# Patient Record
Sex: FEMALE | Race: ASIAN | NOT HISPANIC OR LATINO | Employment: OTHER | URBAN - METROPOLITAN AREA
[De-identification: names, ages, dates, MRNs, and addresses within clinical notes are randomized per-mention and may not be internally consistent; named-entity substitution may affect disease eponyms.]

---

## 2017-01-03 ENCOUNTER — RADIANT APPOINTMENT (OUTPATIENT)
Dept: MAMMOGRAPHY | Facility: CLINIC | Age: 77
End: 2017-01-03

## 2017-01-03 DIAGNOSIS — Z12.31 VISIT FOR SCREENING MAMMOGRAM: ICD-10-CM

## 2017-03-13 ENCOUNTER — HOSPITAL ENCOUNTER (OUTPATIENT)
Facility: CLINIC | Age: 77
Setting detail: OBSERVATION
Discharge: HOME | End: 2017-03-14
Attending: INTERNAL MEDICINE | Admitting: PEDIATRICS
Payer: COMMERCIAL

## 2017-03-13 DIAGNOSIS — K64.8 INTERNAL HEMORRHOIDS: Primary | ICD-10-CM

## 2017-03-13 DIAGNOSIS — K92.2 GASTROINTESTINAL HEMORRHAGE, UNSPECIFIED GASTROINTESTINAL HEMORRHAGE TYPE: ICD-10-CM

## 2017-03-13 DIAGNOSIS — M06.9 RHEUMATOID ARTHRITIS, INVOLVING UNSPECIFIED SITE, UNSPECIFIED RHEUMATOID FACTOR PRESENCE: ICD-10-CM

## 2017-03-13 LAB
ABO + RH BLD: NORMAL
ABO + RH BLD: NORMAL
ALBUMIN SERPL-MCNC: 3.6 G/DL (ref 3.4–5)
ALBUMIN UR-MCNC: 10 MG/DL
ALP SERPL-CCNC: 77 U/L (ref 40–150)
ALT SERPL W P-5'-P-CCNC: 12 U/L (ref 0–50)
ANION GAP SERPL CALCULATED.3IONS-SCNC: 9 MMOL/L (ref 3–14)
APPEARANCE UR: CLEAR
AST SERPL W P-5'-P-CCNC: 10 U/L (ref 0–45)
BASOPHILS # BLD AUTO: 0 10E9/L (ref 0–0.2)
BASOPHILS NFR BLD AUTO: 0.3 %
BILIRUB SERPL-MCNC: 0.5 MG/DL (ref 0.2–1.3)
BILIRUB UR QL STRIP: NEGATIVE
BLD GP AB SCN SERPL QL: NORMAL
BLOOD BANK CMNT PATIENT-IMP: NORMAL
BUN SERPL-MCNC: 12 MG/DL (ref 7–30)
CALCIUM SERPL-MCNC: 9 MG/DL (ref 8.5–10.1)
CHLORIDE SERPL-SCNC: 107 MMOL/L (ref 94–109)
CO2 SERPL-SCNC: 28 MMOL/L (ref 20–32)
COLOR UR AUTO: YELLOW
CREAT SERPL-MCNC: 0.78 MG/DL (ref 0.52–1.04)
CRP SERPL-MCNC: 10 MG/L (ref 0–8)
DIFFERENTIAL METHOD BLD: ABNORMAL
EOSINOPHIL # BLD AUTO: 0.1 10E9/L (ref 0–0.7)
EOSINOPHIL NFR BLD AUTO: 1 %
ERYTHROCYTE [DISTWIDTH] IN BLOOD BY AUTOMATED COUNT: 14.8 % (ref 10–15)
ERYTHROCYTE [SEDIMENTATION RATE] IN BLOOD BY WESTERGREN METHOD: 63 MM/H (ref 0–30)
GFR SERPL CREATININE-BSD FRML MDRD: 73 ML/MIN/1.7M2
GLUCOSE SERPL-MCNC: 98 MG/DL (ref 70–99)
GLUCOSE UR STRIP-MCNC: NEGATIVE MG/DL
HCT VFR BLD AUTO: 30 % (ref 35–47)
HGB BLD-MCNC: 8.9 G/DL (ref 11.7–15.7)
HGB BLD-MCNC: 9.8 G/DL (ref 11.7–15.7)
HGB UR QL STRIP: ABNORMAL
IMM GRANULOCYTES # BLD: 0 10E9/L (ref 0–0.4)
IMM GRANULOCYTES NFR BLD: 0.1 %
INR PPP: 1.11 (ref 0.86–1.14)
KETONES UR STRIP-MCNC: 10 MG/DL
LACTATE BLD-SCNC: 1.2 MMOL/L (ref 0.7–2.1)
LEUKOCYTE ESTERASE UR QL STRIP: NEGATIVE
LYMPHOCYTES # BLD AUTO: 1.3 10E9/L (ref 0.8–5.3)
LYMPHOCYTES NFR BLD AUTO: 16.2 %
MCH RBC QN AUTO: 32.7 PG (ref 26.5–33)
MCHC RBC AUTO-ENTMCNC: 32.7 G/DL (ref 31.5–36.5)
MCV RBC AUTO: 100 FL (ref 78–100)
MONOCYTES # BLD AUTO: 0.4 10E9/L (ref 0–1.3)
MONOCYTES NFR BLD AUTO: 4.9 %
MUCOUS THREADS #/AREA URNS LPF: PRESENT /LPF
NEUTROPHILS # BLD AUTO: 6.1 10E9/L (ref 1.6–8.3)
NEUTROPHILS NFR BLD AUTO: 77.5 %
NITRATE UR QL: NEGATIVE
NRBC # BLD AUTO: 0.1 10*3/UL
NRBC BLD AUTO-RTO: 1 /100
PH UR STRIP: 6 PH (ref 5–7)
PLATELET # BLD AUTO: 223 10E9/L (ref 150–450)
POTASSIUM SERPL-SCNC: 3.7 MMOL/L (ref 3.4–5.3)
PROT SERPL-MCNC: 7.3 G/DL (ref 6.8–8.8)
RBC # BLD AUTO: 3 10E12/L (ref 3.8–5.2)
RBC #/AREA URNS AUTO: 5 /HPF (ref 0–2)
SODIUM SERPL-SCNC: 144 MMOL/L (ref 133–144)
SP GR UR STRIP: 1.02 (ref 1–1.03)
SPECIMEN EXP DATE BLD: NORMAL
SQUAMOUS #/AREA URNS AUTO: <1 /HPF (ref 0–1)
URN SPEC COLLECT METH UR: ABNORMAL
UROBILINOGEN UR STRIP-MCNC: NORMAL MG/DL (ref 0–2)
WBC # BLD AUTO: 7.8 10E9/L (ref 4–11)
WBC #/AREA URNS AUTO: <1 /HPF (ref 0–2)

## 2017-03-13 PROCEDURE — 85018 HEMOGLOBIN: CPT | Performed by: STUDENT IN AN ORGANIZED HEALTH CARE EDUCATION/TRAINING PROGRAM

## 2017-03-13 PROCEDURE — 99220 ZZC INITIAL OBSERVATION CARE,LEVL III: CPT | Mod: AI | Performed by: PEDIATRICS

## 2017-03-13 PROCEDURE — 36415 COLL VENOUS BLD VENIPUNCTURE: CPT | Performed by: INTERNAL MEDICINE

## 2017-03-13 PROCEDURE — G0378 HOSPITAL OBSERVATION PER HR: HCPCS

## 2017-03-13 PROCEDURE — 96360 HYDRATION IV INFUSION INIT: CPT | Performed by: INTERNAL MEDICINE

## 2017-03-13 PROCEDURE — 80053 COMPREHEN METABOLIC PANEL: CPT | Performed by: INTERNAL MEDICINE

## 2017-03-13 PROCEDURE — 86140 C-REACTIVE PROTEIN: CPT | Performed by: INTERNAL MEDICINE

## 2017-03-13 PROCEDURE — 36415 COLL VENOUS BLD VENIPUNCTURE: CPT | Performed by: STUDENT IN AN ORGANIZED HEALTH CARE EDUCATION/TRAINING PROGRAM

## 2017-03-13 PROCEDURE — 25000128 H RX IP 250 OP 636: Performed by: INTERNAL MEDICINE

## 2017-03-13 PROCEDURE — 85652 RBC SED RATE AUTOMATED: CPT | Performed by: INTERNAL MEDICINE

## 2017-03-13 PROCEDURE — 99285 EMERGENCY DEPT VISIT HI MDM: CPT | Mod: Z6 | Performed by: INTERNAL MEDICINE

## 2017-03-13 PROCEDURE — 81001 URINALYSIS AUTO W/SCOPE: CPT | Performed by: INTERNAL MEDICINE

## 2017-03-13 PROCEDURE — 86850 RBC ANTIBODY SCREEN: CPT | Performed by: INTERNAL MEDICINE

## 2017-03-13 PROCEDURE — 25000132 ZZH RX MED GY IP 250 OP 250 PS 637: Performed by: STUDENT IN AN ORGANIZED HEALTH CARE EDUCATION/TRAINING PROGRAM

## 2017-03-13 PROCEDURE — 85610 PROTHROMBIN TIME: CPT | Performed by: INTERNAL MEDICINE

## 2017-03-13 PROCEDURE — 99285 EMERGENCY DEPT VISIT HI MDM: CPT | Mod: 25 | Performed by: INTERNAL MEDICINE

## 2017-03-13 PROCEDURE — 83605 ASSAY OF LACTIC ACID: CPT | Performed by: INTERNAL MEDICINE

## 2017-03-13 PROCEDURE — 86900 BLOOD TYPING SEROLOGIC ABO: CPT | Performed by: INTERNAL MEDICINE

## 2017-03-13 PROCEDURE — 85025 COMPLETE CBC W/AUTO DIFF WBC: CPT | Performed by: INTERNAL MEDICINE

## 2017-03-13 PROCEDURE — 86901 BLOOD TYPING SEROLOGIC RH(D): CPT | Performed by: INTERNAL MEDICINE

## 2017-03-13 RX ORDER — NALOXONE HYDROCHLORIDE 0.4 MG/ML
.1-.4 INJECTION, SOLUTION INTRAMUSCULAR; INTRAVENOUS; SUBCUTANEOUS
Status: DISCONTINUED | OUTPATIENT
Start: 2017-03-13 | End: 2017-03-14 | Stop reason: HOSPADM

## 2017-03-13 RX ORDER — PANTOPRAZOLE SODIUM 40 MG/1
40 TABLET, DELAYED RELEASE ORAL
Status: DISCONTINUED | OUTPATIENT
Start: 2017-03-13 | End: 2017-03-14 | Stop reason: HOSPADM

## 2017-03-13 RX ORDER — FOLIC ACID 1 MG/1
1 TABLET ORAL DAILY
Status: DISCONTINUED | OUTPATIENT
Start: 2017-03-13 | End: 2017-03-13

## 2017-03-13 RX ORDER — MEDROXYPROGESTERONE ACETATE 10 MG
10 TABLET ORAL DAILY
Status: DISCONTINUED | OUTPATIENT
Start: 2017-03-13 | End: 2017-03-14 | Stop reason: HOSPADM

## 2017-03-13 RX ORDER — TRETINOIN 1 MG/G
CREAM TOPICAL AT BEDTIME
Status: DISCONTINUED | OUTPATIENT
Start: 2017-03-13 | End: 2017-03-13

## 2017-03-13 RX ORDER — ACETAMINOPHEN 325 MG/1
650 TABLET ORAL EVERY 4 HOURS PRN
Status: DISCONTINUED | OUTPATIENT
Start: 2017-03-13 | End: 2017-03-14 | Stop reason: HOSPADM

## 2017-03-13 RX ORDER — ONDANSETRON 2 MG/ML
4 INJECTION INTRAMUSCULAR; INTRAVENOUS EVERY 6 HOURS PRN
Status: DISCONTINUED | OUTPATIENT
Start: 2017-03-13 | End: 2017-03-14 | Stop reason: HOSPADM

## 2017-03-13 RX ORDER — ONDANSETRON 4 MG/1
4 TABLET, ORALLY DISINTEGRATING ORAL EVERY 6 HOURS PRN
Status: DISCONTINUED | OUTPATIENT
Start: 2017-03-13 | End: 2017-03-14 | Stop reason: HOSPADM

## 2017-03-13 RX ADMIN — FOLIC ACID 1 MG: 1 TABLET ORAL at 18:19

## 2017-03-13 RX ADMIN — SODIUM CHLORIDE 1000 ML: 9 INJECTION, SOLUTION INTRAVENOUS at 10:51

## 2017-03-13 RX ADMIN — PANTOPRAZOLE SODIUM 40 MG: 40 TABLET, DELAYED RELEASE ORAL at 15:59

## 2017-03-13 RX ADMIN — MEDROXYPROGESTERONE ACETATE 10 MG: 10 TABLET ORAL at 18:19

## 2017-03-13 RX ADMIN — POLYETHYLENE GLYCOL 3350, SODIUM SULFATE ANHYDROUS, SODIUM BICARBONATE, SODIUM CHLORIDE, POTASSIUM CHLORIDE 4000 ML: 236; 22.74; 6.74; 5.86; 2.97 POWDER, FOR SOLUTION ORAL at 22:14

## 2017-03-13 ASSESSMENT — ENCOUNTER SYMPTOMS
ABDOMINAL PAIN: 0
SHORTNESS OF BREATH: 0
FEVER: 0
BLOOD IN STOOL: 1

## 2017-03-13 NOTE — H&P
INTERNAL MEDICINE HISTORY & PHYSICAL   Patrick Olson ([id number]) admitted on 3/13/2017  Primary care provider: Essence Tamayo         Chief Complaint:     Rectal bleeding         History of Present Illness     Patrick Olson is a 76 year old female with history of  Osteoarthritis, osteoporosis, rheumatoid arthritis on methotrexate, and history of diverticular bleed in 2010 who presents to the ED today for rectal bleeding of >1 week. She first noticed bright red bleeding on Monday of last week (3/6/17). She saw her primary care physician on 3/9/17 who started her on antibiotics (augmentin) for possible diverticulitis/diverticular bleed. It was recommended by her PCP that she present to the ED for active bleeding. She presented to the ED today for dark red/brown stool. She experienced some abdominal pain at the start of the bleeding, but none recently, just discomfort. She has been limiting her diet to soft foods and small portions, but has not noticed an improvement. She does have a history of OA and RA, is on methotrexate weekly, also reports taking celecoxib. She is a former smoker, does not drink alcohol, no history of liver disease. She reportedly has a history of a diverticular bleed in 2010 for which she required a transfusion.     In the ED her rectal exam was normal, no evidence of internal or external hemorrhoids, no fissures. However her stool was strongly Guaiac positive. Labs showed a hemoglobin of 9.8, most recent hemoglobin in Care Everywhere is 9.6 from 2011. Lactate was normal. CRP and ESR were mildly elevated. No leukocytosis, BMP normal.          Past Medical History     Past Medical History   Diagnosis Date     Osteoarthritis      Osteoporosis      Rheumatoid arthritis (H)      Sensorineural hearing loss             Past Surgical History     Past Surgical History   Procedure Laterality Date     Orthopedic surgery              Medications     No current facility-administered medications on  file prior to encounter.   Current Outpatient Prescriptions on File Prior to Encounter:  [DISCONTINUED] Celecoxib (CELEBREX PO) Take 200 mg by mouth 2 times daily   tretinoin (RETIN-A) 0.1 % cream Apply topically At Bedtime   Calcium Carbonate-Vitamin D (CALCIUM 600 + D OR) Take 1,200 mg by mouth   Multiple Vitamins-Minerals (MULTIVITAMIN OR)    folic acid (FOLVITE) 1 MG tablet Take 1 mg by mouth daily   Estradiol (ESTROGEL TD) Place 0.5 mg onto the skin   medroxyPROGESTERone (PROVERA) 10 MG tablet Take 10 mg by mouth daily   methotrexate 2.5 MG tablet Take by mouth once a week   DIPHENHYDRAMINE HCL    EPINEPHrine (EPIPEN) 0.3 MG/0.3ML injection Inject 0.3 mg into the muscle once as needed            Allergies     Allergies   Allergen Reactions     Bee Venom Anaphylaxis     Shrimp Anaphylaxis     Evoxac [Cevimeline] Unknown     Prevnar [Pneumococcal 13-Linda Conj Vacc] Unknown            Social History   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     Tobacco: Former smoker  EtOH: None  Other drugs: None    Social History     Social History     Marital status:      Spouse name: N/A     Number of children: N/A     Years of education: N/A     Occupational History     Not on file.     Social History Main Topics     Smoking status: Former Smoker     Smokeless tobacco: Not on file     Alcohol use No     Drug use: No     Sexual activity: Not on file     Other Topics Concern     Not on file     Social History Narrative            Family History   History reviewed. No pertinent family history.         Review of Systems   Complete ROS negative except as noted in HPI         Vitals and Exam     Physical exam:  /64 (BP Location: Left arm)  Pulse 78  Temp 98  F (36.7  C) (Oral)  Resp 16  SpO2 99%  Breastfeeding? No  Wt Readings from Last 2 Encounters:   12/12/16 52.3 kg (115 lb 6.4 oz)   07/25/16 54.2 kg (119 lb 6.4 oz)     No intake or output data in the 24 hours ending 03/13/17 1365    Physical Exam:   General:  female supine in bed, NAD.   HEENT: Sclerae non-icteric. MMM. PERRL, EOMI.  Cardiac: Normal rate, regular rhythm. No m/r/g. Normal S1, S2.  Pulm: Normal respiratory effort. CTAB, no wheezes, crackles, or rhonchi.   Abd: Soft, non distended, non tender abdomen. No hepatosplenomegaly.  Skin: No jaundice, no rash.  Extremities: No LE edema. No cyanosis. No clubbing.  Joints: No inflammation noted on joints.  Neuro: A&Ox3, no focal deficits. Normal speech.   Psych: Euthymic mood, full affect         Labs   CBC  Recent Labs  Lab 03/13/17  1042   WBC 7.8   RBC 3.00*   HGB 9.8*   HCT 30.0*      MCH 32.7   MCHC 32.7   RDW 14.8          BMP  Recent Labs  Lab 03/13/17  1042      POTASSIUM 3.7   CHLORIDE 107   CO2 28   ANIONGAP 9   GLC 98   BUN 12   CR 0.78   GFRESTIMATED 73   GFRESTBLACK 88   EJ 9.0        INR  Recent Labs  Lab 03/13/17  1042   INR 1.11       Liver panel  Recent Labs  Lab 03/13/17  1042   PROTTOTAL 7.3   ALBUMIN 3.6   BILITOTAL 0.5   ALKPHOS 77   AST 10   ALT 12       Urinalysis  Recent Labs   Lab Test  03/13/17   1124   COLOR  Yellow   APPEARANCE  Clear   URINEGLC  Negative   URINEBILI  Negative   URINEKETONE  10*   SG  1.016   UBLD  Trace*   URINEPH  6.0   PROTEIN  10*   NITRITE  Negative   LEUKEST  Negative   RBCU  5*   WBCU  <1       Cultures  None    Imaging/procedure results:  None    ASSESSMENT & PLAN :    # Acute GI Bleed  History of GI bleed requiring transfusion in 2010. Endoscopy at that time showed no evidence of bleed, diverticulosis and internal hemorrhoids. Current GI bleed could be due to diverticular bleed or hemorrhoids.   - GI consult, appreciate recs  - PO pantoprazole 40mg BID  - clear liquid diet, no reds/purples  - GoLytely prep tonight, colonoscopy tomorrow  - NPO at 07:00  - q6h hgb  - d/c augmentin, no signs of infection    #  Rheumatoid arthritis   Stable, no acute issues. Patient takes methotrexate weekly on Tuesdays.   - methotrexate after colonoscopy  tomorrow    ===Chronic conditions===  # Osteoarthritis: no acute issues, tylenol for pain    FEN: Clear liquid diet, no reds/purples; NPO @ 07:00 tomorrow  Prophylaxis:  DVT: mechanical, no chemical due to risk of bleed   GI: pantoprazole as above  Disposition: admit to observation, to home pending evaluation by GI  Code Status: FULL CODE     Annabella Haq MD  Internal Medicine PGY1  [id number]    Patient was seen and discussed with attending physician Dr. Ruby who agrees with above assessment and plan.    Attestation:  Date of Service (when I saw the patient): 3/13/17    This patient has been seen and evaluated by me, Donald Ruby.  Discussed with the house staff team or resident(s) and agree with the findings and plan in this note.     I have reviewed today's Medications, Vital Signs and Labs.    Here with recurrence of GI bleeding.  GI to perform coloscopy in AM.

## 2017-03-13 NOTE — ED NOTES
Pt arrived in the ED with reports of rectal bleeding x8 days, now is developing abdominal discomfort; states she was seen by her MD for the symptoms.  Pt did not take any of her medications or eat/drink today thinking that she would have a colonoscopy.  Pt is awake, alert, and oriented x4; pt is ambulatory with a steady gait.

## 2017-03-13 NOTE — CONSULTS
GASTROENTEROLOGY CONSULTATION      Date of Admission:  3/13/2017          Chief Complaint:   We were asked by Annabella Haq MD to evaluate this patient with BRBPR.         ASSESSMENT AND RECOMMENDATIONS:   Assessment:  Patrick Olson is a 76 year old female with a history of osteoarthritis, rheumatoid arthritis on methotrexate/celecoxib, osteoporosis, internal hemorrhoids, diverticulosis and possible diverticular bleed in 2010 as her colonoscopy did not indicate evidence of bleed endoscopically. Hgb has been chronically low (11.1 - 9.8).      Patient's BRBPR is likely from her known internal hemorrhoids, but it could also be s/t diverticula bleed or angiodysplasia. Plan is to monitor patient overnight while prepping for colonoscopy tomorrow.      Recommendations  -- Monitor hgb, and transfuse if less than 7  -- Ranitidine 150mg twice daily   -- Clear liquids (no red or purple), NPO at 7:00am  -- 4 liters of Golytely, and if not clear at 10pm, please give 2 more liters of bowel prep  -- Please stop Augmentin as there is no evidence of infection     Gastroenterology follow up recommendations: To be determined       Patient care plan discussed with Dr. Downey, GI staff physician. Thank you for involving us in this patient's care. Please do not hesitate to contact the GI service with any questions or concerns.     Reza Rivera CNP  Department of Gastroenterology   -------------------------------------------------------------------------------------------------------------------   History is obtained from the patient and the medical record.          History of Present Illness:   Patrick Olson is a 76 year old female with a history of osteoarthritis, rheumatoid arthritis on methotrexate/celecoxib, osteoporosis, internal hemorrhoids, diverticulosis and possible diverticular bleed in 2010 as there was no evidence of bleed seen endoscopically. Hgb has been chronically low (11.1 - 9.8).      Patient had BRBPR  with all bowel movements since 3/4/17, and the blood amount has been increasing. She went to her PCP on 3/6/17, and patient reports receiving Augmentin for diverticulitis. In the past couple of weeks her stool has been narrow and finger size circumferentially. She also noticed pain that travels from stomach through the rectum after eating. Therefore, she has been eating small meals and less frequently. Patient is former smoker of x1 year (age [age]).           Past Medical History:   Reviewed and edited as appropriate  Past Medical History   Diagnosis Date     Osteoarthritis      Osteoporosis      Rheumatoid arthritis (H)      Sensorineural hearing loss             Past Surgical History:   Reviewed and edited as appropriate   Past Surgical History   Procedure Laterality Date     Orthopedic surgery              Previous Endoscopy:     Results for orders placed or performed in visit on 08/19/10   COLONOSCOPY   Result Value Ref Range    COLONOSCOPY       [facility], [place]  Endoscopy Department-[facility]  _______________________________________________________________________________  Patient Name: Patrick Olson             Gender: F                                   Procedure Date: 8/19/2010 08:40:00 AM MRN: [id number]                             YOB: 1940             Age: 69                                     Admit Type: Inpatient                 Account #: [id number]                       Note Status: Finalized                Attending MD: Tori Rock MD         Pause For The Cause: Pause for the ca   _______________________________________________________________________________     Procedure:           Colonoscopy  Indications:         Pt with RA, diverticulosis who has had significant                        diverticualr bleed in the recent past, now presents with                        minimal hematochezia with no hgb drop.  Providers:           Tori  Elvia Rock MD, Bam Salazar MD, Vandana Monroy RN  Referring MD:        Essence Tamayo MD  Medicines:           Fentanyl  mcgs, Versed IV 2.5 mgs  Complications:       No immediate complications  _______________________________________________________________________________  Procedure:           - Prior to the procedure, a History and Physical was                        performed, and patient medication allergies were                        reviewed. The patient is competent. The risks and                        benefits of the procedure and the sedation options and                        risks were discussed with the patient. All questions                        were answered and informed consent was obtained. Patient                        identification and proposed procedure were verified by                        the physician in the pre-procedure area in the procedure                        room. Mental Status Examination: normal. Airway                        Examination: normal oropharyngeal airway and neck                        mobility. Respiratory Examination: clear to                        auscultation. CV Examination: normal. Prophylactic                        Antibiotics: The patient does not require prophylactic                        antibiotics. Prior Anticoagulants: The patient has taken                        no previous anticoagulant or antiplatelet agents. ASA                        Grade Assessment: II - A patient with mild systemic                        disease. After reviewing the risks and benefits, the                        patient was deemed in satisfactory condition to undergo                        the procedure. The anesthesia plan was to use moderate                        sedation / analgesia (conscious sedation). Immediately                        prior to administration of medications, the patient was                        re-assessed  for adequacy to receive sedatives. The heart                        rate, respiratory rate, oxygen saturations, blood                        pressure, adequacy of pulmonary ventilation, and                        response to care were monitored throughout the                        procedure. The physical status of the patient was                        re-assessed after the procedure.                       After obtaining informed consent, the colonoscope was                        passed under direct vision. Throughout the procedure,                        the patient's blood pressure, pulse, and oxygen                        saturations were monitored continuously. The Colonoscope                        was introduced through the anus and advanced to the                        ileum. The colonoscopy was performed without difficulty.                        The patient tolerated the procedure well. The quality of                        the prep was adequate.                                                                                   Findings:       Multiple small-mouthed diverticula were found in the entire colon. There        is no endoscopic evidence of: bleeding in the entire colon. The exam was        otherwise normal throughout the examined colon.The terminal ileum        appeared normal. There is no endoscopic evidence of: bleeding in the        distal ileum and in the ileocecal valve. Internal, non-bleeding,        medium-sized hemorrhoids were found during retroflexion.                                                                                   Impression:          - Diverticulosis colon.                       - The terminal ileum is normal.                       - No blood or source of bleeding found.                       - Internal, non bleeding, medium-sized hemorrhoids were                        found, this could have been the source of her                         hematochezia.  Recommendation:      - [facility] for [facility].                       - GI consult team will follow.                                                                                     electronically signed by Tori Rock  ______________________  Tori Rock MD  Signed Date: 8/19/2010 11:00:35 AM  Number of Addenda: 0  I was physically present for the entire viewing portion of the exam.      __________________________  Signature of teaching physician    B4c/D4c  Note initiated on 8/19/2010 08:40:37 AM    _____________________  Bam Salazar MD            Social History:   Reviewed and edited as appropriate  Social History     Social History     Marital status:      Spouse name: N/A     Number of children: N/A     Years of education: N/A     Occupational History     Not on file.     Social History Main Topics     Smoking status: Former Smoker     Smokeless tobacco: Not on file     Alcohol use No     Drug use: No     Sexual activity: Not on file     Other Topics Concern     Not on file     Social History Narrative            Family History:   Reviewed and edited as appropriate  No family history on file.        Allergies:   Reviewed and edited as appropriate     Allergies   Allergen Reactions     Bee Venom Anaphylaxis     Shrimp Anaphylaxis     Evoxac [Cevimeline] Unknown     Prevnar [Pneumococcal 13-Linda Conj Vacc] Unknown            Medications:     Prescriptions Prior to Admission   Medication Sig Dispense Refill Last Dose     tretinoin (RETIN-A) 0.1 % cream Apply topically At Bedtime   Taking     Calcium Carbonate-Vitamin D (CALCIUM 600 + D OR) Take 1,200 mg by mouth   Taking     Multiple Vitamins-Minerals (MULTIVITAMIN OR)    Taking     folic acid (FOLVITE) 1 MG tablet Take 1 mg by mouth daily   Taking     Estradiol (ESTROGEL TD) Place 0.5 mg onto the skin   Taking     medroxyPROGESTERone (PROVERA) 10 MG tablet Take 10 mg by mouth daily   Taking      methotrexate 2.5 MG tablet Take by mouth once a week   Taking     DIPHENHYDRAMINE HCL    Taking     EPINEPHrine (EPIPEN) 0.3 MG/0.3ML injection Inject 0.3 mg into the muscle once as needed   Taking             Review of Systems:   A complete review of systems was performed and is negative except as noted in the HPI           Physical Exam:   /64 (BP Location: Left arm)  Pulse 78  Temp 98  F (36.7  C) (Oral)  Resp 16  SpO2 99%  Breastfeeding? No  Wt:   Wt Readings from Last 2 Encounters:   12/12/16 52.3 kg (115 lb 6.4 oz)   07/25/16 54.2 kg (119 lb 6.4 oz)      Constitutional: cooperative, pleasant, not dyspneic/diaphoretic, no acute distress  Eyes: Sclera anicteric/injected  Ears/nose/mouth/throat: Normal oropharynx without ulcers or exudate, mucus membranes moist, hearing intact  Neck: supple, thyroid normal size  CV: RRR. No edema in LE.  Respiratory: Unlabored breathing. CTA bilaterally.   Lymph: No axillary, submandibular, supraclavicular or inguinal lymphadenopathy  Abd: Nondistended, +bs, no hepatosplenomegaly, no peritoneal signs. LLQ tenderness.  Rectal exam: Dark brown stool with the rectal exam  Skin: warm, perfused, no jaundice  Neuro: AAO x 3, No asterixis  Psych: Normal affect  MSK: Normal gait         Data:   Labs and imaging below were independently reviewed and interpreted    BMP  Recent Labs  Lab 03/13/17  1042      POTASSIUM 3.7   CHLORIDE 107   EJ 9.0   CO2 28   BUN 12   CR 0.78   GLC 98     CBC  Recent Labs  Lab 03/13/17  1042   WBC 7.8   RBC 3.00*   HGB 9.8*   HCT 30.0*      MCH 32.7   MCHC 32.7   RDW 14.8        INR  Recent Labs  Lab 03/13/17  1042   INR 1.11     LFTs  Recent Labs  Lab 03/13/17  1042   ALKPHOS 77   AST 10   ALT 12   BILITOTAL 0.5   PROTTOTAL 7.3   ALBUMIN 3.6      PANCNo lab results found in last 7 days.

## 2017-03-13 NOTE — PLAN OF CARE
Problem: Discharge Planning  Goal: Discharge Planning (Adult, OB, Behavioral, Peds)  Observation goals PRIOR TO DISCHARGE     Comments: -diagnostic tests and consults completed and resulted : No not yet  -vital signs normal or at patient baseline: Yes  Nurse to notify provider when observation goals have been met and patient is ready for discharge.

## 2017-03-13 NOTE — IP AVS SNAPSHOT
[address] Observation [address] [address]    Phone:  [phone]    Fax:  [phone]                                       After Visit Summary   3/13/2017    Patrick Olson    MRN: [id number]           After Visit Summary Signature Page     I have received my discharge instructions, and my questions have been answered. I have discussed any challenges I see with this plan with the nurse or doctor.    ..........................................................................................................................................  Patient/Patient Representative Signature      ..........................................................................................................................................  Patient Representative Print Name and Relationship to Patient    ..................................................               ................................................  Date                                            Time    ..........................................................................................................................................  Reviewed by Signature/Title    ...................................................              ..............................................  Date                                                            Time

## 2017-03-13 NOTE — IP AVS SNAPSHOT
MRN:[id number]                      After Visit Summary   3/13/2017    Patrick Olson    MRN: [id number]           Thank you!     Thank you for choosing [facility] for your care. Our goal is always to provide you with excellent care. Hearing back from our patients is one way we can continue to improve our services. Please take a few minutes to complete the written survey that you may receive in the mail after you visit with us. Thank you!        Patient Information     Date Of Birth          1940        About your hospital stay     You were admitted on:  March 13, 2017 You last received care in the:  [facility] Observation [facility]    You were discharged on:  March 14, 2017        Reason for your hospital stay       You were admitted for a GI bleed. You were evaluated with a colonoscopy. No sign of bleeding was seen.                  Who to Call     For medical emergencies, please call 911.  For non-urgent questions about your medical care, please call your primary care provider or clinic, [phone]  For questions related to your surgery, please call your surgery clinic        Attending Provider     Provider Specialty    Erasmo Ruffin MD Internal Medicine    Donald Ruby MD Pediatrics       Primary Care Provider Office Phone # Fax #    Essence Tamayo [phone]       [address] [address]         When to contact your care team       Contact your primary care provider or present to the emergency department if you have temperature >101 degrees, if you have large volume bleeding, or if you feel you may pass out.                  After Care Instructions     Activity       Your activity upon discharge: activity as tolerated            Diet       Follow this diet upon discharge: regular diet, fiber            Discharge Instructions       Please follow-up with your primary care provider with 2-3 days to discuss results of  "biopsy and culture, and to recheck hemoglobin. Stop taking Augmentin as there was no sign of infection on colonoscopy.                  Pending Results     Date and Time Order Name Status Description    3/14/2017 1101 Surgical pathology exam In process     3/14/2017 1100 Clostridium difficile toxin B PCR In process             Statement of Approval     Ordered          17 1503  I have reviewed and agree with all the recommendations and orders detailed in this document.  EFFECTIVE NOW     Approved and electronically signed by:  Annabella Haq MD             Admission Information     Date & Time Provider Department Dept. Phone    3/13/2017 Donald Ruby MD Unit 6D Observation [facility] [place] [phone]      Your Vitals Were     Blood Pressure Pulse Temperature Respirations Pulse Oximetry       117/58 78 98.7  F (37.1  C) (Oral) 16 100%       MyChart Information     [employer] lets you send messages to your doctor, view your test results, renew your prescriptions, schedule appointments and more. To sign up, go to [email or web address] . Click on \"Log in\" on the left side of the screen, which will take you to the Welcome page. Then click on \"Sign up Now\" on the right side of the page.     You will be asked to enter the access code listed below, as well as some personal information. Please follow the directions to create your username and password.     Your access code is: [email or web address]  Expires: 2017  3:07 PM     Your access code will  in 90 days. If you need help or a new code, please call your [place] clinic or [phone].        Care EveryWhere ID     This is your Care EveryWhere ID. This could be used by other organizations to access your [facility] medical records  [id number]           Review of your medicines      START taking        Dose / Directions    hydrocortisone 25 MG Suppository   Commonly known as:  ANUSOL-HC   Used for:  Internal hemorrhoids        Dose:  25 mg   Place 1 " suppository (25 mg) rectally 2 times daily as needed for hemorrhoids   Quantity:  28 suppository   Refills:  0         CONTINUE these medicines which may have CHANGED, or have new prescriptions. If we are uncertain of the size of tablets/capsules you have at home, strength may be listed as something that might have changed.        Dose / Directions    ranitidine 150 MG tablet   Commonly known as:  ZANTAC   This may have changed:    - medication strength  - when to take this   Used for:  Gastrointestinal hemorrhage, unspecified gastrointestinal hemorrhage type        Dose:  150 mg   Take 1 tablet (150 mg) by mouth 2 times daily   Refills:  0         CONTINUE these medicines which have NOT CHANGED        Dose / Directions    bromfenac 0.09 % ophthalmic solution   Commonly known as:  XIBROM        Dose:  1 drop   Place 1 drop Into the left eye 2 times daily   Refills:  0       calcium carb 1250 mg (500 mg [place])/vitamin D 200 units 500-200 MG-UNIT per tablet   Commonly known as:  OSCAL with D        Dose:  2 tablet   Take 2 tablets by mouth daily   Refills:  0       CELEBREX PO        Dose:  200 mg   Take 200 mg by mouth 2 times daily   Refills:  0       EPIPEN 0.3 MG/0.3ML injection   Generic drug:  EPINEPHrine        Dose:  0.3 mg   Inject 0.3 mg into the muscle once as needed   Refills:  0       ESTRADIOL PO        Dose:  0.5 mg   Take 0.5 mg by mouth daily   Refills:  0       folic acid 1 MG tablet   Commonly known as:  FOLVITE        Dose:  3 mg   Take 3 mg by mouth daily   Refills:  0       medroxyPROGESTERone 10 MG tablet   Commonly known as:  PROVERA        Dose:  10 mg   Take 10 mg by mouth daily One tablet (10 mg) daily for Days 1-12 of each month.   Refills:  0       methotrexate 2.5 MG tablet CHEMO        Dose:  15 mg   Take 15 mg by mouth once a week Weekly on Tuesdays   Refills:  0       OCUVITE PRESERVISION PO        Dose:  1 tablet   Take 1 tablet by mouth daily   Refills:  0       PILOCARPINE HCL PO         Dose:  5 mg   Take 5 mg by mouth 3 times daily   Refills:  0       tretinoin 0.1 % cream   Commonly known as:  RETIN-A        Apply topically At Bedtime   Refills:  0            Where to get your medicines      These medications were sent to [id number] - [address] AT [address] & [address] [address]     Phone:  [phone]     hydrocortisone 25 MG Suppository         Some of these will need a paper prescription and others can be bought over the counter. Ask your nurse if you have questions.     You don't need a prescription for these medications     ranitidine 150 MG tablet                Protect others around you: Learn how to safely use, store and throw away your medicines at [email or web address].             Medication List: This is a list of all your medications and when to take them. Check marks below indicate your daily home schedule. Keep this list as a reference.      Medications           Morning Afternoon Evening Bedtime As Needed    bromfenac 0.09 % ophthalmic solution   Commonly known as:  XIBROM   Place 1 drop Into the left eye 2 times daily                                calcium carb 1250 mg (500 mg [place])/vitamin D 200 units 500-200 MG-UNIT per tablet   Commonly known as:  OSCAL with D   Take 2 tablets by mouth daily                                CELEBREX PO   Take 200 mg by mouth 2 times daily                                EPIPEN 0.3 MG/0.3ML injection   Inject 0.3 mg into the muscle once as needed   Generic drug:  EPINEPHrine                                ESTRADIOL PO   Take 0.5 mg by mouth daily                                folic acid 1 MG tablet   Commonly known as:  FOLVITE   Take 3 mg by mouth daily   Last time this was given:  1 mg on 3/13/2017  6:19 PM                                hydrocortisone 25 MG Suppository   Commonly known as:  ANUSOL-HC   Place 1 suppository (25 mg) rectally 2 times daily as  needed for hemorrhoids                                medroxyPROGESTERone 10 MG tablet   Commonly known as:  PROVERA   Take 10 mg by mouth daily One tablet (10 mg) daily for Days 1-12 of each month.   Last time this was given:  10 mg on 3/13/2017  6:19 PM                                methotrexate 2.5 MG tablet CHEMO   Take 15 mg by mouth once a week Weekly on Tuesdays                                OCUVITE PRESERVISION PO   Take 1 tablet by mouth daily                                PILOCARPINE HCL PO   Take 5 mg by mouth 3 times daily                                ranitidine 150 MG tablet   Commonly known as:  ZANTAC   Take 1 tablet (150 mg) by mouth 2 times daily                                tretinoin 0.1 % cream   Commonly known as:  RETIN-A   Apply topically At Bedtime

## 2017-03-13 NOTE — ED PROVIDER NOTES
"  History     Chief Complaint   Patient presents with     Rectal Bleeding     HPI  Patrick Olson is a 76 year old female who presents to the ED today with concern for rectal bleeding. Patient reports onset of bright red blood in stool on Monday (~7 days ago). She was evaluated for this on Thursday and started on a course of antibiotics. She notes her doctor was concerned she maybe having a diverticular bleed as she has had one in the past. Patient was recommended by her doctor to come into the ED if she was \"actively bleeding\". Patient states she was sure what this meant however she continued to have blood in herstool and noted today the blood was more of a dark brown color. She endorses some abdominal discomfort last week and now reports gurgling from paraumbilical region to supra pubic region. She states she has tried a soft food diet and eating only small portions at a time with no symptomatic improvement.          I have reviewed the Medications, Allergies, Past Medical and Surgical History, and Social History in the Epic system.        PAST MEDICAL HISTORY:   Past Medical History   Diagnosis Date     Osteoarthritis      Osteoporosis      Rheumatoid arthritis (H)      Sensorineural hearing loss        PAST SURGICAL HISTORY:   Past Surgical History   Procedure Laterality Date     Orthopedic surgery         FAMILY HISTORY: No family history on file.    SOCIAL HISTORY:   Social History   Substance Use Topics     Smoking status: Former Smoker     Smokeless tobacco: Not on file     Alcohol use No     No current facility-administered medications for this encounter.      Current Outpatient Prescriptions   Medication     [DISCONTINUED] Celecoxib (CELEBREX PO)     tretinoin (RETIN-A) 0.1 % cream     Calcium Carbonate-Vitamin D (CALCIUM 600 + D OR)     Multiple Vitamins-Minerals (MULTIVITAMIN OR)     folic acid (FOLVITE) 1 MG tablet     Estradiol (ESTROGEL TD)     medroxyPROGESTERone (PROVERA) 10 MG tablet     " methotrexate 2.5 MG tablet     DIPHENHYDRAMINE HCL     EPINEPHrine (EPIPEN) 0.3 MG/0.3ML injection        Allergies   Allergen Reactions     Bee Venom Anaphylaxis     Shrimp Anaphylaxis     Evoxac [Cevimeline] Unknown     Prevnar [Pneumococcal 13-Linda Conj Vacc] Unknown       Review of Systems   Constitutional: Negative for fever.   Respiratory: Negative for shortness of breath.    Cardiovascular: Negative for chest pain.   Gastrointestinal: Positive for blood in stool. Negative for abdominal pain.   All other systems reviewed and are negative.      Physical Exam      Physical Exam   Constitutional: She is oriented to person, place, and time. No distress.   HENT:   Head: Atraumatic.   Mouth/Throat: Oropharynx is clear and moist. No oropharyngeal exudate.   Eyes: Pupils are equal, round, and reactive to light. No scleral icterus.   Neck: Neck supple. No JVD present.   Cardiovascular: Normal rate, normal heart sounds and intact distal pulses.  Exam reveals no gallop and no friction rub.    No murmur heard.  Pulmonary/Chest: Effort normal and breath sounds normal. No respiratory distress. She has no wheezes. She has no rales. She exhibits no tenderness.   Abdominal: Soft. Bowel sounds are normal. She exhibits no distension and no mass. There is no tenderness. There is no rebound and no guarding.   Genitourinary: Rectal exam shows guaiac positive stool (yellow mucous strongly positive). Rectal exam shows no external hemorrhoid, no internal hemorrhoid, no fissure, no mass, no tenderness and anal tone normal.   Musculoskeletal: She exhibits no edema or tenderness.   Neurological: She is alert and oriented to person, place, and time. No cranial nerve deficit. Coordination normal.   Skin: Skin is warm. No rash noted. She is not diaphoretic.       ED Course     ED Course     Procedures       10:30 AM  The patient was seen and examined by Dr. Ruffin in Room 10.     Results for orders placed or performed during the hospital  encounter of 03/13/17 (from the past 24 hour(s))   CBC with platelets differential     Status: Abnormal    Collection Time: 03/13/17 10:42 AM   Result Value Ref Range    WBC 7.8 4.0 - 11.0 10e9/L    RBC Count 3.00 (L) 3.8 - 5.2 10e12/L    Hemoglobin 9.8 (L) 11.7 - 15.7 g/dL    Hematocrit 30.0 (L) 35.0 - 47.0 %     78 - 100 fl    MCH 32.7 26.5 - 33.0 pg    MCHC 32.7 31.5 - 36.5 g/dL    RDW 14.8 10.0 - 15.0 %    Platelet Count 223 150 - 450 10e9/L    Diff Method Automated Method     % Neutrophils 77.5 %    % Lymphocytes 16.2 %    % Monocytes 4.9 %    % Eosinophils 1.0 %    % Basophils 0.3 %    % Immature Granulocytes 0.1 %    Nucleated RBCs 1 (H) 0 /100    Absolute Neutrophil 6.1 1.6 - 8.3 10e9/L    Absolute Lymphocytes 1.3 0.8 - 5.3 10e9/L    Absolute Monocytes 0.4 0.0 - 1.3 10e9/L    Absolute Eosinophils 0.1 0.0 - 0.7 10e9/L    Absolute Basophils 0.0 0.0 - 0.2 10e9/L    Abs Immature Granulocytes 0.0 0 - 0.4 10e9/L    Absolute Nucleated RBC 0.1    ABO/Rh type and screen     Status: None    Collection Time: 03/13/17 10:42 AM   Result Value Ref Range    ABO O     RH(D)  Pos     Antibody Screen Neg     Test Valid Only At       [facility]    Specimen Expires 03/16/2017    INR     Status: None    Collection Time: 03/13/17 10:42 AM   Result Value Ref Range    INR 1.11 0.86 - 1.14   Comprehensive metabolic panel     Status: None    Collection Time: 03/13/17 10:42 AM   Result Value Ref Range    Sodium 144 133 - 144 mmol/L    Potassium 3.7 3.4 - 5.3 mmol/L    Chloride 107 94 - 109 mmol/L    Carbon Dioxide 28 20 - 32 mmol/L    Anion Gap 9 3 - 14 mmol/L    Glucose 98 70 - 99 mg/dL    Urea Nitrogen 12 7 - 30 mg/dL    Creatinine 0.78 0.52 - 1.04 mg/dL    GFR Estimate 73 >60 mL/min/1.7m2    GFR Estimate If Black 88 >60 mL/min/1.7m2    Calcium 9.0 8.5 - 10.1 mg/dL    Bilirubin Total 0.5 0.2 - 1.3 mg/dL    Albumin 3.6 3.4 - 5.0 g/dL    Protein Total 7.3 6.8 - 8.8 g/dL    Alkaline  Phosphatase 77 40 - 150 U/L    ALT 12 0 - 50 U/L    AST 10 0 - 45 U/L   Lactic acid whole blood     Status: None    Collection Time: 03/13/17 10:42 AM   Result Value Ref Range    Lactic Acid 1.2 0.7 - 2.1 mmol/L   CRP inflammation     Status: Abnormal    Collection Time: 03/13/17 10:42 AM   Result Value Ref Range    CRP Inflammation 10.0 (H) 0.0 - 8.0 mg/L   Erythrocyte sedimentation rate auto     Status: Abnormal    Collection Time: 03/13/17 10:42 AM   Result Value Ref Range    Sed Rate 63 (H) 0 - 30 mm/h   UA reflex to Microscopic and Culture     Status: Abnormal    Collection Time: 03/13/17 11:24 AM   Result Value Ref Range    Color Urine Yellow     Appearance Urine Clear     Glucose Urine Negative NEG mg/dL    Bilirubin Urine Negative NEG    Ketones Urine 10 (A) NEG mg/dL    Specific Gravity Urine 1.016 1.003 - 1.035    Blood Urine Trace (A) NEG    pH Urine 6.0 5.0 - 7.0 pH    Protein Albumin Urine 10 (A) NEG mg/dL    Urobilinogen mg/dL Normal 0.0 - 2.0 mg/dL    Nitrite Urine Negative NEG    Leukocyte Esterase Urine Negative NEG    Source Midstream Urine     RBC Urine 5 (H) 0 - 2 /HPF    WBC Urine <1 0 - 2 /HPF    Squamous Epithelial /HPF Urine <1 0 - 1 /HPF    Mucous Urine Present (A) NEG /LPF             Labs Ordered and Resulted from Time of ED Arrival Up to the Time of Departure from the ED   CBC WITH PLATELETS DIFFERENTIAL - Abnormal; Notable for the following:        Result Value    RBC Count 3.00 (*)     Hemoglobin 9.8 (*)     Hematocrit 30.0 (*)     Nucleated RBCs 1 (*)     All other components within normal limits   UA MACROSCOPIC WITH REFLEX TO MICRO AND CULTURE - Abnormal; Notable for the following:     Ketones Urine 10 (*)     Blood Urine Trace (*)     Protein Albumin Urine 10 (*)     RBC Urine 5 (*)     Mucous Urine Present (*)     All other components within normal limits   CRP INFLAMMATION - Abnormal; Notable for the following:     CRP Inflammation 10.0 (*)     All other components within normal  limits   ERYTHROCYTE SEDIMENTATION RATE AUTO - Abnormal; Notable for the following:     Sed Rate 63 (*)     All other components within normal limits   INR   COMPREHENSIVE METABOLIC PANEL   LACTIC ACID WHOLE BLOOD   CRP INFLAMMATION   ERYTHROCYTE SEDIMENTATION RATE AUTO   CARDIAC CONTINUOUS MONITORING   ORTHOSTATIC BLOOD PRESSURE AND PULSE   ABO/RH TYPE AND SCREEN       Assessments & Plan (with Medical Decision Making)  GI bleed in the pt with RA on mtx but no NSAIDs, but h/o similar event 2000 that required PRBC transfusion for Hgb 7, colonoscope at that time showed just internal hemorroids, Hgb down to 9.8 from 10.3 baseline appears to be 10-11's, still strongly pos hemoccult, D/W GI-admit and colonoscope in am, not recommending CTA at this time, D/W Medicine-admit.       I have reviewed the nursing notes.    I have reviewed the findings, diagnosis, plan and need for follow up with the patient.    New Prescriptions    No medications on file       Final diagnoses:   Gastrointestinal hemorrhage, unspecified gastrointestinal hemorrhage type     I,Todd Wilcox , sandra serving as a trained medical scribe to document services personally performed by Erasmo Ruffin MD, based on the provider's statements to me.   I, Erasmo Ruffin MD, was physically present and have reviewed and verified the accuracy of this note documented by Todd Wilcox.    3/13/2017   [facility], [place], EMERGENCY DEPARTMENT     Erasmo Ruffin MD  03/13/17 [id number]

## 2017-03-14 VITALS
HEART RATE: 78 BPM | TEMPERATURE: 98.7 F | DIASTOLIC BLOOD PRESSURE: 58 MMHG | OXYGEN SATURATION: 100 % | SYSTOLIC BLOOD PRESSURE: 117 MMHG | RESPIRATION RATE: 16 BRPM

## 2017-03-14 LAB
C DIFF TOX B STL QL: NORMAL
COLONOSCOPY: NORMAL
COPATH REPORT: NORMAL
HGB BLD-MCNC: 10 G/DL (ref 11.7–15.7)
HGB BLD-MCNC: 8.9 G/DL (ref 11.7–15.7)
HGB BLD-MCNC: 9.4 G/DL (ref 11.7–15.7)
SPECIMEN SOURCE: NORMAL

## 2017-03-14 PROCEDURE — 45380 COLONOSCOPY AND BIOPSY: CPT | Performed by: INTERNAL MEDICINE

## 2017-03-14 PROCEDURE — 85018 HEMOGLOBIN: CPT | Performed by: STUDENT IN AN ORGANIZED HEALTH CARE EDUCATION/TRAINING PROGRAM

## 2017-03-14 PROCEDURE — 25000125 ZZHC RX 250: Performed by: INTERNAL MEDICINE

## 2017-03-14 PROCEDURE — 25000128 H RX IP 250 OP 636: Performed by: INTERNAL MEDICINE

## 2017-03-14 PROCEDURE — 36415 COLL VENOUS BLD VENIPUNCTURE: CPT | Performed by: STUDENT IN AN ORGANIZED HEALTH CARE EDUCATION/TRAINING PROGRAM

## 2017-03-14 PROCEDURE — 99153 MOD SED SAME PHYS/QHP EA: CPT | Performed by: INTERNAL MEDICINE

## 2017-03-14 PROCEDURE — 88305 TISSUE EXAM BY PATHOLOGIST: CPT | Performed by: INTERNAL MEDICINE

## 2017-03-14 PROCEDURE — G0378 HOSPITAL OBSERVATION PER HR: HCPCS

## 2017-03-14 PROCEDURE — G0500 MOD SEDAT ENDO SERVICE >5YRS: HCPCS | Performed by: INTERNAL MEDICINE

## 2017-03-14 PROCEDURE — 99217 ZZC OBSERVATION CARE DISCHARGE: CPT | Mod: GC | Performed by: INTERNAL MEDICINE

## 2017-03-14 PROCEDURE — 87493 C DIFF AMPLIFIED PROBE: CPT | Performed by: PEDIATRICS

## 2017-03-14 RX ORDER — HYDROCORTISONE ACETATE 25 MG/1
25 SUPPOSITORY RECTAL 2 TIMES DAILY PRN
Qty: 28 SUPPOSITORY | Refills: 0 | Status: SHIPPED
Start: 2017-03-14 | End: 2019-02-26

## 2017-03-14 RX ORDER — FENTANYL CITRATE 50 UG/ML
INJECTION, SOLUTION INTRAMUSCULAR; INTRAVENOUS PRN
Status: DISCONTINUED | OUTPATIENT
Start: 2017-03-14 | End: 2017-03-14 | Stop reason: HOSPADM

## 2017-03-14 NOTE — DISCHARGE SUMMARY
Medicine Discharge Summary  Patrick Olson MRN: [id number]  1940  Home clinic: [facility]  Primary care provider: Essence Tamayo  ___________________________________          Date of Admission:  3/13/2017  Date of Discharge:  3/14/2017   Admitting Physician:  Donald Ruby MD  Discharge Physician:  Ghazala Adams MD  Discharging Service:  Internal Medicine, [place] [address]     Primary Provider: Essence Tamayo         Reason for Admission:   Patrick Olson is a 77 yo female with a history of osteoporosis, osteoarthritis, rheumatoid arthritis on methotrexate, and a history of diverticular bleed in 2010 who presented with rectal bleeding of 1 week duration and was admitted to observation for bowel clean out and colonoscopy.          Discharge Diagnosis:   # Bright Red Blood Per Rectum  # Rheumatoid Arthritis  # Osteoarthritis         Procedures & Significant Findings:   3/14/17 Colonoscopy Per Report:       The perianal and digital rectal examinations were normal.        The terminal ileum appeared normal.        Scattered mild inflammation characterized by erosions and erythema was        found in the cecum. Biopsies were taken with a cold forceps for        histology. Aspiration of stool was taken to test for clostridium        difficile toxin B        Many small and medium sized diverticula were found in the entire colon.        Non-bleeding internal hemorrhoids were found during retroflexion and        were medium-sized.          Consultations:   Gastroenterology         Hospital Course by Problem:    # Bright Red Blood Per Rectum - Patrick presented with bright red blood per rectum concerning for a GI bleed with a slight drop from her baseline hemoglobin.  She was hemodynamically stable and evaluated by gastroenterology in the emergency department who recommended admission to observation with bowel clean out and colonoscopy.  This was completed on  3/14/2017 while monitored hemoglobin levels were stable.  Procedure revealed no active bleeding source but did note some internal hemorrhoids and non-bleeding diverticuli as well as some mild inflammation of the cecum.  The cecal inflammation may be secondary to bowel prep but biopsies were taken and a stool aspirate was sent for C. Diff testing in the setting of recent antibiotics (found to be negative).  Following the procedure, Patrick was stable and able to tolerate a diet without further observed BRBPR.  She was discharged to home with follow up with her PCP within 2-3 days to review pathology results and hemoglobin recheck.  Antibiotics were discontinued this admission.    # Rheumatoid Arthritis - No acute issues this admission.  Patrick may take her home methotrexate tomorrow (typically takes on Tuesdays but skipped due to procedure) and follow up as noted below.    # Osteoarthritis - No acute issues, continue acetaminophen as needed for pain.    Physical Exam on day of Discharge:  Blood pressure 117/58, pulse 78, temperature 98.7  F (37.1  C), temperature source Oral, resp. rate 16, SpO2 100 %, not currently breastfeeding.  General: well appearing woman, NAD,  at bedside  HEENT: NC/AT  Neck: supple  Respiratory: CTAB  Heart/CV: RRR, no m/r/g  Abdomen/GI: normal bowel sounds, soft, nondistended, nontender  Extremities/MSK: no edema  Skin: no rashes/lesions  Neuro: AOx3, nonfocal  Psych: euthymic    Lines/Tubes:  none         Pending Results:   Colon biopsies - To be followed up at PCP appointment.         Discharge Medications:     Discharge Medication List as of 3/14/2017  3:08 PM      START taking these medications    Details   hydrocortisone (ANUSOL-HC) 25 MG Suppository Place 1 suppository (25 mg) rectally 2 times daily as needed for hemorrhoids, Disp-28 suppository, R-0, Fax         CONTINUE these medications which have CHANGED    Details   ranitidine (ZANTAC) 150 MG tablet Take 1 tablet (150 mg) by  mouth 2 times daily, No Print Out         CONTINUE these medications which have NOT CHANGED    Details   calcium carb 1250 mg, 500 mg Crow,/vitamin D 200 units (OSCAL WITH D) 500-200 MG-UNIT per tablet Take 2 tablets by mouth daily, Historical      ESTRADIOL PO Take 0.5 mg by mouth daily, Historical      PILOCARPINE HCL PO Take 5 mg by mouth 3 times daily, Historical      Multiple Vitamins-Minerals (OCUVITE PRESERVISION PO) Take 1 tablet by mouth daily, Historical      bromfenac (XIBROM) 0.09 % ophthalmic solution Place 1 drop Into the left eye 2 times daily, Historical      Celecoxib (CELEBREX PO) Take 200 mg by mouth 2 times daily, Historical      tretinoin (RETIN-A) 0.1 % cream Apply topically At BedtimeHistorical      folic acid (FOLVITE) 1 MG tablet Take 3 mg by mouth daily , Historical      medroxyPROGESTERone (PROVERA) 10 MG tablet Take 10 mg by mouth daily One tablet (10 mg) daily for Days 1-12 of each month., Historical      methotrexate 2.5 MG tablet Take 15 mg by mouth once a week Weekly on Tuesdays, Historical      EPINEPHrine (EPIPEN) 0.3 MG/0.3ML injection Inject 0.3 mg into the muscle once as needed, Historical                  Discharge Instructions and Follow-Up:     Discharge Procedure Orders  Reason for your hospital stay   Order Comments: You were admitted for a GI bleed. You were evaluated with a colonoscopy. No sign of bleeding was seen.     Activity   Order Comments: Your activity upon discharge: activity as tolerated   Order Specific Question Answer Comments   Is discharge order? Yes      When to contact your care team   Order Comments: Contact your primary care provider or present to the emergency department if you have temperature >101 degrees, if you have large volume bleeding, or if you feel you may pass out.     Discharge Instructions   Order Comments: Please follow-up with your primary care provider with 2-3 days to discuss results of biopsy and culture, and to recheck hemoglobin. Stop  taking Augmentin as there was no sign of infection on colonoscopy.     Diet   Order Comments: Follow this diet upon discharge: regular diet, fiber   Order Specific Question Answer Comments   Is discharge order? Yes                Discharge Disposition:   To home         Condition on Discharge:   Discharge condition: Stable   Code status on discharge: Full Code        Date of service: 3/14/2017    The patient was discussed with Dr. Adams.    Annabella Haq MD  Internal Medicine PGY-1  P: [id number]

## 2017-03-14 NOTE — PLAN OF CARE
Problem: Discharge Planning  Goal: Discharge Planning (Adult, OB, Behavioral, Peds)  -diagnostic tests and consults completed and resulted: No, colonoscopy in AM, labs to be drawn  -vital signs normal or at patient baseline: No, Hgb 8.9

## 2017-03-14 NOTE — PHARMACY-ADMISSION MEDICATION HISTORY
"Admission medication history interview status for the 3/13/2017 admission is complete. See Epic admission navigator for allergy information, pharmacy, prior to admission medications and immunization status.     Medication history interview sources:  Patient, [facility] pill bottles [phone] (did not call)    Changes made to PTA medication list (reason)  Added:   -celecoxib  -pilocarpine  -ranitidine  -Preservision ocular vitamin  -bromfenac  Deleted:   -diphenhydramine  Changed:   -calcium-vitamin D: 1 tablet daily to 2 tablets daily  -topical estrogens to estradiol 0.5 mg daily  -medroxyprogesterone: added \"take 10 mg daily on days 1-12 of each month\"  -methotrexate: updated dose from 2.5 mg to 15 mg  -folic acid: updated dose from 1 mg daily to 3 mg daily    Additional medication history information (including reliability of information, actions taken by pharmacist):  -Pt was somewhat reliable historian, referred to some bottles for medication details and her SO assisted with information as well.  -Pt received an influenza vaccination in November 2016, this was confirmed via [facility] and documented in [facility].      Prior to Admission medications    Medication Sig Last Dose Taking? Auth Provider   calcium carb 1250 mg, 500 mg Platinum,/vitamin D 200 units (OSCAL WITH D) 500-200 MG-UNIT per tablet Take 2 tablets by mouth daily Past Week at Unknown time Yes Unknown, Entered By History   ESTRADIOL PO Take 0.5 mg by mouth daily Past Week at Unknown time Yes Unknown, Entered By History   PILOCARPINE HCL PO Take 5 mg by mouth 3 times daily Past Week at Unknown time Yes Unknown, Entered By History   RANITIDINE HCL PO Take 150 mg by mouth At Bedtime Past Week at Unknown time Yes Unknown, Entered By History   Multiple Vitamins-Minerals (OCUVITE PRESERVISION PO) Take 1 tablet by mouth daily Past Week at Unknown time Yes Unknown, Entered By History   bromfenac (XIBROM) 0.09 % ophthalmic solution Place 1 drop Into the left eye 2 times " daily Past Week at Unknown time Yes Unknown, Entered By History   Celecoxib (CELEBREX PO) Take 200 mg by mouth 2 times daily Past Week at Unknown time Yes Unknown, Entered By History   tretinoin (RETIN-A) 0.1 % cream Apply topically At Bedtime Past Month at Unknown time Yes Reported, Patient   folic acid (FOLVITE) 1 MG tablet Take 3 mg by mouth daily  Past Week at Unknown time Yes Reported, Patient   medroxyPROGESTERone (PROVERA) 10 MG tablet Take 10 mg by mouth daily One tablet (10 mg) daily for Days 1-12 of each month. Past Week at Unknown time Yes Reported, Patient   EPINEPHrine (EPIPEN) 0.3 MG/0.3ML injection Inject 0.3 mg into the muscle once as needed  Yes Reported, Patient   methotrexate 2.5 MG tablet Take 15 mg by mouth once a week Weekly on Tuesdays 3/7/2017  Reported, Patient         Medication history completed by: Florentin Brooks, PharmD

## 2017-03-14 NOTE — PROVIDER NOTIFICATION
Golytely  Prep initiated. 1 soft BM, Pt reported BM is brown, no blood per per.Scheduled Colonoscopy tomorrow.  Blood pressure 129/58, pulse 78, temperature 97.6  F (36.4  C), temperature source Oral, resp. rate 16, SpO2 99 %, not currently breastfeeding.  Will continue to monitor

## 2017-03-14 NOTE — PROGRESS NOTES
D/c to home at 1515. Walked to front door with  and all belongings. PIV removed. Reviewed d/c instructions with pt. Verbalized understanding of d/c instructions and signed papers.

## 2017-03-14 NOTE — PLAN OF CARE
Problem: Discharge Planning  Goal: Discharge Planning (Adult, OB, Behavioral, Peds)  Outpatient/Observation goals to be met before discharge home:     -diagnostic tests and consults completed and resulted: No, colonoscopy in AM, labs to be drawn  -vital signs normal or at patient baseline: No, Hgb 8.9

## 2017-03-14 NOTE — PROGRESS NOTES
Notified MD that patient finished bowel prep at 0200 and stool is clear (no signs of blood) with small particles (look like skin lining) but no stool present. Will continue to monitor

## 2017-03-14 NOTE — PROVIDER NOTIFICATION
Text paged  Provider about a  call from Infectious Disease  Re:Abdominal tissue specimen # 3 Few gram neg Rods, few pmn and Abdominal wall fluid specimen #2 Rare gm neg cris. few pmn.

## 2017-03-14 NOTE — PLAN OF CARE
Problem: Discharge Planning  Goal: Discharge Planning (Adult, OB, Behavioral, Peds)  Outpatient/Observation goals to be met before discharge home:     -diagnostic tests and consults completed and resulted: No, colonoscopy in AM, labs to be drawn  -vital signs normal or at patient baseline: No, Hgb 10

## 2017-03-14 NOTE — PLAN OF CARE
Problem: Discharge Planning  Goal: Discharge Planning (Adult, OB, Behavioral, Peds)  -diagnostic tests and consults completed and resulted: Yes.  -vital signs normal or at patient baseline: yes, Stable 8.9

## 2017-03-14 NOTE — OR NURSING
Colonoscopy done w/ fentanyl and versed for comfort, tolerated well, on RA with VSS, biopsies sent to path

## 2017-03-14 NOTE — PROGRESS NOTES
GASTROENTEROLOGY PROGRESS NOTE    ASSESSMENT:  Patrick Olson is a 76 year old female with a history of osteoarthritis, rheumatoid arthritis on methotrexate/celecoxib, osteoporosis, internal hemorrhoids, diverticulosis and possible diverticular bleed in 2010 as her colonoscopy did not indicate evidence of bleed endoscopically. Hgb has been chronically low ranging from 11.1 - 8.9.     Patient had colonoscopy to evaluate the etiology of the BRBPR, and there was no active bleed seen.  It is possible that the bleed is secondary to the internal hemorrhoids versus diverticulosis seen in the entire colon. However, there was mild random inflammation in the cecum that is possibly related to the bowel prep, biopsies taken. Stool has been aspirated for C. Diff as well because patient was recently on Augmentin.      RECOMMENDATIONS:  -- Anusol suppositories as needed  -- Daily Miralax and fiber to prevent constipation   -- Diet as tolerated   -- Follow up in 2-3 days with PCP to re-check hgb  -- Ranitidine 150mg twice daily   -- Review C. Diff and pathology result (if C. Diff positive, please treat w/oral vancomycin 125mg QID x14 days)  -- GI team is signing off patient to primary team     Patient care plan discussed with Dr. Downey, GI staff physician. Thank you for involving us in this patient's care. Please do not hesitate to contact the GI service with any questions or concerns.     Reza Rivera  Gastroenterology Nurse Practitioner  _______________________________________________________________  S: Patient's abdominal pain is better this morning.    O:  Blood pressure 96/64, pulse 78, temperature 98.3  F (36.8  C), temperature source Oral, resp. rate 14, SpO2 100 %, not currently breastfeeding.    Constitutional: cooperative, pleasant, not dyspneic/diaphoretic, no acute distress  Eyes: Sclera anicteric/injected  Ears/nose/mouth/throat: Normal oropharynx without ulcers or exudate, mucus membranes moist,  hearing intact  Neck: supple, thyroid normal size  CV: RRR. No edema in LE.  Respiratory: Unlabored breathing. CTA bilaterally.   Lymph: No axillary, submandibular, supraclavicular or inguinal lymphadenopathy  Abd: Nondistended, +bs, no hepatosplenomegaly, no peritoneal signs. LLQ tenderness.  Rectal exam: Dark brown stool with the rectal exam  Skin: warm, perfused, no jaundice  Neuro: AAO x 3, No asterixis  Psych: Normal affect  MSK: Normal gait    LABS:  BMP  Recent Labs  Lab 03/13/17  1042      POTASSIUM 3.7   CHLORIDE 107   EJ 9.0   CO2 28   BUN 12   CR 0.78   GLC 98     CBC  Recent Labs  Lab 03/14/17  0557  03/13/17  1042   WBC  --   --  7.8   RBC  --   --  3.00*   HGB 8.9*  < > 9.8*   HCT  --   --  30.0*   MCV  --   --  100   MCH  --   --  32.7   MCHC  --   --  32.7   RDW  --   --  14.8   PLT  --   --  223   < > = values in this interval not displayed.  INR  Recent Labs  Lab 03/13/17  1042   INR 1.11     LFTs  Recent Labs  Lab 03/13/17  1042   ALKPHOS 77   AST 10   ALT 12   BILITOTAL 0.5   PROTTOTAL 7.3   ALBUMIN 3.6      PANCNo lab results found in last 7 days.    HPI:  Patrick Olson is a 76 year old female with a history of osteoarthritis, rheumatoid arthritis on methotrexate/celecoxib, osteoporosis, internal hemorrhoids, diverticulosis and possible diverticular bleed in 2010 as there was no evidence of bleed seen endoscopically. Hgb has been chronically low (11.1 - 9.8).      Patient had BRBPR with all bowel movements since 3/4/17, and the blood amount has been increasing. She went to her PCP on 3/6/17, and patient reports receiving Augmentin for diverticulitis. In the past couple of weeks her stool has been narrow and finger size circumferentially. She also noticed pain that travels from stomach through the rectum after eating. Therefore, she has been eating small meals and less frequently. Patient is former smoker of x1 year (age [age]).    ROS: A comprehensive Review of Systems was asked and  answered in the negative unless specifically commented upon in the HPI    PMHx:  Past Medical History   Diagnosis Date     Osteoarthritis      Osteoporosis      Rheumatoid arthritis (H)      Sensorineural hearing loss        PSurgHx:   Past Surgical History   Procedure Laterality Date     Orthopedic surgery         MEDS:    No current facility-administered medications on file prior to encounter.   Current Outpatient Prescriptions on File Prior to Encounter:  [DISCONTINUED] Celecoxib (CELEBREX PO) Take 200 mg by mouth 2 times daily   tretinoin (RETIN-A) 0.1 % cream Apply topically At Bedtime   Calcium Carbonate-Vitamin D (CALCIUM 600 + D OR) Take 1,200 mg by mouth   Multiple Vitamins-Minerals (MULTIVITAMIN OR)    folic acid (FOLVITE) 1 MG tablet Take 1 mg by mouth daily   Estradiol (ESTROGEL TD) Place 0.5 mg onto the skin   medroxyPROGESTERone (PROVERA) 10 MG tablet Take 10 mg by mouth daily   methotrexate 2.5 MG tablet Take by mouth once a week   DIPHENHYDRAMINE HCL    EPINEPHrine (EPIPEN) 0.3 MG/0.3ML injection Inject 0.3 mg into the muscle once as needed       ALLERGIES:    Allergies   Allergen Reactions     Bee Venom Anaphylaxis     Shrimp Anaphylaxis     Evoxac [Cevimeline] Unknown     Prevnar [Pneumococcal 13-Linda Conj Vacc] Unknown       FHx:  History reviewed. No pertinent family history.    SOCIAL Hx:  Social History     Social History     Marital status:      Spouse name: N/A     Number of children: N/A     Years of education: N/A     Occupational History     Not on file.     Social History Main Topics     Smoking status: Former Smoker     Smokeless tobacco: Not on file     Alcohol use No     Drug use: No     Sexual activity: Not on file     Other Topics Concern     Not on file     Social History Narrative

## 2017-03-20 ENCOUNTER — MEDICAL CORRESPONDENCE (OUTPATIENT)
Dept: HEALTH INFORMATION MANAGEMENT | Facility: CLINIC | Age: 77
End: 2017-03-20

## 2017-03-20 ENCOUNTER — TRANSFERRED RECORDS (OUTPATIENT)
Dept: HEALTH INFORMATION MANAGEMENT | Facility: CLINIC | Age: 77
End: 2017-03-20

## 2017-03-30 ENCOUNTER — PRE VISIT (OUTPATIENT)
Dept: GASTROENTEROLOGY | Facility: CLINIC | Age: 77
End: 2017-03-30

## 2017-03-30 NOTE — TELEPHONE ENCOUNTER
1.  Date/reason for appt: 4/11/17 -- colitis  2.  Referring provider: Essence Tamayo  3.  Call to patient (Yes / No - short description): no, referred  4.  Previous care at / records requested from: [facility] -- referral and records in [facility]  5.  Other: [facility] ED 3/13/17 -- records in [facility].  Colonoscopy 3/13/17 in [facility].

## 2017-04-06 ENCOUNTER — TRANSFERRED RECORDS (OUTPATIENT)
Dept: HEALTH INFORMATION MANAGEMENT | Facility: CLINIC | Age: 77
End: 2017-04-06
Payer: COMMERCIAL

## 2017-04-11 ENCOUNTER — TRANSFERRED RECORDS (OUTPATIENT)
Dept: HEALTH INFORMATION MANAGEMENT | Facility: CLINIC | Age: 77
End: 2017-04-11
Payer: COMMERCIAL

## 2017-04-19 ENCOUNTER — TRANSFERRED RECORDS (OUTPATIENT)
Dept: HEALTH INFORMATION MANAGEMENT | Facility: CLINIC | Age: 77
End: 2017-04-19
Payer: COMMERCIAL

## 2017-05-08 ENCOUNTER — TRANSFERRED RECORDS (OUTPATIENT)
Dept: HEALTH INFORMATION MANAGEMENT | Facility: CLINIC | Age: 77
End: 2017-05-08
Payer: COMMERCIAL

## 2017-06-22 ENCOUNTER — TRANSFERRED RECORDS (OUTPATIENT)
Dept: HEALTH INFORMATION MANAGEMENT | Facility: CLINIC | Age: 77
End: 2017-06-22
Payer: COMMERCIAL

## 2017-07-07 ENCOUNTER — TELEPHONE (OUTPATIENT)
Dept: AUDIOLOGY | Facility: CLINIC | Age: 77
End: 2017-07-07

## 2017-07-07 NOTE — TELEPHONE ENCOUNTER
Patrick Olson was see at the [facility] on 7/7/17 for hearing aid services.  She reported diminished hearing from her hearing aids.  Examination found the devices to be very clean and working properly.  Domes and  filters were replaced as a precaution.  Patrick is scheduled to see her audiologist in ~ 2 weeks to discuss her diminished hearing.  Otherwise, she will return to the clinic as needed.

## 2017-07-24 ENCOUNTER — OFFICE VISIT (OUTPATIENT)
Dept: AUDIOLOGY | Facility: CLINIC | Age: 77
End: 2017-07-24

## 2017-07-24 DIAGNOSIS — H90.3 SENSORY HEARING LOSS, BILATERAL: Primary | ICD-10-CM

## 2017-07-24 NOTE — PROGRESS NOTES
AUDIOLOGY REPORT    BACKGROUND INFORMATION: Patrick Olson was seen in Audiology at the [facility] on 7/24/2017 for follow-up.  The patient has been seen previously in this clinic for hearing assessment 7/25/2016 and results revealed borderline normal sloping to severe sensorineural hearing loss bilaterally for which the patient was fit with binaural hearing amplification 10/19/2016.  T    TEST RESULTS AND PROCEDURES: The patient reports that she is concerned for the retension of the right hearing device and so a retension piece was added. Both hearing aids were deep-cleaned and assessed using electroacoustic analysis and found to be functioning well. The high-frequency gain was increased 5 dB for both devices due to the patient's report of a decrease in clarity of speech. The patient as counseled that if these adjustments do not help she should obtain an order and be scheduled for a hearing evaluation and hearing aid check in the near future.    SUMMARY AND RECOMMENDATIONS: A hearing aid check was performed today.  Adjustments were made as noted above and the patient will return as needed or at least every 4-6 months for cleaning and assessment of hearing aid.  Please call this clinic with questions regarding today s visit.    Jemal Mari.  Licensed Audiologist  [place] #[id number]

## 2017-07-24 NOTE — MR AVS SNAPSHOT
After Visit Summary   2017    Patrick Olson    MRN: [id number]           Patient Information     Date Of Birth          1940        Visit Information        Provider Department      2017 11:00 AM Yasmin Gallagher, Allen SCHUSTER [facility] Audiology        Today's Diagnoses     Sensory hearing loss, bilateral    -  1       Follow-ups after your visit        Who to contact     Please call your clinic at [phone] to:    Ask questions about your health    Make or cancel appointments    Discuss your medicines    Learn about your test results    Speak to your doctor   If you have compliments or concerns about an experience at your clinic, or if you wish to file a complaint, please contact [facility] Patient Relations at [phone] or email us at [email or web address]         Additional Information About Your Visit        MyChart Information     [employer] is an electronic gateway that provides easy, online access to your medical records. With [employer], you can request a clinic appointment, read your test results, renew a prescription or communicate with your care team.     To sign up for Clonet visit the website at [email or web address]   You will be asked to enter the access code listed below, as well as some personal information. Please follow the directions to create your username and password.     Your access code is: [email or web address]  Expires: 10/8/2017  6:31 AM     Your access code will  in 90 days. If you need help or a new code, please contact your [facility] or call [phone] for assistance.        Care EveryWhere ID     This is your Care EveryWhere ID. This could be used by other organizations to access your [place] medical records  [id number]         Blood Pressure from Last 3 Encounters:   17 117/58    Weight from Last 3 Encounters:   16 52.3 kg (115 lb 6.4 oz)   16 54.2 kg (119 lb 6.4 oz)    11/26/12 56.4 kg (124 lb 6.4 oz)              We Performed the Following     No Charge, Hearing Aid Clinic Visit        Primary Care Provider Office Phone # Fax #    Essence Tamayo [phone]       [address] [address]        Equal Access to Services     [facility] : Hadii aad ku hadasho Soomaali, waaxda luqadaha, qaybta kaalmada adeegyada, waxay idiin hayaan ademicheal kharaservando laeliasn . So [facility] [phone].    ATENCIÓN: Si habla español, tiene a farris disposición servicios gratuitos de asistencia lingüística. Wong al [phone].    We comply with applicable federal civil rights laws and [place] laws. We do not discriminate on the basis of race, color, national origin, age, disability sex, sexual orientation or gender identity.            Thank you!     Thank you for choosing [facility] AUDIOLOGY  for your care. Our goal is always to provide you with excellent care. Hearing back from our patients is one way we can continue to improve our services. Please take a few minutes to complete the written survey that you may receive in the mail after your visit with us. Thank you!             Your Updated Medication List - Protect others around you: Learn how to safely use, store and throw away your medicines at www.disposemymeds.org.          This list is accurate as of: 7/24/17 12:29 PM.  Always use your most recent med list.                   Brand Name Dispense Instructions for use Diagnosis    bromfenac 0.09 % ophthalmic solution    XIBROM     Place 1 drop Into the left eye 2 times daily        calcium carb 1250 mg (500 mg [place])/vitamin D 200 units 500-200 MG-UNIT per tablet    OSCAL with D     Take 2 tablets by mouth daily        CELEBREX PO      Take 200 mg by mouth 2 times daily        EPIPEN 0.3 MG/0.3ML injection   Generic drug:  EPINEPHrine      Inject 0.3 mg into the muscle once as needed        ESTRADIOL PO      Take 0.5 mg by mouth daily         folic acid 1 MG tablet    FOLVITE     Take 3 mg by mouth daily        hydrocortisone 25 MG Suppository    ANUSOL-HC    28 suppository    Place 1 suppository (25 mg) rectally 2 times daily as needed for hemorrhoids    Internal hemorrhoids       medroxyPROGESTERone 10 MG tablet    PROVERA     Take 10 mg by mouth daily One tablet (10 mg) daily for Days 1-12 of each month.        methotrexate 2.5 MG tablet CHEMO      Take 15 mg by mouth once a week Weekly on Tuesdays        OCUVITE PRESERVISION PO      Take 1 tablet by mouth daily        PILOCARPINE HCL PO      Take 5 mg by mouth 3 times daily        ranitidine 150 MG tablet    ZANTAC     Take 1 tablet (150 mg) by mouth 2 times daily    Gastrointestinal hemorrhage, unspecified gastrointestinal hemorrhage type       tretinoin 0.1 % cream    RETIN-A     Apply topically At Bedtime

## 2018-05-15 ENCOUNTER — TRANSFERRED RECORDS (OUTPATIENT)
Dept: HEALTH INFORMATION MANAGEMENT | Facility: CLINIC | Age: 78
End: 2018-05-15

## 2018-05-15 ENCOUNTER — OFFICE VISIT (OUTPATIENT)
Dept: ORTHOPEDICS | Facility: CLINIC | Age: 78
End: 2018-05-15
Payer: COMMERCIAL

## 2018-05-15 ENCOUNTER — MEDICAL CORRESPONDENCE (OUTPATIENT)
Dept: HEALTH INFORMATION MANAGEMENT | Facility: CLINIC | Age: 78
End: 2018-05-15

## 2018-05-15 VITALS
BODY MASS INDEX: 19.46 KG/M2 | HEIGHT: 64 IN | SYSTOLIC BLOOD PRESSURE: 121 MMHG | HEART RATE: 89 BPM | WEIGHT: 114 LBS | DIASTOLIC BLOOD PRESSURE: 79 MMHG

## 2018-05-15 DIAGNOSIS — M53.3 CHRONIC LEFT SI JOINT PAIN: Primary | ICD-10-CM

## 2018-05-15 DIAGNOSIS — G89.29 CHRONIC LEFT SI JOINT PAIN: Primary | ICD-10-CM

## 2018-05-15 DIAGNOSIS — M67.952 TENDINOPATHY OF LEFT GLUTEUS MEDIUS: ICD-10-CM

## 2018-05-15 NOTE — PROGRESS NOTES
SPORTS & ORTHOPEDIC WALK-IN VISIT 5/15/2018    Primary Care Physician: Dr. Tamayo  Chronic LBP. Due to increasing yard work and such, has gotten worse. Also having some hip pain. TTP b/l troch. L worse than Right.  Seen at [facility]. XR done. Referred to walk in for assessment of SI jt injections/troch bursitis.   PT in past for LBP. Helped a little.  Reason for visit:     What part of your body is injured / painful?  bilateral hip    What caused the injury /pain? No inciting event     How long ago did your injury occur or pain begin? several weeks ago    What are your most bothersome symptoms? Pain    How would you characterize your symptom?  aching and sharp    What makes your symptoms better? Rest    What makes your symptoms worse? Standing and Sitting    Have you been previously seen for this problem? Yes, [facility]    Medical History:    Any recent changes to your medical history? No    Any new medication prescribed since last visit? No    Have you had surgery on this body part before? Yes 2011 L WINTER at [facility]    Social History:    Occupation: Retired    Handedness: Right    Exercise: Yoga     Review of Systems:    Do you have fever, chills, weight loss? No    Do you have any vision problems? No    Do you have any chest pain or edema? No    Do you have any shortness of breath or wheezing?  No    Do you have stomach problems? No    Do you have any numbness or focal weakness? No    Do you have diabetes? No    Do you have problems with bleeding or clotting? No    Do you have an rashes or other skin lesions? No

## 2018-05-15 NOTE — LETTER
5/15/2018     RE: Patrick Olson  [address] [facility] [address]     Dear Colleague,    Thank you for referring your patient, Patrick Olson, to the [facility] SPORTS AND ORTHOPAEDIC WALK IN CLINIC at [facility]. Please see a copy of my visit note below.          SPORTS & ORTHOPEDIC WALK-IN VISIT 5/15/2018    Primary Care Physician: Dr. Tamayo  Chronic LBP. Due to increasing yard work and such, has gotten worse. Also having some hip pain. TTP b/l troch. L worse than Right.  Seen at [facility]. XR done. Referred to walk in for assessment of SI jt injections/troch bursitis.   PT in past for LBP. Helped a little.  Reason for visit:     What part of your body is injured / painful?  bilateral hip    What caused the injury /pain? No inciting event     How long ago did your injury occur or pain begin? several weeks ago    What are your most bothersome symptoms? Pain    How would you characterize your symptom?  aching and sharp    What makes your symptoms better? Rest    What makes your symptoms worse? Standing and Sitting    Have you been previously seen for this problem? Yes, [facility]    Medical History:    Any recent changes to your medical history? No    Any new medication prescribed since last visit? No    Have you had surgery on this body part before? Yes 2011 L WINTER at [facility]    Social History:    Occupation: Retired    Handedness: Right    Exercise: Yoga     Review of Systems:    Do you have fever, chills, weight loss? No    Do you have any vision problems? No    Do you have any chest pain or edema? No    Do you have any shortness of breath or wheezing?  No    Do you have stomach problems? No    Do you have any numbness or focal weakness? No    Do you have diabetes? No    Do you have problems with bleeding or clotting? No    Do you have an rashes or other skin lesions? No           CHIEF COMPLAINT:  Consult (Back pain)       HISTORY OF PRESENT ILLNESS  Ms. Olson is a  jovanna 77 year old year old female who presents to clinic today with low back and lateral left hip pain.  Patrick explains that she has had long-standing low back pain.  She notes that it is typically worse with overactivity.  It was tolerable over the winter and early spring season however she began gardening and noticed increasing pain over the last several weeks.  She also noticed additional pain in her left lateral hip.  This is worse with walking standing and sitting to some degree.  There is improved with rest.  Pain is described as aching and sharp in the lateral aspect of her left hip and aching in her low back.  She is starting to experience right lateral hip pain as well which is similar in quality, intensity.  She was initially seen and evaluated by [facility] yesterday who referred her on to us for orthopedic evaluation.  Patient notes that she will be traveling next week and she would like to appreciate some relief from this pain.    Treatments to date: No specific treatment to date.    Additional history: Left total hip arthroplasty [facility] 2011.  She has not had any issues with her left hip since this time.    MEDICAL HISTORY  Patient Active Problem List   Diagnosis     SNHL (sensorineural hearing loss)     GI bleed       Current Outpatient Prescriptions   Medication Sig Dispense Refill     bromfenac (XIBROM) 0.09 % ophthalmic solution Place 1 drop Into the left eye 2 times daily       calcium carb 1250 mg, 500 mg [place]/vitamin D 200 units (OSCAL WITH D) 500-200 MG-UNIT per tablet Take 2 tablets by mouth daily       Celecoxib (CELEBREX PO) Take 200 mg by mouth 2 times daily       EPINEPHrine (EPIPEN) 0.3 MG/0.3ML injection Inject 0.3 mg into the muscle once as needed       ESTRADIOL PO Take 0.5 mg by mouth daily       folic acid (FOLVITE) 1 MG tablet Take 3 mg by mouth daily        hydrocortisone (ANUSOL-HC) 25 MG Suppository Place 1 suppository (25 mg) rectally 2 times daily  "as needed for hemorrhoids 28 suppository 0     medroxyPROGESTERone (PROVERA) 10 MG tablet Take 10 mg by mouth daily One tablet (10 mg) daily for Days 1-12 of each month.       methotrexate 2.5 MG tablet Take 15 mg by mouth once a week Weekly on Tuesdays       Multiple Vitamins-Minerals (OCUVITE PRESERVISION PO) Take 1 tablet by mouth daily       PILOCARPINE HCL PO Take 5 mg by mouth 3 times daily       ranitidine (ZANTAC) 150 MG tablet Take 1 tablet (150 mg) by mouth 2 times daily       tretinoin (RETIN-A) 0.1 % cream Apply topically At Bedtime         Allergies   Allergen Reactions     Bee Venom Anaphylaxis     Shrimp Anaphylaxis     Evoxac [Cevimeline] Unknown     Prevnar [Pneumococcal 13-Linda Conj Vacc] Unknown       No family history on file.    Additional medical/Social/Surgical histories reviewed in [facility] and updated as appropriate.     REVIEW OF SYSTEMS (5/17/2018)  CONSTITUTIONAL: Denies fever and weight loss  EYES: Denies acute vision changes  ENT: Denies hearing changes or difficulty swallowing  CARDIAC: Denies chest pain or edema  RESPIRATORY: Denies dyspnea, cough or wheeze  GASTROINTESTINAL: Denies abdominal pain, nausea, vomiting  MUSCULOSKELETAL: See HPI  SKIN: Denies any recent rash or lesion  NEUROLOGICAL: Denies numbness or focal weakness  PSYCHIATRIC: No history of psychiatric symptoms or problems  ENDOCRINE: Diagnosis of diabetes:No  HEMATOLOGY: Denies episodes of easy bleeding      PHYSICAL EXAM  /79  Pulse 89  Ht 1.626 m (5' 4\")  Wt 51.7 kg (114 lb)  BMI 19.57 kg/m2    General  - normal appearance, in no obvious distress  CV  - normal femoral pulse  Pulm  - normal respiratory pattern, non-labored  Musculoskeletal - Left hip  - stance: normal gait without limp, no obvious leg length discrepancy, normal heel and toe walk, single leg squat displays knee valgus, contralateral hip drop, internal rotation of the hip   - inspection: no swelling or effusion,  normal bone and joint alignment, no " obvious deformity  - palpation: tender over the greater trochanter and gluteus medius musculature.  - ROM: pain with active abduction, normal and painless flexion, extension, IR, ER, adduction  - strength: 5/5 in all planes  - special tests:  (-) GELA  (-) FADIR  no pain with axial femoral load  Musculoskeletal - lumbar spine  - stance: Normal gait and stance.  - inspection: normal bone and joint alignment, no obvious scoliosis  - palpation: Tenderness palpation at left sacroiliac joint, exquisitely.  - ROM: Overall preserved range of motion in flexion extension side bending rotation.  - strength: lower extremities 5/5 in all planes  - special tests:  (-) straight leg raise bilaterally  (-) slump test  Neuro  - patellar and Achilles DTRs 2+ bilaterally, no sensory or motor deficit, grossly normal coordination, normal muscle tone  Skin  - no ecchymosis, erythema, warmth, or induration, no obvious rash  Psych  - interactive, appropriate, normal mood and affect    IMAGING : XR Pelvis at [facility] - Mild bilateral SI degeneration, lumbar DDD.      ASSESSMENT & PLAN  Ms. Olson is a 77 year old year old female who presents to clinic today with low back pain and left-sided lateral hip pain which is progressed over the last 3 weeks.  We discussed treatment options at length and patient would like to proceed with more immediate relief, possibly diagnostic and therapeutic SI joint injection.  Additionally she will be enrolled in physical therapy for strengthening of core, lumbar and hip abductors.    Diagnosis:   Left sacroiliac joint pain  Left gluteus tendinopathy    -X-ray guided sacroiliac joint injection  -Home exercise program provided for hip abductor strengthening  -Ibuprofen as needed  -Heat to low back and sacrum  -Follow up 4 weeks    It was a pleasure seeing Patrick today.    Mukesh Lantigua DO, CAM  Primary Care Sports Medicine

## 2018-05-15 NOTE — MR AVS SNAPSHOT
After Visit Summary   5/15/2018    Patrick Olson    MRN: [id number]           Patient Information     Date Of Birth          1940        Visit Information        Provider Department      5/15/2018 1:20 PM Mukesh Lantigua DO [facility] Sports and Orthopaedic Walk In Clinic        Today's Diagnoses     Chronic left SI joint pain    -  1       Follow-ups after your visit        Additional Services     PHYSICAL THERAPY REFERRAL (Internal)       Physical Therapy Referral                  Future tests that were ordered for you today     Open Future Orders        Priority Expected Expires Ordered    XR Sacroiliac Therapeutic Injection Left Routine 5/15/2018 5/15/2019 5/15/2018            Who to contact     Please call your clinic at [phone] to:    Ask questions about your health    Make or cancel appointments    Discuss your medicines    Learn about your test results    Speak to your doctor            Additional Information About Your Visit        MyChart Information     [employer] is an electronic gateway that provides easy, online access to your medical records. With [employer], you can request a clinic appointment, read your test results, renew a prescription or communicate with your care team.     To sign up for coRankt visit the website at [email or web address]   You will be asked to enter the access code listed below, as well as some personal information. Please follow the directions to create your username and password.     Your access code is: [email or web address]  Expires: 2018  2:11 PM     Your access code will  in 90 days. If you need help or a new code, please contact your [facility] or call [phone] for assistance.        Care EveryWhere ID     This is your Care EveryWhere ID. This could be used by other organizations to access your [place] medical records  [id number]        Your Vitals Were     Pulse                   89            Blood Pressure  from Last 3 Encounters:   05/15/18 121/79   03/14/17 117/58    Weight from Last 3 Encounters:   12/12/16 52.3 kg (115 lb 6.4 oz)   07/25/16 54.2 kg (119 lb 6.4 oz)   11/26/12 56.4 kg (124 lb 6.4 oz)              We Performed the Following     PHYSICAL THERAPY REFERRAL (Internal)        Primary Care Provider Office Phone # Fax #    Essence Tamayo [phone]       [address] [address]        Equal Access to Services     [facility]: Hadii luisa arredondo hadjeevan Soflavia, waaxmaddie green, qanilay fordalmamaddie lerma, eloy boston . So [facility] [phone].    ATENCIÓN: Si habla español, tiene a farris disposición servicios gratuitos de asistencia lingüística. [facility] [phone].    We comply with applicable federal civil rights laws and [place] laws. We do not discriminate on the basis of race, color, national origin, age, disability, sex, sexual orientation, or gender identity.            Thank you!     Thank you for choosing [facility] SPORTS AND ORTHOPAEDIC WALK IN CLINIC  for your care. Our goal is always to provide you with excellent care. Hearing back from our patients is one way we can continue to improve our services. Please take a few minutes to complete the written survey that you may receive in the mail after your visit with us. Thank you!             Your Updated Medication List - Protect others around you: Learn how to safely use, store and throw away your medicines at www.disposemymeds.org.          This list is accurate as of 5/15/18  2:11 PM.  Always use your most recent med list.                   Brand Name Dispense Instructions for use Diagnosis    bromfenac 0.09 % ophthalmic solution    XIBROM     Place 1 drop Into the left eye 2 times daily        Calcium carb-Vitamin D 500 mg [place]-200 units 500-200 MG-UNIT per tablet    OSCAL with D;Oyster Shell Calcium     Take 2 tablets by mouth daily        CELEBREX PO       Take 200 mg by mouth 2 times daily        EPIPEN 0.3 MG/0.3ML injection   Generic drug:  EPINEPHrine      Inject 0.3 mg into the muscle once as needed        ESTRADIOL PO      Take 0.5 mg by mouth daily        folic acid 1 MG tablet    FOLVITE     Take 3 mg by mouth daily        hydrocortisone 25 MG Suppository    ANUSOL-HC    28 suppository    Place 1 suppository (25 mg) rectally 2 times daily as needed for hemorrhoids    Internal hemorrhoids       medroxyPROGESTERone 10 MG tablet    PROVERA     Take 10 mg by mouth daily One tablet (10 mg) daily for Days 1-12 of each month.        methotrexate 2.5 MG tablet CHEMO      Take 15 mg by mouth once a week Weekly on Tuesdays        OCUVITE PRESERVISION PO      Take 1 tablet by mouth daily        PILOCARPINE HCL PO      Take 5 mg by mouth 3 times daily        ranitidine 150 MG tablet    ZANTAC     Take 1 tablet (150 mg) by mouth 2 times daily    Gastrointestinal hemorrhage, unspecified gastrointestinal hemorrhage type       tretinoin 0.1 % cream    RETIN-A     Apply topically At Bedtime

## 2018-05-17 ENCOUNTER — HOSPITAL ENCOUNTER (OUTPATIENT)
Dept: CT IMAGING | Facility: CLINIC | Age: 78
Discharge: HOME | End: 2018-05-17
Attending: FAMILY MEDICINE | Admitting: FAMILY MEDICINE
Payer: COMMERCIAL

## 2018-05-17 DIAGNOSIS — G89.29 CHRONIC LEFT SI JOINT PAIN: ICD-10-CM

## 2018-05-17 DIAGNOSIS — M53.3 CHRONIC LEFT SI JOINT PAIN: ICD-10-CM

## 2018-05-17 PROCEDURE — 25000125 ZZHC RX 250: Performed by: FAMILY MEDICINE

## 2018-05-17 PROCEDURE — 27096 INJECT SACROILIAC JOINT: CPT

## 2018-05-17 PROCEDURE — 25000128 H RX IP 250 OP 636: Performed by: FAMILY MEDICINE

## 2018-05-17 RX ORDER — TRIAMCINOLONE ACETONIDE 40 MG/ML
40 INJECTION, SUSPENSION INTRA-ARTICULAR; INTRAMUSCULAR ONCE
Status: COMPLETED | OUTPATIENT
Start: 2018-05-17 | End: 2018-05-17

## 2018-05-17 RX ORDER — BUPIVACAINE HYDROCHLORIDE 2.5 MG/ML
10 INJECTION, SOLUTION EPIDURAL; INFILTRATION; INTRACAUDAL ONCE
Status: COMPLETED | OUTPATIENT
Start: 2018-05-17 | End: 2018-05-17

## 2018-05-17 RX ORDER — LIDOCAINE HYDROCHLORIDE 10 MG/ML
5 INJECTION, SOLUTION EPIDURAL; INFILTRATION; INTRACAUDAL; PERINEURAL ONCE
Status: COMPLETED | OUTPATIENT
Start: 2018-05-17 | End: 2018-05-17

## 2018-05-17 RX ADMIN — BUPIVACAINE HYDROCHLORIDE 25 MG: 2.5 INJECTION, SOLUTION EPIDURAL; INFILTRATION; INTRACAUDAL at 12:26

## 2018-05-17 RX ADMIN — TRIAMCINOLONE ACETONIDE 40 MG: 40 INJECTION, SUSPENSION INTRA-ARTICULAR; INTRAMUSCULAR at 12:26

## 2018-05-17 RX ADMIN — LIDOCAINE HYDROCHLORIDE 5 ML: 10 INJECTION, SOLUTION EPIDURAL; INFILTRATION; INTRACAUDAL; PERINEURAL at 12:25

## 2018-05-17 NOTE — PROGRESS NOTES
CHIEF COMPLAINT:  Consult (Back pain)       HISTORY OF PRESENT ILLNESS  Ms. Olson is a pleasant 77 year old year old female who presents to clinic today with low back and lateral left hip pain.  Patrick explains that she has had long-standing low back pain.  She notes that it is typically worse with overactivity.  It was tolerable over the winter and early spring season however she began gardening and noticed increasing pain over the last several weeks.  She also noticed additional pain in her left lateral hip.  This is worse with walking standing and sitting to some degree.  There is improved with rest.  Pain is described as aching and sharp in the lateral aspect of her left hip and aching in her low back.  She is starting to experience right lateral hip pain as well which is similar in quality, intensity.  She was initially seen and evaluated by [facility] yesterday who referred her on to us for orthopedic evaluation.  Patient notes that she will be traveling next week and she would like to appreciate some relief from this pain.    Treatments to date: No specific treatment to date.    Additional history: Left total hip arthroplasty [facility] 2011.  She has not had any issues with her left hip since this time.    MEDICAL HISTORY  Patient Active Problem List   Diagnosis     SNHL (sensorineural hearing loss)     GI bleed       Current Outpatient Prescriptions   Medication Sig Dispense Refill     bromfenac (XIBROM) 0.09 % ophthalmic solution Place 1 drop Into the left eye 2 times daily       calcium carb 1250 mg, 500 mg [place],/vitamin D 200 units (OSCAL WITH D) 500-200 MG-UNIT per tablet Take 2 tablets by mouth daily       Celecoxib (CELEBREX PO) Take 200 mg by mouth 2 times daily       EPINEPHrine (EPIPEN) 0.3 MG/0.3ML injection Inject 0.3 mg into the muscle once as needed       ESTRADIOL PO Take 0.5 mg by mouth daily       folic acid (FOLVITE) 1 MG tablet Take 3 mg by mouth daily         "hydrocortisone (ANUSOL-HC) 25 MG Suppository Place 1 suppository (25 mg) rectally 2 times daily as needed for hemorrhoids 28 suppository 0     medroxyPROGESTERone (PROVERA) 10 MG tablet Take 10 mg by mouth daily One tablet (10 mg) daily for Days 1-12 of each month.       methotrexate 2.5 MG tablet Take 15 mg by mouth once a week Weekly on Tuesdays       Multiple Vitamins-Minerals (OCUVITE PRESERVISION PO) Take 1 tablet by mouth daily       PILOCARPINE HCL PO Take 5 mg by mouth 3 times daily       ranitidine (ZANTAC) 150 MG tablet Take 1 tablet (150 mg) by mouth 2 times daily       tretinoin (RETIN-A) 0.1 % cream Apply topically At Bedtime         Allergies   Allergen Reactions     Bee Venom Anaphylaxis     Shrimp Anaphylaxis     Evoxac [Cevimeline] Unknown     Prevnar [Pneumococcal 13-Linda Conj Vacc] Unknown       No family history on file.    Additional medical/Social/Surgical histories reviewed in [facility] and updated as appropriate.     REVIEW OF SYSTEMS (5/17/2018)  CONSTITUTIONAL: Denies fever and weight loss  EYES: Denies acute vision changes  ENT: Denies hearing changes or difficulty swallowing  CARDIAC: Denies chest pain or edema  RESPIRATORY: Denies dyspnea, cough or wheeze  GASTROINTESTINAL: Denies abdominal pain, nausea, vomiting  MUSCULOSKELETAL: See HPI  SKIN: Denies any recent rash or lesion  NEUROLOGICAL: Denies numbness or focal weakness  PSYCHIATRIC: No history of psychiatric symptoms or problems  ENDOCRINE: Diagnosis of diabetes:No  HEMATOLOGY: Denies episodes of easy bleeding      PHYSICAL EXAM  /79  Pulse 89  Ht 1.626 m (5' 4\")  Wt 51.7 kg (114 lb)  BMI 19.57 kg/m2    General  - normal appearance, in no obvious distress  CV  - normal femoral pulse  Pulm  - normal respiratory pattern, non-labored  Musculoskeletal - Left hip  - stance: normal gait without limp, no obvious leg length discrepancy, normal heel and toe walk, single leg squat displays knee valgus, contralateral hip drop, internal " rotation of the hip   - inspection: no swelling or effusion,  normal bone and joint alignment, no obvious deformity  - palpation: tender over the greater trochanter and gluteus medius musculature.  - ROM: pain with active abduction, normal and painless flexion, extension, IR, ER, adduction  - strength: 5/5 in all planes  - special tests:  (-) GELA  (-) FADIR  no pain with axial femoral load  Musculoskeletal - lumbar spine  - stance: Normal gait and stance.  - inspection: normal bone and joint alignment, no obvious scoliosis  - palpation: Tenderness palpation at left sacroiliac joint, exquisitely.  - ROM: Overall preserved range of motion in flexion extension side bending rotation.  - strength: lower extremities 5/5 in all planes  - special tests:  (-) straight leg raise bilaterally  (-) slump test  Neuro  - patellar and Achilles DTRs 2+ bilaterally, no sensory or motor deficit, grossly normal coordination, normal muscle tone  Skin  - no ecchymosis, erythema, warmth, or induration, no obvious rash  Psych  - interactive, appropriate, normal mood and affect    IMAGING : XR Pelvis at [facility] - Mild bilateral SI degeneration, lumbar DDD.      ASSESSMENT & PLAN  Ms. Olson is a 77 year old year old female who presents to clinic today with low back pain and left-sided lateral hip pain which is progressed over the last 3 weeks.  We discussed treatment options at length and patient would like to proceed with more immediate relief, possibly diagnostic and therapeutic SI joint injection.  Additionally she will be enrolled in physical therapy for strengthening of core, lumbar and hip abductors.    Diagnosis:   Left sacroiliac joint pain  Left gluteus tendinopathy    -X-ray guided sacroiliac joint injection  -Home exercise program provided for hip abductor strengthening  -Ibuprofen as needed  -Heat to low back and sacrum  -Follow up 4 weeks    It was a pleasure seeing Patrick today.    Mukesh Lantiuga, , CAM  Primary Care  Sports Medicine

## 2018-06-04 ENCOUNTER — THERAPY VISIT (OUTPATIENT)
Dept: PHYSICAL THERAPY | Facility: CLINIC | Age: 78
End: 2018-06-04
Attending: FAMILY MEDICINE
Payer: COMMERCIAL

## 2018-06-04 DIAGNOSIS — G89.29 CHRONIC LEFT-SIDED LOW BACK PAIN WITH LEFT-SIDED SCIATICA: Primary | ICD-10-CM

## 2018-06-04 DIAGNOSIS — M54.42 CHRONIC LEFT-SIDED LOW BACK PAIN WITH LEFT-SIDED SCIATICA: Primary | ICD-10-CM

## 2018-06-04 PROCEDURE — 97112 NEUROMUSCULAR REEDUCATION: CPT | Mod: GP | Performed by: PHYSICAL THERAPIST

## 2018-06-04 PROCEDURE — G8979 MOBILITY GOAL STATUS: HCPCS | Mod: GP | Performed by: PHYSICAL THERAPIST

## 2018-06-04 PROCEDURE — 97110 THERAPEUTIC EXERCISES: CPT | Mod: GP | Performed by: PHYSICAL THERAPIST

## 2018-06-04 PROCEDURE — G8978 MOBILITY CURRENT STATUS: HCPCS | Mod: GP | Performed by: PHYSICAL THERAPIST

## 2018-06-04 PROCEDURE — 97161 PT EVAL LOW COMPLEX 20 MIN: CPT | Mod: GP | Performed by: PHYSICAL THERAPIST

## 2018-06-04 NOTE — MR AVS SNAPSHOT
After Visit Summary   6/4/2018    Patrick Olson    MRN: [id number]           Patient Information     Date Of Birth          1940        Visit Information        Provider Department      6/4/2018 12:20 PM Blank Pedroza PT [facility]        Today's Diagnoses     Chronic left-sided low back pain with left-sided sciatica    -  1       Follow-ups after your visit        Your next 10 appointments already scheduled     Jun 11, 2018 10:20 AM CDT   [facility] ([facility])    [address] [place] [address] [address]  [place] [address]   [phone]            Jun 22, 2018 10:20 AM CDT   [facility] ([facility])    [address] [place] [address] [address]  [place] [address]   [phone]              Who to contact     If you have questions or need follow up information about today's clinic visit or your schedule please contact [facility] directly at [phone].  Normal or non-critical lab and imaging results will be communicated to you by MyChart, letter or phone within 4 business days after the clinic has received the results. If you do not hear from us within 7 days, please contact the clinic through [employer] or phone. If you have a critical or abnormal lab result, we will notify you by phone as soon as possible.  Submit refill requests through [employer] or call your pharmacy and they will forward the refill request to us. Please allow 3 business days for your refill to be completed.          Additional Information About Your Visit        [employer] Information     [employer] lets you send messages to your doctor, view your test results, renew your prescriptions, schedule appointments and  "more. To sign up, go to [email or web address] . Click on \"Log in\" on the left side of the screen, which will take you to the Welcome page. Then click on \"Sign up Now\" on the right side of the page.     You will be asked to enter the access code listed below, as well as some personal information. Please follow the directions to create your username and password.     Your access code is: [email or web address]  Expires: 2018  2:11 PM     Your access code will  in 90 days. If you need help or a new code, please call your [place] clinic or [phone].        Care EveryWhere ID     This is your Care EveryWhere ID. This could be used by other organizations to access your [place] medical records  [id number]         Blood Pressure from Last 3 Encounters:   05/15/18 121/79   17 117/58    Weight from Last 3 Encounters:   05/15/18 51.7 kg (114 lb)   16 52.3 kg (115 lb 6.4 oz)   16 54.2 kg (119 lb 6.4 oz)              We Performed the Following     SARABJIT Inital Eval Report     Neuromuscular Re-Education     PT Eval, Low Complexity (67094)     Therapeutic Exercises        Primary Care Provider Office Phone # Fax #    Essence Tamayo [phone]       [address] [address]        Equal Access to Services     WADE COVINGTON : Hadii aad ku hadasho Sochrisali, waaxda luqadaha, qaybta kaalmada eloy lerma. So [facility] [phone].    ATENCIÓN: Si isaella sylvia, tiene a farris disposición servicios gratuitos de asistencia lingüística. Wong al [phone].    We comply with applicable federal civil rights laws and [place] laws. We do not discriminate on the basis of race, color, national origin, age, disability, sex, sexual orientation, or gender identity.            Thank you!     Thank you for choosing [facility]ER[facility]  for your care. Our goal is always to " provide you with excellent care. Hearing back from our patients is one way we can continue to improve our services. Please take a few minutes to complete the written survey that you may receive in the mail after your visit with us. Thank you!             Your Updated Medication List - Protect others around you: Learn how to safely use, store and throw away your medicines at www.disposemymeds.org.          This list is accurate as of 6/4/18  3:26 PM.  Always use your most recent med list.                   Brand Name Dispense Instructions for use Diagnosis    bromfenac 0.09 % ophthalmic solution    XIBROM     Place 1 drop Into the left eye 2 times daily        Calcium carb-Vitamin D 500 mg [place]-200 units 500-200 MG-UNIT per tablet    OSCAL with D;Oyster Shell Calcium     Take 2 tablets by mouth daily        CELEBREX PO      Take 200 mg by mouth 2 times daily        EPIPEN 0.3 MG/0.3ML injection   Generic drug:  EPINEPHrine      Inject 0.3 mg into the muscle once as needed        ESTRADIOL PO      Take 0.5 mg by mouth daily        folic acid 1 MG tablet    FOLVITE     Take 3 mg by mouth daily        hydrocortisone 25 MG Suppository    ANUSOL-HC    28 suppository    Place 1 suppository (25 mg) rectally 2 times daily as needed for hemorrhoids    Internal hemorrhoids       medroxyPROGESTERone 10 MG tablet    PROVERA     Take 10 mg by mouth daily One tablet (10 mg) daily for Days 1-12 of each month.        methotrexate 2.5 MG tablet CHEMO      Take 15 mg by mouth once a week Weekly on Tuesdays        OCUVITE PRESERVISION PO      Take 1 tablet by mouth daily        PILOCARPINE HCL PO      Take 5 mg by mouth 3 times daily        ranitidine 150 MG tablet    ZANTAC     Take 1 tablet (150 mg) by mouth 2 times daily    Gastrointestinal hemorrhage, unspecified gastrointestinal hemorrhage type       tretinoin 0.1 % cream    RETIN-A     Apply topically At Bedtime

## 2018-06-04 NOTE — LETTER
[facility] PHYSICAL THERAPY  [address]  [place] [address]  [phone]    2018    Re: Patrick Olson   :   1940  MRN:  [id number]   REFERRING PHYSICIAN:   Mukesh Lantigua    [facility] PH[facility]    Date of Initial Evaluation:  2018  Visits:  Rxs Used: 1  Reason for Referral:  Chronic left-sided low back pain with left-sided sciatica    EVALUATION SUMMARY    Subjective:  Patient is a 77 year old female presenting with rehab back hpi. The history is provided by the patient. No  was used.   Patrick Olson is a 77 year old female with a lumbar and sacroiliac condition.  Condition occurred with:  Degenerative joint disease and other reason.  Condition occurred: other.  This is a chronic condition  I have RA and my upper body is manageable.  I have had chronic back pain, Left WINTER .  My left hip and low back pain has been off and on.  Most of the pain is on the outside of the left low back and left SI joint.  When I bend it is on both sides. The pain has been increasing and had SI injection 2018.   I go to senior exercise classes.  I do stretching in the morning.  .    Patient reports pain:  Lower lumbar spine, lumbar spine left and lumbar spine right.  Radiates to:  Gluteals left, thigh left, lower leg left and foot left.  Pain is described as aching (dull) and is constant and reported as 1/10 and 5/10 (range).  Associated symptoms:  Tingling (muscle spasms in both calves, leg going a sleep occasionally.  ). Pain is worse in the P.M..  Symptoms are exacerbated by bending (standing 1 hour, bending to do garden) and relieved by heat and NSAID's.  Since onset symptoms are gradually worsening (since the injection).  Special testing: x-ray,    Previous treatment includes physical therapy (hip replacement).      Pertinent medical history includes:  Implanted device,  rheumatoid arthritis and osteoarthritis.  Medical allergies: yes (shrimp. bee sting).  Other surgeries include:  Orthopedic surgery (left WINTER).  Current medications:  Anti-inflammatory, pain medication and sleep medication.  Current occupation is , retired.    Employment tasks: sitting.  Barriers: house, yard work.    Red flags:  None as reported by the patient.                 Objective:  Standing Alignment:    Lumbar:  Lordosis decr  Pelvic:  Normal  Ankle/Foot:  Calcaneal valgus L  Gait:    Deviations:  Hip:  Decr dynamic control L and Trendelenberg L  Flexibility/Screens:   Re: Patrick Olson   :   1940    Positive screens:  Hip; Lumbar and SI Joint  Lower Extremity:  Decreased left lower extremity flexibility:Hip Flexors; Quadriceps; Hamstrings and Gastroc  Decreased right lower extremity flexibility:  Hip Flexors; Quadriceps; Hamstrings and Gastroc       Lumbar/SI Evaluation  ROM:    AROM Lumbar:   Flexion:        Ankles , 5 reps increase in motion, 10 reps increase in soreness  Ext:                    Moderate movement and feels better   Side Bend:        Left:     Right:   Rotation:           Left:     Right:   Side Glide:        Left:     Right:      Lumbar Myotomes:    T12-L3 (Hip Flex):  Left: 5    Right: 5  L2-4 (Quads):  Left:  5    Right:  5  L4 (Ankle DF):  Left:  5    Right:  5  L5 (Great Toe Ext): Left: 5-    Right: 5-   S1 (Toe Raise):  Left: 5    Right: 5  Neural Tension/Mobility:    Left side:SLR (tightness) or Slump (tightness)  negative.   Lumbar Palpation:    Tenderness present at Left:    Quadratus Lumborum; Erector Spinae; Piriformis; Greater Trochanter; Hip Flexors and Vertebral  Lumbar Provocation:    Left positive with:  Mobility (low back pain) and PROM hip  Spinal Segmental Conclusions:   Level: Hypo noted at L4, L5 and S1  SI joint/Sacrum:      Provocation:  Si compression/distraction negative  Left negative at:    Thigh thrust and Sacral thrust    Hip Evaluation  Hip  PROM:    Flexion: Left: 95  Right:  Extension: Left: almost neutral    Right: unable to get to neutral   Abduction: Left: slightly limited   Right:  Internal Rotation: Left: WFL    Right: WFL  External Rotation: Left: 30 with tightness    Right: 45    Hip Strength:    Flexion:   Left: 5/5   Pain:  Right: 5/5   Pain:                Abduction:  Left: 4/5    +   Pain:Right: 5-/5    Pain:  Internal Rotation:  Left: 4+/5    Pain:Right: 5/5   Pain:  External Rotation:  Left: 5-/5   Pain:  Right: 5/5   Pain:  Knee Flexion:  Left: 5/5   Pain:  Knee Extension:  Left: 5/5   Pain:Right: 5/5    Pain:    Assessment/Plan:    Patient is a 77 year old female with lumbar and left side hip complaints.    Re: Patrick Olson   :   1940    Patient has the following significant findings with corresponding treatment plan.                Diagnosis 1:  Left SI joint, possible left Low back and left hip tenderness  Pain -  manual therapy, self management, education, directional preference exercise and home program  Decreased ROM/flexibility - manual therapy, therapeutic exercise, therapeutic activity and home program  Decreased joint mobility - manual therapy, therapeutic exercise, therapeutic activity and home program  Decreased strength - therapeutic exercise, therapeutic activities and home program  Impaired balance - neuro re-education, therapeutic activities and home program  Impaired muscle performance - neuro re-education and home program  Decreased function - therapeutic activities and home program  Evaluation ongoing.    Therapy Evaluation Codes:   1) History comprised of:   Personal factors that impact the plan of care:      Past/current experiences and Time since onset of symptoms.    Comorbidity factors that impact the plan of care are:      Implanted device, Osteoarthritis and Rheumatoid arthritis.     Medications impacting care: Anti-inflammatory, Pain and Sleep.  2) Examination of Body Systems comprised of:   Body  structures and functions that impact the plan of care:      Hip, Lumbar spine and Sacral illiac joint.   Activity limitations that impact the plan of care are:      Bathing, Bending, Cooking, Driving, Lifting, Sitting, Squatting/kneeling, Stairs, Standing, Walking and Sleeping.  3) Clinical presentation characteristics are:   Stable/Uncomplicated.  4) Decision-Making    Low complexity using standardized patient assessment instrument and/or measureable assessment of functional outcome.  Cumulative Therapy Evaluation is: Low complexity.    Previous and current functional limitations:  (See Goal Flow Sheet for this information)    Short term and Long term goals: (See Goal Flow Sheet for this information)     Communication ability:  Patient appears to be able to clearly communicate and understand verbal and written communication and follow directions correctly.  Treatment Explanation - The following has been discussed with the patient:   RX ordered/plan of care  Possible risks and side effects  This patient would benefit from PT intervention to resume normal activities.   Rehab potential is good.    Frequency:  1 X week, once daily  Duration:  for 8 weeks  Discharge Plan:  Achieve all LTG.  Independent in home treatment program.      Re: Patrick Olson   :   1940    Thank you for your referral.    INQUIRIES  Therapist: Blank Pedroza, PT  [facility]  [address] [address]  Phone: [phone]  Fax: [phone]

## 2018-06-04 NOTE — PROGRESS NOTES
[facility] Initial Evaluation  Subjective:  Patient is a 77 year old female presenting with rehab back hpi. The history is provided by the patient. No  was used.   Patrick Olson is a 77 year old female with a lumbar and sacroiliac condition.  Condition occurred with:  Degenerative joint disease and other reason.  Condition occurred: other.  This is a chronic condition  I have RA and my upper body is manageable.  I have had chronic back pain, Left WINTER 2011.  My left hip and low back pain has been off and on.  Most of the pain is on the outside of the left low back and left SI joint.  When I bend it is on both sides. The pain has been increasing and had SI injection 5/17/2018.   I go to senior exercise classes.  I do stretching in the morning.  .    Patient reports pain:  Lower lumbar spine, lumbar spine left and lumbar spine right.  Radiates to:  Gluteals left, thigh left, lower leg left and foot left.  Pain is described as aching (dull) and is constant and reported as 1/10 and 5/10 (range).  Associated symptoms:  Tingling (muscle spasms in both calves, leg going a sleep occasionally.  ). Pain is worse in the P.M..  Symptoms are exacerbated by bending (standing 1 hour, bending to do garden) and relieved by heat and NSAID's.  Since onset symptoms are gradually worsening (since the injection).  Special testing: x-ray,    Previous treatment includes physical therapy (hip replacement).      Pertinent medical history includes:  Implanted device, rheumatoid arthritis and osteoarthritis.  Medical allergies: yes (shrimp. bee sting).  Other surgeries include:  Orthopedic surgery (left WINTER).  Current medications:  Anti-inflammatory, pain medication and sleep medication.  Current occupation is , retired.    Employment tasks: sitting.    Barriers: house, yard work.      Red flags:  None as reported by the patient.                        Objective:  Standing Alignment:         Lumbar:  Lordosis decr  Pelvic:  Normal      Ankle/Foot:  Calcaneal valgus L    Gait:      Deviations:  Hip:  Decr dynamic control L and Trendelenberg L    Flexibility/Screens:   Positive screens:  Hip; Lumbar and SI Joint    Lower Extremity:  Decreased left lower extremity flexibility:Hip Flexors; Quadriceps; Hamstrings and Gastroc    Decreased right lower extremity flexibility:  Hip Flexors; Quadriceps; Hamstrings and Gastroc               Lumbar/SI Evaluation  ROM:    AROM Lumbar:   Flexion:        Ankles , 5 reps increase in motion, 10 reps increase in soreness  Ext:                    Moderate movement and feels better   Side Bend:        Left:     Right:   Rotation:           Left:     Right:   Side Glide:        Left:     Right:           Lumbar Myotomes:    T12-L3 (Hip Flex):  Left: 5    Right: 5  L2-4 (Quads):  Left:  5    Right:  5  L4 (Ankle DF):  Left:  5    Right:  5  L5 (Great Toe Ext): Left: 5-    Right: 5-   S1 (Toe Raise):  Left: 5    Right: 5        Neural Tension/Mobility:      Left side:SLR (tightness) or Slump (tightness)  negative.     Lumbar Palpation:    Tenderness present at Left:    Quadratus Lumborum; Erector Spinae; Piriformis; Greater Trochanter; Hip Flexors and Vertebral      Lumbar Provocation:    Left positive with:  Mobility (low back pain) and PROM hip    Spinal Segmental Conclusions:     Level: Hypo noted at L4, L5 and S1      SI joint/Sacrum:      Provocation:  Si compression/distraction negative    Left negative at:    Thigh thrust and Sacral thrust                                        Hip Evaluation  Hip PROM:    Flexion: Left: 95  Right:  Extension: Left: almost neutral    Right: unable to get to neutral   Abduction: Left: slightly limited   Right:    Internal Rotation: Left: WFL    Right: WFL  External Rotation: Left: 30 with tightness    Right: 45              Hip Strength:    Flexion:   Left: 5/5   Pain:  Right: 5/5   Pain:                      Abduction:  Left: 4/5     +   Pain:Right: 5-/5    Pain:    Internal Rotation:  Left: 4+/5    Pain:Right: 5/5   Pain:  External Rotation:  Left: 5-/5   Pain:  Right: 5/5   Pain:  Knee Flexion:  Left: 5/5   Pain:  Knee Extension:  Left: 5/5   Pain:Right: 5/5    Pain:                       General     ROS    Assessment/Plan:    Patient is a 77 year old female with lumbar and left side hip complaints.    Patient has the following significant findings with corresponding treatment plan.                Diagnosis 1:  Left SI joint, possible left Low back and left hip tenderness  Pain -  manual therapy, self management, education, directional preference exercise and home program  Decreased ROM/flexibility - manual therapy, therapeutic exercise, therapeutic activity and home program  Decreased joint mobility - manual therapy, therapeutic exercise, therapeutic activity and home program  Decreased strength - therapeutic exercise, therapeutic activities and home program  Impaired balance - neuro re-education, therapeutic activities and home program  Impaired muscle performance - neuro re-education and home program  Decreased function - therapeutic activities and home program  Evaluation ongoing.    Therapy Evaluation Codes:   1) History comprised of:   Personal factors that impact the plan of care:      Past/current experiences and Time since onset of symptoms.    Comorbidity factors that impact the plan of care are:      Implanted device, Osteoarthritis and Rheumatoid arthritis.     Medications impacting care: Anti-inflammatory, Pain and Sleep.  2) Examination of Body Systems comprised of:   Body structures and functions that impact the plan of care:      Hip, Lumbar spine and Sacral illiac joint.   Activity limitations that impact the plan of care are:      Bathing, Bending, Cooking, Driving, Lifting, Sitting, Squatting/kneeling, Stairs, Standing, Walking and Sleeping.  3) Clinical presentation characteristics  are:   Stable/Uncomplicated.  4) Decision-Making    Low complexity using standardized patient assessment instrument and/or measureable assessment of functional outcome.  Cumulative Therapy Evaluation is: Low complexity.    Previous and current functional limitations:  (See Goal Flow Sheet for this information)    Short term and Long term goals: (See Goal Flow Sheet for this information)     Communication ability:  Patient appears to be able to clearly communicate and understand verbal and written communication and follow directions correctly.  Treatment Explanation - The following has been discussed with the patient:   RX ordered/plan of care  Possible risks and side effects  This patient would benefit from PT intervention to resume normal activities.   Rehab potential is good.    Frequency:  1 X week, once daily  Duration:  for 8 weeks  Discharge Plan:  Achieve all LTG.  Independent in home treatment program.    Please refer to the daily flowsheet for treatment today, total treatment time and time spent performing 1:1 timed codes.

## 2018-06-11 ENCOUNTER — THERAPY VISIT (OUTPATIENT)
Dept: PHYSICAL THERAPY | Facility: CLINIC | Age: 78
End: 2018-06-11
Payer: COMMERCIAL

## 2018-06-11 DIAGNOSIS — M54.42 CHRONIC LEFT-SIDED LOW BACK PAIN WITH LEFT-SIDED SCIATICA: ICD-10-CM

## 2018-06-11 DIAGNOSIS — G89.29 CHRONIC LEFT-SIDED LOW BACK PAIN WITH LEFT-SIDED SCIATICA: ICD-10-CM

## 2018-06-11 PROCEDURE — 97110 THERAPEUTIC EXERCISES: CPT | Mod: GP | Performed by: PHYSICAL THERAPY ASSISTANT

## 2018-06-22 ENCOUNTER — THERAPY VISIT (OUTPATIENT)
Dept: PHYSICAL THERAPY | Facility: CLINIC | Age: 78
End: 2018-06-22
Payer: COMMERCIAL

## 2018-06-22 DIAGNOSIS — M54.42 CHRONIC LEFT-SIDED LOW BACK PAIN WITH LEFT-SIDED SCIATICA: ICD-10-CM

## 2018-06-22 DIAGNOSIS — G89.29 CHRONIC LEFT-SIDED LOW BACK PAIN WITH LEFT-SIDED SCIATICA: ICD-10-CM

## 2018-06-22 PROCEDURE — 97110 THERAPEUTIC EXERCISES: CPT | Mod: GP | Performed by: PHYSICAL THERAPY ASSISTANT

## 2018-07-06 ENCOUNTER — THERAPY VISIT (OUTPATIENT)
Dept: PHYSICAL THERAPY | Facility: CLINIC | Age: 78
End: 2018-07-06
Payer: COMMERCIAL

## 2018-07-06 DIAGNOSIS — G89.29 CHRONIC LEFT-SIDED LOW BACK PAIN WITH LEFT-SIDED SCIATICA: ICD-10-CM

## 2018-07-06 DIAGNOSIS — M54.42 CHRONIC LEFT-SIDED LOW BACK PAIN WITH LEFT-SIDED SCIATICA: ICD-10-CM

## 2018-07-06 PROCEDURE — 97140 MANUAL THERAPY 1/> REGIONS: CPT | Mod: GP | Performed by: PHYSICAL THERAPIST

## 2018-07-06 PROCEDURE — 97110 THERAPEUTIC EXERCISES: CPT | Mod: GP | Performed by: PHYSICAL THERAPIST

## 2018-07-10 ENCOUNTER — OFFICE VISIT (OUTPATIENT)
Dept: ORTHOPEDICS | Facility: CLINIC | Age: 78
End: 2018-07-10
Payer: COMMERCIAL

## 2018-07-10 VITALS
HEIGHT: 64 IN | SYSTOLIC BLOOD PRESSURE: 111 MMHG | BODY MASS INDEX: 19.46 KG/M2 | WEIGHT: 114 LBS | DIASTOLIC BLOOD PRESSURE: 69 MMHG

## 2018-07-10 DIAGNOSIS — M54.16 LUMBAR BACK PAIN WITH RADICULOPATHY AFFECTING LEFT LOWER EXTREMITY: Primary | ICD-10-CM

## 2018-07-10 DIAGNOSIS — M53.3 SACROILIAC JOINT PAIN: ICD-10-CM

## 2018-07-10 DIAGNOSIS — M67.952 TENDINOPATHY OF LEFT GLUTEUS MEDIUS: ICD-10-CM

## 2018-07-10 RX ORDER — METHYLPREDNISOLONE 4 MG
TABLET, DOSE PACK ORAL
Qty: 21 TABLET | Refills: 0 | Status: SHIPPED | OUTPATIENT
Start: 2018-07-10 | End: 2018-08-08

## 2018-07-10 NOTE — LETTER
7/10/2018       RE: Patrick Olson  [address] [facility] [address]     Dear Colleague,    Thank you for referring your patient, Patrick Olson, to the [facility] SPORTS AND ORTHOPAEDIC WALK IN CLINIC at [facility]. Please see a copy of my visit note below.          SPORTS & ORTHOPEDIC WALK-IN FOLLOW-UP VISIT 7/10/2018    Interval History:     Follow up reason: Low back pain     Date of injury: Chronic     Date last seen: 5/15/18    Following Therapeutic Plan: Yes     Pain: Worsening    Function: Worsening    Interval History:  She was getting better after injection on . She went to [place] soon after this and was doing okay, but had some pain with touring the city. She then went to [facility] in mid  and walked a lot more than usual. She then started to have increased low back pain/SI pain. She came back and tried to rest. Then she started PT (3 sessions). She feels she has gotten worse since then. She has low back pain and left hip pain. She feels okay while sitting, but has pain once she stands. The pain shoots down her left leg into her big toe. She would like to know why she is getting worse and some pain relief.     Medical History:    Any recent changes to your medical history? No    Any new medication prescribed since last visit? No    Review of Systems:    Do you have fever, chills, weight loss? No    Do you have any vision problems? No    Do you have any chest pain or edema? No    Do you have any shortness of breath or wheezing?  No    Do you have stomach problems? No    Do you have any numbness or focal weakness? Yes - down left leg    Do you have diabetes? No    Do you have problems with bleeding or clotting? No    Do you have an rashes or other skin lesions? No             [facility]  Orthopedics  Mukesh Lantigua,   07/10/2018     Name: Patrick Olson  MRN: [id number]  Age: 77 year old  : 1940  Referring provider: Referred Self     ESTABLISHED  "PATIENT FOLLOW-UP: Low back and hip pain        HISTORY OF PRESENT ILLNESS  Ms. Olson is a pleasant 77 year old year old female status-post left hip arthroplasty in 2011 with a history of rheumatoid arthritis who presents to clinic today for follow-up of chronic low back pain. I last evaluated the patient on 5/15/18 at which time she voiced having a tolerable winter and early spring, however, when she began gardening activities she had increasing pain in the low back and left lateral hip. At the time, it was also exacerbated with walking, standing, and sitting and improved with rest. Pain was described as aching and sharp in the hip and aching in her lower back. I performed a radiologist guided left SI joint injection on 5/17. Other plans were for formal physical therapy, home exercise, heat to low back and scrum, and Ibuprofen as needed.     Today, she reports that she received moderate relief from SI injection.  When she went to [place] in late May, and did a lot of walking that exacerbated her back pain however was very functional. She got home and spent three weeks a rest, however, the mild pain remained relatively unchanged. She then went to [place] in the middle of June, with walking about 5-6 miles a day. She endorses that by the 6th day of this, \"my body was telling me to stop\". She got home and rested for a few days and her pain began to feel better. This is when she decided to begin formal physical therapy; however, after three session, beginning about 10 days ago, she developed left hip pain with radiation of pain down to her knee and beginning again at the toes and worsening low back pain. She feels that standing, sitting, and walking still exacerbate her pain, and is present at rest. She is taking Tylenol, which does provide some relief. She is concerned of right hip problems now due to over compensating on he right leg.     Her worst pain at this time is radiating down leg. No weakness.      REVIEW OF " "SYSTEMS (7/10/2018)  CONSTITUTIONAL: Denies fever and weight loss  GASTROINTESTINAL: Denies abdominal pain, nausea, vomiting  MUSCULOSKELETAL: See HPI  SKIN: Denies any recent rash or lesion  NEUROLOGICAL: Denies numbness or focal weakness     PHYSICAL EXAM  /69  Ht 1.626 m (5' 4\")  Wt 51.7 kg (114 lb)  BMI 19.57 kg/m2    General  - normal appearance, in no obvious distress  CV  - normal peripheral perfusion  Pulm  - normal respiratory pattern, non-labored  Musculoskeletal - lumbar spine  - stance: normal gait without limp, no obvious leg length discrepancy, normal heel and toe walk  - inspection: normal bone and joint alignment, no obvious scoliosis  - palpation: Tenderness to the origin of the rectus femoris anteriorly. Pain in bilateral SI joints. Tenderness overlying greater trochanters.  - ROM: resisted hip flexion exacerbates pain, lumbar spine with normal extension, sidebending, rotation. Pain with lumbar flexion.  - strength: lower extremities 5/5 in all planes  - special tests:  (-) straight leg raise    (-) slump test    Neuro  - patellar and Achilles DTRs 2+ bilaterally, No lower extremity sensory deficit throughout L5 distribution, grossly normal coordination, normal muscle tone  Skin  - no ecchymosis, erythema, warmth, or induration, no obvious rash  Psych  - interactive, appropriate, normal mood and affect    IMAGING : XR Pelvis at [facility] - Mild bilateral SI degeneration, significant lumbar DDD.      ASSESSMENT & PLAN  Ms. Olson is a 77 year old year old female who presents to clinic today with low back and left hip pain. We discussed the need for further evaluation through MRI imaging of the lumbar spine as this is her greatest symptom today. No improvement to date with land based PT    Diagnosis: Lumbar back pain with radiculopathy affecting left lower extremity, sacroiliac joint pain. Gluteal tendinopathy of left hip.     - MRI of the lumbar spine   - Medrol dose pack   - Continue " taking Tylenol as needed for pain    - Follow up after imaging is obtained; consider aquatherapy,     It was a pleasure seeing Patrick.    I, Mukesh Lantigua DO, have reviewed the above note and agree with the scribe's notation as written.    I, Freddy Dill, am serving as a scribe to document services personally performed by Mukesh Lantigua DO, based on my observations and the provider's statements to me.    Portions of this medical record were completed by a scribe. UPON MY REVIEW AND AUTHENTICATION BY ELECTRONIC SIGNATURE, this confirms (a) I performed the applicable clinical services, and (b) the record is accurate.       Again, thank you for allowing me to participate in the care of your patient.      Sincerely,    Mukesh Lantigua DO

## 2018-07-10 NOTE — PROGRESS NOTES
"[facility]  Orthopedics  Mukesh Lantigua, DO  07/10/2018     Name: Patrick Olson  MRN: [id number]  Age: 77 year old  : 1940  Referring provider: Referred Self     ESTABLISHED PATIENT FOLLOW-UP: Low back and hip pain        HISTORY OF PRESENT ILLNESS  Ms. Olson is a pleasant 77 year old year old female status-post left hip arthroplasty in  with a history of rheumatoid arthritis who presents to clinic today for follow-up of chronic low back pain. I last evaluated the patient on 5/15/18 at which time she voiced having a tolerable winter and early spring, however, when she began gardening activities she had increasing pain in the low back and left lateral hip. At the time, it was also exacerbated with walking, standing, and sitting and improved with rest. Pain was described as aching and sharp in the hip and aching in her lower back. I performed a radiologist guided left SI joint injection on . Other plans were for formal physical therapy, home exercise, heat to low back and scrum, and Ibuprofen as needed.     Today, she reports that she received moderate relief from SI injection.  When she went to [place] in late May, and did a lot of walking that exacerbated her back pain however was very functional. She got home and spent three weeks a rest, however, the mild pain remained relatively unchanged. She then went to [place] in the middle of , with walking about 5-6 miles a day. She endorses that by the 6th day of this, \"my body was telling me to stop\". She got home and rested for a few days and her pain began to feel better. This is when she decided to begin formal physical therapy; however, after three session, beginning about 10 days ago, she developed left hip pain with radiation of pain down to her knee and beginning again at the toes and worsening low back pain. She feels that standing, sitting, and walking still exacerbate her pain, and is present at rest. She is taking Tylenol, which does provide " "some relief. She is concerned of right hip problems now due to over compensating on he right leg.     Her worst pain at this time is radiating down leg. No weakness.      REVIEW OF SYSTEMS (7/10/2018)  CONSTITUTIONAL: Denies fever and weight loss  GASTROINTESTINAL: Denies abdominal pain, nausea, vomiting  MUSCULOSKELETAL: See HPI  SKIN: Denies any recent rash or lesion  NEUROLOGICAL: Denies numbness or focal weakness     PHYSICAL EXAM  /69  Ht 1.626 m (5' 4\")  Wt 51.7 kg (114 lb)  BMI 19.57 kg/m2    General  - normal appearance, in no obvious distress  CV  - normal peripheral perfusion  Pulm  - normal respiratory pattern, non-labored  Musculoskeletal - lumbar spine  - stance: normal gait without limp, no obvious leg length discrepancy, normal heel and toe walk  - inspection: normal bone and joint alignment, no obvious scoliosis  - palpation: Tenderness to the origin of the rectus femoris anteriorly. Pain in bilateral SI joints. Tenderness overlying greater trochanters.  - ROM: resisted hip flexion exacerbates pain, lumbar spine with normal extension, sidebending, rotation. Pain with lumbar flexion.  - strength: lower extremities 5/5 in all planes  - special tests:  (-) straight leg raise    (-) slump test    Neuro  - patellar and Achilles DTRs 2+ bilaterally, No lower extremity sensory deficit throughout L5 distribution, grossly normal coordination, normal muscle tone  Skin  - no ecchymosis, erythema, warmth, or induration, no obvious rash  Psych  - interactive, appropriate, normal mood and affect    IMAGING : XR Pelvis at [facility] - Mild bilateral SI degeneration, significant lumbar DDD.      ASSESSMENT & PLAN  Ms. Olson is a 77 year old year old female who presents to clinic today with low back and left hip pain. We discussed the need for further evaluation through MRI imaging of the lumbar spine as this is her greatest symptom today. No improvement to date with land based PT    Diagnosis: Lumbar " back pain with radiculopathy affecting left lower extremity, sacroiliac joint pain. Gluteal tendinopathy of left hip.     - MRI of the lumbar spine   - Medrol dose pack   - Continue taking Tylenol as needed for pain    - Follow up after imaging is obtained; consider aquatherapy,     It was a pleasure seeing Patrick.    I, Mukesh Lantigua DO, have reviewed the above note and agree with the scribe's notation as written.    I, Freddy Dill, am serving as a scribe to document services personally performed by Mukesh Lantigua DO, based on my observations and the provider's statements to me.    Portions of this medical record were completed by a scribe. UPON MY REVIEW AND AUTHENTICATION BY ELECTRONIC SIGNATURE, this confirms (a) I performed the applicable clinical services, and (b) the record is accurate.     Mukesh Lantigua DO, [facility]  Primary Care Sports Medicine

## 2018-07-10 NOTE — PROGRESS NOTES
SPORTS & ORTHOPEDIC WALK-IN FOLLOW-UP VISIT 7/10/2018    Interval History:     Follow up reason: Low back pain     Date of injury: Chronic     Date last seen: 5/15/18    Following Therapeutic Plan: Yes     Pain: Worsening    Function: Worsening    Interval History:  She was getting better after injection on 5/17. She went to [place] soon after this and was doing okay, but had some pain with touring the city. She then went to [facility] in mid June and walked a lot more than usual. She then started to have increased low back pain/SI pain. She came back and tried to rest. Then she started PT (3 sessions). She feels she has gotten worse since then. She has low back pain and left hip pain. She feels okay while sitting, but has pain once she stands. The pain shoots down her left leg into her big toe. She would like to know why she is getting worse and some pain relief.     Medical History:    Any recent changes to your medical history? No    Any new medication prescribed since last visit? No    Review of Systems:    Do you have fever, chills, weight loss? No    Do you have any vision problems? No    Do you have any chest pain or edema? No    Do you have any shortness of breath or wheezing?  No    Do you have stomach problems? No    Do you have any numbness or focal weakness? Yes - down left leg    Do you have diabetes? No    Do you have problems with bleeding or clotting? No    Do you have an rashes or other skin lesions? No

## 2018-07-10 NOTE — PROGRESS NOTES
"ESTABLISHED PATIENT FOLLOW-UP:  Pain of the Lower Back and New Patient       HISTORY OF PRESENT ILLNESS  Ms. Olson is a pleasant 77 year old year old female who presents to clinic today for follow-up of chronic low back pain. I last evaluated the patient on 5/15/18 at which time she voiced having a tolerable winter and early spring, however, when she began gardening activities she began to have increasing pain in her back and left lateral hip. It was worse with walking, standing, and sitting and improved with rest. Pain was described as aching and sharp in the hip and aching in her lower back. I performed a radiologist guided left SI joint injection on 5/17. This provided her         Left SI injection on 5/17/18. Hx of left total hip arthroplasty 2011.  bilateral low back pain left hip pain   Seen on ***    Not completely gone after the injection. Exacerbated with walking during her trip. 3 week period of rest and things were unchanged. Then went to [place] in mid June. There she walked 5-6 miles a day and on her 6th day her pain was worse.   Went home rested for few days. Began to feel better and decided to begin the formal physical therapy. She participated in three sessions.   10 days ago left hip down her lateral leg down to her big toes. Tingling in toes.   Laying flat slight exaerbationg  Standing   Compensation on right leg and is beginning to hurt tamy     Qian for week [place] for week.     Nadia today, helped her pain     Stops at the top of her knee and begin at her toes.       Rheumatoid arthritris     REVIEW OF SYSTEMS (7/10/2018)  CONSTITUTIONAL: Denies fever and weight loss  GASTROINTESTINAL: Denies abdominal pain, nausea, vomiting***  MUSCULOSKELETAL: See HPI  SKIN: Denies any recent rash or lesion  NEUROLOGICAL: Denies numbness or focal weakness     PHYSICAL EXAM  /69  Ht 1.626 m (5' 4\")  Wt 51.7 kg (114 lb)  BMI 19.57 kg/m2    ***  General  - normal appearance, in no obvious " distress  CV  - normal peripheral perfusion  Pulm  - normal respiratory pattern, non-labored  Musculoskeletal - lumbar spine  - stance: slow to rise and sit  - inspection: normal bone and joint alignment, no obvious scoliosis  - palpation: bilateral lumbar paravertebral spasm  - ROM: pain with bilateral rotation, flexion past 30 deg, extension  - strength: lower extremities 5/5 in all planes  - special tests:  (-) straight leg raise bilaterally  (-) slump test  Neuro  - patellar and Achilles DTRs 2+ bilaterally, no sensory or motor deficit, grossly normal coordination, normal muscle tone  Skin  - no ecchymosis, erythema, warmth, or induration, no obvious rash  Psych  - interactive, appropriate, normal mood and affect         General  - normal appearance, in no obvious distress  CV  - normal peripheral perfusion  Pulm  - normal respiratory pattern, non-labored  Musculoskeletal - lumbar spine  - stance: normal gait without limp, no obvious leg length discrepancy, normal heel and toe walk  - inspection: normal bone and joint alignment, no obvious scoliosis  - palpation: no paravertebral or bony tenderness  - ROM: flexion exacerbates pain, normal extension, sidebending, rotation  - strength: lower extremities 5/5 in all planes  - special tests:  (-) straight leg raise    (-) slump test    Neuro  - patellar and Achilles DTRs 2+ bilaterally, lower extremity sensory deficit throughout L5 distribution ***, grossly normal coordination, normal muscle tone  Skin  - no ecchymosis, erythema, warmth, or induration, no obvious rash  Psych  - interactive, appropriate, normal mood and affect      Bilateral SI pain  l greater troch  Anterior at rec fem    pain with resisted hip flexion   tendereness to origin of rectus femoris   Pain in the bilateral SI region.       IMAGING : {ds laterality:163205}. Final results and radiologist's interpretation, available in the [facility] health record. Images were reviewed with the patient/family  members in the office today. My personal interpretation of the performed imaging is ***    Films in may   modaate jackn in spine      ASSESSMENT & PLAN  Ms. Olson is a 77 year old year old female who presents to clinic today with Pain of the Lower Back and New Patient        - MRI of the lumbar spine ***  - Medrol dose darryl   - Continue taking Tylenol as needed for pain   - ***   -Follow up ***    It was a pleasure seeing Imani Lantigua DO, CAM  Primary Care Sports Medicine

## 2018-07-12 ENCOUNTER — RADIANT APPOINTMENT (OUTPATIENT)
Dept: MRI IMAGING | Facility: CLINIC | Age: 78
End: 2018-07-12
Attending: FAMILY MEDICINE
Payer: COMMERCIAL

## 2018-07-12 DIAGNOSIS — M54.16 LUMBAR BACK PAIN WITH RADICULOPATHY AFFECTING LEFT LOWER EXTREMITY: ICD-10-CM

## 2018-07-12 DIAGNOSIS — M53.3 SACROILIAC JOINT PAIN: ICD-10-CM

## 2018-07-13 ENCOUNTER — OFFICE VISIT (OUTPATIENT)
Dept: ORTHOPEDICS | Facility: CLINIC | Age: 78
End: 2018-07-13
Payer: COMMERCIAL

## 2018-07-13 VITALS — HEART RATE: 87 BPM | HEIGHT: 64 IN | BODY MASS INDEX: 19.46 KG/M2 | WEIGHT: 114 LBS

## 2018-07-13 DIAGNOSIS — M51.16 LUMBAR DISC HERNIATION WITH RADICULOPATHY: Primary | ICD-10-CM

## 2018-07-13 NOTE — LETTER
"7/13/2018       RE: Patrick Olson  [address] [facility] [address]     Dear Colleague,    Thank you for referring your patient, Patrick Olson, to the [facility] at [facility]. Please see a copy of my visit note below.          SPORTS & ORTHOPEDIC WALK-IN FOLLOW-UP VISIT 7/13/2018    Interval History:     Follow up reason: LBP L spine MRI results    Date of injury: Chronic    Date last seen: 7/10/18    Following Therapeutic Plan: Yes     Pain: About the same as 7/10/18    Function: Unchanged    Interval History: The Medrol dose pack seems to help somewhat.    Medical History:    Any recent changes to your medical history? No    Any new medication prescribed since last visit? No    Review of Systems:    Do you have fever, chills, weight loss? No    Do you have any vision problems? No    Do you have any chest pain or edema? No    Do you have any shortness of breath or wheezing?  No    Do you have stomach problems? No    Do you have any numbness or focal weakness? Yes, down L leg    Do you have diabetes? No    Do you have problems with bleeding or clotting? No    Do you have an rashes or other skin lesions? No             ESTABLISHED PATIENT FOLLOW-UP:  Pain of the Lower Back       HISTORY OF PRESENT ILLNESS  Ms. Olson is a pleasant 77 year old year old female who presents to clinic today for follow-up of low back pain.  Patrick is here today to review her MRI results.  Since her last visit, 1 week ago, she continues to have pain from left lateral hip across her anterior thigh and knee into the medial aspect of her calf.  Pain was improved since \"little bit\" taking Medrol Dosepak.  No weakness.    Date of injury: About 1 month  Date last seen: 7/10/18  Following Therapeutic Plan: Yes  Pain: Slightly improved  Function: Unchanged  Interval History: As above    Additional medical/Social/Surgical histories reviewed in EPIC and updated as " "appropriate.    REVIEW OF SYSTEMS (7/14/2018)  CONSTITUTIONAL: Denies fever and weight loss  GASTROINTESTINAL: Denies abdominal pain, nausea, vomiting  MUSCULOSKELETAL: See HPI  SKIN: Denies any recent rash or lesion  NEUROLOGICAL: Denies numbness or focal weakness     PHYSICAL EXAM  Pulse 87  Ht 1.626 m (5' 4\")  Wt 51.7 kg (114 lb)  BMI 19.57 kg/m2    General Appearance: Well appearing, alert, in no acute distress, well-hydrated, and well nourished  Skin: No rashes, lesions, or ecchymosis present  Cardiovascular: pedal pulses and radial pulses normal, no signs of upper or lower extremity edema  Respiratory: no respiratory distress, no audible wheezing, no labored breathing, symmetric thoracic excursion  Psychiatric: mood and affect are appropriate, patient is oriented to time, place and person  Extremities: no peripheral edema noted in the upper or lower extremities    IMAGING : MRI lumbar spine  Impression:      Multilevel lumbar spondylosis. The most significant findings are at  L3-L4 where there is a left subarticular disc protrusion impinging on  the left L4 nerve root. Right moderate foraminal stenosis at L4-L5 due  to right foraminal disc protrusion and facet arthropathy.     I have personally reviewed the examination and initial interpretation  and I agree with the findings.     ALIS MONTE MD    ASSESSMENT & PLAN  Ms. Olson is a 77 year old year old female who presents to clinic today for follow-up of lumbar MRI and discussion of low back pain with radiculopathy.  MRI revealing L3-L4 disc herniation with impingement of left L4 nerve root.  This is consistent with her clinical examination and symptomatology.  This point we discussed consideration for epidural steroid injection.  Patient agrees and order was placed.  We also discussed continuing physical therapy and considering addition of traction at the lumbar spine.  Patient will return in 2 weeks after her epidural injection for reevaluation.    It " was a pleasure seeing Patrick.    Mukesh Lantigua DO, CAQSM  Primary Care Sports Medicine

## 2018-07-13 NOTE — MR AVS SNAPSHOT
"              After Visit Summary   7/13/2018    Patrick Olson    MRN: [id number]           Patient Information     Date Of Birth          1940        Visit Information        Provider Department      7/13/2018 6:00 PM Mukesh Lantigua [facility]        Today's Diagnoses     Lumbar disc herniation with radiculopathy    -  1       Follow-ups after your visit        Additional Services     PHYSICAL THERAPY REFERRAL (Internal)       Physical Therapy Referral - Lumbar traction with Peter at [facility]                  Future tests that were ordered for you today     Open Future Orders        Priority Expected Expires Ordered    X-ray Lumbar epidural injection Routine 7/13/2018 7/13/2019 7/13/2018            Who to contact     Please call your clinic at [phone] to:    Ask questions about your health    Make or cancel appointments    Discuss your medicines    Learn about your test results    Speak to your doctor            Additional Information About Your Visit        Care EveryWhere ID     This is your Care EveryWhere ID. This could be used by other organizations to access your [facility] medical records  [id number]        Your Vitals Were     Pulse Height BMI (Body Mass Index)             87 1.626 m (5' 4\") 19.57 kg/m2          Blood Pressure from Last 3 Encounters:   07/10/18 111/69   05/15/18 121/79   03/14/17 117/58    Weight from Last 3 Encounters:   07/13/18 51.7 kg (114 lb)   07/10/18 51.7 kg (114 lb)   05/15/18 51.7 kg (114 lb)              We Performed the Following     PHYSICAL THERAPY REFERRAL (Internal)        Primary Care Provider Office Phone # Fax #    Essence Tamayo [phone]       [address] [address]        Equal Access to Services     WADE COVINGTON AH: Sarah Garibay, loerna green, eloy powers. So wasahara " [phone].    ATENCIÓN: Si fabiola ward, tiene a farris disposición servicios gratuitos de asistencia lingüística. Wong kidd [phone].    We comply with applicable federal civil rights laws and [place] laws. We do not discriminate on the basis of race, color, national origin, age, disability, sex, sexual orientation, or gender identity.            Thank you!     Thank you for choosing [facility]  for your care. Our goal is always to provide you with excellent care. Hearing back from our patients is one way we can continue to improve our services. Please take a few minutes to complete the written survey that you may receive in the mail after your visit with us. Thank you!             Your Updated Medication List - Protect others around you: Learn how to safely use, store and throw away your medicines at www.disposemymeds.org.          This list is accurate as of 7/13/18  6:55 PM.  Always use your most recent med list.                   Brand Name Dispense Instructions for use Diagnosis    bromfenac 0.09 % ophthalmic solution    XIBROM     Place 1 drop Into the left eye 2 times daily        Calcium carb-Vitamin D 500 mg [place]-200 units 500-200 MG-UNIT per tablet    OSCAL with D;Oyster Shell Calcium     Take 2 tablets by mouth daily        CELEBREX PO      Take 200 mg by mouth 2 times daily        EPIPEN 0.3 MG/0.3ML injection   Generic drug:  EPINEPHrine      Inject 0.3 mg into the muscle once as needed        ESTRADIOL PO      Take 0.5 mg by mouth daily        folic acid 1 MG tablet    FOLVITE     Take 3 mg by mouth daily        hydrocortisone 25 MG Suppository    ANUSOL-HC    28 suppository    Place 1 suppository (25 mg) rectally 2 times daily as needed for hemorrhoids    Internal hemorrhoids       medroxyPROGESTERone 10 MG tablet    PROVERA     Take 10 mg by mouth daily One tablet (10 mg) daily for Days 1-12 of each month.        methotrexate 2.5 MG tablet CHEMO      Take 15  mg by mouth once a week Weekly on Tuesdays        methylPREDNISolone 4 MG tablet    MEDROL DOSEPAK    21 tablet    Follow package instructions    Lumbar back pain with radiculopathy affecting left lower extremity, Sacroiliac joint pain       OCUVITE PRESERVISION PO      Take 1 tablet by mouth daily        PILOCARPINE HCL PO      Take 5 mg by mouth 3 times daily        ranitidine 150 MG tablet    ZANTAC     Take 1 tablet (150 mg) by mouth 2 times daily    Gastrointestinal hemorrhage, unspecified gastrointestinal hemorrhage type       tretinoin 0.1 % cream    RETIN-A     Apply topically At Bedtime

## 2018-07-13 NOTE — PROGRESS NOTES
SPORTS & ORTHOPEDIC WALK-IN FOLLOW-UP VISIT 7/13/2018    Interval History:     Follow up reason: LBP L spine MRI results    Date of injury: Chronic    Date last seen: 7/10/18    Following Therapeutic Plan: Yes     Pain: About the same as 7/10/18    Function: Unchanged    Interval History: The Medrol dose pack seems to help somewhat.    Medical History:    Any recent changes to your medical history? No    Any new medication prescribed since last visit? No    Review of Systems:    Do you have fever, chills, weight loss? No    Do you have any vision problems? No    Do you have any chest pain or edema? No    Do you have any shortness of breath or wheezing?  No    Do you have stomach problems? No    Do you have any numbness or focal weakness? Yes, down L leg    Do you have diabetes? No    Do you have problems with bleeding or clotting? No    Do you have an rashes or other skin lesions? No

## 2018-07-14 NOTE — PROGRESS NOTES
"ESTABLISHED PATIENT FOLLOW-UP:  Pain of the Lower Back       HISTORY OF PRESENT ILLNESS  Ms. Olson is a pleasant 77 year old year old female who presents to clinic today for follow-up of low back pain.  Patrick is here today to review her MRI results.  Since her last visit, 1 week ago, she continues to have pain from left lateral hip across her anterior thigh and knee into the medial aspect of her calf.  Pain was improved since \"little bit\" taking Medrol Dosepak.  No weakness.    Date of injury: About 1 month  Date last seen: 7/10/18  Following Therapeutic Plan: Yes  Pain: Slightly improved  Function: Unchanged  Interval History: As above    Additional medical/Social/Surgical histories reviewed in [facility] and updated as appropriate.    REVIEW OF SYSTEMS (7/14/2018)  CONSTITUTIONAL: Denies fever and weight loss  GASTROINTESTINAL: Denies abdominal pain, nausea, vomiting  MUSCULOSKELETAL: See HPI  SKIN: Denies any recent rash or lesion  NEUROLOGICAL: Denies numbness or focal weakness     PHYSICAL EXAM  Pulse 87  Ht 1.626 m (5' 4\")  Wt 51.7 kg (114 lb)  BMI 19.57 kg/m2    General Appearance: Well appearing, alert, in no acute distress, well-hydrated, and well nourished  Skin: No rashes, lesions, or ecchymosis present  Cardiovascular: pedal pulses and radial pulses normal, no signs of upper or lower extremity edema  Respiratory: no respiratory distress, no audible wheezing, no labored breathing, symmetric thoracic excursion  Psychiatric: mood and affect are appropriate, patient is oriented to time, place and person  Extremities: no peripheral edema noted in the upper or lower extremities    IMAGING : MRI lumbar spine  Impression:      Multilevel lumbar spondylosis. The most significant findings are at  L3-L4 where there is a left subarticular disc protrusion impinging on  the left L4 nerve root. Right moderate foraminal stenosis at L4-L5 due  to right foraminal disc protrusion and facet arthropathy.     I have personally " reviewed the examination and initial interpretation  and I agree with the findings.     ALIS MONTE MD    ASSESSMENT & PLAN  Ms. Olson is a 77 year old year old female who presents to clinic today for follow-up of lumbar MRI and discussion of low back pain with radiculopathy.  MRI revealing L3-L4 disc herniation with impingement of left L4 nerve root.  This is consistent with her clinical examination and symptomatology.  This point we discussed consideration for epidural steroid injection.  Patient agrees and order was placed.  We also discussed continuing physical therapy and considering addition of traction at the lumbar spine.  Patient will return in 2 weeks after her epidural injection for reevaluation.    It was a pleasure seeing Patrick.    Mukesh Lantigua DO, [facility]  Primary Care Sports Medicine

## 2018-07-16 ENCOUNTER — TELEPHONE (OUTPATIENT)
Dept: GENERAL RADIOLOGY | Facility: CLINIC | Age: 78
End: 2018-07-16

## 2018-07-20 ENCOUNTER — TELEPHONE (OUTPATIENT)
Dept: AUDIOLOGY | Facility: CLINIC | Age: 78
End: 2018-07-20

## 2018-07-20 ENCOUNTER — RADIANT APPOINTMENT (OUTPATIENT)
Dept: GENERAL RADIOLOGY | Facility: CLINIC | Age: 78
End: 2018-07-20
Attending: FAMILY MEDICINE
Payer: COMMERCIAL

## 2018-07-20 VITALS
OXYGEN SATURATION: 100 % | RESPIRATION RATE: 20 BRPM | HEART RATE: 72 BPM | DIASTOLIC BLOOD PRESSURE: 60 MMHG | SYSTOLIC BLOOD PRESSURE: 145 MMHG

## 2018-07-20 DIAGNOSIS — M51.16 LUMBAR DISC HERNIATION WITH RADICULOPATHY: ICD-10-CM

## 2018-07-20 RX ORDER — IOPAMIDOL 408 MG/ML
20 INJECTION, SOLUTION INTRATHECAL ONCE
Status: COMPLETED | OUTPATIENT
Start: 2018-07-20 | End: 2018-07-20

## 2018-07-20 RX ORDER — BUPIVACAINE HYDROCHLORIDE 5 MG/ML
10 INJECTION, SOLUTION EPIDURAL; INTRACAUDAL ONCE
Status: COMPLETED | OUTPATIENT
Start: 2018-07-20 | End: 2018-07-20

## 2018-07-20 RX ORDER — LIDOCAINE HYDROCHLORIDE 10 MG/ML
30 INJECTION, SOLUTION EPIDURAL; INFILTRATION; INTRACAUDAL; PERINEURAL ONCE
Status: COMPLETED | OUTPATIENT
Start: 2018-07-20 | End: 2018-07-20

## 2018-07-20 RX ORDER — METHYLPREDNISOLONE ACETATE 80 MG/ML
80 INJECTION, SUSPENSION INTRA-ARTICULAR; INTRALESIONAL; INTRAMUSCULAR; SOFT TISSUE ONCE
Status: COMPLETED | OUTPATIENT
Start: 2018-07-20 | End: 2018-07-20

## 2018-07-20 RX ADMIN — BUPIVACAINE HYDROCHLORIDE 4 ML: 5 INJECTION, SOLUTION EPIDURAL; INTRACAUDAL at 14:29

## 2018-07-20 RX ADMIN — LIDOCAINE HYDROCHLORIDE 5 ML: 10 INJECTION, SOLUTION EPIDURAL; INFILTRATION; INTRACAUDAL; PERINEURAL at 14:27

## 2018-07-20 RX ADMIN — METHYLPREDNISOLONE ACETATE 80 MG: 80 INJECTION, SUSPENSION INTRA-ARTICULAR; INTRALESIONAL; INTRAMUSCULAR; SOFT TISSUE at 14:29

## 2018-07-20 RX ADMIN — IOPAMIDOL 2 ML: 408 INJECTION, SOLUTION INTRATHECAL at 14:29

## 2018-07-20 NOTE — MR AVS SNAPSHOT
MRN:[id number]                      After Visit Summary   7/20/2018    Patrick Olson    MRN: [id number]           Visit Information        Provider Department      7/20/2018 2:30 PM  NEURO RAD;  IMAGING NURSE; UC[facility]           Review of your medicines          These changes are accurate as of 7/20/18  2:21 PM.  If you have any questions, ask your nurse or doctor.               CONTINUE these medicines which have NOT CHANGED        Dose / Directions    bromfenac 0.09 % ophthalmic solution   Commonly known as:  XIBROM        Dose:  1 drop   Place 1 drop Into the left eye 2 times daily   Refills:  0       Calcium carb-Vitamin D 500 mg [place]-200 units 500-200 MG-UNIT per tablet   Commonly known as:  OSCAL with D;Oyster Shell Calcium        Dose:  2 tablet   Take 2 tablets by mouth daily   Refills:  0       CELEBREX PO        Dose:  200 mg   Take 200 mg by mouth 2 times daily   Refills:  0       EPIPEN 0.3 MG/0.3ML injection   Generic drug:  EPINEPHrine        Dose:  0.3 mg   Inject 0.3 mg into the muscle once as needed   Refills:  0       ESTRADIOL PO        Dose:  0.5 mg   Take 0.5 mg by mouth daily   Refills:  0       folic acid 1 MG tablet   Commonly known as:  FOLVITE        Dose:  3 mg   Take 3 mg by mouth daily   Refills:  0       hydrocortisone 25 MG Suppository   Commonly known as:  ANUSOL-HC   Used for:  Internal hemorrhoids        Dose:  25 mg   Place 1 suppository (25 mg) rectally 2 times daily as needed for hemorrhoids   Quantity:  28 suppository   Refills:  0       medroxyPROGESTERone 10 MG tablet   Commonly known as:  PROVERA        Dose:  10 mg   Take 10 mg by mouth daily One tablet (10 mg) daily for Days 1-12 of each month.   Refills:  0       methotrexate 2.5 MG tablet CHEMO        Dose:  15 mg   Take 15 mg by mouth once a week Weekly on Tuesdays   Refills:  0       methylPREDNISolone 4 MG tablet   Commonly known as:  MEDROL DOSEPAK   Used for:  Lumbar  back pain with radiculopathy affecting left lower extremity, Sacroiliac joint pain        Follow package instructions   Quantity:  21 tablet   Refills:  0       OCUVITE PRESERVISION PO        Dose:  1 tablet   Take 1 tablet by mouth daily   Refills:  0       PILOCARPINE HCL PO        Dose:  5 mg   Take 5 mg by mouth 3 times daily   Refills:  0       ranitidine 150 MG tablet   Commonly known as:  ZANTAC   Used for:  Gastrointestinal hemorrhage, unspecified gastrointestinal hemorrhage type        Dose:  150 mg   Take 1 tablet (150 mg) by mouth 2 times daily   Refills:  0       tretinoin 0.1 % cream   Commonly known as:  RETIN-A        Apply topically At Bedtime   Refills:  0                Protect others around you: Learn how to safely use, store and throw away your medicines at www.disposemymeds.org.         Follow-ups after your visit        Your next 10 appointments already scheduled     Jul 20, 2018  2:30 PM CDT   XR LUMBAR EPIDURAL INJECTION with MIGUELXR3, MIGUEL IMAGING NURSE, MIGUEL NEURO RAD   [facility] ([facility])    [address] [address] [phone]           For nerve root injection, please send or bring copies of any MRIs or other scans you have had.  Bring a list of your current medicines to your exam. (Include vitamins, minerals and over-the-counter medicines.) Leave your valuables at home.  Plan to have someone drive you home afterward.  Stop taking the following medicines (but talk to your doctor first):   If you take blood thinners, you may need to stop taking them a few days before treatment. Talk to your doctor before stopping these medicines.Stop taking Coumadin (warfarin) 3 days before treatment. Restart the day after treatment.   If you take Plavix, Ticlid, Pletal or Persantine, please ask your doctor if you should stop these medicines. You may need extra tests on the morning of your scan.   If you take Xarelto  (Rivaroxaban), you may need to stop taking it 24 hours before treatment. Talk to your doctor before stopping this medicine. Restart the day after treatment.  You may take your other medicines as normal.  Stop all food and drink (including water) 3 hours before your test or treatment.  Please tell the doctor:   If you are allergic to X-ray dye (contrast fluid).   If you may be pregnant.  Injections take about 30 to 45 minutes. Most people spend up to 2 hours in the clinic or hospital.  Please call the Imaging Department at your exam site with any questions.            Jul 30, 2018 11:00 AM CDT   (Arrive by 10:45 AM)   [facility] Spine with Leon Cavazos, PT   [facility] [place] ([facility])    [address] [place] [address] [phone]               Care Instructions        Further instructions from your care team       Discharge Instructions for Epidural Steroid Injection/Nerve Block    Care of injection site:    If you have new numbness down your leg after the injection, this may last up to 4-6 hours, but should go away. You may need help with walking until your leg feels normal.    Over the next 24 to 48 hours, pain at the injection site may increase before it gets better.    For the next 48 hours, use ice packs for 15 minutes,3-4 times a day for pain relief.    If you have a bandage, you may remove it the next morning     Do not submerge injection site in water for 24 hours, (no baths. pools, hot tubs). Showers are OK.            Activity:    You should relax today and then you may return to your regular activity tomorrow.    You may eat a normal diet.    Medicines:    Restart all your medicines tomorrow at your regular dose, including Coumadin (Warfarin).    If you are restarting Coumadin, talk to your doctor about having your INR checked.    If you received steroids: The steroid should help reduce swelling and pain. This may take from 3-14 days. Pain from  the shot will be mild and go away in 2-3 days.     Common side effects may include:    Insomnia    Heartburn    Flushed face    Water retention    Increased appetite    Increased blood sugar      If you have diabetes, watch your blood sugar closely, If needed, call your doctor to help control your blood sugar.    Some patients will get lasting relief from a single injection. Others may require additional injections to get results.     Call your doctor or go to the Emergency Room if you have new severe pain or fever.     Additional Information About Your Visit        [employer] Information     [employer] gives you secure access to your electronic health record. If you see a primary care provider, you can also send messages to your care team and make appointments. If you have questions, please call your primary care clinic.  If you do not have a primary care provider, please call [phone] and they will assist you.      [employer] is an electronic gateway that provides easy, online access to your medical records. With [employer], you can request a clinic appointment, read your test results, renew a prescription or communicate with your care team.     To access your existing account, please contact your [facility] or call [phone] for assistance.        Care EveryWhere ID     This is your Care EveryWhere ID. This could be used by other organizations to access your [facility] medical records  [id number]        Your Vitals Were     Blood Pressure Pulse Respirations Pulse Oximetry          127/58 72 20 100%         Primary Care Provider Office Phone # Fax #    Essence Tamayo [phone]      Equal Access to Services     [facility] : Hadii luisa arredondo hadasho Sochrisali, waaxda luqadaha, qaybta kaalmada adeegyada, eloy boston . So [facility] [phone].    ATENCIÓN: Si habla español, tiene a farris disposición servicios gratuitos de asistencia lingüística. Llame  al [phone].    We comply with applicable federal civil rights laws and [place] laws. We do not discriminate on the basis of race, color, national origin, age, disability, sex, sexual orientation, or gender identity.            Thank you!     Thank you for choosing [facility] for your care. Our goal is always to provide you with excellent care. Hearing back from our patients is one way we can continue to improve our services. Please take a few minutes to complete the written survey that you may receive in the mail after you visit with us. Thank you!             Medication List: This is a list of all your medications and when to take them. Check marks below indicate your daily home schedule. Keep this list as a reference.          This list is accurate as of 7/20/18  2:21 PM.  Always use your most recent med list.               Medications           Morning Afternoon Evening Bedtime As Needed    bromfenac 0.09 % ophthalmic solution   Commonly known as:  XIBROM   Place 1 drop Into the left eye 2 times daily                                Calcium carb-Vitamin D 500 mg [place]-200 units 500-200 MG-UNIT per tablet   Commonly known as:  OSCAL with D;Oyster Shell Calcium   Take 2 tablets by mouth daily                                CELEBREX PO   Take 200 mg by mouth 2 times daily                                EPIPEN 0.3 MG/0.3ML injection   Inject 0.3 mg into the muscle once as needed   Generic drug:  EPINEPHrine                                ESTRADIOL PO   Take 0.5 mg by mouth daily                                folic acid 1 MG tablet   Commonly known as:  FOLVITE   Take 3 mg by mouth daily                                hydrocortisone 25 MG Suppository   Commonly known as:  ANUSOL-HC   Place 1 suppository (25 mg) rectally 2 times daily as needed for hemorrhoids                                medroxyPROGESTERone 10 MG tablet   Commonly known as:  PROVERA   Take 10 mg by mouth daily One tablet (10 mg) daily for Days  1-12 of each month.                                methotrexate 2.5 MG tablet CHEMO   Take 15 mg by mouth once a week Weekly on Tuesdays                                methylPREDNISolone 4 MG tablet   Commonly known as:  MEDROL DOSEPAK   Follow package instructions                                OCUVITE PRESERVISION PO   Take 1 tablet by mouth daily                                PILOCARPINE HCL PO   Take 5 mg by mouth 3 times daily                                ranitidine 150 MG tablet   Commonly known as:  ZANTAC   Take 1 tablet (150 mg) by mouth 2 times daily                                tretinoin 0.1 % cream   Commonly known as:  RETIN-A   Apply topically At Bedtime

## 2018-07-20 NOTE — PROGRESS NOTES
Pt did well with the procedure.  No complications. VSS. AVS given and pt escorted to the waiting room.    Brooke, Imaging Nurse

## 2018-07-30 ENCOUNTER — THERAPY VISIT (OUTPATIENT)
Dept: PHYSICAL THERAPY | Facility: CLINIC | Age: 78
End: 2018-07-30
Payer: COMMERCIAL

## 2018-07-30 DIAGNOSIS — M54.42 CHRONIC LEFT-SIDED LOW BACK PAIN WITH LEFT-SIDED SCIATICA: ICD-10-CM

## 2018-07-30 DIAGNOSIS — G89.29 CHRONIC LEFT-SIDED LOW BACK PAIN WITH LEFT-SIDED SCIATICA: ICD-10-CM

## 2018-07-30 PROCEDURE — 97530 THERAPEUTIC ACTIVITIES: CPT | Mod: GP | Performed by: PHYSICAL THERAPIST

## 2018-07-30 PROCEDURE — 97012 MECHANICAL TRACTION THERAPY: CPT | Mod: GP | Performed by: PHYSICAL THERAPIST

## 2018-07-30 PROCEDURE — 97110 THERAPEUTIC EXERCISES: CPT | Mod: GP | Performed by: PHYSICAL THERAPIST

## 2018-07-30 NOTE — PROGRESS NOTES
S:  Pt reports back to PT with continued orders for PT.  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  Pt had a hx of left WINTER.  Pt had a trip in may with increased walking, returned with no problems.  June went to [facility] with incr walking and sxs aggravation since then.  Things were going well until last PT appt and then had reaggravation.  Pt reports having a CSI with some success.  Current pain 4/10  O:   TTP:  L3,4,5 and surrounding paraspinals  Good michelle of prone position  Rapid speed with HEP and lacking control  A:  Pt will benefit from continued PT with traction trial to address form/speed and progress POC.  P: follow up in 1 week

## 2018-07-30 NOTE — MR AVS SNAPSHOT
After Visit Summary   7/30/2018    Patrick Olson    MRN: [id number]           Patient Information     Date Of Birth          1940        Visit Information        Provider Department      7/30/2018 11:00 AM Leon Cavazos PT [facility]        Today's Diagnoses     Chronic left-sided low back pain with left-sided sciatica           Follow-ups after your visit        Your next 10 appointments already scheduled     Aug 06, 2018  9:40 AM CDT   SARABJIT Spine with Leon Cavazos PT   [facility] ([facility])    [address] [place] [id number] [phone]            Aug 08, 2018 10:00 AM CDT   Return Walk In Ortho with Mukesh Lantigua DO   [facility] ([facility] [place])    [address] [place] [id number] [phone]            Aug 21, 2018  1:50 PM CDT   SARABJIT Spine with Leon Cavazos PT   [facility] ([facility])    [address] [place] [id number] [phone]            Aug 24, 2018 10:20 AM CDT   SARABJIT Spine with Leon Cavazos PT   [facility] ([facility])    [address] [place] [id number] [phone]              Who to contact     If you have questions or need follow up information about today's clinic visit or your schedule please contact [facility] [place] directly at [phone].  Normal or non-critical lab and imaging results will be communicated to you by MyChart, letter or phone within 4 business days after the clinic has received the results. If you do not hear from us within 7 days, please contact the clinic through MyChart or phone. If you have a critical or abnormal lab result, we will notify you by phone as soon as possible.  Submit refill requests  through [employer] or call your pharmacy and they will forward the refill request to us. Please allow 3 business days for your refill to be completed.          Additional Information About Your Visit        [employer] Information     [employer] gives you secure access to your electronic health record. If you see a primary care provider, you can also send messages to your care team and make appointments. If you have questions, please call your primary care clinic.  If you do not have a primary care provider, please call [phone] and they will assist you.        Care EveryWhere ID     This is your Care EveryWhere ID. This could be used by other organizations to access your [facility] medical records  [id number]         Blood Pressure from Last 3 Encounters:   07/20/18 145/60   07/10/18 111/69   05/15/18 121/79    Weight from Last 3 Encounters:   07/13/18 51.7 kg (114 lb)   07/10/18 51.7 kg (114 lb)   05/15/18 51.7 kg (114 lb)              We Performed the Following     Mechanical Traction Therapy     Therapeutic Activities     Therapeutic Exercises        Primary Care Provider Office Phone # Fax #    Essence Tamayo [phone]       [address] [address]        Equal Access to Services     [facility] : Hadii luisa lechugao Lani, waaxda norma, qaybta kaalmada adetrent, eloy borjas. So [facility] [phone].    ATENCIÓN: Si habla español, tiene a farris disposición servicios gratuitos de asistencia lingüística. Llame al [phone].    We comply with applicable federal civil rights laws and [place] laws. We do not discriminate on the basis of race, color, national origin, age, disability, sex, sexual orientation, or gender identity.            Thank you!     Thank you for choosing [facility] [place]  for your care. Our goal is always to provide you with excellent care. Hearing back from our patients  is one way we can continue to improve our services. Please take a few minutes to complete the written survey that you may receive in the mail after your visit with us. Thank you!             Your Updated Medication List - Protect others around you: Learn how to safely use, store and throw away your medicines at www.disposemymeds.org.          This list is accurate as of 7/30/18 12:52 PM.  Always use your most recent med list.                   Brand Name Dispense Instructions for use Diagnosis    bromfenac 0.09 % ophthalmic solution    XIBROM     Place 1 drop Into the left eye 2 times daily        Calcium carb-Vitamin D 500 mg [place]-200 units 500-200 MG-UNIT per tablet    OSCAL with D;Oyster Shell Calcium     Take 2 tablets by mouth daily        CELEBREX PO      Take 200 mg by mouth 2 times daily        EPIPEN 0.3 MG/0.3ML injection   Generic drug:  EPINEPHrine      Inject 0.3 mg into the muscle once as needed        ESTRADIOL PO      Take 0.5 mg by mouth daily        folic acid 1 MG tablet    FOLVITE     Take 3 mg by mouth daily        hydrocortisone 25 MG Suppository    ANUSOL-HC    28 suppository    Place 1 suppository (25 mg) rectally 2 times daily as needed for hemorrhoids    Internal hemorrhoids       medroxyPROGESTERone 10 MG tablet    PROVERA     Take 10 mg by mouth daily One tablet (10 mg) daily for Days 1-12 of each month.        methotrexate 2.5 MG tablet CHEMO      Take 15 mg by mouth once a week Weekly on Tuesdays        methylPREDNISolone 4 MG tablet    MEDROL DOSEPAK    21 tablet    Follow package instructions    Lumbar back pain with radiculopathy affecting left lower extremity, Sacroiliac joint pain       OCUVITE PRESERVISION PO      Take 1 tablet by mouth daily        PILOCARPINE HCL PO      Take 5 mg by mouth 3 times daily        ranitidine 150 MG tablet    ZANTAC     Take 1 tablet (150 mg) by mouth 2 times daily    Gastrointestinal hemorrhage, unspecified gastrointestinal hemorrhage type        tretinoin 0.1 % cream    RETIN-A     Apply topically At Bedtime

## 2018-08-06 ENCOUNTER — THERAPY VISIT (OUTPATIENT)
Dept: PHYSICAL THERAPY | Facility: CLINIC | Age: 78
End: 2018-08-06
Payer: COMMERCIAL

## 2018-08-06 DIAGNOSIS — M54.42 CHRONIC LEFT-SIDED LOW BACK PAIN WITH LEFT-SIDED SCIATICA: ICD-10-CM

## 2018-08-06 DIAGNOSIS — G89.29 CHRONIC LEFT-SIDED LOW BACK PAIN WITH LEFT-SIDED SCIATICA: ICD-10-CM

## 2018-08-06 PROCEDURE — 97112 NEUROMUSCULAR REEDUCATION: CPT | Mod: GP | Performed by: PHYSICAL THERAPIST

## 2018-08-06 PROCEDURE — 97012 MECHANICAL TRACTION THERAPY: CPT | Mod: GP | Performed by: PHYSICAL THERAPIST

## 2018-08-06 PROCEDURE — 97110 THERAPEUTIC EXERCISES: CPT | Mod: GP | Performed by: PHYSICAL THERAPIST

## 2018-08-06 NOTE — MR AVS SNAPSHOT
After Visit Summary   8/6/2018    Patrick Olson    MRN: [id number]           Patient Information     Date Of Birth          1940        Visit Information        Provider Department      8/6/2018 9:40 AM Leon Cavazos PT [facility] [employer] [facility]        Today's Diagnoses     Chronic left-sided low back pain with left-sided sciatica           Follow-ups after your visit        Your next 10 appointments already scheduled     Aug 08, 2018 10:00 AM CDT   Return Walk In Ortho with Mukesh Lantigua DO   [facility] ([facility])    [address] [place] [address]  4th [facility] [address]   [phone]            Aug 21, 2018  1:50 PM CDT   SARABJIT Spine with Leon Cavazos PT   [facility] [employer] [facility] ([facility])    [address] [place] [address]   [phone]            Aug 24, 2018 10:20 AM CDT   SARABJIT Spine with eLon Cavazos PT   [facility] [employer] [facility] ([facility])    [address] [place] [address]   [phone]            Aug 27, 2018  9:00 AM CDT   SARABJIT Spine with Leon Cavazos PT   [facility] [employer] [facility] ([facility])    [address] [place] [address]   [phone]            Sep 04, 2018 10:40 AM CDT   SARABJIT Spine with Leon Cavazos PT   [facility] [employer] [facility] ([facility])    [address] [place] [address]   [phone]            Sep 10, 2018  9:40 AM CDT   SARABJIT Spine with Leon Cavazos PT   [facility] [employer] [facility] ([facility])    [address] [place] [address]   [phone]            Sep 17, 2018 11:00 AM CDT   SARABJIT Spine with Leon Cavazos PT   [facility] [employer] [facility] ([facility])    [address] [place] [address]  [facility] [address] [phone]            Sep 24, 2018 11:00 AM CDT   SARABJIT Spine with Leon Cavazos PT   [facility] ([facility])    [facility] [address] [phone]              Who to contact     If you have questions or need follow up information about today's clinic visit or your schedule please contact [facility] [place] directly at [phone].  Normal or non-critical lab and imaging results will be communicated to you by [employer], letter or phone within 4 business days after the clinic has received the results. If you do not hear from us within 7 days, please contact the clinic through [employer] or phone. If you have a critical or abnormal lab result, we will notify you by phone as soon as possible.  Submit refill requests through [employer] or call your pharmacy and they will forward the refill request to us. Please allow 3 business days for your refill to be completed.          Additional Information About Your Visit        [employer] Information     [employer] gives you secure access to your electronic health record. If you see a primary care provider, you can also send messages to your care team and make appointments. If you have questions, please call your primary care clinic.  If you do not have a primary care provider, please call [phone] and they will assist you.        Care EveryWhere ID     This is your Care EveryWhere ID. This could be used by other organizations to access your [facility] medical records  [id number]         Blood Pressure from Last 3 Encounters:   07/20/18 145/60   07/10/18 111/69   05/15/18 121/79    Weight from Last 3 Encounters:   07/13/18 51.7 kg (114 lb)   07/10/18 51.7 kg (114 lb)   05/15/18 51.7 kg (114 lb)              We Performed the Following     Mechanical Traction Therapy     Neuromuscular Re-Education     Therapeutic Exercises        Primary Care Provider  Office Phone # Fax #    Essence Tamayo [phone]       [address] [address]        Equal Access to Services     WADE COVINGTON : Hadii aad ku hadkaidennaz Pilarflavia, wamariaelenada luqadaha, qafrandyta kaalmada maria guadalupe, eloy jewelin hayaanoe garciamichael gabriellebarbaraservando borjas. So [facility] [phone].    ATENCIÓN: Si habla español, tiene a farris disposición servicios gratuitos de asistencia lingüística. Mary Anname al [phone].    We comply with applicable federal civil rights laws and [place] laws. We do not discriminate on the basis of race, color, national origin, age, disability, sex, sexual orientation, or gender identity.            Thank you!     Thank you for choosing [facility] [place]  for your care. Our goal is always to provide you with excellent care. Hearing back from our patients is one way we can continue to improve our services. Please take a few minutes to complete the written survey that you may receive in the mail after your visit with us. Thank you!             Your Updated Medication List - Protect others around you: Learn how to safely use, store and throw away your medicines at www.disposemymeds.org.          This list is accurate as of 8/6/18 10:41 AM.  Always use your most recent med list.                   Brand Name Dispense Instructions for use Diagnosis    bromfenac 0.09 % ophthalmic solution    XIBROM     Place 1 drop Into the left eye 2 times daily        Calcium carb-Vitamin D 500 mg [place]-200 units 500-200 MG-UNIT per tablet    OSCAL with D;Oyster Shell Calcium     Take 2 tablets by mouth daily        CELEBREX PO      Take 200 mg by mouth 2 times daily        EPIPEN 0.3 MG/0.3ML injection   Generic drug:  EPINEPHrine      Inject 0.3 mg into the muscle once as needed        ESTRADIOL PO      Take 0.5 mg by mouth daily        folic acid 1 MG tablet    FOLVITE     Take 3 mg by mouth daily        hydrocortisone 25 MG Suppository     ANUSOL-HC    28 suppository    Place 1 suppository (25 mg) rectally 2 times daily as needed for hemorrhoids    Internal hemorrhoids       medroxyPROGESTERone 10 MG tablet    PROVERA     Take 10 mg by mouth daily One tablet (10 mg) daily for Days 1-12 of each month.        methotrexate 2.5 MG tablet CHEMO      Take 15 mg by mouth once a week Weekly on Tuesdays        methylPREDNISolone 4 MG tablet    MEDROL DOSEPAK    21 tablet    Follow package instructions    Lumbar back pain with radiculopathy affecting left lower extremity, Sacroiliac joint pain       OCUVITE PRESERVISION PO      Take 1 tablet by mouth daily        PILOCARPINE HCL PO      Take 5 mg by mouth 3 times daily        ranitidine 150 MG tablet    ZANTAC     Take 1 tablet (150 mg) by mouth 2 times daily    Gastrointestinal hemorrhage, unspecified gastrointestinal hemorrhage type       tretinoin 0.1 % cream    RETIN-A     Apply topically At Bedtime

## 2018-08-06 NOTE — PROGRESS NOTES
S:  Pt reports feeling good after last session but then the next day had an increase in pain this increased again through the remainder of the week.  Current pain is a 5/10 without NSAIDS.  Overall no change in sxs since the onset of PT.  Injection helped for about 3 days.  Walking tolerance is one mile.   O:  Trunk Standing ext: thigh tightness and pain  Standing flex: decr sxs with repeated motions    A:  No current change in PT since onset.  One visit of traction-will trial 4-5 more times to assess response and determine potential home usage.  This will be coupled with strengthening.  P:  Follow up 1-2 times per week.

## 2018-08-08 ENCOUNTER — OFFICE VISIT (OUTPATIENT)
Dept: ORTHOPEDICS | Facility: CLINIC | Age: 78
End: 2018-08-08
Payer: COMMERCIAL

## 2018-08-08 VITALS
BODY MASS INDEX: 19.46 KG/M2 | SYSTOLIC BLOOD PRESSURE: 129 MMHG | HEART RATE: 77 BPM | WEIGHT: 114 LBS | HEIGHT: 64 IN | DIASTOLIC BLOOD PRESSURE: 73 MMHG

## 2018-08-08 DIAGNOSIS — M54.42 CHRONIC LEFT-SIDED LOW BACK PAIN WITH LEFT-SIDED SCIATICA: Primary | ICD-10-CM

## 2018-08-08 DIAGNOSIS — G89.29 CHRONIC LEFT-SIDED LOW BACK PAIN WITH LEFT-SIDED SCIATICA: Primary | ICD-10-CM

## 2018-08-08 RX ORDER — CYCLOSPORINE 0.5 MG/ML
1 EMULSION OPHTHALMIC 2 TIMES DAILY
Status: ON HOLD | COMMUNITY
End: 2021-01-21

## 2018-08-08 RX ORDER — MELOXICAM 15 MG/1
15 TABLET ORAL DAILY
Qty: 30 TABLET | Refills: 0 | Status: SHIPPED | OUTPATIENT
Start: 2018-08-08 | End: 2018-11-06

## 2018-08-08 NOTE — LETTER
8/8/2018       RE: Patrick Olson  [address]  [facility] [address]     Dear Colleague,    Thank you for referring your patient, Patrick Olson, to the [facility] SPORTS AND ORTHOPAEDIC WALK IN CLINIC at [facility]. Please see a copy of my visit note below.          SPORTS & ORTHOPEDIC WALK-IN FOLLOW-UP VISIT 8/8/2018    Interval History:     Follow up reason: LEE ANN F/U    Date of injury: Chronic    Date last seen: 7/13/18    Following Therapeutic Plan: Yes     Pain: Improving somewhat    Function: Improving somewhat    Interval History: Up an down, some days better than others. Injection made somewhat of a difference. Has had two PT sessions since injection.     Medical History:    Any recent changes to your medical history? No    Any new medication prescribed since last visit? No    Review of Systems:    Do you have fever, chills, weight loss? No    Do you have any vision problems? No    Do you have any chest pain or edema? No    Do you have any shortness of breath or wheezing?  No    Do you have stomach problems? No    Do you have any numbness or focal weakness? Yes, down L leg    Do you have diabetes? No    Do you have problems with bleeding or clotting? No    Do you have an rashes or other skin lesions? No             ESTABLISHED PATIENT FOLLOW-UP:  RECHECK and Pain of the Lower Back       HISTORY OF PRESENT ILLNESS  Ms. Olson is a pleasant 77 year old year old female who presents to clinic today for follow-up of low back pain with left-sided radiculopathy, status post lumbar epidural steroid injection.    Date of injury: No injury, acute exacerbation around May 2018  Date last seen: July 13, 2018  Following Therapeutic Plan: Yes  Pain: Pain is improved by approximately 20%  Function: Mildly improved  Interval History: Patient states that she had marked improvement from lumbar epidural steroid injection which only lasted for a few days.  Since this time she has had  "increased pain with radiculopathy into her left lower extremity.  She is able to perform most of her IADLs however she is unable to perform long walks.  Her walking tolerance is up to about 1 mile at this point in time.  She attempted a 2 mile walk but she had an acute flare of her low back pain.  She continues physical therapy and notes that she had had improvement with lumbar traction over the last 2 weeks.  She is optimistic that this may continue to improve her symptoms.     Additional medical/Social/Surgical histories reviewed in [facility] and updated as appropriate.    REVIEW OF SYSTEMS (8/8/2018)  CONSTITUTIONAL: Denies fever and weight loss  GASTROINTESTINAL: Denies abdominal pain, nausea, vomiting  MUSCULOSKELETAL: See HPI  SKIN: Denies any recent rash or lesion  NEUROLOGICAL: Denies numbness or focal weakness     PHYSICAL EXAM  /73  Pulse 77  Ht 1.626 m (5' 4\")  Wt 51.7 kg (114 lb)  BMI 19.57 kg/m2      General  - normal appearance, in no obvious distress  CV  - normal peripheral perfusion  Pulm  - normal respiratory pattern, non-labored  Musculoskeletal - lumbar spine  - stance: normal gait without limp, no obvious leg length discrepancy, normal heel and toe walk  - inspection: normal bone and joint alignment, no obvious scoliosis  - palpation: Significant tenderness palpation of lumbar spine.  - ROM: Full range of motion in flexion-extension side bending and rotation.  No significant increase in pain with flexion.  - strength: lower extremities 5/5 in all planes  - special tests:  (-) straight leg raise    (-) slump test    Neuro  - patellar and Achilles DTRs 2+ bilaterally, No lower extremity sensory deficit throughout L5 distribution, grossly normal coordination, normal muscle tone  Skin  - no ecchymosis, erythema, warmth, or induration, no obvious rash  Psych  - interactive, appropriate, normal mood and affect     ASSESSMENT & PLAN  Ms. Olson is a 77 year old year old female who presents to " clinic today for discussion of chronic low back pain with radiculopathy status post lumbar epidural steroid injection.  Patient does note that she has had about 20% improvement of her symptoms overall and is hopeful that continued traction and physical therapy will bolster her recovery.  We discussed additional medication such as gabapentin versus repeat epidural steroid injection.  Patient would like to see how continue physical therapy will alter her current level of pain and discomfort and declines additional medications.  She would however be interested in trying meloxicam daily for her pain instead of using ibuprofen.  Patient will follow-up in the next 6 weeks or so to discuss progress his physical therapy wraps up.    It was a pleasure seeing Patrick.      Again, thank you for allowing me to participate in the care of your patient.      Sincerely,    Mukesh Lantigua, DO

## 2018-08-08 NOTE — MR AVS SNAPSHOT
After Visit Summary   8/8/2018    Patrick Olson    MRN: [id number]           Patient Information     Date Of Birth          1940        Visit Information        Provider Department      8/8/2018 10:00 AM Mukesh Lantigua DO [facility]opa[facility]        Today's Diagnoses     Chronic left-sided low back pain with left-sided sciatica    -  1       Follow-ups after your visit        Your next 10 appointments already scheduled     Aug 21, 2018  1:50 PM CDT   SARABJIT Spine with Leon Cavazos PT   [facility] ([facility])    [address] [place] [address]   [phone]            Aug 24, 2018 10:20 AM CDT   SARABJIT Spine with Leon Cavazos PT   [facility] ([facility])    [address] [place] [address]   [phone]            Aug 27, 2018  9:00 AM CDT   SARABJIT Spine with Leon Cavazos PT   [facility] ([facility])    [address] [place] [address]   [phone]            Sep 04, 2018 10:40 AM CDT   SARABJIT Spine with Leon Cavazos PT   [facility] ([facility])    [address] [place] [address]   [phone]            Sep 10, 2018  9:40 AM CDT   SARABJIT Spine with Leon Cavazos PT   [facility] ([facility])    [address] [place] [address]   [phone]            Sep 17, 2018 11:00 AM CDT   SARABJIT Spine with Leon Cavazos PT   [facility] ([facility])    [address] [place] [address]   [phone]            Sep 24, 2018 11:00 AM CDT   SARABJIT Spine with Leon Cavazos PT   [facility] ([facility])    [address] [place] [address]  [phone]              Who to contact     Please call your clinic at [phone] to:    Ask questions about your health    Make or cancel appointments    Discuss your medicines    Learn about your test results    Speak to your doctor            Additional Information About Your Visit        [employer] Information     [employer] gives you secure access to your electronic health record. If you see a primary care provider, you can also send messages to your care team and make appointments. If you have questions, please call your primary care clinic.  If you do not have a primary care provider, please call [phone] and they will assist you.      [employer] is an electronic gateway that provides easy, online access to your medical records. With [employer], you can request a clinic appointment, read your test results, renew a prescription or communicate with your care team.     To access your existing account, please contact your [facility] or call [phone] for assistance.        Care EveryWhere ID     This is your Care EveryWhere ID. This could be used by other organizations to access your [facility] medical records  [id number]        Your Vitals Were     Pulse Height BMI (Body Mass Index)             77 1.626 m (5' 4\") 19.57 kg/m2          Blood Pressure from Last 3 Encounters:   08/08/18 129/73   07/20/18 145/60   07/10/18 111/69    Weight from Last 3 Encounters:   08/08/18 51.7 kg (114 lb)   07/13/18 51.7 kg (114 lb)   07/10/18 51.7 kg (114 lb)              Today, you had the following     No orders found for display         Today's Medication Changes          These changes are accurate as of 8/8/18  3:03 PM.  If you have any questions, ask your nurse or doctor.               Start taking these medicines.        Dose/Directions    meloxicam 15 MG tablet   Commonly known as:  MOBIC   Used for:  Chronic left-sided low back pain with left-sided sciatica        Dose:  15 mg "   Take 1 tablet (15 mg) by mouth daily   Quantity:  30 tablet   Refills:  0         Stop taking these medicines if you haven't already. Please contact your care team if you have questions.     bromfenac 0.09 % ophthalmic solution   Commonly known as:  XIBROM                Where to get your medicines      These medications were sent to [facility] [id number] - [address] AT [address] & [address] [address]     Phone:  [phone]     meloxicam 15 MG tablet                Primary Care Provider Office Phone # Fax #    Essence Tamayo [phone]       [address]   [facility] [address]        Equal Access to Services     WADE COVINGTON : Sarah lechugao Soomaali, waaxda luqadaha, qaybta kaalmada adeegyada, eloy borjas. So [facility] [phone].    ATENCIÓN: Si habla español, tiene a farris disposición servicios gratuitos de asistencia lingüística. [facility] [phone].    We comply with applicable federal civil rights laws and [place] laws. We do not discriminate on the basis of race, color, national origin, age, disability, sex, sexual orientation, or gender identity.            Thank you!     Thank you for choosing [facility] SPORTS AND ORTHOPAEDIC WALK IN CLINIC  for your care. Our goal is always to provide you with excellent care. Hearing back from our patients is one way we can continue to improve our services. Please take a few minutes to complete the written survey that you may receive in the mail after your visit with us. Thank you!             Your Updated Medication List - Protect others around you: Learn how to safely use, store and throw away your medicines at www.disposemymeds.org.          This list is accurate as of 8/8/18  3:03 PM.  Always use your most recent med list.                   Brand Name Dispense Instructions for use Diagnosis    Calcium  carb-Vitamin D 500 mg [place]-200 units 500-200 MG-UNIT per tablet    OSCAL with D;Oyster Shell Calcium     Take 2 tablets by mouth daily        CELEBREX PO      Take 200 mg by mouth 2 times daily        cycloSPORINE 0.05 % ophthalmic emulsion    RESTASIS     Place 1 drop into both eyes 2 times daily        EPIPEN 0.3 MG/0.3ML injection   Generic drug:  EPINEPHrine      Inject 0.3 mg into the muscle once as needed        ESTRADIOL PO      Take 0.5 mg by mouth daily        folic acid 1 MG tablet    FOLVITE     Take 3 mg by mouth daily        hydrocortisone 25 MG Suppository    ANUSOL-HC    28 suppository    Place 1 suppository (25 mg) rectally 2 times daily as needed for hemorrhoids    Internal hemorrhoids       medroxyPROGESTERone 10 MG tablet    PROVERA     Take 10 mg by mouth daily One tablet (10 mg) daily for Days 1-12 of each month.        meloxicam 15 MG tablet    MOBIC    30 tablet    Take 1 tablet (15 mg) by mouth daily    Chronic left-sided low back pain with left-sided sciatica       methotrexate 2.5 MG tablet CHEMO      Take 15 mg by mouth once a week Weekly on Tuesdays        OCUVITE PRESERVISION PO      Take 1 tablet by mouth daily        PILOCARPINE HCL PO      Take 5 mg by mouth 3 times daily        ranitidine 150 MG tablet    ZANTAC     Take 1 tablet (150 mg) by mouth 2 times daily    Gastrointestinal hemorrhage, unspecified gastrointestinal hemorrhage type       tretinoin 0.1 % cream    RETIN-A     Apply topically At Bedtime

## 2018-08-08 NOTE — PROGRESS NOTES
"ESTABLISHED PATIENT FOLLOW-UP:  RECHECK and Pain of the Lower Back       HISTORY OF PRESENT ILLNESS  Ms. Olson is a pleasant 77 year old year old female who presents to clinic today for follow-up of low back pain with left-sided radiculopathy, status post lumbar epidural steroid injection.    Date of injury: No injury, acute exacerbation around May 2018  Date last seen: July 13, 2018  Following Therapeutic Plan: Yes  Pain: Pain is improved by approximately 20%  Function: Mildly improved  Interval History: Patient states that she had marked improvement from lumbar epidural steroid injection which only lasted for a few days.  Since this time she has had increased pain with radiculopathy into her left lower extremity.  She is able to perform most of her IADLs however she is unable to perform long walks.  Her walking tolerance is up to about 1 mile at this point in time.  She attempted a 2 mile walk but she had an acute flare of her low back pain.  She continues physical therapy and notes that she had had improvement with lumbar traction over the last 2 weeks.  She is optimistic that this may continue to improve her symptoms.     Additional medical/Social/Surgical histories reviewed in [facility] and updated as appropriate.    REVIEW OF SYSTEMS (8/8/2018)  CONSTITUTIONAL: Denies fever and weight loss  GASTROINTESTINAL: Denies abdominal pain, nausea, vomiting  MUSCULOSKELETAL: See HPI  SKIN: Denies any recent rash or lesion  NEUROLOGICAL: Denies numbness or focal weakness     PHYSICAL EXAM  /73  Pulse 77  Ht 1.626 m (5' 4\")  Wt 51.7 kg (114 lb)  BMI 19.57 kg/m2      General  - normal appearance, in no obvious distress  CV  - normal peripheral perfusion  Pulm  - normal respiratory pattern, non-labored  Musculoskeletal - lumbar spine  - stance: normal gait without limp, no obvious leg length discrepancy, normal heel and toe walk  - inspection: normal bone and joint alignment, no obvious scoliosis  - palpation: " Significant tenderness palpation of lumbar spine.  - ROM: Full range of motion in flexion-extension side bending and rotation.  No significant increase in pain with flexion.  - strength: lower extremities 5/5 in all planes  - special tests:  (-) straight leg raise    (-) slump test    Neuro  - patellar and Achilles DTRs 2+ bilaterally, No lower extremity sensory deficit throughout L5 distribution, grossly normal coordination, normal muscle tone  Skin  - no ecchymosis, erythema, warmth, or induration, no obvious rash  Psych  - interactive, appropriate, normal mood and affect     ASSESSMENT & PLAN  Ms. Olson is a 77 year old year old female who presents to clinic today for discussion of chronic low back pain with radiculopathy status post lumbar epidural steroid injection.  Patient does note that she has had about 20% improvement of her symptoms overall and is hopeful that continued traction and physical therapy will bolster her recovery.  We discussed additional medication such as gabapentin versus repeat epidural steroid injection.  Patient would like to see how continue physical therapy will alter her current level of pain and discomfort and declines additional medications.  She would however be interested in trying meloxicam daily for her pain instead of using ibuprofen.  Patient will follow-up in the next 6 weeks or so to discuss progress his physical therapy wraps up.    It was a pleasure seeing Imani Lantigua DO, CAM  Primary Care Sports Medicine

## 2018-08-08 NOTE — PROGRESS NOTES
SPORTS & ORTHOPEDIC WALK-IN FOLLOW-UP VISIT 8/8/2018    Interval History:     Follow up reason: LEE ANN F/U    Date of injury: Chronic    Date last seen: 7/13/18    Following Therapeutic Plan: Yes     Pain: Improving somewhat    Function: Improving somewhat    Interval History: Up an down, some days better than others. Injection made somewhat of a difference. Has had two PT sessions since injection.     Medical History:    Any recent changes to your medical history? No    Any new medication prescribed since last visit? No    Review of Systems:    Do you have fever, chills, weight loss? No    Do you have any vision problems? No    Do you have any chest pain or edema? No    Do you have any shortness of breath or wheezing?  No    Do you have stomach problems? No    Do you have any numbness or focal weakness? Yes, down L leg    Do you have diabetes? No    Do you have problems with bleeding or clotting? No    Do you have an rashes or other skin lesions? No

## 2018-08-21 ENCOUNTER — THERAPY VISIT (OUTPATIENT)
Dept: PHYSICAL THERAPY | Facility: CLINIC | Age: 78
End: 2018-08-21
Payer: COMMERCIAL

## 2018-08-21 DIAGNOSIS — M51.16 LUMBAR DISC HERNIATION WITH RADICULOPATHY: Primary | ICD-10-CM

## 2018-08-21 DIAGNOSIS — G89.29 CHRONIC LEFT-SIDED LOW BACK PAIN WITH LEFT-SIDED SCIATICA: ICD-10-CM

## 2018-08-21 DIAGNOSIS — M54.42 CHRONIC LEFT-SIDED LOW BACK PAIN WITH LEFT-SIDED SCIATICA: ICD-10-CM

## 2018-08-21 PROCEDURE — 97110 THERAPEUTIC EXERCISES: CPT | Mod: GP | Performed by: PHYSICAL THERAPIST

## 2018-08-21 PROCEDURE — 97112 NEUROMUSCULAR REEDUCATION: CPT | Mod: GP | Performed by: PHYSICAL THERAPIST

## 2018-08-21 PROCEDURE — 97012 MECHANICAL TRACTION THERAPY: CPT | Mod: GP | Performed by: PHYSICAL THERAPIST

## 2018-08-21 NOTE — MR AVS SNAPSHOT
After Visit Summary   8/21/2018    Patrick Olson    MRN: [id number]           Patient Information     Date Of Birth          1940        Visit Information        Provider Department      8/21/2018 1:50 PM Leon Cavazos PT [facility] [place]        Today's Diagnoses     Chronic left-sided low back pain with left-sided sciatica           Follow-ups after your visit        Your next 10 appointments already scheduled     Aug 24, 2018 10:20 AM CDT   SARABJIT Spine with Leon Cavazos PT   [facility] [place] ([facility])    [address] [place] [address]   [phone]            Aug 27, 2018  9:00 AM CDT   SARABJIT Spine with Leon Cavazos PT   [facility] [place] ([facility])    [address] [place] [address]   [phone]            Sep 04, 2018 10:40 AM CDT   SARABJIT Spine with Leon Cavazos PT   [facility] [place] ([facility])    [address] [place] [address]   [phone]            Sep 10, 2018  9:40 AM CDT   SARABJIT Spine with Leon Cavazos PT   [facility] [place] ([facility])    [address] [place] [address]   [phone]            Sep 17, 2018 11:00 AM CDT   SARABJIT Spine with Leon Cavazos PT   [facility] [place] ([facility])    [address] [place] [address]   [phone]            Sep 24, 2018 11:00 AM CDT   SARABJIT Spine with Leon Cavazos PT   [facility] ([facility])    [address] [place] [address]   [phone]              Who to contact     If you have questions or need follow up information about today's clinic visit or your schedule please contact [facility] directly at  [phone].  Normal or non-critical lab and imaging results will be communicated to you by MyChart, letter or phone within 4 business days after the clinic has received the results. If you do not hear from us within 7 days, please contact the clinic through [employer] or phone. If you have a critical or abnormal lab result, we will notify you by phone as soon as possible.  Submit refill requests through [employer] or call your pharmacy and they will forward the refill request to us. Please allow 3 business days for your refill to be completed.          Additional Information About Your Visit        [employer] Information     [employer] gives you secure access to your electronic health record. If you see a primary care provider, you can also send messages to your care team and make appointments. If you have questions, please call your primary care clinic.  If you do not have a primary care provider, please call [phone] and they will assist you.        Care EveryWhere ID     This is your Care EveryWhere ID. This could be used by other organizations to access your [facility] medical records  [id number]         Blood Pressure from Last 3 Encounters:   08/08/18 129/73   07/20/18 145/60   07/10/18 111/69    Weight from Last 3 Encounters:   08/08/18 51.7 kg (114 lb)   07/13/18 51.7 kg (114 lb)   07/10/18 51.7 kg (114 lb)              We Performed the Following     Mechanical Traction Therapy     Neuromuscular Re-Education     Therapeutic Exercises        Primary Care Provider Office Phone # Fax #    Essence Tamayo [phone]       [address] [address]        Equal Access to Services     [facility]: Hadii aad arnulfo hadasho Soflavia, waaxda luqadaha, qaybta kaalmada maria guadalupe, eloy borjas. So [facility] [phone].    ATENCIÓN: Si habla español, tiene a farris disposición servicios gratuitos de asistencia lingüística. Llame al  [phone].    We comply with applicable federal civil rights laws and [place] laws. We do not discriminate on the basis of race, color, national origin, age, disability, sex, sexual orientation, or gender identity.            Thank you!     Thank you for choosing [facility]  for your care. Our goal is always to provide you with excellent care. Hearing back from our patients is one way we can continue to improve our services. Please take a few minutes to complete the written survey that you may receive in the mail after your visit with us. Thank you!             Your Updated Medication List - Protect others around you: Learn how to safely use, store and throw away your medicines at www.disposemymeds.org.          This list is accurate as of 8/21/18  2:38 PM.  Always use your most recent med list.                   Brand Name Dispense Instructions for use Diagnosis    Calcium carb-Vitamin D 500 mg [place]-200 units 500-200 MG-UNIT per tablet    OSCAL with D;Oyster Shell Calcium     Take 2 tablets by mouth daily        CELEBREX PO      Take 200 mg by mouth 2 times daily        cycloSPORINE 0.05 % ophthalmic emulsion    RESTASIS     Place 1 drop into both eyes 2 times daily        EPIPEN 0.3 MG/0.3ML injection   Generic drug:  EPINEPHrine      Inject 0.3 mg into the muscle once as needed        ESTRADIOL PO      Take 0.5 mg by mouth daily        folic acid 1 MG tablet    FOLVITE     Take 3 mg by mouth daily        hydrocortisone 25 MG Suppository    ANUSOL-HC    28 suppository    Place 1 suppository (25 mg) rectally 2 times daily as needed for hemorrhoids    Internal hemorrhoids       medroxyPROGESTERone 10 MG tablet    PROVERA     Take 10 mg by mouth daily One tablet (10 mg) daily for Days 1-12 of each month.        meloxicam 15 MG tablet    MOBIC    30 tablet    Take 1 tablet (15 mg) by mouth daily    Chronic left-sided low back pain with left-sided sciatica       methotrexate 2.5 MG  tablet CHEMO      Take 15 mg by mouth once a week Weekly on Tuesdays        OCUVITE PRESERVISION PO      Take 1 tablet by mouth daily        PILOCARPINE HCL PO      Take 5 mg by mouth 3 times daily        ranitidine 150 MG tablet    ZANTAC     Take 1 tablet (150 mg) by mouth 2 times daily    Gastrointestinal hemorrhage, unspecified gastrointestinal hemorrhage type       tretinoin 0.1 % cream    RETIN-A     Apply topically At Bedtime

## 2018-08-21 NOTE — PROGRESS NOTES
S:  Pt reports overall noticed change in walking tolerance able to walk a few miles.  Standing for prolonged periods aggravate the left leg although able to stand longer 80 minutes-tendency to bias right side.  O: fair control with TA contraction  decr sxs with repeated flex in standing and good motion  Thigh tightness present with trunk extension  A:  Reports small changes in sxs and function that are temporary in nature.  PT would like to continue traction + PT for 3-4 more visits to progress POC and determine potential for home traction usage.  P: follow up 1-2 times per week.

## 2018-08-24 ENCOUNTER — THERAPY VISIT (OUTPATIENT)
Dept: PHYSICAL THERAPY | Facility: CLINIC | Age: 78
End: 2018-08-24
Payer: COMMERCIAL

## 2018-08-24 DIAGNOSIS — G89.29 CHRONIC LEFT-SIDED LOW BACK PAIN WITH LEFT-SIDED SCIATICA: ICD-10-CM

## 2018-08-24 DIAGNOSIS — M54.42 CHRONIC LEFT-SIDED LOW BACK PAIN WITH LEFT-SIDED SCIATICA: ICD-10-CM

## 2018-08-24 PROCEDURE — 97112 NEUROMUSCULAR REEDUCATION: CPT | Mod: GP | Performed by: PHYSICAL THERAPIST

## 2018-08-24 PROCEDURE — 97110 THERAPEUTIC EXERCISES: CPT | Mod: GP | Performed by: PHYSICAL THERAPIST

## 2018-08-27 ENCOUNTER — THERAPY VISIT (OUTPATIENT)
Dept: PHYSICAL THERAPY | Facility: CLINIC | Age: 78
End: 2018-08-27
Payer: COMMERCIAL

## 2018-08-27 DIAGNOSIS — M54.42 CHRONIC LEFT-SIDED LOW BACK PAIN WITH LEFT-SIDED SCIATICA: ICD-10-CM

## 2018-08-27 DIAGNOSIS — G89.29 CHRONIC LEFT-SIDED LOW BACK PAIN WITH LEFT-SIDED SCIATICA: ICD-10-CM

## 2018-08-27 PROCEDURE — 97110 THERAPEUTIC EXERCISES: CPT | Mod: GP | Performed by: PHYSICAL THERAPIST

## 2018-08-27 PROCEDURE — 97012 MECHANICAL TRACTION THERAPY: CPT | Mod: GP | Performed by: PHYSICAL THERAPIST

## 2018-09-04 ENCOUNTER — THERAPY VISIT (OUTPATIENT)
Dept: PHYSICAL THERAPY | Facility: CLINIC | Age: 78
End: 2018-09-04
Payer: COMMERCIAL

## 2018-09-04 DIAGNOSIS — G89.29 CHRONIC LEFT-SIDED LOW BACK PAIN WITH LEFT-SIDED SCIATICA: ICD-10-CM

## 2018-09-04 DIAGNOSIS — M54.42 CHRONIC LEFT-SIDED LOW BACK PAIN WITH LEFT-SIDED SCIATICA: ICD-10-CM

## 2018-09-04 PROCEDURE — 97112 NEUROMUSCULAR REEDUCATION: CPT | Mod: GP | Performed by: PHYSICAL THERAPIST

## 2018-09-04 PROCEDURE — 97110 THERAPEUTIC EXERCISES: CPT | Mod: GP | Performed by: PHYSICAL THERAPIST

## 2018-09-07 DIAGNOSIS — M54.16 LUMBAR BACK PAIN WITH RADICULOPATHY AFFECTING LEFT LOWER EXTREMITY: Primary | ICD-10-CM

## 2018-09-10 ENCOUNTER — THERAPY VISIT (OUTPATIENT)
Dept: PHYSICAL THERAPY | Facility: CLINIC | Age: 78
End: 2018-09-10
Payer: COMMERCIAL

## 2018-09-10 DIAGNOSIS — G89.29 CHRONIC LEFT-SIDED LOW BACK PAIN WITH LEFT-SIDED SCIATICA: ICD-10-CM

## 2018-09-10 DIAGNOSIS — M54.42 CHRONIC LEFT-SIDED LOW BACK PAIN WITH LEFT-SIDED SCIATICA: ICD-10-CM

## 2018-09-10 PROCEDURE — 97110 THERAPEUTIC EXERCISES: CPT | Mod: GP | Performed by: PHYSICAL THERAPIST

## 2018-09-10 PROCEDURE — 97140 MANUAL THERAPY 1/> REGIONS: CPT | Mod: GP | Performed by: PHYSICAL THERAPIST

## 2018-09-18 ENCOUNTER — RADIANT APPOINTMENT (OUTPATIENT)
Dept: MAMMOGRAPHY | Facility: CLINIC | Age: 78
End: 2018-09-18
Attending: INTERNAL MEDICINE
Payer: COMMERCIAL

## 2018-09-18 DIAGNOSIS — Z12.31 VISIT FOR SCREENING MAMMOGRAM: ICD-10-CM

## 2018-09-18 DIAGNOSIS — Z12.39 BREAST CANCER SCREENING, HIGH RISK PATIENT: ICD-10-CM

## 2018-11-06 DIAGNOSIS — M54.42 CHRONIC LEFT-SIDED LOW BACK PAIN WITH LEFT-SIDED SCIATICA: ICD-10-CM

## 2018-11-06 DIAGNOSIS — G89.29 CHRONIC LEFT-SIDED LOW BACK PAIN WITH LEFT-SIDED SCIATICA: ICD-10-CM

## 2018-11-06 RX ORDER — MELOXICAM 15 MG/1
15 TABLET ORAL DAILY
Qty: 30 TABLET | Refills: 0 | Status: SHIPPED | OUTPATIENT
Start: 2018-11-06 | End: 2020-02-07

## 2019-02-01 ENCOUNTER — MEDICAL CORRESPONDENCE (OUTPATIENT)
Dept: HEALTH INFORMATION MANAGEMENT | Facility: CLINIC | Age: 79
End: 2019-02-01

## 2019-02-04 ENCOUNTER — TRANSFERRED RECORDS (OUTPATIENT)
Dept: HEALTH INFORMATION MANAGEMENT | Facility: CLINIC | Age: 79
End: 2019-02-04

## 2019-02-26 ENCOUNTER — OFFICE VISIT (OUTPATIENT)
Dept: ORTHOPEDICS | Facility: CLINIC | Age: 79
End: 2019-02-26
Payer: COMMERCIAL

## 2019-02-26 DIAGNOSIS — M51.369 LUMBAR DEGENERATIVE DISC DISEASE: Primary | ICD-10-CM

## 2019-02-26 NOTE — LETTER
2019       RE: Patrick Olson  [address] [facility] [address]     Dear Colleague,    Thank you for referring your patient, Patrick Olson, to the [facility] SPORTS AND ORTHOPAEDIC WALK IN CLINIC at [facility]. Please see a copy of my visit note below.    [facility]  Orthopedics  Mukesh Lantigua, DO  2019     Name: Patrick Olson  MRN: [id number]  Age: 78 year old  : 1940  Referring provider: Referred Self     Chief Complaint: RECHECK    History of Present Illness:   Patrick Olson is a 78 year old female  who presents today for follow-up regarding lower back pain with left-sided radiculopathy. I last evaluated her on 19, at which time patient was interested in trying meloxicam daily for pain instead of using ibuprofen and wanted to continue physical therapy to alter her current level of pain and discomfort. Today, she notes increasing low back pain in the last month, especially after following painting activities. She notes that she visited Dr. Carney approximately two weeks ago, at which time she was given an increase dosage in Metadoxine. She notes following the home exercise program closely, and was wondering if physical therapy would be helpful. She also reports she will be traveling and doing a lot of walking in March.     Review of Systems:   A 10-point review of systems was obtained and is negative except for as noted in the HPI.     Physical Examination:  not currently breastfeeding.    General  - normal appearance, in no obvious distress  CV  - normal peripheral perfusion  Pulm  - normal respiratory pattern, non-labored  Musculoskeletal - lumbar spine  - stance: normal gait without limp, no obvious leg length discrepancy, normal heel and toe walk  - inspection: normal bone and joint alignment, no obvious scoliosis  - palpation: tenderness at the left SI region as well as left gluteal region. Tenderness at the left PIS region.   -  ROM: normal and painless flexion, extension, left and right sidebending and rotation  - strength: lower extremities 5/5 in all planes   - special tests:  (-) straight leg raise bilaterally  (-) slump test  (-) Sacroiliac compression test   Neuro  - patellar and Achilles DTRs 2+ bilaterally, no sensory or motor deficit, grossly normal coordination, normal muscle tone  Skin  - no ecchymosis, erythema, warmth, or induration, no obvious rash  Psych  - interactive, appropriate, normal mood and affect    Assessment:   78 year old female with a past medical history of sacroiliac joint pain and 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  She has concerns that her pain may progress to severity experienced 6 months ago and would like to initiate therapy earlier to avoid need for injections.    Plan:   - Referral to physical therapy provided; may only need 3 visits or so to brush up   - Continue home exercise program  - Avoid prolonged standing during painting/modify activity for the time being  - She defers epidural steroid injections at this time.   - Follow-up PRN    Mukesh PEREIRA DO, have reviewed the above note and agree with the scribe's notation as written.    Scribe Disclosure:   Waldo PEREIRA, am serving as a scribe to document services personally performed by Mukesh Lantigua DO at this visit, based upon the provider's statements to me. All documentation has been reviewed by the aforementioned provider prior to being entered into the official medical record.      Again, thank you for allowing me to participate in the care of your patient.      Sincerely,    Mukesh Lantigua DO

## 2019-02-26 NOTE — PROGRESS NOTES
[facility]  Orthopedics  Mukesh Lantigua,   2019     Name: Patrick Olson  MRN: [id number]  Age: 78 year old  : 1940  Referring provider: Referred Self     Chief Complaint: RECHECK    History of Present Illness:   Patrick Olson is a 78 year old female  who presents today for follow-up regarding lower back pain with left-sided radiculopathy. I last evaluated her on 19, at which time patient was interested in trying meloxicam daily for pain instead of using ibuprofen and wanted to continue physical therapy to alter her current level of pain and discomfort. Today, she notes increasing low back pain in the last month, especially after following painting activities. She notes that she visited Dr. Carney approximately two weeks ago, at which time she was given an increase dosage in Metadoxine. She notes following the home exercise program closely, and was wondering if physical therapy would be helpful. She also reports she will be traveling and doing a lot of walking in March.     Review of Systems:   A 10-point review of systems was obtained and is negative except for as noted in the HPI.     Physical Examination:  not currently breastfeeding.    General  - normal appearance, in no obvious distress  CV  - normal peripheral perfusion  Pulm  - normal respiratory pattern, non-labored  Musculoskeletal - lumbar spine  - stance: normal gait without limp, no obvious leg length discrepancy, normal heel and toe walk  - inspection: normal bone and joint alignment, no obvious scoliosis  - palpation: tenderness at the left SI region as well as left gluteal region. Tenderness at the left PIS region.   - ROM: normal and painless flexion, extension, left and right sidebending and rotation  - strength: lower extremities 5/5 in all planes   - special tests:  (-) straight leg raise bilaterally  (-) slump test  (-) Sacroiliac compression test   Neuro  - patellar and Achilles DTRs 2+ bilaterally, no sensory or  motor deficit, grossly normal coordination, normal muscle tone  Skin  - no ecchymosis, erythema, warmth, or induration, no obvious rash  Psych  - interactive, appropriate, normal mood and affect    Assessment:   78 year old female with a past medical history of sacroiliac joint pain and 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  She has concerns that her pain may progress to severity experienced 6 months ago and would like to initiate therapy earlier to avoid need for injections.    Plan:   - Referral to physical therapy provided; may only need 3 visits or so to brush up   - Continue home exercise program  - Avoid prolonged standing during painting/modify activity for the time being  - She defers epidural steroid injections at this time.   - Follow-up PRN    Mukesh PEREIRA DO, have reviewed the above note and agree with the scribe's notation as written.    Scribe Disclosure:   Waldo PEREIRA, am serving as a scribe to document services personally performed by Mukesh Lantigua DO at this visit, based upon the provider's statements to me. All documentation has been reviewed by the aforementioned provider prior to being entered into the official medical record.

## 2019-03-01 ENCOUNTER — OFFICE VISIT (OUTPATIENT)
Dept: AUDIOLOGY | Facility: CLINIC | Age: 79
End: 2019-03-01
Payer: COMMERCIAL

## 2019-03-01 DIAGNOSIS — H90.3 SENSORINEURAL HEARING LOSS, BILATERAL: Primary | ICD-10-CM

## 2019-03-01 NOTE — PROGRESS NOTES
AUDIOLOGY REPORT    SUBJECTIVE:  Patrick Olson is a 78 year old female who was seen in Audiology at the [facility] [place] [facility] for audiologic evaluation and hearing aid check, referred by Essence Tamayo.  The patient has been seen previously in this clinic on 7/25/2016 for hearing assessment and results indicated mild sloping to severe sensorineural hearing loss bilaterally with 68% speech understanding for the right ear and 84% for the left ear. The patient was fit with binaural Resound LiNX2-5  in the canal hearing aids 10/19/2016 but reports that she seems to have lost some clarity for speech in her left ear. The patient also reports the presence of significant feedback from the right hearing aid with recent use. The patient also notes issues with hearing in the presence of background noise and also issues with clarity of speech when someone is using a microphone. The patient confirms bilateral episodic tinnitus is still present but she denies any other ear related issues or vertigo.    OBJECTIVE:  Fall Risk Screen:  1. Have you fallen two or more times in the past year? No  2. Have you fallen and had an injury in the past year? No    Otoscopic exam indicates excessive cerumen bilaterally (more significant right ear) that was removed with lighted curette without incident prior to testing.     Pure Tone Thresholds assessed using conventional audiometry with good  reliability from 250-8000 Hz bilaterally using circumaural headphones and reassessed using insert earphones    RIGHT:  Mild sloping to severe sensorineural hearing loss; stable    LEFT:    Mild sloping to severe sensorineural hearing loss; stable w/ exception of 15 dB decrease 4000 Hz  Tympanogram:    RIGHT: normal eardrum mobility    LEFT:   normal eardrum mobility    Reflexes (reported by stimulus ear):  RIGHT: Ipsilateral is present at elevated levels  RIGHT: Contralateral is present at elevated  levels  LEFT:   Ipsilateral is present at normal levels  LEFT:   Contralateral is absent at frequencies tested    Speech Reception Threshold:    RIGHT: 45 dB HL    LEFT:   45 dB HL  Word Recognition Score:     RIGHT: 76% at 85 dB HL using NU-6 recorded word list; slight improvement (68% 2016)    LEFT:   88% at 85 dB HL using NU-6 recorded word list; stable    The patient's hearing aids were deep cleaned and assessed and are functioning according to specifications. The patient reports good volume and sound quality after the cleaning. The noise reduction strategy was changed to monitor/focus ear and made more sensitive, and a manual acoustic program was added for the patient to try when others are speaking through a microphone. The patient confirms that the right hearing aid feedback is gone after ear cleaning and hearing aid cleaning.    ASSESSMENT:  Essentially stable mild to severe sensorineural hearing loss bilaterally (15 dB decrease 4000 Hz left ear only). Hearing aids cleaned and assessed and are functioning well. Adjustments were made to the hearing aid settings as noted above.    PLAN:     It is recommended that the patient return every 4-6 months for cleaning and assessment of the hearing aids and at least bi-annually for monitoring of hearing status, sooner if changes in hearing or ear symptoms are noted.  Please call this clinic with questions regarding these results or recommendations.      Jemal Mari.  Licensed Audiologist  [place] #[id number]

## 2019-03-04 ENCOUNTER — THERAPY VISIT (OUTPATIENT)
Dept: PHYSICAL THERAPY | Facility: CLINIC | Age: 79
End: 2019-03-04
Payer: COMMERCIAL

## 2019-03-04 DIAGNOSIS — M51.369 LUMBAR DEGENERATIVE DISC DISEASE: ICD-10-CM

## 2019-03-04 PROCEDURE — G8981 BODY POS CURRENT STATUS: HCPCS | Mod: GP | Performed by: PHYSICAL THERAPIST

## 2019-03-04 PROCEDURE — 97112 NEUROMUSCULAR REEDUCATION: CPT | Mod: GP | Performed by: PHYSICAL THERAPIST

## 2019-03-04 PROCEDURE — G8982 BODY POS GOAL STATUS: HCPCS | Mod: GP | Performed by: PHYSICAL THERAPIST

## 2019-03-04 PROCEDURE — 97161 PT EVAL LOW COMPLEX 20 MIN: CPT | Mod: GP | Performed by: PHYSICAL THERAPIST

## 2019-03-04 PROCEDURE — 97110 THERAPEUTIC EXERCISES: CPT | Mod: GP | Performed by: PHYSICAL THERAPIST

## 2019-03-04 NOTE — LETTER
[facility]  [address]  [place] [address]  [phone]    2019    Re: Patrick Olson   :   1940  MRN:  [id number]   REFERRING PHYSICIAN:   Mukesh Lantigua    [facility]    Date of Initial Evaluation:  2019  Visits:  Rxs Used: 1  Reason for Referral:  Lumbar degenerative disc disease    EVALUATION SUMMARY    Subjective:  The history is provided by the patient. No  was used.   Patrick Olson is a 78 year old female with a lumbar condition.  Occurance: leaning over.  Condition occurred: at home.  This is a new and recurrent condition  I had back pain last summer.  I had a MRI which showed a disc problem and had a cortisone shot which helped.  With PT I was gradually getting better.  I had pain along the left side of the leg.  This time on or about 2019 the pain has started to increase but has stayed in the low back.  I also had flare up with the arthritis in the upper body.  I had increase in methotrexate.  I am getting a little better with the back.  I still have U/E pain.  I am a  and tried to return and had increase in pain. End of March, I am going on trip..    Patient reports pain:  Lumbar spine right, lumbar spine left and lower lumbar spine.  Radiates to:  No radiation.  Quality: dull pain. and is constant and reported as 2/10 and 4/10.  Associated with: none. Pain is worse in the A.M..  Symptoms are exacerbated by lying down, bending and carrying and relieved by heat (RA medication, getting up and moving).  Since onset symptoms are unchanged.  Special tests:  MRI (last year).  Previous treatment includes physical therapy.  There was moderate improvement following previous treatment.  General health as reported by patient is fair.  Pertinent medical history includes:  Implanted device, osteoarthritis and rheumatoid  arthritis.  Medical allergies: no.  Other surgeries include:  Orthopedic surgery (hip replacement).  Current medications:  Anti-inflammatory and sleep medication.  Current occupation is Retired--, .  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  Primary job tasks include:  Prolonged standing (leaning).  Barriers: house.  Red flags:  None as reported by the patient.                  Objective:  Standing Alignment:    Cervical/thoracic deviations alignment: fair minus sitting posture.  Shoulder/UE:  Elevated scapula R  Lumbar:  Lordosis decr  Pelvic:  Iliac crest high R  Hip:  Greater trochanter high L  Flexibility/Screens:   Re: Patrick Olson   :   1940    Positive screens:  LumbarNegative screens: Hip   Lower Extremity:  Decreased left lower extremity flexibility:Quadriceps and Hamstrings  Decreased right lower extremity flexibility:  Quadriceps and Hamstrings       Lumbar/SI Evaluation  ROM:    AROM Lumbar:   Flexion:        Mid to lower shin increase in pain   Ext:                    Minimal movement   Side Bend:        Left:     Right:   Rotation:           Left:     Right:   Side Glide:        Left:     Right:     Lumbar Myotomes:    T12-L3 (Hip Flex):  Left: 5    Right: 5  L2-4 (Quads):  Left:  5    Right:  5  L4 (Ankle DF):  Left:  5    Right:  5  L5 (Great Toe Ext): Left: 5    Right: 5   S1 (Toe Raise):  Left: 5    Right: 5  Neural Tension/Mobility:    Left side:Slump  negative.   Right side:   Slump  negative.   Lumbar Palpation:    Tenderness present at Left:    Quadratus Lumborum and Vertebral  Tenderness present at Right: Quadratus Lumborum and Vertebral  Functional Tests:  Core strength and proprioception lumbar: balance 10 sec bilateral   Lumbar Provocation:    Left positive with:  Mobility  Right positive with: Mobility    Assessment/Plan:    Patient is a 78 year old female with lumbar complaints.    Patient has the following significant findings with corresponding treatment  plan.                Diagnosis 1:  Low back pain DDD,  Pain -  manual therapy, self management, education, directional preference exercise and home program  Decreased ROM/flexibility - manual therapy, therapeutic exercise, therapeutic activity and home program  Impaired balance - neuro re-education, therapeutic activities and home program  Impaired muscle performance - neuro re-education and home program  Decreased function - therapeutic activities and home program  Impaired posture - neuro re-education, therapeutic activities and home program  Evaluation ongoing.    Therapy Evaluation Codes:   1) History comprised of:   Personal factors that impact the plan of care:      Past/current experiences and Profession.    Comorbidity factors that impact the plan of care are:      Implanted device, Osteoarthritis and Rheumatoid arthritis.     Medications impacting care: Anti-inflammatory and Sleep.  Re: Patrick Olson   :   1940    2) Examination of Body Systems comprised of:   Body structures and functions that impact the plan of care:      Lumbar spine and Pelvis.   Activity limitations that impact the plan of care are:      Bathing, Bending, Driving, Dressing, Lifting, Sitting, Squatting/kneeling, Stairs, Standing, Walking, Sleeping and painting.  3) Clinical presentation characteristics are:   Evolving/Changing.  4) Decision-Making    Low complexity using standardized patient assessment instrument and/or measureable assessment of functional outcome.  Cumulative Therapy Evaluation is: Low complexity.    Previous and current functional limitations:  (See Goal Flow Sheet for this information)    Short term and Long term goals: (See Goal Flow Sheet for this information)     Communication ability:  Patient appears to be able to clearly communicate and understand verbal and written communication and follow directions correctly.  Treatment Explanation - The following has been discussed with the patient:   RX  ordered/plan of care  Possible risks and side effects  This patient would benefit from PT intervention to resume normal activities.   Rehab potential is good.    Frequency:  2 X week, once daily  Duration:  for 1 weeks tapering to 1 X a week over 6 weeks  Discharge Plan:  Achieve all LTG.  Independent in home treatment program.    Thank you for your referral.    INQUIRIES  Therapist: Blank Pedroza, PT  [facility] [place]  [address] [address]  Phone: [phone]  Fax: [phone]

## 2019-03-04 NOTE — PROGRESS NOTES
[facility] Initial Evaluation  Subjective:  The history is provided by the patient. No  was used.   Patrick Olson is a 78 year old female with a lumbar condition.  Occurance: leaning over.  Condition occurred: at home.  This is a new and recurrent condition  I had back pain last summer.  I had a MRI which showed a disc problem and had a cortisone shot which helped.  With PT I was gradually getting better.  I had pain along the left side of the leg.  This time on or about 1/28/2019 the pain has started to increase but has stayed in the low back.  I also had flare up with the arthritis in the upper body.  I had increase in methotrexate.  I am getting a little better with the back.  I still have U/E pain.  I am a  and tried to return and had increase in pain. End of March, I am going on trip..    Patient reports pain:  Lumbar spine right, lumbar spine left and lower lumbar spine.  Radiates to:  No radiation.  Quality: dull pain. and is constant and reported as 2/10 and 4/10.  Associated with: none. Pain is worse in the A.M..  Symptoms are exacerbated by lying down, bending and carrying and relieved by heat (RA medication, getting up and moving).  Since onset symptoms are unchanged.  Special tests:  MRI (last year).  Previous treatment includes physical therapy.  There was moderate improvement following previous treatment.  General health as reported by patient is fair.  Pertinent medical history includes:  Implanted device, osteoarthritis and rheumatoid arthritis.  Medical allergies: no.  Other surgeries include:  Orthopedic surgery (hip replacement).  Current medications:  Anti-inflammatory and sleep medication.  Current occupation is Retired--, .  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  Primary job tasks include:  Prolonged standing (leaning).    Barriers: house.    Red flags:  None as reported by the patient.                         Objective:  Standing Alignment:    Cervical/thoracic deviations alignment: fair minus sitting posture.  Shoulder/UE:  Elevated scapula R  Lumbar:  Lordosis decr  Pelvic:  Iliac crest high R  Hip:  Greater trochanter high L            Flexibility/Screens:   Positive screens:  LumbarNegative screens: Hip     Lower Extremity:  Decreased left lower extremity flexibility:Quadriceps and Hamstrings    Decreased right lower extremity flexibility:  Quadriceps and Hamstrings               Lumbar/SI Evaluation  ROM:    AROM Lumbar:   Flexion:        Mid to lower shin increase in pain   Ext:                    Minimal movement   Side Bend:        Left:     Right:   Rotation:           Left:     Right:   Side Glide:        Left:     Right:           Lumbar Myotomes:    T12-L3 (Hip Flex):  Left: 5    Right: 5  L2-4 (Quads):  Left:  5    Right:  5  L4 (Ankle DF):  Left:  5    Right:  5  L5 (Great Toe Ext): Left: 5    Right: 5   S1 (Toe Raise):  Left: 5    Right: 5        Neural Tension/Mobility:      Left side:Slump  negative.     Right side:   Slump  negative.   Lumbar Palpation:    Tenderness present at Left:    Quadratus Lumborum and Vertebral  Tenderness present at Right: Quadratus Lumborum and Vertebral  Functional Tests:  Core strength and proprioception lumbar: balance 10 sec bilateral         Lumbar Provocation:    Left positive with:  Mobility  Right positive with: Mobility                                                 General     ROS    Assessment/Plan:    Patient is a 78 year old female with lumbar complaints.    Patient has the following significant findings with corresponding treatment plan.                Diagnosis 1:  Low back pain DDD,  Pain -  manual therapy, self management, education, directional preference exercise and home program  Decreased ROM/flexibility - manual therapy, therapeutic exercise, therapeutic activity and home program  Impaired balance - neuro re-education, therapeutic activities and home  program  Impaired muscle performance - neuro re-education and home program  Decreased function - therapeutic activities and home program  Impaired posture - neuro re-education, therapeutic activities and home program  Evaluation ongoing.    Therapy Evaluation Codes:   1) History comprised of:   Personal factors that impact the plan of care:      Past/current experiences and Profession.    Comorbidity factors that impact the plan of care are:      Implanted device, Osteoarthritis and Rheumatoid arthritis.     Medications impacting care: Anti-inflammatory and Sleep.  2) Examination of Body Systems comprised of:   Body structures and functions that impact the plan of care:      Lumbar spine and Pelvis.   Activity limitations that impact the plan of care are:      Bathing, Bending, Driving, Dressing, Lifting, Sitting, Squatting/kneeling, Stairs, Standing, Walking, Sleeping and painting.  3) Clinical presentation characteristics are:   Evolving/Changing.  4) Decision-Making    Low complexity using standardized patient assessment instrument and/or measureable assessment of functional outcome.  Cumulative Therapy Evaluation is: Low complexity.    Previous and current functional limitations:  (See Goal Flow Sheet for this information)    Short term and Long term goals: (See Goal Flow Sheet for this information)     Communication ability:  Patient appears to be able to clearly communicate and understand verbal and written communication and follow directions correctly.  Treatment Explanation - The following has been discussed with the patient:   RX ordered/plan of care  Possible risks and side effects  This patient would benefit from PT intervention to resume normal activities.   Rehab potential is good.    Frequency:  2 X week, once daily  Duration:  for 1 weeks tapering to 1 X a week over 6 weeks  Discharge Plan:  Achieve all LTG.  Independent in home treatment program.    Please refer to the daily flowsheet for treatment  today, total treatment time and time spent performing 1:1 timed codes.

## 2019-03-08 ENCOUNTER — THERAPY VISIT (OUTPATIENT)
Dept: PHYSICAL THERAPY | Facility: CLINIC | Age: 79
End: 2019-03-08
Payer: COMMERCIAL

## 2019-03-08 DIAGNOSIS — M51.369 LUMBAR DEGENERATIVE DISC DISEASE: ICD-10-CM

## 2019-03-08 PROCEDURE — 97112 NEUROMUSCULAR REEDUCATION: CPT | Mod: GP | Performed by: PHYSICAL THERAPIST

## 2019-03-08 PROCEDURE — 97530 THERAPEUTIC ACTIVITIES: CPT | Mod: GP | Performed by: PHYSICAL THERAPIST

## 2019-03-08 PROCEDURE — 97110 THERAPEUTIC EXERCISES: CPT | Mod: GP | Performed by: PHYSICAL THERAPIST

## 2019-03-13 ENCOUNTER — THERAPY VISIT (OUTPATIENT)
Dept: PHYSICAL THERAPY | Facility: CLINIC | Age: 79
End: 2019-03-13
Payer: COMMERCIAL

## 2019-03-13 DIAGNOSIS — M51.369 LUMBAR DEGENERATIVE DISC DISEASE: ICD-10-CM

## 2019-03-13 PROCEDURE — 97112 NEUROMUSCULAR REEDUCATION: CPT | Mod: GP | Performed by: PHYSICAL THERAPIST

## 2019-03-13 PROCEDURE — 97110 THERAPEUTIC EXERCISES: CPT | Mod: GP | Performed by: PHYSICAL THERAPIST

## 2019-03-21 ENCOUNTER — THERAPY VISIT (OUTPATIENT)
Dept: PHYSICAL THERAPY | Facility: CLINIC | Age: 79
End: 2019-03-21
Payer: COMMERCIAL

## 2019-03-21 DIAGNOSIS — M51.369 LUMBAR DEGENERATIVE DISC DISEASE: ICD-10-CM

## 2019-03-21 PROCEDURE — 97112 NEUROMUSCULAR REEDUCATION: CPT | Mod: GP | Performed by: PHYSICAL THERAPIST

## 2019-03-21 PROCEDURE — 97530 THERAPEUTIC ACTIVITIES: CPT | Mod: GP | Performed by: PHYSICAL THERAPIST

## 2019-03-21 PROCEDURE — 97110 THERAPEUTIC EXERCISES: CPT | Mod: GP | Performed by: PHYSICAL THERAPIST

## 2019-04-03 ENCOUNTER — THERAPY VISIT (OUTPATIENT)
Dept: PHYSICAL THERAPY | Facility: CLINIC | Age: 79
End: 2019-04-03
Payer: COMMERCIAL

## 2019-04-03 DIAGNOSIS — M51.369 LUMBAR DEGENERATIVE DISC DISEASE: ICD-10-CM

## 2019-04-03 PROCEDURE — 97110 THERAPEUTIC EXERCISES: CPT | Mod: GP | Performed by: PHYSICAL THERAPIST

## 2019-04-03 PROCEDURE — 97112 NEUROMUSCULAR REEDUCATION: CPT | Mod: GP | Performed by: PHYSICAL THERAPIST

## 2019-05-01 ENCOUNTER — THERAPY VISIT (OUTPATIENT)
Dept: PHYSICAL THERAPY | Facility: CLINIC | Age: 79
End: 2019-05-01
Payer: COMMERCIAL

## 2019-05-01 DIAGNOSIS — M51.369 LUMBAR DEGENERATIVE DISC DISEASE: ICD-10-CM

## 2019-05-01 PROCEDURE — 97110 THERAPEUTIC EXERCISES: CPT | Mod: GP | Performed by: PHYSICAL THERAPY ASSISTANT

## 2019-05-01 NOTE — LETTER
[facility] ATHLETIC [facility] PHYSICAL THERAPY  [address]  [place] [address]  [phone]    May 6, 2019    Re: Patrick Olson   :   1940  MRN:  [id number]   REFERRING PHYSICIAN:   Mukesh Lantigua    [facility]    Date of Initial Evaluation:  2019  Visits:  Rxs Used: 6  Reason for Referral:  Lumbar degenerative disc disease    DISCHARGE REPORT  Progress reporting period is from 3/4/19 to 19.      SUBJECTIVE  Subjective changes noted by patient:    Pt has been seen for 6 PT sessions, pt pleased with current recovery process. Symptoms tend to be worse in AM, decreases with performance of exercise. Symptoms during the day vary with activity level.  Pt consistent with HEP and work out class 2x/week.    Current Pain level: 10    Initial Pain level: 5/10   Changes in function:  Yes (See Goal flowsheet attached for changes in current functional level)     Adverse reaction to treatment or activity: None     OBJECTIVE  Changes noted in objective findings:  Yes, improved ROM and strength.  Lumbar AROM: flex reach to ankles, ext WFL.   R/L hip abd strength 5-/5.   Stork balance test ~20 seconds bilaterally. G  ait normal on level ground, did discuss using SEC for ambulation on uneven surface with vacation.   Oswestry has stayed the same at 20%, Khushi has improved from 4 to 3 (low risk).      ASSESSMENT/PLAN  Updated problem list and treatment plan: Diagnosis 1:  LBP  Pain -  home program  Decreased strength - home program  Decreased proprioception - home program  Decreased function - home program  STG/LTGs have been met:  Yes (See Goal flow sheet completed today.)  Progress toward STG/LTGs have been made:  Yes (See Goal flow sheet completed today.)  Assessment of Progress: The patient's condition is improving.  Self Management Plans:  Patient is independent in a home treatment program.  Patient is  independent in self management of symptoms.  Re: Patrick Olson   :   1940    I have re-evaluated this patient and find that the nature, scope, duration and intensity of the therapy is appropriate for the medical condition of the patient.  Patrick continues to require the following intervention to meet STG and LT's:  PT intervention is no longer required to meet STG/LTG.    Recommendations:  This patient is ready to be discharged from therapy and continue their home treatment program.  The progress note/discharge summary was written in collaboration with and reviewed by the physical therapist.    Thank you for your referral.    INQUIRIES  Therapist: Genie Damon, PT  [facility] [place] [facility]ICAL [facility]AP[facility]  [address] [address]  Phone: [phone]  Fax: [phone]

## 2019-05-02 NOTE — PROGRESS NOTES
DISCHARGE REPORT    Progress reporting period is from 3/4/19 to 5/1/19.      SUBJECTIVE  Subjective changes noted by patient:    Pt has been seen for 6 PT sessions, pt pleased with current recovery process. Symptoms tend to be worse in AM, decreases with performance of exercise. Symptoms during the day vary with activity level.  Pt consistent with HEP and work out class 2x/week.    Current Pain level: 1/10    Initial Pain level: 5/10   Changes in function:  Yes (See Goal flowsheet attached for changes in current functional level)     Adverse reaction to treatment or activity: None     OBJECTIVE  Changes noted in objective findings:  Yes, improved ROM and strength.  Lumbar AROM: flex reach to ankles, ext WFL.   R/L hip abd strength 5-/5.   Stork balance test ~20 seconds bilaterally. G  ait normal on level ground, did discuss using SEC for ambulation on uneven surface with vacation.   Oswestry has stayed the same at 20%, Khushi has improved from 4 to 3 (low risk).

## 2019-05-03 PROBLEM — M51.369 LUMBAR DEGENERATIVE DISC DISEASE: Status: RESOLVED | Noted: 2019-03-04 | Resolved: 2019-05-03

## 2019-05-03 NOTE — PROGRESS NOTES
ASSESSMENT/PLAN  Updated problem list and treatment plan: Diagnosis 1:  LBP  Pain -  home program  Decreased strength - home program  Decreased proprioception - home program  Decreased function - home program  STG/LTGs have been met:  Yes (See Goal flow sheet completed today.)  Progress toward STG/LTGs have been made:  Yes (See Goal flow sheet completed today.)  Assessment of Progress: The patient's condition is improving.  Self Management Plans:  Patient is independent in a home treatment program.  Patient is independent in self management of symptoms.  I have re-evaluated this patient and find that the nature, scope, duration and intensity of the therapy is appropriate for the medical condition of the patient.  Patrick continues to require the following intervention to meet STG and LT's:  PT intervention is no longer required to meet STG/LTG.    Recommendations:  This patient is ready to be discharged from therapy and continue their home treatment program.  The progress note/discharge summary was written in collaboration with and reviewed by the physical therapist.    Please refer to the daily flowsheet for treatment today, total treatment time and time spent performing 1:1 timed codes.

## 2019-09-16 PROBLEM — G89.29 CHRONIC LEFT-SIDED LOW BACK PAIN WITH LEFT-SIDED SCIATICA: Status: RESOLVED | Noted: 2018-06-04 | Resolved: 2019-09-16

## 2019-09-16 PROBLEM — M54.42 CHRONIC LEFT-SIDED LOW BACK PAIN WITH LEFT-SIDED SCIATICA: Status: RESOLVED | Noted: 2018-06-04 | Resolved: 2019-09-16

## 2019-10-03 ENCOUNTER — HEALTH MAINTENANCE LETTER (OUTPATIENT)
Age: 79
End: 2019-10-03

## 2019-10-22 ENCOUNTER — ANCILLARY PROCEDURE (OUTPATIENT)
Dept: MAMMOGRAPHY | Facility: CLINIC | Age: 79
End: 2019-10-22
Attending: INTERNAL MEDICINE
Payer: COMMERCIAL

## 2019-10-22 DIAGNOSIS — Z12.31 VISIT FOR SCREENING MAMMOGRAM: ICD-10-CM

## 2019-12-16 ENCOUNTER — HEALTH MAINTENANCE LETTER (OUTPATIENT)
Age: 79
End: 2019-12-16

## 2020-02-07 ENCOUNTER — OFFICE VISIT (OUTPATIENT)
Dept: ORTHOPEDICS | Facility: CLINIC | Age: 80
End: 2020-02-07

## 2020-02-07 VITALS — HEIGHT: 64 IN | WEIGHT: 105 LBS | BODY MASS INDEX: 17.93 KG/M2

## 2020-02-07 DIAGNOSIS — M51.369 LUMBAR DEGENERATIVE DISC DISEASE: Primary | ICD-10-CM

## 2020-02-07 ASSESSMENT — MIFFLIN-ST. JEOR: SCORE: 946.28

## 2020-02-07 NOTE — LETTER
2/7/2020       RE: Patrick Olson  [address]  [place] [address]     Dear Colleague,    Thank you for referring your patient, Patrick Olson, to the [facility] SPORTS AND ORTHOPAEDIC WALK IN CLINIC at [facility]. Please see a copy of my visit note below.          SPORTS & ORTHOPEDIC WALK-IN FOLLOW-UP VISIT 2/7/2020    Had completed a good amount of PT.   Mentions that she had good relief from her LEE ANN on 7/20/2018.  She has been doing her best to continue her HEP and goes to exercise class 2x a week.  Denies any injury, overuse, or aggravating factors that would have attributed to this slow recurrence of her LBP.  Mentions that she is noticing more pain creep into her thoracic spine with forward flexion.  Pain is generally localized around her low back, but is also noticing Bilateral (L>R) glute pain.    She is beginning to notice more pain with flexion when standing (not as bad sitting because she uses pillow support)  Laying supine is most comfortable.    Planning a trip to [place] in March (wants to be able to complete all the long walks).     Interval History:     Follow up reason: chronic recurrence of LBP    Date of injury: NA    Date last seen: 2/26/19    Following Therapeutic Plan: Yes     Pain: Unchanged    Function: Worsening      Medical History:    Any recent changes to your medical history? No    Any new medication prescribed since last visit? No    Review of Systems:    Do you have fever, chills, weight loss? No    Do you have any vision problems? No    Do you have any chest pain or edema? No    Do you have any shortness of breath or wheezing?  No    Do you have stomach problems? No    Do you have any numbness or focal weakness? No    Do you have diabetes? No    Do you have problems with bleeding or clotting? No    Do you have an rashes or other skin lesions? No             ESTABLISHED PATIENT FOLLOW-UP:  Follow Up of the Lower Back       HISTORY OF  "PRESENT ILLNESS  Mrs. Olson is a pleasant 79 year old year old female who presents to clinic today for follow-up of low back pain. Good relief from her LEE ANN on 7/20/2018. She has been doing her best to continue her HEP and goes to exercise class 2x a week. Slowly increasing low back pain without significant radicular component. L>R gluteal region as well.  Increasing pain with standing, improved with sitting.   Denies any injury, overuse, or aggravating factors that would have attributed to this slow recurrence of her LBP.    Of note she is planning a trip to [place] in March and would like to be very functional by this time. Would be interested in repeat LE EANN.    Additional medical/Social/Surgical histories reviewed in [facility] and updated as appropriate.    REVIEW OF SYSTEMS (2/10/2020)  CONSTITUTIONAL: Denies fever and weight loss  GASTROINTESTINAL: Denies abdominal pain, nausea, vomiting  MUSCULOSKELETAL: See HPI  SKIN: Denies any recent rash or lesion  NEUROLOGICAL: Denies numbness or focal weakness     PHYSICAL EXAM  Ht 1.626 m (5' 4\")   Wt 47.6 kg (105 lb)   BMI 18.02 kg/m           General  - normal appearance, in no obvious distress  CV  - normal peripheral perfusion  Pulm  - normal respiratory pattern, non-labored  Musculoskeletal - lumbar spine  - stance: normal gait without limp, no obvious leg length discrepancy, normal heel and toe walk  - inspection: normal bone and joint alignment, no obvious scoliosis  - palpation: Significant tenderness palpation of lumbar spine.  - ROM: Full range of motion in flexion-extension side bending and rotation.  Pain with flexion and sidebending  - strength: lower extremities 5/5 in all planes  - special tests:  (-) straight leg raise    (-) slump test    Neuro  - patellar and Achilles DTRs 2+ bilaterally, No lower extremity sensory deficit throughout L5 distribution, grossly normal coordination, normal muscle tone  Skin  - no ecchymosis, erythema, warmth, or induration, no " obvious rash  Psych  - interactive, appropriate, normal mood and affect     ASSESSMENT & PLAN  Ms. Olson is a 79 year old year old female with lumbar degenerative disc disease, good relief from previous LEE ANN about 6 months ago.  Will repeat and determine level of improvement.  Pain is more axial than previously, but I do think interlaminar LEE ANN would be appropriate given pathology and gluteal pain.      -Repeat CSI lumbar spine  -Continue HEP  -Heat therapy  -Activity modifications discussed  -Follow up 2-3 weeks after LEE ANN    It was a pleasure seeing Patrick.    Mukesh Lantigua DO, CAQSM  Primary Care Sports Medicine

## 2020-02-07 NOTE — PROGRESS NOTES
SPORTS & ORTHOPEDIC WALK-IN FOLLOW-UP VISIT 2/7/2020    Had completed a good amount of PT.   Mentions that she had good relief from her LEE ANN on 7/20/2018.  She has been doing her best to continue her HEP and goes to exercise class 2x a week.  Denies any injury, overuse, or aggravating factors that would have attributed to this slow recurrence of her LBP.  Mentions that she is noticing more pain creep into her thoracic spine with forward flexion.  Pain is generally localized around her low back, but is also noticing Bilateral (L>R) glute pain.    She is beginning to notice more pain with flexion when standing (not as bad sitting because she uses pillow support)  Laying supine is most comfortable.    Planning a trip to [place] in March (wants to be able to complete all the long walks).     Interval History:     Follow up reason: chronic recurrence of LBP    Date of injury: NA    Date last seen: 2/26/19    Following Therapeutic Plan: Yes     Pain: Unchanged    Function: Worsening      Medical History:    Any recent changes to your medical history? No    Any new medication prescribed since last visit? No    Review of Systems:    Do you have fever, chills, weight loss? No    Do you have any vision problems? No    Do you have any chest pain or edema? No    Do you have any shortness of breath or wheezing?  No    Do you have stomach problems? No    Do you have any numbness or focal weakness? No    Do you have diabetes? No    Do you have problems with bleeding or clotting? No    Do you have an rashes or other skin lesions? No

## 2020-02-10 NOTE — PROGRESS NOTES
"ESTABLISHED PATIENT FOLLOW-UP:  Follow Up of the Lower Back       HISTORY OF PRESENT ILLNESS  Mrs. Olson is a pleasant 79 year old year old female who presents to clinic today for follow-up of low back pain. Good relief from her LEE ANN on 7/20/2018. She has been doing her best to continue her HEP and goes to exercise class 2x a week. Slowly increasing low back pain without significant radicular component. L>R gluteal region as well.  Increasing pain with standing, improved with sitting.   Denies any injury, overuse, or aggravating factors that would have attributed to this slow recurrence of her LBP.    Of note she is planning a trip to [place] in March and would like to be very functional by this time. Would be interested in repeat LEE ANN.    Additional medical/Social/Surgical histories reviewed in [facility] and updated as appropriate.    REVIEW OF SYSTEMS (2/10/2020)  CONSTITUTIONAL: Denies fever and weight loss  GASTROINTESTINAL: Denies abdominal pain, nausea, vomiting  MUSCULOSKELETAL: See HPI  SKIN: Denies any recent rash or lesion  NEUROLOGICAL: Denies numbness or focal weakness     PHYSICAL EXAM  Ht 1.626 m (5' 4\")   Wt 47.6 kg (105 lb)   BMI 18.02 kg/m          General  - normal appearance, in no obvious distress  CV  - normal peripheral perfusion  Pulm  - normal respiratory pattern, non-labored  Musculoskeletal - lumbar spine  - stance: normal gait without limp, no obvious leg length discrepancy, normal heel and toe walk  - inspection: normal bone and joint alignment, no obvious scoliosis  - palpation: Significant tenderness palpation of lumbar spine.  - ROM: Full range of motion in flexion-extension side bending and rotation.  Pain with flexion and sidebending  - strength: lower extremities 5/5 in all planes  - special tests:  (-) straight leg raise    (-) slump test    Neuro  - patellar and Achilles DTRs 2+ bilaterally, No lower extremity sensory deficit throughout L5 distribution, grossly normal coordination, " normal muscle tone  Skin  - no ecchymosis, erythema, warmth, or induration, no obvious rash  Psych  - interactive, appropriate, normal mood and affect     ASSESSMENT & PLAN  Ms. Olson is a 79 year old year old female with lumbar degenerative disc disease, good relief from previous LEE ANN about 6 months ago.  Will repeat and determine level of improvement.  Pain is more axial than previously, but I do think interlaminar LEE ANN would be appropriate given pathology and gluteal pain.      -Repeat CSI lumbar spine  -Continue HEP  -Heat therapy  -Activity modifications discussed  -Follow up 2-3 weeks after LEE ANN    It was a pleasure seeing Patrick.    Mukesh Lantigua DO, CAQSM  Primary Care Sports Medicine

## 2020-02-14 RX ORDER — NICOTINE POLACRILEX 4 MG
15-30 LOZENGE BUCCAL
Status: CANCELLED | OUTPATIENT
Start: 2020-02-14

## 2020-02-14 RX ORDER — DEXTROSE MONOHYDRATE 25 G/50ML
25-50 INJECTION, SOLUTION INTRAVENOUS
Status: CANCELLED | OUTPATIENT
Start: 2020-02-14

## 2020-02-18 ENCOUNTER — HOSPITAL ENCOUNTER (OUTPATIENT)
Facility: CLINIC | Age: 80
Discharge: HOME | End: 2020-02-18
Attending: RADIOLOGY | Admitting: RADIOLOGY
Payer: COMMERCIAL

## 2020-02-18 ENCOUNTER — HOSPITAL ENCOUNTER (OUTPATIENT)
Dept: GENERAL RADIOLOGY | Facility: CLINIC | Age: 80
End: 2020-02-18
Attending: FAMILY MEDICINE | Admitting: RADIOLOGY
Payer: COMMERCIAL

## 2020-02-18 VITALS
TEMPERATURE: 97 F | SYSTOLIC BLOOD PRESSURE: 111 MMHG | OXYGEN SATURATION: 99 % | DIASTOLIC BLOOD PRESSURE: 40 MMHG | HEART RATE: 69 BPM | RESPIRATION RATE: 16 BRPM

## 2020-02-18 DIAGNOSIS — M51.369 LUMBAR DEGENERATIVE DISC DISEASE: ICD-10-CM

## 2020-02-18 PROCEDURE — 40000863 ZZH STATISTIC RADIOLOGY XRAY, US, CT, MAR, NM

## 2020-02-18 PROCEDURE — 25000125 ZZHC RX 250: Performed by: FAMILY MEDICINE

## 2020-02-18 PROCEDURE — 25000128 H RX IP 250 OP 636: Performed by: FAMILY MEDICINE

## 2020-02-18 PROCEDURE — 62323 NJX INTERLAMINAR LMBR/SAC: CPT

## 2020-02-18 PROCEDURE — 25500064 ZZH RX 255 OP 636: Performed by: FAMILY MEDICINE

## 2020-02-18 RX ORDER — DIPHENHYDRAMINE HCL 25 MG
25 TABLET ORAL
Status: ON HOLD | COMMUNITY
End: 2021-01-20

## 2020-02-18 RX ORDER — DEXTROSE MONOHYDRATE 25 G/50ML
25-50 INJECTION, SOLUTION INTRAVENOUS
Status: DISCONTINUED | OUTPATIENT
Start: 2020-02-18 | End: 2020-02-18 | Stop reason: HOSPADM

## 2020-02-18 RX ORDER — IOPAMIDOL 408 MG/ML
10 INJECTION, SOLUTION INTRATHECAL ONCE
Status: COMPLETED | OUTPATIENT
Start: 2020-02-18 | End: 2020-02-18

## 2020-02-18 RX ORDER — ACETAMINOPHEN 325 MG/1
600 TABLET ORAL ONCE
COMMUNITY
End: 2023-03-07 | Stop reason: DRUGHIGH

## 2020-02-18 RX ORDER — NICOTINE POLACRILEX 4 MG
15-30 LOZENGE BUCCAL
Status: DISCONTINUED | OUTPATIENT
Start: 2020-02-18 | End: 2020-02-18 | Stop reason: HOSPADM

## 2020-02-18 RX ORDER — DEXAMETHASONE SODIUM PHOSPHATE 10 MG/ML
20 INJECTION, SOLUTION INTRAMUSCULAR; INTRAVENOUS ONCE
Status: COMPLETED | OUTPATIENT
Start: 2020-02-18 | End: 2020-02-18

## 2020-02-18 RX ADMIN — LIDOCAINE HYDROCHLORIDE 2 ML: 10 INJECTION, SOLUTION EPIDURAL; INFILTRATION; INTRACAUDAL; PERINEURAL at 10:47

## 2020-02-18 RX ADMIN — DEXAMETHASONE SODIUM PHOSPHATE 20 MG: 10 INJECTION, SOLUTION INTRAMUSCULAR; INTRAVENOUS at 10:49

## 2020-02-18 RX ADMIN — LIDOCAINE HYDROCHLORIDE 3 ML: 10 INJECTION, SOLUTION EPIDURAL; INFILTRATION; INTRACAUDAL; PERINEURAL at 10:49

## 2020-02-18 RX ADMIN — IOPAMIDOL 1 ML: 408 INJECTION, SOLUTION INTRATHECAL at 10:49

## 2020-02-18 NOTE — PROGRESS NOTES
Pt returns to [facility] via cart at 1105 from xray.  Denies pain, nausea or vomiting. Pt requesting po food and fluids.

## 2020-02-18 NOTE — PROGRESS NOTES
Pre procedure plan of care reviewed with pt prior to injection.  All question answered and pt appears to accept and understand.

## 2020-02-18 NOTE — PROGRESS NOTES
p up at bedside with stand by assist after injection and tolerated activity well.  Denies pain. Pt able to ambulate safely at time of discharge to home.  All discharge instructions reviewed prior to discharge   to home.

## 2020-02-18 NOTE — PROCEDURES
[facility]    Procedure: L3-4 interlaminar epidural steroid injection  Date/Time: 2/18/2020 11:07 AM  Performed by: Evi Arnett PA-C  Authorized by: Evi Arnett PA-C     UNIVERSAL PROTOCOL   Site Marked: Yes  Prior Images Obtained and Reviewed:  Yes  Required items: Required blood products, implants, devices and special equipment available    Patient identity confirmed:  Verbally with patient  NA - No sedation, light sedation, or local anesthesia  Confirmation Checklist:  Patient's identity using two indicators, relevant allergies, procedure was appropriate and matched the consent or emergent situation and correct equipment/implants were available  Time out: Immediately prior to the procedure a time out was called    Universal Protocol: the Joint Commission Universal Protocol was followed    Preparation: Patient was prepped and draped in usual sterile fashion           ANESTHESIA    Anesthesia: Local infiltration  Local Anesthetic:  Lidocaine 1% without epinephrine      SEDATION    Patient Sedated: No    See dictated procedure note for full details.  PROCEDURE   Patient Tolerance:  Patient tolerated the procedure well with no immediate complications  Describe Procedure: L3-4 interlaminar epidural steroid injection  Length of time physician/provider present for 1:1 monitoring during sedation: 0

## 2020-02-18 NOTE — DISCHARGE INSTRUCTIONS
Steroid Injection Discharge Instructions     After you go home:      You may resume your normal diet.    Care of Puncture Site:      If you have a bandaid on your puncture site, you may remove it the next morning    You may shower tomorrow    No bath tubs, whirlpools or swimming for at least 3 days     Activity:      You may go back to normal activity in 24 hours    You should let pain be your guide as to the extent of your activities    Maintain any activity limitations as ordered by your provider    Do NOT drive a vehicle if you develop numbness in your arm or leg    Medicines:      You may resume all medications    For minor pain, you may take Acetaminophen (Tylenol) or Ibuprofen (Advil)    Pain:       You may experience increased or different pain over the next 24-48 hours    For the next 48 hrs - you may use ice packs for discomfort     Call your primary care doctor if:      You have severe pain that does not improve with pain medication    You have chills or a fever greater than 101 F (38 C)    The site is red, swollen, hot or tender    New problems with your bowel or bladder    Any questions or concerns    Other Instructions:      New numbness down your leg post injection is temporary and may last for up to 6 hours. You may need assistance with activity until your leg has normal sensation.      For Your Information:      A steroid was injected to help decrease swelling and may help to reduce pain. It may take up to 7-10 days to obtain full results.    Some patients will get lasting relief from a single injection. Others may require up to 3 injections to get results. If you have more than one steroid injection, they should be given 2 weeks apart.    Side effects of your steroid injection are mild and will go away in 2-3 days  - Insomnia  - Heartburn  - Flushed face  - Water retention  - Increased appetite  - Increased blood sugar      If you have questions call:        [facility] Radiology Dept @  [phone]

## 2020-02-18 NOTE — PROGRESS NOTES
Care Suites Admission Nursing Note    Patient Information  Name: Patrick Olson  Age: 79 year old  Reason for admission: steroid injection  Care Suites arrival time: 0945    Patient Admission/Assessment   Pre-procedure assessment complete: YES  If abnormal assessment/labs, provider notified: N/A  NPO: YES and NO  Medications held per instructions/orders: N/A  Consent: MD to obtain  Patient oriented to room: YES  Education/questions answered: YES  Plan/other: per procedural plan of care    Discharge Planning  Accompanied by:   Discharge name/phone number: pt will call  Overnight post sedation caregiver: N/A  Discharge location: home    Mary Carmen Turpin RN

## 2020-02-18 NOTE — PROGRESS NOTES
Care Suites Discharge Summary    Discharge Criteria:   Discharge Criteria met per MD orders: Yes.   Vital signs stable.     Pt demonstrates ability to ambulate safely: Yes.  (See discharge questionnaire for additional information)    Discharge instructions & education:   Discharge instructions reviewed with patient . Patient verbalizes  understanding.   Additional patient education provided:  none.     Medications:   Patient will be discharging on new medications- No. Patient verbalizes reason for use, start date, and side effects NA.    Items returned to patient:   Home and hospital acquired medications returned to patient NA   Listed belongings gathered and returned to patient: Yes    Patient discharged to home via wheelchair with spouse driving  .    Mary Carmen Turpin RN

## 2020-09-03 ENCOUNTER — TELEPHONE (OUTPATIENT)
Dept: AUDIOLOGY | Facility: CLINIC | Age: 80
End: 2020-09-03

## 2020-09-03 ENCOUNTER — ANCILLARY PROCEDURE (OUTPATIENT)
Dept: MRI IMAGING | Facility: CLINIC | Age: 80
End: 2020-09-03
Attending: INTERNAL MEDICINE
Payer: COMMERCIAL

## 2020-09-03 DIAGNOSIS — R51.9 ACHING HEADACHE: ICD-10-CM

## 2020-09-03 RX ORDER — GADOBUTROL 604.72 MG/ML
7.5 INJECTION INTRAVENOUS ONCE
Status: COMPLETED | OUTPATIENT
Start: 2020-09-03 | End: 2020-09-03

## 2020-09-03 RX ADMIN — GADOBUTROL 5 ML: 604.72 INJECTION INTRAVENOUS at 12:56

## 2020-09-03 NOTE — TELEPHONE ENCOUNTER
Patrick Olson was seen at the [facility] on 9/3/20 for hearing aid services.  She asked to have her hearing aids cleaned.  Both devices were cleaned and the filters, domes (medium open) and right retention lock were replaced.  The hearing aids were found to be working normally.  Patrick was advised to schedule an appointment with an audiologist as she reports not always hearing well with the hearing aids.

## 2020-10-26 ENCOUNTER — ANCILLARY PROCEDURE (OUTPATIENT)
Dept: MAMMOGRAPHY | Facility: CLINIC | Age: 80
End: 2020-10-26
Attending: INTERNAL MEDICINE
Payer: COMMERCIAL

## 2020-10-26 DIAGNOSIS — Z12.31 VISIT FOR SCREENING MAMMOGRAM: ICD-10-CM

## 2020-10-26 PROCEDURE — 77067 SCR MAMMO BI INCL CAD: CPT | Mod: GC | Performed by: RADIOLOGY

## 2021-01-15 ENCOUNTER — HEALTH MAINTENANCE LETTER (OUTPATIENT)
Age: 81
End: 2021-01-15

## 2021-01-19 ENCOUNTER — HOSPITAL ENCOUNTER (INPATIENT)
Facility: CLINIC | Age: 81
LOS: 1 days | Discharge: ANOTHER HOSPITAL | DRG: 379 | End: 2021-01-20
Attending: EMERGENCY MEDICINE | Admitting: EMERGENCY MEDICINE
Payer: COMMERCIAL

## 2021-01-19 ENCOUNTER — TRANSFERRED RECORDS (OUTPATIENT)
Dept: HEALTH INFORMATION MANAGEMENT | Facility: CLINIC | Age: 81
End: 2021-01-19

## 2021-01-19 ENCOUNTER — APPOINTMENT (OUTPATIENT)
Dept: CT IMAGING | Facility: CLINIC | Age: 81
DRG: 379 | End: 2021-01-19
Attending: EMERGENCY MEDICINE
Payer: COMMERCIAL

## 2021-01-19 DIAGNOSIS — Z11.52 ENCOUNTER FOR SCREENING LABORATORY TESTING FOR SEVERE ACUTE RESPIRATORY SYNDROME CORONAVIRUS 2 (SARS-COV-2): ICD-10-CM

## 2021-01-19 DIAGNOSIS — K92.2 GASTROINTESTINAL HEMORRHAGE, UNSPECIFIED GASTROINTESTINAL HEMORRHAGE TYPE: ICD-10-CM

## 2021-01-19 LAB
ABO + RH BLD: NORMAL
ABO + RH BLD: NORMAL
ANION GAP SERPL CALCULATED.3IONS-SCNC: 3 MMOL/L (ref 3–14)
APTT PPP: 26 SEC (ref 22–37)
BASOPHILS # BLD AUTO: 0 10E9/L (ref 0–0.2)
BASOPHILS NFR BLD AUTO: 0.4 %
BLD GP AB SCN SERPL QL: NORMAL
BLOOD BANK CMNT PATIENT-IMP: NORMAL
BUN SERPL-MCNC: 24 MG/DL (ref 7–30)
CALCIUM SERPL-MCNC: 9.4 MG/DL (ref 8.5–10.1)
CHLORIDE SERPL-SCNC: 108 MMOL/L (ref 94–109)
CO2 SERPL-SCNC: 30 MMOL/L (ref 20–32)
CREAT SERPL-MCNC: 0.94 MG/DL (ref 0.52–1.04)
DIFFERENTIAL METHOD BLD: ABNORMAL
EOSINOPHIL # BLD AUTO: 0.1 10E9/L (ref 0–0.7)
EOSINOPHIL NFR BLD AUTO: 1.1 %
ERYTHROCYTE [DISTWIDTH] IN BLOOD BY AUTOMATED COUNT: 15.8 % (ref 10–15)
GFR SERPL CREATININE-BSD FRML MDRD: 58 ML/MIN/{1.73_M2}
GLUCOSE SERPL-MCNC: 121 MG/DL (ref 70–99)
HCT VFR BLD AUTO: 28.9 % (ref 35–47)
HGB BLD-MCNC: 8.3 G/DL (ref 11.7–15.7)
HGB BLD-MCNC: 9.4 G/DL (ref 11.7–15.7)
IMM GRANULOCYTES # BLD: 0 10E9/L (ref 0–0.4)
IMM GRANULOCYTES NFR BLD: 0.2 %
INR PPP: 0.95 (ref 0.86–1.14)
LYMPHOCYTES # BLD AUTO: 1.1 10E9/L (ref 0.8–5.3)
LYMPHOCYTES NFR BLD AUTO: 25.1 %
MCH RBC QN AUTO: 33 PG (ref 26.5–33)
MCHC RBC AUTO-ENTMCNC: 32.5 G/DL (ref 31.5–36.5)
MCV RBC AUTO: 101 FL (ref 78–100)
MONOCYTES # BLD AUTO: 0.5 10E9/L (ref 0–1.3)
MONOCYTES NFR BLD AUTO: 10.3 %
NEUTROPHILS # BLD AUTO: 2.8 10E9/L (ref 1.6–8.3)
NEUTROPHILS NFR BLD AUTO: 62.9 %
NRBC # BLD AUTO: 0 10*3/UL
NRBC BLD AUTO-RTO: 0 /100
PLATELET # BLD AUTO: 242 10E9/L (ref 150–450)
POTASSIUM SERPL-SCNC: 4 MMOL/L (ref 3.4–5.3)
RBC # BLD AUTO: 2.85 10E12/L (ref 3.8–5.2)
SODIUM SERPL-SCNC: 142 MMOL/L (ref 133–144)
SPECIMEN EXP DATE BLD: NORMAL
WBC # BLD AUTO: 4.5 10E9/L (ref 4–11)

## 2021-01-19 PROCEDURE — 80048 BASIC METABOLIC PNL TOTAL CA: CPT | Performed by: EMERGENCY MEDICINE

## 2021-01-19 PROCEDURE — 120N000001 HC R&B MED SURG/OB

## 2021-01-19 PROCEDURE — 99285 EMERGENCY DEPT VISIT HI MDM: CPT | Performed by: EMERGENCY MEDICINE

## 2021-01-19 PROCEDURE — 86901 BLOOD TYPING SEROLOGIC RH(D): CPT | Performed by: EMERGENCY MEDICINE

## 2021-01-19 PROCEDURE — 85018 HEMOGLOBIN: CPT | Performed by: EMERGENCY MEDICINE

## 2021-01-19 PROCEDURE — 250N000011 HC RX IP 250 OP 636: Performed by: EMERGENCY MEDICINE

## 2021-01-19 PROCEDURE — 74177 CT ABD & PELVIS W/CONTRAST: CPT

## 2021-01-19 PROCEDURE — 85025 COMPLETE CBC W/AUTO DIFF WBC: CPT | Performed by: EMERGENCY MEDICINE

## 2021-01-19 PROCEDURE — 86850 RBC ANTIBODY SCREEN: CPT | Performed by: EMERGENCY MEDICINE

## 2021-01-19 PROCEDURE — 74177 CT ABD & PELVIS W/CONTRAST: CPT | Mod: 26 | Performed by: RADIOLOGY

## 2021-01-19 PROCEDURE — 85730 THROMBOPLASTIN TIME PARTIAL: CPT | Performed by: EMERGENCY MEDICINE

## 2021-01-19 PROCEDURE — 86900 BLOOD TYPING SEROLOGIC ABO: CPT | Performed by: EMERGENCY MEDICINE

## 2021-01-19 PROCEDURE — 85610 PROTHROMBIN TIME: CPT | Performed by: EMERGENCY MEDICINE

## 2021-01-19 PROCEDURE — 99285 EMERGENCY DEPT VISIT HI MDM: CPT | Mod: 25 | Performed by: EMERGENCY MEDICINE

## 2021-01-19 PROCEDURE — C9803 HOPD COVID-19 SPEC COLLECT: HCPCS | Performed by: EMERGENCY MEDICINE

## 2021-01-19 RX ORDER — IOPAMIDOL 755 MG/ML
64 INJECTION, SOLUTION INTRAVASCULAR ONCE
Status: COMPLETED | OUTPATIENT
Start: 2021-01-19 | End: 2021-01-19

## 2021-01-19 RX ADMIN — IOPAMIDOL 64 ML: 755 INJECTION, SOLUTION INTRAVENOUS at 21:02

## 2021-01-19 ASSESSMENT — MIFFLIN-ST. JEOR: SCORE: 936.74

## 2021-01-20 ENCOUNTER — HOSPITAL ENCOUNTER (INPATIENT)
Facility: CLINIC | Age: 81
LOS: 4 days | Discharge: HOME | DRG: 378 | End: 2021-01-25
Attending: INTERNAL MEDICINE | Admitting: INTERNAL MEDICINE
Payer: COMMERCIAL

## 2021-01-20 ENCOUNTER — APPOINTMENT (OUTPATIENT)
Dept: NUCLEAR MEDICINE | Facility: CLINIC | Age: 81
DRG: 378 | End: 2021-01-20
Attending: INTERNAL MEDICINE
Payer: COMMERCIAL

## 2021-01-20 VITALS
RESPIRATION RATE: 16 BRPM | OXYGEN SATURATION: 100 % | WEIGHT: 104 LBS | TEMPERATURE: 98.1 F | SYSTOLIC BLOOD PRESSURE: 126 MMHG | HEIGHT: 64 IN | BODY MASS INDEX: 17.75 KG/M2 | DIASTOLIC BLOOD PRESSURE: 62 MMHG | HEART RATE: 75 BPM

## 2021-01-20 DIAGNOSIS — K57.31 DIVERTICULAR HEMORRHAGE: Primary | ICD-10-CM

## 2021-01-20 LAB
ANION GAP SERPL CALCULATED.3IONS-SCNC: 4 MMOL/L (ref 3–14)
BUN SERPL-MCNC: 17 MG/DL (ref 7–30)
CALCIUM SERPL-MCNC: 8.8 MG/DL (ref 8.5–10.1)
CHLORIDE SERPL-SCNC: 110 MMOL/L (ref 94–109)
CO2 SERPL-SCNC: 28 MMOL/L (ref 20–32)
CREAT SERPL-MCNC: 0.82 MG/DL (ref 0.52–1.04)
ERYTHROCYTE [DISTWIDTH] IN BLOOD BY AUTOMATED COUNT: 15.9 % (ref 10–15)
GFR SERPL CREATININE-BSD FRML MDRD: 69 ML/MIN/{1.73_M2}
GLUCOSE SERPL-MCNC: 95 MG/DL (ref 70–99)
HCT VFR BLD AUTO: 25.2 % (ref 35–47)
HGB BLD-MCNC: 8.3 G/DL (ref 11.7–15.7)
HGB BLD-MCNC: 8.3 G/DL (ref 11.7–15.7)
LABORATORY COMMENT REPORT: NORMAL
MCH RBC QN AUTO: 33.1 PG (ref 26.5–33)
MCHC RBC AUTO-ENTMCNC: 32.9 G/DL (ref 31.5–36.5)
MCV RBC AUTO: 100 FL (ref 78–100)
PLATELET # BLD AUTO: 207 10E9/L (ref 150–450)
POTASSIUM SERPL-SCNC: 4.7 MMOL/L (ref 3.4–5.3)
RBC # BLD AUTO: 2.51 10E12/L (ref 3.8–5.2)
SARS-COV-2 RNA RESP QL NAA+PROBE: NEGATIVE
SODIUM SERPL-SCNC: 142 MMOL/L (ref 133–144)
SPECIMEN SOURCE: NORMAL
WBC # BLD AUTO: 5 10E9/L (ref 4–11)

## 2021-01-20 PROCEDURE — 96360 HYDRATION IV INFUSION INIT: CPT | Mod: 59 | Performed by: EMERGENCY MEDICINE

## 2021-01-20 PROCEDURE — 86901 BLOOD TYPING SEROLOGIC RH(D): CPT | Performed by: INTERNAL MEDICINE

## 2021-01-20 PROCEDURE — U0003 INFECTIOUS AGENT DETECTION BY NUCLEIC ACID (DNA OR RNA); SEVERE ACUTE RESPIRATORY SYNDROME CORONAVIRUS 2 (SARS-COV-2) (CORONAVIRUS DISEASE [COVID-19]), AMPLIFIED PROBE TECHNIQUE, MAKING USE OF HIGH THROUGHPUT TECHNOLOGIES AS DESCRIBED BY CMS-2020-01-R: HCPCS | Performed by: EMERGENCY MEDICINE

## 2021-01-20 PROCEDURE — U0005 INFEC AGEN DETEC AMPLI PROBE: HCPCS | Performed by: EMERGENCY MEDICINE

## 2021-01-20 PROCEDURE — 80048 BASIC METABOLIC PNL TOTAL CA: CPT | Performed by: INTERNAL MEDICINE

## 2021-01-20 PROCEDURE — 258N000003 HC RX IP 258 OP 636: Performed by: EMERGENCY MEDICINE

## 2021-01-20 PROCEDURE — 36415 COLL VENOUS BLD VENIPUNCTURE: CPT | Performed by: PHYSICIAN ASSISTANT

## 2021-01-20 PROCEDURE — 99220 PR INITIAL OBSERVATION CARE,LEVEL III: CPT | Performed by: INTERNAL MEDICINE

## 2021-01-20 PROCEDURE — 78278 ACUTE GI BLOOD LOSS IMAGING: CPT

## 2021-01-20 PROCEDURE — 343N000001 HC RX 343: Performed by: INTERNAL MEDICINE

## 2021-01-20 PROCEDURE — 86923 COMPATIBILITY TEST ELECTRIC: CPT | Performed by: INTERNAL MEDICINE

## 2021-01-20 PROCEDURE — 250N000013 HC RX MED GY IP 250 OP 250 PS 637: Performed by: INTERNAL MEDICINE

## 2021-01-20 PROCEDURE — 258N000003 HC RX IP 258 OP 636: Performed by: PHYSICIAN ASSISTANT

## 2021-01-20 PROCEDURE — 85027 COMPLETE CBC AUTOMATED: CPT | Performed by: INTERNAL MEDICINE

## 2021-01-20 PROCEDURE — G0378 HOSPITAL OBSERVATION PER HR: HCPCS

## 2021-01-20 PROCEDURE — 36415 COLL VENOUS BLD VENIPUNCTURE: CPT | Performed by: INTERNAL MEDICINE

## 2021-01-20 PROCEDURE — A9560 TC99M LABELED RBC: HCPCS | Performed by: INTERNAL MEDICINE

## 2021-01-20 PROCEDURE — 86900 BLOOD TYPING SEROLOGIC ABO: CPT | Performed by: INTERNAL MEDICINE

## 2021-01-20 PROCEDURE — 250N000011 HC RX IP 250 OP 636: Performed by: INTERNAL MEDICINE

## 2021-01-20 PROCEDURE — 86850 RBC ANTIBODY SCREEN: CPT | Performed by: INTERNAL MEDICINE

## 2021-01-20 PROCEDURE — 85018 HEMOGLOBIN: CPT | Performed by: PHYSICIAN ASSISTANT

## 2021-01-20 RX ORDER — LANOLIN ALCOHOL/MO/W.PET/CERES
3 CREAM (GRAM) TOPICAL
Status: DISCONTINUED | OUTPATIENT
Start: 2021-01-20 | End: 2021-01-25 | Stop reason: HOSPADM

## 2021-01-20 RX ORDER — ONDANSETRON 2 MG/ML
4 INJECTION INTRAMUSCULAR; INTRAVENOUS EVERY 6 HOURS PRN
Status: DISCONTINUED | OUTPATIENT
Start: 2021-01-20 | End: 2021-01-25 | Stop reason: HOSPADM

## 2021-01-20 RX ORDER — ONDANSETRON 4 MG/1
4 TABLET, ORALLY DISINTEGRATING ORAL EVERY 6 HOURS PRN
Status: DISCONTINUED | OUTPATIENT
Start: 2021-01-20 | End: 2021-01-25 | Stop reason: HOSPADM

## 2021-01-20 RX ORDER — SODIUM CHLORIDE 9 MG/ML
INJECTION, SOLUTION INTRAVENOUS CONTINUOUS
Status: DISCONTINUED | OUTPATIENT
Start: 2021-01-20 | End: 2021-01-23

## 2021-01-20 RX ORDER — ACETAMINOPHEN 325 MG/1
650 TABLET ORAL EVERY 4 HOURS PRN
Status: DISCONTINUED | OUTPATIENT
Start: 2021-01-20 | End: 2021-01-25 | Stop reason: HOSPADM

## 2021-01-20 RX ORDER — NALOXONE HYDROCHLORIDE 0.4 MG/ML
0.4 INJECTION, SOLUTION INTRAMUSCULAR; INTRAVENOUS; SUBCUTANEOUS
Status: DISCONTINUED | OUTPATIENT
Start: 2021-01-20 | End: 2021-01-25 | Stop reason: HOSPADM

## 2021-01-20 RX ORDER — SODIUM CHLORIDE 9 MG/ML
INJECTION, SOLUTION INTRAVENOUS ONCE
Status: COMPLETED | OUTPATIENT
Start: 2021-01-20 | End: 2021-01-20

## 2021-01-20 RX ORDER — NALOXONE HYDROCHLORIDE 0.4 MG/ML
0.2 INJECTION, SOLUTION INTRAMUSCULAR; INTRAVENOUS; SUBCUTANEOUS
Status: DISCONTINUED | OUTPATIENT
Start: 2021-01-20 | End: 2021-01-25 | Stop reason: HOSPADM

## 2021-01-20 RX ORDER — LIDOCAINE 40 MG/G
CREAM TOPICAL
Status: DISCONTINUED | OUTPATIENT
Start: 2021-01-20 | End: 2021-01-24

## 2021-01-20 RX ORDER — OXYCODONE HYDROCHLORIDE 5 MG/1
5-10 TABLET ORAL
Status: DISCONTINUED | OUTPATIENT
Start: 2021-01-20 | End: 2021-01-25 | Stop reason: HOSPADM

## 2021-01-20 RX ORDER — ACETAMINOPHEN 650 MG/1
650 SUPPOSITORY RECTAL EVERY 4 HOURS PRN
Status: DISCONTINUED | OUTPATIENT
Start: 2021-01-20 | End: 2021-01-25 | Stop reason: HOSPADM

## 2021-01-20 RX ORDER — FOLIC ACID 1 MG/1
1 TABLET ORAL DAILY
Status: DISCONTINUED | OUTPATIENT
Start: 2021-01-20 | End: 2021-01-25 | Stop reason: HOSPADM

## 2021-01-20 RX ADMIN — ONDANSETRON 4 MG: 4 TABLET, ORALLY DISINTEGRATING ORAL at 07:06

## 2021-01-20 RX ADMIN — SODIUM CHLORIDE: 9 INJECTION, SOLUTION INTRAVENOUS at 00:32

## 2021-01-20 RX ADMIN — FOLIC ACID 1 MG: 1 TABLET ORAL at 08:04

## 2021-01-20 RX ADMIN — ACETAMINOPHEN 650 MG: 325 TABLET, FILM COATED ORAL at 19:14

## 2021-01-20 RX ADMIN — ACETAMINOPHEN 650 MG: 325 TABLET, FILM COATED ORAL at 03:14

## 2021-01-20 RX ADMIN — Medication 22.6 MILLICURIE: at 14:04

## 2021-01-20 RX ADMIN — SODIUM CHLORIDE: 9 INJECTION, SOLUTION INTRAVENOUS at 14:52

## 2021-01-20 ASSESSMENT — ENCOUNTER SYMPTOMS
LIGHT-HEADEDNESS: 0
NAUSEA: 0
DIZZINESS: 0
BLOOD IN STOOL: 1
VOMITING: 0

## 2021-01-20 NOTE — CONSULTS
Consult Date:  01/20/2021      GASTROENTEROLOGY CONSULTATION      CHIEF COMPLAINT:  Bright red blood per rectum.      We were asked to see this patient by her admitting hospitalist, Dr. Nj Cordova, for further evaluation of bright red blood per rectum.      HISTORY OF PRESENT ILLNESS:  The patient is a 80-year-old woman who has known diverticular disease and hemorrhoids and has had a diverticular bleed in the past, who was in her usual state of health when she developed the acute onset of bright red blood per rectum.  She had scheduled a video visit with her gastroenterologist later that day, but then she developed more bright red blood per rectum and she presented to the emergency room for further evaluation.  She initially presented to the [facility] where she has been followed, but there were no beds and so she is here at [facility].      She describes the blood as painless, bright red, blood with mixed stool initially and then just bright red blood per rectum.  She describes it as a large volume.  She did not have any dizziness or lightheadedness.      REVIEW OF SYSTEMS:   CONSTITUTIONAL:  No fever or chills.   NEUROLOGIC:  No dizziness or lightheadedness.   CARDIAC:  No chest pain or palpitations.   PULMONARY:  No shortness of breath.   GASTROINTESTINAL:  As above.   GENITOURINARY:  She is urinating normally.      All other systems were negative.      PAST MEDICAL HISTORY:  Significant for osteoarthritis, osteoporosis, and a sensorineural hearing loss for which she wears hearing aids.      Past medical history also includes a history of diverticular bleed.  Colonoscopy in 2017 showed diverticulosis and hemorrhoids.      MEDICATIONS ON ADMISSION:  Included Tylenol, calcium carbonate, Celebrex, Restasis, an EpiPen, folic acid, methotrexate, pilocarpine, and Retin-A cream.      SOCIAL HISTORY:  The patient denies tobacco, denies alcohol.      FAMILY HISTORY:  Sister diagnosed with colon cancer  in her 70s.      PHYSICAL EXAMINATION:   GENERAL:  She is a well-nourished woman, alert and oriented, in no apparent distress.   VITAL SIGNS:  Blood pressure is 108/40, temperature is 97.6, pulse is 75.   HEENT:  Head is atraumatic, normocephalic.  Sclerae nonicteric.   SKIN:  Without evidence of jaundice or rash.     HEART:  S1, S2.  I did not appreciate a significant murmur.   LUNGS:  Clear to auscultation.   ABDOMEN:  Soft and nontender.  No hepatosplenomegaly, no masses.   EXTREMITIES:  Right and left lower extremity without edema.   NEUROLOGIC:  She is alert and oriented.  Grossly, it is nonfocal.   PSYCHIATRIC:  No evidence of pressured speech or flattened affect.      LABORATORY DATA:  Her hemoglobin in 2017 when she had her last bleed was around 9.  Her hemoglobin today is 9.4.  I do not have a previous value.  Since admission, it has fallen to 8.3.  Platelet count is normal and white blood cell count is normal.      ASSESSMENT AND PLAN:  Bright red blood per rectum:  The most likely etiology is diverticular bleed with her last colonoscopy in 2017.  Differential always has to include a small bowel bleed or malignancy or even hemorrhoids, although hemorrhoids rarely bleed to this volume.  Her bleeding was probably exacerbated by her use of Celebrex.  At this time, she is having bright red blood per rectum and we suggest a tagged red blood cell scan as soon as possible for further evaluation.  At least at that time, we would be able to localize the site of bleeding.  If she continues bleeding to a rapid degree, Interventional Radiology would be the next step for both diagnostic and therapeutic benefit.      We will continue to follow this patient with you.      Thank you for asking us to participate in her care.         ASIF OSHEA MD             D: 2021   T: 2021   MT: SAIDA      Name:     MACHELLE PERSAUD   MRN:      [id number]        Account:       [id number]   :      1940            Consult Date:  01/20/2021      Document: [id number]

## 2021-01-20 NOTE — PLAN OF CARE
Patient A&Ox4. VSS ex soft BP, on RA. Denies pain, using heat pack on shoulder. No c/o nausea. 1 medium bloody BM this shift. Hgb recheck this AM: 8.3. Good UOP. Up independently. Tolerates clears diet. PIV infusing NS @75. GI consulted. GIB imaging done today. Discharge pending.

## 2021-01-20 NOTE — H&P
[facility]    History and Physical - Hospitalist Service       Date of Admission:  1/20/2021    Assessment & Plan   Patrick Olson is a 80 year old female admitted on 1/20/2021. She presents as a direct admission from [facility] emergency department secondary to lack of beds with painless bright red blood per rectum, known history of diverticular bleeds requiring blood transfusions in the past.    Acute GI hemorrhage: Most consistent with recurrent diverticular bleed.  -Gastroenterology consulted.  Patient currently follows with gastroenterology at the [facility], though tells me that she has also followed with [place] [facility] in the past.  -Consider outpatient referral to colorectal surgery given now third episode of diverticular bleed  -Patient has been consented for blood products by myself on admission  -Repeat hemoglobin in a.m.; type and screen can be obtained at that time as well as patient is hemodynamically stable and has had no further episodes of hematochezia since 7 PM  -Iron supplementation at discharge pending course of hospitalization (might not be necessary if patient requires significant transfusions).    Rheumatoid arthritis:  -Following reconciliation of prior to admission medications by pharmacy, anticipate resumption of methotrexate, pilocarpine  -Continuing on folic acid 1 mg daily currently       Diet:  Clear liquid diet  DVT Prophylaxis: Ambulate every shift  Parra Catheter: not present  Code Status:    Full code         Disposition Plan   Expected discharge: Tomorrow, recommended to prior living arrangement once Leaving subsides, hemoglobin stable, gastroenterology consultation complete.  Entered: Nj Cordova MD 01/20/2021, 2:26 AM     The patient's care was discussed with the Patient and Transferring ER provider at the [facility].    Nj Cordova MD  [facility]  Contact information  available via [facility] Paging/Directory      ______________________________________________________________________    Chief Complaint   Right red blood per rectum    History is obtained from the patient, chart review, discussion with outside/transferring ER provider, review of outside records including hospitalization for left hip arthroplasty in 2011 at [facility]    History of Present Illness   Patrick Olson is a 80 year old female who presents as a direct admission from [facility] emergency department for painless GI hemorrhage.    Patient is a fairly healthy 80-year-old female with a History of rheumatoid arthritis on methotrexate, folic acid.  She does, however, have a history of significant diverticular bleeds in the past requiring blood transfusions.  Required hospitalization in 2010, 2017 for hematochezia most recently.    typically followed by [facility], though tells me that she has followed with [place] [facility] in the past as well.    For the past day, patient has had some bright red blood in her stool.  She was scheduled for a video visit with her gastroenterologist later today, though amount of blood noted in stool worsened to the point where she felt she should be evaluated in the emergency department, subsequently transferred to [facility] given lack of beds at [facility].    Patient tells me that she had to bloody stools, describing blood mixed with stools 1/19 a.m.  More significant blood in stool at approximately 7 PM which resulted in her presentation to the emergency department.  She describes a large volume of painless bright red blood per rectum consistent with diverticular bleed, which again, patient has had in the past.    In the emergency department, patient's hemoglobin of 9.4 was rechecked after approximately 3 hours, back to 8.3.  Patient with no further episodes of hematochezia.  She tells me that she has gone to the bathroom and  passed flatus without blood.  She has no lightheadedness, no shortness of breath, no palpitations.    On arrival at [facility], patient is hemodynamically stable, asymptomatic.  Again, has not had further episodes of bright red blood per rectum since 7 PM.  Consented for blood products, gastroenterology consulted, admitted observation status as I am hopeful that her bleeding has already subsided.  Patient has known significant diverticulosis and now with her third diverticular bleeding episode.  She has not had discussions in the past with colorectal surgery regarding possible partial colectomy.  Note that on colonoscopy she has diverticulum throughout her colon.  I am not certain that a source of bleeding has ever been identified on prior gastroenterology evaluation.      Review of Systems    The 10 point Review of Systems is negative other than noted in the HPI or here.  No fevers or chills  No palpitations  No lightheadedness    Past Medical History    I have reviewed this patient's medical history and updated it with pertinent information if needed.   Past Medical History:   Diagnosis Date     Osteoarthritis      Osteoporosis      Rheumatoid arthritis (H)      Sensorineural hearing loss        Past Surgical History   I have reviewed this patient's surgical history and updated it with pertinent information if needed.  Past Surgical History:   Procedure Laterality Date     COLONOSCOPY N/A 3/14/2017    Procedure: COMBINED COLONOSCOPY, SINGLE OR MULTIPLE BIOPSY/POLYPECTOMY BY BIOPSY;  Surgeon: Spencer Downey MD;  Location:  GI     ORTHOPEDIC SURGERY     Left total hip arthroplasty in 2011 at [facility]    Social History   I have reviewed this patient's social history and updated it with pertinent information if needed.  Social History     Tobacco Use     Smoking status: Former Smoker     Smokeless tobacco: Never Used   Substance Use Topics     Alcohol use: No     Drug use: No       Family  "History     Sister diagnosed with colon cancer in her 70s; patient reports that this background concern weighs on her when she has GI issues    Prior to Admission Medications   Prior to Admission Medications   Prescriptions Last Dose Informant Patient Reported? Taking?   Celecoxib (CELEBREX PO)   Yes No   Sig: Take 200 mg by mouth 2 times daily   EPINEPHrine (EPIPEN) 0.3 MG/0.3ML injection   Yes No   Sig: Inject 0.3 mg into the muscle once as needed   Multiple Vitamins-Minerals (OCUVITE PRESERVISION PO)   Yes No   Sig: Take 1 tablet by mouth daily   PILOCARPINE HCL PO   Yes No   Sig: Take 5 mg by mouth 3 times daily   acetaminophen (TYLENOL) 500 MG tablet   Yes No   Sig: Take 500 mg by mouth every 6 hours as needed for mild pain   calcium carb 1250 mg, 500 mg [place],/vitamin D 200 units (OSCAL WITH D) 500-200 MG-UNIT per tablet   Yes No   Sig: Take 2 tablets by mouth daily   cycloSPORINE (RESTASIS) 0.05 % ophthalmic emulsion   Yes No   Sig: Place 1 drop into both eyes 2 times daily   diphenhydrAMINE (BENADRYL) 25 MG tablet   Yes No   Sig: Take 25 mg by mouth nightly as needed for itching or allergies   folic acid (FOLVITE) 1 MG tablet   Yes No   Sig: Take 3 mg by mouth daily    methotrexate 2.5 MG tablet   Yes No   Sig: Take 20 mg by mouth every 7 days Weekly on Tuesdays   tretinoin (RETIN-A) 0.1 % cream   Yes No   Sig: Apply topically At Bedtime      Facility-Administered Medications: None     Allergies   Allergies   Allergen Reactions     Bee Venom Anaphylaxis     Shrimp Anaphylaxis     Evoxac [Cevimeline] Unknown     Prevnar Swelling     Other reaction(s): Edema     Prevnar [Pneumococcal 13-Linda Conj Vacc] Unknown       Physical Exam  Vital signs:  Temp: 97.6  F (36.4  C) Temp src: Oral BP: 137/46 Pulse: 83   Resp: 16 SpO2: 96 % O2 Device: None (Room air)     Weight: 46.5 kg (102 lb 8 oz)  Estimated body mass index is 17.59 kg/m  as calculated from the following:    Height as of 1/19/21: 1.626 m (5' 4\").    Weight " as of this encounter: 46.5 kg (102 lb 8 oz).    General Appearance: Thin, though well-appearing 80-year-old  American female resting comfortably in bed.  She appears younger than stated age  Eyes: No scleral icterus or injection  HEENT: Normocephalic, atraumatic.  Respiratory: Breath sounds are clear bilateral to auscultation without wheezes or crackles.  Good effort, good air movement throughout lung fields  Cardiovascular: Regular rate and rhythm.  Heart rate currently in the 80 range.  No appreciable murmur.  GI: Abdomen thin, soft, nontender palpation.  No palpable mass.  Bowel sounds present and normoactive  Lymph/Hematologic: No lower extremity edema  Genitourinary: Not examined  Skin: No rashes, no petechiae, no jaundice  Musculoskeletal: Thin, though no muscular wasting is appreciated.  Neurologic: Alert, conversant, appropriate conversation.  Mental status grossly intact.  Psychiatric: Very pleasant, normal affect    Data   Data reviewed today: I reviewed all medications, new labs and imaging results over the last 24 hours. I personally reviewed no images or EKG's today.    Recent Labs   Lab 01/19/21  2254 01/19/21 2001   WBC  --  4.5   HGB 8.3* 9.4*   MCV  --  101*   PLT  --  242   INR  --  0.95   NA  --  142   POTASSIUM  --  4.0   CHLORIDE  --  108   CO2  --  30   BUN  --  24   CR  --  0.94   ANIONGAP  --  3   EJ  --  9.4   GLC  --  121*

## 2021-01-20 NOTE — ED PROVIDER NOTES
[place] EMERGENCY DEPARTMENT ([facility])  1/19/21  History     Chief Complaint   Patient presents with     Rectal Bleeding     The history is provided by the patient and medical records.     Patrick Olson is a 80 year old female with a past medical history of diverticulosis c/b bleeds, rheumatoid arthritis, osteoporosis, osteoarthritis and s/p colonoscopy (3/14/2017) who presents for evaluation of hematochezia and possible GI bleed. Patient reports she had two episodes of large bright red blood per rectum yesterday. She states the blood was mixed in with loose/soft stool. She states she went to [place] to see her PCP this morning where she had a negative rectal exam, but was noted to have hemorrhoids. Patient states she had a 3rd episode of bleeding this afternoon that was increased in amount and volume of blood so she came into the ED for further evaluation. Patient notes mild GI upset over the past couple days with more gas. She denies nausea or vomiting. Denies lightheadedness, dizziness, or chest pain. Patient denies other symptoms.     Past Medical History  Past Medical History:   Diagnosis Date     Osteoarthritis      Osteoporosis      Rheumatoid arthritis (H)      Sensorineural hearing loss      Past Surgical History:   Procedure Laterality Date     COLONOSCOPY N/A 3/14/2017    Procedure: COMBINED COLONOSCOPY, SINGLE OR MULTIPLE BIOPSY/POLYPECTOMY BY BIOPSY;  Surgeon: Spencer Downey MD;  Location:  GI     ORTHOPEDIC SURGERY            acetaminophen (TYLENOL) 500 MG tablet       calcium carb 1250 mg, 500 mg [place],/vitamin D 200 units (OSCAL WITH D) 500-200 MG-UNIT per tablet       Celecoxib (CELEBREX PO)       cycloSPORINE (RESTASIS) 0.05 % ophthalmic emulsion       diphenhydrAMINE (BENADRYL) 25 MG tablet       EPINEPHrine (EPIPEN) 0.3 MG/0.3ML injection       folic acid (FOLVITE) 1 MG tablet       methotrexate 2.5 MG tablet       Multiple Vitamins-Minerals (OCUVITE PRESERVISION PO)        "PILOCARPINE HCL PO       tretinoin (RETIN-A) 0.1 % cream      Allergies   Allergen Reactions     Bee Venom Anaphylaxis     Shrimp Anaphylaxis     Evoxac [Cevimeline] Unknown     Prevnar Swelling     Other reaction(s): Edema     Prevnar [Pneumococcal 13-Linda Conj Vacc] Unknown     Family History  History reviewed. No pertinent family history.  Social History   Social History     Tobacco Use     Smoking status: Former Smoker     Smokeless tobacco: Never Used   Substance Use Topics     Alcohol use: No     Drug use: No      Past medical history, past surgical history, medications, allergies, family history, and social history were reviewed with the patient. No additional pertinent items.       Review of Systems   Cardiovascular: Negative for chest pain.   Gastrointestinal: Positive for blood in stool (BRBPR). Negative for nausea and vomiting.        Pos for GI \"upset\" and gas   Neurological: Negative for dizziness and light-headedness.   All other systems reviewed and are negative.      Physical Exam   BP: 137/72  Pulse: 93  Temp: 98.1  F (36.7  C)  Resp: 16  Height: 162.6 cm (5' 4\")  Weight: 47.2 kg (104 lb)  SpO2: 100 %  Physical Exam  Vitals signs and nursing note reviewed.   Constitutional:       General: She is not in acute distress.     Appearance: Normal appearance. She is not diaphoretic.   HENT:      Head: Atraumatic.      Mouth/Throat:      Pharynx: No oropharyngeal exudate.   Eyes:      General: No scleral icterus.     Pupils: Pupils are equal, round, and reactive to light.   Cardiovascular:      Rate and Rhythm: Normal rate and regular rhythm.      Heart sounds: Normal heart sounds.   Pulmonary:      Effort: No respiratory distress.      Breath sounds: Normal breath sounds.   Abdominal:      General: Bowel sounds are normal.      Palpations: Abdomen is soft.      Tenderness: There is no abdominal tenderness.   Musculoskeletal:         General: No tenderness.   Skin:     General: Skin is warm.      Findings: " No rash.   Neurological:      General: No focal deficit present.      Mental Status: She is alert and oriented to person, place, and time.   Psychiatric:         Mood and Affect: Mood normal.         Behavior: Behavior normal.           ED Course      Procedures                   Results for orders placed or performed during the hospital encounter of 01/19/21   CT Abdomen Pelvis w Contrast     Status: None    Narrative    EXAM: CT ABDOMEN PELVIS W CONTRAST, 1/19/2021 9:10 PM    TECHNIQUE:  Helical CT images from the lung bases through the  symphysis pubis were obtained after the administration of intravenous  contrast. Coronal and sagittal reformatted images were generated at a  workstation for further assessment. Contrast dose: iopamidol  (ISOVUE-370) solution 64 mL.    HISTORY: Abdominal pain, acute, nonlocalized    COMPARISON: Lumbar spine MRI 7/12/2018.    FINDINGS:    Lung Bases: No acute airspace disease. Aortic atherosclerosis.    Abdomen/Pelvis: Subcentimeter hypoenhancing liver lesions between  segments 5/8 (series 2 image 19), in segment Radha (series 2 image 14),  and in segment 6 (series 2 image 30), too small to characterize. These  are not clearly included or evaluated on prior MRI lumbar spine. A  nodular 1 cm lesion in the left adrenal gland towards the apex is also  noted, similar to prior MRI. Numerous fluid-density renal cysts  without mass, hydronephrosis, or stone. Urinary bladder, uterus, and  ovaries are grossly unremarkable though partially obscured due to  streak artifact. No abnormally dilated or thickened loops of bowel.  The appendix is unremarkable. Extensive colonic diverticulosis  throughout the colon without evidence of acute diverticulitis. No free  air or free fluid in the abdomen/pelvis. Nearly 2 cm hypoattenuating  stone in the gallbladder. Tiny cyst in the pancreatic body.  No  pathologically enlarged lymph nodes.    Bones / Soft Tissues: No suspicious lesions or acute  abnormalities.  Left hip total arthroplasty. Ischial tuberosity enthesophytes.  Degenerative changes of the hip joints.      Impression    IMPRESSION:   1.  Hypoattenuating liver lesions are demonstrated, too small to  characterize. These are indeterminant, possibly representing  hemangiomas although other etiologies including primary and secondary  liver lesions cannot be excluded. No comparison available at time of  dictation on PACS or care everywhere. Could consider follow-up  nonemergent outpatient MRI liver to further evaluate as clinically  warranted.  2.  No acute intra-abdominal or pelvic abnormality identified.  3.  Left adrenal nodule appears similar to prior lumbar spine MRI  suggesting benign etiology.  4.  Extensive colonic diverticulosis with no evidence of acute  diverticulitis.    I have personally reviewed the examination and initial interpretation  and I agree with the findings.    ALISSON TAVAREZ MD   CBC with platelets differential     Status: Abnormal   Result Value Ref Range    WBC 4.5 4.0 - 11.0 10e9/L    RBC Count 2.85 (L) 3.8 - 5.2 10e12/L    Hemoglobin 9.4 (L) 11.7 - 15.7 g/dL    Hematocrit 28.9 (L) 35.0 - 47.0 %     (H) 78 - 100 fl    MCH 33.0 26.5 - 33.0 pg    MCHC 32.5 31.5 - 36.5 g/dL    RDW 15.8 (H) 10.0 - 15.0 %    Platelet Count 242 150 - 450 10e9/L    Diff Method Automated Method     % Neutrophils 62.9 %    % Lymphocytes 25.1 %    % Monocytes 10.3 %    % Eosinophils 1.1 %    % Basophils 0.4 %    % Immature Granulocytes 0.2 %    Nucleated RBCs 0 0 /100    Absolute Neutrophil 2.8 1.6 - 8.3 10e9/L    Absolute Lymphocytes 1.1 0.8 - 5.3 10e9/L    Absolute Monocytes 0.5 0.0 - 1.3 10e9/L    Absolute Eosinophils 0.1 0.0 - 0.7 10e9/L    Absolute Basophils 0.0 0.0 - 0.2 10e9/L    Abs Immature Granulocytes 0.0 0 - 0.4 10e9/L    Absolute Nucleated RBC 0.0    INR     Status: None   Result Value Ref Range    INR 0.95 0.86 - 1.14   Partial thromboplastin time     Status: None   Result  Value Ref Range    PTT 26 22 - 37 sec   Basic metabolic panel     Status: Abnormal   Result Value Ref Range    Sodium 142 133 - 144 mmol/L    Potassium 4.0 3.4 - 5.3 mmol/L    Chloride 108 94 - 109 mmol/L    Carbon Dioxide 30 20 - 32 mmol/L    Anion Gap 3 3 - 14 mmol/L    Glucose 121 (H) 70 - 99 mg/dL    Urea Nitrogen 24 7 - 30 mg/dL    Creatinine 0.94 0.52 - 1.04 mg/dL    GFR Estimate 58 (L) >60 mL/min/[1.73_m2]    GFR Estimate If Black 67 >60 mL/min/[1.73_m2]    Calcium 9.4 8.5 - 10.1 mg/dL   Hemoglobin     Status: Abnormal   Result Value Ref Range    Hemoglobin 8.3 (L) 11.7 - 15.7 g/dL   ABO/Rh type and screen     Status: None   Result Value Ref Range    ABO O     RH(D) Pos     Antibody Screen Neg     Test Valid Only At          [facility]    Specimen Expires 01/22/2021      Medications   sodium chloride 0.9% infusion (has no administration in time range)   sodium chloride (PF) 0.9% PF flush 66 mL (66 mLs Intravenous Given 1/19/21 2102)   iopamidol (ISOVUE-370) solution 64 mL (64 mLs Intravenous Given 1/19/21 2102)        Assessments & Plan (with Medical Decision Making)     80 year old female with a past medical history of diverticulosis c/b bleeds, rheumatoid arthritis, osteoporosis, osteoarthritis and s/p colonoscopy (3/14/2017) who presents for evaluation of hematochezia and possible diverticular GI bleed.  Patient placed on cardiac monitor which revealed normal sinus rhythm and stable vital signs with a blood pressure 137/72 and normal pulse ox at home percent on room air.  IV is established x2, labs are drawn sent reviewed and document in epic and hemoglobin initially 9.4 with otherwise unremarkable CBC and normal electrolytes.  Patient was observed here in the emergency department while undergoing CT of abdomen pelvis which revealed some hypoattenuating liver lesions too small to characterize along with extensive diverticula but without any other acute process.   Patient had a second hemoglobin drawn approximately 3 hours after the first which dropped to 8.3 from 9.4 without any administration of IV fluids to suggest a delusional effect.  Case was discussed with GI who advised admitting the patient.  Case discussed with patient placement here at the [place] however no beds were available, patient is agreeable to transfer to [facility] and this was arranged.    I have reviewed the nursing notes. I have reviewed the findings, diagnosis, plan and need for follow up with the patient.    New Prescriptions    No medications on file       Final diagnoses:   Gastrointestinal hemorrhage, unspecified gastrointestinal hemorrhage type   I, Nava Brito, am serving as a trained medical scribe to document services personally performed by Mary Ellen Black MD, based on the provider's statements to me.     I, Mary Ellen Black MD, was physically present and have reviewed and verified the accuracy of this note documented by Nava Brito.     --  Mary Ellen Black MD  [facility] EMERGENCY DEPARTMENT  1/19/2021     Mary Ellen Black MD  01/21/21 [id number]

## 2021-01-20 NOTE — PROGRESS NOTES
Brief chart update. H&P from this am reviewed as well as GI consult note. Appreciate their assistance.    Repeat Hgb stable this am but had additional episode of BRBPR.    - Planning tagged RBC study per GI  - Continue monitor Hgb q 6  - Gentle IVF at 75 ml     Cheyanne Smith PA-C  Hospitalist Service  [phone]

## 2021-01-20 NOTE — PLAN OF CARE
A/Ox4. VSS on RA. Pain control with tylenol. Oxycodone available, complaints of shoulder pain. Heat applied. Denies abd pain. No bloody stool this shift. Hbg recheck scheduled for the AM. Ind in room. Discharge pending GI consult.

## 2021-01-20 NOTE — PROGRESS NOTES
GI NOTE ( consult dictated )    A/  Bright red blood per rectum- most likey diverticular bleed    P/  Tagged RBC scan if bleeding rapidly can be seen with interventional radiology for diagnostic and therapeutic benefit.    Will continue to follow    Princess Womack MD  [facility] Digestive Health  Office

## 2021-01-20 NOTE — ED TRIAGE NOTES
Pt arrives to triage with complaints of BRBPR. Pt reports she had 2 episodes of bloody stool this morning, went to [facility]. Pt reports rectal exam was negative and pt does not have hemorrhoids. Blood work was checked. Pt sent home. Pt had another episode of bloody stool this evening. Hx diverticulosis. Denies nausea and vomiting. Denies abdominal pain. Pt reports she does not take any blood thinning medications. A&O.

## 2021-01-21 PROBLEM — K92.2 ACUTE LOWER GI BLEEDING: Status: ACTIVE | Noted: 2021-01-21

## 2021-01-21 LAB
BLD PROD TYP BPU: NORMAL
BLD UNIT ID BPU: 0
BLOOD PRODUCT CODE: NORMAL
BPU ID: NORMAL
HGB BLD-MCNC: 6.5 G/DL (ref 11.7–15.7)
HGB BLD-MCNC: 8 G/DL (ref 11.7–15.7)
HGB BLD-MCNC: 8.1 G/DL (ref 11.7–15.7)
TRANSFUSION STATUS PATIENT QL: NORMAL
TRANSFUSION STATUS PATIENT QL: NORMAL

## 2021-01-21 PROCEDURE — 250N000013 HC RX MED GY IP 250 OP 250 PS 637: Performed by: PHYSICIAN ASSISTANT

## 2021-01-21 PROCEDURE — G0378 HOSPITAL OBSERVATION PER HR: HCPCS

## 2021-01-21 PROCEDURE — 120N000001 HC R&B MED SURG/OB

## 2021-01-21 PROCEDURE — 258N000003 HC RX IP 258 OP 636: Performed by: HOSPITALIST

## 2021-01-21 PROCEDURE — 85018 HEMOGLOBIN: CPT | Performed by: HOSPITALIST

## 2021-01-21 PROCEDURE — 36415 COLL VENOUS BLD VENIPUNCTURE: CPT | Performed by: PHYSICIAN ASSISTANT

## 2021-01-21 PROCEDURE — 250N000013 HC RX MED GY IP 250 OP 250 PS 637: Performed by: INTERNAL MEDICINE

## 2021-01-21 PROCEDURE — 36415 COLL VENOUS BLD VENIPUNCTURE: CPT | Performed by: HOSPITALIST

## 2021-01-21 PROCEDURE — 96360 HYDRATION IV INFUSION INIT: CPT

## 2021-01-21 PROCEDURE — 99232 SBSQ HOSP IP/OBS MODERATE 35: CPT | Performed by: HOSPITALIST

## 2021-01-21 PROCEDURE — 36430 TRANSFUSION BLD/BLD COMPNT: CPT

## 2021-01-21 PROCEDURE — P9016 RBC LEUKOCYTES REDUCED: HCPCS | Performed by: INTERNAL MEDICINE

## 2021-01-21 PROCEDURE — 85018 HEMOGLOBIN: CPT | Performed by: PHYSICIAN ASSISTANT

## 2021-01-21 RX ORDER — CELECOXIB 200 MG/1
200 CAPSULE ORAL 2 TIMES DAILY
Status: ON HOLD | COMMUNITY
End: 2021-01-25

## 2021-01-21 RX ORDER — GABAPENTIN 300 MG/1
300 CAPSULE ORAL AT BEDTIME
Status: DISCONTINUED | OUTPATIENT
Start: 2021-01-21 | End: 2021-01-25 | Stop reason: HOSPADM

## 2021-01-21 RX ORDER — GABAPENTIN 300 MG/1
300 CAPSULE ORAL AT BEDTIME
COMMUNITY
End: 2021-12-31

## 2021-01-21 RX ORDER — FOLIC ACID 1 MG/1
1 TABLET ORAL DAILY
Status: DISCONTINUED | OUTPATIENT
Start: 2021-01-21 | End: 2021-01-21

## 2021-01-21 RX ORDER — PILOCARPINE HYDROCHLORIDE 5 MG/1
5 TABLET, FILM COATED ORAL AT BEDTIME
Status: ON HOLD | COMMUNITY
End: 2021-11-17

## 2021-01-21 RX ORDER — PILOCARPINE HYDROCHLORIDE 5 MG/1
5 TABLET, FILM COATED ORAL DAILY PRN
COMMUNITY
End: 2021-10-12

## 2021-01-21 RX ORDER — LIFITEGRAST 50 MG/ML
1 SOLUTION/ DROPS OPHTHALMIC 2 TIMES DAILY
Status: ON HOLD | COMMUNITY
End: 2021-11-17

## 2021-01-21 RX ADMIN — SODIUM CHLORIDE 1000 ML: 9 INJECTION, SOLUTION INTRAVENOUS at 01:38

## 2021-01-21 RX ADMIN — FOLIC ACID 1 MG: 1 TABLET ORAL at 09:49

## 2021-01-21 RX ADMIN — SODIUM CHLORIDE: 9 INJECTION, SOLUTION INTRAVENOUS at 14:55

## 2021-01-21 RX ADMIN — ACETAMINOPHEN 650 MG: 325 TABLET, FILM COATED ORAL at 20:07

## 2021-01-21 RX ADMIN — GABAPENTIN 300 MG: 300 CAPSULE ORAL at 20:07

## 2021-01-21 ASSESSMENT — ACTIVITIES OF DAILY LIVING (ADL)
ADLS_ACUITY_SCORE: 14
ADLS_ACUITY_SCORE: 14

## 2021-01-21 NOTE — PROGRESS NOTES
GASTROENTEROLOGY PROGRESS NOTE    CC: Diverticular bleed    SUBJECTIVE:  Drop in hemoglobin and small amount of leeding overnight    OBJECTIVE:  General Appearance:  Well nourished in NAD  BP (!) 100/39 (BP Location: Left arm)   Pulse 72   Temp 97.1  F (36.2  C) (Oral)   Resp 16   Wt 46.5 kg (102 lb 8 oz)   SpO2 97%   BMI 17.59 kg/m    Temp (24hrs), Av.7  F (36.5  C), Min:96.9  F (36.1  C), Max:98.7  F (37.1  C)    Patient Vitals for the past 72 hrs:   Weight   21 0224 46.5 kg (102 lb 8 oz)       Intake/Output Summary (Last 24 hours) at 2021 0904  Last data filed at 2021 0700  Gross per 24 hour   Intake 1402 ml   Output 2350 ml   Net -948 ml       PHYSICAL EXAM  Abdomen: Abdomen soft, non-tender. BS normal. No masses, organomegaly      Additional Comments:  ROS, FH, SH: See initial GI consult for details.    I have reviewed the patient's new clinical lab results:    Recent Labs   Lab Test 21  0724 21  0150 21  1546 21  0851 21  1042 17  1042   WBC  --   --   --  5.0  --  4.5  --  7.8   HGB 8.0* 6.5* 8.3* 8.3*   < > 9.4*   < > 9.8*   MCV  --   --   --  100  --  101*  --  100   PLT  --   --   --  207  --  242  --  223   INR  --   --   --   --   --  0.95  --  1.11    < > = values in this interval not displayed.     Recent Labs   Lab Test 21  0851 21  1042   POTASSIUM 4.7 4.0 3.7   CHLORIDE 110* 108 107   CO2 28 30 28   BUN 17 24 12   ANIONGAP 4 3 9     Recent Labs   Lab Test 17  1124 17  1042   ALBUMIN  --  3.6   BILITOTAL  --  0.5   ALT  --  12   AST  --  10   PROTEIN 10*  --       2021 Tagged RBC scan                                                                IMPRESSION:  Negative tagged red cell study.    Active Problems:    Diverticular hemorrhage   No acute bleeding noted at this time but drop in hemoglobin   Continue clears   Monitor    Princess Womack MD  [facility] Digestive  Health  Office

## 2021-01-21 NOTE — PLAN OF CARE
Patient A&Ox4. VSS on RA. Hard of hearing, hearing aids in place. R PIV infusing NS @ 75. Up Ind. Denies pain, using heat pack on back for discomfort, PRN Tyl available. No c/o nausea. 2 medium bloody BM this shift. Hgb recheck this afternoon: 8.3. Tolerating clears diet. GIB imaging completed today, see results. GI consulted. Discharge pending.

## 2021-01-21 NOTE — PROGRESS NOTES
I was paged for hypotension. I ordered STAT HGB and STAT NS bolus.    HGB 6.5; one unit packed red cells urgently ordered. Per nursing staff she is not actively bleeding; we will monitor closely overnight.

## 2021-01-21 NOTE — UTILIZATION REVIEW
Admission Status; Secondary Review Determination         Under the authority of the Utilization Management Committee, the utilization review process indicated a secondary review on the above patient.  The review outcome is based on review of the medical records, discussions with staff, and applying clinical experience noted on the date of the review.        (x)      Inpatient Status Appropriate - This patient's medical care is consistent with medical management for inpatient care and reasonable inpatient medical practice.      RATIONALE FOR DETERMINATION   The patient is a 80-year-old female admitted on 1/20/2021.  She was transferred from [facility] emergency department due to lack of beds at the [place].  She does have a history of GI bleed.  She has had bloody stools and her hemoglobin fell to 6.5.  She was given a unit of blood with post hemoglobin of 8.0.  She has received a tagged red blood cell count.  The case was discussed with Cheyanne Smith PA-C.  She has had continued GI bleed today and may be taken down for additional scope study.  She has not considered stable at this time.  She had been on Celebrex which is currently held.  Due to acute and persisting GI bleed with further investigation and treatment required, inpatient status is recommended.  Cheyanne will make this change.      The severity of illness, intensity of service provided, expected LOS and risk for adverse outcome make the care complex, high risk and appropriate for hospital admission.        The information on this document is developed by the utilization review team in order for the business office to ensure compliance.  This only denotes the appropriateness of proper admission status and does not reflect the quality of care rendered.         The definitions of Inpatient Status and Observation Status used in making the determination above are those provided in the CMS Coverage Manual, Chapter 1 and Chapter 6, section 70.4.       Sincerely,     Kian Chavez MD  Physician Advisor  Utilization Review/ Case Management  [facility].

## 2021-01-21 NOTE — PROVIDER NOTIFICATION
MD Notification    Notified Person: MD    Notified Person Name: Dr Garcia    Notification Date/Time: 1/21/21 0100    Notification Interaction: Phone    Purpose of Notification: Hypotensive    Orders Received: Stat Hgb, 1L bolus of NS    Comments: Hgb came back 6.5, 1 U PRBC's ordered

## 2021-01-21 NOTE — PLAN OF CARE
A&Ox4, pleasant. [place], hearing aids. VSS on RA ex hypotensive. MD pageisis, 1L NS bolus given and stat Hgb came back 6.5, 1 U PRBC's given overnight, tolerated well. Denied pain, N/V. Up SBA in room, c/o generalized weakness so pt agreed to call when getting up. No bloody stools overnight but endorsed small amt of blood when wiping after urinating. Tolerating clears. GI consulted. Discharge pending improvement. Slept between cares.

## 2021-01-21 NOTE — PHARMACY-ADMISSION MEDICATION HISTORY
Pharmacy Medication History  Admission medication history interview status for the 1/20/2021  admission is complete. See EPIC admission navigator for prior to admission medications     Location of Interview: Phone  Medication history sources: Patient and outside med dispense report  Medication history source reliability: Good  Adherence assessment: Good    Significant changes made to the medication list:  Added: Gabapentin, Xiidra  Discontinued: Restasis, Retin-A  Adjusted: Folic Acid dose to 1mg, Preservision to BID, Pilocarpine to daily at hs and prn    In the past week, patient estimated taking medication this percent of the time: greater than 90%    Additional medication history information:   None    Medication reconciliation completed by provider prior to medication history? No    Time spent in this activity: 30 mins      Prior to Admission medications    Medication Sig Last Dose Taking? Auth Provider   acetaminophen (TYLENOL) 500 MG tablet Take 500 mg by mouth every 6 hours as needed for mild pain 1/20/2021 at pm Yes Reported, Patient   calcium carb 1250 mg, 500 mg [place],/vitamin D 200 units (OSCAL WITH D) 500-200 MG-UNIT per tablet Take 1 tablet by mouth 2 times daily  1/19/2021 at pm Yes Unknown, Entered By History   celecoxib (CELEBREX) 200 MG capsule Take 200 mg by mouth 2 times daily 1/19/2021 at pm Yes Unknown, Entered By History   EPINEPHrine (EPIPEN) 0.3 MG/0.3ML injection Inject 0.3 mg into the muscle once as needed prn at prn Yes Reported, Patient   folic acid (FOLVITE) 1 MG tablet Take 1 mg by mouth daily  1/20/2021 at pm Yes Reported, Patient   gabapentin (NEURONTIN) 300 MG capsule Take 300 mg by mouth At Bedtime 1/19/2021 at hs Yes Unknown, Entered By History   lifitegrast (XIIDRA) 5 % opthalmic solution Place 1 drop into both eyes 2 times daily 1/21/2021 at am Yes Unknown, Entered By History   methotrexate 2.5 MG tablet Take 20 mg by mouth every 7 days Weekly on Tuesdays 1/19/2021 at am Yes  Reported, Patient   Multiple Vitamins-Minerals (OCUVITE PRESERVISION PO) Take 1 tablet by mouth 2 times daily  1/19/2021 at pm Yes Unknown, Entered By History   pilocarpine (SALAGEN) 5 MG tablet Take 5 mg by mouth At Bedtime 1/19/2021 at hs Yes Unknown, Entered By History   pilocarpine (SALAGEN) 5 MG tablet Take 5 mg by mouth daily as needed prn at prn Yes Unknown, Entered By History

## 2021-01-21 NOTE — PROGRESS NOTES
[facility]    Medicine Progress Note - Hospitalist Service       Date of Admission:  1/20/2021  Assessment & Plan       Patrick Olson is a 80 year old female admitted on 1/20/2021. She presents as a direct admission from [facility] emergency department secondary to lack of beds with painless bright red blood per rectum, known history of diverticular bleeds requiring blood transfusions in the past.    ABL Anemia  Acute Diverticular Bleed: Patient with history of diverticular bleed in 2010 and 2017. Presented to outside ER with blood stools. Hgb 9.4--8.3. Transferred to [facility] due to lack of beds  - Had 1 additional bloody stool 1/20. GI consulted underwent tagged RBC study that was negative  - Hgb fell to 6.5--s/p 1 unit PRBC 8.0  - Discussed with GI, no plans for scope. Repeat Hgb at 12 and if stable advance diet to low fiber  - Hold PTA Celebrex    Addendum: Additional small bloody stool this afternoon. Dicussed with GI and Nuclear medicine. Will send back to down to rescan tagged RBC     Hypotension 2/2 to ABL Anemia: SBPs fell to 80s systolic. Improved after IVF bolus and PRBC  - Monitor  - Trend Hgb  - Continue continuous IVF for now    Rheumatoid arthritis:  - Takes methotrexate on Tue, hold for now  - Hold PTA Celebrex  - Resume PTA Gabapentin        Diet: Clear Liquid Diet    DVT Prophylaxis: Ambulate every shift  Parra Catheter: not present  Code Status: Full Code           Disposition Plan   Expected discharge: Possibly tomorrow if Hgb stable with advancement of diet  Entered: Cheyanne Smith PA-C 01/21/2021, 11:39 AM       The patient's care was discussed with the Bedside Nurse, Care Coordinator/, Patient, Patient's Family and GI Consultant.    Cheyanne Smith PA-C  Hospitalist Service  [facility]  Contact information available via [facility]  Paging/Directory    ______________________________________________________________________    Interval History   Events of last night reviewed.  at bedside. Feeling well. No dizziness with ambulation to bathroom. Hgb stable this am    Discussed with GI and plan to advance diet later today if repeat Hgb stable    Data reviewed today: I reviewed all medications, new labs and imaging results over the last 24 hours. I personally reviewed no images or EKG's today.    Physical Exam   Vital Signs: Temp: 97.1  F (36.2  C) Temp src: Oral BP: (!) 100/39 Pulse: 72   Resp: 16 SpO2: 97 % O2 Device: None (Room air)    Weight: 102 lbs 8 oz  Physical Exam  Vitals signs and nursing note reviewed.   Constitutional:       Appearance: She is not toxic-appearing.      Comments: thin   HENT:      Head: Normocephalic and atraumatic.   Eyes:      General: No scleral icterus.  Cardiovascular:      Rate and Rhythm: Normal rate and regular rhythm.      Heart sounds: No murmur.   Pulmonary:      Effort: Pulmonary effort is normal.   Abdominal:      General: Abdomen is flat. There is no distension.      Palpations: Abdomen is soft.   Musculoskeletal:      Right lower leg: No edema.      Left lower leg: No edema.   Skin:     General: Skin is warm and dry.      Findings: No rash.   Neurological:      General: No focal deficit present.      Mental Status: She is alert and oriented to person, place, and time.   Psychiatric:         Mood and Affect: Mood normal.           Data

## 2021-01-21 NOTE — PLAN OF CARE
AOx4, pleasant, [place] (hearing aids were running out of battery this morning,  to bring new batteries) Patient denies pain, n/v but is experiencing generalized weakness. Soft BP (especially diastolic), but normal VS otherwise. Hgb is slowly rising (8.1 at time of writing this note). Patient is still on clear liquid diet, and waiting for a confirmed additional blood tracking test before moving to a fiber-restricted diet. Has not had a BM today, and no bleeding except on toilet tissue this morning. NS running 125mL/hr, may reduce as fit if patient continues fluids PO. Discharge pending blood tracking test and improvement.

## 2021-01-22 ENCOUNTER — APPOINTMENT (OUTPATIENT)
Dept: INTERVENTIONAL RADIOLOGY/VASCULAR | Facility: CLINIC | Age: 81
DRG: 378 | End: 2021-01-22
Attending: RADIOLOGY
Payer: COMMERCIAL

## 2021-01-22 LAB
HGB BLD-MCNC: 7 G/DL (ref 11.7–15.7)
HGB BLD-MCNC: 7.2 G/DL (ref 11.7–15.7)
HGB BLD-MCNC: 7.3 G/DL (ref 11.7–15.7)

## 2021-01-22 PROCEDURE — B41C1ZZ FLUOROSCOPY OF PELVIC ARTERIES USING LOW OSMOLAR CONTRAST: ICD-10-PCS | Performed by: RADIOLOGY

## 2021-01-22 PROCEDURE — 272N000566 HC SHEATH CR3

## 2021-01-22 PROCEDURE — 250N000009 HC RX 250: Performed by: RADIOLOGY

## 2021-01-22 PROCEDURE — 258N000003 HC RX IP 258 OP 636: Performed by: HOSPITALIST

## 2021-01-22 PROCEDURE — 99207 PR CDG-CHARGE REQUIRED MANUAL ENTRY: CPT | Performed by: PHYSICIAN ASSISTANT

## 2021-01-22 PROCEDURE — C1760 CLOSURE DEV, VASC: HCPCS

## 2021-01-22 PROCEDURE — 75726 ARTERY X-RAYS ABDOMEN: CPT

## 2021-01-22 PROCEDURE — 99232 SBSQ HOSP IP/OBS MODERATE 35: CPT | Performed by: PHYSICIAN ASSISTANT

## 2021-01-22 PROCEDURE — 272N000196 HC ACCESSORY CR5

## 2021-01-22 PROCEDURE — 255N000002 HC RX 255 OP 636: Performed by: RADIOLOGY

## 2021-01-22 PROCEDURE — 250N000011 HC RX IP 250 OP 636: Performed by: RADIOLOGY

## 2021-01-22 PROCEDURE — 36415 COLL VENOUS BLD VENIPUNCTURE: CPT | Performed by: PHYSICIAN ASSISTANT

## 2021-01-22 PROCEDURE — C1769 GUIDE WIRE: HCPCS

## 2021-01-22 PROCEDURE — 120N000001 HC R&B MED SURG/OB

## 2021-01-22 PROCEDURE — 250N000013 HC RX MED GY IP 250 OP 250 PS 637: Performed by: INTERNAL MEDICINE

## 2021-01-22 PROCEDURE — 272N000116 HC CATH CR1

## 2021-01-22 PROCEDURE — 36245 INS CATH ABD/L-EXT ART 1ST: CPT

## 2021-01-22 PROCEDURE — 250N000013 HC RX MED GY IP 250 OP 250 PS 637: Performed by: PHYSICIAN ASSISTANT

## 2021-01-22 PROCEDURE — 85018 HEMOGLOBIN: CPT | Performed by: PHYSICIAN ASSISTANT

## 2021-01-22 PROCEDURE — 99152 MOD SED SAME PHYS/QHP 5/>YRS: CPT

## 2021-01-22 RX ORDER — FLUMAZENIL 0.1 MG/ML
0.2 INJECTION, SOLUTION INTRAVENOUS
Status: DISCONTINUED | OUTPATIENT
Start: 2021-01-22 | End: 2021-01-23

## 2021-01-22 RX ORDER — NALOXONE HYDROCHLORIDE 0.4 MG/ML
0.2 INJECTION, SOLUTION INTRAMUSCULAR; INTRAVENOUS; SUBCUTANEOUS
Status: DISCONTINUED | OUTPATIENT
Start: 2021-01-22 | End: 2021-01-22

## 2021-01-22 RX ORDER — NALOXONE HYDROCHLORIDE 0.4 MG/ML
0.4 INJECTION, SOLUTION INTRAMUSCULAR; INTRAVENOUS; SUBCUTANEOUS
Status: DISCONTINUED | OUTPATIENT
Start: 2021-01-22 | End: 2021-01-22

## 2021-01-22 RX ORDER — HEPARIN SODIUM 200 [USP'U]/100ML
2 INJECTION, SOLUTION INTRAVENOUS CONTINUOUS PRN
Status: DISCONTINUED | OUTPATIENT
Start: 2021-01-22 | End: 2021-01-22

## 2021-01-22 RX ORDER — SODIUM CHLORIDE 9 MG/ML
INJECTION, SOLUTION INTRAVENOUS CONTINUOUS
Status: DISCONTINUED | OUTPATIENT
Start: 2021-01-22 | End: 2021-01-24

## 2021-01-22 RX ORDER — IOPAMIDOL 612 MG/ML
100 INJECTION, SOLUTION INTRAVASCULAR ONCE
Status: COMPLETED | OUTPATIENT
Start: 2021-01-22 | End: 2021-01-22

## 2021-01-22 RX ORDER — FENTANYL CITRATE 50 UG/ML
25-50 INJECTION, SOLUTION INTRAMUSCULAR; INTRAVENOUS EVERY 5 MIN PRN
Status: DISCONTINUED | OUTPATIENT
Start: 2021-01-22 | End: 2021-01-22

## 2021-01-22 RX ADMIN — FENTANYL CITRATE 25 MCG: 50 INJECTION, SOLUTION INTRAMUSCULAR; INTRAVENOUS at 11:15

## 2021-01-22 RX ADMIN — MIDAZOLAM HYDROCHLORIDE 0.5 MG: 1 INJECTION, SOLUTION INTRAMUSCULAR; INTRAVENOUS at 11:15

## 2021-01-22 RX ADMIN — GABAPENTIN 300 MG: 300 CAPSULE ORAL at 20:24

## 2021-01-22 RX ADMIN — FOLIC ACID 1 MG: 1 TABLET ORAL at 08:25

## 2021-01-22 RX ADMIN — SODIUM CHLORIDE: 9 INJECTION, SOLUTION INTRAVENOUS at 13:54

## 2021-01-22 RX ADMIN — SODIUM CHLORIDE: 9 INJECTION, SOLUTION INTRAVENOUS at 09:53

## 2021-01-22 RX ADMIN — IOPAMIDOL 90 ML: 612 INJECTION, SOLUTION INTRAVENOUS at 11:36

## 2021-01-22 RX ADMIN — LIDOCAINE HYDROCHLORIDE 5 ML: 10 INJECTION, SOLUTION INFILTRATION; PERINEURAL at 11:13

## 2021-01-22 RX ADMIN — ACETAMINOPHEN 650 MG: 325 TABLET, FILM COATED ORAL at 20:24

## 2021-01-22 RX ADMIN — FENTANYL CITRATE 25 MCG: 50 INJECTION, SOLUTION INTRAMUSCULAR; INTRAVENOUS at 11:03

## 2021-01-22 RX ADMIN — SODIUM CHLORIDE: 9 INJECTION, SOLUTION INTRAVENOUS at 01:43

## 2021-01-22 RX ADMIN — ACETAMINOPHEN 650 MG: 325 TABLET, FILM COATED ORAL at 15:14

## 2021-01-22 RX ADMIN — HEPARIN SODIUM 2 BAG: 200 INJECTION, SOLUTION INTRAVENOUS at 11:17

## 2021-01-22 RX ADMIN — MIDAZOLAM HYDROCHLORIDE 0.5 MG: 1 INJECTION, SOLUTION INTRAMUSCULAR; INTRAVENOUS at 11:03

## 2021-01-22 RX ADMIN — SODIUM CHLORIDE: 9 INJECTION, SOLUTION INTRAVENOUS at 22:01

## 2021-01-22 ASSESSMENT — ACTIVITIES OF DAILY LIVING (ADL)
ADLS_ACUITY_SCORE: 14

## 2021-01-22 NOTE — PLAN OF CARE
A&Ox4. VSS ex soft BP. C/O back pain, PRN tylenol given. BM x2 - bloody, red. IND, denies feeling weak or lightheaded. Clear liquid diet. R PIV infusing NS @ 125 mL/hr. Possible discharge 1/22 pending Hgb.

## 2021-01-22 NOTE — PROGRESS NOTES
GI NOTE    Discussed case with IR and they will attempt angiogram to locate bleed.   Orders written    Princess Womack MD  [facility]  Cell  [phone] 8 AM-5 PM  Office

## 2021-01-22 NOTE — PRE-PROCEDURE
GENERAL PRE-PROCEDURE:   Procedure:  Visceral angiogram with possible embolization of a diverticular bleed with intravenous moderate sedation   Date/Time:  1/22/2021 10:55 AM    Written consent obtained?: Yes    Risks and benefits: Risks, benefits and alternatives were discussed    Consent given by:  Patient  Patient states understanding of procedure being performed: Yes    Patient's understanding of procedure matches consent: Yes    Procedure consent matches procedure scheduled: Yes    Expected level of sedation:  Moderate  Appropriately NPO:  Yes  ASA Class:  Class 2- mild systemic disease, no acute problems, no functional limitations  Mallampati  :  Grade 2- soft palate, base of uvula, tonsillar pillars, and portion of posterior pharyngeal wall visible  Lungs:  Lungs clear with good breath sounds bilaterally  Heart:  Normal heart sounds and rate  History & Physical reviewed:  History and physical reviewed and no updates needed  Statement of review:  I have reviewed the lab findings, diagnostic data, medications, and the plan for sedation

## 2021-01-22 NOTE — PRE-PROCEDURE
GENERAL PRE-PROCEDURE:   Procedure:  Gi bleed angiogram  Date/Time:  1/22/2021 10:49 AM    Verbal consent obtained?: Yes    Risks and benefits: Risks, benefits and alternatives were discussed    DC Plan: Appropriate discharge home plan in place for patients who are going home after procedure   Consent given by:  Patient  Patient states understanding of procedure being performed: Yes    Patient's understanding of procedure matches consent: Yes    Procedure consent matches procedure scheduled: Yes    Appropriately NPO:  Yes  ASA Class:  Class 2- mild systemic disease, no acute problems, no functional limitations  Mallampati  :  Grade 2- soft palate, base of uvula, tonsillar pillars, and portion of posterior pharyngeal wall visible  Lungs:  Lungs clear with good breath sounds bilaterally  Heart:  Normal heart sounds and rate  History & Physical reviewed:  History and physical reviewed and no updates needed  Statement of review:  I have reviewed the lab findings, diagnostic data, medications, and the plan for sedation

## 2021-01-22 NOTE — IR NOTE
Interventional Radiology Intra-procedural Nursing Note     Patient Name:  Patrick Olson    Medical Record Number: [id number]  Today's Date:  January 22, 2021  Procedure: visceral angio  Start Time: 1102  End of procedure time: 1126    Report given to: lara Edward 88 RN  Time pt departs:  1133       Notes:      Patient brought into IR room # 1 at 1050.      Informed consent obtained by Dr. Lesvia MD.         Allergies   Allergen Reactions     Bee Venom Anaphylaxis     Shrimp Anaphylaxis     Evoxac [Cevimeline] Unknown     Prevnar Swelling     Other reaction(s): Edema     Prevnar [Pneumococcal 13-Linda Conj Vacc] Unknown       Labs reviewed:  Results for orders placed or performed during the hospital encounter of 01/20/21 (from the past 24 hour(s))   Hemoglobin   Result Value Ref Range    Hemoglobin 8.1 (L) 11.7 - 15.7 g/dL   Hemoglobin   Result Value Ref Range    Hemoglobin 7.2 (L) 11.7 - 15.7 g/dL     Peripheral pulses checked and documented in Epic Flowsheet.     Patient prepped with 2% Chlorhexidine and draped in supine position.  VSS.  Monitor reads NSR with rate in the 70's.      MD first needle stick time was 1107. A total of 5 ml of 1% lidocaine was used locally at the puncture site.    A 6Fr arterial sheath was placed at 1119.    Debrief was completed by Dr. Lesvia MD.       Patient received a total of 1 mg Versed and 50 mcg fentanyl for sedation during the procedure. The last dose was given at 1115.      The patient tolerated the procedure well.      A 6Fr arterial sheath was removed at 1125 and 6 fr angio seal was placed.  The site is C/D/I at procedure end without hematoma.       Marino Ascencio RN

## 2021-01-22 NOTE — PROGRESS NOTES
[facility]    Medicine Progress Note - Hospitalist Service       Date of Admission:  1/20/2021  Assessment & Plan       Patrick Olson is a 80 year old female admitted on 1/20/2021. She presents as a direct admission from [facility] emergency department secondary to lack of beds with painless bright red blood per rectum, known history of diverticular bleeds requiring blood transfusions in the past.    ABL Anemia  Acute Diverticular Bleed: Patient with history of diverticular bleed in 2010 and 2017. Presented to outside ER with blood stools. Hgb 9.4--8.3. Transferred to [facility] due to lack of beds  - Ongoing bloody stools. [facility] following. Initial tagged RBC scan 1/20 negative, repeat 1/21 with possible activity in hepatic/splenic flexure. Angiogram 1/22 negative  - Hgb fell to 6.5--s/p 1 unit PRBC. Hgb now stable 7-7.5. Conditional orders for 1 unit for Hgb < 7  - Monitor Hgb q 8  - Clears  - [facility] considering flex sig but patient now on bedrest following angiogram until 1530, possibly tomorrow if ongoing bleeding    Hypotension 2/2 to ABL Anemia, resp;kay: SBPs fell to 80s systolic 1/20. Improved after IVF bolus and PRBC  - Monitor  - Trend Hgb  - Continue continuous IVF for now    Rheumatoid arthritis:  - Takes methotrexate on Tue, hold for now  - Hold PTA Celebrex  - Resume PTA Gabapentin        Diet: Clear Liquid Diet    DVT Prophylaxis: Pneumatic Compression Devices  Parra Catheter: not present  Code Status: Full Code           Disposition Plan   Expected discharge: Pending cessation of bleeding, tolerating diet and plan per GI  Entered: Cheyanne Smith PA-C 01/22/2021, 1:25 PM       The patient's care was discussed with the Bedside Nurse, Patient and GI Consultant.    Cheyanne Smith PA-C  Hospitalist Service  [facility]  Contact information available via [facility]  [place]/y    ______________________________________________________________________    Interval History   Had ongoing bloody stools overnight. BP stable. Underwent IR procedure this am and no obvious bleeding found    Data reviewed today: I reviewed all medications, new labs and imaging results over the last 24 hours. I personally reviewed no images or EKG's today.    Physical Exam   Vital Signs: Temp: 97.9  F (36.6  C) Temp src: Oral BP: 117/49 Pulse: 70   Resp: 13 SpO2: 96 % O2 Device: None (Room air) Oxygen Delivery: 2 LPM  Weight: 102 lbs 8 oz  Physical Exam  Vitals signs and nursing note reviewed.   Constitutional:       Appearance: Normal appearance.   HENT:      Head: Normocephalic and atraumatic.   Eyes:      General: No scleral icterus.  Cardiovascular:      Rate and Rhythm: Normal rate and regular rhythm.      Heart sounds: No murmur.   Pulmonary:      Effort: Pulmonary effort is normal.      Breath sounds: No wheezing.   Abdominal:      General: There is no distension.      Tenderness: There is no abdominal tenderness.   Skin:     General: Skin is warm and dry.      Findings: No rash.   Neurological:      General: No focal deficit present.      Mental Status: She is alert. Mental status is at baseline.   Psychiatric:         Mood and Affect: Mood normal.           Data

## 2021-01-22 NOTE — PROGRESS NOTES
[facility] Progress Note     Interval History:  Returned from angiogram. Denies abdominal pain, nausea, or vomiting. Seems to have bloody stools after clear liquid meals. Had clear liquids last evening at 7:30. Just before 8, had a stool with bright red blood, at 8, larger stool with bright red blood mixed with stool. Happened again this morning after clear liquids, 8am had a stool with a small amount of blood, 10 minutes later moderate amount of blood. Angiogram negative.       Physical Exam:    /54   Pulse 68   Temp 97.8  F (36.6  C) (Oral)   Resp 13   Wt 46.5 kg (102 lb 8 oz)   SpO2 96%   BMI 17.59 kg/m    Temp (24hrs), Av.2  F (36.2  C), Min:96.9  F (36.1  C), Max:97.8  F (36.6  C)    Patient Vitals for the past 72 hrs:   Weight   21 0224 46.5 kg (102 lb 8 oz)       Intake/Output Summary (Last 24 hours) at 2021 1151  Last data filed at 2021 0700  Gross per 24 hour   Intake 2725 ml   Output --   Net 2725 ml       Constitutional: No acute distress  Cardiovascular: RRR, normal S1/S2  Respiratory: Effort normal, CTA bilaterally  Abdomen: Soft, nondistended, nontender      Laboratory Data  Recent Labs   Lab Test 21  0727 21  1156 21  0724 21  0851 21  0851 21  1042 17  1042   WBC  --   --   --   --  5.0  --  4.5  --  7.8   HGB 7.2* 8.1* 8.0*   < > 8.3*   < > 9.4*   < > 9.8*   MCV  --   --   --   --  100  --  101*  --  100   PLT  --   --   --   --  207  --  242  --  223   INR  --   --   --   --   --   --  0.95  --  1.11    < > = values in this interval not displayed.     Recent Labs   Lab Test 21  0851 21  1042    142 144   POTASSIUM 4.7 4.0 3.7   CHLORIDE 110* 108 107   CO2 28 30 28   BUN 17 24 12   CR 0.82 0.94 0.78   ANIONGAP 4 3 9   EJ 8.8 9.4 9.0     Recent Labs   Lab Test 17  1124 17  1042   ALBUMIN  --  3.6   BILITOTAL  --  0.5   ALT  --  12   AST  --  10   ALKPHOS  --   77   PROTEIN 10*  --        Imaging  CT 1/19/21  IMPRESSION:   1.  Hypoattenuating liver lesions are demonstrated, too small to  characterize. These are indeterminant, possibly representing  hemangiomas although other etiologies including primary and secondary  liver lesions cannot be excluded. No comparison available at time of  dictation on PACS or care everywhere. Could consider follow-up  nonemergent outpatient MRI liver to further evaluate as clinically  warranted.  2.  No acute intra-abdominal or pelvic abnormality identified.  3.  Left adrenal nodule appears similar to prior lumbar spine MRI  suggesting benign etiology.  4.  Extensive colonic diverticulosis with no evidence of acute  diverticulitis.    Tagged RBC Scan   1/20/21  IMPRESSION:  Negative tagged red cell study.    Tagged RBC Scan   1/21/21  Additional 24-hour imaging is obtained. There is activity which could  be in the hepatic and splenic flexures. It is unclear if this is free  technetium versus blood in the colon.    Endoscopic Workup  Colonoscopy 3/14/17  Findings:        The perianal and digital rectal examinations were normal.        The terminal ileum appeared normal.        Scattered mild inflammation characterized by erosions and erythema was        found in the cecum. Biopsies were taken with a cold forceps for        histology. Aspiration of stool was taken to test for clostridium        difficile toxin B        Many small and medium sized diverticula were found in the entire colon.        Non-bleeding internal hemorrhoids were found during retroflexion and        were medium-sized.                                                                                     Impression:          - Scattered mild inflammation was found in the cecum.                        Biopsied. This may be due to prep. She was recently on                        antibiotics and thus we aspirated stool for C diff                        testing as well, which  could explain the cramping she is                        having. Alternatively she culd be having symptomatic                        diverticular disease and hemorrhoidal bleeding.                        - The examined portion of the ileum was normal.                        - Diverticulosis in the entire examined colon.                        - Non-bleeding internal hemorrhoids.     Assessment & Plan:  80 year old female with a history of diverticulosis throughout the colon, hemorrhoids, and diverticular bleeding who presented with large volume bright red blood per rectum on 1/20. Initial tagged RBC scan 1/20 was negative. Repeat tagged RBC scan 1/21 showed activity in possible hepatic/splenic flexure. She had two bloody stools last evening after clear liquids Hgb 8.1--> 7.2, same thing happened after clears for breakfast. Angiogram today was negative. Will need to be on bedrest for 3 hours after procedure.     Plan  -Clears   -Monitor for signs of bleeding  -Monitor hemoglobin and transfuse to keep >7  -May consider endoscopic workup pending clinical course   -Will discuss with Dr. Vu Ojeda, [facility]  Cell: [phone] until 1PM. Please call Dr. Womack 1-5PM   Office: [phone]

## 2021-01-22 NOTE — PLAN OF CARE
A&O. VSS except soft BPs. No c/o pain. Several loose bloody stools. Pt to IR for Visceral Embolization but no bleed found. Left femoral site CDI with no hematoma noted. CMS intact. IVF infusing at 125cc/hr into PIV. Clears. Hgb 7.2 and 7.3 today, next draw at 8pm. PRBC conditional orders available for <7. Possible GI procedure tomorrow if no improvement.

## 2021-01-23 LAB
ABO + RH BLD: NORMAL
ABO + RH BLD: NORMAL
ANION GAP SERPL CALCULATED.3IONS-SCNC: 3 MMOL/L (ref 3–14)
BLD GP AB SCN SERPL QL: NORMAL
BLD PROD TYP BPU: NORMAL
BLD PROD TYP BPU: NORMAL
BLD UNIT ID BPU: 0
BLOOD BANK CMNT PATIENT-IMP: NORMAL
BLOOD PRODUCT CODE: NORMAL
BPU ID: NORMAL
BUN SERPL-MCNC: 6 MG/DL (ref 7–30)
CALCIUM SERPL-MCNC: 8 MG/DL (ref 8.5–10.1)
CHLORIDE SERPL-SCNC: 114 MMOL/L (ref 94–109)
CO2 SERPL-SCNC: 26 MMOL/L (ref 20–32)
CREAT SERPL-MCNC: 0.75 MG/DL (ref 0.52–1.04)
FLEXIBLE SIGMOIDOSCOPY: NORMAL
GFR SERPL CREATININE-BSD FRML MDRD: 76 ML/MIN/{1.73_M2}
GLUCOSE SERPL-MCNC: 157 MG/DL (ref 70–99)
HGB BLD-MCNC: 7.5 G/DL (ref 11.7–15.7)
HGB BLD-MCNC: 8.4 G/DL (ref 11.7–15.7)
HGB BLD-MCNC: 8.8 G/DL (ref 11.7–15.7)
NUM BPU REQUESTED: 2
POTASSIUM SERPL-SCNC: 3.6 MMOL/L (ref 3.4–5.3)
SODIUM SERPL-SCNC: 143 MMOL/L (ref 133–144)
SPECIMEN EXP DATE BLD: NORMAL
TRANSFUSION STATUS PATIENT QL: NORMAL
TRANSFUSION STATUS PATIENT QL: NORMAL

## 2021-01-23 PROCEDURE — G0500 MOD SEDAT ENDO SERVICE >5YRS: HCPCS | Performed by: INTERNAL MEDICINE

## 2021-01-23 PROCEDURE — 250N000013 HC RX MED GY IP 250 OP 250 PS 637: Performed by: INTERNAL MEDICINE

## 2021-01-23 PROCEDURE — 258N000003 HC RX IP 258 OP 636: Performed by: INTERNAL MEDICINE

## 2021-01-23 PROCEDURE — 0DJD8ZZ INSPECTION OF LOWER INTESTINAL TRACT, VIA NATURAL OR ARTIFICIAL OPENING ENDOSCOPIC: ICD-10-PCS | Performed by: INTERNAL MEDICINE

## 2021-01-23 PROCEDURE — 85018 HEMOGLOBIN: CPT | Performed by: INTERNAL MEDICINE

## 2021-01-23 PROCEDURE — 36415 COLL VENOUS BLD VENIPUNCTURE: CPT | Performed by: INTERNAL MEDICINE

## 2021-01-23 PROCEDURE — 36415 COLL VENOUS BLD VENIPUNCTURE: CPT | Performed by: PHYSICIAN ASSISTANT

## 2021-01-23 PROCEDURE — 120N000001 HC R&B MED SURG/OB

## 2021-01-23 PROCEDURE — 250N000013 HC RX MED GY IP 250 OP 250 PS 637: Performed by: PHYSICIAN ASSISTANT

## 2021-01-23 PROCEDURE — P9016 RBC LEUKOCYTES REDUCED: HCPCS | Performed by: INTERNAL MEDICINE

## 2021-01-23 PROCEDURE — 250N000011 HC RX IP 250 OP 636: Performed by: INTERNAL MEDICINE

## 2021-01-23 PROCEDURE — 45330 DIAGNOSTIC SIGMOIDOSCOPY: CPT | Performed by: INTERNAL MEDICINE

## 2021-01-23 PROCEDURE — 258N000003 HC RX IP 258 OP 636: Performed by: HOSPITALIST

## 2021-01-23 PROCEDURE — 80048 BASIC METABOLIC PNL TOTAL CA: CPT | Performed by: PHYSICIAN ASSISTANT

## 2021-01-23 PROCEDURE — 85018 HEMOGLOBIN: CPT | Performed by: PHYSICIAN ASSISTANT

## 2021-01-23 PROCEDURE — 99232 SBSQ HOSP IP/OBS MODERATE 35: CPT | Performed by: INTERNAL MEDICINE

## 2021-01-23 RX ORDER — NALOXONE HYDROCHLORIDE 0.4 MG/ML
0.2 INJECTION, SOLUTION INTRAMUSCULAR; INTRAVENOUS; SUBCUTANEOUS
Status: CANCELLED | OUTPATIENT
Start: 2021-01-23 | End: 2021-01-24

## 2021-01-23 RX ORDER — FENTANYL CITRATE 50 UG/ML
INJECTION, SOLUTION INTRAMUSCULAR; INTRAVENOUS PRN
Status: DISCONTINUED | OUTPATIENT
Start: 2021-01-23 | End: 2021-01-23 | Stop reason: HOSPADM

## 2021-01-23 RX ORDER — NALOXONE HYDROCHLORIDE 0.4 MG/ML
0.4 INJECTION, SOLUTION INTRAMUSCULAR; INTRAVENOUS; SUBCUTANEOUS
Status: CANCELLED | OUTPATIENT
Start: 2021-01-23 | End: 2021-01-24

## 2021-01-23 RX ORDER — FLUMAZENIL 0.1 MG/ML
0.2 INJECTION, SOLUTION INTRAVENOUS
Status: CANCELLED | OUTPATIENT
Start: 2021-01-23 | End: 2021-01-24

## 2021-01-23 RX ORDER — LIDOCAINE 40 MG/G
CREAM TOPICAL
Status: DISCONTINUED | OUTPATIENT
Start: 2021-01-23 | End: 2021-01-25 | Stop reason: HOSPADM

## 2021-01-23 RX ADMIN — SODIUM CHLORIDE: 9 INJECTION, SOLUTION INTRAVENOUS at 05:31

## 2021-01-23 RX ADMIN — GABAPENTIN 300 MG: 300 CAPSULE ORAL at 20:13

## 2021-01-23 RX ADMIN — SODIUM CHLORIDE: 9 INJECTION, SOLUTION INTRAVENOUS at 09:46

## 2021-01-23 RX ADMIN — FOLIC ACID 1 MG: 1 TABLET ORAL at 08:21

## 2021-01-23 RX ADMIN — SODIUM PHOSPHATE 2 ENEMA: 7; 19 ENEMA RECTAL at 12:27

## 2021-01-23 RX ADMIN — ACETAMINOPHEN 650 MG: 325 TABLET, FILM COATED ORAL at 20:13

## 2021-01-23 RX ADMIN — SODIUM CHLORIDE: 9 INJECTION, SOLUTION INTRAVENOUS at 18:37

## 2021-01-23 ASSESSMENT — ACTIVITIES OF DAILY LIVING (ADL)
ADLS_ACUITY_SCORE: 14

## 2021-01-23 NOTE — PLAN OF CARE
A&O. VSS, BPs soft. Up independently in room. Hgb this morning 8.8. Flex sig this afternoon, no acute bleeds noted. Diet advanced to full liquid, tolerating well. Continue to watch bleeding.

## 2021-01-23 NOTE — PLAN OF CARE
A&Ox4, VSS on RA ex SBP 80s overnight. Transfused 1U PRBC, recheck hgb in AM. C/o of slight soreness at groin sight, given tylenol prn x1. Groin site, WNL, CDI. CMS intact. Ind in room, w/ iv pole. IVF @ 125mL/hr. Tolerating clears. GI following, potential flex sig today.

## 2021-01-23 NOTE — PLAN OF CARE
3125-5290: A&O. VSS. Some back pain and soreness at angiogram site, tylenol given. Femoral site CDI, CMS intact. 2 small episodes of bright red stools with clots. Denies dizziness/lightheadedness. BP stable. Tolerating clears. Repeat hgb @ 8pm. Possible flex sig tomorrow pending hgb, resolution in bleeding/gi recs.

## 2021-01-23 NOTE — PRE-PROCEDURE
Pre-Endoscopy History and Physical     Patrick Olson MRN# [id number]   YOB: 1940 Age: 80 year old     Date of Procedure: 1/20/2021  Primary care provider: Essence Tamayo  Type of Endoscopy: flexible sigmoidoscopy  Reason for Procedure: rectal bleedign  Type of Anesthesia Anticipated: Moderate (conscious) sedation    HPI:    Patrick is a 80 year old female who will be undergoing the above procedure.      A history and physical has been performed. The patient's medications and allergies have been reviewed. The risks and benefits of the procedure and the sedation options and risks were discussed with the patient.  All questions were answered and informed consent was obtained.      Allergies   Allergen Reactions     Bee Venom Anaphylaxis     Shrimp Anaphylaxis     Evoxac [Cevimeline] Unknown     Prevnar Swelling     Other reaction(s): Edema     Prevnar [Pneumococcal 13-Linda Conj Vacc] Unknown        Current Facility-Administered Medications   Medication     acetaminophen (TYLENOL) Suppository 650 mg     acetaminophen (TYLENOL) tablet 650 mg     folic acid (FOLVITE) tablet 1 mg     gabapentin (NEURONTIN) capsule 300 mg     lidocaine (LMX4) cream     lidocaine 1 % 0.1-1 mL     melatonin tablet 3 mg     naloxone (NARCAN) injection 0.2 mg    Or     naloxone (NARCAN) injection 0.4 mg    Or     naloxone (NARCAN) injection 0.2 mg    Or     naloxone (NARCAN) injection 0.4 mg     ondansetron (ZOFRAN-ODT) ODT tab 4 mg    Or     ondansetron (ZOFRAN) injection 4 mg     oxyCODONE (ROXICODONE) tablet 5-10 mg     sodium chloride (PF) 0.9% PF flush 3 mL     sodium chloride (PF) 0.9% PF flush 3 mL     sodium chloride 0.9% infusion       Patient Active Problem List   Diagnosis     SNHL (sensorineural hearing loss)     GI bleed     Gastrointestinal hemorrhage, unspecified gastrointestinal hemorrhage type     Diverticular hemorrhage     Acute lower GI bleeding        Past Medical History:   Diagnosis Date     Osteoarthritis       Osteoporosis      Rheumatoid arthritis (H)      Sensorineural hearing loss         Past Surgical History:   Procedure Laterality Date     COLONOSCOPY N/A 3/14/2017    Procedure: COMBINED COLONOSCOPY, SINGLE OR MULTIPLE BIOPSY/POLYPECTOMY BY BIOPSY;  Surgeon: Spencer Downey MD;  Location: [facility]     IR VISCERAL EMBOLIZATION  1/22/2021     ORTHOPEDIC SURGERY         Social History     Tobacco Use     Smoking status: Former Smoker     Smokeless tobacco: Never Used   Substance Use Topics     Alcohol use: No       No family history on file.         Medications:     Medications Prior to Admission   Medication Sig Dispense Refill Last Dose     acetaminophen (TYLENOL) 500 MG tablet Take 500 mg by mouth every 6 hours as needed for mild pain   1/20/2021 at pm     calcium carb 1250 mg, 500 mg [place],/vitamin D 200 units (OSCAL WITH D) 500-200 MG-UNIT per tablet Take 1 tablet by mouth 2 times daily    1/19/2021 at pm     celecoxib (CELEBREX) 200 MG capsule Take 200 mg by mouth 2 times daily   1/19/2021 at pm     EPINEPHrine (EPIPEN) 0.3 MG/0.3ML injection Inject 0.3 mg into the muscle once as needed   prn at prn     folic acid (FOLVITE) 1 MG tablet Take 1 mg by mouth daily    1/20/2021 at pm     gabapentin (NEURONTIN) 300 MG capsule Take 300 mg by mouth At Bedtime   1/19/2021 at hs     lifitegrast (XIIDRA) 5 % opthalmic solution Place 1 drop into both eyes 2 times daily   1/21/2021 at am     methotrexate 2.5 MG tablet Take 20 mg by mouth every 7 days Weekly on Tuesdays 1/19/2021 at am     Multiple Vitamins-Minerals (OCUVITE PRESERVISION PO) Take 1 tablet by mouth 2 times daily    1/19/2021 at pm     pilocarpine (SALAGEN) 5 MG tablet Take 5 mg by mouth At Bedtime   1/19/2021 at hs     pilocarpine (SALAGEN) 5 MG tablet Take 5 mg by mouth daily as needed   prn at prn       Scheduled Medications:    folic acid  1 mg Oral Daily     gabapentin  300 mg Oral At Bedtime     sodium chloride (PF)  3 mL Intracatheter Q8H  "      PRN:  acetaminophen, acetaminophen, lidocaine 4%, lidocaine (buffered or not buffered), melatonin, naloxone **OR** naloxone **OR** naloxone **OR** naloxone, ondansetron **OR** ondansetron, oxyCODONE, sodium chloride (PF)    PHYSICAL EXAM:   /43 (BP Location: Right arm)   Pulse 72   Temp 98.1  F (36.7  C) (Oral)   Resp 16   Wt 46.5 kg (102 lb 8 oz)   SpO2 97%   BMI 17.59 kg/m   Estimated body mass index is 17.59 kg/m  as calculated from the following:    Height as of 1/19/21: 1.626 m (5' 4\").    Weight as of this encounter: 46.5 kg (102 lb 8 oz).   RESP: lungs clear to auscultation - no rales, rhonchi or wheezes  CV: regular rates and rhythm    IMPRESSION   ASA Class 2 - Mild systemic disease      Signed Electronically by: Jaime Steinberg MD  January 23, 2021    .            "

## 2021-01-23 NOTE — PROGRESS NOTES
Earlier in the evening I was paged for HGB 7.0 and SBP 88 and recommended transfusion one unit packed red cells.

## 2021-01-23 NOTE — PROVIDER NOTIFICATION
MD Notification    Notified Person: MD    Notified Person Name: Radha    Notification Date/Time: 1/23/21, 0028     Notification Interaction: Text page     Purpose of Notification: Soft BP 88/43. Fatigue, but otherwise asymptomatic. Currently receiving IVF 125ml/hr. Last hgb 7.     Orders Received: transfuse 1 unit    Comments:

## 2021-01-24 LAB
HGB BLD-MCNC: 8 G/DL (ref 11.7–15.7)
HGB BLD-MCNC: 8 G/DL (ref 11.7–15.7)
HGB BLD-MCNC: 8.4 G/DL (ref 11.7–15.7)

## 2021-01-24 PROCEDURE — 120N000001 HC R&B MED SURG/OB

## 2021-01-24 PROCEDURE — 250N000013 HC RX MED GY IP 250 OP 250 PS 637: Performed by: INTERNAL MEDICINE

## 2021-01-24 PROCEDURE — 99232 SBSQ HOSP IP/OBS MODERATE 35: CPT | Performed by: INTERNAL MEDICINE

## 2021-01-24 PROCEDURE — 250N000013 HC RX MED GY IP 250 OP 250 PS 637: Performed by: PHYSICIAN ASSISTANT

## 2021-01-24 PROCEDURE — 85018 HEMOGLOBIN: CPT | Performed by: INTERNAL MEDICINE

## 2021-01-24 PROCEDURE — 258N000003 HC RX IP 258 OP 636: Performed by: INTERNAL MEDICINE

## 2021-01-24 PROCEDURE — 36415 COLL VENOUS BLD VENIPUNCTURE: CPT | Performed by: INTERNAL MEDICINE

## 2021-01-24 RX ADMIN — SODIUM CHLORIDE: 9 INJECTION, SOLUTION INTRAVENOUS at 08:13

## 2021-01-24 RX ADMIN — FOLIC ACID 1 MG: 1 TABLET ORAL at 09:09

## 2021-01-24 RX ADMIN — ACETAMINOPHEN 650 MG: 325 TABLET, FILM COATED ORAL at 20:33

## 2021-01-24 RX ADMIN — GABAPENTIN 300 MG: 300 CAPSULE ORAL at 20:33

## 2021-01-24 ASSESSMENT — ACTIVITIES OF DAILY LIVING (ADL)
ADLS_ACUITY_SCORE: 14

## 2021-01-24 NOTE — PROGRESS NOTES
GASTROENTEROLOGY PROGRESS NOTE       SUBJECTIVE:  Feels good.  Non-bloody stool today.     OBJECTIVE:  /58 (BP Location: Left arm)   Pulse 71   Temp 97.8  F (36.6  C) (Oral)   Resp 15   Wt 46.5 kg (102 lb 8 oz)   SpO2 95%   BMI 17.59 kg/m    Temp (24hrs), Av  F (36.7  C), Min:97.8  F (36.6  C), Max:98.2  F (36.8  C)    No data found.    Intake/Output Summary (Last 24 hours) at 2021 1254  Last data filed at 2021 1219  Gross per 24 hour   Intake 720 ml   Output --   Net 720 ml        PHYSICAL EXAM     Cardiovascular: Normal  Respiratory: Normal  Gastrointestinal: Active BS, soft, NT/ND      Recent Labs   Lab Test 21  0555 21  2143 21  1529 21  0851 21  0821  1042 17  1042   WBC  --   --   --   --  5.0  --  4.5  --  7.8   HGB 8.0* 7.5* 8.4*   < > 8.3*   < > 9.4*   < > 9.8*   MCV  --   --   --   --  100  --  101*  --  100   PLT  --   --   --   --  207  --  242  --  223   INR  --   --   --   --   --   --  0.95  --  1.11    < > = values in this interval not displayed.     Recent Labs   Lab Test 21  0902 21  0851 21   POTASSIUM 3.6 4.7 4.0   CHLORIDE 114* 110* 108   CO2 26 28 30   BUN 6* 17 24   ANIONGAP 3 4 3     Recent Labs   Lab Test 17  1124 17  1042   ALBUMIN  --  3.6   BILITOTAL  --  0.5   ALT  --  12   AST  --  10   PROTEIN 10*  --            Active Problems:    Diverticular hemorrhage    Assessment: No evidence of further bleeding.  Most likely a diverticular bleed.    Plan: Full liquid diet.  Home tomorrow.  Recommended she take a daily fiber supplement at discharge.        Jaime Steinberg MD  [facility]  Office:  [phone]

## 2021-01-24 NOTE — PROGRESS NOTES
Medicine Progress Note - Hospitalist Service       Date of Admission:  1/20/2021  Assessment & Plan       Patrick Olson is a 80 year old female admitted on 1/20/2021. She presents as a direct admission from [facility] emergency department secondary to lack of beds with painless bright red blood per rectum, known history of diverticular bleeds requiring blood transfusions in the past.    ABL Anemia  Acute Diverticular Bleed: Patient with history of diverticular bleed in 2010 and 2017. Presented to outside ER with blood stools. Hgb 9.4--8.3. Transferred to [facility] due to lack of beds  - not on ASA or AC; PTA Celebrex stopped.  - Ongoing bloody stools. [facility] following. Initial tagged RBC scan 1/20 negative, repeat 1/21 with possible activity in hepatic/splenic flexure. Angiogram 1/22 negative  - Hgb fell to 6.5--s/p 1 unit PRBC on 01/21  - Hb up to 8 but then trending down- 7-7.5--7.3--7 on 01/22 night; BP was low lat that time also  - transfused another unit PBRBcs on 01/23  - Hb up to 8.8; BP improved  - underwent flex sig on 01/23- Many diverticula were found in the sigmoid colon. Most had clot in them. Nonbloody stool encountered in descending colon. No significant hemorrhoids.  - full liquid diet  - had a nonbloody BM in am, as per report  - serial Hb stable 8.4--7.5--8  - monitor Hbq8h  . Conditional orders for 1 unit for Hgb < 7  - stop iv fluids    Hypotension 2/2 to ABL Anemia, improved  - SBPs fell to 80s systolic 1/20. Improved after IVF bolus and PRBC; BP again low last night; improved after transfusion of 1 unit PRBCs  - Monitor  - Trend Hgb  - stop iv fluids now    Rheumatoid arthritis:  - Takes methotrexate on Tue, hold for now  - Hold PTA Celebrex  - Resume PTA Gabapentin        Diet: Full Liquid Diet    DVT Prophylaxis: Pneumatic Compression Devices  Parra Catheter: not present  Code Status: Full Code           Disposition Plan   Expected discharge: possible  "tomorrow if no recurrent bloody stools    Entered: Rubi Edward MD 01/24/2021, 12:04 PM       The patient's care was discussed with the Bedside Nurse and Patient.    Rubi Edward MD  Hospitalist Service  [facility]  Contact information available via [facility] Paging/Directory    ______________________________________________________________________    Interval History    Had a nonbloody BM in am, as per report; no abd pain; tolerating full liquid diet; no chest pain, no SOB  - asking if she will have a colonoscopy before discharge, \"wants to make sure there is nothing wrong in her colon\"; I told her that likely she will not have colonoscopy since her bleeding stopped and the cause was likely diverticular bleed.     Data reviewed today: I reviewed all medications, new labs and imaging results over the last 24 hours. I personally reviewed no images or EKG's today.    Physical Exam   Vital Signs: Temp: 97.8  F (36.6  C) Temp src: Oral BP: 118/58 Pulse: 71   Resp: 15 SpO2: 95 % O2 Device: None (Room air)    Weight: 102 lbs 8 oz  Physical Exam  Vitals signs and nursing note reviewed.   Constitutional:       Appearance: Normal appearance.   HENT:      Head: Normocephalic and atraumatic.   Eyes:      General: No scleral icterus.  Cardiovascular:      Rate and Rhythm: Normal rate and regular rhythm.      Heart sounds: No murmur.   Pulmonary:      Effort: Pulmonary effort is normal.      Breath sounds: No wheezing.   Abdominal:      General: There is no distension.      Tenderness: There is no abdominal tenderness.   Skin:     General: Skin is warm and dry.      Findings: No rash.   Neurological:      General: No focal deficit present.      Mental Status: She is alert. Mental status is at baseline.   Psychiatric:         Mood and Affect: Mood normal.           Data     "

## 2021-01-24 NOTE — PLAN OF CARE
A&O. VSS. Tylenol for pack pain. No BM's this shift, last hgb 8. R groin site CDI, CMS intact. IVF @ 75, +1 edema to bilateral feet. Tolerating full liquids. Monitoring for bloody stools. Discharge pending stable hgb, resolution in bloody stools/medical clearance.

## 2021-01-24 NOTE — PLAN OF CARE
A&O. VSS. Up independently in room. 1 BM this shift, no bleeding noted. R groin site CDI. Trace edema to BLE, IV SL. Hgb at 0600 8.0, next check at 1400. Plan to discharge tomorrow if no further bleeding noted.

## 2021-01-24 NOTE — PROVIDER NOTIFICATION
MD Notification    Notified Person: MD    Notified Person Name: Dr. Edward    Notification Date/Time: 01/24/21 5:07 PM     Notification Interaction: web based paging    Purpose of Notification: FYI, pt bumped head against shelf when bending down in bathroom. Pt is stable. Did you want any additional testing?    Orders Received: Continue to monitor.    Comments:

## 2021-01-24 NOTE — PLAN OF CARE
A&OX4, VSS on RA. Denies pain, n&V. S/p flex sig today. Tolerating full liquid diet, continues to have watery loose diarrhea, possibly residual from bowel prep but no signs of bleeding yet. Up independently. Right fem site c/d/I. Hgb recheck was 8.4. IVF at 75mL/hr. GI following.Continue to monitor.

## 2021-01-25 VITALS
WEIGHT: 102.5 LBS | HEART RATE: 77 BPM | TEMPERATURE: 97.4 F | OXYGEN SATURATION: 99 % | DIASTOLIC BLOOD PRESSURE: 46 MMHG | BODY MASS INDEX: 17.59 KG/M2 | RESPIRATION RATE: 16 BRPM | SYSTOLIC BLOOD PRESSURE: 131 MMHG

## 2021-01-25 LAB — HGB BLD-MCNC: 8 G/DL (ref 11.7–15.7)

## 2021-01-25 PROCEDURE — 36415 COLL VENOUS BLD VENIPUNCTURE: CPT | Performed by: INTERNAL MEDICINE

## 2021-01-25 PROCEDURE — 250N000013 HC RX MED GY IP 250 OP 250 PS 637: Performed by: INTERNAL MEDICINE

## 2021-01-25 PROCEDURE — 99238 HOSP IP/OBS DSCHRG MGMT 30/<: CPT | Performed by: INTERNAL MEDICINE

## 2021-01-25 PROCEDURE — 85018 HEMOGLOBIN: CPT | Performed by: INTERNAL MEDICINE

## 2021-01-25 RX ORDER — WHEAT DEXTRIN/ASPARTAME 3 G/6 G
1 POWDER IN PACKET (EA) ORAL DAILY
COMMUNITY
Start: 2021-01-25 | End: 2021-10-12

## 2021-01-25 RX ADMIN — FOLIC ACID 1 MG: 1 TABLET ORAL at 08:15

## 2021-01-25 ASSESSMENT — ACTIVITIES OF DAILY LIVING (ADL)
ADLS_ACUITY_SCORE: 14

## 2021-01-25 NOTE — DISCHARGE SUMMARY
[facility]  Hospitalist Discharge Summary      Date of Admission:  1/20/2021  Date of Discharge:  1/25/2021 12:46 PM  Discharging Provider: Rubi Edward MD      Discharge Diagnoses   Acute Diverticular Bleed  Acute blood loss anemia  Rheumatoid arthritis    Follow-ups Needed After Discharge   Follow-up Appointments     Follow-up and recommended labs and tests       Follow up with primary care provider, Essence Tamayo, within 7 days for   hospital follow- up.  The following labs/tests are recommended: Hb.             Unresulted Labs Ordered in the Past 30 Days of this Admission     No orders found from 12/21/2020 to 1/21/2021.      None    Discharge Disposition   Discharged to home  Condition at discharge: Good      Hospital Course   Patrick Olson is a 80 year old female with PMH of diverticular bleeding (requiring blood transfusions in the past) and RA admitted on 1/20/2021 after she presented to [facility] ER with painless bright red blood per rectum; because of lack of beds at [facility]- she was transferred to [facility]; for a detailed HPI- please refer to H&P done by Dr. Nj Cordova on 01/20/2021.     ABL Anemia  Acute Diverticular Bleed: Patient with history of diverticular bleed in 2010 and 2017. Presented to outside ER with blood stools. Baseline Hgb 9.4--8.3. Transferred to [facility] due to lack of beds  - not on ASA or AC; PTA Celebrex stopped.  - GI consulted  - had ongoing bloody stools after d/c; Initial tagged RBC scan 1/20 negative, repeat 1/21 with possible activity in hepatic/splenic flexure. Angiogram 1/22 negative  - Hgb fell to 6.5--s/p 1 unit PRBC on 01/21  - Hb up to 8 but then trending down- 7-7.5--7.3--7 on 01/22 night; BP was low at that time also  - transfused another unit PBRBcs on 01/23  - Hb up to 8.8; BP improved  - underwent flex sig on 01/23- Many diverticula were found in the sigmoid colon. Most had clot in them. Nonbloody stool encountered in descending colon. No  significant hemorrhoids.  - it seems that the bleeding stopped  - diet resumed and advanced; no reported abdominal pain and no recurrent bloody stools  - serial Hb stable 8.4--7.5--8  - recommended to hold Celebrex for now, follow up with PMD in 7-10 days, repeat Hb at that time and if no recurrent bloody stools- consider resuming Celebrex after discussing with PMD and Rheumatology.   - add daily fiber supplement to the diet  - as per GI- If pt has recurrent bleeding she was instructed to return to the ED. Would then obtain immediate CTA to try and catch the source for her bleeding     Hypotension 2/2 to ABL Anemia, resolved with iv fluids and blood tramsfussions     Rheumatoid arthritis:  - PTA Methotrexate held on admission, OK to resume after discharge   - Hold PTA Celebrex for now, risks and benefits of resuming NSAIDs to be discussed with her rheumathologist  - Resume PTA Gabapentin        Consultations This Hospital Stay   GASTROENTEROLOGY IP CONSULT    Code Status   Full Code    Time Spent on this Encounter   I, Rubi Edward MD, personally saw the patient today and spent less than or equal to 30 minutes discharging this patient.       Rubi Edward MD  [place] [id number] [facility]  [address] [place] [address] AVE, [address]  [facility] [address]  Phone: [phone]  ______________________________________________________________________    Physical Exam   Vital Signs: Temp: 97.4  F (36.3  C) Temp src: Oral BP: 131/46 Pulse: 77   Resp: 16 SpO2: 99 % O2 Device: None (Room air)    Weight: 102 lbs 8 oz     Constitutional:       Appearance: Normal appearance.   HENT:      Head: Normocephalic and atraumatic.   Eyes:      General: No scleral icterus.  Cardiovascular:      Rate and Rhythm: Normal rate and regular rhythm.      Heart sounds: No murmur.   Pulmonary:      Effort: Pulmonary effort is normal.      Breath sounds: No wheezing.   Abdominal:      General: There is no distension.      Tenderness:  There is no abdominal tenderness.   Skin:     General: Skin is warm and dry.      Findings: No rash.   Neurological:      General: No focal deficit present.      Mental Status: She is alert. Mental status is at baseline.   Psychiatric:         Mood and Affect: Mood normal.        Primary Care Physician   Essence Tamayo    Discharge Orders      Reason for your hospital stay    You were admitted for evaluation of bloody stools- likely due to a diverticular bleeding that stopped by itself. You were seen by GI and underwent flexible sigmoidoscopy and there was no evidence of active bleeding     Follow-up and recommended labs and tests     Follow up with primary care provider, Essence Tamayo, within 7 days for hospital follow- up.  The following labs/tests are recommended: Hb.     Activity    Your activity upon discharge: activity as tolerated     When to contact your care team    Call your primary doctor if you have any of the following: temperature greater than 100.5 or less than 96, chills, recurrent bloody stools, black stools, nausea, vomiting, abdominal pain, dizziness, loss of consciousness.     Full Code     Diet    Follow this diet upon discharge: High fiber       Significant Results and Procedures   Most Recent 3 CBC's:  Recent Labs   Lab Test 01/25/21  0555 01/24/21  2127 01/24/21  1401 01/20/21  0851 01/20/21  0851 01/19/21 2001 01/19/21 2001 03/13/17  1042 03/13/17  1042   WBC  --   --   --   --  5.0  --  4.5  --  7.8   HGB 8.0* 8.0* 8.4*   < > 8.3*   < > 9.4*   < > 9.8*   MCV  --   --   --   --  100  --  101*  --  100   PLT  --   --   --   --  207  --  242  --  223    < > = values in this interval not displayed.     Most Recent 3 BMP's:  Recent Labs   Lab Test 01/23/21  0902 01/20/21  0851 01/19/21 2001    142 142   POTASSIUM 3.6 4.7 4.0   CHLORIDE 114* 110* 108   CO2 26 28 30   BUN 6* 17 24   CR 0.75 0.82 0.94   ANIONGAP 3 4 3   EJ 8.0* 8.8 9.4   * 95 121*     Most Recent 2  LFT's:  Recent Labs   Lab Test 03/13/17  1042   AST 10   ALT 12   ALKPHOS 77   BILITOTAL 0.5     Most Recent 3 INR's:  Recent Labs   Lab Test 01/19/21 2001 03/13/17  1042   INR 0.95 1.11     Most Recent 3 Creatinines:  Recent Labs   Lab Test 01/23/21  0902 01/20/21  0851 01/19/21 2001   CR 0.75 0.82 0.94     Most Recent 3 Hemoglobins:  Recent Labs   Lab Test 01/25/21  0555 01/24/21 2127 01/24/21  1401   HGB 8.0* 8.0* 8.4*     Most Recent TSH and T4:No lab results found.  Most Recent 6 glucoses:  Recent Labs   Lab Test 01/23/21  0902 01/20/21  0851 01/19/21 2001 03/13/17  1042   * 95 121* 98     Most Recent Anemia Panel:  Recent Labs   Lab Test 01/25/21  0555 01/20/21  0851 01/20/21  0851   WBC  --   --  5.0   HGB 8.0*   < > 8.3*   HCT  --   --  25.2*   MCV  --   --  100   PLT  --   --  207    < > = values in this interval not displayed.   ,   Results for orders placed or performed during the hospital encounter of 01/20/21   NM GI Bleed    Addendum: 1/21/2021    MACHELLE PERSAUD  Accession # [id number]    The original report on this patient was dictated by Dr. Bauer.      Additional 24-hour imaging is obtained. There is activity which could  be in the hepatic and splenic flexures. It is unclear if this is free  technetium versus blood in the colon.    TRE BAUER MD (Date of Addendum: 1/21/2021 2:40 PM)    TRE BAUER MD      Narrative    NUCLEAR MEDICINE GASTROINTESTINAL BLEEDING STUDY 1/20/2021 2:43 PM    HISTORY: Gastrointestinal bleed. Bright red blood per rectum. Possible  diverticular bleed.    COMPARISON: None.    TECHNIQUE: Tc labeled red blood cells were injected intravenously with  subsequent dynamic imaging of the abdomen for evaluation of GI bleed.     DOSE: 22.6mCi Tc99m Ultratag RBC's @ 1335, R arm IV. injected  intravenously.    FINDINGS: There is no activity projecting within the GI tract  suspicious for GI bleed.      Impression    IMPRESSION:  Negative tagged red cell study.    TRE  MD WADE   IR Visceral Embolization    Narrative    INTERVENTIONAL RADIOLOGY VISCERAL EMBOLIZATION  1/22/2021 11:39 AM     HISTORY:  Patient has an acute gastrointestinal bleeding felt to be  from a diverticular source in the distal colon.    COMPARISON: Tagged red blood cell scan dated 1/20/2021 and CT of the  abdomen and pelvis dated 1/19/2021.    DESCRIPTION OF PROCEDURE: After obtaining informed consent, the  patient was placed in a supine position on the fluoroscopy table. The  right groin was prepped and draped in the usual sterile manner. 1%  lidocaine was injected for local anesthesia. Ultrasound was used to  evaluate and document patency of the right common femoral artery.  Incidental note was made of a high right femoral bifurcation. Under  sterile ultrasound guidance, access into the right common femoral  artery was obtained. An image was saved for documentation. A 6 Macedonian  vascular sheath was placed. A SOS catheter was used to select the  inferior mesenteric artery as well as the superior mesenteric artery  for angiography.    FINDINGS: No active extravasation was identified.    The right femoral sheath was removed. The puncture site was closed  with a 6 [place] Angio-Seal.    I determined this patient to be an appropriate candidate for the  planned sedation and procedure and reassessed the patient immediately  prior to sedation and procedure. Moderate intravenous conscious  sedation was supervised by me. The patient was independently monitored  by a registered nurse assigned to the Department of radiology using  automated blood pressure, EKG and pulse oximetry. The patient  tolerated the procedure well. There were no immediate postprocedure  complications. The patient's vital signs were monitored by radiology  nursing staff under my supervision and remained stable throughout the  study. Radiation dose for this scan was reduced using automated  exposure control, adjustment of the mA and/or kV  according to patient  size, or iterative reconstruction technique.    Medications: 1 mg Versed, 50 mcg fentanyl    Sedation time: 16 minutes    Fluoroscopy time: 3.4 minutes    Total fluoroscopy dose: 70.32 mGy Air Kerma    Contrast: 90 cc Isovue    Local anesthetic: 5 cc 1% lidocaine      Impression    IMPRESSION: Visceral angiography performed as above. No active  extravasation from bowel noted.    SIVA STRANGE MD       Discharge Medications   Current Discharge Medication List      START taking these medications    Details   bulk laxative (BENEFIBER DRINK MIX) packet Take 1 packet by mouth daily  Qty:      Associated Diagnoses: Diverticular hemorrhage         CONTINUE these medications which have NOT CHANGED    Details   acetaminophen (TYLENOL) 500 MG tablet Take 500 mg by mouth every 6 hours as needed for mild pain      calcium carb 1250 mg, 500 mg [place],/vitamin D 200 units (OSCAL WITH D) 500-200 MG-UNIT per tablet Take 1 tablet by mouth 2 times daily       EPINEPHrine (EPIPEN) 0.3 MG/0.3ML injection Inject 0.3 mg into the muscle once as needed      folic acid (FOLVITE) 1 MG tablet Take 1 mg by mouth daily       gabapentin (NEURONTIN) 300 MG capsule Take 300 mg by mouth At Bedtime      lifitegrast (XIIDRA) 5 % opthalmic solution Place 1 drop into both eyes 2 times daily      methotrexate 2.5 MG tablet Take 20 mg by mouth every 7 days Weekly on Tuesdays      Multiple Vitamins-Minerals (OCUVITE PRESERVISION PO) Take 1 tablet by mouth 2 times daily       !! pilocarpine (SALAGEN) 5 MG tablet Take 5 mg by mouth At Bedtime      !! pilocarpine (SALAGEN) 5 MG tablet Take 5 mg by mouth daily as needed       !! - Potential duplicate medications found. Please discuss with provider.      STOP taking these medications       celecoxib (CELEBREX) 200 MG capsule Comments:   Reason for Stopping:             Allergies   Allergies   Allergen Reactions     Bee Venom Anaphylaxis     Shrimp Anaphylaxis     Evoxac [Cevimeline]  Unknown     Prevnar Swelling     Other reaction(s): Edema     Prevnar [Pneumococcal 13-Linda Conj Vacc] Unknown

## 2021-01-25 NOTE — PLAN OF CARE
Pt A&O x 4, VSS on RA. Up independently in room. Tolerating full liquid diet well. R PIV SL. Denies pain or N/V. Pt bumped head on evening shift while in BR, small abrasion to right side of forehead, Neuros intact(MD notified by prior shift). R groin dressing CDI. Hgb 8.0 recheck @ 0555. No further blood stools, voiding adequatley. Pt to likely discharge home today.

## 2021-01-25 NOTE — PROGRESS NOTES
GASTROENTEROLOGY PROGRESS NOTE     SUBJECTIVE: Feeling well. Had a normal non bloody bowel movement this morning. Tolerating clears.      OBJECTIVE:   /46 (BP Location: Right arm)   Pulse 77   Temp 97.4  F (36.3  C) (Oral)   Resp 16   Wt 46.5 kg (102 lb 8 oz)   SpO2 99%   BMI 17.59 kg/m     Temp (24hrs), Av.7  F (36.5  C), Min:97.1  F (36.2  C), Max:98.4  F (36.9  C)     No data found.     Intake/Output Summary (Last 24 hours) at 2021 0812  Last data filed at 2021 0747  Gross per 24 hour   Intake 740 ml   Output --   Net 740 ml      PHYSICAL EXAM   Constitutional: Age appropriate, up in room, no acute distress    Additional Comments:   ROS, FH, SH: See initial GI consult for details.     I have reviewed the patient's new clinical lab results:   Recent Labs   Lab Test 21  0555 21  1401 21  0851 21  0851 21  1042 17  1042   WBC  --   --   --   --  5.0  --  4.5  --  7.8   HGB 8.0* 8.0* 8.4*   < > 8.3*   < > 9.4*   < > 9.8*   MCV  --   --   --   --  100  --  101*  --  100   PLT  --   --   --   --  207  --  242  --  223   INR  --   --   --   --   --   --  0.95  --  1.11    < > = values in this interval not displayed.      Recent Labs   Lab Test 21  0902 21  0821    142 142   POTASSIUM 3.6 4.7 4.0   CHLORIDE 114* 110* 108   CO2 26 28 30   BUN 6* 17 24   CR 0.75 0.82 0.94   ANIONGAP 3 4 3   EJ 8.0* 8.8 9.4      Recent Labs   Lab Test 17  1124 17  1042   ALBUMIN  --  3.6   BILITOTAL  --  0.5   ALT  --  12   AST  --  10   ALKPHOS  --  77   PROTEIN 10*  --       3/14/17 Colonoscopy ([facility])                                                                                    Impression:          - Scattered mild inflammation was found in the cecum.                        Biopsied. This may be due to prep. She was recently on                        antibiotics and thus we  aspirated stool for C diff                        testing as well, which could explain the cramping she is                        having. Alternatively she culd be having symptomatic                        diverticular disease and hemorrhoidal bleeding.                        - The examined portion of the ileum was normal.                        - Diverticulosis in the entire examined colon.                        - Non-bleeding internal hemorrhoids.     1/19/21 CT abdomen pelvis  IMPRESSION:   1.  Hypoattenuating liver lesions are demonstrated, too small to  characterize. These are indeterminant, possibly representing  hemangiomas although other etiologies including primary and secondary  liver lesions cannot be excluded. No comparison available at time of  dictation on PACS or care everywhere. Could consider follow-up  nonemergent outpatient MRI liver to further evaluate as clinically  warranted.  2.  No acute intra-abdominal or pelvic abnormality identified.  3.  Left adrenal nodule appears similar to prior lumbar spine MRI  suggesting benign etiology.  4.  Extensive colonic diverticulosis with no evidence of acute  diverticulitis.     1/20/21 Tagged RBC Scan   IMPRESSION:  Negative tagged red cell study.     1/21/21 Tagged RBC Scan   Additional 24-hour imaging is obtained. There is activity which could  be in the hepatic and splenic flexures. It is unclear if this is free  technetium versus blood in the colon.    1/22/21 Visceral Angiogram  IMPRESSION: Visceral angiography performed as above. No active  extravasation from bowel noted.    1/23/21 Flex Sig ([place])   Findings:        Many diverticula were found in the sigmoid colon. Most had clot in them.        Nonbloody stool encountered in descending colon. No significant        hemorrhoids.        Assessment & Plan:  80 year old female with a history of diverticulosis throughout the colon, hemorrhoids, and diverticular bleeding who presented with large volume  bright red blood per rectum on 1/20. Initial tagged RBC scan 1/20 was negative. Repeat tagged RBC scan 1/21 showed activity in possible hepatic/splenic flexure. Visceral angiogram negative. More bloody stools 3 days ago so flex sig done as above but no source of bleeding found. Diverticular bleed suspected, appears to be resolved at this time.      Plan  -Diet as tolerated   -Follow hgb and stool output  -Discharge planning for today  -If pt has recurrent bleeding she was instructed to return to the ED. Would then obtain immediate CTA to try and catch the source for her bleeding  -Will sign off. Discussed with Dr Edward. Please call with any questions.      Lam Christie PA-C  [facility]  Cell:  [phone] Monday through Friday 4258-0749  Office: [phone]

## 2021-01-25 NOTE — PLAN OF CARE
AOx4. VSS on RA. Up independently in room. Full liquid diet, tolerating well. PIV SL. Denies pain and N/V. Pt bumped head while bending over in BR, small scratch to forehead, MD aware. R groin dressing, CDI. No BM this shift. Hgb 8, recheck in AM. Possible discharge home tomorrow pending Hgb stabilization.

## 2021-01-25 NOTE — PROGRESS NOTES
Patient discharged at 12:48 PM to home. IV was discontinued. Pain at time of discharge was 0. Belongings returned to patient.  Discharge instructions and medications reviewed with patient.  Patient verbalized understanding and all questions were answered. Instructions to stop celebrex until seeing her PCP and also to  bulk laxative either powder or pill and take daily.  At time of discharge, patient condition was stable and left the unit escorted by nursing assistant.

## 2021-02-02 ENCOUNTER — IMMUNIZATION (OUTPATIENT)
Dept: NURSING | Facility: CLINIC | Age: 81
End: 2021-02-02
Payer: COMMERCIAL

## 2021-02-02 PROCEDURE — 91300 PR COVID VAC PFIZER DIL RECON 30 MCG/0.3 ML IM: CPT

## 2021-02-02 PROCEDURE — 0001A PR COVID VAC PFIZER DIL RECON 30 MCG/0.3 ML IM: CPT

## 2021-02-23 ENCOUNTER — IMMUNIZATION (OUTPATIENT)
Dept: NURSING | Facility: CLINIC | Age: 81
End: 2021-02-23
Attending: FAMILY MEDICINE
Payer: COMMERCIAL

## 2021-02-23 PROCEDURE — 0002A PR COVID VAC PFIZER DIL RECON 30 MCG/0.3 ML IM: CPT

## 2021-02-23 PROCEDURE — 91300 PR COVID VAC PFIZER DIL RECON 30 MCG/0.3 ML IM: CPT

## 2021-03-31 NOTE — PROGRESS NOTES
March 31, 2021     Kaylee Lucero  [address] [place] [id number]    Dear Kaylee Lucero:    Welcome and thank you for choosing the [facility]!  At [facility] we believe that every patient deserves the best care and we look forward to providing that for you. You have a scheduled appointment with:        MIKAL Toro   DATE: 4/28/2021  Time: 1:00 pm    Please complete the attached new patient medical history forms and bring it with you to your appointment. Arrive 15 minutes early if you need assistance completing the paperwork.    If you arrive late, or do not have your new patient medical history form completed, your appointment may be rescheduled for a later date. If you need to cancel your appointment for any reason, please contact the Pain Line at [phone] at least 24 hours prior to your appointment. If you use transportation services, plan extra time for travel.     In order to provide you with the best possible care, please bring along the following to your appointment:     · The attached completed history forms  · Medical records or test results related to your condition performed outside of an [facility] location. For example: prior surgical reports, treatment records, test results, X-Ray/MRI/CT scan reports and/or images.  · Insurance cards  · Photo ID  · Any applicable co-pay    Our address is:    [facility] [address] [address] [facility][note 3] [address]  Phone: [phone]      If you have any questions, please do not hesitate to contact our Pain Line at            [phone], and we will be happy to assist you.  We look forward to meeting you.    Sincerely,  Madina Gonzalez  Manager of Clinic Operations   [facility]    Medicine Progress Note - Hospitalist Service       Date of Admission:  1/20/2021  Assessment & Plan       Patrick Olson is a 80 year old female admitted on 1/20/2021. She presents as a direct admission from [facility] emergency department secondary to lack of beds with painless bright red blood per rectum, known history of diverticular bleeds requiring blood transfusions in the past.    ABL Anemia  Acute Diverticular Bleed: Patient with history of diverticular bleed in 2010 and 2017. Presented to outside ER with blood stools. Hgb 9.4--8.3. Transferred to [facility] due to lack of beds  - Ongoing bloody stools. [facility] following. Initial tagged RBC scan 1/20 negative, repeat 1/21 with possible activity in hepatic/splenic flexure. Angiogram 1/22 negative  - Hgb fell to 6.5--s/p 1 unit PRBC on 01/21  - Hb up to 8 but then trending down- 7-7.5--7.3--7 last night; BP was low last night  - transfused another unit PBRBcs  - Hb up to 8.8; BP improved  - underwent flex sig today- Many diverticula were found in the sigmoid colon. Most had clot in them. Nonbloody stool encountered in descending colon. No significant   Hemorrhoids.  - full liquid diet  - monitor Hbq8h  . Conditional orders for 1 unit for Hgb < 7    Hypotension 2/2 to ABL Anemia, improved  - SBPs fell to 80s systolic 1/20. Improved after IVF bolus and PRBC; BP again low last night; improved after transfusion of 1 unit PRBCs  - Monitor  - Trend Hgb  - Continue IVF NS at 75cc/h for now, bolus prn    Rheumatoid arthritis:  - Takes methotrexate on Tue, hold for now  - Hold PTA Celebrex  - Resume PTA Gabapentin        Diet: Full Liquid Diet    DVT Prophylaxis: Pneumatic Compression Devices  Parra Catheter: not present  Code Status: Full Code           Disposition Plan   Expected discharge: Pending cessation of bleeding, tolerating diet and plan per GI  Entered: Rubi Edward MD 01/23/2021, 3:12 PM       The patient's  care was discussed with the Bedside Nurse, Patient and GI Consultant.    Rubi Edward MD  Hospitalist Service  [facility]  Contact information available via [facility] Paging/Directory    ______________________________________________________________________    Interval History   Had ongoing bloody stools overnight. SBP in 80's, transfused 1 unit PRBCs, BP improved  - had flex sigmoid today  - denies abd pain, no N/V, no chest pain, no SOB  - a little frustrated because of ongoing lower GI bleeding with no obvious bleeding found    Data reviewed today: I reviewed all medications, new labs and imaging results over the last 24 hours. I personally reviewed the flex sigmoidoscopy report image(s) showing as above.    Physical Exam   Vital Signs: Temp: 98.1  F (36.7  C) Temp src: Oral BP: 114/58 Pulse: 69   Resp: 11 SpO2: 97 % O2 Device: None (Room air)    Weight: 102 lbs 8 oz  Physical Exam  Vitals signs and nursing note reviewed.   Constitutional:       Appearance: Normal appearance.   HENT:      Head: Normocephalic and atraumatic.   Eyes:      General: No scleral icterus.  Cardiovascular:      Rate and Rhythm: Normal rate and regular rhythm.      Heart sounds: No murmur.   Pulmonary:      Effort: Pulmonary effort is normal.      Breath sounds: No wheezing.   Abdominal:      General: There is no distension.      Tenderness: There is no abdominal tenderness.   Skin:     General: Skin is warm and dry.      Findings: No rash.   Neurological:      General: No focal deficit present.      Mental Status: She is alert. Mental status is at baseline.   Psychiatric:         Mood and Affect: Mood normal.           Data

## 2021-04-30 ENCOUNTER — TRANSFERRED RECORDS (OUTPATIENT)
Dept: HEALTH INFORMATION MANAGEMENT | Facility: CLINIC | Age: 81
End: 2021-04-30
Payer: COMMERCIAL

## 2021-05-05 ENCOUNTER — TRANSFERRED RECORDS (OUTPATIENT)
Dept: HEALTH INFORMATION MANAGEMENT | Facility: CLINIC | Age: 81
End: 2021-05-05

## 2021-05-24 ENCOUNTER — TRANSFERRED RECORDS (OUTPATIENT)
Dept: HEALTH INFORMATION MANAGEMENT | Facility: CLINIC | Age: 81
End: 2021-05-24
Payer: COMMERCIAL

## 2021-05-26 ENCOUNTER — TRANSFERRED RECORDS (OUTPATIENT)
Dept: HEALTH INFORMATION MANAGEMENT | Facility: CLINIC | Age: 81
End: 2021-05-26

## 2021-06-21 ENCOUNTER — OFFICE VISIT (OUTPATIENT)
Dept: ORTHOPEDICS | Facility: CLINIC | Age: 81
End: 2021-06-21
Payer: COMMERCIAL

## 2021-06-21 VITALS — WEIGHT: 103 LBS | HEIGHT: 64 IN | BODY MASS INDEX: 17.58 KG/M2

## 2021-06-21 DIAGNOSIS — M51.369 LUMBAR DEGENERATIVE DISC DISEASE: Primary | ICD-10-CM

## 2021-06-21 PROCEDURE — 99203 OFFICE O/P NEW LOW 30 MIN: CPT | Performed by: FAMILY MEDICINE

## 2021-06-21 ASSESSMENT — MIFFLIN-ST. JEOR: SCORE: 932.2

## 2021-06-21 NOTE — PROGRESS NOTES
CHIEF COMPLAINT:  Pain of the Lower Back       HISTORY OF PRESENT ILLNESS  Ms. Olson is a pleasant 80 year old year old female who presents to clinic today with recurring low back pain.      Patrick explains that last LEE ANN 2/18/2020 did not last quite as long as the LEE ANN prior.   Since that injection, it had been helpful, but gradually worsening. Since about early 4/2021, has noticed enough Sx to want to return to treatment.  Sx currently are worse than she has ever been experiencing.  Better in the AM, worse in the afternoon.    Pain is localized on low back, but will also experience B lateral hip pain.  Walks every day, has increased pain after about 1 mile of walking.    Onset: gradual  Location: bilateral low back  Quality:  dull  Duration: 2 months were it has been significantly worse  Severity: 5/10 at worst  Timing:intermittent episodes worse in PM  Modifying factors:  resting and non-use makes it better, movement and use makes it worse  Associated signs & symptoms: pain  Previous similar pain: Yes, similar LBP in the past treated with LEE ANN  Treatments to date:heat, LEE ANN 14 months of relief, tylenol    Additional history: as documented    Review of Systems:    Have you recently had a a fever, chills, weight loss? No    Do you have any vision problems? No    Do you have any chest pain or edema? No    Do you have any shortness of breath or wheezing?  No    Do you have stomach problems? No    Do you have any numbness or focal weakness? No    Do you have diabetes? No    Do you have problems with bleeding or clotting? No    Do you have an rashes or other skin lesions? No    MEDICAL HISTORY  Patient Active Problem List   Diagnosis     SNHL (sensorineural hearing loss)     GI bleed     Gastrointestinal hemorrhage, unspecified gastrointestinal hemorrhage type     Diverticular hemorrhage     Acute lower GI bleeding       Current Outpatient Medications   Medication Sig Dispense Refill     acetaminophen (TYLENOL) 500 MG  tablet Take 500 mg by mouth every 6 hours as needed for mild pain       bulk laxative (BENEFIBER DRINK MIX) packet Take 1 packet by mouth daily       calcium carb 1250 mg, 500 mg [place],/vitamin D 200 units (OSCAL WITH D) 500-200 MG-UNIT per tablet Take 1 tablet by mouth 2 times daily        EPINEPHrine (EPIPEN) 0.3 MG/0.3ML injection Inject 0.3 mg into the muscle once as needed       folic acid (FOLVITE) 1 MG tablet Take 1 mg by mouth daily        gabapentin (NEURONTIN) 300 MG capsule Take 300 mg by mouth At Bedtime       lifitegrast (XIIDRA) 5 % opthalmic solution Place 1 drop into both eyes 2 times daily       methotrexate 2.5 MG tablet Take 20 mg by mouth every 7 days Weekly on Tuesdays       Multiple Vitamins-Minerals (OCUVITE PRESERVISION PO) Take 1 tablet by mouth 2 times daily        pilocarpine (SALAGEN) 5 MG tablet Take 5 mg by mouth At Bedtime       pilocarpine (SALAGEN) 5 MG tablet Take 5 mg by mouth daily as needed         Allergies   Allergen Reactions     Bee Venom Anaphylaxis     Shrimp Anaphylaxis     Evoxac [Cevimeline] Unknown     Prevnar Swelling     Other reaction(s): Edema     Prevnar [Pneumococcal 13-Linda Conj Vacc] Unknown       No family history on file.    Additional medical/Social/Surgical histories reviewed in [facility] and updated as appropriate.       PHYSICAL EXAM  There were no vitals taken for this visit.    General  - normal appearance, in no obvious distress  Musculoskeletal - lumbar spine  - stance: slow to rise and sit  - inspection: normal bone and joint alignment, no obvious scoliosis  - palpation: left low lumbar paravertebral spasm. TTP L4-S1 paraspinals. Tender sciatic notch bilateral buttock.  - ROM: pain with bilateral rotation, flexion past 30 deg, extension  - strength: lower extremities 5/5 in all planes  - special tests:  (-) straight leg raise bilaterally  (-) slump test  Neuro  - patellar and Achilles DTRs 2+ bilaterally, no sensory or motor deficit, grossly normal  coordination, normal muscle tone  Skin  - no ecchymosis, erythema, warmth, or induration, no obvious rash  Psych  - interactive, appropriate, normal mood and affect    IMAGING :   MRI LUMBAR SPINE W/O CONTRAST    Impression:      Multilevel lumbar spondylosis. The most significant findings are at  L3-L4 where there is a left subarticular disc protrusion impinging on  the left L4 nerve root. Right moderate foraminal stenosis at L4-L5 due  to right foraminal disc protrusion and facet arthropathy.     I have personally reviewed the examination and initial interpretation  and I agree with the findings.     ALIS MONTE MD     ASSESSMENT & PLAN  Ms. Olson is a 80 year old year old female with past medical history of lumbar spondylosis presenting with acute on chronic low back pain.    Diagnosis:   1. Chronic lumbago with sciatica  2. Lumbar spondylosis    Patrick and I had a discussion regarding current status of her low back.  Her last MRI was in 2018 and it does seem her pain has been improved previously with epidural injection.  I would like to repeat MRI for consideration of repeat epidural injection.  We also had a discussion about surgical considerations and she would like to hold off at this juncture, especially if not absolutely needed.    I will see her back virtually to discuss.  Continue with over-the-counter analgesics, her home exercise program which she has gained extensive knowledge of with previous PT courses.    It was a pleasure seeing Patrick today.    Mukesh Lantigua DO, [facility]  Primary Care Sports Medicine

## 2021-06-21 NOTE — LETTER
6/21/2021      RE: Patrick Olson  [address]  [facility] [address]       CHIEF COMPLAINT:  Pain of the Lower Back       HISTORY OF PRESENT ILLNESS  Ms. Olson is a pleasant 80 year old year old female who presents to clinic today with recurring low back pain.      Patrick explains that last LEE ANN 2/18/2020 did not last quite as long as the LEE ANN prior.   Since that injection, it had been helpful, but gradually worsening. Since about early 4/2021, has noticed enough Sx to want to return to treatment.  Sx currently are worse than she has ever been experiencing.  Better in the AM, worse in the afternoon.    Pain is localized on low back, but will also experience B lateral hip pain.  Walks every day, has increased pain after about 1 mile of walking.    Onset: gradual  Location: bilateral low back  Quality:  dull  Duration: 2 months were it has been significantly worse  Severity: 5/10 at worst  Timing:intermittent episodes worse in PM  Modifying factors:  resting and non-use makes it better, movement and use makes it worse  Associated signs & symptoms: pain  Previous similar pain: Yes, similar LBP in the past treated with LEE ANN  Treatments to date:heat, LEE ANN 14 months of relief, tylenol    Additional history: as documented    Review of Systems:    Have you recently had a a fever, chills, weight loss? No    Do you have any vision problems? No    Do you have any chest pain or edema? No    Do you have any shortness of breath or wheezing?  No    Do you have stomach problems? No    Do you have any numbness or focal weakness? No    Do you have diabetes? No    Do you have problems with bleeding or clotting? No    Do you have an rashes or other skin lesions? No    MEDICAL HISTORY  Patient Active Problem List   Diagnosis     SNHL (sensorineural hearing loss)     GI bleed     Gastrointestinal hemorrhage, unspecified gastrointestinal hemorrhage type     Diverticular hemorrhage     Acute lower GI bleeding       Current  Outpatient Medications   Medication Sig Dispense Refill     acetaminophen (TYLENOL) 500 MG tablet Take 500 mg by mouth every 6 hours as needed for mild pain       bulk laxative (BENEFIBER DRINK MIX) packet Take 1 packet by mouth daily       calcium carb 1250 mg, 500 mg [place],/vitamin D 200 units (OSCAL WITH D) 500-200 MG-UNIT per tablet Take 1 tablet by mouth 2 times daily        EPINEPHrine (EPIPEN) 0.3 MG/0.3ML injection Inject 0.3 mg into the muscle once as needed       folic acid (FOLVITE) 1 MG tablet Take 1 mg by mouth daily        gabapentin (NEURONTIN) 300 MG capsule Take 300 mg by mouth At Bedtime       lifitegrast (XIIDRA) 5 % opthalmic solution Place 1 drop into both eyes 2 times daily       methotrexate 2.5 MG tablet Take 20 mg by mouth every 7 days Weekly on Tuesdays       Multiple Vitamins-Minerals (OCUVITE PRESERVISION PO) Take 1 tablet by mouth 2 times daily        pilocarpine (SALAGEN) 5 MG tablet Take 5 mg by mouth At Bedtime       pilocarpine (SALAGEN) 5 MG tablet Take 5 mg by mouth daily as needed         Allergies   Allergen Reactions     Bee Venom Anaphylaxis     Shrimp Anaphylaxis     Evoxac [Cevimeline] Unknown     Prevnar Swelling     Other reaction(s): Edema     Prevnar [Pneumococcal 13-Linda Conj Vacc] Unknown       No family history on file.    Additional medical/Social/Surgical histories reviewed in [facility] and updated as appropriate.       PHYSICAL EXAM  There were no vitals taken for this visit.    General  - normal appearance, in no obvious distress  Musculoskeletal - lumbar spine  - stance: slow to rise and sit  - inspection: normal bone and joint alignment, no obvious scoliosis  - palpation: left low lumbar paravertebral spasm. TTP L4-S1 paraspinals. Tender sciatic notch bilateral buttock.  - ROM: pain with bilateral rotation, flexion past 30 deg, extension  - strength: lower extremities 5/5 in all planes  - special tests:  (-) straight leg raise bilaterally  (-) slump test  Neuro  -  patellar and Achilles DTRs 2+ bilaterally, no sensory or motor deficit, grossly normal coordination, normal muscle tone  Skin  - no ecchymosis, erythema, warmth, or induration, no obvious rash  Psych  - interactive, appropriate, normal mood and affect    IMAGING :   MRI LUMBAR SPINE W/O CONTRAST    Impression:      Multilevel lumbar spondylosis. The most significant findings are at  L3-L4 where there is a left subarticular disc protrusion impinging on  the left L4 nerve root. Right moderate foraminal stenosis at L4-L5 due  to right foraminal disc protrusion and facet arthropathy.     I have personally reviewed the examination and initial interpretation  and I agree with the findings.     ALIS MONTE MD     ASSESSMENT & PLAN  Ms. Olson is a 80 year old year old female with past medical history of lumbar spondylosis presenting with acute on chronic low back pain.    Diagnosis:   1. Chronic lumbago with sciatica  2. Lumbar spondylosis    Patrick and I had a discussion regarding current status of her low back.  Her last MRI was in 2018 and it does seem her pain has been improved previously with epidural injection.  I would like to repeat MRI for consideration of repeat epidural injection.  We also had a discussion about surgical considerations and she would like to hold off at this juncture, especially if not absolutely needed.    I will see her back virtually to discuss.  Continue with over-the-counter analgesics, her home exercise program which she has gained extensive knowledge of with previous PT courses.    It was a pleasure seeing Patrick today.    Mukesh Lantigua DO, [facility]  Primary Care Sports Medicine

## 2021-06-23 ENCOUNTER — TRANSFERRED RECORDS (OUTPATIENT)
Dept: HEALTH INFORMATION MANAGEMENT | Facility: CLINIC | Age: 81
End: 2021-06-23
Payer: COMMERCIAL

## 2021-07-01 ENCOUNTER — OFFICE VISIT (OUTPATIENT)
Dept: ORTHOPEDICS | Facility: CLINIC | Age: 81
End: 2021-07-01
Payer: COMMERCIAL

## 2021-07-01 VITALS — BODY MASS INDEX: 17.58 KG/M2 | HEIGHT: 64 IN | WEIGHT: 103 LBS

## 2021-07-01 DIAGNOSIS — M51.369 LUMBAR DEGENERATIVE DISC DISEASE: Primary | ICD-10-CM

## 2021-07-01 PROCEDURE — 99213 OFFICE O/P EST LOW 20 MIN: CPT | Performed by: FAMILY MEDICINE

## 2021-07-01 ASSESSMENT — MIFFLIN-ST. JEOR: SCORE: 932.2

## 2021-07-01 NOTE — LETTER
"  7/1/2021      RE: Patrick Olson  [address] [facility] [address]       ESTABLISHED PATIENT FOLLOW-UP:  Follow Up of the Lower Back       HISTORY OF PRESENT ILLNESS  Ms. Olson is a pleasant 80 year old year old female who presents to clinic today for follow-up of MRI lumbar.     Interested in LEE ANN.    Date of injury: early 4/2021  Date last seen: 6/21/21  Following Therapeutic Plan: yes MRI  Pain: unchanged  Function: unchanged    Additional medical/Social/Surgical histories reviewed in [facility] and updated as appropriate.    REVIEW OF SYSTEMS (7/1/2021)  CONSTITUTIONAL: Denies fever and weight loss  GASTROINTESTINAL: Denies abdominal pain, nausea, vomiting  MUSCULOSKELETAL: See HPI  SKIN: Denies any recent rash or lesion  NEUROLOGICAL: Denies numbness or focal weakness     PHYSICAL EXAM  Ht 1.626 m (5' 4\")   Wt 46.7 kg (103 lb)   BMI 17.68 kg/m      .  General  - normal appearance, in no obvious distress  Musculoskeletal - lumbar spine  - stance: slow to rise and sit  - inspection: normal bone and joint alignment, no obvious scoliosis  - palpation: left low lumbar paravertebral spasm. TTP L4-S1 paraspinals. Tender sciatic notch bilateral buttock.  - ROM: pain with bilateral rotation, flexion past 30 deg, extension  - strength: lower extremities 5/5 in all planes  - special tests:  (-) straight leg raise bilaterally  (-) slump test  Neuro  - patellar and Achilles DTRs 2+ bilaterally, no sensory or motor deficit, grossly normal coordination, normal muscle tone  Skin  - no ecchymosis, erythema, warmth, or induration, no obvious rash  Psych  - interactive, appropriate, normal mood and affect    IMAGING :      ASSESSMENT & PLAN  Ms. Olson is a 80 year old year old female who presents to clinic today with Follow Up of the Lower Back    Most difficult finding which is not seen on previous MRI from 2018 includes a 6 mm left-sided herniated nucleus pulposus at L2-L3 with severe subarticular recess stenosis " and left III nerve root impingement.  I do believe this is the likely cause for her new and more intense back pain.    Treatment options discussed and at this time she would like to proceed with a repeat epidural injection.  At this time, we will redirect not at L3-L4 this time, but more cephalad to L2-L3 pathology.    I would like to see how this improves her symptoms.  She will follow-up with me 2 weeks after injection or as needed based on results.  She will continue her lumbar strengthening exercises which she is been diligent with in the past and has had extensive physical therapy.    It was a pleasure seeing Patrick.    Mukesh Lantigua DO, [facility]  Primary Care Sports Medicine

## 2021-07-01 NOTE — PROGRESS NOTES
"ESTABLISHED PATIENT FOLLOW-UP:  Follow Up of the Lower Back       HISTORY OF PRESENT ILLNESS  Ms. Olson is a pleasant 80 year old year old female who presents to clinic today for follow-up of MRI lumbar.     Interested in LEE ANN.    Date of injury: early 4/2021  Date last seen: 6/21/21  Following Therapeutic Plan: yes MRI  Pain: unchanged  Function: unchanged    Additional medical/Social/Surgical histories reviewed in [facility] and updated as appropriate.    REVIEW OF SYSTEMS (7/1/2021)  CONSTITUTIONAL: Denies fever and weight loss  GASTROINTESTINAL: Denies abdominal pain, nausea, vomiting  MUSCULOSKELETAL: See HPI  SKIN: Denies any recent rash or lesion  NEUROLOGICAL: Denies numbness or focal weakness     PHYSICAL EXAM  Ht 1.626 m (5' 4\")   Wt 46.7 kg (103 lb)   BMI 17.68 kg/m      .  General  - normal appearance, in no obvious distress  Musculoskeletal - lumbar spine  - stance: slow to rise and sit  - inspection: normal bone and joint alignment, no obvious scoliosis  - palpation: left low lumbar paravertebral spasm. TTP L4-S1 paraspinals. Tender sciatic notch bilateral buttock.  - ROM: pain with bilateral rotation, flexion past 30 deg, extension  - strength: lower extremities 5/5 in all planes  - special tests:  (-) straight leg raise bilaterally  (-) slump test  Neuro  - patellar and Achilles DTRs 2+ bilaterally, no sensory or motor deficit, grossly normal coordination, normal muscle tone  Skin  - no ecchymosis, erythema, warmth, or induration, no obvious rash  Psych  - interactive, appropriate, normal mood and affect    IMAGING :      ASSESSMENT & PLAN  Ms. Olson is a 80 year old year old female who presents to clinic today with Follow Up of the Lower Back    Most difficult finding which is not seen on previous MRI from 2018 includes a 6 mm left-sided herniated nucleus pulposus at L2-L3 with severe subarticular recess stenosis and left III nerve root impingement.  I do believe this is the likely cause for her new " and more intense back pain.    Treatment options discussed and at this time she would like to proceed with a repeat epidural injection.  At this time, we will redirect not at L3-L4 this time, but more cephalad to L2-L3 pathology.    I would like to see how this improves her symptoms.  She will follow-up with me 2 weeks after injection or as needed based on results.  She will continue her lumbar strengthening exercises which she is been diligent with in the past and has had extensive physical therapy.    It was a pleasure seeing Patrick.    Mukesh Lantigua DO, [facility]  Primary Care Sports Medicine

## 2021-07-08 ENCOUNTER — TRANSFERRED RECORDS (OUTPATIENT)
Dept: HEALTH INFORMATION MANAGEMENT | Facility: CLINIC | Age: 81
End: 2021-07-08

## 2021-07-20 ENCOUNTER — TRANSFERRED RECORDS (OUTPATIENT)
Dept: HEALTH INFORMATION MANAGEMENT | Facility: CLINIC | Age: 81
End: 2021-07-20

## 2021-07-20 ENCOUNTER — MEDICAL CORRESPONDENCE (OUTPATIENT)
Dept: HEALTH INFORMATION MANAGEMENT | Facility: CLINIC | Age: 81
End: 2021-07-20

## 2021-07-21 ENCOUNTER — TRANSCRIBE ORDERS (OUTPATIENT)
Dept: OTHER | Age: 81
End: 2021-07-21

## 2021-07-21 DIAGNOSIS — N28.9 RENAL INSUFFICIENCY: Primary | ICD-10-CM

## 2021-07-21 DIAGNOSIS — M06.9 RHEUMATOID ARTHRITIS, RHEUMATOID FACTOR STATUS UNKNOWN (H): ICD-10-CM

## 2021-07-21 DIAGNOSIS — D64.9 ANEMIA: Primary | ICD-10-CM

## 2021-07-26 ENCOUNTER — TRANSFERRED RECORDS (OUTPATIENT)
Dept: HEALTH INFORMATION MANAGEMENT | Facility: CLINIC | Age: 81
End: 2021-07-26

## 2021-08-24 ENCOUNTER — MEDICAL CORRESPONDENCE (OUTPATIENT)
Dept: HEALTH INFORMATION MANAGEMENT | Facility: CLINIC | Age: 81
End: 2021-08-24

## 2021-08-24 ENCOUNTER — TRANSFERRED RECORDS (OUTPATIENT)
Dept: HEALTH INFORMATION MANAGEMENT | Facility: CLINIC | Age: 81
End: 2021-08-24

## 2021-08-25 ENCOUNTER — TRANSCRIBE ORDERS (OUTPATIENT)
Dept: OTHER | Age: 81
End: 2021-08-25

## 2021-08-25 DIAGNOSIS — K31.819 GASTRIC AVM: Primary | ICD-10-CM

## 2021-08-26 ENCOUNTER — TRANSCRIBE ORDERS (OUTPATIENT)
Dept: MULTI SPECIALTY CLINIC | Facility: CLINIC | Age: 81
End: 2021-08-26

## 2021-08-26 DIAGNOSIS — M06.9 RHEUMATOID ARTHRITIS INVOLVING MULTIPLE SITES, UNSPECIFIED WHETHER RHEUMATOID FACTOR PRESENT (H): Primary | ICD-10-CM

## 2021-09-05 ENCOUNTER — HEALTH MAINTENANCE LETTER (OUTPATIENT)
Age: 81
End: 2021-09-05

## 2021-09-28 DIAGNOSIS — R79.89 ELEVATED SERUM CREATININE: Primary | ICD-10-CM

## 2021-09-28 DIAGNOSIS — E55.9 VITAMIN D DEFICIENCY, UNSPECIFIED: ICD-10-CM

## 2021-09-28 DIAGNOSIS — K92.2 GASTROINTESTINAL HEMORRHAGE, UNSPECIFIED GASTROINTESTINAL HEMORRHAGE TYPE: ICD-10-CM

## 2021-09-30 NOTE — TELEPHONE ENCOUNTER
RECORDS RECEIVED FROM: Internal   DATE RECEIVED: 10.01.2021   NOTES STATUS DETAILS   OFFICE NOTE from referring provider Received 07.20.2021 Lauryn Tamayo MD, [place]   OFFICE NOTE from other specialist  N/A    *Only VASCULITIS or LUPUS gather office notes for the following N/A    *PULMONARY   N/A    *ENT N/A    *DERMATOLOGY N/A    *RHEUMATOLOGY N/A    DISCHARGE SUMMARY from hospital N/A    DISCHARGE REPORT from the ER N/A    MEDICATION LIST Received 07.21.2021   IMAGING  (NEED IMAGES AND REPORTS)     KIDNEY CT SCAN N/A    KIDNEY ULTRASOUND N/A    MR ABDOMEN N/A    NUCLEAR MEDICINE RENAL N/A    LABS     CBC Received 07.21.2021   CMP Received 07.21.2021   BMP Received 07.21.2021   UA Received 07.21.2021   URINE PROTEIN Received 07.21.2021   RENAL PANEL N/A    BIOPSY     KIDNEY BIOPSY  N/A

## 2021-09-30 NOTE — TELEPHONE ENCOUNTER
NOTES Status Details   OFFICE NOTE from referring provider Received 08.26.2021 Essence Tamayo MD   OFFICE NOTE from other specialist N/A    DISCHARGE SUMMARY from hospital N/A    DISCHARGE REPORT from the ER N/A    MEDICATION LIST Received 08.26.2021   LABS (Any and all labs)      Received    Biopsy/pathology (Anything related to diagnoses I.e. fluid aspirations, lip biopsy, muscle biopsy)      N/A     N/A    Imaging (All imaging related to diagnoses)     Echo N/A    HRCT N/A    CXR N/A    EMG N/A                   Scleroderma/Dermatomyositis diagnoses     Previous Cardiology notes  N/A    Previous Pulmonary notes N/A    Previous Dermatology notes N/A    Previous GI notes N/A    Lupus diagnoses     Previous Nephrology notes N/A    Previous Dermatology notes N/A    Previous Cardiology notes N/A

## 2021-10-01 ENCOUNTER — OFFICE VISIT (OUTPATIENT)
Dept: NEPHROLOGY | Facility: CLINIC | Age: 81
End: 2021-10-01
Attending: INTERNAL MEDICINE
Payer: COMMERCIAL

## 2021-10-01 ENCOUNTER — PRE VISIT (OUTPATIENT)
Dept: NEPHROLOGY | Facility: CLINIC | Age: 81
End: 2021-10-01

## 2021-10-01 ENCOUNTER — LAB (OUTPATIENT)
Dept: LAB | Facility: CLINIC | Age: 81
End: 2021-10-01
Payer: COMMERCIAL

## 2021-10-01 VITALS
WEIGHT: 102.6 LBS | SYSTOLIC BLOOD PRESSURE: 123 MMHG | HEIGHT: 64 IN | BODY MASS INDEX: 17.52 KG/M2 | TEMPERATURE: 97.9 F | DIASTOLIC BLOOD PRESSURE: 67 MMHG | HEART RATE: 79 BPM

## 2021-10-01 DIAGNOSIS — E55.9 VITAMIN D DEFICIENCY, UNSPECIFIED: ICD-10-CM

## 2021-10-01 DIAGNOSIS — R79.89 ELEVATED SERUM CREATININE: ICD-10-CM

## 2021-10-01 DIAGNOSIS — N28.9 RENAL INSUFFICIENCY: ICD-10-CM

## 2021-10-01 DIAGNOSIS — K92.2 GASTROINTESTINAL HEMORRHAGE, UNSPECIFIED GASTROINTESTINAL HEMORRHAGE TYPE: ICD-10-CM

## 2021-10-01 DIAGNOSIS — M06.9 RHEUMATOID ARTHRITIS, RHEUMATOID FACTOR STATUS UNKNOWN (H): ICD-10-CM

## 2021-10-01 LAB
ALBUMIN SERPL-MCNC: 3.6 G/DL (ref 3.4–5)
ALBUMIN UR-MCNC: NEGATIVE MG/DL
ANION GAP SERPL CALCULATED.3IONS-SCNC: 4 MMOL/L (ref 3–14)
APPEARANCE UR: CLEAR
BILIRUB UR QL STRIP: NEGATIVE
BUN SERPL-MCNC: 19 MG/DL (ref 7–30)
CALCIUM SERPL-MCNC: 9.6 MG/DL (ref 8.5–10.1)
CHLORIDE BLD-SCNC: 107 MMOL/L (ref 94–109)
CO2 SERPL-SCNC: 30 MMOL/L (ref 20–32)
COLOR UR AUTO: NORMAL
CREAT SERPL-MCNC: 0.99 MG/DL (ref 0.52–1.04)
CREAT UR-MCNC: 15 MG/DL
DEPRECATED CALCIDIOL+CALCIFEROL SERPL-MC: 76 UG/L (ref 20–75)
ERYTHROCYTE [DISTWIDTH] IN BLOOD BY AUTOMATED COUNT: 14.3 % (ref 10–15)
FERRITIN SERPL-MCNC: 116 NG/ML (ref 8–252)
GFR SERPL CREATININE-BSD FRML MDRD: 55 ML/MIN/1.73M2
GLUCOSE BLD-MCNC: 148 MG/DL (ref 70–99)
GLUCOSE UR STRIP-MCNC: NEGATIVE MG/DL
HCT VFR BLD AUTO: 34.2 % (ref 35–47)
HGB BLD-MCNC: 11.2 G/DL (ref 11.7–15.7)
HGB UR QL STRIP: NEGATIVE
IRON SATN MFR SERPL: 22 % (ref 15–46)
IRON SERPL-MCNC: 60 UG/DL (ref 35–180)
KETONES UR STRIP-MCNC: NEGATIVE MG/DL
LEUKOCYTE ESTERASE UR QL STRIP: NEGATIVE
MCH RBC QN AUTO: 32.9 PG (ref 26.5–33)
MCHC RBC AUTO-ENTMCNC: 32.7 G/DL (ref 31.5–36.5)
MCV RBC AUTO: 101 FL (ref 78–100)
NITRATE UR QL: NEGATIVE
PH UR STRIP: 6 [PH] (ref 5–7)
PHOSPHATE SERPL-MCNC: 3.4 MG/DL (ref 2.5–4.5)
PLATELET # BLD AUTO: 183 10E3/UL (ref 150–450)
POTASSIUM BLD-SCNC: 5.2 MMOL/L (ref 3.4–5.3)
PROT UR-MCNC: <0.05 G/L
PROT/CREAT 24H UR: NORMAL MG/G{CREAT}
PTH-INTACT SERPL-MCNC: 39 PG/ML (ref 18–80)
RBC # BLD AUTO: 3.4 10E6/UL (ref 3.8–5.2)
RBC URINE: 1 /HPF
SODIUM SERPL-SCNC: 141 MMOL/L (ref 133–144)
SP GR UR STRIP: 1 (ref 1–1.03)
TIBC SERPL-MCNC: 268 UG/DL (ref 240–430)
UROBILINOGEN UR STRIP-MCNC: NORMAL MG/DL
WBC # BLD AUTO: 2.9 10E3/UL (ref 4–11)
WBC URINE: <1 /HPF

## 2021-10-01 PROCEDURE — 85027 COMPLETE CBC AUTOMATED: CPT | Performed by: PATHOLOGY

## 2021-10-01 PROCEDURE — 36415 COLL VENOUS BLD VENIPUNCTURE: CPT | Performed by: PATHOLOGY

## 2021-10-01 PROCEDURE — G0463 HOSPITAL OUTPT CLINIC VISIT: HCPCS

## 2021-10-01 PROCEDURE — 82728 ASSAY OF FERRITIN: CPT | Performed by: PATHOLOGY

## 2021-10-01 PROCEDURE — 80069 RENAL FUNCTION PANEL: CPT | Performed by: PATHOLOGY

## 2021-10-01 PROCEDURE — 81001 URINALYSIS AUTO W/SCOPE: CPT | Performed by: PATHOLOGY

## 2021-10-01 PROCEDURE — 99204 OFFICE O/P NEW MOD 45 MIN: CPT | Mod: GC | Performed by: INTERNAL MEDICINE

## 2021-10-01 PROCEDURE — 84156 ASSAY OF PROTEIN URINE: CPT | Performed by: PATHOLOGY

## 2021-10-01 PROCEDURE — 83550 IRON BINDING TEST: CPT | Performed by: PATHOLOGY

## 2021-10-01 PROCEDURE — 83970 ASSAY OF PARATHORMONE: CPT | Mod: 90 | Performed by: PATHOLOGY

## 2021-10-01 PROCEDURE — 82306 VITAMIN D 25 HYDROXY: CPT | Mod: 90 | Performed by: PATHOLOGY

## 2021-10-01 ASSESSMENT — MIFFLIN-ST. JEOR: SCORE: 930.39

## 2021-10-01 ASSESSMENT — PAIN SCALES - GENERAL: PAINLEVEL: MODERATE PAIN (4)

## 2021-10-01 NOTE — PATIENT INSTRUCTIONS
Please stop the iron and vitamin C supplements    Please check your blood pressure in the morning after you wake up and particularly when you feel dizzy to make sure its not low    We will get an ultrasound of your kidneys and see you back in about 3 months    Pleasure talking to you today!

## 2021-10-01 NOTE — NURSING NOTE
"Chief Complaint   Patient presents with     Consult     establish care with renal insufficiency     /67   Pulse 79   Temp 97.9  F (36.6  C) (Oral)   Ht 1.626 m (5' 4\")   Wt 46.5 kg (102 lb 9.6 oz)   BMI 17.61 kg/m    Joie Page CMA on 10/1/2021 at 9:44 AM    "

## 2021-10-01 NOTE — PROGRESS NOTES
October 1, 2021    Assessment/Plan:  CKD3  Baseline ~0.8 until last year, up to 1.18 in early July with unclear cause and back to 0.99 on today's check. UA bland, UPCR negative. Patient did have multiple cysts noted on recent CT but these are likely acquired given her age and normal BP.  CKD likely in the setting of advanced age and possible contribution from chronic  methotrexate use. Cr likely overestimating GFR given weight loss and low BMI. GFR using Cockroft Gault equation is 35 ml/min.  - renal US to complete workup  - consider alternative to methotrexate in discussion with rheumatology  - Avoid nephrotoxins  - Stop vitamin C as this gets metabolized to oxalate and can cause toxicity    Anemia, recurrent GI bleed  Hgb improving, can stop PO iron supplement as iron profile now replete and she has some abdominal discomfort    Orthostasis  Asked patient to check BP in the morning during her symptoms.   - decrease dose of gabapentin as this is renally cleared if hypotension is noted or symptoms continue to persist    Low WBC  Previously normal.  Will be repeated on two weeks at [facility]    Discussed with Dr Chelsy Power MD  Renal Fellow  [phone]    I have seen and examined this patient with the fellow.  This note reflects our joint assessment and plan.   81 yo woman with CKD stage 3b (GFR 35 by CG).  UA is bland.  D dx for CKD includes long term methotrexate use, KUSHAL in association with recent GI bleeds. Possible orthostatic hypotension in morning. She has been on iron supplements and is now iron replete.  We rec  1.  Check morning BPs  2.  OK to stop iron supplements and vitamin C  3.  Consider lower dose of gabapentin given CKD    Carito Valentino MD      I was asked to see this patient by Dr. Tamayo    HPI: Patrick Olson is a 80 year old female who presents for evaluation of Cr elevation. PMH includes RA on methotrexate weekly since 1980s (current dose is 7.5 mg weekly), recurrent GI bleed  with last admission in Jan 2021.    Patient notes slow weight loss over the past year. 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 Colonoscopy showed diverticulosis and non-bleeding external hemorrhoids. Patient continues to have a stomachache particularly after eating and this has affected her intake which is significantly less. Also continues to note fatigue and less exercise tolerance, previously was able to walk about 2 miles but now can only do about 1. No SOB, chest pain, abdominal pain, n/v/d, fevers/chills, dysuria, change in frequency, hematuria. Arthritis usually affects R elbow and hand joints as well as lower back. Denies any significant LUTS, notes nocturia x1.  Feels groggy/dizzy in the morning so gets up slowly.  No LOC.    Cr was noted to be 1.18 in July on most recent check which prompted referral. UA has been bland.  Cr 0.7 in 2017. Up to 1.2 intermittently since Jan 2021     Recently started taking PO iron and vit C supplement on 8/27/21.    - Swelling: none  - Hx of UTIs: none  - Hx of stones: none  - Rashes: none new  - Family hx of kidney disease: none, father and sister have diabetes  - NSAID use: none    Allergies   Allergen Reactions     Bee Venom Anaphylaxis     Shrimp Anaphylaxis     Evoxac [Cevimeline] Unknown     Prevnar Swelling     Other reaction(s): Edema     Prevnar [Pneumococcal 13-Linda Conj Vacc] Unknown     acetaminophen (TYLENOL) 500 MG tablet, Take 500 mg by mouth every 6 hours as needed for mild pain  bulk laxative (BENEFIBER DRINK MIX) packet, Take 1 packet by mouth daily  calcium carb 1250 mg, 500 mg [place],/vitamin D 200 units (OSCAL WITH D) 500-200 MG-UNIT per tablet, Take 1 tablet by mouth 2 times daily   EPINEPHrine (EPIPEN) 0.3 MG/0.3ML injection, Inject 0.3 mg into the muscle once as needed  folic acid (FOLVITE) 1 MG tablet, Take 1 mg by mouth daily   gabapentin (NEURONTIN) 300 MG capsule, Take 300 mg by mouth At  "Bedtime  lifitegrast (XIIDRA) 5 % opthalmic solution, Place 1 drop into both eyes 2 times daily  methotrexate 2.5 MG tablet, Take 20 mg by mouth every 7 days Weekly on Tuesdays  Multiple Vitamins-Minerals (OCUVITE PRESERVISION PO), Take 1 tablet by mouth 2 times daily   pilocarpine (SALAGEN) 5 MG tablet, Take 5 mg by mouth At Bedtime  pilocarpine (SALAGEN) 5 MG tablet, Take 5 mg by mouth daily as needed    No current facility-administered medications on file prior to visit.      Past Medical History:   Diagnosis Date     Osteoarthritis      Osteoporosis      Rheumatoid arthritis (H)      Sensorineural hearing loss        Past Surgical History:   Procedure Laterality Date     COLONOSCOPY N/A 3/14/2017    Procedure: COMBINED COLONOSCOPY, SINGLE OR MULTIPLE BIOPSY/POLYPECTOMY BY BIOPSY;  Surgeon: Spencer Downey MD;  Location:  GI     IR VISCERAL EMBOLIZATION  1/22/2021     ORTHOPEDIC SURGERY       SIGMOIDOSCOPY FLEXIBLE N/A 1/23/2021    Procedure: SIGMOIDOSCOPY, FLEXIBLE;  Surgeon: Jaime Steinberg MD;  Location:  GI       Social History     Tobacco Use     Smoking status: Former Smoker     Smokeless tobacco: Never Used   Substance Use Topics     Alcohol use: No     Drug use: No       No family history on file.    ROS: A 12 point review of systems was negative other than noted above.     Exam:  /67   Pulse 79   Temp 97.9  F (36.6  C) (Oral)   Ht 1.626 m (5' 4\")   Wt 46.5 kg (102 lb 9.6 oz)   BMI 17.61 kg/m      GENERAL APPEARANCE: alert and no distress, very thin  EYES: EOMI, no scleral icterus  HENT: mouth without ulcers or lesions  NECK: supple, no goiter  RESP: lungs clear to auscultation   CV: regular rhythm, normal rate, no rub  Extremities: no edema  GI: soft, non-tender, nondistended  SKIN: no rash on exposed surfaces  NEURO: mentation intact and speech normal  PSYCH: affect normal/bright    Results:  Lab on 10/01/2021   Component Date Value Ref Range Status     WBC Count 10/01/2021 2.9* " 4.0 - 11.0 10e3/uL Final     RBC Count 10/01/2021 3.40* 3.80 - 5.20 10e6/uL Final     Hemoglobin 10/01/2021 11.2* 11.7 - 15.7 g/dL Final     Hematocrit 10/01/2021 34.2* 35.0 - 47.0 % Final     MCV 10/01/2021 101* 78 - 100 fL Final     MCH 10/01/2021 32.9  26.5 - 33.0 pg Final     MCHC 10/01/2021 32.7  31.5 - 36.5 g/dL Final     RDW 10/01/2021 14.3  10.0 - 15.0 % Final     Platelet Count 10/01/2021 183  150 - 450 10e3/uL Final

## 2021-10-04 ENCOUNTER — TRANSFERRED RECORDS (OUTPATIENT)
Dept: HEALTH INFORMATION MANAGEMENT | Facility: CLINIC | Age: 81
End: 2021-10-04
Payer: COMMERCIAL

## 2021-10-08 ENCOUNTER — TRANSFERRED RECORDS (OUTPATIENT)
Dept: HEALTH INFORMATION MANAGEMENT | Facility: CLINIC | Age: 81
End: 2021-10-08
Payer: COMMERCIAL

## 2021-10-11 ENCOUNTER — PATIENT OUTREACH (OUTPATIENT)
Dept: ONCOLOGY | Facility: CLINIC | Age: 81
End: 2021-10-11

## 2021-10-11 NOTE — PROGRESS NOTES
Called to remind pt of new pt consult on 10/12 at 10:30 am with Dr. Sen. Introduced self as nurse care coordinator and asked if pt had any questions. She just had labs by pcp on 10/1 and asked if any more labs would be required tomorrow, informed her none prior to visit but after conversation with Dr. Sen he will decide if more are needed, she verbalized understanding.

## 2021-10-12 ENCOUNTER — ONCOLOGY VISIT (OUTPATIENT)
Dept: ONCOLOGY | Facility: CLINIC | Age: 81
End: 2021-10-12
Attending: INTERNAL MEDICINE
Payer: COMMERCIAL

## 2021-10-12 VITALS
BODY MASS INDEX: 18.25 KG/M2 | HEIGHT: 63 IN | HEART RATE: 62 BPM | DIASTOLIC BLOOD PRESSURE: 74 MMHG | SYSTOLIC BLOOD PRESSURE: 152 MMHG | TEMPERATURE: 97.6 F | OXYGEN SATURATION: 99 % | WEIGHT: 103 LBS

## 2021-10-12 DIAGNOSIS — D64.9 ANEMIA: ICD-10-CM

## 2021-10-12 DIAGNOSIS — K57.31 DIVERTICULOSIS OF LARGE INTESTINE WITH HEMORRHAGE: ICD-10-CM

## 2021-10-12 PROCEDURE — G0463 HOSPITAL OUTPT CLINIC VISIT: HCPCS

## 2021-10-12 PROCEDURE — 99205 OFFICE O/P NEW HI 60 MIN: CPT | Performed by: INTERNAL MEDICINE

## 2021-10-12 RX ORDER — TRETINOIN 1 MG/G
CREAM TOPICAL
Status: ON HOLD | COMMUNITY
Start: 2021-09-07 | End: 2023-02-06

## 2021-10-12 RX ORDER — AMOXICILLIN 400 MG/5ML
POWDER, FOR SUSPENSION ORAL
Status: ON HOLD | COMMUNITY
End: 2021-11-17

## 2021-10-12 RX ORDER — FAMOTIDINE 20 MG/1
TABLET, FILM COATED ORAL
Status: ON HOLD | COMMUNITY
Start: 2021-08-24 | End: 2021-11-17

## 2021-10-12 ASSESSMENT — MIFFLIN-ST. JEOR: SCORE: 919.94

## 2021-10-12 ASSESSMENT — PAIN SCALES - GENERAL: PAINLEVEL: MILD PAIN (3)

## 2021-10-12 NOTE — NURSING NOTE
"Oncology Rooming Note    October 12, 2021 10:30 AM   Patrick Olson is a 80 year old female who presents for:    Chief Complaint   Patient presents with     Oncology Clinic Visit     anemia     Initial Vitals: BP (!) 152/74   Pulse 62   Temp 97.6  F (36.4  C) (Oral)   Ht 1.606 m (5' 3.23\")   Wt 46.7 kg (103 lb)   SpO2 99%   BMI 18.11 kg/m   Estimated body mass index is 18.11 kg/m  as calculated from the following:    Height as of this encounter: 1.606 m (5' 3.23\").    Weight as of this encounter: 46.7 kg (103 lb). Body surface area is 1.44 meters squared.  Mild Pain (3) Comment: Data Unavailable   No LMP recorded. Patient is postmenopausal.  Allergies reviewed: Yes  Medications reviewed: Yes    Medications: Medication refills not needed today.  Pharmacy name entered into [employer]: [id number] - [facility] [address] [place] [address]    Clinical concerns: none       Fay Cm CMA            "

## 2021-10-12 NOTE — LETTER
10/12/2021         RE: Patrick Olson  [address] [id number]        Dear Colleague,    Thank you for referring your patient, Patrick Olson, to the [facility]. Please see a copy of my visit note below.        [facility] Hematology Consultation  [address] [place] [address]  Phone: [phone]    Outpatient Visit Note:    Patient: Patrick Olson   MRN: [id number]   : 1940   SARY: Oct 12, 2021     Reason for Consultation: Patrick Olson is a referred by Essence Tamayo for evaluation and treatment of anemia.    Assessment  Patrick Olson is a 80 year old woman history of rheumatoid arthritis on methotrexate and GI bleeding from diverticulosis with mild leukopenia (no differential available) and multifactorial macrocytic anemia with contributions from chronic inflammation and methotrexate therapy.  At this point I think the best strategy would be to change therapy from methotrexate to another DMARD given long-term risk of MDS.  If not improved off this therapy then would consider bone marrow biopsy, particularly if she is neutropenic.        Most recent labs reveal mild macrocytic anemia (Hgb 11.2, ) and leukopenia (WBC 2.9, no diff avaliable). Most likely driving factor for patients anemia/leukopenia is her methotrexate as indicated by an elevated MCV.  B12 elevated recently per patient records, and she is already on folate supplementation. Iron studies reveal she is iron replete. Was hospitalized earlier this year for diverticular bleeding requiring transfusion x2, but no concern for ongoing bleed. While patient is at increased risk for MDS given methotrexate, low suspicion at this time, given hemoglobin significantly improved since hospitlization for diverticular bleed. No differential or smear on blood work prior to visit, so will order those to evaluate further. Would not recommend bone marrow biopsy at this time. Would  recommend switching from methotrexate to other non-myelosuppressive agent, though, as her methotrexate is clearly contributing to anemia/leukopenia at baseline.     Recommendations  - Follow up as planned with Dr. Clements and discuss non-myelosuppressive agents for rheumatoid arthritis (message sent to make him aware)  - Ordered CBC w/ differential and blood smear (can obtain when gets labs drawn with rheumatology)  - Discontinue iron supplementation as she is now iron replete    Sameer Durán  Medical Student  Hematology Clinic    Physician Attestation   I saw this patient with the student who acted as my scribe for the visit.  I have edited the note to reflect my history, exam and medical decision making.    60 minutes spent on the date of the encounter doing chart review, review of outside records, review of test results, interpretation of tests, patient visit and documentation     Doug Sen MD   of Medicine  [facility] [employer]         HPI:    Patrick Olson is a 79 y/o female w/ PMHx significant for rheumatoid arthritis (on methotrexate), diverticulosis (hx of diverticular bleed), and CKD3, who presents for evaluation of anemia.     Recent hospitalization for diverticular bleed, requiring transfusion x2 (01/20-25). No clear source of bleeding identified on flex sigmoidscopy or tagged RBC study. Bleeding resolved spontaneously. No recurrence of bleeding since. Has undergone colonoscopy since, which was unremarkable except for diverticulosis. 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     No recent hematuria, hemoptysis, new bruises, or petechiae. Was started iron supplement by outside provider given concern she was iron deficient (normal iron studies, but elevated soluble transferrin receptor). Does describe darker stools since starting iron supplement, but no overt blood. Otherwise no  abdominal pain, constipation, or any other new concerns since starting iron.      Of note, the patient has a long term history of rheumatoid arthritis, immunosuppressed on methotrexate. Prior rheumatologist retired, so she is currently working on getting set up with a new rheumatologist (has appointment with Dr. Clements on 10/14/21).  No recent increase in methotrexate, but she did decide to go down on her methotrexate earlier this year as her pain had been well controlled. No significant increase in pain since.     Review of Systems:    General: Mild weight loss over past couple of years. No fever/chills.   HEENT: No vision changes. No nosebleeds.   SKIN: No bruising/petechiae. No new rash.   CARDIOVASCULAR: No chest pain/palpitations.   RESPIRATORY: No SOB/cough. No hemoptysis.   GASTROINTESTINAL: No abdominal pain. No constipation/diarrhea. Dark stool (new since starting iron supplement)  GENITOURINARY: No hematuria.   NEUROLOGICAL: No headache. No light headedness/dizziness. No numbness/tingling.     Past Medical/Surgical History:    Past Medical History:   Diagnosis Date     Osteoarthritis      Osteoporosis      Rheumatoid arthritis (H)      Sensorineural hearing loss      Past Surgical History:   Procedure Laterality Date     COLONOSCOPY N/A 3/14/2017    Procedure: COMBINED COLONOSCOPY, SINGLE OR MULTIPLE BIOPSY/POLYPECTOMY BY BIOPSY;  Surgeon: Spencer Downey MD;  Location:  GI     IR VISCERAL EMBOLIZATION  1/22/2021     ORTHOPEDIC SURGERY       SIGMOIDOSCOPY FLEXIBLE N/A 1/23/2021    Procedure: SIGMOIDOSCOPY, FLEXIBLE;  Surgeon: Jaime Steinberg MD;  Location:  GI     Family History:  - No known family history of bleeding/clotting disorders    Social History:   - Retired (formerly employed as  at [employer])  - Former  (multiple works displayed at [employer] and [employer]); stopped recently due to back pain limiting  "her    Vitals:  BP (!) 152/74   Pulse 62   Temp 97.6  F (36.4  C) (Oral)   Ht 1.606 m (5' 3.23\")   Wt 46.7 kg (103 lb)   SpO2 99%   BMI 18.11 kg/m      Physical Exam:    General: Healthy appearing elderly female. No acute distress.   HENT: EOMI. No icterus. NC/AT.   Cardiovascular: RRR. No audible gallop/murmur/rub. Extremities warm and well perfused. No significant peripheral edema.   Pulmonary: Lungs clear to auscultation. No increased work of breathing, speaking comfortably on room air.  Skin: No visible bruising/petechiae on exposed skin.  Neuro: Alert and orientated. Normal mentation. Normal speech.   Psych: Normal mood and affect.     Laboratory Results Reviewed:       Lab Test 10/01/21  0927   WBC 2.9*   RBC 3.40*   HGB 11.2*   HCT 34.2*   *   MCH 32.9   MCHC 32.7   RDW 14.3        Ferritin (10/01): 116  Iron (10/01): 60  Iron Binding Capacity (10/01): 268               Again, thank you for allowing me to participate in the care of your patient.        Sincerely,        Doug Sen MD    "

## 2021-10-12 NOTE — PROGRESS NOTES
[facility] Hematology Consultation  [address] [place] [address]  Phone: [phone]    Outpatient Visit Note:    Patient: Patrick Olson   MRN: [id number]   : 1940   SARY: Oct 12, 2021     Reason for Consultation: Patrick Olson is a referred by Essence Tamayo for evaluation and treatment of anemia.    Assessment  Patrick Olson is a 80 year old woman history of rheumatoid arthritis on methotrexate and GI bleeding from diverticulosis with mild leukopenia (no differential available) and multifactorial macrocytic anemia with contributions from chronic inflammation and methotrexate therapy.  At this point I think the best strategy would be to change therapy from methotrexate to another DMARD given long-term risk of MDS.  If not improved off this therapy then would consider bone marrow biopsy, particularly if she is neutropenic.        Most recent labs reveal mild macrocytic anemia (Hgb 11.2, ) and leukopenia (WBC 2.9, no diff avaliable). Most likely driving factor for patients anemia/leukopenia is her methotrexate as indicated by an elevated MCV.  B12 elevated recently per patient records, and she is already on folate supplementation. Iron studies reveal she is iron replete. Was hospitalized earlier this year for diverticular bleeding requiring transfusion x2, but no concern for ongoing bleed. While patient is at increased risk for MDS given methotrexate, low suspicion at this time, given hemoglobin significantly improved since hospitlization for diverticular bleed. No differential or smear on blood work prior to visit, so will order those to evaluate further. Would not recommend bone marrow biopsy at this time. Would recommend switching from methotrexate to other non-myelosuppressive agent, though, as her methotrexate is clearly contributing to anemia/leukopenia at baseline.     Recommendations  - Follow up as planned with Dr. Clements and discuss non-myelosuppressive agents  for rheumatoid arthritis (message sent to make him aware)  - Ordered CBC w/ differential and blood smear (can obtain when gets labs drawn with rheumatology)  - Discontinue iron supplementation as she is now iron replete    Sameer Durán  Medical Student  Hematology Clinic    Physician Attestation   I saw this patient with the student who acted as my scribe for the visit.  I have edited the note to reflect my history, exam and medical decision making.    60 minutes spent on the date of the encounter doing chart review, review of outside records, review of test results, interpretation of tests, patient visit and documentation     Doug Sen MD   of Medicine  [employer] [facility] [facility] [employer]         HPI:    Patrick Olson is a 81 y/o female w/ PMHx significant for rheumatoid arthritis (on methotrexate), diverticulosis (hx of diverticular bleed), and CKD3, who presents for evaluation of anemia.     Recent hospitalization for diverticular bleed, requiring transfusion x2 (01/20-25). No clear source of bleeding identified on flex sigmoidscopy or tagged RBC study. Bleeding resolved spontaneously. No recurrence of bleeding since. Has undergone colonoscopy since, which was unremarkable except for diverticulosis. 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     No recent hematuria, hemoptysis, new bruises, or petechiae. Was started iron supplement by outside provider given concern she was iron deficient (normal iron studies, but elevated soluble transferrin receptor). Does describe darker stools since starting iron supplement, but no overt blood. Otherwise no abdominal pain, constipation, or any other new concerns since starting iron.      Of note, the patient has a long term history of rheumatoid arthritis, immunosuppressed on methotrexate. Prior rheumatologist retired, so she is currently working on getting set up with a  "new rheumatologist (has appointment with Dr. Clements on 10/14/21).  No recent increase in methotrexate, but she did decide to go down on her methotrexate earlier this year as her pain had been well controlled. No significant increase in pain since.     Review of Systems:    General: Mild weight loss over past couple of years. No fever/chills.   HEENT: No vision changes. No nosebleeds.   SKIN: No bruising/petechiae. No new rash.   CARDIOVASCULAR: No chest pain/palpitations.   RESPIRATORY: No SOB/cough. No hemoptysis.   GASTROINTESTINAL: No abdominal pain. No constipation/diarrhea. Dark stool (new since starting iron supplement)  GENITOURINARY: No hematuria.   NEUROLOGICAL: No headache. No light headedness/dizziness. No numbness/tingling.     Past Medical/Surgical History:    Past Medical History:   Diagnosis Date     Osteoarthritis      Osteoporosis      Rheumatoid arthritis (H)      Sensorineural hearing loss      Past Surgical History:   Procedure Laterality Date     COLONOSCOPY N/A 3/14/2017    Procedure: COMBINED COLONOSCOPY, SINGLE OR MULTIPLE BIOPSY/POLYPECTOMY BY BIOPSY;  Surgeon: Spencer Downey MD;  Location:  GI     IR VISCERAL EMBOLIZATION  1/22/2021     ORTHOPEDIC SURGERY       SIGMOIDOSCOPY FLEXIBLE N/A 1/23/2021    Procedure: SIGMOIDOSCOPY, FLEXIBLE;  Surgeon: Jaime Steinberg MD;  Location:  GI     Family History:  - No known family history of bleeding/clotting disorders    Social History:   - Retired (formerly employed as  at [employer])  - Former  (multiple works displayed at [employer] and [employer]); stopped recently due to back pain limiting her    Vitals:  BP (!) 152/74   Pulse 62   Temp 97.6  F (36.4  C) (Oral)   Ht 1.606 m (5' 3.23\")   Wt 46.7 kg (103 lb)   SpO2 99%   BMI 18.11 kg/m      Physical Exam:    General: Healthy appearing elderly female. No acute distress.   HENT: EOMI. No icterus. NC/AT. "   Cardiovascular: RRR. No audible gallop/murmur/rub. Extremities warm and well perfused. No significant peripheral edema.   Pulmonary: Lungs clear to auscultation. No increased work of breathing, speaking comfortably on room air.  Skin: No visible bruising/petechiae on exposed skin.  Neuro: Alert and orientated. Normal mentation. Normal speech.   Psych: Normal mood and affect.     Laboratory Results Reviewed:       Lab Test 10/01/21  0927   WBC 2.9*   RBC 3.40*   HGB 11.2*   HCT 34.2*   *   MCH 32.9   MCHC 32.7   RDW 14.3        Ferritin (10/01): 116  Iron (10/01): 60  Iron Binding Capacity (10/01): 268

## 2021-10-14 ENCOUNTER — OFFICE VISIT (OUTPATIENT)
Dept: RHEUMATOLOGY | Facility: CLINIC | Age: 81
End: 2021-10-14
Attending: INTERNAL MEDICINE
Payer: COMMERCIAL

## 2021-10-14 ENCOUNTER — ANCILLARY PROCEDURE (OUTPATIENT)
Dept: ULTRASOUND IMAGING | Facility: CLINIC | Age: 81
End: 2021-10-14
Attending: INTERNAL MEDICINE
Payer: COMMERCIAL

## 2021-10-14 ENCOUNTER — PRE VISIT (OUTPATIENT)
Dept: RHEUMATOLOGY | Facility: CLINIC | Age: 81
End: 2021-10-14

## 2021-10-14 VITALS
WEIGHT: 101.1 LBS | SYSTOLIC BLOOD PRESSURE: 125 MMHG | DIASTOLIC BLOOD PRESSURE: 61 MMHG | BODY MASS INDEX: 17.78 KG/M2 | HEART RATE: 69 BPM | OXYGEN SATURATION: 99 % | TEMPERATURE: 97.5 F

## 2021-10-14 DIAGNOSIS — Z79.899 HIGH RISK MEDICATION USE: ICD-10-CM

## 2021-10-14 DIAGNOSIS — D53.9 MACROCYTIC ANEMIA: Primary | ICD-10-CM

## 2021-10-14 DIAGNOSIS — N28.9 RENAL INSUFFICIENCY: ICD-10-CM

## 2021-10-14 DIAGNOSIS — M06.9 RHEUMATOID ARTHRITIS INVOLVING MULTIPLE SITES, UNSPECIFIED WHETHER RHEUMATOID FACTOR PRESENT (H): ICD-10-CM

## 2021-10-14 PROCEDURE — 99205 OFFICE O/P NEW HI 60 MIN: CPT | Performed by: INTERNAL MEDICINE

## 2021-10-14 PROCEDURE — 76770 US EXAM ABDO BACK WALL COMP: CPT | Mod: GC | Performed by: RADIOLOGY

## 2021-10-14 RX ORDER — HYDROXYCHLOROQUINE SULFATE 200 MG/1
200 TABLET, FILM COATED ORAL DAILY
Qty: 90 TABLET | Refills: 1 | Status: SHIPPED | OUTPATIENT
Start: 2021-10-14 | End: 2022-04-12

## 2021-10-14 ASSESSMENT — PAIN SCALES - GENERAL: PAINLEVEL: MODERATE PAIN (4)

## 2021-10-14 NOTE — PATIENT INSTRUCTIONS
1) stop methotrexate  2) start plaquenil 1 tablet (200mg) daily  3) Plan for your routine yearly eye exam  4) Follow-up with me in 12 weeks with labs prior to your appointment  5) Follow-up with spine doctor to talk about options (repeating injection vs other)    Greg Clements MD  Rheumatology

## 2021-10-14 NOTE — PROGRESS NOTES
Outpatient Rheumatology Consultation    Name: Patrick Olson    MRN [id number]   Today's date: 10/14/2021         Reason for consult: Establish care for rheumatoid arthritis on methotrexate   Requesting physician: Lauryn Tamayo MD             Assessment & Plan:   80-year-old female with a history of seropositive rheumatoid arthritis with secondary Sjogren's syndrome currently on methotrexate 10 mg weekly (previously on 20 mg weekly)/folic acid 2 mg daily and 5 mg Salagen nightly presents to rheumatology to establish care.  Had recent visit with Dr Sen of hematology for evaluation and treatment of macrocytic anemia likely secondary to chronic inflammation and methotrexate therapy.  She also has a mild leukopenia.  Her multifactorial macrocytic anemia is further complicated by recurrent diverticular bleeding which most recently in January 2021 required hospitalization and transfusion.  At this point her seropositive rheumatoid arthritis is under good control and in remission on this low-dose of methotrexate which is on the very low end of therapeutic dose.  Given the likely contribution to her anemia and possibly leukopenia in this context of well-controlled low disease activity we discussed other options for her inflammatory arthritis.  We have arrived on hydroxychloroquine.  Risks and benefits discussed to include retinal toxicity and the need for routine OCT retinal toxicity screening exam.  She already sees an ophthalmologist at least yearly.  She will continue this.  We also discussed the rare but serious cutaneous side effect of the drug.  She knows to stop at first sign of rash and let us know.  Headache/GI/other adverse events discussed.  She is agreeable.     We will plan follow-up in about 3 months with labs prior that appointment.  We will repeat serologies to include rheumatoid factor and CCP along with baseline screening labs CBC, CMP, ESR, CRP.      Plan:  -Stop methotrexate and folic  acid  -Start hydroxychloroquine 200 mg once daily dose adjusted for weight and keeping in mind her GFR 55  -Follow-up in 3 months with labs in the week prior to that appointment  -At that time we will also obtain bilateral hand films and foot films as baseline  -Yearly eye exam with screening OCT    Greg Clements MD  Rheumatology     Some total of 60 minutes on the date of service on chart review, patient encounter, documentation,      Subjective:   Patient with a diagnosis of seropositive rheumatoid arthritis and secondary Sjogren's syndrome on methotrexate 10 mg weekly, folic acid 2 mg daily and Salagen 5 mg each evening presents to rheumatology to discuss other therapies for her inflammatory arthritis in the setting of chronic macrocytic anemia and mild leukopenia.  She reports that overall her joint symptoms of peripheral joints have been well controlled even with her reduction of methotrexate dose from 20 mg weekly earlier this year down to 10 mg weekly due to the above concern.  She has not had worsening of joint pain, stiffness or any return of erythema edema or warmth.  She is able to make full fist upon waking in the morning.  Denies any early morning stiffness lasting more than 3 to 5 minutes.  Denies any fevers chills.  She was also previously taking Celebrex though in the context of her recurrent diverticular bleeds this was discontinued earlier this year as well.  No worsening after this discontinuation either.  Has mild dry eyes and sees an ophthalmologist yearly.  She does have dry mouth and finds that the Salagen has been helpful.  She denies any swelling of her parotid glands or submandibular glands.  She drinks water frequently including overnight when needed.  Denies recurrent/progressive dental disease.  Sees a dentist regularly.  No new rash.  No fevers or chills.  No recurrent infection.  Her weight is now stable though she initially did have some weight loss earlier this year  with her diverticular disease and change in diet.  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  No history of DVT.  No hair loss.  No inflammatory eye disease history.  No oral or nasal ulcers.  No recurrent epistaxes.  No photosensitive rash.  No chest pain or shortness of breath.  No change in strength or sensation in arms or legs.  On complaint at this time is ongoing lumbar spine pain for which she had previously seen orthospine.  Previously had injections which were helpful though the most recent was not.    Past Medical History  Past Medical History:   Diagnosis Date     Diverticulosis of large intestine      Osteoarthritis      Osteoporosis      Rheumatoid arthritis (H)      Sensorineural hearing loss      Past Surgical History  Past Surgical History:   Procedure Laterality Date     COLONOSCOPY N/A 3/14/2017    Procedure: COMBINED COLONOSCOPY, SINGLE OR MULTIPLE BIOPSY/POLYPECTOMY BY BIOPSY;  Surgeon: Spencer Downey MD;  Location:  GI     IR VISCERAL EMBOLIZATION  1/22/2021     ORTHOPEDIC SURGERY       SIGMOIDOSCOPY FLEXIBLE N/A 1/23/2021    Procedure: SIGMOIDOSCOPY, FLEXIBLE;  Surgeon: Jaime Steinberg MD;  Location:  GI     Medications  Current Outpatient Medications   Medication     acetaminophen (TYLENOL) 500 MG tablet     amoxicillin (AMOXIL) 400 MG/5ML suspension     calcium carb 1250 mg, 500 mg [place] [employer] [place],/vitamin D 200 units (OSCAL WITH D) 500-200 MG-UNIT per tablet     EPINEPHrine (EPIPEN) 0.3 MG/0.3ML injection     famotidine (PEPCID) 20 MG tablet     Ferrous Gluconate 239 (27 Fe) MG TABS     gabapentin (NEURONTIN) 300 MG capsule     lifitegrast (XIIDRA) 5 % opthalmic solution     methotrexate 2.5 MG tablet     Multiple Vitamins-Minerals (OCUVITE PRESERVISION PO)     pilocarpine (SALAGEN) 5 MG tablet     tretinoin (RETIN-A) 0.1 % external cream     No current facility-administered medications for this  visit.       Allergies  Allergies   Allergen Reactions     Bee Venom Anaphylaxis     Shrimp Anaphylaxis     Evoxac [Cevimeline] Unknown     Prevnar Swelling     Other reaction(s): Edema     Prevnar [Pneumococcal 13-Linda Conj Vacc] Unknown       Family History: No family hx of autoimmune disease    Social History:        Objective:     Blood pressure 125/61, pulse 69, temperature 97.5  F (36.4  C), temperature source Oral, weight 45.9 kg (101 lb 1.6 oz), SpO2 99 %, not currently breastfeeding.  General sitting up unassisted no acute distress  HEENT sclera clear no facial rash no oral ulcers dry mouth with good dentition no parotid or submandibular swelling no scarring alopecia  Cardiovascular regular rate and rhythm  Pulmonary clear to auscultation bilaterally abdomen thin no masses nontender not distended  MSK full active and passive range of motion without pain of her bilateral shoulders, elbows.  Right wrist with mild inability to fully flex 5 out 10 degrees.  Extension also fully limited by about 5 to 10 degrees.  No tenderness at this site though some synovial hypertrophy/fullness.  No erythema or warmth.  She is able to make a full fist bilaterally.   strength is full.  No synovitis of MCPs, PIPs, DIPs bilaterally.  No tenderness at the sites.  Full active passive range of motion of bilateral hips, knees, ankles.  No synovitis.  MTP squeeze negative.      Labs:  WBC   Date Value Ref Range Status   01/20/2021 5.0 4.0 - 11.0 10e9/L Final     WBC Count   Date Value Ref Range Status   10/01/2021 2.9 (L) 4.0 - 11.0 10e3/uL Final     Hemoglobin   Date Value Ref Range Status   10/01/2021 11.2 (L) 11.7 - 15.7 g/dL Final   01/25/2021 8.0 (L) 11.7 - 15.7 g/dL Final     Platelet Count   Date Value Ref Range Status   10/01/2021 183 150 - 450 10e3/uL Final   01/20/2021 207 150 - 450 10e9/L Final     Creatinine   Date Value Ref Range Status   10/01/2021 0.99 0.52 - 1.04 mg/dL Final   01/23/2021 0.75 0.52 - 1.04 mg/dL  Final     Lab Results   Component Value Date    ALKPHOS 77 03/13/2017     AST   Date Value Ref Range Status   03/13/2017 10 0 - 45 U/L Final     Lab Results   Component Value Date    ALT 12 03/13/2017     Sed Rate   Date Value Ref Range Status   03/13/2017 63 (H) 0 - 30 mm/h Final     CRP Inflammation   Date Value Ref Range Status   03/13/2017 10.0 (H) 0.0 - 8.0 mg/L Final     UA RESULTS:  Recent Labs   Lab Test 10/01/21  0934   COLOR Straw   APPEARANCE Clear   URINEGLC Negative   URINEBILI Negative   URINEKETONE Negative   SG 1.003   UBLD Negative   URINEPH 6.0   PROTEIN Negative   NITRITE Negative   LEUKEST Negative   RBCU 1   WBCU <1      No results found for: ANAIGG, ANAP1, TRUMAN, DNA, ENASMI, RNPIGG, ENASSA, ENASSB, C3COM, C4COM, CKTOTAL, ALDOLASE, RHF, CCPIGG, ANCA, MPOIGG, PR3IGG    Imaging:  No peripheral x-rays to review in our system

## 2021-10-15 NOTE — TELEPHONE ENCOUNTER
REFERRAL INFORMATION:    Referring Provider:  Dr. Essence Tamayo     Referring Clinic:  [facility]     Reason for Visit/Diagnosis: Gastric AVM      FUTURE VISIT INFORMATION:    Appointment Date: 1/12/2022    Appointment Time: 10 AM      NOTES STATUS DETAILS   OFFICE NOTE from Referring Provider Received 1/19/2021 Office visit with Kylie Davies PA-C - fax pg 10, scanned under 8/24/2021 1/29/2021 Office visit with Dr. Tamayo- fax pg 14    7/20/2021 Office visit with Dr. Tamayo, fax pg 18     OFFICE NOTE from Other Specialist Internal  10/12/2021 Office visit with Dr. Doug Sen ([facility])   HOSPITAL DISCHARGE SUMMARY/  ED VISITS Internal  11/16/2021 ([facility])    OPERATIVE REPORT Internal  Sigmoidoscopy: 1/20/2021   MEDICATION LIST Internal         ENDOSCOPY  Received EGD: 5/26/2021 ([facility])    COLONOSCOPY Received/ Internal  5/26/2021 ([facility])   3/13/17, 8/19/10, 8/5/10, 3/13/06     ERCP N/A    EUS N/A    STOOL TESTING Internal 3/14/17   PERTINENT LABS Internal/ Care Everywhere    PATHOLOGY REPORTS (RELATED) N/A    IMAGING (CT, MRI, EGD, MRCP, Small Bowel Follow Through/SBT, MR/CT Enterography) Internal NM GI Bleed: 1/20/2021  CT Abdomen Pelvis: 1/19/2021     1/3/2022 11:41am Fax request sent to [facility] for med recs. -Bhao     1/7/2022 1:58pm Fax request sent to [facility] for med recs. -Bhao     1/10/2022 9:15am Fax request sent to [facility] for med recs. -Bhao

## 2021-11-05 ENCOUNTER — LAB (OUTPATIENT)
Dept: LAB | Facility: CLINIC | Age: 81
End: 2021-11-05
Payer: COMMERCIAL

## 2021-11-05 DIAGNOSIS — M06.9 RHEUMATOID ARTHRITIS INVOLVING MULTIPLE SITES, UNSPECIFIED WHETHER RHEUMATOID FACTOR PRESENT (H): ICD-10-CM

## 2021-11-05 LAB
BASOPHILS # BLD AUTO: 0 10E3/UL (ref 0–0.2)
BASOPHILS NFR BLD AUTO: 1 %
CRP SERPL-MCNC: <2.9 MG/L (ref 0–8)
EOSINOPHIL # BLD AUTO: 0.1 10E3/UL (ref 0–0.7)
EOSINOPHIL NFR BLD AUTO: 2 %
ERYTHROCYTE [DISTWIDTH] IN BLOOD BY AUTOMATED COUNT: 14.5 % (ref 10–15)
ERYTHROCYTE [SEDIMENTATION RATE] IN BLOOD BY WESTERGREN METHOD: 36 MM/HR (ref 0–30)
HCT VFR BLD AUTO: 34.2 % (ref 35–47)
HGB BLD-MCNC: 11 G/DL (ref 11.7–15.7)
LYMPHOCYTES # BLD AUTO: 1.3 10E3/UL (ref 0.8–5.3)
LYMPHOCYTES NFR BLD AUTO: 33 %
MCH RBC QN AUTO: 32.6 PG (ref 26.5–33)
MCHC RBC AUTO-ENTMCNC: 32.2 G/DL (ref 31.5–36.5)
MCV RBC AUTO: 102 FL (ref 78–100)
MONOCYTES # BLD AUTO: 0.4 10E3/UL (ref 0–1.3)
MONOCYTES NFR BLD AUTO: 11 %
NEUTROPHILS # BLD AUTO: 2.2 10E3/UL (ref 1.6–8.3)
NEUTROPHILS NFR BLD AUTO: 54 %
PLATELET # BLD AUTO: 224 10E3/UL (ref 150–450)
RBC # BLD AUTO: 3.37 10E6/UL (ref 3.8–5.2)
WBC # BLD AUTO: 4 10E3/UL (ref 4–11)

## 2021-11-05 PROCEDURE — 86431 RHEUMATOID FACTOR QUANT: CPT

## 2021-11-05 PROCEDURE — 85652 RBC SED RATE AUTOMATED: CPT

## 2021-11-05 PROCEDURE — 36415 COLL VENOUS BLD VENIPUNCTURE: CPT

## 2021-11-05 PROCEDURE — 80053 COMPREHEN METABOLIC PANEL: CPT

## 2021-11-05 PROCEDURE — 86140 C-REACTIVE PROTEIN: CPT

## 2021-11-05 PROCEDURE — 85025 COMPLETE CBC W/AUTO DIFF WBC: CPT

## 2021-11-05 PROCEDURE — 86200 CCP ANTIBODY: CPT

## 2021-11-06 LAB
ALBUMIN SERPL-MCNC: 3.8 G/DL (ref 3.4–5)
ALP SERPL-CCNC: 70 U/L (ref 40–150)
ALT SERPL W P-5'-P-CCNC: 18 U/L (ref 0–50)
ANION GAP SERPL CALCULATED.3IONS-SCNC: 7 MMOL/L (ref 3–14)
AST SERPL W P-5'-P-CCNC: 21 U/L (ref 0–45)
BILIRUB SERPL-MCNC: 0.4 MG/DL (ref 0.2–1.3)
BUN SERPL-MCNC: 22 MG/DL (ref 7–30)
CALCIUM SERPL-MCNC: 9 MG/DL (ref 8.5–10.1)
CHLORIDE BLD-SCNC: 107 MMOL/L (ref 94–109)
CO2 SERPL-SCNC: 25 MMOL/L (ref 20–32)
CREAT SERPL-MCNC: 1.02 MG/DL (ref 0.52–1.04)
GFR SERPL CREATININE-BSD FRML MDRD: 53 ML/MIN/1.73M2
GLUCOSE BLD-MCNC: 91 MG/DL (ref 70–99)
POTASSIUM BLD-SCNC: 4.7 MMOL/L (ref 3.4–5.3)
PROT SERPL-MCNC: 7.5 G/DL (ref 6.8–8.8)
SODIUM SERPL-SCNC: 139 MMOL/L (ref 133–144)

## 2021-11-08 LAB
CCP AB SER IA-ACNC: 2.5 U/ML
RHEUMATOID FACT SER NEPH-ACNC: <7 IU/ML

## 2021-11-15 ENCOUNTER — MEDICAL CORRESPONDENCE (OUTPATIENT)
Dept: HEALTH INFORMATION MANAGEMENT | Facility: CLINIC | Age: 81
End: 2021-11-15
Payer: COMMERCIAL

## 2021-11-15 ENCOUNTER — TRANSFERRED RECORDS (OUTPATIENT)
Dept: HEALTH INFORMATION MANAGEMENT | Facility: CLINIC | Age: 81
End: 2021-11-15
Payer: COMMERCIAL

## 2021-11-16 ENCOUNTER — HOSPITAL ENCOUNTER (INPATIENT)
Facility: CLINIC | Age: 81
LOS: 2 days | Discharge: HOME | DRG: 348 | End: 2021-11-20
Attending: EMERGENCY MEDICINE | Admitting: STUDENT IN AN ORGANIZED HEALTH CARE EDUCATION/TRAINING PROGRAM
Payer: COMMERCIAL

## 2021-11-16 ENCOUNTER — APPOINTMENT (OUTPATIENT)
Dept: CT IMAGING | Facility: CLINIC | Age: 81
DRG: 348 | End: 2021-11-16
Attending: EMERGENCY MEDICINE
Payer: COMMERCIAL

## 2021-11-16 ENCOUNTER — NURSE TRIAGE (OUTPATIENT)
Dept: NURSING | Facility: CLINIC | Age: 81
End: 2021-11-16
Payer: COMMERCIAL

## 2021-11-16 DIAGNOSIS — Z20.822 LAB TEST NEGATIVE FOR COVID-19 VIRUS: ICD-10-CM

## 2021-11-16 DIAGNOSIS — K57.31 DIVERTICULOSIS OF LARGE INTESTINE WITH HEMORRHAGE: Primary | ICD-10-CM

## 2021-11-16 DIAGNOSIS — K92.2 GASTROINTESTINAL HEMORRHAGE, UNSPECIFIED GASTROINTESTINAL HEMORRHAGE TYPE: ICD-10-CM

## 2021-11-16 LAB
ABO/RH(D): NORMAL
ALBUMIN SERPL-MCNC: 3.8 G/DL (ref 3.4–5)
ALBUMIN UR-MCNC: NEGATIVE MG/DL
ALP SERPL-CCNC: 69 U/L (ref 40–150)
ALT SERPL W P-5'-P-CCNC: 18 U/L (ref 0–50)
ANION GAP SERPL CALCULATED.3IONS-SCNC: 7 MMOL/L (ref 3–14)
ANTIBODY SCREEN: NEGATIVE
APPEARANCE UR: CLEAR
APTT PPP: 29 SECONDS (ref 22–38)
AST SERPL W P-5'-P-CCNC: 21 U/L (ref 0–45)
BASOPHILS # BLD AUTO: 0.1 10E3/UL (ref 0–0.2)
BASOPHILS NFR BLD AUTO: 1 %
BILIRUB SERPL-MCNC: 0.4 MG/DL (ref 0.2–1.3)
BILIRUB UR QL STRIP: NEGATIVE
BUN SERPL-MCNC: 13 MG/DL (ref 7–30)
CALCIUM SERPL-MCNC: 9.4 MG/DL (ref 8.5–10.1)
CHLORIDE BLD-SCNC: 106 MMOL/L (ref 94–109)
CO2 SERPL-SCNC: 26 MMOL/L (ref 20–32)
COLOR UR AUTO: ABNORMAL
CREAT SERPL-MCNC: 0.98 MG/DL (ref 0.52–1.04)
EOSINOPHIL # BLD AUTO: 0 10E3/UL (ref 0–0.7)
EOSINOPHIL NFR BLD AUTO: 1 %
ERYTHROCYTE [DISTWIDTH] IN BLOOD BY AUTOMATED COUNT: 14.4 % (ref 10–15)
GFR SERPL CREATININE-BSD FRML MDRD: 55 ML/MIN/1.73M2
GLUCOSE BLD-MCNC: 109 MG/DL (ref 70–99)
GLUCOSE UR STRIP-MCNC: NEGATIVE MG/DL
HCT VFR BLD AUTO: 28.6 % (ref 35–47)
HGB BLD-MCNC: 8.6 G/DL (ref 11.7–15.7)
HGB BLD-MCNC: 9.5 G/DL (ref 11.7–15.7)
HGB UR QL STRIP: NEGATIVE
HOLD SPECIMEN: NORMAL
IMM GRANULOCYTES # BLD: 0 10E3/UL
IMM GRANULOCYTES NFR BLD: 0 %
INR PPP: 1.02 (ref 0.85–1.15)
KETONES UR STRIP-MCNC: ABNORMAL MG/DL
LEUKOCYTE ESTERASE UR QL STRIP: NEGATIVE
LYMPHOCYTES # BLD AUTO: 1.1 10E3/UL (ref 0.8–5.3)
LYMPHOCYTES NFR BLD AUTO: 27 %
MCH RBC QN AUTO: 32.9 PG (ref 26.5–33)
MCHC RBC AUTO-ENTMCNC: 33.2 G/DL (ref 31.5–36.5)
MCV RBC AUTO: 99 FL (ref 78–100)
MONOCYTES # BLD AUTO: 0.4 10E3/UL (ref 0–1.3)
MONOCYTES NFR BLD AUTO: 8 %
NEUTROPHILS # BLD AUTO: 2.6 10E3/UL (ref 1.6–8.3)
NEUTROPHILS NFR BLD AUTO: 63 %
NITRATE UR QL: NEGATIVE
NRBC # BLD AUTO: 0 10E3/UL
NRBC BLD AUTO-RTO: 0 /100
PH UR STRIP: 5.5 [PH] (ref 5–7)
PLATELET # BLD AUTO: 218 10E3/UL (ref 150–450)
POTASSIUM BLD-SCNC: 3.5 MMOL/L (ref 3.4–5.3)
PROT SERPL-MCNC: 7.2 G/DL (ref 6.8–8.8)
RBC # BLD AUTO: 2.89 10E6/UL (ref 3.8–5.2)
RBC URINE: <1 /HPF
SARS-COV-2 RNA RESP QL NAA+PROBE: NEGATIVE
SODIUM SERPL-SCNC: 139 MMOL/L (ref 133–144)
SP GR UR STRIP: 1.03 (ref 1–1.03)
SPECIMEN EXPIRATION DATE: NORMAL
SQUAMOUS EPITHELIAL: <1 /HPF
UROBILINOGEN UR STRIP-MCNC: NORMAL MG/DL
WBC # BLD AUTO: 4.2 10E3/UL (ref 4–11)
WBC URINE: 2 /HPF

## 2021-11-16 PROCEDURE — 85610 PROTHROMBIN TIME: CPT | Performed by: EMERGENCY MEDICINE

## 2021-11-16 PROCEDURE — G0378 HOSPITAL OBSERVATION PER HR: HCPCS

## 2021-11-16 PROCEDURE — 36415 COLL VENOUS BLD VENIPUNCTURE: CPT | Performed by: EMERGENCY MEDICINE

## 2021-11-16 PROCEDURE — 96374 THER/PROPH/DIAG INJ IV PUSH: CPT | Mod: 59 | Performed by: EMERGENCY MEDICINE

## 2021-11-16 PROCEDURE — 250N000013 HC RX MED GY IP 250 OP 250 PS 637: Performed by: PHYSICIAN ASSISTANT

## 2021-11-16 PROCEDURE — 99226 PR SUBSEQUENT OBSERVATION CARE,LEVEL III: CPT | Performed by: STUDENT IN AN ORGANIZED HEALTH CARE EDUCATION/TRAINING PROGRAM

## 2021-11-16 PROCEDURE — 85018 HEMOGLOBIN: CPT | Performed by: EMERGENCY MEDICINE

## 2021-11-16 PROCEDURE — 99207 PR CDG-HISTORY COMPONENT: MEETS DETAILED - DOWN CODED LACK OF ROS: CPT | Performed by: STUDENT IN AN ORGANIZED HEALTH CARE EDUCATION/TRAINING PROGRAM

## 2021-11-16 PROCEDURE — 99285 EMERGENCY DEPT VISIT HI MDM: CPT | Mod: 25 | Performed by: EMERGENCY MEDICINE

## 2021-11-16 PROCEDURE — 80053 COMPREHEN METABOLIC PANEL: CPT | Performed by: EMERGENCY MEDICINE

## 2021-11-16 PROCEDURE — 85025 COMPLETE CBC W/AUTO DIFF WBC: CPT | Performed by: EMERGENCY MEDICINE

## 2021-11-16 PROCEDURE — 85730 THROMBOPLASTIN TIME PARTIAL: CPT | Performed by: EMERGENCY MEDICINE

## 2021-11-16 PROCEDURE — U0003 INFECTIOUS AGENT DETECTION BY NUCLEIC ACID (DNA OR RNA); SEVERE ACUTE RESPIRATORY SYNDROME CORONAVIRUS 2 (SARS-COV-2) (CORONAVIRUS DISEASE [COVID-19]), AMPLIFIED PROBE TECHNIQUE, MAKING USE OF HIGH THROUGHPUT TECHNOLOGIES AS DESCRIBED BY CMS-2020-01-R: HCPCS | Performed by: EMERGENCY MEDICINE

## 2021-11-16 PROCEDURE — 250N000011 HC RX IP 250 OP 636: Performed by: EMERGENCY MEDICINE

## 2021-11-16 PROCEDURE — 81001 URINALYSIS AUTO W/SCOPE: CPT | Performed by: EMERGENCY MEDICINE

## 2021-11-16 PROCEDURE — 99285 EMERGENCY DEPT VISIT HI MDM: CPT | Performed by: EMERGENCY MEDICINE

## 2021-11-16 PROCEDURE — 74174 CTA ABD&PLVS W/CONTRAST: CPT

## 2021-11-16 PROCEDURE — 86900 BLOOD TYPING SEROLOGIC ABO: CPT | Performed by: EMERGENCY MEDICINE

## 2021-11-16 PROCEDURE — 74174 CTA ABD&PLVS W/CONTRAST: CPT | Mod: 26 | Performed by: RADIOLOGY

## 2021-11-16 PROCEDURE — C9113 INJ PANTOPRAZOLE SODIUM, VIA: HCPCS | Performed by: EMERGENCY MEDICINE

## 2021-11-16 PROCEDURE — 99207 PR APP CREDIT; MD BILLING SHARED VISIT: CPT | Performed by: PHYSICIAN ASSISTANT

## 2021-11-16 RX ORDER — ACETAMINOPHEN 500 MG
500 TABLET ORAL EVERY 6 HOURS PRN
Status: DISCONTINUED | OUTPATIENT
Start: 2021-11-16 | End: 2021-11-20 | Stop reason: HOSPADM

## 2021-11-16 RX ORDER — ONDANSETRON 2 MG/ML
4 INJECTION INTRAMUSCULAR; INTRAVENOUS EVERY 6 HOURS PRN
Status: DISCONTINUED | OUTPATIENT
Start: 2021-11-16 | End: 2021-11-20 | Stop reason: HOSPADM

## 2021-11-16 RX ORDER — ONDANSETRON 4 MG/1
4 TABLET, ORALLY DISINTEGRATING ORAL EVERY 6 HOURS PRN
Status: DISCONTINUED | OUTPATIENT
Start: 2021-11-16 | End: 2021-11-20 | Stop reason: HOSPADM

## 2021-11-16 RX ORDER — HYDROXYCHLOROQUINE SULFATE 200 MG/1
200 TABLET, FILM COATED ORAL DAILY
Status: DISCONTINUED | OUTPATIENT
Start: 2021-11-17 | End: 2021-11-20 | Stop reason: HOSPADM

## 2021-11-16 RX ORDER — IOPAMIDOL 755 MG/ML
61 INJECTION, SOLUTION INTRAVASCULAR ONCE
Status: COMPLETED | OUTPATIENT
Start: 2021-11-16 | End: 2021-11-16

## 2021-11-16 RX ORDER — GABAPENTIN 300 MG/1
300 CAPSULE ORAL AT BEDTIME
Status: DISCONTINUED | OUTPATIENT
Start: 2021-11-16 | End: 2021-11-20 | Stop reason: HOSPADM

## 2021-11-16 RX ORDER — PANTOPRAZOLE SODIUM 40 MG/1
40 TABLET, DELAYED RELEASE ORAL DAILY
Qty: 30 TABLET | Refills: 0 | Status: SHIPPED | OUTPATIENT
Start: 2021-11-16 | End: 2021-12-16

## 2021-11-16 RX ORDER — ESTRADIOL 10 UG/1
TABLET VAGINAL
COMMUNITY
Start: 2021-10-19 | End: 2022-04-20

## 2021-11-16 RX ADMIN — IOPAMIDOL 61 ML: 755 INJECTION, SOLUTION INTRAVENOUS at 17:24

## 2021-11-16 RX ADMIN — PANTOPRAZOLE SODIUM 40 MG: 40 INJECTION, POWDER, FOR SOLUTION INTRAVENOUS at 18:09

## 2021-11-16 RX ADMIN — GABAPENTIN 300 MG: 300 CAPSULE ORAL at 22:25

## 2021-11-16 NOTE — ED TRIAGE NOTES
Pt arrives from home. Pt states that she had bright red blood in stool which has progressively darkened beginning on 11/12. Hx of diverticulitis. Pt states that dark blood in stool is new for her. 2-4 loose BM per day.    Natacha Valdez RN on 11/16/2021 at 3:44 PM

## 2021-11-16 NOTE — ED PROVIDER NOTES
History     Chief Complaint   Patient presents with     Rectal Bleeding     HPI  Patrick Olson is a 80 year old female with a past medical history of diverticulitis, osteoarthritis, osteoporosis, rheumatoid arthritis who presents to the emergency department with a chief complaint of GI bleeding.  The patient came in from home.  Patient reports she has had bright red blood in her stool progressively darkening since 11/12.  It has also become tarry in nature.  It started similar to prior episodes of diverticulitis in the past.  However, the dark blood in her stool is new for her, she has not had this before.  The patient reports that she has many known diverticula, but GI was never able to find out exactly where the bleeding was coming from as she has so many.  However, she did have an upper endoscopy fairly recently which showed a vascular malformation, which was cauterized.  She has never had an upper GI bleed to her knowledge, she does not take any PPI.  She is not on any blood thinners currently.  The patient reports she has been on methotrexate for her rheumatoid arthritis for many years, this was recently changed to Plaquenil.  She was also recently started on some vaginal estrogen cream for atrophic vaginitis, but she denies any vaginal bleeding.  No hematuria reported.  No other sources of current bleeding.  No other recent medication changes reported.  She reports she has had associated diarrhea, with 2-4 loose bowel movements per day.  She is afebrile on arrival to the emergency department.  She denies any nausea, vomiting, or current abdominal pain.  Patient denies any fevers or chills.  The patient's last episode of GI bleeding was in January of this year.  The patient has been following a clear liquid diet for the past 2 days in hopes that her symptoms would resolve, however when the nature of the bleeding change, she called the nurse triage line and was advised to come to the emergency department for  further evaluation.    Of note, the patient states that her sister  from colon cancer.    I have reviewed the Medications, Allergies, Past Medical and Surgical History, and Social History in the [facility] system.    Past Medical History:   Diagnosis Date     Diverticulosis of large intestine      Osteoarthritis      Osteoporosis      Rheumatoid arthritis (H)      Sensorineural hearing loss      Past Surgical History:   Procedure Laterality Date     COLONOSCOPY N/A 3/14/2017    Procedure: COMBINED COLONOSCOPY, SINGLE OR MULTIPLE BIOPSY/POLYPECTOMY BY BIOPSY;  Surgeon: Spencer Downey MD;  Location: [facility]     IR VISCERAL EMBOLIZATION  2021     ORTHOPEDIC SURGERY       SIGMOIDOSCOPY FLEXIBLE N/A 2021    Procedure: SIGMOIDOSCOPY, FLEXIBLE;  Surgeon: Jaime Steinberg MD;  Location:  GI     No current facility-administered medications for this encounter.     Current Outpatient Medications   Medication     acetaminophen (TYLENOL) 500 MG tablet     amoxicillin (AMOXIL) 400 MG/5ML suspension     calcium carb 1250 mg, 500 mg [place],/vitamin D 200 units (OSCAL WITH D) 500-200 MG-UNIT per tablet     EPINEPHrine (EPIPEN) 0.3 MG/0.3ML injection     famotidine (PEPCID) 20 MG tablet     Ferrous Gluconate 239 (27 Fe) MG TABS     gabapentin (NEURONTIN) 300 MG capsule     hydroxychloroquine (PLAQUENIL) 200 MG tablet     lifitegrast (XIIDRA) 5 % opthalmic solution     methotrexate 2.5 MG tablet     Multiple Vitamins-Minerals (OCUVITE PRESERVISION PO)     pilocarpine (SALAGEN) 5 MG tablet     tretinoin (RETIN-A) 0.1 % external cream     Allergies   Allergen Reactions     Bee Venom Anaphylaxis     Shrimp Anaphylaxis     Evoxac [Cevimeline] Unknown     Prevnar Swelling     Other reaction(s): Edema     Prevnar [Pneumococcal 13-Linda Conj Vacc] Unknown     Past medical history, past surgical history, medications, and allergies were reviewed with the patient. Additional pertinent items: None    Social History     Socioeconomic  History     Marital status:      Spouse name: Not on file     Number of children: Not on file     Years of education: Not on file     Highest education level: Not on file   Occupational History     Not on file   Tobacco Use     Smoking status: Former Smoker     Smokeless tobacco: Never Used   Substance and Sexual Activity     Alcohol use: No     Drug use: No     Sexual activity: Not on file   Other Topics Concern     Not on file   Social History Narrative    - Retired (formerly employed as  at [employer])    - 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 Additional pertinent items: None    Review of Systems  General: No fevers or chills  Skin: No rash or diaphoresis  Eyes: No eye redness or discharge  Ears/Nose/Throat: No rhinorrhea or nasal congestion  Respiratory: No cough or SOB  Cardiovascular: No chest pain or palpitations  Gastrointestinal: See HPI  Genitourinary: No urinary frequency, hematuria, or dysuria  Musculoskeletal: No arthralgias or myalgias  Neurologic: No numbness or weakness  Psychiatric: No depression or SI  Hematologic/Lymphatic/Immunologic: No leg swelling, no easy bruising/bleeding  Endocrine: No polyuria/polydypsia    A complete review of systems was performed with pertinent positives and negatives noted in the HPI, and all other systems negative.    Physical Exam   BP: 124/76  Pulse: 95  Temp: 98  F (36.7  C)  Resp: 18  SpO2: 99 %      General:  Well nourished, well developed, NAD  HEENT: EOMI, anicteric. NCAT, MMM  Neck: no jugular venous distension, supple, nl ROM  Cardiac: Regular rate and rhythm. No murmurs, rubs, or gallops. Normal S1, S2.  Intact peripheral pulses  Pulm: CTAB, no stridor, wheezes, rales, rhonchi  Abd: Soft, nontender, nondistended.  No masses palpated.    Skin: Warm and dry to the touch.  No rash  Extremities: No LE edema, no cyanosis, w/w/p  Neuro: A&Ox3, no gross focal deficits    ED Course        Procedures                          Labs Ordered and Resulted from Time of ED Arrival to Time of ED Departure   COMPREHENSIVE METABOLIC PANEL - Abnormal       Result Value    Sodium 139      Potassium 3.5      Chloride 106      Carbon Dioxide (CO2) 26      Anion Gap 7      Urea Nitrogen 13      Creatinine 0.98      Calcium 9.4      Glucose 109 (*)     Alkaline Phosphatase 69      AST 21      ALT 18      Protein Total 7.2      Albumin 3.8      Bilirubin Total 0.4      GFR Estimate 55 (*)    CBC WITH PLATELETS AND DIFFERENTIAL - Abnormal    WBC Count 4.2      RBC Count 2.89 (*)     Hemoglobin 9.5 (*)     Hematocrit 28.6 (*)     MCV 99      MCH 32.9      MCHC 33.2      RDW 14.4      Platelet Count 218      % Neutrophils 63      % Lymphocytes 27      % Monocytes 8      % Eosinophils 1      % Basophils 1      % Immature Granulocytes 0      NRBCs per 100 WBC 0      Absolute Neutrophils 2.6      Absolute Lymphocytes 1.1      Absolute Monocytes 0.4      Absolute Eosinophils 0.0      Absolute Basophils 0.1      Absolute Immature Granulocytes 0.0      Absolute NRBCs 0.0     INR   PARTIAL THROMBOPLASTIN TIME   ROUTINE UA WITH MICROSCOPIC REFLEX TO CULTURE   ABO/RH TYPE AND SCREEN            Results for orders placed or performed during the hospital encounter of 11/16/21 (from the past 24 hour(s))   [place] Draw    Narrative    The following orders were created for panel order [place] Draw.  Procedure                               Abnormality          Status                     ---------                               -----------         ------                     Extra Blue Top Tube[[id number]]                              In process                 Extra Red Top Tube[[id number]]                               In process                 Extra Green Top (Lithium...[[id number]]                      In process                 Extra Purple Top Tube[[id number]]                            In process                 Extra Blood Bank Purple ...[[id number]]                      In process                   Please view results for these tests on the individual orders.   CBC with platelets differential    Narrative    The following orders were created for panel order CBC with platelets differential.  Procedure                               Abnormality         Status                     ---------                               -----------         ------                     CBC with platelets and d...[[id number]]  Abnormal            Final result                 Please view results for these tests on the individual orders.   Comprehensive metabolic panel   Result Value Ref Range    Sodium 139 133 - 144 mmol/L    Potassium 3.5 3.4 - 5.3 mmol/L    Chloride 106 94 - 109 mmol/L    Carbon Dioxide (CO2) 26 20 - 32 mmol/L    Anion Gap 7 3 - 14 mmol/L    Urea Nitrogen 13 7 - 30 mg/dL    Creatinine 0.98 0.52 - 1.04 mg/dL    Calcium 9.4 8.5 - 10.1 mg/dL    Glucose 109 (H) 70 - 99 mg/dL    Alkaline Phosphatase 69 40 - 150 U/L    AST 21 0 - 45 U/L    ALT 18 0 - 50 U/L    Protein Total 7.2 6.8 - 8.8 g/dL    Albumin 3.8 3.4 - 5.0 g/dL    Bilirubin Total 0.4 0.2 - 1.3 mg/dL    GFR Estimate 55 (L) >60 mL/min/1.73m2   CBC with platelets and differential   Result Value Ref Range    WBC Count 4.2 4.0 - 11.0 10e3/uL    RBC Count 2.89 (L) 3.80 - 5.20 10e6/uL    Hemoglobin 9.5 (L) 11.7 - 15.7 g/dL    Hematocrit 28.6 (L) 35.0 - 47.0 %    MCV 99 78 - 100 fL    MCH 32.9 26.5 - 33.0 pg    MCHC 33.2 31.5 -  36.5 g/dL    RDW 14.4 10.0 - 15.0 %    Platelet Count 218 150 - 450 10e3/uL    % Neutrophils 63 %    % Lymphocytes 27 %    % Monocytes 8 %    % Eosinophils 1 %    % Basophils 1 %    % Immature Granulocytes 0 %    NRBCs per 100 WBC 0 <1 /100    Absolute Neutrophils 2.6 1.6 - 8.3 10e3/uL    Absolute Lymphocytes 1.1 0.8 - 5.3 10e3/uL    Absolute Monocytes 0.4 0.0 - 1.3 10e3/uL    Absolute Eosinophils 0.0 0.0 - 0.7 10e3/uL    Absolute Basophils 0.1 0.0 - 0.2 10e3/uL    Absolute Immature Granulocytes 0.0 <=0.0 10e3/uL    Absolute NRBCs 0.0 10e3/uL       Labs, vital signs, and imaging studies were reviewed by me.    Medications   pantoprazole (PROTONIX) IV push injection 40 mg (has no administration in time range)       Assessments & Plan (with Medical Decision Making)   Patrick Olson is a 80 year old female who presents with GI bleeding.  Differential diagnosis includes diverticulitis/bleeding diverticulum, colon mass, vascular malformation, peptic ulcer disease, gastritis, colitis.  Labs, urinalysis, CTA of the abdomen and pelvis were ordered to further evaluate the patient.  Patient declines any pain medication and denies any nausea.  Protonix was given in case of upper GI bleed (the concern for which is elevated by the tarry and dark red nature of her stools instead of bright red bleeding that patient has had with diverticular bleeds in the past).    Labs thus far are remarkable for normal creatinine.  Hemoglobin 9.5 (decreased from hemoglobin of 11, drawn 11 days ago).     I have reviewed the nursing notes.    I have reviewed the findings, diagnosis, plan and need for follow up with the patient.    Patient to be signed out to oncoming provider, Dr. Salgado, labs, UA, and CT are pending at this time.  Disposition pending results.    New Prescriptions    PANTOPRAZOLE (PROTONIX) 40 MG EC TABLET    Take 1 tablet (40 mg) by mouth daily for 30 doses       Final diagnoses:   Gastrointestinal hemorrhage, unspecified  gastrointestinal hemorrhage type     Genie Rojas MD  11/16/2021   [facility] EMERGENCY DEPARTMENT     Genie Rojas MD  11/16/21 [id number]

## 2021-11-16 NOTE — DISCHARGE INSTRUCTIONS
"MESSAGE FROM DR FRANCES  You were admitted to the hospital for symptoms of \"GI bleeding\" or intestinal bleeding, we did many tests and found that you had bleeding in your small intestine (the terminal or distal ileum) on your video camera capsule study. We also did a colonoscopy and found a polyp that we removed and sent to the lab for testing. The GI doctors (Dr Arthur) will be following up with you about those results. Thankfully your blood counts (hemoglobin levels) were stable and we believe the internal intestinal bleeding had stopped on its own. We gave you IV iron and started you on an iron supplement which helps your body make more hemoglobin. At this point we believe it is safe for you continue your recovery outside the hospital. Remember to follow up with your primary doctor within the next 7 days as we discussed. If you notice symptoms such as black or red stools, fatigue or passing ou to lightheadedness or severe nauasea, call your doctor or come to the ER right away like we discussed.       FOR ANEMIA  -- take ferrous sulfate and ascorbic acid at the same time every day  -- follow up with your doctor as we discussed, you will need a \"CBC\" to check your blood counts  -- take pantoprazole INSTEAD OF famotidine for reflux, this is a type of acid blocker that is stronger than your famotidine and can be helpful for people with GI bleeding    FOR INTESTINAL POLYP  -- you need a repeat colonscopy in 6 months, someone will call you to arrange this  -- the gastroenterology doctors will be following up with you in clinic about your polyp results       "

## 2021-11-16 NOTE — ED PROVIDER NOTES
"SIGN-OUT:  - Assumed care of this patient from Dr. Rojas  - Pending at shift change: CTA A/P, labs (UA)  - Tentative plan from original EM Physician: F/U pending labs and CT imaging. Clinically reassess. Has had some drop in Hgb and given her age they do not think unreasonable to bring in for Obs/further eval/management, but she is reportedly reliable and if wants to go home they have written up for such (though they would recommend a repeat Hgb if able to convince patient to stay for such, 6 hrs would be about 10pm).     UPDATES / REASSESSMENT:  - Results:   --- CTA A/P (written prelim report): \"1. No GI bleed, retroperitoneal hemorrhage or evidence of active extravasation.   2. Colonic diverticulosis without evidence of diverticulitis.   3. Numerous hypoattenuating hepatic lesions, not significantly changed  from 1/19/2021.   4. Incidental findings including cholelithiasis and right nephrolithiasis.\" (Reviewed all results with the patient.  She was already aware of the incidental findings and will follow up with her usual primary care teams for such.)  --- UA: Acute findings  - Reassessment:   --- No acute issues or interventions necessary while still in the emergency department.    DISCUSSIONS:  - w/ Patient:    --- Reviewed available findings, options at this time.  Given her notable GIB history and ongoing symptoms for the last 4 days, as well as the hemoglobin drop, in discussion of the R/B/A, patient elects to come in for further observation, discussed with GI, etc.   - We will keep n.p.o., already received Protonix in case there is an upper source. No known variceal hx for related meds/management.   -  She expressed understanding and agreement with this plan. All questions answered to the best of our ability at this time.   - Discussed w/ admitting team. No additional requests from their standpoint for while in ED. They will F/U and continue further evaluation/management.     DISPOSITION:  - IMPRESSION: " GIB (BRBPR and darker stools), anemia/acute hgb drop  - DISPOSITION: Admit to IM for further evaluation/management  - OTHER RECOMMENDATIONS: GI consult, NPO, serial Hgbs, GIB precuations           Sarahi Salgado MD  11/18/21 7035

## 2021-11-16 NOTE — TELEPHONE ENCOUNTER
"Pt reports Diverticular bleeding in past and believes it is back. In past it  required blood transfusions and pt concerned it will become that severe again. Pt reports she had this issue in January 2021.    Bleeding began 11/12/21, it started with bright red blood with first two stools, then Saturday, 11/13/21, morning still bleeding but less, and was \"medium color\", Sunday, 11/14/21, smelly gas and \"darker, purple color mixed with blood with BMs. Monday, 11/15/21, stool was \"tarry and shiny\". Pt reports the stool was not easily flushed due to size and the quantity, states she had to flush it several times to get it to go down because it was so \"thick\".   Today, 11/16/21, at 0830, \"maroon, tarry\" stool, smaller volume, but then at 1115am, a bigger volume and stool was looser with maroon blood.    No constipation since bleeding began this time.    No vomiting or dizziness.   No fever.    No swollen or distended abdomen.  Pt denies taking blood thinner medications.    \"Slight\" constant pain in abdomen, mainly in lower mid abdomen. However, she does experience mild discomfort on occasion in the upper abdomen.    Pt put herself on clear liquid diet when bleeding began on 11/12/21. Pt states she feels weak from not eating.    Pt reports she is on Plaquenil since 10/14/21, for Rheumatoid Arthritis and is unsure if that is contributing to bleeding.     Per RN Triage protocol, pt will go to ED now for evaluation. Caller given care advice per RN triage protocol. Caller advised to call back with any questions or concerning or worsening symptoms. Caller verbalizes understanding and agreement of plan.    Jenny Dowell RN   11/16/21 2:01 PM  [facility] Nurse Advisor    Reason for Disposition    Bloody, black, or tarry bowel movements (Exception: chronic-unchanged  black-grey bowel movements and is taking iron pills or Pepto-bismol)    Additional Information    Negative: Passed out (i.e., fainted, collapsed and was not " responding)    Negative: Shock suspected (e.g., cold/pale/clammy skin, too weak to stand, low BP, rapid pulse)    Negative: Vomiting red blood or black (coffee ground) material    Negative: Sounds like a life-threatening emergency to the triager    Negative: Diarrhea is the main symptom    Negative: Rectal symptoms    Negative: SEVERE rectal bleeding (large blood clots; on and off, or constant bleeding)    Negative: SEVERE dizziness (e.g., unable to stand, requires support to walk, feels like passing out now)    Negative: MODERATE rectal bleeding (small blood clots, passing blood without stool, or toilet water turns red) more than once a day    Protocols used: RECTAL BLEEDING-A-[place]    COVID 19 Nurse Triage Plan/Patient Instructions    Please be aware that novel coronavirus (COVID-19) may be circulating in the community. If you develop symptoms such as fever, cough, or SOB or if you have concerns about the presence of another infection including coronavirus (COVID-19), please contact your health care provider or visit [email or web address].     Disposition/Instructions    ED Visit recommended. Follow protocol based instructions.     Bring Your Own Device:  Please also bring your smart device(s) (smart phones, tablets, laptops) and their charging cables for your personal use and to communicate with your care team during your visit.    Thank you for taking steps to prevent the spread of this virus.  o Limit your contact with others.  o Wear a simple mask to cover your cough.  o Wash your hands well and often.    Resources    [facility]: About COVID-19: [email or web address]    CDC: What to Do If You're Sick: www.cdc.gov/coronavirus/2019-ncov/about/steps-when-sick.html    CDC: Ending Home Isolation: www.cdc.gov/coronavirus/2019-ncov/hcp/disposition-in-home-patients.html     CDC: Caring for Someone: www.cdc.gov/coronavirus/2019-ncov/if-you-are-sick/care-for-someone.html     [facility]: Interim Guidance  for Hospital Discharge to Home: www.health.[facility].[place]/diseases/coronavirus/hcp/hospdischarge.pdf    [facility] clinical trials (COVID-19 research studies): [email or web address]     Below are the COVID-19 hotlines at the [place] Department of Health ([facility]). Interpreters are available.   o For health questions: Call 428-282-9524 or 1-504.221.2229 (7 a.m. to 7 p.m.)  o For questions about schools and childcare: Call 175-384-2695 or 1-341.765.2704 (7 a.m. to 7 p.m.)

## 2021-11-17 LAB
ANION GAP SERPL CALCULATED.3IONS-SCNC: 3 MMOL/L (ref 3–14)
BUN SERPL-MCNC: 10 MG/DL (ref 7–30)
CALCIUM SERPL-MCNC: 9 MG/DL (ref 8.5–10.1)
CHLORIDE BLD-SCNC: 110 MMOL/L (ref 94–109)
CO2 SERPL-SCNC: 30 MMOL/L (ref 20–32)
CREAT SERPL-MCNC: 1.04 MG/DL (ref 0.52–1.04)
ERYTHROCYTE [DISTWIDTH] IN BLOOD BY AUTOMATED COUNT: 14.6 % (ref 10–15)
GFR SERPL CREATININE-BSD FRML MDRD: 52 ML/MIN/1.73M2
GLUCOSE BLD-MCNC: 91 MG/DL (ref 70–99)
HCT VFR BLD AUTO: 26.6 % (ref 35–47)
HGB BLD-MCNC: 8.6 G/DL (ref 11.7–15.7)
HGB BLD-MCNC: 8.7 G/DL (ref 11.7–15.7)
HGB BLD-MCNC: 9.3 G/DL (ref 11.7–15.7)
LACTATE SERPL-SCNC: 2 MMOL/L (ref 0.7–2)
MCH RBC QN AUTO: 32.2 PG (ref 26.5–33)
MCHC RBC AUTO-ENTMCNC: 32.3 G/DL (ref 31.5–36.5)
MCV RBC AUTO: 100 FL (ref 78–100)
PLATELET # BLD AUTO: 185 10E3/UL (ref 150–450)
POTASSIUM BLD-SCNC: 4.5 MMOL/L (ref 3.4–5.3)
RBC # BLD AUTO: 2.67 10E6/UL (ref 3.8–5.2)
SODIUM SERPL-SCNC: 143 MMOL/L (ref 133–144)
WBC # BLD AUTO: 3.8 10E3/UL (ref 4–11)

## 2021-11-17 PROCEDURE — 80048 BASIC METABOLIC PNL TOTAL CA: CPT | Performed by: PHYSICIAN ASSISTANT

## 2021-11-17 PROCEDURE — 96376 TX/PRO/DX INJ SAME DRUG ADON: CPT

## 2021-11-17 PROCEDURE — 99207 PR CDG-CODE CATEGORY CHANGED: CPT | Performed by: PEDIATRICS

## 2021-11-17 PROCEDURE — G0378 HOSPITAL OBSERVATION PER HR: HCPCS

## 2021-11-17 PROCEDURE — 85018 HEMOGLOBIN: CPT | Performed by: PEDIATRICS

## 2021-11-17 PROCEDURE — 250N000011 HC RX IP 250 OP 636: Performed by: PEDIATRICS

## 2021-11-17 PROCEDURE — C9113 INJ PANTOPRAZOLE SODIUM, VIA: HCPCS | Performed by: PEDIATRICS

## 2021-11-17 PROCEDURE — 250N000013 HC RX MED GY IP 250 OP 250 PS 637: Performed by: PHYSICIAN ASSISTANT

## 2021-11-17 PROCEDURE — 258N000003 HC RX IP 258 OP 636: Performed by: PEDIATRICS

## 2021-11-17 PROCEDURE — 36415 COLL VENOUS BLD VENIPUNCTURE: CPT | Performed by: PEDIATRICS

## 2021-11-17 PROCEDURE — 83605 ASSAY OF LACTIC ACID: CPT | Performed by: PEDIATRICS

## 2021-11-17 PROCEDURE — 999N000007 HC SITE CHECK

## 2021-11-17 PROCEDURE — 85027 COMPLETE CBC AUTOMATED: CPT | Performed by: PHYSICIAN ASSISTANT

## 2021-11-17 PROCEDURE — 999N000128 HC STATISTIC PERIPHERAL IV START W/O US GUIDANCE

## 2021-11-17 PROCEDURE — 36415 COLL VENOUS BLD VENIPUNCTURE: CPT | Performed by: PHYSICIAN ASSISTANT

## 2021-11-17 PROCEDURE — 99225 PR SUBSEQUENT OBSERVATION CARE,LEVEL II: CPT | Performed by: PEDIATRICS

## 2021-11-17 RX ORDER — SODIUM FERRIC GLUCONATE COMPLEX IN SUCROSE 12.5 MG/ML
125 INJECTION INTRAVENOUS ONCE
Status: DISCONTINUED | OUTPATIENT
Start: 2021-11-17 | End: 2021-11-17

## 2021-11-17 RX ORDER — FERROUS SULFATE 325(65) MG
325 TABLET, DELAYED RELEASE (ENTERIC COATED) ORAL DAILY
Qty: 30 TABLET | Refills: 0 | Status: SHIPPED | OUTPATIENT
Start: 2021-11-17 | End: 2022-03-10

## 2021-11-17 RX ORDER — METHYLPREDNISOLONE SODIUM SUCCINATE 125 MG/2ML
125 INJECTION, POWDER, LYOPHILIZED, FOR SOLUTION INTRAMUSCULAR; INTRAVENOUS
Status: DISCONTINUED | OUTPATIENT
Start: 2021-11-17 | End: 2021-11-20

## 2021-11-17 RX ORDER — DIPHENHYDRAMINE HYDROCHLORIDE 50 MG/ML
50 INJECTION INTRAMUSCULAR; INTRAVENOUS
Status: DISCONTINUED | OUTPATIENT
Start: 2021-11-17 | End: 2021-11-20

## 2021-11-17 RX ORDER — ASCORBIC ACID 500 MG
500 TABLET ORAL DAILY
Qty: 30 TABLET | Refills: 0 | Status: SHIPPED | OUTPATIENT
Start: 2021-11-17 | End: 2022-04-20

## 2021-11-17 RX ADMIN — PANTOPRAZOLE SODIUM 40 MG: 40 INJECTION, POWDER, FOR SOLUTION INTRAVENOUS at 17:09

## 2021-11-17 RX ADMIN — GABAPENTIN 300 MG: 300 CAPSULE ORAL at 21:49

## 2021-11-17 RX ADMIN — SODIUM CHLORIDE 25 MG: 9 INJECTION, SOLUTION INTRAVENOUS at 14:01

## 2021-11-17 RX ADMIN — SODIUM CHLORIDE 125 MG: 9 INJECTION, SOLUTION INTRAVENOUS at 23:58

## 2021-11-17 NOTE — PLAN OF CARE
- Diagnostic tests and consults completed and resulted: No  - Vital signs normal or at patient baseline: Yes  - Tolerating oral intake to maintain hydration: Yes  - Hgb stable without evidence of active bleeding: No, pt has had two bloody stools

## 2021-11-17 NOTE — PROVIDER NOTIFICATION
Team paged at [phone]    SOBS, Rm 1, KRISTI TIRADO: Pt room is located in basement observation unit, rm 1.  REA Carty [id number]

## 2021-11-17 NOTE — PLAN OF CARE
[id number]    Activity: Up ad shayla, independent  Neuros: A&O x4.   Cardiac: WDL.   Respiratory: WDL on RA. Denies SOB. LS CTA  GI/: Voiding spontaneously. +BS, passing flatus. No bloody stool observed this shift.  Diet: Clears  Skin: CDI  Lines: Left PIV SL  Labs: Last Hgb 8.6, 0600 Hgb  Pain/nausea: Denies.  New changes this shift: Pt arrived on unit at approximately 0330

## 2021-11-17 NOTE — PLAN OF CARE
-diagnostic tests and consults completed and resulted: No   -vital signs normal or at patient baseline: Yes  -tolerating oral intake to maintain hydration: Yes  - hgb stable without evidence of active bleeding: Yes

## 2021-11-17 NOTE — PROVIDER NOTIFICATION
Gold 3 text paged:    FYI-Pt had a medium loose bright, red bloody stool. VSS and asymptomatic. Please advise. Thanks.

## 2021-11-17 NOTE — H&P
[facility]    History and Physical - Hospitalist Service, Gold Night       Date of Admission:  11/16/2021    Assessment & Plan      Patrick Olson is a 80 year old female admitted on 11/16/2021. She has a past medical history significant for rheumatoid arthritis, CKD stage 3, GERD, and prior diverticular bleeds who presented to the ED with recurrent hematochezia which has slowly evolved into darker/tarry stools. She is admitted to Internal Medicine for observation.    She had an admission in 01/2021 at [facility] with presumed diverticular bleed. Underwent tagged RBC scan with possible activity in hepatic/splenic flexure. Negative angiogram at that time. Flex sigmoidoscopy at that time with many diverticula in sigmoid colon with clot in them. Recommended to start fiber supplement. Has followed with [facility] as outpatient and had colonoscopy/EGD in 05/2021. Noted to have multiple diverticulosis in ascending/transverse and descending/sigmoid colon as well as small hemorrhoids. 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    Hx of diverticular bleed  Initially with hematochezia on 11/12, which transitioned to maroon/darker stools. BM this AM was brown but remained loose. Hgb 9.5, recent baseline 11. HDS. CTA without active bleed or GI bleed, colonic diverticulosis. Prior diverticular bleeds in 2010, 2017 as well as 01/2021 as above. Reports bleeding has all been small volume. No hx of esophageal varices and given presentation low suspicion this is an UGIB. More likely recurrent diverticular bleed which appears to have stopped at this time. Attempts high fiber diet at baseline.  - Clear liquid diet tonight  - Recheck Hgb in AM  - Consider GI consult if recurrent bleeding, otherwise she can likely follow-up outpatient    Rheumatoid arthritis  Follows with Dr. Clements. Previously on methotrexate. Recently started on hydroxychloroquine due to concerns for renal  function/leukopenia related complications from methotrexate.   - Continue PTA hydroxychloroquine 200 mg daily    CKD3  Follows with Nephrology. CKD likely due to advanced age and possible contribution of chronic methotrexate. Cr stable at 0.98.  - Avoid nephrotoxic agents    Insomnia - Continue PTA gabapentin 300 mg at bedtime     Hepatic lesions  CT abd/pevlis noting numerous hypoattenuating hepatic lesions, not significantly changed from 01/2021. Per patient, her outpatient GI provider ordered MRI and lesions are benign.       Diet: Clear Liquid Diet  DVT Prophylaxis: Ambulate every shift  Parra Catheter: Not present  Central Lines: None  Code Status: No CPR- Do NOT Intubate    Clinically Significant Risk Factors Present on Admission                   Disposition Plan   Expected discharge: 1-2 days recommended to prior living arrangement once hemoglobin stable and no further rectal bleeding.     The patient's care was discussed with the Attending Physician, Dr. Lieberman and Patient.    EVERARDO Matos [facility]  Securely message with the Vocera Web Console (learn more here)  Text page via [employer] Paging/Directory    Please see sign in/sign out for up to date coverage information    ______________________________________________________________________    Chief Complaint   Blood in stool    History is obtained from the patient    History of Present Illness   Patrick Olson is a 80 year old female who presented to the ED with concerns for bright red blood in her stool.     She initially noted bright red blood in her stool on 11/12. Was not large volume but given her history was alarmed. Started following a clear liquid diet at that time. It eventually turned darker on Sunday and by Monday noted it was more maroon in nature. It became stickier with this change in color. Also reports it was tarry/shiny looking. Today it has returned to normal brown color but was still  loose. Previously was having 2-4 loose bowel movements, today has had 2. The volume of blood in her stools was less than her prior episodes of diverticular bleeding. Denies any associated abdominal pain. Has complex history of prior diverticular bleeds and follows with [facility] as an outpatient.     She feels slightly weak from following only a clear liquid diet the past few days. She otherwise follows a higher fiber diet - though unsure of exact quantity. Eats oatmeal daily for breakfast, typically has lettuce/salad as well as vegetable such as broccoli. Attempted to take a fiber supplement but noted that it gave her upset stomach.    Denies any nausea or vomiting.     Review of Systems    The 10 point Review of Systems is negative other than noted in the HPI or here.     Past Medical History    I have reviewed this patient's medical history and updated it with pertinent information if needed.   Past Medical History:   Diagnosis Date     Diverticulosis of large intestine      Osteoarthritis      Osteoporosis      Rheumatoid arthritis (H)      Sensorineural hearing loss        Past Surgical History   I have reviewed this patient's surgical history and updated it with pertinent information if needed.  Past Surgical History:   Procedure Laterality Date     COLONOSCOPY N/A 3/14/2017    Procedure: COMBINED COLONOSCOPY, SINGLE OR MULTIPLE BIOPSY/POLYPECTOMY BY BIOPSY;  Surgeon: Spencer Downey MD;  Location:  GI     IR VISCERAL EMBOLIZATION  1/22/2021     ORTHOPEDIC SURGERY       SIGMOIDOSCOPY FLEXIBLE N/A 1/23/2021    Procedure: SIGMOIDOSCOPY, FLEXIBLE;  Surgeon: Jaime Steinberg MD;  Location:  GI       Social History   I have reviewed this patient's social history and updated it with pertinent information if needed.  Social History     Tobacco Use     Smoking status: Former Smoker     Smokeless tobacco: Never Used   Substance Use Topics     Alcohol use: No     Drug use: No       Family History   She does  report family history of sister with colon cancer.    Prior to Admission Medications   Prior to Admission Medications   Prescriptions Last Dose Informant Patient Reported? Taking?   EPINEPHrine (EPIPEN) 0.3 MG/0.3ML injection  at prn Self Yes Yes   Sig: Inject 0.3 mg into the muscle once as needed    Ferrous Gluconate 239 (27 Fe) MG TABS  Self Yes No   Patient not taking: Reported on 10/14/2021   Multiple Vitamins-Minerals (OCUVITE PRESERVISION PO) 11/16/2021 at am Self Yes Yes   Sig: Take 1 tablet by mouth 2 times daily    YUVAFEM 10 MCG TABS vaginal tablet 11/15/2021 at Unknown time Self Yes Yes   acetaminophen (TYLENOL) 500 MG tablet Past Week at Unknown time Self Yes Yes   Sig: Take 500 mg by mouth every 6 hours as needed for mild pain   amoxicillin (AMOXIL) 400 MG/5ML suspension Not Taking at Unknown time Self Yes No   Patient not taking: Reported on 11/16/2021   calcium carb 1250 mg, 500 mg [place],/vitamin D 200 units (OSCAL WITH D) 500-200 MG-UNIT per tablet 11/15/2021 at pm Self Yes Yes   Sig: Take 1 tablet by mouth 2 times daily    famotidine (PEPCID) 20 MG tablet Past Month at Unknown time Self Yes Yes   Sig: TAKE 1 TABLET BY MOUTH TWICE A DAY   gabapentin (NEURONTIN) 300 MG capsule 11/15/2021 at pm Self Yes Yes   Sig: Take 300 mg by mouth At Bedtime   hydroxychloroquine (PLAQUENIL) 200 MG tablet Past Week at 11- Self No Yes   Sig: Take 1 tablet (200 mg) by mouth daily Annual Plaquenil toxicity eye screening required.   lifitegrast (XIIDRA) 5 % opthalmic solution Not Taking at Unknown time Self Yes No   Sig: Place 1 drop into both eyes 2 times daily   Patient not taking: Reported on 11/16/2021   pilocarpine (SALAGEN) 5 MG tablet Not Taking at Unknown time Self Yes No   Sig: Take 5 mg by mouth At Bedtime   Patient not taking: Reported on 11/16/2021   tretinoin (RETIN-A) 0.1 % external cream More than a month at Unknown time Self Yes Yes   Sig: APPLY TO FACE AT BEDTIME *NOT COVERED, NO PA AVAILABLE PER  MD*      Facility-Administered Medications: None     Allergies   Allergies   Allergen Reactions     Bee Venom Anaphylaxis     Shrimp Anaphylaxis     Evoxac [Cevimeline] Unknown     Prevnar Swelling     Other reaction(s): Edema     Prevnar [Pneumococcal 13-Linda Conj Vacc] Unknown       Physical Exam   Vital Signs: Temp: 98  F (36.7  C) Temp src: Oral BP: 115/74 Pulse: 82   Resp: 16 SpO2: 98 % O2 Device: None (Room air)    Weight: 100 lbs 0 oz    General Appearance: Awake. Alert and oriented x 4. No acute distress. Sitting up in stretcher, pleasant.  Eyes: Normal lids. Anicteric sclera. PERRL. EOMs intact. No nystagmus.   HEENT: Normocephalic, atraumatic. Nares patent. Oropharynx clear. Mucous membranes moist.  Respiratory: Normal work of breathing on room air. Lungs CTAB. No wheezes, rhonchi or rales.  Cardiovascular: RRR. S1, S2. No murmurs, rubs or gallops. Pedal pulses 2+ No lower extremity edema.  GI: Abdomen non-distended. Normoactive bowel sounds. Soft, non-tender. No rebounding or guarding.  Lymph/Hematologic: No abnormal or excessive bruising.  Skin: Warm, dry. No rashes on exposed skin.  Musculoskeletal: Moves all extremities equally.  Neurologic: No focal deficits. CN II-XII grossly intact.     Data   Data reviewed today: I reviewed all medications, new labs and imaging results over the last 24 hours. I personally reviewed no images or EKG's today.    Recent Labs   Lab 11/16/21  1615 11/16/21  1603   WBC  --  4.2   HGB 8.6* 9.5*   MCV  --  99   PLT  --  218   INR  --  1.02   NA  --  139   POTASSIUM  --  3.5   CHLORIDE  --  106   CO2  --  26   BUN  --  13   CR  --  0.98   ANIONGAP  --  7   EJ  --  9.4   GLC  --  109*   ALBUMIN  --  3.8   PROTTOTAL  --  7.2   BILITOTAL  --  0.4   ALKPHOS  --  69   ALT  --  18   AST  --  21     Recent Results (from the past 24 hour(s))   CTA Abdomen Pelvis with Contrast    Narrative    EXAMINATION: CTA ABDOMEN PELVIS WITH CONTRAST, 11/16/2021 5:48 PM    TECHNIQUE:  Helical CT  images from the lung bases through the  symphysis pubis were obtained with IV contrast. Contrast dose:  iopamidol (ISOVUE-370) solution 61 mL    COMPARISON: CT 1/19/2021    HISTORY: GI bleed    FINDINGS:    ABDOMEN/PELVIS:  LIVER: There are numerous subcentimeter hepatic hypodense lesions, not  significantly changed from 1/19/2021. A few of these lesions,  including that in hepatic segment 6 demonstrates discontinuous,  peripheral enhancement suggesting hemangioma.  GALLBLADDER: No gallbladder wall thickening. The gallbladder is  dilated containing peripherally calcified, hypoattenuating gallstones.  No intra or extrahepatic biliary ductal dilation.  PANCREAS: 4 mm cystic lesion in the pancreatic body (series 13 image  73), not significantly changed. No pancreatic ductal dilation or  suspicious pancreatic mass  SPLEEN: Within normal limits.  ADRENAL GLANDS: Unchanged thickening of the left adrenal gland. No  right adrenal nodule.  URINARY TRACT: Symmetric cortical enhancement bilaterally. Bilateral  hypoattenuating, exophytic renal cysts. Nonobstructing right renal  calculi. Cortical thinning in the posterior right kidney, suggesting  prior insult or ischemia. No hydronephrosis. The urinary bladder is  well distended without focal abnormality.  REPRODUCTIVE ORGANS: Fibroid uterus, partly obscured by streak  artifact from left total hip arthroplasty.  BOWEL: Small large bowel are normal in caliber. There is  hyperattenuating colonic contents, most pronounced in the cecum and  ascending colon. No increased density on postcontrast images. No  evidence of intraluminal hemorrhage. Diffuse sigmoid diverticulosis  without CT evidence of diverticulitis. Right lower quadrant appendix  is within normal limits.  PERITONEUM/FLUID: No free fluid or air in the abdomen or pelvis.    VESSELS: Scattered atherosclerotic calcifications of the abdominal  aorta. The celiac, superior mesenteric artery and inferior mesenteric  artery are  patent and normal in caliber. Patent origins of bilateral  single renal arteries. The iliac vessels and common femoral vessels  are patent. The portal vein, superior mesenteric vein and splenic vein  are patent.    LYMPH NODES: No lymphadenopathy in the abdomen or pelvis.    BONES/SOFT TISSUES: Straightening of the lumbar spine. Multilevel  degenerative changes with opposing endplate sclerosis. Changes of left  total hip arthroplasty. No suspicious or acute osseous lesions.    LUNG BASES: Clear. Heart is not enlarged. No pleural or pericardial  effusion.      Impression    IMPRESSION:   1. No GI bleed, retroperitoneal hemorrhage or evidence of active  extravasation.  2. Colonic diverticulosis without evidence of diverticulitis.  3. Numerous hypoattenuating hepatic lesions, not significantly changed  from 1/19/2021. Based on prior imaging, many of these appear to  represent hemangiomas.    I have personally reviewed the examination and initial interpretation  and I agree with the findings.    MITA DAMICO MD         SYSTEM ID:  [id number]

## 2021-11-17 NOTE — PROGRESS NOTES
[facility]    Medicine Progress Note - Hospitalist Service, Gold 3       Date of Admission:  11/16/2021    Assessment & Plan           80 year old female admitted on 11/16/2021. She has a past medical history significant for rheumatoid arthritis, CKD stage 3, GERD, and prior diverticular bleeds who presented to the ED with recurrent hematochezia which has slowly evolved into darker/tarry stools     #Diverticulosis  #Bright red blood per rectum   She had an admission in 01/2021 at [facility] with presumed diverticular bleed. Underwent tagged RBC scan with possible activity in hepatic/splenic flexure. Negative angiogram at that time. Flex sigmoidoscopy at that time with many diverticula in sigmoid colon with clot in them. Recommended to start fiber supplement. Has followed with [facility] as outpatient and had colonoscopy/EGD in 05/2021. Noted to have multiple diverticulosis in ascending/transverse and descending/sigmoid colon as well as small hemorrhoids. 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   :: Patient has a history of diverticular bleed requiring transfusion; thus far hemoglobins have been stable but has had persistent red and maroon stools  :: Trend hemoglobins every 8 hours, transfuse to keep hemoglobin greater than 7  :: Does not appear to be high transient upper GI bleed, history and symptoms appear consistent with lower GI bleeding, diverticulosis; May need transfusion, continued red stools by tomorrow we will discuss endoscopy with GI.  :: Clear liquid diet tonight  :: Consent obtained and placed on chart, type and screen obtained.     Rheumatoid arthritis  Follows with Dr. Clements. Previously on methotrexate. Recently started on hydroxychloroquine due to concerns for renal function/leukopenia related complications from methotrexate.   - Continue PTA hydroxychloroquine 200 mg daily     CKD3  Follows with Nephrology. CKD likely due to advanced age and  possible contribution of chronic methotrexate. Cr stable at 0.98.  - Avoid nephrotoxic agents     Insomnia - Continue PTA gabapentin 300 mg at bedtime      Hepatic lesions  CT abd/pevlis noting numerous hypoattenuating hepatic lesions, not significantly changed from 01/2021. Per patient, her outpatient GI provider ordered MRI and lesions are benign.          Diet: Diet  Clear Liquid Diet    DVT Prophylaxis: NOT INDICATED  Parra Catheter: Not present  Central Lines: None  Code Status: No CPR- Do NOT Intubate      Disposition Plan   Expected discharge: 11/17/2021   recommended to prior living arrangement once hemoglobin stable.     The patient's care was discussed with the Bedside Nurse and Patient.    Raul Herrera MD  Hospitalist Service, [address]  Securely message with the Vocera Web Console (learn more here)  Text page via [employer] Paging/Directory    Please see sign in/sign out for up to date coverage information    Clinically Significant Risk Factors Present on Admission                   ______________________________________________________________________    Interval History      History obtained from EMR review and per patient who is an excellent historian.    Overnight no acute events  Patient had stable hemoglobins overnight and planned for discharge after IV iron infusion today.  During rounds this morning she had no symptoms no syncope or presyncope, lightheadedness, fatigue, dizziness, nausea vomiting, diarrhea, melena, hematochezia, she had 1 normal bowel movements standing.      Received page later the afternoon that patient had red and brown stool with apparently bright red blood, this is before IV iron infusion, just prior to discharge after infusion completed patient had additional larger bowel movement with larger volume of bright red blood, she remains without nausea vomiting, lightheadedness or dizziness, palpitations, tachycardia.            Data reviewed today: I reviewed all medications, new labs and imaging results over the last 24 hours. I personally reviewed no images or EKG's today.    Physical Exam   Vital Signs: Temp: 98.2  F (36.8  C) Temp src: Oral BP: 96/55 Pulse: 71   Resp: 16 SpO2: 99 % O2 Device: None (Room air)    Weight: 100 lbs 0 oz  EXAM  General: well appearing woman lying up in bed, NAD  Head: NC, AT  Eye: symm gaze, anicteric sclerae  ENT: patent nares wo drainage/epistaxis, MMM  Pulm: CTAB, comfrtoable WOB on RA  CV: normal rate, regular rhythm  GI: soft, NTND, thin  Neuro: awake, alert, hearing speech and phonation, intact grossly       Data   Recent Labs   Lab 11/17/21  0638 11/16/21  1615 11/16/21  1603   WBC 3.8*  --  4.2   HGB 8.6* 8.6* 9.5*     --  99     --  218   INR  --   --  1.02     --  139   POTASSIUM 4.5  --  3.5   CHLORIDE 110*  --  106   CO2 30  --  26   BUN 10  --  13   CR 1.04  --  0.98   ANIONGAP 3  --  7   EJ 9.0  --  9.4   GLC 91  --  109*   ALBUMIN  --   --  3.8   PROTTOTAL  --   --  7.2   BILITOTAL  --   --  0.4   ALKPHOS  --   --  69   ALT  --   --  18   AST  --   --  21     Recent Results (from the past 24 hour(s))   CTA Abdomen Pelvis with Contrast    Narrative    EXAMINATION: CTA ABDOMEN PELVIS WITH CONTRAST, 11/16/2021 5:48 PM    TECHNIQUE:  Helical CT images from the lung bases through the  symphysis pubis were obtained with IV contrast. Contrast dose:  iopamidol (ISOVUE-370) solution 61 mL    COMPARISON: CT 1/19/2021    HISTORY: GI bleed    FINDINGS:    ABDOMEN/PELVIS:  LIVER: There are numerous subcentimeter hepatic hypodense lesions, not  significantly changed from 1/19/2021. A few of these lesions,  including that in hepatic segment 6 demonstrates discontinuous,  peripheral enhancement suggesting hemangioma.  GALLBLADDER: No gallbladder wall thickening. The gallbladder is  dilated containing peripherally calcified, hypoattenuating gallstones.  No intra or extrahepatic  biliary ductal dilation.  PANCREAS: 4 mm cystic lesion in the pancreatic body (series 13 image  73), not significantly changed. No pancreatic ductal dilation or  suspicious pancreatic mass  SPLEEN: Within normal limits.  ADRENAL GLANDS: Unchanged thickening of the left adrenal gland. No  right adrenal nodule.  URINARY TRACT: Symmetric cortical enhancement bilaterally. Bilateral  hypoattenuating, exophytic renal cysts. Nonobstructing right renal  calculi. Cortical thinning in the posterior right kidney, suggesting  prior insult or ischemia. No hydronephrosis. The urinary bladder is  well distended without focal abnormality.  REPRODUCTIVE ORGANS: Fibroid uterus, partly obscured by streak  artifact from left total hip arthroplasty.  BOWEL: Small large bowel are normal in caliber. There is  hyperattenuating colonic contents, most pronounced in the cecum and  ascending colon. No increased density on postcontrast images. No  evidence of intraluminal hemorrhage. Diffuse sigmoid diverticulosis  without CT evidence of diverticulitis. Right lower quadrant appendix  is within normal limits.  PERITONEUM/FLUID: No free fluid or air in the abdomen or pelvis.    VESSELS: Scattered atherosclerotic calcifications of the abdominal  aorta. The celiac, superior mesenteric artery and inferior mesenteric  artery are patent and normal in caliber. Patent origins of bilateral  single renal arteries. The iliac vessels and common femoral vessels  are patent. The portal vein, superior mesenteric vein and splenic vein  are patent.    LYMPH NODES: No lymphadenopathy in the abdomen or pelvis.    BONES/SOFT TISSUES: Straightening of the lumbar spine. Multilevel  degenerative changes with opposing endplate sclerosis. Changes of left  total hip arthroplasty. No suspicious or acute osseous lesions.    LUNG BASES: Clear. Heart is not enlarged. No pleural or pericardial  effusion.      Impression    IMPRESSION:   1. No GI bleed, retroperitoneal  hemorrhage or evidence of active  extravasation.  2. Colonic diverticulosis without evidence of diverticulitis.  3. Numerous hypoattenuating hepatic lesions, not significantly changed  from 1/19/2021. Based on prior imaging, many of these appear to  represent hemangiomas.    I have personally reviewed the examination and initial interpretation  and I agree with the findings.    MITA DAMICO MD         SYSTEM ID:  [id number]     Medications       ferric gluconate  125 mg Intravenous Q48H     gabapentin  300 mg Oral At Bedtime     hydroxychloroquine  200 mg Oral Daily     pantoprazole (PROTONIX) IV  40 mg Intravenous Q24H

## 2021-11-17 NOTE — PROVIDER NOTIFICATION
Gold 3 text paged Re: Patient had another bloody stool and is concerned that her symptoms are not well controlled to discharge. She wants to be admitted tonight and to see GI tomorrow. Patient would like to discuss her concerns with Provider.

## 2021-11-17 NOTE — PROGRESS NOTES
-diagnostic tests and consults completed and resulted - In process, Hgb to be drawn at 0600   -vital signs normal or at patient baseline - Met  -tolerating oral intake to maintain hydration - Met  - hgb stable without evidence of active bleeding - In process

## 2021-11-18 PROBLEM — Z20.822 LAB TEST NEGATIVE FOR COVID-19 VIRUS: Status: ACTIVE | Noted: 2021-11-18

## 2021-11-18 LAB
C DIFF TOX B STL QL: NEGATIVE
HGB BLD-MCNC: 8.1 G/DL (ref 11.7–15.7)
HGB BLD-MCNC: 8.2 G/DL (ref 11.7–15.7)
HGB BLD-MCNC: 8.7 G/DL (ref 11.7–15.7)
HOLD SPECIMEN: NORMAL

## 2021-11-18 PROCEDURE — G0378 HOSPITAL OBSERVATION PER HR: HCPCS

## 2021-11-18 PROCEDURE — 99222 1ST HOSP IP/OBS MODERATE 55: CPT | Performed by: NURSE PRACTITIONER

## 2021-11-18 PROCEDURE — 87493 C DIFF AMPLIFIED PROBE: CPT | Performed by: NURSE PRACTITIONER

## 2021-11-18 PROCEDURE — 91110 GI TRC IMG INTRAL ESOPH-ILE: CPT | Performed by: INTERNAL MEDICINE

## 2021-11-18 PROCEDURE — 250N000013 HC RX MED GY IP 250 OP 250 PS 637: Performed by: PHYSICIAN ASSISTANT

## 2021-11-18 PROCEDURE — 120N000002 HC R&B MED SURG/OB UMMC

## 2021-11-18 PROCEDURE — 250N000011 HC RX IP 250 OP 636: Performed by: PEDIATRICS

## 2021-11-18 PROCEDURE — 258N000003 HC RX IP 258 OP 636: Performed by: PEDIATRICS

## 2021-11-18 PROCEDURE — 99225 PR SUBSEQUENT OBSERVATION CARE,LEVEL II: CPT | Performed by: PEDIATRICS

## 2021-11-18 PROCEDURE — 250N000013 HC RX MED GY IP 250 OP 250 PS 637: Performed by: NURSE PRACTITIONER

## 2021-11-18 PROCEDURE — 99207 PR CDG-CODE CATEGORY CHANGED: CPT | Performed by: PEDIATRICS

## 2021-11-18 PROCEDURE — 96375 TX/PRO/DX INJ NEW DRUG ADDON: CPT

## 2021-11-18 PROCEDURE — C9113 INJ PANTOPRAZOLE SODIUM, VIA: HCPCS | Performed by: PEDIATRICS

## 2021-11-18 PROCEDURE — 36415 COLL VENOUS BLD VENIPUNCTURE: CPT | Performed by: PEDIATRICS

## 2021-11-18 PROCEDURE — 85018 HEMOGLOBIN: CPT | Performed by: PEDIATRICS

## 2021-11-18 RX ADMIN — POLYETHYLENE GLYCOL 3350, SODIUM SULFATE ANHYDROUS, SODIUM BICARBONATE, SODIUM CHLORIDE, POTASSIUM CHLORIDE 2000 ML: 236; 22.74; 6.74; 5.86; 2.97 POWDER, FOR SOLUTION ORAL at 13:31

## 2021-11-18 RX ADMIN — HYDROXYCHLOROQUINE SULFATE 200 MG: 200 TABLET, FILM COATED ORAL at 09:01

## 2021-11-18 RX ADMIN — GABAPENTIN 300 MG: 300 CAPSULE ORAL at 22:22

## 2021-11-18 RX ADMIN — SODIUM CHLORIDE 125 MG: 9 INJECTION, SOLUTION INTRAVENOUS at 10:42

## 2021-11-18 RX ADMIN — PANTOPRAZOLE SODIUM 40 MG: 40 INJECTION, POWDER, FOR SOLUTION INTRAVENOUS at 18:26

## 2021-11-18 ASSESSMENT — ACTIVITIES OF DAILY LIVING (ADL)
ADLS_ACUITY_SCORE: 4
TOILETING_ISSUES: NO
WEAR_GLASSES_OR_BLIND: YES
VISION_MANAGEMENT: GLASSES
WALKING_OR_CLIMBING_STAIRS_DIFFICULTY: NO
ADLS_ACUITY_SCORE: 8
ADLS_ACUITY_SCORE: 8
ADLS_ACUITY_SCORE: 4
DRESSING/BATHING_DIFFICULTY: NO
ADLS_ACUITY_SCORE: 8
DOING_ERRANDS_INDEPENDENTLY_DIFFICULTY: NO
ADLS_ACUITY_SCORE: 8
DIFFICULTY_EATING/SWALLOWING: NO
CONCENTRATING,_REMEMBERING_OR_MAKING_DECISIONS_DIFFICULTY: NO
ADLS_ACUITY_SCORE: 8
DIFFICULTY_COMMUNICATING: NO
FALL_HISTORY_WITHIN_LAST_SIX_MONTHS: NO
ADLS_ACUITY_SCORE: 4

## 2021-11-18 NOTE — PLAN OF CARE
Observation Goals:     -diagnostic tests and consults completed and resulted not met; trending hemoglobin; possible Endoscopy  -vital signs normal or at patient baseline met except hypotensive this morning. Dr. Esteves informed, no new orders   -tolerating oral intake to maintain hydration not met; on clear liquid diet   - hgb stable without evidence of active bleeding met; no bloody stool this shift.    Alert and oriented x 4. Denies pain. Slept overnight. Will recheck hemoglobin this am. Will continue to monitor and follow plan of care

## 2021-11-18 NOTE — PLAN OF CARE
Time 3310-9918    Reason for admission: Gastrointestinal hemorrhage, unspecified gastrointestinal hemorrhage type [K92.2]   Vitals: /50 (BP Location: Left arm)   Pulse 102   Temp 97.1  F (36.2  C) (Oral)   Resp 16   Wt 45.4 kg (100 lb)   SpO2 98%   BMI 17.59 kg/m     Activity: Independent. Pt ambulates in room.   Pain: denies  Neuro: A&Ox4   Cardiac: WDL   Respiratory: WDL   GI/: Pt being monitored for bloody stool. No stool reported this shift.   Diet: Orders Placed This Encounter      Diet      Clear Liquid Diet     Lines: R and L PIV saline locked   Labs/imaging: Pt triggered sepsis, Lactic acid level 2.0. Provider notified. Pt being monitored for Hgb levels. Latest level at 2149 9.3.     Plan: Pt on observation being monitored for Hgb changes and bloody stool. GI following.     Continue to monitor and follow POC

## 2021-11-18 NOTE — UTILIZATION REVIEW
Admission Status; Secondary Review Determination    Under the authority of the Utilization Management Committee, the utilization review process indicated a secondary review on the above patient. The review outcome is based on review of the medical records, discussions with staff, and applying clinical experience noted on the date of the review.    (x) Inpatient Status Appropriate - This patient's medical care is consistent with medical management for inpatient care and reasonable inpatient medical practice.    RATIONALE FOR DETERMINATION: 80-year-old female with history of rheumatoid arthritis, marked diverticulosis with history of diverticular bleeds now presents to the hospital with recurrent hematochezia along with some darker/tarry stools.  Patient's baseline hemoglobin recently 11.2, down to 9.5 on presentation, 8.6 the following morning and on hospital day 3 patient has had recurrent episodes of hematochezia with hemoglobin down to 8.2.  This has been associated with relative hypotension early morning on hospital day 3.  Due to patient's active recurrent bleeding with slow drop in hemoglobin and concerning blood pressures, patient is requiring greater than 2 nights in the hospital for ongoing evaluation and monitoring of lower GI bleed appropriate for inpatient care.    At the time of admission with the information available to the attending physician more than 2 nights Hospital complex care was anticipated, based on patient risk of adverse outcome if treated as outpatient and complex care required. Inpatient admission is appropriate based on the Medicare guidelines.    This document was produced using voice recognition software    The information on this document is developed by the utilization review team in order for the business office to ensure compliance. This only denotes the appropriateness of proper admission status and does not reflect the quality of care rendered.    The definitions of Inpatient  Status and Observation Status used in making the determination above are those provided in the CMS Coverage Manual, Chapter 1 and Chapter 6, section 70.4.    Sincerely,    Richard Prasad MD  Utilization Review  Physician Advisor  [facility].

## 2021-11-18 NOTE — PROGRESS NOTES
Observation Goals:     -diagnostic tests and consults completed and resulted not met; trending hemoglobin; possible Endoscopy  -vital signs normal or at patient baseline partially met; except hypotensive   -tolerating oral intake to maintain hydration not met; on clear liquid diet   - hgb stable without evidence of active bleeding not met; bloody stool this AM (MD notified), stomachache

## 2021-11-18 NOTE — PROGRESS NOTES
[facility]    Medicine Progress Note - Hospitalist Service, Gold 3       Date of Admission:  11/16/2021    Assessment & Plan           80 year old female admitted on 11/16/2021. She has a past medical history significant for rheumatoid arthritis, CKD stage 3, GERD, and prior diverticular bleeds who presented to the ED with recurrent hematochezia which has slowly evolved into darker/tarry stools     #Diverticulosis  #Bright red blood per rectum   She had an admission in 01/2021 at [facility] with presumed diverticular bleed. Underwent tagged RBC scan with possible activity in hepatic/splenic flexure. Negative angiogram at that time. Flex sigmoidoscopy at that time with many diverticula in sigmoid colon with clot in them. Recommended to start fiber supplement. Has followed with [facility] as outpatient and had colonoscopy/EGD in 05/2021. Noted to have multiple diverticulosis in ascending/transverse and descending/sigmoid colon as well as small hemorrhoids. 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   :: Patient has a history of diverticular bleed requiring transfusion; thus far hemoglobins have been stable but has had persistent red and maroon stools  :: Consult GI, appr recs and asst, see note  :: Trend hemoglobins every 12 hours, transfuse to keep hemoglobin greater than 7  :: Does not appear to be high transient upper GI bleed, history and symptoms appear consistent with lower GI bleeding, diverticulosis; May need transfusion, continued red stools by tomorrow we will discuss endoscopy with GI.  :: Clear liquid diet tonight  :: Consent obtained and placed on chart, type and screen obtained.     Rheumatoid arthritis  Follows with Dr. Clements. Previously on methotrexate. Recently started on hydroxychloroquine due to concerns for renal function/leukopenia related complications from methotrexate.   - Continue PTA hydroxychloroquine 200 mg daily     CKD3  Follows with  Nephrology. CKD likely due to advanced age and possible contribution of chronic methotrexate. Cr stable at 0.98.  - Avoid nephrotoxic agents     Insomnia - Continue PTA gabapentin 300 mg at bedtime      Hepatic lesions  CT abd/pevlis noting numerous hypoattenuating hepatic lesions, not significantly changed from 01/2021. Per patient, her outpatient GI provider ordered MRI and lesions are benign.          Diet: Diet  NPO per Anesthesia Guidelines for Procedure/Surgery Except for: Meds, Other; Specify: except prep    DVT Prophylaxis: NOT INDICATED  Parra Catheter: Not present  Central Lines: None  Code Status: No CPR- Do NOT Intubate      Disposition Plan   Expected discharge: 11/21/2021   recommended to prior living arrangement once hemoglobin stable.     The patient's care was discussed with the Bedside Nurse and Patient.    Raul Herrera MD  Hospitalist Service, [address]  Securely message with the [employer]oc[employer] Web Console (learn more here)  Text page via [employer] Paging/Directory    Please see sign in/sign out for up to date coverage information    Clinically Significant Risk Factors Present on Admission                   ______________________________________________________________________    Interval History      History obtained from EMR review and per patient who is an excellent historian.    Overnight no acute events  Patient had stable hemoglobins overnight  but contd BRBPR  midl abd pain no NV no fcs no cp no sob   PO fine UOP fine                Data reviewed today: I reviewed all medications, new labs and imaging results over the last 24 hours. I personally reviewed no images or EKG's today.    Physical Exam   Vital Signs: Temp: 98.6  F (37  C) Temp src: Oral BP: 120/49 Pulse: 77   Resp: 16 SpO2: 97 % O2 Device: None (Room air)    Weight: 100 lbs 0 oz  EXAM  General: well appearing woman sitting up in bed, NAD  Head: NC, AT  Eye: symm gaze, anicteric  sclerae  ENT: patent nares wo drainage/epistaxis, MMM  Pulm: no stridor, comfrtoable WOB on RA  GI: soft, NTND, thin  Neuro: awake, alert, hearing speech and phonation, intact grossly       Data   Recent Labs   Lab 11/18/21  1212 11/18/21  0611 11/17/21  2149 11/17/21  1717 11/17/21  0638 11/16/21  1615 11/16/21  1603   WBC  --   --   --   --  3.8*  --  4.2   HGB 8.7* 8.2* 9.3*   < > 8.6*   < > 9.5*   MCV  --   --   --   --  100  --  99   PLT  --   --   --   --  185  --  218   INR  --   --   --   --   --   --  1.02   NA  --   --   --   --  143  --  139   POTASSIUM  --   --   --   --  4.5  --  3.5   CHLORIDE  --   --   --   --  110*  --  106   CO2  --   --   --   --  30  --  26   BUN  --   --   --   --  10  --  13   CR  --   --   --   --  1.04  --  0.98   ANIONGAP  --   --   --   --  3  --  7   EJ  --   --   --   --  9.0  --  9.4   GLC  --   --   --   --  91  --  109*   ALBUMIN  --   --   --   --   --   --  3.8   PROTTOTAL  --   --   --   --   --   --  7.2   BILITOTAL  --   --   --   --   --   --  0.4   ALKPHOS  --   --   --   --   --   --  69   ALT  --   --   --   --   --   --  18   AST  --   --   --   --   --   --  21    < > = values in this interval not displayed.     No results found for this or any previous visit (from the past 24 hour(s)).  Medications       gabapentin  300 mg Oral At Bedtime     hydroxychloroquine  200 mg Oral Daily     pantoprazole (PROTONIX) IV  40 mg Intravenous Q24H     polyethylene glycol  2,000 mL Oral Once

## 2021-11-18 NOTE — PLAN OF CARE
S: Patient has entered enteric precautions due to bloody stool this morning.     B: Patient is 80 year old female, admitted on 11/16/2021 for gastrointestinal hemorrhage, unspecified. Previously had diverticular bleeds from 2010, 2017, and 01/2021, hepatic lesions from 01/2021, insomnia, rheumatoid arthritis, GERD, and CKD stage 3.     A: alert and oriented x4, VSS, Voiding adequately, 1 BM this morning described as bloody. Patient had a rectal exam by physician and entered enteric precautions. Patient tolerate rectal exam well with no concerns. Patient is up independently. Denies nausea and vomiting. Currently rates pain 3/10 on lower abdomen. Patient received Hydroxychloroquine 200 mg to help with arthritis and Ferric Gluconate 125 mg for iron-deficiency anemia. No PRN administered. Patient refused shower care but agreed for oral care.     R: Patient will be moving to a isolated room due to entering enteric precautions. Patient will have a endoscopy at 1600. Patient anticipated to discharge 3 days to home with home care.

## 2021-11-18 NOTE — PLAN OF CARE
Admission/Transfer from: Observation unit  2 RN skin assessment completed. Yes  Significant findings include:WDL  WOC Nurse Consult Ordered? No  Was NST/NA able to join during assessment? No  Bed algorithm can be found in PCS flowsheets and forms binder, was this used for this assessment? No  Was a pulsate mattress ordered? No

## 2021-11-18 NOTE — PLAN OF CARE
Observation Goals:    -diagnostic tests and consults completed and resulted not met; trending hemoglobin; possible Endoscopy  -vital signs normal or at patient baseline met  -tolerating oral intake to maintain hydration not met; on clear liquid diet   - hgb stable without evidence of active bleeding met    Alert and oriented x 4. Up independently in the bathroom. Denies pain . Iron IV given, no reaction reported. Vital signs stable. Will continue to monitor and follow plan of care

## 2021-11-18 NOTE — PLAN OF CARE
Time: 8339-0263    Reason for admission: bloody stools  Vitals: /42 (BP Location: Right arm)   Pulse 78   Temp 98.4  F (36.9  C) (Oral)   Resp 16   Wt 45.4 kg (100 lb)   SpO2 96%   BMI 17.59 kg/m      Activity: independent  Pain: stomachache  Neuro: WDL  Cardiac: WDL  Respiratory: WDL  GI/: bloody stools  Diet: clear liquids - NPO after brenna  Lines: L PIV, R PIV  Skin/Wounds: skin is CDI  Labs/Imaging: scope this afternoon, stool sample collected    New changes this shift: given 2000 ml brenna in [facility], stool sample collected - very bloody stool, team aware    Plan:  Continue to monitor and follow POC, assess stool for blood, assess BP

## 2021-11-19 ENCOUNTER — ANESTHESIA EVENT (OUTPATIENT)
Dept: SURGERY | Facility: CLINIC | Age: 81
DRG: 348 | End: 2021-11-19
Payer: COMMERCIAL

## 2021-11-19 LAB
HGB BLD-MCNC: 7.6 G/DL (ref 11.7–15.7)
HGB BLD-MCNC: 8.8 G/DL (ref 11.7–15.7)

## 2021-11-19 PROCEDURE — 36415 COLL VENOUS BLD VENIPUNCTURE: CPT | Performed by: PEDIATRICS

## 2021-11-19 PROCEDURE — 99225 PR SUBSEQUENT OBSERVATION CARE,LEVEL II: CPT | Performed by: PEDIATRICS

## 2021-11-19 PROCEDURE — 85018 HEMOGLOBIN: CPT | Performed by: PEDIATRICS

## 2021-11-19 PROCEDURE — 250N000013 HC RX MED GY IP 250 OP 250 PS 637: Performed by: NURSE PRACTITIONER

## 2021-11-19 PROCEDURE — 99207 PR CDG-CODE CATEGORY CHANGED: CPT | Performed by: PEDIATRICS

## 2021-11-19 PROCEDURE — 250N000013 HC RX MED GY IP 250 OP 250 PS 637: Performed by: PHYSICIAN ASSISTANT

## 2021-11-19 PROCEDURE — C9113 INJ PANTOPRAZOLE SODIUM, VIA: HCPCS | Performed by: PEDIATRICS

## 2021-11-19 PROCEDURE — 120N000002 HC R&B MED SURG/OB UMMC

## 2021-11-19 PROCEDURE — 250N000011 HC RX IP 250 OP 636: Performed by: PEDIATRICS

## 2021-11-19 RX ADMIN — ACETAMINOPHEN 500 MG: 500 TABLET, FILM COATED ORAL at 12:21

## 2021-11-19 RX ADMIN — POLYETHYLENE GLYCOL 3350, SODIUM SULFATE ANHYDROUS, SODIUM BICARBONATE, SODIUM CHLORIDE, POTASSIUM CHLORIDE 4000 ML: 236; 22.74; 6.74; 5.86; 2.97 POWDER, FOR SOLUTION ORAL at 18:37

## 2021-11-19 RX ADMIN — PANTOPRAZOLE SODIUM 40 MG: 40 INJECTION, POWDER, FOR SOLUTION INTRAVENOUS at 18:03

## 2021-11-19 RX ADMIN — GABAPENTIN 300 MG: 300 CAPSULE ORAL at 21:48

## 2021-11-19 ASSESSMENT — ACTIVITIES OF DAILY LIVING (ADL)
ADLS_ACUITY_SCORE: 4

## 2021-11-19 NOTE — PROGRESS NOTES
"Brief Medicine Note:    Cross cover notified that patient refusing evening Golytely. RN attempted to educate patient on plan but patient requested to speak with provider. Writer talked with patient on the phone - educated her on reasoning for repeat golytely prep being that if VCE study shows need for procedure, that she will need to have completed bowel prep in order for this to occur. If unable to complete procedure tomorrow (if warranted), will result in delays in her evaluation and potential inability to capture what is actively causing her to bleed.    Despite her understanding of the above, and my multiple attempts at reiterating this to her, she stated she would not do the second Golytely and that she could \"just get an enema\" if needed tomorrow. I reassured patient that she is absolutely able to refuse the prep but ensured she understood this may delay her care. She was in understanding.    Dalia Simmons PA-C  Internal Medicine [facility] Hospitalist  [facility]  Pager: [phone]    "

## 2021-11-19 NOTE — PROGRESS NOTES
BRIEF GI NOTE:    VCE uploaded today.     Prelim read by this writer with capsule entering duodenum around 2 hours.   Active bleeding seen at 6 hours 9 minutes, in ileum. Unclear how close to IC valve as 20+ minutes of mucosa obscured by blood.   Unable to determine bleeding source. Noted known hx of AVM.     Pt hgb continues to drift down (8.7 yesterday --> 7.6 today). VS stable.  Nursing still noting some blood in stool    Case reviewed with Dr. Arthur of advanced endoscopy.   Will plan for retrograde balloon enteroscopy tomorrow.   Colon prep this evening (4L Golytely). If not clear at 4 a.m. will need additional 2 L.  OK for clears with prep (no red or purple food dye). NPO at 5 a.m.  Care planned discussed with Dr. Herrera of the primary service. Greatly appreciate his cares.

## 2021-11-19 NOTE — PROGRESS NOTES
[facility]    Medicine Progress Note - Hospitalist Service, Gold 3       Date of Admission:  11/16/2021    Assessment & Plan             80 year old female admitted on 11/16/2021. She has a past medical history significant for rheumatoid arthritis, CKD stage 3, GERD, and prior diverticular bleeds who presented to the ED with recurrent hematochezia which has slowly evolved into darker/tarry stools     #Diverticulosis  #Bright red blood per rectum   She had an admission in 01/2021 at [facility] with presumed diverticular bleed. Underwent tagged RBC scan with possible activity in hepatic/splenic flexure. Negative angiogram at that time. Flex sigmoidoscopy at that time with many diverticula in sigmoid colon with clot in them. Recommended to start fiber supplement. Has followed with [facility] as outpatient and had colonoscopy/EGD in 05/2021. Noted to have multiple diverticulosis in ascending/transverse and descending/sigmoid colon as well as small hemorrhoids. 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   :: Patient has a history of diverticular bleed requiring transfusion; thus far hemoglobins have been stable but has had persistent red and maroon stools  :: Consulted GI, appr recs and asst, see note; awaiting VCE results; possible colonoscopy depending on GI expert recs  :: Trend hemoglobins every 12 hours, transfuse to keep hemoglobin greater than 7  :: Consent obtained and placed on chart, type and screen obtained.     Rheumatoid arthritis  Follows with Dr. Clements. Previously on methotrexate. Recently started on hydroxychloroquine due to concerns for renal function/leukopenia related complications from methotrexate.   - Continue PTA hydroxychloroquine 200 mg daily     CKD3  Follows with Nephrology. CKD likely due to advanced age and possible contribution of chronic methotrexate. Cr stable at 0.98.  - Avoid nephrotoxic agents     Insomnia - Continue PTA gabapentin 300  mg at bedtime      Hepatic lesions  CT abd/pevlis noting numerous hypoattenuating hepatic lesions, not significantly changed from 01/2021. Per patient, her outpatient GI provider ordered MRI and lesions are benign.          Diet: Diet  NPO per Anesthesia Guidelines for Procedure/Surgery Except for: Meds, Other; Specify: except prep    DVT Prophylaxis: NOT INDICATED  Parra Catheter: Not present  Central Lines: None  Code Status: No CPR- Do NOT Intubate  <<<d/w pt that code reversal while in OR will be in place, she agrees and will be FULL CODE while in OR, once out of surgery she will be placed back to DNR DNI status    Disposition Plan   Expected discharge: 11/21/2021   recommended to prior living arrangement once hemoglobin stable.     The patient's care was discussed with the Bedside Nurse and Patient.    Raul Herrera MD  Hospitalist Service, [address]  Securely message with the Vocera Web Console (learn more here)  Text page via [employer] Paging/Directory    Please see sign in/sign out for up to date coverage information    Clinically Significant Risk Factors Present on Admission                ______________________________________________________________________    Interval History       ON JESUS   patient declined further prep, see EMR notes  Denies nausea vomiting abdominal pain, no fevers chills sweats, no bright red blood per rectum, had small bowel movement with what appears to be small firm stool and blood, pellet-like    Urine output normal, has been n.p.o. in anticipation of procedure possibly   Awaiting VCE results            Data reviewed today: I reviewed all medications, new labs and imaging results over the last 24 hours. I personally reviewed no images or EKG's today.    Physical Exam   Vital Signs: Temp: 98.2  F (36.8  C) Temp src: Oral BP: 113/52 Pulse: 100   Resp: 16 SpO2: 100 % O2 Device: None (Room air)    Weight: 100 lbs 0  oz  EXAM  General: well appearing woman sitting up in bed, NAD  Head: NC, AT  Eye: symm gaze, anicteric sclerae  ENT: patent nares wo drainage/epistaxis, MMM  Pulm: no stridor, comfrtoable WOB on RA  GI: soft, NTND, thin  Neuro: awake, alert, hearing speech and phonation, intact grossly       Data   Recent Labs   Lab 11/19/21  0630 11/18/21  1807 11/18/21  1212 11/17/21  1717 11/17/21  0638 11/16/21  1615 11/16/21  1603   WBC  --   --   --   --  3.8*  --  4.2   HGB 7.6* 8.1* 8.7*   < > 8.6*   < > 9.5*   MCV  --   --   --   --  100  --  99   PLT  --   --   --   --  185  --  218   INR  --   --   --   --   --   --  1.02   NA  --   --   --   --  143  --  139   POTASSIUM  --   --   --   --  4.5  --  3.5   CHLORIDE  --   --   --   --  110*  --  106   CO2  --   --   --   --  30  --  26   BUN  --   --   --   --  10  --  13   CR  --   --   --   --  1.04  --  0.98   ANIONGAP  --   --   --   --  3  --  7   EJ  --   --   --   --  9.0  --  9.4   GLC  --   --   --   --  91  --  109*   ALBUMIN  --   --   --   --   --   --  3.8   PROTTOTAL  --   --   --   --   --   --  7.2   BILITOTAL  --   --   --   --   --   --  0.4   ALKPHOS  --   --   --   --   --   --  69   ALT  --   --   --   --   --   --  18   AST  --   --   --   --   --   --  21    < > = values in this interval not displayed.     No results found for this or any previous visit (from the past 24 hour(s)).  Medications       gabapentin  300 mg Oral At Bedtime     hydroxychloroquine  200 mg Oral Daily     pantoprazole (PROTONIX) IV  40 mg Intravenous Q24H     polyethylene glycol  2,000 mL Oral Once

## 2021-11-19 NOTE — PLAN OF CARE
Pt is independent. No CPR. DNI. Gold 3. Alert and oriented x4. Concerned about what to expect, needs to be kept informed about Dr's plan for pt. Pt able to make her needs known.  NPO for possible colonoscopy. Able to take meds PO. Refused hydroxychoroquine in AM.  Reported HA around noon, gave tylenol. Pt reported HA has reduced, but not completely gone - pt believes it's due to lack of sleep.  Lung sounds clear in all fields.  Pt has had low BP in the past couple days. BP was 88/45 around 10am. Rechecked minutes later and BP increased to 96/42.  Voids without difficulty. Small bloody, watery stool with a couple of tiny, hard round stool/regan occurred once during shift. Capsule has not yet been excreted.  HGB was 7.6 at 6:30am.  PIV left arm is saline locked. PIV right arm is saline locked.    Waiting on GI to get back about results from capsule. Possible colonoscopy depending on result of capsule study. Keep NPO and continue monitoring HGB.

## 2021-11-19 NOTE — PLAN OF CARE
Alert and oriented x 4. Up independently in the room. Denies pain. C diff test Negative. Ongoing video capsule. Maintained on NPO. Possible colonoscopy , pt refused golytely prep. Soft BPs . No bloody stool this shift. Will recheck hgb this am.  Will continue to monitor and follow plan of care

## 2021-11-19 NOTE — PLAN OF CARE
Time 2497-4228    Reason for admission: Gastrointestinal hemorrhage, unspecified gastrointestinal hemorrhage type [K92.2]  Lab test negative for COVID-19 virus [Z20.822]   Vitals: /44 (BP Location: Right arm)   Pulse 96   Temp 98.8  F (37.1  C) (Oral)   Resp 16   Wt 45.4 kg (100 lb)   SpO2 100%   BMI 17.59 kg/m     Activity: Independent. Pt ambulates in room   Pain: denies  Neuro: A&Ox4, pt calls appropriately  Cardiac: WDL    Respiratory: WDL   GI/: Pt being monitored for bloody stool. Pt took golytely prior to video capsule swallow study - reported blood in stool.   Diet: Orders Placed This Encounter      Diet      NPO per Anesthesia Guidelines for Procedure/Surgery Except for: Meds, Other; Specify: except prep     Lines: R and L PIV saline locked. Requested R PIV consult d/t bleeding with NS flush.   Skin/Wounds: WDL - no issues at this time    Labs/imaging: Pending cdiff lab results. Video capsule swallow study completed at 0400 on 11/19. Last Hgb result 8.1 - provider following and aware of downward trend.        New changes this shift: Pt refused 1700 dose of Golytely for GI consult prep - team aware. Pt moved to inpatient status.     Plan: GI following, pt to be observed for Hgb changes and continued bloody stool.     Continue to monitor and follow POC

## 2021-11-20 ENCOUNTER — ANESTHESIA (OUTPATIENT)
Dept: SURGERY | Facility: CLINIC | Age: 81
DRG: 348 | End: 2021-11-20
Payer: COMMERCIAL

## 2021-11-20 VITALS
HEART RATE: 89 BPM | DIASTOLIC BLOOD PRESSURE: 44 MMHG | RESPIRATION RATE: 16 BRPM | OXYGEN SATURATION: 100 % | SYSTOLIC BLOOD PRESSURE: 98 MMHG | TEMPERATURE: 97.6 F | WEIGHT: 100 LBS | BODY MASS INDEX: 17.59 KG/M2

## 2021-11-20 LAB
ABO/RH(D): NORMAL
ANTIBODY SCREEN: NEGATIVE
COLONOSCOPY: NORMAL
HGB BLD-MCNC: 7.7 G/DL (ref 11.7–15.7)
SPECIMEN EXPIRATION DATE: NORMAL

## 2021-11-20 PROCEDURE — 272N000002 HC OR SUPPLY OTHER OPNP: Performed by: INTERNAL MEDICINE

## 2021-11-20 PROCEDURE — 88305 TISSUE EXAM BY PATHOLOGIST: CPT | Mod: 26 | Performed by: PATHOLOGY

## 2021-11-20 PROCEDURE — 99217 PR OBSERVATION CARE DISCHARGE: CPT | Performed by: PEDIATRICS

## 2021-11-20 PROCEDURE — 99207 PR CDG-CODE CATEGORY CHANGED: CPT | Performed by: PEDIATRICS

## 2021-11-20 PROCEDURE — 370N000017 HC ANESTHESIA TECHNICAL FEE, PER MIN: Performed by: INTERNAL MEDICINE

## 2021-11-20 PROCEDURE — 250N000011 HC RX IP 250 OP 636: Performed by: STUDENT IN AN ORGANIZED HEALTH CARE EDUCATION/TRAINING PROGRAM

## 2021-11-20 PROCEDURE — 36415 COLL VENOUS BLD VENIPUNCTURE: CPT | Performed by: PEDIATRICS

## 2021-11-20 PROCEDURE — 0W3P8ZZ CONTROL BLEEDING IN GASTROINTESTINAL TRACT, VIA NATURAL OR ARTIFICIAL OPENING ENDOSCOPIC: ICD-10-PCS | Performed by: INTERNAL MEDICINE

## 2021-11-20 PROCEDURE — 272N000001 HC OR GENERAL SUPPLY STERILE: Performed by: INTERNAL MEDICINE

## 2021-11-20 PROCEDURE — 0DBC4ZZ EXCISION OF ILEOCECAL VALVE, PERCUTANEOUS ENDOSCOPIC APPROACH: ICD-10-PCS | Performed by: INTERNAL MEDICINE

## 2021-11-20 PROCEDURE — 360N000083 HC SURGERY LEVEL 3 W/ FLUORO, PER MIN: Performed by: INTERNAL MEDICINE

## 2021-11-20 PROCEDURE — 250N000009 HC RX 250: Performed by: STUDENT IN AN ORGANIZED HEALTH CARE EDUCATION/TRAINING PROGRAM

## 2021-11-20 PROCEDURE — 710N000010 HC RECOVERY PHASE 1, LEVEL 2, PER MIN: Performed by: INTERNAL MEDICINE

## 2021-11-20 PROCEDURE — 258N000003 HC RX IP 258 OP 636: Performed by: STUDENT IN AN ORGANIZED HEALTH CARE EDUCATION/TRAINING PROGRAM

## 2021-11-20 PROCEDURE — 86900 BLOOD TYPING SEROLOGIC ABO: CPT | Performed by: ANESTHESIOLOGY

## 2021-11-20 PROCEDURE — 999N000141 HC STATISTIC PRE-PROCEDURE NURSING ASSESSMENT: Performed by: INTERNAL MEDICINE

## 2021-11-20 PROCEDURE — 88305 TISSUE EXAM BY PATHOLOGIST: CPT | Mod: TC | Performed by: INTERNAL MEDICINE

## 2021-11-20 PROCEDURE — 85018 HEMOGLOBIN: CPT | Performed by: PEDIATRICS

## 2021-11-20 PROCEDURE — 36415 COLL VENOUS BLD VENIPUNCTURE: CPT | Performed by: ANESTHESIOLOGY

## 2021-11-20 PROCEDURE — 250N000009 HC RX 250: Performed by: INTERNAL MEDICINE

## 2021-11-20 RX ORDER — OXYCODONE HYDROCHLORIDE 5 MG/1
5 TABLET ORAL EVERY 4 HOURS PRN
Status: DISCONTINUED | OUTPATIENT
Start: 2021-11-20 | End: 2021-11-20 | Stop reason: HOSPADM

## 2021-11-20 RX ORDER — ONDANSETRON 4 MG/1
4 TABLET, ORALLY DISINTEGRATING ORAL EVERY 6 HOURS PRN
Status: DISCONTINUED | OUTPATIENT
Start: 2021-11-20 | End: 2021-11-20

## 2021-11-20 RX ORDER — SODIUM CHLORIDE, SODIUM LACTATE, POTASSIUM CHLORIDE, CALCIUM CHLORIDE 600; 310; 30; 20 MG/100ML; MG/100ML; MG/100ML; MG/100ML
INJECTION, SOLUTION INTRAVENOUS CONTINUOUS
Status: DISCONTINUED | OUTPATIENT
Start: 2021-11-20 | End: 2021-11-20 | Stop reason: HOSPADM

## 2021-11-20 RX ORDER — ONDANSETRON 2 MG/ML
INJECTION INTRAMUSCULAR; INTRAVENOUS PRN
Status: DISCONTINUED | OUTPATIENT
Start: 2021-11-20 | End: 2021-11-20

## 2021-11-20 RX ORDER — NALOXONE HYDROCHLORIDE 0.4 MG/ML
0.4 INJECTION, SOLUTION INTRAMUSCULAR; INTRAVENOUS; SUBCUTANEOUS
Status: DISCONTINUED | OUTPATIENT
Start: 2021-11-20 | End: 2021-11-20 | Stop reason: HOSPADM

## 2021-11-20 RX ORDER — LIDOCAINE 40 MG/G
CREAM TOPICAL
Status: DISCONTINUED | OUTPATIENT
Start: 2021-11-20 | End: 2021-11-20 | Stop reason: HOSPADM

## 2021-11-20 RX ORDER — MEPERIDINE HYDROCHLORIDE 25 MG/ML
12.5 INJECTION INTRAMUSCULAR; INTRAVENOUS; SUBCUTANEOUS
Status: DISCONTINUED | OUTPATIENT
Start: 2021-11-20 | End: 2021-11-20 | Stop reason: HOSPADM

## 2021-11-20 RX ORDER — ONDANSETRON 4 MG/1
4 TABLET, ORALLY DISINTEGRATING ORAL EVERY 30 MIN PRN
Status: DISCONTINUED | OUTPATIENT
Start: 2021-11-20 | End: 2021-11-20 | Stop reason: HOSPADM

## 2021-11-20 RX ORDER — ALBUTEROL SULFATE 0.83 MG/ML
2.5 SOLUTION RESPIRATORY (INHALATION) EVERY 4 HOURS PRN
Status: DISCONTINUED | OUTPATIENT
Start: 2021-11-20 | End: 2021-11-20 | Stop reason: HOSPADM

## 2021-11-20 RX ORDER — ONDANSETRON 2 MG/ML
4 INJECTION INTRAMUSCULAR; INTRAVENOUS EVERY 6 HOURS PRN
Status: DISCONTINUED | OUTPATIENT
Start: 2021-11-20 | End: 2021-11-20

## 2021-11-20 RX ORDER — NALOXONE HYDROCHLORIDE 0.4 MG/ML
0.2 INJECTION, SOLUTION INTRAMUSCULAR; INTRAVENOUS; SUBCUTANEOUS
Status: DISCONTINUED | OUTPATIENT
Start: 2021-11-20 | End: 2021-11-20 | Stop reason: HOSPADM

## 2021-11-20 RX ORDER — LIDOCAINE HYDROCHLORIDE 20 MG/ML
INJECTION, SOLUTION INFILTRATION; PERINEURAL PRN
Status: DISCONTINUED | OUTPATIENT
Start: 2021-11-20 | End: 2021-11-20

## 2021-11-20 RX ORDER — FLUMAZENIL 0.1 MG/ML
0.2 INJECTION, SOLUTION INTRAVENOUS
Status: DISCONTINUED | OUTPATIENT
Start: 2021-11-20 | End: 2021-11-20 | Stop reason: HOSPADM

## 2021-11-20 RX ORDER — HYDROMORPHONE HCL IN WATER/PF 6 MG/30 ML
0.2 PATIENT CONTROLLED ANALGESIA SYRINGE INTRAVENOUS EVERY 5 MIN PRN
Status: DISCONTINUED | OUTPATIENT
Start: 2021-11-20 | End: 2021-11-20 | Stop reason: HOSPADM

## 2021-11-20 RX ORDER — SODIUM CHLORIDE, SODIUM LACTATE, POTASSIUM CHLORIDE, CALCIUM CHLORIDE 600; 310; 30; 20 MG/100ML; MG/100ML; MG/100ML; MG/100ML
INJECTION, SOLUTION INTRAVENOUS CONTINUOUS PRN
Status: DISCONTINUED | OUTPATIENT
Start: 2021-11-20 | End: 2021-11-20

## 2021-11-20 RX ORDER — ONDANSETRON 2 MG/ML
4 INJECTION INTRAMUSCULAR; INTRAVENOUS EVERY 30 MIN PRN
Status: DISCONTINUED | OUTPATIENT
Start: 2021-11-20 | End: 2021-11-20 | Stop reason: HOSPADM

## 2021-11-20 RX ORDER — PROPOFOL 10 MG/ML
INJECTION, EMULSION INTRAVENOUS CONTINUOUS PRN
Status: DISCONTINUED | OUTPATIENT
Start: 2021-11-20 | End: 2021-11-20

## 2021-11-20 RX ORDER — FENTANYL CITRATE 50 UG/ML
INJECTION, SOLUTION INTRAMUSCULAR; INTRAVENOUS PRN
Status: DISCONTINUED | OUTPATIENT
Start: 2021-11-20 | End: 2021-11-20

## 2021-11-20 RX ORDER — FENTANYL CITRATE 50 UG/ML
25 INJECTION, SOLUTION INTRAMUSCULAR; INTRAVENOUS
Status: DISCONTINUED | OUTPATIENT
Start: 2021-11-20 | End: 2021-11-20 | Stop reason: HOSPADM

## 2021-11-20 RX ORDER — FENTANYL CITRATE 50 UG/ML
25 INJECTION, SOLUTION INTRAMUSCULAR; INTRAVENOUS EVERY 5 MIN PRN
Status: DISCONTINUED | OUTPATIENT
Start: 2021-11-20 | End: 2021-11-20 | Stop reason: HOSPADM

## 2021-11-20 RX ADMIN — PHENYLEPHRINE HYDROCHLORIDE 100 MCG: 10 INJECTION INTRAVENOUS at 11:06

## 2021-11-20 RX ADMIN — ONDANSETRON 4 MG: 2 INJECTION INTRAMUSCULAR; INTRAVENOUS at 11:28

## 2021-11-20 RX ADMIN — PHENYLEPHRINE HYDROCHLORIDE 100 MCG: 10 INJECTION INTRAVENOUS at 10:43

## 2021-11-20 RX ADMIN — PROPOFOL 150 MCG/KG/MIN: 10 INJECTION, EMULSION INTRAVENOUS at 10:03

## 2021-11-20 RX ADMIN — PHENYLEPHRINE HYDROCHLORIDE 100 MCG: 10 INJECTION INTRAVENOUS at 10:09

## 2021-11-20 RX ADMIN — LIDOCAINE HYDROCHLORIDE 40 MG: 20 INJECTION, SOLUTION INFILTRATION; PERINEURAL at 10:03

## 2021-11-20 RX ADMIN — PHENYLEPHRINE HYDROCHLORIDE 100 MCG: 10 INJECTION INTRAVENOUS at 10:53

## 2021-11-20 RX ADMIN — SODIUM CHLORIDE, POTASSIUM CHLORIDE, SODIUM LACTATE AND CALCIUM CHLORIDE: 600; 310; 30; 20 INJECTION, SOLUTION INTRAVENOUS at 09:56

## 2021-11-20 RX ADMIN — FENTANYL CITRATE 25 MCG: 50 INJECTION, SOLUTION INTRAMUSCULAR; INTRAVENOUS at 10:14

## 2021-11-20 ASSESSMENT — ACTIVITIES OF DAILY LIVING (ADL)
ADLS_ACUITY_SCORE: 4

## 2021-11-20 NOTE — ANESTHESIA PREPROCEDURE EVALUATION
Anesthesia Pre-Procedure Evaluation    Patient: Patrick Olson   MRN: [id number] : 1940        Preoperative Diagnosis: GI bleed [K92.2]    Procedure : Procedure(s):  BALLOON ENTEROSCOPY          Past Medical History:   Diagnosis Date     Diverticulosis of large intestine      Osteoarthritis      Osteoporosis      Rheumatoid arthritis (H)      Sensorineural hearing loss       Past Surgical History:   Procedure Laterality Date     CAPSULE/PILL CAM ENDOSCOPY N/A 2021    Procedure: IMAGING PROCEDURE, GI TRACT, INTRALUMINAL, VIA CAPSULE;  Surgeon: Deric Rodriguez MD;  Location:  GI     COLONOSCOPY N/A 3/14/2017    Procedure: COMBINED COLONOSCOPY, SINGLE OR MULTIPLE BIOPSY/POLYPECTOMY BY BIOPSY;  Surgeon: Spencer Downey MD;  Location:  GI     IR VISCERAL EMBOLIZATION  2021     ORTHOPEDIC SURGERY       SIGMOIDOSCOPY FLEXIBLE N/A 2021    Procedure: SIGMOIDOSCOPY, FLEXIBLE;  Surgeon: Jaime Steinberg MD;  Location:  GI      Allergies   Allergen Reactions     Bee Venom Anaphylaxis     Shrimp Anaphylaxis     Evoxac [Cevimeline] Unknown     Prevnar Swelling     Other reaction(s): Edema     Prevnar [Pneumococcal 13-Linda Conj Vacc] Unknown      Social History     Tobacco Use     Smoking status: Former Smoker     Smokeless tobacco: Never Used   Substance Use Topics     Alcohol use: No      Wt Readings from Last 1 Encounters:   21 45.4 kg (100 lb)        Anesthesia Evaluation   Pt has had prior anesthetic. Type: General and MAC.    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), type: CRI, Pt does not require dialysis,     Endo:  - neg endo ROS     Psychiatric/Substance Use: Comment: Insomnia on PTA gabapentin      Infectious Disease:  - neg infectious disease ROS     Malignancy:  - neg malignancy ROS     Other:            Physical Exam    Airway        Mallampati: II   TM distance: > 3 FB   Neck ROM: full   Mouth opening: > 3 cm    Respiratory Devices and Support         Dental  no notable dental history         Cardiovascular          Rhythm and rate: regular and normal (-) no murmur    Pulmonary           breath sounds clear to auscultation           OUTSIDE LABS:  CBC:   Lab Results   Component Value Date    WBC 3.8 (L) 11/17/2021    WBC 4.2 11/16/2021    HGB 7.6 (L) 11/19/2021    HGB 8.1 (L) 11/18/2021    HCT 26.6 (L) 11/17/2021    HCT 28.6 (L) 11/16/2021     11/17/2021     11/16/2021     BMP:   Lab Results   Component Value Date     11/17/2021     11/16/2021    POTASSIUM 4.5 11/17/2021    POTASSIUM 3.5 11/16/2021    CHLORIDE 110 (H) 11/17/2021    CHLORIDE 106 11/16/2021    CO2 30 11/17/2021    CO2 26 11/16/2021    BUN 10 11/17/2021    BUN 13 11/16/2021    CR 1.04 11/17/2021    CR 0.98 11/16/2021    GLC 91 11/17/2021     (H) 11/16/2021     COAGS:   Lab Results   Component Value Date    PTT 29 11/16/2021    INR 1.02 11/16/2021     POC: No results found for: BGM, HCG, HCGS  HEPATIC:   Lab Results   Component Value Date    ALBUMIN 3.8 11/16/2021    PROTTOTAL 7.2 11/16/2021    ALT 18 11/16/2021    AST 21 11/16/2021    ALKPHOS 69 11/16/2021    BILITOTAL 0.4 11/16/2021     OTHER:   Lab Results   Component Value Date    LACT 2.0 11/17/2021    EJ 9.0 11/17/2021    PHOS 3.4 10/01/2021    LIPASE 165 08/06/2010    AMYLASE 102 08/06/2010    CRP <2.9 11/05/2021    SED 36 (H) 11/05/2021       Anesthesia Plan    ASA Status:  3   NPO Status:  NPO Appropriate    Anesthesia Type:  MAC.     - Reason for MAC: straight local not clinically adequate   Induction: Intravenous.           Consents    Anesthesia Plan(s) and associated risks, benefits, and realistic alternatives discussed. Questions answered and patient/representative(s) expressed understanding.    - Discussed:     - Discussed with:  Patient      - Extended Intubation/Ventilatory Support Discussed: No.      - Patient is DNR/DNI Status: Yes             Suspend during perioperative period? No.    Use of blood products discussed: No .     Postoperative Care    Pain management: IV analgesics.   PONV prophylaxis: Ondansetron (or other 5HT-3)     Comments:    Other Comments: Patient seen and examined.  Risks, benefits and alternatives to MAC with possibility of GETA discussed.  Patient made aware of the possibility of periprocedural awareness given MAC.  Questions answered and patient wishes to proceed            Rickie Sanchez MD

## 2021-11-20 NOTE — PROGRESS NOTES
6419-4296: Afebrile. VSS on RA ex intermittent soft BPs. Lowest BP 88/45 this morning but re-checks have been stable the remainder of the day. A/Ox4. Up independently. Admitted with bloody stools. Pt completed capsule study overnight which did show signs of bleeding. Plan for enteroscopy tomorrow. Voiding adequately. Pt had 1 small BM today with small amount of bright red blood. PRN tylenol given x1 for headache with relief. Calls appropriately and makes needs known.    Pt will need to drink GoLytely prep tonight 4L until stools are clear. Please give additional 2L GoLytely if stools aren't clear by 0400. Pt is on a clear liquid diet tonight (no red dye.) Must be NPO at 0500 tomorrow.     Fadia Rutherford RN

## 2021-11-20 NOTE — OP NOTE
COLONOSCOPY 11/20/2021 10:01 AM [address] [place] [address] [phone]     Endoscopy Department   _______________________________________________________________________________   Patient Name: Patrick Olson             Procedure Date: 11/20/2021 10:01 AM   MRN: [id number]                       Account Number: [id number]   YOB: 1940             Admit Type: Inpatient   Age: 80                                Gender: Female   Note Status: Finalized                Attending MD: Kirby Arthur MD   Pause for the Cause: time out performed Total Sedation Time:   _______________________________________________________________________________       Procedure:             Colonoscopy   Indications:           Hematochezia   Providers:             Kirby Arthur MD   Referring MD:             Requesting Provider:   Altagracia Martinez MD, Raul Herrera   Medicines:             Monitored Anesthesia Care   Complications:         No immediate complications. Estimated blood loss:                          Minimal.   _______________________________________________________________________________   Procedure:             Pre-Anesthesia Assessment:                          - Prior to the procedure, a History and Physical was                          performed, and patient medications and allergies were                          reviewed. The patient is competent. The risks and                          benefits of the procedure and the sedation options and                          risks were discussed with the patient. All questions                          were answered and informed consent was obtained.                          Patient identification and proposed procedure were                          verified by the physician, the nurse, the                          anesthesiologist and the anesthetist in the procedure                           room. Mental Status Examination: alert and oriented.                          Airway Examination: normal oropharyngeal airway and                          neck mobility. Respiratory Examination: clear to                          auscultation. CV Examination: normal. Prophylactic                          Antibiotics: The patient does not require prophylactic                          antibiotics. Prior Anticoagulants: The patient has                          taken no anticoagulant or antiplatelet agents. ASA                          Grade Assessment: III - A patient with severe systemic                          disease. After reviewing the risks and benefits, the                          patient was deemed in satisfactory condition to                          undergo the procedure. The anesthesia plan was to use                          monitored anesthesia care (MAC). Immediately prior to                          administration of medications, the patient was                          re-assessed for adequacy to receive sedatives. The                          heart rate, respiratory rate, oxygen saturations,                          blood pressure, adequacy of pulmonary ventilation, and                          response to care were monitored throughout the                          procedure. The physical status of the patient was                          re-assessed after the procedure.                          After obtaining informed consent, the colonoscope was                          passed under direct vision. Throughout the procedure,                          the patient's blood pressure, pulse, and oxygen                          saturations were monitored continuously. The was                          introduced through the anus and advanced to 20 cm into                          the ileum. The colonoscopy was performed without                          difficulty. The patient tolerated the procedure well.                           The quality of the bowel preparation was evaluated                          using the BBPS ([place] Bowel Preparation Scale) with                          scores of: Right Colon = 2 (minor amount of residual                          staining, small fragments of stool and/or opaque                          liquid, but mucosa seen well), Transverse Colon = 2                          (minor amount of residual staining, small fragments of                          stool and/or opaque liquid, but mucosa seen well) and                          Left Colon = 3 (entire mucosa seen well with no                          residual staining, small fragments of stool or opaque                          liquid). The total BBPS score equals 7. The quality of                          the bowel preparation was good.                                                                                     Findings:        The perianal and digital rectal examinations were normal. Pertinent        negatives include no palpable rectal lesions.        The distal ileum and ileum, 40 cm from the ileocecal valve appeared        normal. Maximal insertion depth tattooed with an injection of 5 mL of        Spot (carbon black). Estimated blood loss was minimal.        A 30 mm polyp was found in the ileocecal valve. The polyp was Qian        classification IIa (superficial, elevated) and non-granular lateral        spreading, NICE type II. Preparations were made for mucosal resection.        Orise gel was injected to raise the lesion. Piecemeal mucosal resection        using a snare was performed. Resection and retrieval were complete. To        prevent bleeding after mucosal resection, seven hemostatic clips were        successfully placed (MR conditional). There was no bleeding at the end        of the procedure.        Pill cam was visible in the ascending colon and retrieved and removed        with a Amaro net.        Multiple  small and large-mouthed diverticula were found in the entire        colon.        The retroflexed view of the distal rectum and anal verge was normal and        showed no anal or rectal abnormalities.                                                                                    Impression:            - No signs of active bleedig or stigmata of old                          bleeding seen.                          - The examined portion of the ileum was normal.                          Advanced at least ~40 cm. Maximal insertion depth                          tattooed to aid with future localization if recurrent                          bleeding although may not be post-polypectomy in                          nature if occurs in the next 1-2 weeks.                          - One 30 mm polyp at the ileocecal valve (non-granular                          LST, NICE type II), removed with mucosal resection.                          Resected and retrieved. Snare tip soft coagulation of margins. Clips (MR conditional) were placed. Possible source of hematochezia that was seen                          on capsule study.                          - Pill cam in the ascending colon. Removed.                          - Diverticulosis in the entire examined colon.                          - The distal rectum and anal verge are normal on                          retroflexion view.                          - MODIFIER 22 given complexity of procedure.   Recommendation:        - [facility] for [facility] care.                          - Hold anticoagulation for next 5 days                          - Await pathology results.                          - Repeat colonoscopy in 6 months to follow up                          piecemeal resection with Dr. Arthur                          - [facility] team to continue following                                                                                       Kirby Arthur,  MD

## 2021-11-20 NOTE — ANESTHESIA CARE TRANSFER NOTE
Patient: Patrick Olson    Procedure: Procedure(s):  ENTEROSCOPY, colonoscopy with endoscopic mucosal resection and tattoo placement       Diagnosis: GI bleed [K92.2]  Diagnosis Additional Information: No value filed.    Anesthesia Type:   MAC     Note:    Oropharynx: spontaneously breathing and oropharynx clear of all foreign objects  Level of Consciousness: drowsy  Oxygen Supplementation: face mask  Level of Supplemental Oxygen (L/min / FiO2): 6  Independent Airway: airway patency satisfactory and stable  Dentition: dentition unchanged  Vital Signs Stable: post-procedure vital signs reviewed and stable  Report to RN Given: handoff report given  Patient transferred to: PACU    Handoff Report: Identifed the Patient, Identified the Reponsible Provider, Reviewed the pertinent medical history, Discussed the surgical course, Reviewed Intra-OP anesthesia mangement and issues during anesthesia, Set expectations for post-procedure period and Allowed opportunity for questions and acknowledgement of understanding      Vitals:  Vitals Value Taken Time   BP 92/70 11/20/21 1130   Temp 36.5  C (97.7  F) 11/20/21 1134   Pulse 64 11/20/21 1130   Resp     SpO2 100 % 11/20/21 1134   Vitals shown include unvalidated device data.    Electronically Signed By: Leslie Goldberg, MD  November 20, 2021  11:35 AM

## 2021-11-20 NOTE — ANESTHESIA POSTPROCEDURE EVALUATION
Patient: Patrick Olson    Procedure: Procedure(s):  ENTEROSCOPY, colonoscopy with endoscopic mucosal resection and tattoo placement       Diagnosis:GI bleed [K92.2]  Diagnosis Additional Information: No value filed.    Anesthesia Type:  MAC    Note:  Disposition: Inpatient   Postop Pain Control: Uneventful            Sign Out: Well controlled pain   PONV: No   Neuro/Psych: Uneventful            Sign Out: Acceptable/Baseline neuro status   Airway/Respiratory: Uneventful            Sign Out: Acceptable/Baseline resp. status   CV/Hemodynamics: Uneventful            Sign Out: Acceptable CV status; No obvious hypovolemia; No obvious fluid overload   Other NRE: NONE   DID A NON-ROUTINE EVENT OCCUR? No           Last vitals:  Vitals Value Taken Time   /49 11/20/21 1140   Temp 36.4  C (97.52  F) 11/20/21 1149   Pulse 77 11/20/21 1140   Resp 18 11/20/21 1140   SpO2 100 % 11/20/21 1149   Vitals shown include unvalidated device data.    Electronically Signed By: Donald Jackson MD  November 20, 2021  11:50 AM

## 2021-11-20 NOTE — PLAN OF CARE
[id number]    Vital signs:  Temp: 97.6  F (36.4  C) Temp src: Oral BP: 116/44 Pulse: 79   Resp: 16 SpO2: 100 % O2 Device: None (Room air)         Pt is AOx4 and up independently. Colonoscopy was completed on this shift. Pt on clear liquid diet with RN to advance as tolerated. Able to take meds PO. Refused hydroxychloroquine this AM. Hgb was 7.7 at 0640 this AM. Ordered to be drawn again. Lung sounds clear and equal bilaterally on RA. Voids without difficulty. L PIV SL. R PIV SL. Waiting on Hgb result before possible discharge.

## 2021-11-20 NOTE — PLAN OF CARE
Time 8760-5636     Reason for admission:   Gastrointestinal hemorrhage, unspecified gastrointestinal hemorrhage type [K92.2]  Lab test negative for COVID-19 virus [Z20.822]      Vitals: /40 (BP Location: Left arm, Patient Position: Fowlers)   Pulse 79   Temp 98.6  F (37  C) (Oral)   Resp 18   Wt 45.4 kg (100 lb)   SpO2 99%   BMI 17.59 kg/m      Activity: Independent   Pain: Denies  Neuro: A/Ox4  Cardiac: WNL  Respiratory: WNL  GI/: Voids spontaneously, small amount of blood with stools per pt. Amount of blood has been decreasing per pt.   Diet: NPO, no red or purple dyes  Lines: PIV SL  Wounds: no new issues      This shift: Go-Lytely bowel prep competed.      Plan: Colonoscopy/enteroscopy 11/20      Continue to monitor and follow POC

## 2021-11-21 NOTE — DISCHARGE SUMMARY
Pt discharging home with her . Went over discharge paper work and follow-ups together with patient. PIVs removed. Help patient pack up belongings. Adequate for discharge.    Fadia Rutherford RN

## 2021-11-22 NOTE — DISCHARGE SUMMARY
[facility]  Hospitalist Discharge Summary      Date of Admission:  11/16/2021  Date of Discharge:  11/20/2021  4:54 PM  Discharging Provider: Raul Herrera MD  Discharge Team: Hospitalist Service, Gold 3    Discharge Diagnoses     #Acute blood loss anemia secondary to small intestinal tract GI bleed  #Intestinal Polyp s/p biopsy  #hx Diverticulosis  #Rheumatoid arthritis  #CKD3  #Insomnia     Follow-ups Needed After Discharge   Follow-up Appointments     Adult [facility] Follow-up and recommended labs and tests      Follow up with primary care provider, Essence Tamayo, within 7 days to   evaluate medication change and for hospital follow- up.  The following   labs/tests are recommended: CBC.      Appointments on [place] and/or [facility] (with [facility] or [facility]   provider or service). Call [phone] if you haven't heard regarding   these appointments within 7 days of discharge.              #Acute blood loss anemia secondary to small intestinal tract GI bleed  -- take PO iron and vitamin C  -- PCP to consider recheck iron and CBC in the next 3- 6 months  -- monitor clinically for repeat bleeding    #Hepatic lesions  -- CT abd/pevlis noting numerous hypoattenuating hepatic lesions, not significantly changed from 01/2021. Per patient, her outpatient GI provider ordered MRI and lesions are benign. Monitor as outpatient            Unresulted Labs Ordered in the Past 30 Days of this Admission     No orders found from 10/17/2021 to 11/17/2021.      These results will be followed up by PCP  Polyp lab will be followed up by GI team    Discharge Disposition   Discharged to home  Condition at discharge: Stable      Hospital Course       80 year old female who presented to the ED with concerns for bright red blood in her stool.      She initially noted bright red blood in her stool on 11/12. Was not large volume but given her history was alarmed. Started following a clear  liquid diet at that time. It eventually turned darker on Sunday and by Monday noted it was more maroon in nature. It became stickier with this change in color. Also reports it was tarry/shiny looking. Today it has returned to normal brown color but was still loose. Previously was having 2-4 loose bowel movements, today has had 2. The volume of blood in her stools was less than her prior episodes of diverticular bleeding. Denies any associated abdominal pain. Has complex history of prior diverticular bleeds and follows with [facility] as an outpatient.      She feels slightly weak from following only a clear liquid diet the past few days. She otherwise follows a higher fiber diet - though unsure of exact quantity. Eats oatmeal daily for breakfast, typically has lettuce/salad as well as vegetable such as broccoli. Attempted to take a fiber supplement but noted that it gave her upset stomach.       Patient was noted to hospital service for observation of bright red blood per rectum, hemoglobins were trended and remained greater than 7 throughout her hospital stay.  IV iron was given x2 doses.  Due to continued bright red blood per rectum patient ultimately received video capsule endoscopy which showed active bleeding, and subsequently was was taken to colonoscopy by gastroenterology team, see procedure note ultimately found to have polyp which was removed and is pending in the lab, otherwise during colonoscopy no active areas of bleeding were identified, no other clips/ cautery measures were taken.          Consultations This Hospital Stay   VASCULAR ACCESS CARE ADULT IP CONSULT  GI LUMINAL ADULT IP CONSULT  VASCULAR ACCESS CARE ADULT IP CONSULT    Code Status   Prior    Time Spent on this Encounter   I, Raul Herrera MD, personally saw the patient today and spent greater than 30 minutes discharging this patient.       Raul Herrera MD  [facility] [address] [address]  Phone:  [phone]  ______________________________________________________________________    Physical Exam   Vital Signs:                    BP 98/44 (BP Location: Left arm)   Pulse 89   Temp 97.6  F (36.4  C) (Oral)   Resp 16   Wt 45.4 kg (100 lb)   SpO2 100%   BMI 17.59 kg/m      Weight: 100 lbs 0 oz  EXAM  General: Well-appearing elderly woman, sitting up in bed, watching TV  Head: NC, AT  Eye: symm gaze, anicteric sclerae  ENT: patent nares wo drainage/epistaxis  Pulm: CTAB, comfortable WOB on room air  CV: normal rate, regular rhythm  GI: soft, NTND, thin, mild left upper quadrant left lower quadrant tenderness, LUQ pain with mild referred tenderness of left lower quadrant  Neuro: awake, alert, hearing speech and phonation, intact grossly     SUBJECTIVE:    ON JESUS, had colonoscopy today see Dr. Arthur note below, polyp removed and sent to lab, no AVMs were seen, no actively bleeding diverticula are seen, source of GI bleed uncertain but appears to have stopped    Postop feeling better, having mild cramping left upper quadrant and left lower quadrant.  Taking p.o. pain no nausea no vomiting, passing gas and had small clear bowel movement postop  Ambulating on her own, mentation is normal, breathing well on room air     bedside, updated patient has been in results of endoscopy, Gave education, anticipatory guidance re: GIB and post op c-scope cares and return precautions (including, but not limited to) nausea, vomiting, BRBPR, dizziness fatigue syncope, palpitations.  Patient demonstrated understanding, asking many appropriate questions which were answered to satisfaction             Primary Care Physician   Essence Tamayo    Discharge Orders      CBC with platelets     Reason for your hospital stay    Intestinal bleeding     Activity    Your activity upon discharge: activity as tolerated     Adult [facility] Follow-up and recommended labs and tests    Follow up with primary care provider, Essence  Belkis, within 7 days to evaluate medication change and for hospital follow- up.  The following labs/tests are recommended: CBC.      Appointments on [place] and/or [facility] (with [facility] or [facility] provider or service). Call [phone] if you haven't heard regarding these appointments within 7 days of discharge.     Diet    Follow this diet upon discharge: Orders Placed This Encounter      Regular Diet Adult       Significant Results and Procedures   Most Recent 3 CBC's:Recent Labs   Lab Test 11/20/21  0640 11/19/21 2002 11/19/21  0630 11/17/21  1717 11/17/21  0638 11/16/21  1615 11/16/21  1603 11/05/21  1356   WBC  --   --   --   --  3.8*  --  4.2 4.0   HGB 7.7* 8.8* 7.6*   < > 8.6*   < > 9.5* 11.0*   MCV  --   --   --   --  100  --  99 102*   PLT  --   --   --   --  185  --  218 224    < > = values in this interval not displayed.     Most Recent 3 BMP's:Recent Labs   Lab Test 11/17/21  0638 11/16/21  1603 11/05/21  1356    139 139   POTASSIUM 4.5 3.5 4.7   CHLORIDE 110* 106 107   CO2 30 26 25   BUN 10 13 22   CR 1.04 0.98 1.02   ANIONGAP 3 7 7   EJ 9.0 9.4 9.0   GLC 91 109* 91     Most Recent 2 LFT's:Recent Labs   Lab Test 11/16/21  1603 11/05/21  1356   AST 21 21   ALT 18 18   ALKPHOS 69 70   BILITOTAL 0.4 0.4     Most Recent 3 INR's:Recent Labs   Lab Test 11/16/21  1603 01/19/21 2001 03/13/17  1042   INR 1.02 0.95 1.11     Most Recent INR's and Anticoagulation Dosing History:  Anticoagulation Dose History     Recent Dosing and Labs Latest Ref Rng & Units 8/6/2010 8/18/2010 8/19/2010 3/13/2017 1/19/2021 11/16/2021    INR 0.85 - 1.15 1.02 0.93 0.94 1.11 0.95 1.02        Most Recent 3 Creatinines:Recent Labs   Lab Test 11/17/21  0638 11/16/21  1603 11/05/21  1356   CR 1.04 0.98 1.02     Most Recent 3 Hemoglobins:Recent Labs   Lab Test 11/20/21  0640 11/19/21  2002 11/19/21  0630   HGB 7.7* 8.8* 7.6*     Most Recent 3 Troponin's:No lab results found.  Most Recent 3 BNP's:No lab results found.  Most  Recent D-dimer:No lab results found.  Most Recent Cholesterol Panel:No lab results found.  Most Recent 6 Bacteria Isolates From Any Culture (See EPIC Reports for Culture Details):No lab results found.  Most Recent TSH and T4:No lab results found.  Most Recent Hemoglobin A1c:No lab results found.  Most Recent 6 glucoses:Recent Labs   Lab Test 11/17/21  0638 11/16/21  1603 11/05/21  1356 10/01/21  0927 01/23/21  0902 01/20/21  0851   GLC 91 109* 91 148* 157* 95     Most Recent Urinalysis:Recent Labs   Lab Test 11/16/21  1802   COLOR Straw   APPEARANCE Clear   URINEGLC Negative   URINEBILI Negative   URINEKETONE Trace*   SG 1.027   UBLD Negative   URINEPH 5.5   PROTEIN Negative   NITRITE Negative   LEUKEST Negative   RBCU <1   WBCU 2     Most Recent ABG:No lab results found.  Most Recent ESR & CRP:Recent Labs   Lab Test 11/05/21  1356   SED 36*   CRP <2.9     Most Recent Anemia Panel:Recent Labs   Lab Test 11/20/21  0640 11/17/21  1717 11/17/21  0638 11/05/21  1356 10/01/21  0927   WBC  --   --  3.8*   < > 2.9*   HGB 7.7*   < > 8.6*   < > 11.2*   HCT  --   --  26.6*   < > 34.2*   MCV  --   --  100   < > 101*   PLT  --   --  185   < > 183   IRON  --   --   --   --  60   IRONSAT  --   --   --   --  22   FEB  --   --   --   --  268   ANMOL  --   --   --   --  116    < > = values in this interval not displayed.     Most Recent CPK:No lab results found.,   Results for orders placed or performed during the hospital encounter of 11/16/21   CTA Abdomen Pelvis with Contrast    Narrative    EXAMINATION: CTA ABDOMEN PELVIS WITH CONTRAST, 11/16/2021 5:48 PM    TECHNIQUE:  Helical CT images from the lung bases through the  symphysis pubis were obtained with IV contrast. Contrast dose:  iopamidol (ISOVUE-370) solution 61 mL    COMPARISON: CT 1/19/2021    HISTORY: GI bleed    FINDINGS:    ABDOMEN/PELVIS:  LIVER: There are numerous subcentimeter hepatic hypodense lesions, not  significantly changed from 1/19/2021. A few of these  lesions,  including that in hepatic segment 6 demonstrates discontinuous,  peripheral enhancement suggesting hemangioma.  GALLBLADDER: No gallbladder wall thickening. The gallbladder is  dilated containing peripherally calcified, hypoattenuating gallstones.  No intra or extrahepatic biliary ductal dilation.  PANCREAS: 4 mm cystic lesion in the pancreatic body (series 13 image  73), not significantly changed. No pancreatic ductal dilation or  suspicious pancreatic mass  SPLEEN: Within normal limits.  ADRENAL GLANDS: Unchanged thickening of the left adrenal gland. No  right adrenal nodule.  URINARY TRACT: Symmetric cortical enhancement bilaterally. Bilateral  hypoattenuating, exophytic renal cysts. Nonobstructing right renal  calculi. Cortical thinning in the posterior right kidney, suggesting  prior insult or ischemia. No hydronephrosis. The urinary bladder is  well distended without focal abnormality.  REPRODUCTIVE ORGANS: Fibroid uterus, partly obscured by streak  artifact from left total hip arthroplasty.  BOWEL: Small large bowel are normal in caliber. There is  hyperattenuating colonic contents, most pronounced in the cecum and  ascending colon. No increased density on postcontrast images. No  evidence of intraluminal hemorrhage. Diffuse sigmoid diverticulosis  without CT evidence of diverticulitis. Right lower quadrant appendix  is within normal limits.  PERITONEUM/FLUID: No free fluid or air in the abdomen or pelvis.    VESSELS: Scattered atherosclerotic calcifications of the abdominal  aorta. The celiac, superior mesenteric artery and inferior mesenteric  artery are patent and normal in caliber. Patent origins of bilateral  single renal arteries. The iliac vessels and common femoral vessels  are patent. The portal vein, superior mesenteric vein and splenic vein  are patent.    LYMPH NODES: No lymphadenopathy in the abdomen or pelvis.    BONES/SOFT TISSUES: Straightening of the lumbar spine.  Multilevel  degenerative changes with opposing endplate sclerosis. Changes of left  total hip arthroplasty. No suspicious or acute osseous lesions.    LUNG BASES: Clear. Heart is not enlarged. No pleural or pericardial  effusion.      Impression    IMPRESSION:   1. No GI bleed, retroperitoneal hemorrhage or evidence of active  extravasation.  2. Colonic diverticulosis without evidence of diverticulitis.  3. Numerous hypoattenuating hepatic lesions, not significantly changed  from 1/19/2021. Based on prior imaging, many of these appear to  represent hemangiomas.    I have personally reviewed the examination and initial interpretation  and I agree with the findings.    MITA DAMICO MD         SYSTEM ID:  [id number]       Discharge Medications   Discharge Medication List as of 11/20/2021  4:30 PM      START taking these medications    Details   ferrous sulfate (FE TABS) 325 (65 Fe) MG EC tablet Take 1 tablet (325 mg) by mouth daily, Disp-30 tablet, R-0, E-PrescribeDispense on request      pantoprazole (PROTONIX) 40 MG EC tablet Take 1 tablet (40 mg) by mouth daily for 30 doses, Disp-30 tablet, R-0, Local Print      vitamin C (ASCORBIC ACID) 500 MG tablet Take 1 tablet (500 mg) by mouth daily, Disp-30 tablet, R-0, E-Prescribe         CONTINUE these medications which have NOT CHANGED    Details   acetaminophen (TYLENOL) 500 MG tablet Take 500 mg by mouth every 6 hours as needed for mild pain, Historical      calcium carb 1250 mg, 500 mg [place],/vitamin D 200 units (OSCAL WITH D) 500-200 MG-UNIT per tablet Take 1 tablet by mouth 2 times daily , Historical      gabapentin (NEURONTIN) 300 MG capsule Take 300 mg by mouth At Bedtime, Historical      hydroxychloroquine (PLAQUENIL) 200 MG tablet Take 1 tablet (200 mg) by mouth daily Annual Plaquenil toxicity eye screening required., Disp-90 tablet, R-1, E-Prescribe      Multiple Vitamins-Minerals (OCUVITE PRESERVISION PO) Take 1 tablet by mouth 2 times daily , Historical       tretinoin (RETIN-A) 0.1 % external cream APPLY TO FACE AT BEDTIME *NOT COVERED, NO PA AVAILABLE PER MD*Historical      YUVAFEM 10 MCG TABS vaginal tablet KINZA, Historical         STOP taking these medications       amoxicillin (AMOXIL) 400 MG/5ML suspension Comments:   Reason for Stopping:         EPINEPHrine (EPIPEN) 0.3 MG/0.3ML injection Comments:   Reason for Stopping:         famotidine (PEPCID) 20 MG tablet Comments:   Reason for Stopping:         Ferrous Gluconate 239 (27 Fe) MG TABS Comments:   Reason for Stopping:         lifitegrast (XIIDRA) 5 % opthalmic solution Comments:   Reason for Stopping:         pilocarpine (SALAGEN) 5 MG tablet Comments:   Reason for Stopping:             Allergies   Allergies   Allergen Reactions     Bee Venom Anaphylaxis     Shrimp Anaphylaxis     Evoxac [Cevimeline] Unknown     Prevnar Swelling     Other reaction(s): Edema     Prevnar [Pneumococcal 13-Linda Conj Vacc] Unknown

## 2021-11-24 LAB
PATH REPORT.COMMENTS IMP SPEC: NORMAL
PATH REPORT.FINAL DX SPEC: NORMAL
PATH REPORT.GROSS SPEC: NORMAL
PATH REPORT.MICROSCOPIC SPEC OTHER STN: NORMAL
PATH REPORT.RELEVANT HX SPEC: NORMAL
PHOTO IMAGE: NORMAL

## 2021-12-06 ENCOUNTER — TRANSFERRED RECORDS (OUTPATIENT)
Dept: HEALTH INFORMATION MANAGEMENT | Facility: CLINIC | Age: 81
End: 2021-12-06
Payer: COMMERCIAL

## 2021-12-23 ENCOUNTER — ANCILLARY PROCEDURE (OUTPATIENT)
Dept: BONE DENSITY | Facility: CLINIC | Age: 81
End: 2021-12-23
Attending: OBSTETRICS & GYNECOLOGY
Payer: COMMERCIAL

## 2021-12-23 DIAGNOSIS — Z13.820 ENCOUNTER FOR OSTEOPOROSIS SCREENING IN ASYMPTOMATIC POSTMENOPAUSAL PATIENT: ICD-10-CM

## 2021-12-23 DIAGNOSIS — Z78.0 ENCOUNTER FOR OSTEOPOROSIS SCREENING IN ASYMPTOMATIC POSTMENOPAUSAL PATIENT: ICD-10-CM

## 2021-12-23 PROCEDURE — 77080 DXA BONE DENSITY AXIAL: CPT | Performed by: INTERNAL MEDICINE

## 2021-12-24 LAB — VIDEO CAPSULE ENDOSCOPY: NORMAL

## 2021-12-31 ENCOUNTER — OFFICE VISIT (OUTPATIENT)
Dept: ORTHOPEDICS | Facility: CLINIC | Age: 81
End: 2021-12-31
Payer: COMMERCIAL

## 2021-12-31 DIAGNOSIS — M25.421 PAIN AND SWELLING OF RIGHT ELBOW: ICD-10-CM

## 2021-12-31 DIAGNOSIS — M54.16 LUMBAR RADICULOPATHY, ACUTE: Primary | ICD-10-CM

## 2021-12-31 DIAGNOSIS — M06.9 RHEUMATOID ARTHRITIS, INVOLVING UNSPECIFIED SITE, UNSPECIFIED WHETHER RHEUMATOID FACTOR PRESENT (H): ICD-10-CM

## 2021-12-31 DIAGNOSIS — M25.521 PAIN AND SWELLING OF RIGHT ELBOW: ICD-10-CM

## 2021-12-31 DIAGNOSIS — M51.369 LUMBAR DEGENERATIVE DISC DISEASE: ICD-10-CM

## 2021-12-31 PROCEDURE — 99214 OFFICE O/P EST MOD 30 MIN: CPT | Performed by: FAMILY MEDICINE

## 2021-12-31 RX ORDER — GABAPENTIN 300 MG/1
300 CAPSULE ORAL 3 TIMES DAILY
COMMUNITY
Start: 2021-12-31 | End: 2021-12-31

## 2021-12-31 RX ORDER — PREDNISONE 20 MG/1
40 TABLET ORAL DAILY
Qty: 10 TABLET | Refills: 0 | Status: SHIPPED | OUTPATIENT
Start: 2021-12-31 | End: 2022-03-10

## 2021-12-31 RX ORDER — GABAPENTIN 300 MG/1
300 CAPSULE ORAL 3 TIMES DAILY
Qty: 90 CAPSULE | Refills: 1 | Status: SHIPPED | OUTPATIENT
Start: 2021-12-31 | End: 2024-09-12

## 2021-12-31 NOTE — LETTER
12/31/2021      RE: Patrick Olson  [address]  [facility] [id number]       ESTABLISHED PATIENT FOLLOW-UP:  Follow Up of the Lower Back       HISTORY OF PRESENT ILLNESS  Ms. Olson is a pleasant 81 year old year old female who presents to clinic today for follow-up of low back pain.    Date of injury: 4/2021  Date last seen: 7/1/21  Following Therapeutic Plan: Yes  Pain: Worse  Function: Worse   Interval History: when having 1st injection completed in July she did not get much relief and now pain is worse. Looking for next steps.  Pain is not radiating down legs.     Right elbow pain as well radiating into forearm. Also has finger pains . She notes this increased in November. Relates this to stopping her methotrexate and starting plaquenil.  Sees rheumatology mid February.     Additional medical/Social/Surgical histories reviewed in [facility] and updated as appropriate.    REVIEW OF SYSTEMS (12/31/2021)  CONSTITUTIONAL: Denies fever and weight loss  GASTROINTESTINAL: Denies abdominal pain, nausea, vomiting  MUSCULOSKELETAL: See HPI  SKIN: Denies any recent rash or lesion  NEUROLOGICAL: Denies numbness or focal weakness     PHYSICAL EXAM  There were no vitals taken for this visit.    General  - normal appearance, in no obvious distress  Musculoskeletal - left UE  - inspection: normal joint alignment, no obvious deformity, no joint effusion or swelling. No swelling or erythema of elbow or hand.  - palpation: tender at the origin of the common extensor tendon into extensor musculature.  - ROM:  160 deg flexion   0 deg extension   90 deg pronation   90 deg supination  - strength: 5/5 wrist extension with elbow flexed, 5/5 flexion, 5/5  strength  - special tests:  (-) varus  (-) valgus  (-) Tinel's  Musculoskeletal - lumbar spine  - inspection: normal bone and joint alignment, no obvious scoliosis  - palpation: Significant tenderness palpation of lumbar paraspinal musculature.  - ROM: Full range of motion in  flexion-extension side bending and rotation.  Pain with flexion and sidebending  - strength: lower extremities 5/5 in all planes  - special tests:  (-) straight leg raise    (-) slump test    Neuro  - patellar and Achilles DTRs 2+ bilaterally, No lower extremity sensory deficit throughout L5 distribution, grossly normal coordination, normal muscle tone  Skin  - no ecchymosis, erythema, warmth, or induration, no obvious rash  Psych  - interactive, appropriate, normal mood and affect    IMAGING :       ASSESSMENT & PLAN  Ms. Olson is a 81 year old year old female who presents to clinic today with Follow Up of the Lower Back    Diagnosis:   Lumbar degenerative disc disease  Lumbar radiculitis affecting bilateral lower extremities  Polyarthralgias  Rheumatoid arthritis    Current exacerbation of known lumbar disc herniation.  This time we discussed consideration for repeat epidural steroid injection.  After review of previous injections, she had the most significant relief from L3-L4 injection rather than cephalad.  We will repeat at this time to see what kind of relief she is able to achieve.  In addition of this steroid burst will be provided for more immediate relief.  She is currently taking gabapentin at night only and I would like her to increase it at this juncture in time up to 300 mg 3 times daily.    I will see her back 2 weeks after epidural steroid injection.  We can review efficacy.  Discussed gabapentin and whether regimen should be modified.  Continue with her home exercise program.    Provided tennis elbow rehab program to determine whether this is helpful.  Continue this discussion of polyarthralgias with rheumatology. Determine whether medication change from methotrexate could be contributory. Monitor other joint pains.    It was a pleasure seeing Patrick.    Mukesh Lantigua, , [facility]  Primary Care Sports Medicine

## 2021-12-31 NOTE — PATIENT INSTRUCTIONS
"Tennis Elbow Instructions    LATERAL EPICONDYLITIS (TENNIS ELBOW):    Pain on the outside of elbow worsened with any gripping activity even as little as lifting a coffee cup; weakness of ; aching pain    Usually there is not any numbness or tingling involved (please let us know if this is happening as it may be something else)    TREATMENT:    Avoid aggravating activities until pain free at rest and with exercises. Then return slowly with pain being your guide. IF IT HURTS, DO NOT DO IT!    Wrist brace allows the muscles to relax in neutral position. The muscles that extend and rotate your wrist are attached at the lateral elbow. It is these tendons that are painful so the wrist brace protects them (wear brace as much as possible, including sleep until pain free then may wean out. First stop during daytime but continue for sleep and activities you can do with brace on)    Brace options: \"chopat\" elbow strap relieves pressure on the tendon attachment but does squeeze muscle so may initially hurt. Often helpful once resuming activities and not using wrist brace.    Ice 10-15 minutes after activity. (or ice cup massage 5-7 min- fill pilar cup and freze. peel away paper to use)    Cross-friction massage (rub painful area)    Strengthening: Perform exercises as instructed either in handout or by occupational therapy.     These should be very easy with little to no weight.    Goal: 2 sets of 20 for each strength exercise, 1-2 times per day, 5 days a week    Hold stretches for 30 secs and repeat 2-3 times per day      Jeramie Twist: You may purchase a flex  bar by [employer] and perform exercises called \"Jeramie Twist\". Search online for Jeramie twist tennis elbow exercises and you will find videos on how to perform. The bar may be purchased online as well at sites such as amazon.      You may do the stretching exercises right away. You may do the strengthening exercises when stretching is nearly painless.    STRETCHING " EXERCISES    Wrist active range of motion, flexion and extension: Bend the wrist of your injured arm forward and back as far as you can. Do 2 sets of 15.    Wrist stretch: Press the back of the hand on your injured side with your other hand to help bend your wrist. Hold for 15 to 30 seconds. Next, stretch the hand back by pressing the fingers in a backward direction. Hold for 15 to 30 seconds. Keep the arm on your injured side straight during this exercise. Do 3 sets.    Forearm pronation and supination: Bend the elbow of your injured arm 90 degrees, keeping your elbow at your side. Turn your palm up and hold for 5 seconds. Then slowly turn your palm down and hold for 5 seconds. Make sure you keep your elbow at your side and bent 90 degrees while you do the exercise. Do 2 sets of 15.  Active elbow flexion and extension: Gently bring the palm of the hand on your injured side up toward your shoulder, bending your elbow as much as you can. Then straighten your elbow as far as you can. Repeat 15 times. Do 2 sets of 15.  STRENGTHENING EXERCISES    Eccentric wrist flexion: Hold a can or hammer handle in the hand of your injured side with your palm up. Use the hand on the side that is not injured to bend your wrist up. Then let go of your wrist and use just your injured side to lower the weight slowly back to the starting position. Do 3 sets of 15. Gradually increase the weight you are holding.    Eccentric wrist extension: Hold a soup can or hammer handle in the hand of your injured side with your palm facing down. Use the hand on the side that is not injured to bend your wrist up. Then let go of your wrist and use just your injured side to lower the weight slowly back to the starting position. Do 3 sets of 15. Gradually increase the weight you are holding.    Wrist radial deviation strengthening: Put your wrist in the sideways position with your thumb up. Hold a can of soup or a hammer handle and gently bend your wrist  up, with the thumb reaching toward the ceiling. Slowly lower to the starting position. Do not move your forearm throughout this exercise. Do 2 sets of 15.    Forearm pronation and supination strengthening: Hold a soup can or hammer handle in your hand and bend your elbow 90 degrees. Slowly turn your hand so your palm is up and then down. Do 2 sets of 15.  Wrist extension with broom handle: Stand up and hold a broom handle in both hands. With your arms at shoulder level, elbows straight and palms down, roll the broom handle backward in your hand. Do 2 sets of 15.

## 2022-01-03 ENCOUNTER — LAB (OUTPATIENT)
Dept: LAB | Facility: CLINIC | Age: 82
End: 2022-01-03
Payer: COMMERCIAL

## 2022-01-03 DIAGNOSIS — M06.9 RHEUMATOID ARTHRITIS INVOLVING MULTIPLE SITES, UNSPECIFIED WHETHER RHEUMATOID FACTOR PRESENT (H): ICD-10-CM

## 2022-01-03 LAB
ALBUMIN SERPL-MCNC: 3.9 G/DL (ref 3.4–5)
ALP SERPL-CCNC: 75 U/L (ref 40–150)
ALT SERPL W P-5'-P-CCNC: 24 U/L (ref 0–50)
ANION GAP SERPL CALCULATED.3IONS-SCNC: 4 MMOL/L (ref 3–14)
AST SERPL W P-5'-P-CCNC: 24 U/L (ref 0–45)
BASOPHILS # BLD AUTO: 0 10E3/UL (ref 0–0.2)
BASOPHILS NFR BLD AUTO: 0 %
BILIRUB SERPL-MCNC: 0.4 MG/DL (ref 0.2–1.3)
BUN SERPL-MCNC: 18 MG/DL (ref 7–30)
CALCIUM SERPL-MCNC: 9.6 MG/DL (ref 8.5–10.1)
CHLORIDE BLD-SCNC: 109 MMOL/L (ref 94–109)
CO2 SERPL-SCNC: 29 MMOL/L (ref 20–32)
CREAT SERPL-MCNC: 0.99 MG/DL (ref 0.52–1.04)
CRP SERPL-MCNC: <2.9 MG/L (ref 0–8)
EOSINOPHIL # BLD AUTO: 0.1 10E3/UL (ref 0–0.7)
EOSINOPHIL NFR BLD AUTO: 4 %
ERYTHROCYTE [DISTWIDTH] IN BLOOD BY AUTOMATED COUNT: 13.7 % (ref 10–15)
ERYTHROCYTE [SEDIMENTATION RATE] IN BLOOD BY WESTERGREN METHOD: 39 MM/HR (ref 0–30)
GFR SERPL CREATININE-BSD FRML MDRD: 58 ML/MIN/1.73M2
GLUCOSE BLD-MCNC: 104 MG/DL (ref 70–99)
HCT VFR BLD AUTO: 32.5 % (ref 35–47)
HGB BLD-MCNC: 10.5 G/DL (ref 11.7–15.7)
LYMPHOCYTES # BLD AUTO: 1.3 10E3/UL (ref 0.8–5.3)
LYMPHOCYTES NFR BLD AUTO: 32 %
MCH RBC QN AUTO: 32.1 PG (ref 26.5–33)
MCHC RBC AUTO-ENTMCNC: 32.3 G/DL (ref 31.5–36.5)
MCV RBC AUTO: 99 FL (ref 78–100)
MONOCYTES # BLD AUTO: 0.4 10E3/UL (ref 0–1.3)
MONOCYTES NFR BLD AUTO: 10 %
NEUTROPHILS # BLD AUTO: 2.2 10E3/UL (ref 1.6–8.3)
NEUTROPHILS NFR BLD AUTO: 55 %
PLATELET # BLD AUTO: 226 10E3/UL (ref 150–450)
POTASSIUM BLD-SCNC: 4.6 MMOL/L (ref 3.4–5.3)
PROT SERPL-MCNC: 7.5 G/DL (ref 6.8–8.8)
RBC # BLD AUTO: 3.27 10E6/UL (ref 3.8–5.2)
RHEUMATOID FACT SER NEPH-ACNC: 8 IU/ML
SODIUM SERPL-SCNC: 142 MMOL/L (ref 133–144)
WBC # BLD AUTO: 3.9 10E3/UL (ref 4–11)

## 2022-01-03 PROCEDURE — 85025 COMPLETE CBC W/AUTO DIFF WBC: CPT

## 2022-01-03 PROCEDURE — 85652 RBC SED RATE AUTOMATED: CPT

## 2022-01-03 PROCEDURE — 86200 CCP ANTIBODY: CPT

## 2022-01-03 PROCEDURE — 36415 COLL VENOUS BLD VENIPUNCTURE: CPT

## 2022-01-03 PROCEDURE — 80053 COMPREHEN METABOLIC PANEL: CPT

## 2022-01-03 PROCEDURE — 86140 C-REACTIVE PROTEIN: CPT

## 2022-01-03 PROCEDURE — 86431 RHEUMATOID FACTOR QUANT: CPT

## 2022-01-05 LAB — CCP AB SER IA-ACNC: 3.6 U/ML

## 2022-01-12 ENCOUNTER — OFFICE VISIT (OUTPATIENT)
Dept: GASTROENTEROLOGY | Facility: CLINIC | Age: 82
End: 2022-01-12
Attending: INTERNAL MEDICINE
Payer: COMMERCIAL

## 2022-01-12 ENCOUNTER — PRE VISIT (OUTPATIENT)
Dept: GASTROENTEROLOGY | Facility: CLINIC | Age: 82
End: 2022-01-12

## 2022-01-12 VITALS
OXYGEN SATURATION: 100 % | HEART RATE: 72 BPM | DIASTOLIC BLOOD PRESSURE: 60 MMHG | SYSTOLIC BLOOD PRESSURE: 123 MMHG | BODY MASS INDEX: 17.84 KG/M2 | WEIGHT: 100.7 LBS | HEIGHT: 63 IN

## 2022-01-12 DIAGNOSIS — R10.13 DYSPEPSIA: ICD-10-CM

## 2022-01-12 DIAGNOSIS — K59.03 DRUG-INDUCED CONSTIPATION: ICD-10-CM

## 2022-01-12 DIAGNOSIS — K63.5 CECAL POLYP: ICD-10-CM

## 2022-01-12 DIAGNOSIS — K31.819 GASTRIC AVM: Primary | ICD-10-CM

## 2022-01-12 DIAGNOSIS — K64.9 HEMORRHOIDS, UNSPECIFIED HEMORRHOID TYPE: ICD-10-CM

## 2022-01-12 DIAGNOSIS — K57.31 DIVERTICULAR HEMORRHAGE: ICD-10-CM

## 2022-01-12 PROCEDURE — 99205 OFFICE O/P NEW HI 60 MIN: CPT | Performed by: INTERNAL MEDICINE

## 2022-01-12 ASSESSMENT — MIFFLIN-ST. JEOR: SCORE: 904.55

## 2022-01-12 NOTE — PROGRESS NOTES
GI CLINIC VISIT    CC/REFERRING MD:  Essence Tamayo  REASON FOR CONSULTATION:   Ms. Olson is a 81 year old female who I was asked to see in consultation at the request of Dr. Essence Tamayo for gastric AVM.     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 Recent polypectomy at  valve should have managed her most recent bleeding episode. Certainly diverticular bleeding and AVM bleeding could recur and we discussed when to present to the ED for further evaluation.      Given finding of prior AVM, there will likely be additional AVMs. This should be addressed with periodic hgb checks and iron replacement as necessary (oral or infusions). Ablative therapies f (EGD, colonoscopy, and deep SB enteroscopy with use of APC) would not be appropriate unless there is active overt (visible) bleeding.  Disease-modifying therapies (SQ octreotide) would be considered in those patients refractory to the above measures, generally with repeated hospitalizations (note incredible cost for octreotide).      Would also address hemorrhoidal bleeding with aggressive constipation management (see below). Could consider colorectal surgery clinic referral if concern for recurrent hemorrhoidal bleeding which could be addressed with possible banding.      Noted improved dyspepsia with use of famotidine and small dietary changes. We discussed this medication; it is safe for longterm use (benefit outweighs risk).     - continue iron replacement as you are to support anemia in the setting of AVMs, ok to use periodically directed by your primary care team based on hgb levels (pending clinical picture this may not need to be an every medication, but one use  periodically)  - continue famotidine, seems to be helping your earlier stomach symptoms, this medication is safe to take longterm  - continue Miralax - ok to adjust dose from 1/2 capful - 2 capfuls daily, goal is at least three satisfactory BMs a week and no more than three a day with minimizing of constipation symptoms in-between  - ok to get up and walk around after a BM and to return in 10-15 minutes later if getting another signal to move your bowels (you don't need to sit and push on the toilet in the interim)  - will check with Dr. Arthur about follow-up colonoscopy, likely 6-12 months as he reportedly discussed with you  - please report to an emergency department if you have having visible GI bleeding again (vomiting blood, blood in the stool)      RTC PRN      Thank you for this consultation. It was a pleasure to participate in the care of this patient; please contact us with any further questions.  A total of 45 face-to-face minutes was spent with this patient, >50% of which was counseling regarding the above delineated issues. An additional 30 minutes was spent on the date of the encounter doing chart review, documentation, care coordination, and further activities as noted above.    Altagracia Martinez MD   of Medicine  Division of Gastroenterology, Hepatology and Nutrition  [facility]    ---------------------------------------------------------------------------------------------------  HPI:     Ms. Olson is a 81 year old female with RA (on methotrexate until Oct 2021, but switched to plaquenil at that point), spine pain planned for injection, OA, osteoporosis, and prior GI bleeding (AVM, possible diverticular, polyp-related --> see below) who was originally referred to GI clinic due to her history of gastric AVM as well as early satiety with dyspepsia. After her referral she was admitted to our institution for GI bleeding. She has previously followed with [facility] ([facility]) for her GI care (since 2017 per her report) and has undergone endoscopic exams at [facility], and [facility] since 2010.       Dating back several years per her recollection (2010 based on available records) she began to have recurrent overt GI bleeding with anemia. The bleeding was described as bright red. She underwent inpatient colonoscopic exams with no active bleeding found. She's been seen to have both diverticulosis and hemorrhoids on these exams. At these times, diverticular bleeding had been felt to be a contributor given her clinical presentation, though no active bleeding lesion was ever identified.      Her most recent EGD and colonoscopy were 5/26/21 at [facility] for anemia. EGD revealed a non-bleeding gastric AVM which was treated with APC. Colonoscopy demonstrated diverticulosis and small hemorrhoids.     Ms. Olson was referred to  given this AVM and anemia, but prior to the appointment was admitted to [facility] with hematochezia (per patient this was \"a bit darker\" than her prior bleeding episodes). A CTA did not demonstrate any active extravasation. Thus a capsule was done with bleeding seen in the distal ileum to cecum on prelim capsule read. Colonoscopy with ileal intubation  (20 cm beyond the IC valve) was done thereafter with no active bleeding found, but a very large 30 mm polyp was found on the IC valve and removed via EMR. This was felt to be a likely source of bleeding.No other findings were seen in the area of bleeding noted on capsule endoscopy.      In regards to her anemia, Ms. Olson has been evaluated by both her PCP and hematology. Per records it seems she was felt to have a component of anemia of chronic disease, though in the setting of recent GI bleeding she was given IV iron infusions which she has since completed. She is now on daily po iron supplementation with vitamin C. Hgb is up to 10.5 (7.7 on hospital " "discharge).     Ms. Olson was also referred for postprandial stomach pain she began experiencing earlier this year. For it she was started on famotidine 20 mg BID. She also increased her water and fiber intake and cut out gluten. She is not sure if gluten avoidance made a difference and has been re-introducing back into her diet for some time.  She tracked her symptoms and felt that her stomach also hurt more when it was empty. She is working on more frequent snacking throughout the day. Over time, with all these interventions she feels the abdominal discomfort is gone and no longer an issue.      Her GI history is notable for \"fatty liver\" reportedly identified on imaging done through [facility]. She states a liver MRI was done thereafter to follow-up which was reportedly normal.     Ms. Olson also has a history of constipation which was much worse with higher doses of oral iron. She is on Miralax 1 capful daily. With this moves her bowels 1-3 times a day. She does feel that at night she has a lot of flatulence. She denies any straining with defecation, denies noting any tissue prolapse or rectal symptoms.    ROS:    + Dry mouth, most notable at night, has water at bedside which helps  + OP dysphagia - feels solids take a long time to pass through her throat, but no issues in her esophagus - denies sense of food sticking or food impactions --> ? Worse with dry mouth      PROBLEM LIST  Patient Active Problem List    Diagnosis Date Noted     Lab test negative for COVID-19 virus 11/18/2021     Priority: Medium     Diverticulosis of large intestine      Priority: Medium     Acute lower GI bleeding 01/21/2021     Priority: Medium     Diverticular hemorrhage 01/20/2021     Priority: Medium     Gastrointestinal hemorrhage, unspecified gastrointestinal hemorrhage type 01/19/2021     Priority: Medium     GI bleed 03/13/2017     Priority: Medium     SNHL (sensorineural hearing loss) 11/29/2012     Priority: " Medium       PERTINENT PAST MEDICAL HISTORY:  Past Medical History:   Diagnosis Date     Diverticulosis of large intestine      Osteoarthritis      Osteoporosis      Rheumatoid arthritis (H)      Sensorineural hearing loss        PREVIOUS SURGERIES:  Past Surgical History:   Procedure Laterality Date     CAPSULE/PILL CAM ENDOSCOPY N/A 11/18/2021    Procedure: IMAGING PROCEDURE, GI TRACT, INTRALUMINAL, VIA CAPSULE;  Surgeon: Deric Rodriguez MD;  Location: [facility]     COLONOSCOPY N/A 3/14/2017    Procedure: COMBINED COLONOSCOPY, SINGLE OR MULTIPLE BIOPSY/POLYPECTOMY BY BIOPSY;  Surgeon: Spencer Downey MD;  Location: [facility]     ENTEROSCOPY SMALL BOWEL N/A 11/20/2021    Procedure: ENTEROSCOPY, colonoscopy with endoscopic mucosal resection and tattoo placement;  Surgeon: Kirby Arthur MD;  Location: [facility] OR     IR VISCERAL EMBOLIZATION  1/22/2021     ORTHOPEDIC SURGERY       SIGMOIDOSCOPY FLEXIBLE N/A 1/23/2021    Procedure: SIGMOIDOSCOPY, FLEXIBLE;  Surgeon: Jaime Steinberg MD;  Location:  GI       ALLERGIES:   Allergies   Allergen Reactions     Bee Venom Anaphylaxis     Shrimp Anaphylaxis     Evoxac [Cevimeline] Unknown     Prevnar Swelling     Other reaction(s): Edema     Prevnar [Pneumococcal 13-Linda Conj Vacc] Unknown       PERTINENT MEDICATIONS:  Current Outpatient Medications   Medication     acetaminophen (TYLENOL) 500 MG tablet     calcium carb 1250 mg, 500 mg [place],/vitamin D 200 units (OSCAL WITH D) 500-200 MG-UNIT per tablet     ferrous sulfate (FE TABS) 325 (65 Fe) MG EC tablet     gabapentin (NEURONTIN) 300 MG capsule     hydroxychloroquine (PLAQUENIL) 200 MG tablet     Multiple Vitamins-Minerals (OCUVITE PRESERVISION PO)     predniSONE (DELTASONE) 20 MG tablet     tretinoin (RETIN-A) 0.1 % external cream     vitamin C (ASCORBIC ACID) 500 MG tablet     YUVAFEM 10 MCG TABS vaginal tablet     No current facility-administered medications for this visit.       SOCIAL HISTORY:  Social History      Socioeconomic History     Marital status:      Spouse name: Not on file     Number of children: Not on file     Years of education: Not on file     Highest education level: Not on file   Occupational History     Not on file   Tobacco Use     Smoking status: Former Smoker     Smokeless tobacco: Never Used   Substance and Sexual Activity     Alcohol use: No     Drug use: No     Sexual activity: Not on file   Other Topics Concern     Not on file   Social History Narrative    - Retired (formerly employed as  at [employer])    - 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    Past/family/social history reviewed and no changes    PHYSICAL EXAMINATION:  Vitals There were no vitals taken for this visit.   Wt   Wt Readings from Last 2 Encounters:   11/16/21 45.4 kg (100 lb)   10/14/21 45.9 kg (101 lb 1.6 oz)      Gen: Pt sitting up in NAD, interactive and cooperative on exam  Eyes: sclerae anicteric, no injection  Cardiac: RRR, nl S1, S2, no peripheral edema  Resp: Clear on anterior exam  GI: Normoactive BS, abd soft,, nontender  Skin: Warm, dry, no jaundice, nails appear healthy  Neuro: alert, oriented, answers questions appropriately      PERTINENT STUDIES:    Lab on 01/03/2022   Component Date Value Ref Range Status     Rheumatoid Factor 01/03/2022 8  <12 IU/mL Final     Sodium 01/03/2022 142  133 - 144 mmol/L Final     Potassium 01/03/2022 4.6  3.4 - 5.3 mmol/L Final      Chloride 01/03/2022 109  94 - 109 mmol/L Final     Carbon Dioxide (CO2) 01/03/2022 29  20 - 32 mmol/L Final     Anion Gap 01/03/2022 4  3 - 14 mmol/L Final     Urea Nitrogen 01/03/2022 18  7 - 30 mg/dL Final     Creatinine 01/03/2022 0.99  0.52 - 1.04 mg/dL Final     Calcium 01/03/2022 9.6  8.5 - 10.1 mg/dL Final     Glucose 01/03/2022 104* 70 - 99 mg/dL Final     Alkaline Phosphatase 01/03/2022 75  40 - 150 U/L Final     AST 01/03/2022 24  0 - 45 U/L Final     ALT 01/03/2022 24  0 - 50 U/L Final     Protein Total 01/03/2022 7.5  6.8 - 8.8 g/dL Final     Albumin 01/03/2022 3.9  3.4 - 5.0 g/dL Final     Bilirubin Total 01/03/2022 0.4  0.2 - 1.3 mg/dL Final     GFR Estimate 01/03/2022 58* >60 mL/min/1.73m2 Final     CRP Inflammation 01/03/2022 <2.9  0.0 - 8.0 mg/L Final     Cyclic Citrullinated Peptide Antib* 01/03/2022 3.6  <7.0 U/mL Final     Erythrocyte Sedimentation Rate 01/03/2022 39* 0 - 30 mm/hr Final     WBC Count 01/03/2022 3.9* 4.0 - 11.0 10e3/uL Final     RBC Count 01/03/2022 3.27* 3.80 - 5.20 10e6/uL Final     Hemoglobin 01/03/2022 10.5* 11.7 - 15.7 g/dL Final     Hematocrit 01/03/2022 32.5* 35.0 - 47.0 % Final     MCV 01/03/2022 99  78 - 100 fL Final     MCH 01/03/2022 32.1  26.5 - 33.0 pg Final     MCHC 01/03/2022 32.3  31.5 - 36.5 g/dL Final     RDW 01/03/2022 13.7  10.0 - 15.0 % Final     Platelet Count 01/03/2022 226  150 - 450 10e3/uL Final     % Neutrophils 01/03/2022 55  % Final     % Lymphocytes 01/03/2022 32  % Final     % Monocytes 01/03/2022 10  % Final     % Eosinophils 01/03/2022 4  % Final     % Basophils 01/03/2022 0  % Final     Absolute Neutrophils 01/03/2022 2.2  1.6 - 8.3 10e3/uL Final     Absolute Lymphocytes 01/03/2022 1.3  0.8 - 5.3 10e3/uL Final     Absolute Monocytes 01/03/2022 0.4  0.0 - 1.3 10e3/uL Final     Absolute Eosinophils 01/03/2022 0.1  0.0 - 0.7 10e3/uL Final     Absolute Basophils 01/03/2022 0.0  0.0 - 0.2 10e3/uL Final

## 2022-01-12 NOTE — PATIENT INSTRUCTIONS
- continue iron replacement as you are to support anemia in the setting of AVMs, ok to go off and restart as directed by your primary care team  - continue famotidine, seems to be helping your earlier stomach symptoms, this medication is safe to take longterm  - continue Miralax - ok to adjust dose from 1/2 capful - 2 capfuls daily, goal is at least three satisfactory BMs a week and no more than three a day with minimizing of constipation symptoms in-between  - ok to get up and walk around after a BM and to return in 10-15 minutes later if getting another signal to move your bowels (you don't need to sit and push on the toilet in the interim)  - will check with Dr. Arthur about follow-up colonoscopy, likely 6-12 months as he reportedly discussed with you  - please report to an emergency department if you have having visible GI bleeding again (vomiting blood, blood in the stool)  - follow-up as needed       If you have any questions, please don't hesitate to contact me through our GI RN Clinic Coordinator, Consuelo García, at [phone].

## 2022-01-12 NOTE — NURSING NOTE
"Chief Complaint   Patient presents with     New Patient       Vitals:    01/12/22 1000   BP: 123/60   Pulse: 72   SpO2: 100%   Weight: 45.7 kg (100 lb 11.2 oz)   Height: 1.606 m (5' 3.23\")       Body mass index is 17.71 kg/m .    Maura Wiggins CMA    "

## 2022-01-14 ENCOUNTER — MEDICAL CORRESPONDENCE (OUTPATIENT)
Dept: HEALTH INFORMATION MANAGEMENT | Facility: CLINIC | Age: 82
End: 2022-01-14
Payer: COMMERCIAL

## 2022-01-18 ENCOUNTER — TRANSFERRED RECORDS (OUTPATIENT)
Dept: HEALTH INFORMATION MANAGEMENT | Facility: CLINIC | Age: 82
End: 2022-01-18
Payer: COMMERCIAL

## 2022-02-07 ENCOUNTER — ANCILLARY PROCEDURE (OUTPATIENT)
Dept: GENERAL RADIOLOGY | Facility: CLINIC | Age: 82
End: 2022-02-07
Attending: FAMILY MEDICINE
Payer: COMMERCIAL

## 2022-02-07 VITALS
RESPIRATION RATE: 17 BRPM | HEART RATE: 69 BPM | OXYGEN SATURATION: 100 % | DIASTOLIC BLOOD PRESSURE: 73 MMHG | SYSTOLIC BLOOD PRESSURE: 127 MMHG | TEMPERATURE: 97.6 F

## 2022-02-07 DIAGNOSIS — M54.16 LUMBAR RADICULOPATHY, ACUTE: ICD-10-CM

## 2022-02-07 PROCEDURE — 62323 NJX INTERLAMINAR LMBR/SAC: CPT | Performed by: RADIOLOGY

## 2022-02-07 RX ORDER — LIDOCAINE HYDROCHLORIDE 10 MG/ML
30 INJECTION, SOLUTION EPIDURAL; INFILTRATION; INTRACAUDAL; PERINEURAL ONCE
Status: COMPLETED | OUTPATIENT
Start: 2022-02-07 | End: 2022-02-07

## 2022-02-07 RX ORDER — BUPIVACAINE HYDROCHLORIDE 5 MG/ML
10 INJECTION, SOLUTION EPIDURAL; INTRACAUDAL ONCE
Status: COMPLETED | OUTPATIENT
Start: 2022-02-07 | End: 2022-02-07

## 2022-02-07 RX ORDER — IOPAMIDOL 408 MG/ML
20 INJECTION, SOLUTION INTRATHECAL ONCE
Status: COMPLETED | OUTPATIENT
Start: 2022-02-07 | End: 2022-02-07

## 2022-02-07 RX ORDER — METHYLPREDNISOLONE ACETATE 80 MG/ML
80 INJECTION, SUSPENSION INTRA-ARTICULAR; INTRALESIONAL; INTRAMUSCULAR; SOFT TISSUE ONCE
Status: COMPLETED | OUTPATIENT
Start: 2022-02-07 | End: 2022-02-07

## 2022-02-07 RX ADMIN — BUPIVACAINE HYDROCHLORIDE 10 MG: 5 INJECTION, SOLUTION EPIDURAL; INTRACAUDAL at 11:18

## 2022-02-07 RX ADMIN — METHYLPREDNISOLONE ACETATE 80 MG: 80 INJECTION, SUSPENSION INTRA-ARTICULAR; INTRALESIONAL; INTRAMUSCULAR; SOFT TISSUE at 11:18

## 2022-02-07 RX ADMIN — LIDOCAINE HYDROCHLORIDE 5 ML: 10 INJECTION, SOLUTION EPIDURAL; INFILTRATION; INTRACAUDAL; PERINEURAL at 11:18

## 2022-02-07 RX ADMIN — IOPAMIDOL 2 ML: 408 INJECTION, SOLUTION INTRATHECAL at 11:18

## 2022-02-07 NOTE — DISCHARGE INSTRUCTIONS
Discharge Instructions for Epidural Steroid Injection/Nerve Block  Care of injection site:    If you have new numbness down your leg after the injection, this may last up to 4-6 hours, but should go away. You may need help with walking until your leg feels normal.    Over the next 24 to 48 hours, pain at the injection site may increase before it gets better.    For the next 48 hours, use ice packs for 15 minutes, 3-4 times a day for pain relief.    If you have a bandage, you may remove it the next morning     Do not submerge injection site in water for 24 hours, (no baths. pools, hot tubs). Showers are OK.            Activity:    You should relax today and then you may return to your regular activity tomorrow.    You may eat a normal diet.    Medicines:    Restart all your blood thinner medicines tomorrow at your regular dose, including Coumadin (Warfarin).    If you are restarting Coumadin, talk to your doctor about having your INR checked.    If you received steroids: The steroid should help reduce swelling and pain. This may take from 3-14 days. Pain from the shot will be mild and go away in 2-3 days.     Common side effects may include:    Insomnia    Heartburn    Flushed face    Water retention    Increased appetite    Increased blood sugar    If you have diabetes, watch your blood sugar closely. If needed, call your doctor to help control your blood sugar.    Call your doctor or go to the Emergency Room if you have new severe pain or fever.

## 2022-02-10 ENCOUNTER — OFFICE VISIT (OUTPATIENT)
Dept: RHEUMATOLOGY | Facility: CLINIC | Age: 82
End: 2022-02-10
Attending: INTERNAL MEDICINE
Payer: COMMERCIAL

## 2022-02-10 VITALS
SYSTOLIC BLOOD PRESSURE: 108 MMHG | WEIGHT: 101.7 LBS | BODY MASS INDEX: 17.88 KG/M2 | OXYGEN SATURATION: 100 % | HEART RATE: 81 BPM | TEMPERATURE: 98 F | DIASTOLIC BLOOD PRESSURE: 71 MMHG

## 2022-02-10 DIAGNOSIS — M51.369 DDD (DEGENERATIVE DISC DISEASE), LUMBAR: Primary | ICD-10-CM

## 2022-02-10 DIAGNOSIS — M06.9 RHEUMATOID ARTHRITIS INVOLVING MULTIPLE SITES, UNSPECIFIED WHETHER RHEUMATOID FACTOR PRESENT (H): ICD-10-CM

## 2022-02-10 PROCEDURE — 99215 OFFICE O/P EST HI 40 MIN: CPT | Performed by: INTERNAL MEDICINE

## 2022-02-10 PROCEDURE — G0463 HOSPITAL OUTPT CLINIC VISIT: HCPCS

## 2022-02-10 RX ORDER — LIFITEGRAST 50 MG/ML
1 SOLUTION/ DROPS OPHTHALMIC 2 TIMES DAILY
COMMUNITY

## 2022-02-10 ASSESSMENT — PAIN SCALES - GENERAL: PAINLEVEL: MODERATE PAIN (5)

## 2022-02-10 NOTE — NURSING NOTE
Chief Complaint   Patient presents with     RECHECK     f/u     /71   Pulse 81   Temp 98  F (36.7  C) (Oral)   Wt 46.1 kg (101 lb 11.2 oz)   SpO2 100%   BMI 17.88 kg/m    Azeem Simmons MA on 2/10/2022 at 3:38 PM     88

## 2022-02-10 NOTE — LETTER
2/10/2022       RE: Patrick Olson  [address]  [facility] [address]     Dear Colleague,    Thank you for referring your patient, Patrick Olson, to the [facility] [place] [facility]. Please see a copy of my visit note below.      Outpatient Rheumatology follow-up    Name: Patrick Olson    MRN [id number]   Today's date: 02/10/2022         Reason for follow-up: rheumatoid arthritis on methotrexate   Requesting physician: Lauryn Tamayo MD             Assessment & Plan:   81-year-old female with a history of seropositive rheumatoid arthritis with secondary Sjogren's syndrome on methotrexate 10 mg weekly (previously on 20 mg weekly)/folic acid 2 mg daily and 5 mg Salagen nightly presented to rheumatology to establish care in Oct 2021.  At that time she had recently seen Dr Sen of hematology for evaluation and treatment of macrocytic anemia likely secondary to chronic inflammation and methotrexate therapy.  She also has a mild leukopenia.  Her multifactorial macrocytic anemia was further complicated by recurrent diverticular bleeding  most recently in January 2021 required hospitalization and transfusion.  Her seropositive rheumatoid arthritis was under good control and in remission on low-dose of methotrexate which was on the low end of therapeutic dose range.  Given the likely contribution to her anemia and possibly leukopenia in the context of well-controlled low disease activity we discussed other options for her inflammatory arthritis and have transitioned her from methotrexate to hydroxychloroquine.  Risks and benefits again discussed today to include retinal toxicity and the need for routine OCT retinal toxicity screening exam.  She already sees an ophthalmologist at least yearly.  She will continue this.  We also discussed the rare but serious cutaneous side effect of the drug.  She knows to stop at first sign of  rash and let us know.  Headache/GI/other adverse events discussed.  She is agreeable to continue as no side effects have developed and her inflammatory arthritis has remained in remission on HCQ monotherapy.    Seropositive rheumatoid arthritis with secondary sjogrens syndrome: disease is currently in remission without evidence of synovitis on exam, stable ESR at 39 (was 36 3 months ago) and negative CRP <2.9. She has had improvement in her leukopenia and anemia with the switch as well. WBC was 3.9 and HGB was 10.5 on 1/3/22. No change to current therapy with HCQ.     Degenerative disc disease- patient followed by sports medicine. Requests referral to PT. She has had ongoing non-inflammatory pain. Some radicular symptoms which improved with gabapentin. I have outlined a potential increase in her gabapentin dose in her AVS which I offered to increase today, though she wishes to discuss with prescribing provider which is reasonable. Will start tylenol prn.  -Referral to PT placed  -Gabapentin increase proposal outlined in AVS to consider    Left>right greater trochanteric bursitis: Given symptoms only last for 1-3 days occurring only once monthly, patient wishes to hold on dedicated PT or steroid injection to this site. She knows to let me know if she changes her mind prior to her next appointment and can place referral to injection clinic with sports medicine for evaluation for injection to greater trochanter.    High risk medication use: Hydroxychloroquine  No evidence of toxicity by history today.  Most recent labs without evidence of toxicity from January 2022.  The last ophthalmology note I have is from 3/19/2020 though she reports having gone since that time.  We will need to obtain these records.  Continue with yearly screening eye exams.  She understands this.    Plan:  - Continue hydroxychloroquine 200 mg once daily dose adjusted for weight and keeping in mind her GFR 55  -Follow-up in 3 months- no labs  required prior to that appointment  -At that time we will also obtain bilateral hand films and foot films as baseline  -Yearly eye exam with screening OCT needs to be updated  --PT referral placed  --Continue 5mg salagen at bedtime    Greg Clements MD  Rheumatology     I spent a total of 40 minutes on the date of service on chart review, patient in person encounter, documentation, .      Subjective:   Interval history 2/10/22  Did not notice any return or worsening of inflammatory arthritis with transition from methotrexate to Plaquenil.  Reports less than 5 minutes of early morning stiffness.  No red hot swollen joints.  Rates her pain at less than a 2 out of 10.  No rash, headache, change in vision, GI upset.  No interval infection.  Continues to have ongoing left greater than right intermittent hip pain which is worse when lying on that side.  She continues to be active with frequent walking almost daily weather permitting.  Her hip pain is there a few days per month.  Resolves without intervention.  Had follow-up with GI on 01/12/2022.  They plan for repeat colonoscopy in 6 to 12 months.  She was put on a course of prednisone after a visit with sports medicine for her established lumbar disc disease and likely exacerbation.  She had a positive response of this for her axial pain.  She did not note much difference in regards to her peripheral arthritis symptoms as they had been inactive prior to starting this prednisone course.  Review of systems otherwise negative.    History from initial consultation in 10/2021  Patient with a diagnosis of seropositive rheumatoid arthritis and secondary Sjogren's syndrome on methotrexate 10 mg weekly, folic acid 2 mg daily and Salagen 5 mg each evening presents to rheumatology to discuss other therapies for her inflammatory arthritis in the setting of chronic macrocytic anemia and mild leukopenia.  She reports that overall her joint symptoms of peripheral joints  have been well controlled even with her reduction of methotrexate dose from 20 mg weekly earlier this year down to 10 mg weekly due to the above concern.  She has not had worsening of joint pain, stiffness or any return of erythema edema or warmth.  She is able to make full fist upon waking in the morning.  Denies any early morning stiffness lasting more than 3 to 5 minutes.  Denies any fevers chills.  She was also previously taking Celebrex though in the context of her recurrent diverticular bleeds this was discontinued earlier this year as well.  No worsening after this discontinuation either.  Has mild dry eyes and sees an ophthalmologist yearly.  She does have dry mouth and finds that the Salagen has been helpful.  She denies any swelling of her parotid glands or submandibular glands.  She drinks water frequently including overnight when needed.  Denies recurrent/progressive dental disease.  Sees a dentist regularly.  No new rash.  No fevers or chills.  No recurrent infection.  Her weight is now stable though she initially did have some weight loss earlier this year with her diverticular disease and change in diet.  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  No history of DVT.  No hair loss.  No inflammatory eye disease history.  No oral or nasal ulcers.  No recurrent epistaxes.  No photosensitive rash.  No chest pain or shortness of breath.  No change in strength or sensation in arms or legs.  On complaint at this time is ongoing lumbar spine pain for which she had previously seen orthospine.  Previously had injections which were helpful though the most recent was not.    Past Medical History  Past Medical History:   Diagnosis Date     Diverticulosis of large intestine      Osteoarthritis      Osteoporosis      Rheumatoid arthritis (H)      Sensorineural hearing loss      Past Surgical History  Past Surgical History:    Procedure Laterality Date     CAPSULE/PILL CAM ENDOSCOPY N/A 11/18/2021    Procedure: IMAGING PROCEDURE, GI TRACT, INTRALUMINAL, VIA CAPSULE;  Surgeon: Deric Rodriguez MD;  Location: [facility]     COLONOSCOPY N/A 3/14/2017    Procedure: COMBINED COLONOSCOPY, SINGLE OR MULTIPLE BIOPSY/POLYPECTOMY BY BIOPSY;  Surgeon: Spencer Downey MD;  Location: [facility] GI     ENTEROSCOPY SMALL BOWEL N/A 11/20/2021    Procedure: ENTEROSCOPY, colonoscopy with endoscopic mucosal resection and tattoo placement;  Surgeon: Kirby Arthur MD;  Location: [facility] OR     IR VISCERAL EMBOLIZATION  1/22/2021     ORTHOPEDIC SURGERY       SIGMOIDOSCOPY FLEXIBLE N/A 1/23/2021    Procedure: SIGMOIDOSCOPY, FLEXIBLE;  Surgeon: Jaime Steinberg MD;  Location:  GI     Medications  Current Outpatient Medications   Medication     acetaminophen (TYLENOL) 500 MG tablet     calcium carb 1250 mg, 500 mg [place],/vitamin D 200 units (OSCAL WITH D) 500-200 MG-UNIT per tablet     ferrous sulfate (FE TABS) 325 (65 Fe) MG EC tablet     gabapentin (NEURONTIN) 300 MG capsule     Multiple Vitamins-Minerals (OCUVITE PRESERVISION PO)     predniSONE (DELTASONE) 20 MG tablet     tretinoin (RETIN-A) 0.1 % external cream     vitamin C (ASCORBIC ACID) 500 MG tablet     YUVAFEM 10 MCG TABS vaginal tablet     No current facility-administered medications for this visit.       Allergies  Allergies   Allergen Reactions     Bee Venom Anaphylaxis     Shrimp Anaphylaxis     Evoxac [Cevimeline] Unknown     Prevnar Swelling     Other reaction(s): Edema     Prevnar [Pneumococcal 13-Linda Conj Vacc] Unknown       Family History: No family hx of autoimmune disease    Social History: No alcohol, drug use, smoking history.       Objective:   /71   Pulse 81   Temp 98  F (36.7  C) (Oral)   Wt 46.1 kg (101 lb 11.2 oz)   SpO2 100%   BMI 17.88 kg/m    General sitting up unassisted no acute distress  HEENT sclera clear no facial rash no oral ulcers dry mouth with good  dentition no parotid or submandibular swelling no scarring alopecia  Cardiovascular regular rate and rhythm, mild baseline hypotension  Pulmonary clear to auscultation bilaterally   abdomen soft non tender no masses  MSK full active and passive range of motion without pain of her bilateral shoulders, elbows.  Right wrist with mild inability to fully flex by about 5 degrees.  Extension also limited by about 5 to 10 degrees.  No tenderness at this site though some synovial hypertrophy/fullness.  No erythema or warmth.  She is able to make a full fist bilaterally.   strength is full.  No synovitis of MCPs, PIPs, DIPs bilaterally.  No tenderness at the sites.  Full active passive range of motion of bilateral hips, knees, ankles.  No synovitis.  MTP squeeze negative.  Has tenderness to palpation over the lumbar spine, chronic known DDD/ site of disc herniation    Labs:  WBC   Date Value Ref Range Status   01/20/2021 5.0 4.0 - 11.0 10e9/L Final     WBC Count   Date Value Ref Range Status   01/03/2022 3.9 (L) 4.0 - 11.0 10e3/uL Final     Hemoglobin   Date Value Ref Range Status   01/03/2022 10.5 (L) 11.7 - 15.7 g/dL Final   01/25/2021 8.0 (L) 11.7 - 15.7 g/dL Final     Platelet Count   Date Value Ref Range Status   01/03/2022 226 150 - 450 10e3/uL Final   01/20/2021 207 150 - 450 10e9/L Final     Creatinine   Date Value Ref Range Status   01/03/2022 0.99 0.52 - 1.04 mg/dL Final   01/23/2021 0.75 0.52 - 1.04 mg/dL Final     Lab Results   Component Value Date    ALKPHOS 75 01/03/2022    ALKPHOS 77 03/13/2017     AST   Date Value Ref Range Status   01/03/2022 24 0 - 45 U/L Final   03/13/2017 10 0 - 45 U/L Final     Lab Results   Component Value Date    ALT 24 01/03/2022    ALT 12 03/13/2017     Sed Rate   Date Value Ref Range Status   03/13/2017 63 (H) 0 - 30 mm/h Final     Erythrocyte Sedimentation Rate   Date Value Ref Range Status   01/03/2022 39 (H) 0 - 30 mm/hr Final     CRP Inflammation   Date Value Ref Range Status    01/03/2022 <2.9 0.0 - 8.0 mg/L Final   03/13/2017 10.0 (H) 0.0 - 8.0 mg/L Final     UA RESULTS:  Recent Labs   Lab Test 11/16/21  1802   COLOR Straw   APPEARANCE Clear   URINEGLC Negative   URINEBILI Negative   URINEKETONE Trace*   SG 1.027   UBLD Negative   URINEPH 5.5   PROTEIN Negative   NITRITE Negative   LEUKEST Negative   RBCU <1   WBCU 2      Rheumatoid Factor   Date Value Ref Range Status   01/03/2022 8 <12 IU/mL Final     Cyclic Citrullinated Peptide Antibody IgG   Date Value Ref Range Status   01/03/2022 3.6 <7.0 U/mL Final     Comment:     Negative       Imaging:   No peripheral x-rays to review in our system

## 2022-02-10 NOTE — PROGRESS NOTES
Outpatient Rheumatology follow-up    Name: Patrick Olson    MRN [id number]   Today's date: 02/10/2022         Reason for follow-up: rheumatoid arthritis on methotrexate   Requesting physician: Lauryn Tamayo MD             Assessment & Plan:   81-year-old female with a history of seropositive rheumatoid arthritis with secondary Sjogren's syndrome on methotrexate 10 mg weekly (previously on 20 mg weekly)/folic acid 2 mg daily and 5 mg Salagen nightly presented to rheumatology to establish care in Oct 2021.  At that time she had recently seen Dr Sen of hematology for evaluation and treatment of macrocytic anemia likely secondary to chronic inflammation and methotrexate therapy.  She also has a mild leukopenia.  Her multifactorial macrocytic anemia was further complicated by recurrent diverticular bleeding  most recently in January 2021 required hospitalization and transfusion.  Her seropositive rheumatoid arthritis was under good control and in remission on low-dose of methotrexate which was on the low end of therapeutic dose range.  Given the likely contribution to her anemia and possibly leukopenia in the context of well-controlled low disease activity we discussed other options for her inflammatory arthritis and have transitioned her from methotrexate to hydroxychloroquine.  Risks and benefits again discussed today to include retinal toxicity and the need for routine OCT retinal toxicity screening exam.  She already sees an ophthalmologist at least yearly.  She will continue this.  We also discussed the rare but serious cutaneous side effect of the drug.  She knows to stop at first sign of rash and let us know.  Headache/GI/other adverse events discussed.  She is agreeable to continue as no side effects have developed and her inflammatory arthritis has remained in remission on HCQ monotherapy.    Seropositive rheumatoid arthritis with secondary sjogrens syndrome: disease is currently in remission without  evidence of synovitis on exam, stable ESR at 39 (was 36 3 months ago) and negative CRP <2.9. She has had improvement in her leukopenia and anemia with the switch as well. WBC was 3.9 and HGB was 10.5 on 1/3/22. No change to current therapy with HCQ.     Degenerative disc disease- patient followed by sports medicine. Requests referral to PT. She has had ongoing non-inflammatory pain. Some radicular symptoms which improved with gabapentin. I have outlined a potential increase in her gabapentin dose in her AVS which I offered to increase today, though she wishes to discuss with prescribing provider which is reasonable. Will start tylenol prn.  -Referral to PT placed  -Gabapentin increase proposal outlined in AVS to consider    Left>right greater trochanteric bursitis: Given symptoms only last for 1-3 days occurring only once monthly, patient wishes to hold on dedicated PT or steroid injection to this site. She knows to let me know if she changes her mind prior to her next appointment and can place referral to injection clinic with sports medicine for evaluation for injection to greater trochanter.    High risk medication use: Hydroxychloroquine  No evidence of toxicity by history today.  Most recent labs without evidence of toxicity from January 2022.  The last ophthalmology note I have is from 3/19/2020 though she reports having gone since that time.  We will need to obtain these records.  Continue with yearly screening eye exams.  She understands this.    Plan:  - Continue hydroxychloroquine 200 mg once daily dose adjusted for weight and keeping in mind her GFR 55  -Follow-up in 3 months- no labs required prior to that appointment  -At that time we will also obtain bilateral hand films and foot films as baseline  -Yearly eye exam with screening OCT needs to be updated  --PT referral placed  --Continue 5mg salagen at bedtime    Greg Clements MD  Rheumatology     I spent a total of 40 minutes on the date of service  on chart review, patient in person encounter, documentation, .      Subjective:   Interval history 2/10/22  Did not notice any return or worsening of inflammatory arthritis with transition from methotrexate to Plaquenil.  Reports less than 5 minutes of early morning stiffness.  No red hot swollen joints.  Rates her pain at less than a 2 out of 10.  No rash, headache, change in vision, GI upset.  No interval infection.  Continues to have ongoing left greater than right intermittent hip pain which is worse when lying on that side.  She continues to be active with frequent walking almost daily weather permitting.  Her hip pain is there a few days per month.  Resolves without intervention.  Had follow-up with GI on 01/12/2022.  They plan for repeat colonoscopy in 6 to 12 months.  She was put on a course of prednisone after a visit with sports medicine for her established lumbar disc disease and likely exacerbation.  She had a positive response of this for her axial pain.  She did not note much difference in regards to her peripheral arthritis symptoms as they had been inactive prior to starting this prednisone course.  Review of systems otherwise negative.    History from initial consultation in 10/2021  Patient with a diagnosis of seropositive rheumatoid arthritis and secondary Sjogren's syndrome on methotrexate 10 mg weekly, folic acid 2 mg daily and Salagen 5 mg each evening presents to rheumatology to discuss other therapies for her inflammatory arthritis in the setting of chronic macrocytic anemia and mild leukopenia.  She reports that overall her joint symptoms of peripheral joints have been well controlled even with her reduction of methotrexate dose from 20 mg weekly earlier this year down to 10 mg weekly due to the above concern.  She has not had worsening of joint pain, stiffness or any return of erythema edema or warmth.  She is able to make full fist upon waking in the morning.  Denies any early  morning stiffness lasting more than 3 to 5 minutes.  Denies any fevers chills.  She was also previously taking Celebrex though in the context of her recurrent diverticular bleeds this was discontinued earlier this year as well.  No worsening after this discontinuation either.  Has mild dry eyes and sees an ophthalmologist yearly.  She does have dry mouth and finds that the Salagen has been helpful.  She denies any swelling of her parotid glands or submandibular glands.  She drinks water frequently including overnight when needed.  Denies recurrent/progressive dental disease.  Sees a dentist regularly.  No new rash.  No fevers or chills.  No recurrent infection.  Her weight is now stable though she initially did have some weight loss earlier this year with her diverticular disease and change in diet.  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  No history of DVT.  No hair loss.  No inflammatory eye disease history.  No oral or nasal ulcers.  No recurrent epistaxes.  No photosensitive rash.  No chest pain or shortness of breath.  No change in strength or sensation in arms or legs.  On complaint at this time is ongoing lumbar spine pain for which she had previously seen orthospine.  Previously had injections which were helpful though the most recent was not.    Past Medical History  Past Medical History:   Diagnosis Date     Diverticulosis of large intestine      Osteoarthritis      Osteoporosis      Rheumatoid arthritis (H)      Sensorineural hearing loss      Past Surgical History  Past Surgical History:   Procedure Laterality Date     CAPSULE/PILL CAM ENDOSCOPY N/A 11/18/2021    Procedure: IMAGING PROCEDURE, GI TRACT, INTRALUMINAL, VIA CAPSULE;  Surgeon: Deric Rodriguez MD;  Location:  GI     COLONOSCOPY N/A 3/14/2017    Procedure: COMBINED COLONOSCOPY, SINGLE OR MULTIPLE BIOPSY/POLYPECTOMY BY BIOPSY;  Surgeon:  Spencer Downey MD;  Location: [facility] GI     ENTEROSCOPY SMALL BOWEL N/A 11/20/2021    Procedure: ENTEROSCOPY, colonoscopy with endoscopic mucosal resection and tattoo placement;  Surgeon: Kirby Arthur MD;  Location: [facility] OR     IR VISCERAL EMBOLIZATION  1/22/2021     ORTHOPEDIC SURGERY       SIGMOIDOSCOPY FLEXIBLE N/A 1/23/2021    Procedure: SIGMOIDOSCOPY, FLEXIBLE;  Surgeon: Jaime Steinberg MD;  Location:  GI     Medications  Current Outpatient Medications   Medication     acetaminophen (TYLENOL) 500 MG tablet     calcium carb 1250 mg, 500 mg [place],/vitamin D 200 units (OSCAL WITH D) 500-200 MG-UNIT per tablet     ferrous sulfate (FE TABS) 325 (65 Fe) MG EC tablet     gabapentin (NEURONTIN) 300 MG capsule     Multiple Vitamins-Minerals (OCUVITE PRESERVISION PO)     predniSONE (DELTASONE) 20 MG tablet     tretinoin (RETIN-A) 0.1 % external cream     vitamin C (ASCORBIC ACID) 500 MG tablet     YUVAFEM 10 MCG TABS vaginal tablet     No current facility-administered medications for this visit.       Allergies  Allergies   Allergen Reactions     Bee Venom Anaphylaxis     Shrimp Anaphylaxis     Evoxac [Cevimeline] Unknown     Prevnar Swelling     Other reaction(s): Edema     Prevnar [Pneumococcal 13-Linda Conj Vacc] Unknown       Family History: No family hx of autoimmune disease    Social History: No alcohol, drug use, smoking history.       Objective:   /71   Pulse 81   Temp 98  F (36.7  C) (Oral)   Wt 46.1 kg (101 lb 11.2 oz)   SpO2 100%   BMI 17.88 kg/m    General sitting up unassisted no acute distress  HEENT sclera clear no facial rash no oral ulcers dry mouth with good dentition no parotid or submandibular swelling no scarring alopecia  Cardiovascular regular rate and rhythm, mild baseline hypotension  Pulmonary clear to auscultation bilaterally   abdomen soft non tender no masses  MSK full active and passive range of motion without pain of her bilateral shoulders, elbows.  Right wrist with mild  inability to fully flex by about 5 degrees.  Extension also limited by about 5 to 10 degrees.  No tenderness at this site though some synovial hypertrophy/fullness.  No erythema or warmth.  She is able to make a full fist bilaterally.   strength is full.  No synovitis of MCPs, PIPs, DIPs bilaterally.  No tenderness at the sites.  Full active passive range of motion of bilateral hips, knees, ankles.  No synovitis.  MTP squeeze negative.  Has tenderness to palpation over the lumbar spine, chronic known DDD/ site of disc herniation    Labs:  WBC   Date Value Ref Range Status   01/20/2021 5.0 4.0 - 11.0 10e9/L Final     WBC Count   Date Value Ref Range Status   01/03/2022 3.9 (L) 4.0 - 11.0 10e3/uL Final     Hemoglobin   Date Value Ref Range Status   01/03/2022 10.5 (L) 11.7 - 15.7 g/dL Final   01/25/2021 8.0 (L) 11.7 - 15.7 g/dL Final     Platelet Count   Date Value Ref Range Status   01/03/2022 226 150 - 450 10e3/uL Final   01/20/2021 207 150 - 450 10e9/L Final     Creatinine   Date Value Ref Range Status   01/03/2022 0.99 0.52 - 1.04 mg/dL Final   01/23/2021 0.75 0.52 - 1.04 mg/dL Final     Lab Results   Component Value Date    ALKPHOS 75 01/03/2022    ALKPHOS 77 03/13/2017     AST   Date Value Ref Range Status   01/03/2022 24 0 - 45 U/L Final   03/13/2017 10 0 - 45 U/L Final     Lab Results   Component Value Date    ALT 24 01/03/2022    ALT 12 03/13/2017     Sed Rate   Date Value Ref Range Status   03/13/2017 63 (H) 0 - 30 mm/h Final     Erythrocyte Sedimentation Rate   Date Value Ref Range Status   01/03/2022 39 (H) 0 - 30 mm/hr Final     CRP Inflammation   Date Value Ref Range Status   01/03/2022 <2.9 0.0 - 8.0 mg/L Final   03/13/2017 10.0 (H) 0.0 - 8.0 mg/L Final     UA RESULTS:  Recent Labs   Lab Test 11/16/21  1802   COLOR Straw   APPEARANCE Clear   URINEGLC Negative   URINEBILI Negative   URINEKETONE Trace*   SG 1.027   UBLD Negative   URINEPH 5.5   PROTEIN Negative   NITRITE Negative   LEUKEST Negative    RBCU <1   WBCU 2      Rheumatoid Factor   Date Value Ref Range Status   01/03/2022 8 <12 IU/mL Final     Cyclic Citrullinated Peptide Antibody IgG   Date Value Ref Range Status   01/03/2022 3.6 <7.0 U/mL Final     Comment:     Negative       Imaging:   No peripheral x-rays to review in our system

## 2022-02-14 NOTE — PROGRESS NOTES
ESTABLISHED PATIENT FOLLOW-UP:  Follow Up of the Lower Back     HISTORY OF PRESENT ILLNESS  Ms. Olson is a pleasant 81 year old year old female who presents to clinic today for follow-up of low back pain    Date of injury: 4/2021  Date last seen: 12/31/21  Following Therapeutic Plan: lumbar epidural injection  Pain: mild  Function: improved   Interval History: Patrick notes pain has improved moderately after epidural steroid injection on 2/7/2022.  Relief did seem to take over 1 week.    She also notes change in her regimen of gabapentin.  She is using 300 mg every morning, 60 mg afternoon and 600 mg nightly.  She finds this regimen to be most advantageous.  She also started using Tylenol arthritis strength rather than standard Tylenol preparation.  She notes pain in her legs has minimized.  She continues to experience pain of her left gluteal region.  Lumbar pain at times as well.  She has been increasing her workouts at the gym and is now walking about 1/2 mile.  She was given PT use prescription and she has a plan to follow-up in [place] for this.    She saw her rheumatologist who is continuing with similar regimen of hydroxychloroquine monotherapy.  Seropositive rheumatoid arthritis with secondary Sjogren's currently in remission.  Prescribed physical therapy.    Additional medical/Social/Surgical histories reviewed in [facility] and updated as appropriate.    REVIEW OF SYSTEMS (2/14/2022)  CONSTITUTIONAL: Denies fever and weight loss  GASTROINTESTINAL: Denies abdominal pain, nausea, vomiting  MUSCULOSKELETAL: See HPI  SKIN: Denies any recent rash or lesion  NEUROLOGICAL: Denies numbness or focal weakness     PHYSICAL EXAM  /68   Wt 45.8 kg (101 lb)   BMI 17.76 kg/m      General  - normal appearance, in no obvious distress  Musculoskeletal - lumbar spine  - inspection: normal bone and joint alignment, no obvious scoliosis  - palpation: Only mild tenderness palpation of lumbar paraspinal musculature.   Tenderness at the left gluteal region.  Nontender at the trochanteric bursal regions.  - ROM: Full range of motion in flexion-extension side bending and rotation.  Pain with flexion and sidebending  - strength: lower extremities 5/5 in all planes  - special tests:  (-) straight leg raise    (-) slump test    Neuro  - patellar and Achilles DTRs 2+ bilaterally, No lower extremity sensory deficit throughout L5 distribution, grossly normal coordination, normal muscle tone  Skin  - no ecchymosis, erythema, warmth, or induration, no obvious rash  Psych  - interactive, appropriate, normal mood and affect     ASSESSMENT & PLAN  Ms. Olson is a 81 year old year old female with possible history of seropositive rheumatoid arthritis with secondary Sjogren's syndrome in remission on hydroxychloroquine, lumbar stenosis with neurogenic claudication presenting status post epidural steroid injection on 2/7/2022.    Diagnosis  Lumbar stenosis with neurogenic claudication    -Continue gabapentin regimen discussed, do not it in the morning, 600 in the afternoon and nightly  -Physical therapy referral provided by her rheumatologist which I think is a very good idea.  I think she is suffering from some pain related deconditioning and would benefit from lateral pool-based therapy.  She does have land-based therapy scheduled and we may discuss option of pool therapy given its low impact nature on the joints as well as overall gentle resistance to all musculature.  -May apply Voltaren gel as needed up to 4 times daily  -Consider epidural steroid injection prior to her trip to [place] as this is a life event that she wishes to accomplish  -Follow-up in 3 months to discuss progress    Seropositive rheumatoid arthritis secondary Sjogren's syndrome  -Following with   -Continue hydroxychloroquine  -Stable blood counts and inflammatory markers on 1/3/2022    It was a pleasure seeing Imani Lantigua, , [facility]  Primary Care Sports  Medicine

## 2022-02-20 ENCOUNTER — HEALTH MAINTENANCE LETTER (OUTPATIENT)
Age: 82
End: 2022-02-20

## 2022-02-21 NOTE — TELEPHONE ENCOUNTER
RECORDS RECEIVED FROM: internal    DATE RECEIVED: 4.20.22    NOTES (FOR ALL VISITS) STATUS DETAILS   OFFICE NOTES from referring provider internal  Elvia Colin MD   OFFICE NOTES from other specialist Received Claudio- 1/18/22 , 12.6.21   ED NOTES     OPERATIVE REPORT  (thyroid, pituitary, adrenal, parathyroid)     MEDICATION LIST internal     IMAGING      DEXASCAN internal  12.23.21   MRI (BRAIN) internal  9.3.20   XR (Chest)     CT (HEAD/NECK/CHEST/ABDOMEN) internal  11.16.21, 1.19.21   NUCLEAR      ULTRASOUND (HEAD/NECK)     LABS     DIABETES: HBGA1C, CREATININE, FASTING LIPIDS, MICROALBUMIN URINE, POTASSIUM, TSH, T4    THYROID: TSH, T4, CBC, THYRODLONULIN, TOTAL T3, FREE T4, CALCITONIN, CEA internal

## 2022-02-23 ENCOUNTER — OFFICE VISIT (OUTPATIENT)
Dept: ORTHOPEDICS | Facility: CLINIC | Age: 82
End: 2022-02-23
Payer: COMMERCIAL

## 2022-02-23 VITALS — WEIGHT: 101 LBS | DIASTOLIC BLOOD PRESSURE: 68 MMHG | SYSTOLIC BLOOD PRESSURE: 110 MMHG | BODY MASS INDEX: 17.76 KG/M2

## 2022-02-23 DIAGNOSIS — M51.369 LUMBAR DEGENERATIVE DISC DISEASE: Primary | ICD-10-CM

## 2022-02-23 PROCEDURE — 99213 OFFICE O/P EST LOW 20 MIN: CPT | Performed by: FAMILY MEDICINE

## 2022-02-23 NOTE — LETTER
2/23/2022         RE: Patrick Olson  [address]  [facility] [address]        Dear Colleague,    Thank you for referring your patient, Patrick Olson, to the [facility] [place]. Please see a copy of my visit note below.    ESTABLISHED PATIENT FOLLOW-UP:  Follow Up of the Lower Back     HISTORY OF PRESENT ILLNESS  Ms. Olson is a pleasant 81 year old year old female who presents to clinic today for follow-up of low back pain    Date of injury: 4/2021  Date last seen: 12/31/21  Following Therapeutic Plan: lumbar epidural injection  Pain: mild  Function: improved   Interval History: Patrick notes pain has improved moderately after epidural steroid injection on 2/7/2022.  Relief did seem to take over 1 week.    She also notes change in her regimen of gabapentin.  She is using 300 mg every morning, 60 mg afternoon and 600 mg nightly.  She finds this regimen to be most advantageous.  She also started using Tylenol arthritis strength rather than standard Tylenol preparation.  She notes pain in her legs has minimized.  She continues to experience pain of her left gluteal region.  Lumbar pain at times as well.  She has been increasing her workouts at the gym and is now walking about 1/2 mile.  She was given PT use prescription and she has a plan to follow-up in [place] for this.    She saw her rheumatologist who is continuing with similar regimen of hydroxychloroquine monotherapy.  Seropositive rheumatoid arthritis with secondary Sjogren's currently in remission.  Prescribed physical therapy.    Additional medical/Social/Surgical histories reviewed in [facility] and updated as appropriate.    REVIEW OF SYSTEMS (2/14/2022)  CONSTITUTIONAL: Denies fever and weight loss  GASTROINTESTINAL: Denies abdominal pain, nausea, vomiting  MUSCULOSKELETAL: See HPI  SKIN: Denies any recent rash or lesion  NEUROLOGICAL: Denies numbness or focal weakness     PHYSICAL EXAM  /68   Wt 45.8 kg (101  lb)   BMI 17.76 kg/m      General  - normal appearance, in no obvious distress  Musculoskeletal - lumbar spine  - inspection: normal bone and joint alignment, no obvious scoliosis  - palpation: Only mild tenderness palpation of lumbar paraspinal musculature.  Tenderness at the left gluteal region.  Nontender at the trochanteric bursal regions.  - ROM: Full range of motion in flexion-extension side bending and rotation.  Pain with flexion and sidebending  - strength: lower extremities 5/5 in all planes  - special tests:  (-) straight leg raise    (-) slump test    Neuro  - patellar and Achilles DTRs 2+ bilaterally, No lower extremity sensory deficit throughout L5 distribution, grossly normal coordination, normal muscle tone  Skin  - no ecchymosis, erythema, warmth, or induration, no obvious rash  Psych  - interactive, appropriate, normal mood and affect     ASSESSMENT & PLAN  Ms. Olson is a 81 year old year old female with possible history of seropositive rheumatoid arthritis with secondary Sjogren's syndrome in remission on hydroxychloroquine, lumbar stenosis with neurogenic claudication presenting status post epidural steroid injection on 2/7/2022.    Diagnosis  Lumbar stenosis with neurogenic claudication    -Continue gabapentin regimen discussed, do not it in the morning, 600 in the afternoon and nightly  -Physical therapy referral provided by her rheumatologist which I think is a very good idea.  I think she is suffering from some pain related deconditioning and would benefit from lateral pool-based therapy.  She does have land-based therapy scheduled and we may discuss option of pool therapy given its low impact nature on the joints as well as overall gentle resistance to all musculature.  -May apply Voltaren gel as needed up to 4 times daily  -Consider epidural steroid injection prior to her trip to [place] as this is a life event that she wishes to accomplish  -Follow-up in 3 months to discuss  progress    Seropositive rheumatoid arthritis secondary Sjogren's syndrome  -Following with   -Continue hydroxychloroquine  -Stable blood counts and inflammatory markers on 1/3/2022    It was a pleasure seeing Imani Lantigua DO, [facility]  Primary Care Sports Medicine            Again, thank you for allowing me to participate in the care of your patient.        Sincerely,        Mukesh Lantigua DO

## 2022-02-28 ENCOUNTER — THERAPY VISIT (OUTPATIENT)
Dept: PHYSICAL THERAPY | Facility: CLINIC | Age: 82
End: 2022-02-28
Payer: COMMERCIAL

## 2022-02-28 DIAGNOSIS — G89.29 CHRONIC BILATERAL LOW BACK PAIN WITHOUT SCIATICA: ICD-10-CM

## 2022-02-28 DIAGNOSIS — M54.50 CHRONIC BILATERAL LOW BACK PAIN WITHOUT SCIATICA: ICD-10-CM

## 2022-02-28 DIAGNOSIS — M51.369 DDD (DEGENERATIVE DISC DISEASE), LUMBAR: ICD-10-CM

## 2022-02-28 PROCEDURE — 97161 PT EVAL LOW COMPLEX 20 MIN: CPT | Mod: GP | Performed by: PHYSICAL THERAPIST

## 2022-02-28 PROCEDURE — 97112 NEUROMUSCULAR REEDUCATION: CPT | Mod: GP | Performed by: PHYSICAL THERAPIST

## 2022-02-28 PROCEDURE — 97110 THERAPEUTIC EXERCISES: CPT | Mod: GP | Performed by: PHYSICAL THERAPIST

## 2022-02-28 NOTE — PROGRESS NOTES
[facility]    OUTPATIENT Physical Therapy ORTHOPEDIC EVALUATION  PLAN OF TREATMENT FOR OUTPATIENT REHABILITATION  (COMPLETE FOR INITIAL CLAIMS ONLY)  Patient's Last Name, First Name, M.I.  YOB: 1940  Patrick Olson    Provider s Name:  [facility]   Medical Record No.  [id number]   Start of Care Date:  02/28/22   Onset Date:   02/10/22 (MD)   Type:     _X__PT   ___OT Medical Diagnosis:    Encounter Diagnoses   Name Primary?     DDD (degenerative disc disease), lumbar      Chronic bilateral low back pain without sciatica         Treatment Diagnosis:  Low back pain- DDD        Goals:     02/28/22 0500   Body Part   Goals listed below are for Low back   Goal #1   Goal #1 standing   Current Functional Level Hours patient can stand   Performance level 1-2 hrs pain level 5-6/10   STG Target Performance Hours patient will be able to stand   Performance level 1-2 hrs pain level 2/10   Rationale for housekeeping tasks such as vacuuming, bed making, mowing, gardening;for meal preparation   Due date 03/28/22   LTG Target Performance Hours patient will be able to stand   Performance Level 2 hrs pain level 0-1/10   Rationale for housekeeping tasks such as vacuuming, bed making, mowing;for meal preparation;for safe community ambulation   Due date 04/25/22       Therapy Frequency:  1 x week for 4 weeks and 2 x month for 1 month  Predicted Duration of Therapy Intervention:  8 weeks    Kirsten Cash, PT                 I CERTIFY THE NEED FOR THESE SERVICES FURNISHED UNDER        THIS PLAN OF TREATMENT AND WHILE UNDER MY CARE     (Physician co-signature of this document indicates review and certification of the therapy plan).                       Certification Date From:  02/28/22   Certification Date To:  04/28/22    Referring Provider:  Greg Rivera  Assessment        See Epic Evaluation SOC Date: 02/28/22

## 2022-02-28 NOTE — PROGRESS NOTES
Physical Therapy Initial Evaluation  Subjective:    Therapist Generated HPI Evaluation  Problem details: MD order 2/10/22    Patrick has been having LBP for the past 5 years but getting worse last 2 years. She has disc bulge compressing her nerve. She received cortisone injection several times. She underwent PT in the past which has helped. Some days are better than others.  She does exercises everyday. Currently having more pain, shooting to both thighs, She got injection L3-4 level recently which has helped a little. She was sent to PT for further management.  .         Type of problem:  Lumbar.    This is a chronic condition.  Condition occurred with:  Degenerative joint disease.    Patient reports pain:  Lumbar spine left, lumbar spine right, lower lumbar spine, upper lumbar spine and mid lumbar spine.  Pain is described as aching and shooting and is intermittent.  Pain radiates to:  Gluteals left, gluteals right, thigh right and thigh left. Pain is worse during the day.  Since onset symptoms are gradually improving.  Associated symptoms:  Loss of motion/stiffness and loss of strength. Symptoms are exacerbated by standing, sitting, bending and walking (sit/ stand more than 2 hrs, walk 1 mile can increase the pain)  Relieved by: gabapentine, tylenol.  Special tests included:  X-ray.  Previous treatment includes physical therapy and other.   Barriers include:  None as reported by patient.    Patient Health History  Patrick Olson being seen for low back pain.     Date of Onset: 5 yrs back.   Problem occurred: worse last 2 yrs   Pain is reported as 4/10 on pain scale.  General health as reported by patient is fair.  Pertinent medical history includes: rheumatoid arthritis and osteoarthritis.   Red flags:  Pain at rest/night.   Other medical allergies details: shrimp, bee sting.   Surgeries include:  Other. Other surgery history details: hip replaced- left.    Current medications:  Anti-inflammatory and pain medication.     Current occupation is retired.   Primary job tasks include:  Repetitive tasks.                                    Objective:        Flexibility/Screens:       Lower Extremity:  Decreased left lower extremity flexibility:Hip ER's; Piriformis; Hip Flexors and Hamstrings    Decreased right lower extremity flexibility:  Hip ER's; Piriformis; Hip Flexors and Hamstrings               Lumbar/SI Evaluation  ROM:    AROM Lumbar:   Flexion:          Till midlower leg  Ext:                    WFL   Side Bend:        Left:  WFL - stretch pain right side    Right:  WFL - stretch pain leftside  Rotation:           Left:     Right:   Side Glide:        Left:     Right:         Strength: weak core stabilization demonstrated, Abd MMT 2/5  Lumbar Myotomes:  normal            Lumbar DTR's:  normal      Cord Signs:  normal    Lumbar Dermtomes:  normal                Neural Tension/Mobility:  Lumbar:  Normal        Lumbar Palpation:    Tenderness present at Left:    Erector Spinae  Tenderness present at Right: Erector Spinae    Lumbar Provocation:  normal                                                       [place] Lumbar Evaluation    Posture:  Sitting: fair  Standing: fair  Lordosis: WNL  Lateral Shift: no  Correction of Posture: better      Test Movements:  FIS: During: no effect  After: no effect    Repeat FIS: During: increases  After: no worse    EIS: During: no effect  After: no effect    Repeat EIS: During: abolishes  After: better              Conclusion: posture and derangement  Principle of Treatment:  Posture Correction: sitting with back support advsied    Extension: encouraged - no pain                                           ROS    Assessment/Plan:    Patient is a 81 year old female with lumbar complaints.    Patient has the following significant findings with corresponding treatment plan.                Diagnosis 1:  Low back pain- DDD  Pain -  hot/cold therapy, US, electric stimulation, manual therapy, STS, self  management, education and home program  Decreased ROM/flexibility - manual therapy, therapeutic exercise, therapeutic activity and home program  Decreased strength - therapeutic exercise, therapeutic activities and home program  Decreased function - therapeutic activities and home program    Therapy Evaluation Codes:   1) History comprised of:   Personal factors that impact the plan of care:      Time since onset of symptoms.    Comorbidity factors that impact the plan of care are:      check HPI.     Medications impacting care: check HPI.  2) Examination of Body Systems comprised of:   Body structures and functions that impact the plan of care:      Lumbar spine.   Activity limitations that impact the plan of care are:      Bending, Sitting, Standing and Walking.  3) Clinical presentation characteristics are:   Stable/Uncomplicated.  4) Decision-Making    Low complexity using standardized patient assessment instrument and/or measureable assessment of functional outcome.  Cumulative Therapy Evaluation is: Low complexity.    Previous and current functional limitations:  (See Goal Flow Sheet for this information)    Short term and Long term goals: (See Goal Flow Sheet for this information)     Communication ability:  Patient appears to be able to clearly communicate and understand verbal and written communication and follow directions correctly.  Treatment Explanation - The following has been discussed with the patient:   RX ordered/plan of care  Anticipated outcomes  Possible risks and side effects  This patient would benefit from PT intervention to resume normal activities.   Rehab potential is good.    Frequency:  1 X week, once daily  Duration:  for 6 weeks  Discharge Plan:  Achieve all LTG.  Independent in home treatment program.  Reach maximal therapeutic benefit.    Please refer to the daily flowsheet for treatment today, total treatment time and time spent performing 1:1 timed codes.

## 2022-03-07 ENCOUNTER — THERAPY VISIT (OUTPATIENT)
Dept: PHYSICAL THERAPY | Facility: CLINIC | Age: 82
End: 2022-03-07
Payer: COMMERCIAL

## 2022-03-07 DIAGNOSIS — M54.50 CHRONIC BILATERAL LOW BACK PAIN WITHOUT SCIATICA: ICD-10-CM

## 2022-03-07 DIAGNOSIS — G89.29 CHRONIC BILATERAL LOW BACK PAIN WITHOUT SCIATICA: ICD-10-CM

## 2022-03-07 PROCEDURE — 97110 THERAPEUTIC EXERCISES: CPT | Mod: GP | Performed by: PHYSICAL THERAPIST

## 2022-03-07 PROCEDURE — 97112 NEUROMUSCULAR REEDUCATION: CPT | Mod: GP | Performed by: PHYSICAL THERAPIST

## 2022-03-09 DIAGNOSIS — E55.9 VITAMIN D DEFICIENCY, UNSPECIFIED: ICD-10-CM

## 2022-03-09 DIAGNOSIS — R79.89 ELEVATED SERUM CREATININE: Primary | ICD-10-CM

## 2022-03-09 NOTE — PROGRESS NOTES
Nephrology Progress Note  03/09/2022         Assessment/Plan:  CKD3  Baseline ~0.8 until last year, up to 1.18 in early July with unclear cause and back to 0.99, back up to 1.14  on today's check. She has not had much to drink today. UA bland, UPCR negative. Patient did have multiple cysts noted on recent CT but these are likely acquired given her age and normal BP.  CKD likely in the setting of advanced age and possible contribution from chronic  methotrexate use. Cr likely overestimating GFR given weight loss and low BMI. GFR using Cockroft Gault equation is 35 ml/min.  - renal US bilateral benign appearing renal cysts, otherwise unremarkable  - she stopped methotrexate in October   - Avoid nephrotoxins  - Stop vitamin C as this gets metabolized to oxalate and can cause toxicity - she continued this per her PCP    BP/Volume  History of Orthostasis  Asked patient to check BP in the morning during her symptoms.   - she stopped gabapentin in the mornings and her dizziness has improved. She is still taking two tablets in the afternoon and at bedtime.   - /64 in clinic  - does not take any antihypertensives or diuretics  - has no swelling in LE, no shortness of breath    Electrolytes:   - potassium 5.2, sodium 142, bicarb 34  - follow low potassium diet    Anemia, recurrent GI bleed  Hgb 11.2,  Iron sat 28%  - switched to 140 mg of po iron, 325 mg caused GI upset    Disposition: follow up in six months with labs to see Dr. Power.       History of Present Illness:   Patrick Olson is a 81 year old female who presents for evaluation of Cr elevation. PMH includes RA on methotrexate weekly since 1980s (current dose is 7.5 mg weekly), recurrent GI bleed with last admission in Jan 2021.     Patient notes slow weight loss over the past year. 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 Colonoscopy showed diverticulosis and non-bleeding external hemorrhoids. Patient  continues to have a stomachache particularly after eating and this has affected her intake which is significantly less. Also continues to note fatigue and less exercise tolerance, previously was able to walk about 2 miles but now can only do about 1. No SOB, chest pain, abdominal pain, n/v/d, fevers/chills, dysuria, change in frequency, hematuria. Arthritis usually affects R elbow and hand joints as well as lower back. Denies any significant LUTS, notes nocturia x1.  Groggy and dizziness improved with cutting the morning dose of gabapentin.      Cr was noted to be 1.18 in July which prompted referral. UA has been bland.  Cr 0.7 in 2017. Up to 1.2 intermittently since Jan 2021      Recently started taking PO iron and vit C supplement on 8/27/21.    - Swelling: none  - Hx of UTIs: none  - Hx of stones: none  - Rashes: none new  - Family hx of kidney disease: none, father and sister have diabetes  - NSAID use: none      Review of system: negative except noted in HPI.           Labs:   All labs reviewed by me  Electrolytes/Renal - Recent Labs   Lab Test 01/03/22  0955 11/17/21  0638 11/16/21  1603 11/05/21  1356 10/01/21  0927    143 139   < > 141   POTASSIUM 4.6 4.5 3.5   < > 5.2   CHLORIDE 109 110* 106   < > 107   CO2 29 30 26   < > 30   BUN 18 10 13   < > 19   CR 0.99 1.04 0.98   < > 0.99   * 91 109*   < > 148*   EJ 9.6 9.0 9.4   < > 9.6   PHOS  --   --   --   --  3.4    < > = values in this interval not displayed.       CBC -   Recent Labs   Lab Test 01/03/22  0955 11/20/21  0640 11/19/21 2002 11/17/21  1717 11/17/21  0638 11/16/21  1615 11/16/21  1603   WBC 3.9*  --   --   --  3.8*  --  4.2   HGB 10.5* 7.7* 8.8*   < > 8.6*   < > 9.5*     --   --   --  185  --  218    < > = values in this interval not displayed.       LFTs -   Recent Labs   Lab Test 01/03/22  0955 11/16/21  1603 11/05/21  1356   ALKPHOS 75 69 70   BILITOTAL 0.4 0.4 0.4   ALT 24 18 18   AST 24 21 21   PROTTOTAL 7.5 7.2 7.5    ALBUMIN 3.9 3.8 3.8       Iron Panel -   Recent Labs   Lab Test 10/01/21  0927   IRON 60   IRONSAT 22   ANMOL 116         Imaging:  Reviewed      BILL LÓPEZC     Visit length 15 minutes. An additional 20 minutes was spent on date of service in chart review, documentation, and other activities as noted.

## 2022-03-10 ENCOUNTER — LAB (OUTPATIENT)
Dept: LAB | Facility: CLINIC | Age: 82
End: 2022-03-10
Payer: COMMERCIAL

## 2022-03-10 ENCOUNTER — OFFICE VISIT (OUTPATIENT)
Dept: NEPHROLOGY | Facility: CLINIC | Age: 82
End: 2022-03-10
Attending: OBSTETRICS & GYNECOLOGY
Payer: COMMERCIAL

## 2022-03-10 VITALS
OXYGEN SATURATION: 97 % | BODY MASS INDEX: 17.9 KG/M2 | DIASTOLIC BLOOD PRESSURE: 64 MMHG | SYSTOLIC BLOOD PRESSURE: 115 MMHG | WEIGHT: 101.8 LBS | HEART RATE: 79 BPM

## 2022-03-10 DIAGNOSIS — R79.89 ELEVATED SERUM CREATININE: ICD-10-CM

## 2022-03-10 DIAGNOSIS — M06.9 RHEUMATOID ARTHRITIS, RHEUMATOID FACTOR STATUS UNKNOWN (H): ICD-10-CM

## 2022-03-10 DIAGNOSIS — N18.9 ANEMIA IN CHRONIC KIDNEY DISEASE, UNSPECIFIED CKD STAGE: ICD-10-CM

## 2022-03-10 DIAGNOSIS — N18.4 CKD (CHRONIC KIDNEY DISEASE) STAGE 4, GFR 15-29 ML/MIN (H): Primary | ICD-10-CM

## 2022-03-10 DIAGNOSIS — D63.1 ANEMIA IN CHRONIC KIDNEY DISEASE, UNSPECIFIED CKD STAGE: ICD-10-CM

## 2022-03-10 DIAGNOSIS — E55.9 VITAMIN D DEFICIENCY, UNSPECIFIED: ICD-10-CM

## 2022-03-10 LAB
ALBUMIN SERPL-MCNC: 3.9 G/DL (ref 3.4–5)
ALBUMIN UR-MCNC: NEGATIVE MG/DL
ANION GAP SERPL CALCULATED.3IONS-SCNC: 4 MMOL/L (ref 3–14)
APPEARANCE UR: CLEAR
BILIRUB UR QL STRIP: NEGATIVE
BUN SERPL-MCNC: 24 MG/DL (ref 7–30)
CALCIUM SERPL-MCNC: 9.9 MG/DL (ref 8.5–10.1)
CHLORIDE BLD-SCNC: 104 MMOL/L (ref 94–109)
CO2 SERPL-SCNC: 34 MMOL/L (ref 20–32)
COLOR UR AUTO: YELLOW
CREAT SERPL-MCNC: 1.14 MG/DL (ref 0.52–1.04)
CREAT UR-MCNC: 44 MG/DL
DEPRECATED CALCIDIOL+CALCIFEROL SERPL-MC: 75 UG/L (ref 20–75)
ERYTHROCYTE [DISTWIDTH] IN BLOOD BY AUTOMATED COUNT: 15.1 % (ref 10–15)
FERRITIN SERPL-MCNC: 92 NG/ML (ref 8–252)
GFR SERPL CREATININE-BSD FRML MDRD: 49 ML/MIN/1.73M2
GLUCOSE BLD-MCNC: 113 MG/DL (ref 70–99)
GLUCOSE UR STRIP-MCNC: NEGATIVE MG/DL
HCT VFR BLD AUTO: 34.8 % (ref 35–47)
HGB BLD-MCNC: 11.2 G/DL (ref 11.7–15.7)
HGB UR QL STRIP: NEGATIVE
IRON SATN MFR SERPL: 28 % (ref 15–46)
IRON SERPL-MCNC: 83 UG/DL (ref 35–180)
KETONES UR STRIP-MCNC: NEGATIVE MG/DL
LEUKOCYTE ESTERASE UR QL STRIP: NEGATIVE
MCH RBC QN AUTO: 30.1 PG (ref 26.5–33)
MCHC RBC AUTO-ENTMCNC: 32.2 G/DL (ref 31.5–36.5)
MCV RBC AUTO: 94 FL (ref 78–100)
MUCOUS THREADS #/AREA URNS LPF: PRESENT /LPF
NITRATE UR QL: NEGATIVE
PH UR STRIP: 7 [PH] (ref 5–7)
PHOSPHATE SERPL-MCNC: 4.1 MG/DL (ref 2.5–4.5)
PLATELET # BLD AUTO: 220 10E3/UL (ref 150–450)
POTASSIUM BLD-SCNC: 5.2 MMOL/L (ref 3.4–5.3)
PROT UR-MCNC: 0.12 G/L
PROT/CREAT 24H UR: 0.27 G/G CR (ref 0–0.2)
PTH-INTACT SERPL-MCNC: 46 PG/ML (ref 15–65)
RBC # BLD AUTO: 3.72 10E6/UL (ref 3.8–5.2)
RBC URINE: 2 /HPF
SODIUM SERPL-SCNC: 142 MMOL/L (ref 133–144)
SP GR UR STRIP: 1.01 (ref 1–1.03)
TIBC SERPL-MCNC: 296 UG/DL (ref 240–430)
UROBILINOGEN UR STRIP-MCNC: NORMAL MG/DL
WBC # BLD AUTO: 4.1 10E3/UL (ref 4–11)
WBC URINE: <1 /HPF

## 2022-03-10 PROCEDURE — 82306 VITAMIN D 25 HYDROXY: CPT | Mod: 90 | Performed by: PATHOLOGY

## 2022-03-10 PROCEDURE — 84156 ASSAY OF PROTEIN URINE: CPT | Performed by: PATHOLOGY

## 2022-03-10 PROCEDURE — 81001 URINALYSIS AUTO W/SCOPE: CPT | Performed by: PATHOLOGY

## 2022-03-10 PROCEDURE — 83550 IRON BINDING TEST: CPT | Performed by: PATHOLOGY

## 2022-03-10 PROCEDURE — 85027 COMPLETE CBC AUTOMATED: CPT | Performed by: PATHOLOGY

## 2022-03-10 PROCEDURE — 83970 ASSAY OF PARATHORMONE: CPT | Performed by: PATHOLOGY

## 2022-03-10 PROCEDURE — 99000 SPECIMEN HANDLING OFFICE-LAB: CPT | Performed by: PATHOLOGY

## 2022-03-10 PROCEDURE — 82728 ASSAY OF FERRITIN: CPT | Performed by: PATHOLOGY

## 2022-03-10 PROCEDURE — 36415 COLL VENOUS BLD VENIPUNCTURE: CPT | Performed by: PATHOLOGY

## 2022-03-10 PROCEDURE — 99214 OFFICE O/P EST MOD 30 MIN: CPT | Performed by: PHYSICIAN ASSISTANT

## 2022-03-10 PROCEDURE — 80069 RENAL FUNCTION PANEL: CPT | Performed by: PATHOLOGY

## 2022-03-10 NOTE — LETTER
3/10/2022     RE: Patrick Olson  [address]  [facility] [address]     Dear Colleague,    Thank you for referring your patient, Patrick Olson, to the [facility] [place] [facility]. Please see a copy of my visit note below.    Nephrology Progress Note  03/09/2022         Assessment/Plan:  CKD3  Baseline ~0.8 until last year, up to 1.18 in early July with unclear cause and back to 0.99, back up to 1.14  on today's check. She has not had much to drink today. UA bland, UPCR negative. Patient did have multiple cysts noted on recent CT but these are likely acquired given her age and normal BP.  CKD likely in the setting of advanced age and possible contribution from chronic  methotrexate use. Cr likely overestimating GFR given weight loss and low BMI. GFR using Cockroft Gault equation is 35 ml/min.  - renal US bilateral benign appearing renal cysts, otherwise unremarkable  - she stopped methotrexate in October   - Avoid nephrotoxins  - Stop vitamin C as this gets metabolized to oxalate and can cause toxicity - she continued this per her PCP    BP/Volume  History of Orthostasis  Asked patient to check BP in the morning during her symptoms.   - she stopped gabapentin in the mornings and her dizziness has improved. She is still taking two tablets in the afternoon and at bedtime.   - /64 in clinic  - does not take any antihypertensives or diuretics  - has no swelling in LE, no shortness of breath    Electrolytes:   - potassium 5.2, sodium 142, bicarb 34  - follow low potassium diet    Anemia, recurrent GI bleed  Hgb 11.2,  Iron sat 28%  - switched to 140 mg of po iron, 325 mg caused GI upset    Disposition: follow up in six months with labs to see Dr. Power.       History of Present Illness:   Patrick Olson is a 81 year old female who presents for evaluation of Cr elevation. PMH includes RA on methotrexate weekly since 1980s  (current dose is 7.5 mg weekly), recurrent GI bleed with last admission in Jan 2021.     Patient notes slow weight loss over the past year. 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 Colonoscopy showed diverticulosis and non-bleeding external hemorrhoids. Patient continues to have a stomachache particularly after eating and this has affected her intake which is significantly less. Also continues to note fatigue and less exercise tolerance, previously was able to walk about 2 miles but now can only do about 1. No SOB, chest pain, abdominal pain, n/v/d, fevers/chills, dysuria, change in frequency, hematuria. Arthritis usually affects R elbow and hand joints as well as lower back. Denies any significant LUTS, notes nocturia x1.  Groggy and dizziness improved with cutting the morning dose of gabapentin.      Cr was noted to be 1.18 in July which prompted referral. UA has been bland.  Cr 0.7 in 2017. Up to 1.2 intermittently since Jan 2021      Recently started taking PO iron and vit C supplement on 8/27/21.    - Swelling: none  - Hx of UTIs: none  - Hx of stones: none  - Rashes: none new  - Family hx of kidney disease: none, father and sister have diabetes  - NSAID use: none      Review of system: negative except noted in HPI.           Labs:   All labs reviewed by me  Electrolytes/Renal - Recent Labs   Lab Test 01/03/22  0955 11/17/21  0638 11/16/21  1603 11/05/21  1356 10/01/21  0927    143 139   < > 141   POTASSIUM 4.6 4.5 3.5   < > 5.2   CHLORIDE 109 110* 106   < > 107   CO2 29 30 26   < > 30   BUN 18 10 13   < > 19   CR 0.99 1.04 0.98   < > 0.99   * 91 109*   < > 148*   EJ 9.6 9.0 9.4   < > 9.6   PHOS  --   --   --   --  3.4    < > = values in this interval not displayed.       CBC -   Recent Labs   Lab Test 01/03/22  0955 11/20/21  0640 11/19/21 2002 11/17/21  1717 11/17/21  0638 11/16/21  1615 11/16/21  1603   WBC 3.9*  --   --   --  3.8*  --   4.2   HGB 10.5* 7.7* 8.8*   < > 8.6*   < > 9.5*     --   --   --  185  --  218    < > = values in this interval not displayed.       LFTs -   Recent Labs   Lab Test 01/03/22  0955 11/16/21  1603 11/05/21  1356   ALKPHOS 75 69 70   BILITOTAL 0.4 0.4 0.4   ALT 24 18 18   AST 24 21 21   PROTTOTAL 7.5 7.2 7.5   ALBUMIN 3.9 3.8 3.8       Iron Panel -   Recent Labs   Lab Test 10/01/21  0927   IRON 60   IRONSAT 22   ANMOL 116         Imaging:  Reviewed    PATO MIX PA-C     Visit length 15 minutes. An additional 20 minutes was spent on date of service in chart review, documentation, and other activities as noted.

## 2022-03-11 ENCOUNTER — TELEPHONE (OUTPATIENT)
Dept: NEPHROLOGY | Facility: CLINIC | Age: 82
End: 2022-03-11
Payer: COMMERCIAL

## 2022-03-11 DIAGNOSIS — N18.4 CKD (CHRONIC KIDNEY DISEASE) STAGE 4, GFR 15-29 ML/MIN (H): Primary | ICD-10-CM

## 2022-03-11 NOTE — TELEPHONE ENCOUNTER
Genie Raymond PA-C   3/11/2022 11:19 AM CST Back to Top        Please let pt know her vit D is high end of normal. She should stop any vitamin D supplements. Can recheck it in 3-6 months.      Thanks,    Genie     Spoke to Patrick about the Vitamin D. She does take a calcium supplement that contains vitamin D. Writer asked her to find a new calcium supplement without vitamin D if possible. She also talked about scheduling her appointment in September. Writer will send message to .

## 2022-03-14 ENCOUNTER — THERAPY VISIT (OUTPATIENT)
Dept: PHYSICAL THERAPY | Facility: CLINIC | Age: 82
End: 2022-03-14
Payer: COMMERCIAL

## 2022-03-14 DIAGNOSIS — G89.29 CHRONIC BILATERAL LOW BACK PAIN WITHOUT SCIATICA: ICD-10-CM

## 2022-03-14 DIAGNOSIS — M54.50 CHRONIC BILATERAL LOW BACK PAIN WITHOUT SCIATICA: ICD-10-CM

## 2022-03-14 PROCEDURE — 97140 MANUAL THERAPY 1/> REGIONS: CPT | Mod: GP | Performed by: PHYSICAL THERAPIST

## 2022-03-14 PROCEDURE — 97112 NEUROMUSCULAR REEDUCATION: CPT | Mod: GP | Performed by: PHYSICAL THERAPIST

## 2022-03-14 PROCEDURE — 97110 THERAPEUTIC EXERCISES: CPT | Mod: GP | Performed by: PHYSICAL THERAPIST

## 2022-03-21 ENCOUNTER — THERAPY VISIT (OUTPATIENT)
Dept: PHYSICAL THERAPY | Facility: CLINIC | Age: 82
End: 2022-03-21
Payer: COMMERCIAL

## 2022-03-21 DIAGNOSIS — G89.29 CHRONIC BILATERAL LOW BACK PAIN WITHOUT SCIATICA: ICD-10-CM

## 2022-03-21 DIAGNOSIS — M54.50 CHRONIC BILATERAL LOW BACK PAIN WITHOUT SCIATICA: ICD-10-CM

## 2022-03-21 PROCEDURE — 97110 THERAPEUTIC EXERCISES: CPT | Mod: GP | Performed by: PHYSICAL THERAPIST

## 2022-03-21 PROCEDURE — 97112 NEUROMUSCULAR REEDUCATION: CPT | Mod: GP | Performed by: PHYSICAL THERAPIST

## 2022-03-21 PROCEDURE — 97140 MANUAL THERAPY 1/> REGIONS: CPT | Mod: GP | Performed by: PHYSICAL THERAPIST

## 2022-04-04 ENCOUNTER — THERAPY VISIT (OUTPATIENT)
Dept: PHYSICAL THERAPY | Facility: CLINIC | Age: 82
End: 2022-04-04
Payer: COMMERCIAL

## 2022-04-04 DIAGNOSIS — G89.29 CHRONIC BILATERAL LOW BACK PAIN WITHOUT SCIATICA: ICD-10-CM

## 2022-04-04 DIAGNOSIS — M54.50 CHRONIC BILATERAL LOW BACK PAIN WITHOUT SCIATICA: ICD-10-CM

## 2022-04-04 PROCEDURE — 97110 THERAPEUTIC EXERCISES: CPT | Mod: GP | Performed by: PHYSICAL THERAPIST

## 2022-04-04 PROCEDURE — 97140 MANUAL THERAPY 1/> REGIONS: CPT | Mod: GP | Performed by: PHYSICAL THERAPIST

## 2022-04-07 ENCOUNTER — TRANSFERRED RECORDS (OUTPATIENT)
Dept: HEALTH INFORMATION MANAGEMENT | Facility: CLINIC | Age: 82
End: 2022-04-07
Payer: COMMERCIAL

## 2022-04-07 ENCOUNTER — MEDICAL CORRESPONDENCE (OUTPATIENT)
Dept: HEALTH INFORMATION MANAGEMENT | Facility: CLINIC | Age: 82
End: 2022-04-07
Payer: COMMERCIAL

## 2022-04-11 ENCOUNTER — TRANSCRIBE ORDERS (OUTPATIENT)
Dept: OTHER | Age: 82
End: 2022-04-11
Payer: COMMERCIAL

## 2022-04-11 ENCOUNTER — TELEPHONE (OUTPATIENT)
Dept: RHEUMATOLOGY | Facility: CLINIC | Age: 82
End: 2022-04-11
Payer: COMMERCIAL

## 2022-04-11 DIAGNOSIS — H90.6 MIXED CONDUCTIVE AND SENSORINEURAL HEARING LOSS OF BOTH EARS: Primary | ICD-10-CM

## 2022-04-11 NOTE — TELEPHONE ENCOUNTER
Pt has been itching and swelling in her scalp.  She has been trying different shampoos and oil on her skin.  She noticed 2 days ago that she has red patches around her hair line.  She is also noticing that she has some spots on her face that are the same.  She is concerned after looking things up, that she may have psoriasis that could have been triggered by her HCQ use, or that she may have an allergice reaction to a medication, she also has recently started Arthritis tylenol.    She is very concerned as this rash has been very itchy for several weeks, but now the rash is coming out.  She will send photos to be looked at. She would like to be seen by Dermatology. Have scheduled her for Friday, video.    Viki Yousif RN  Rheumatology Clinic

## 2022-04-11 NOTE — TELEPHONE ENCOUNTER
[facility] Call Center    Phone Message    May a detailed message be left on voicemail: no     Reason for Call: Symptoms or Concerns     If patient has red-flag symptoms, warm transfer to triage line    Current symptom or concern: rash/itching on scalp and face    Symptoms have been present for:  3-4 week(s)    Has patient previously been seen for this? No    By : Dr. Uriostegui        Are there any new or worsening symptoms? Yes: Patient said it started on her scalp and has been moving to her face. Wondering if it could be from her RA medication.      Action Taken: Message routed to:  [facility] ([facility] Rheum    Travel Screening: Not Applicable

## 2022-04-12 DIAGNOSIS — M06.9 RHEUMATOID ARTHRITIS INVOLVING MULTIPLE SITES, UNSPECIFIED WHETHER RHEUMATOID FACTOR PRESENT (H): ICD-10-CM

## 2022-04-12 RX ORDER — HYDROXYCHLOROQUINE SULFATE 200 MG/1
200 TABLET, FILM COATED ORAL DAILY
Qty: 90 TABLET | Refills: 0 | Status: SHIPPED | OUTPATIENT
Start: 2022-04-12 | End: 2022-05-19

## 2022-04-12 NOTE — TELEPHONE ENCOUNTER
Plaquenil      Last Written Prescription Date:  10/14/21  Last Fill Quantity: 90,   # refills: 1  Last Office Visit: 2/10/22  Future Office visit: 5/19/22  Last Eye Exam:3/19/20 care everywhere  Nothing  More recent in media nor EMR    Routing refill request to provider for review/approval because:  No record of recent eye exam in EPIC - Letter sent today.  Medication not on active med list

## 2022-04-12 NOTE — LETTER
Patrick Olson  [facility] [facility] [address]    Dear Patrick Olson,     Regular eye exams are required while taking hydroxychloroquine (Plaquenil). Eye exams should be completed by an eye specialist who is experienced in monitoring for hydroxychloroquine toxicity (a rare effect of the drug that can damage your eyes and vision).  These may be yearly or as determined by your eye specialist.     Although vision problems and loss of sight while taking hydroxychloroquine are very rare, notify your doctor immediately if you notice changes in your vision. The goal of screening is to detect toxicity before your vision is significantly or noticeably impacted. Failing to get proper screening exams puts you at risk of vision changes which may or may not be reversible.    Per the American Academy of Ophthalmology recommendations (2016), screening tests performed may include a 10-2 visual field test, spectral-domain optical coherence tomography (SD OCT), or other screening tests as determined by the eye specialist, including a multifocal electroretinogram (mfERG) or fundus auto-fluorescence (FAF).    We received a refill request from your pharmacy. A copy of your current eye exam was not found in your medical record. Your hydroxychloroquine prescription has been refilled with a limited supply pending confirmation you have had an eye exam to test for hydroxychloroquine toxicity.      We encourage you to bring this letter to your eye exam to discuss the exams that will be performed during your visit. Please request your eye clinic fax or mail a copy of your eye exam report to our clinic indicating that testing was completed for hydroxychloroquine toxicity screening. The exam notes must specifically comment on the potential for hydroxychloroquine toxicity and outline recommended follow-up.    If you have questions about hydroxychloroquine or the information in this letter, please call the clinic at [phone] or  talk to your provider at your next office visit.                        2    Eye Specialist Letter  Dear Eye Specialist,  To ensure safe use of Plaquenil (hydroxychloroquine), we are requesting your assistance in providing the following information. Please return this form to our clinic via mail or fax, or incorporate this information into your visit summary. Your note must indicate whether or not there is evidence of toxicity from Plaquenil use. For questions regarding this form, please call [phone].  Patient Name:   :             Date of Exam:    The following exams were completed during this visit in accordance with the American Academy of Ophthalmology recommendations (2016):  ? 10-2 automated visual field  ? Spectral-domain optical coherence tomography (SD OCT)  ? Multifocal electroretinogram (mfERG)  ? Fundus autofluorescence (FAF)  ? Other (please specify)  Please select from the following:  ? The findings from the above exams are not suggestive of toxicity from Plaquenil (hydroxychloroquine).  ? The findings from the above exams may suggest toxicity from Plaquenil (hydroxychloroquine).  Directly contact the clinic and fax this form.  Please provide additional guidance on whether or not the medication may be continued from your perspective:  Date of next recommended Plaquenil (hydroxychloroquine) screening eye exam:   ? 5 years  ? 1 year  ? 6 months  Other (please specify):   Eye Specialist Name (print):                                                                Date:  Eye Specialist Signature:

## 2022-04-13 ENCOUNTER — THERAPY VISIT (OUTPATIENT)
Dept: PHYSICAL THERAPY | Facility: CLINIC | Age: 82
End: 2022-04-13
Payer: COMMERCIAL

## 2022-04-13 DIAGNOSIS — G89.29 CHRONIC BILATERAL LOW BACK PAIN WITHOUT SCIATICA: Primary | ICD-10-CM

## 2022-04-13 DIAGNOSIS — M54.50 CHRONIC BILATERAL LOW BACK PAIN WITHOUT SCIATICA: Primary | ICD-10-CM

## 2022-04-13 PROCEDURE — 97140 MANUAL THERAPY 1/> REGIONS: CPT | Mod: GP | Performed by: PHYSICAL THERAPIST

## 2022-04-13 PROCEDURE — 97112 NEUROMUSCULAR REEDUCATION: CPT | Mod: GP | Performed by: PHYSICAL THERAPIST

## 2022-04-13 PROCEDURE — 97110 THERAPEUTIC EXERCISES: CPT | Mod: GP | Performed by: PHYSICAL THERAPIST

## 2022-04-14 PROBLEM — M54.50 BILATERAL LOW BACK PAIN WITHOUT SCIATICA: Status: RESOLVED | Noted: 2022-02-28 | Resolved: 2022-04-14

## 2022-04-14 NOTE — PROGRESS NOTES
Subjective:  HPI  Physical Exam  Oswestry Score: 4.44 %                 Objective:  System    Physical Exam    General     ROS    Assessment/Plan:    DISCHARGE REPORT    Progress reporting period is from 2/28/22 to 4/13/22.       SUBJECTIVE  Subjective changes noted by patient:  Subjective: Patrikc mentions she is doing well, her pain is better adn she has started some group classes and wants to discharge with HEP.    Current pain level is  Current Pain level: 1/10.     Previous pain level was    .   Changes in function:  Yes (See Goal flowsheet attached for changes in current functional level)  Adverse reaction to treatment or activity: None    OBJECTIVE  Changes noted in objective findings:  Yes,   Objective: L/S AROM is WFL, Improved TA activation and core stabilization demonstrated.     ASSESSMENT/PLAN  Updated problem list and treatment plan: Diagnosis 1:  LBP    STG/LTGs have been met or progress has been made towards goals:  Yes (See Goal flow sheet completed today.)  Assessment of Progress: The patient's condition is improving.  The patient's condition has potential to improve.  The patient has met all of their long term goals.  Self Management Plans:  Patient is independent in a home treatment program.  Patient is independent in self management of symptoms.  I have re-evaluated this patient and find that the nature, scope, duration and intensity of the therapy is appropriate for the medical condition of the patient.  Patrick continues to require the following intervention to meet STG and LTG's:  PT intervention is no longer required to meet STG/LTG.    Recommendations:  This patient is ready to be discharged from therapy and continue their home treatment program.    Please refer to the daily flowsheet for treatment today, total treatment time and time spent performing 1:1 timed codes.

## 2022-04-15 ENCOUNTER — VIRTUAL VISIT (OUTPATIENT)
Dept: DERMATOLOGY | Facility: CLINIC | Age: 82
End: 2022-04-15
Payer: COMMERCIAL

## 2022-04-15 ENCOUNTER — PRE VISIT (OUTPATIENT)
Dept: DERMATOLOGY | Facility: CLINIC | Age: 82
End: 2022-04-15

## 2022-04-15 ENCOUNTER — TELEPHONE (OUTPATIENT)
Dept: DERMATOLOGY | Facility: CLINIC | Age: 82
End: 2022-04-15

## 2022-04-15 DIAGNOSIS — L21.9 DERMATITIS, SEBORRHEIC: Primary | ICD-10-CM

## 2022-04-15 PROCEDURE — 99203 OFFICE O/P NEW LOW 30 MIN: CPT | Mod: 95 | Performed by: DERMATOLOGY

## 2022-04-15 RX ORDER — KETOCONAZOLE 20 MG/ML
SHAMPOO TOPICAL WEEKLY
Qty: 120 ML | Refills: 11 | Status: SHIPPED | OUTPATIENT
Start: 2022-04-15 | End: 2023-03-07

## 2022-04-15 RX ORDER — FLUOCINOLONE ACETONIDE 0.11 MG/ML
OIL TOPICAL DAILY
Qty: 118.28 ML | Refills: 3 | Status: SHIPPED | OUTPATIENT
Start: 2022-04-15 | End: 2023-03-07

## 2022-04-15 RX ORDER — FAMOTIDINE 20 MG/1
20 TABLET, FILM COATED ORAL 2 TIMES DAILY PRN
Status: ON HOLD | COMMUNITY
Start: 2022-04-01 | End: 2024-07-21

## 2022-04-15 NOTE — PROGRESS NOTES
[facility] Dermatology Note  Encounter Date: Apr 15, 2022  Store-and-Forward and Video (invitation sent by [email or web address]). Location of teledermatologist: [facility] [place]. Date of images: 04/15/22. Start time: 10:38am. End time: 10:49 am.    Dermatology Problem List:  1. Pink, scaly patches on frontal scalp/forehead: DDX: Seborheic dermatitis vs. Retinoid dermatitis vs. Irritant contact dermatitis  - ketoconazole shampoo 2-3x per week, fluocinolone oil  - avoid retinoid application to affected area  2. Rheumatoid arthritis and Sjogrens    ____________________________________________    Assessment & Plan:     1. Pink, scaly patches on frontal scalp/forehead: Based on image send via [email or web address], it is difficult to fully evaluate. DDX: Seborheic dermatitis vs. Retinoid dermatitis vs. Irritant contact dermatitis  - Wash scalp with ketoconazole shampoo 2-3x per week  - Apply fluocinolone oil to scalp daily  - Avoid retinoid application to affected area  - RTC in 4-6 weeks for in person evaluation    Procedures Performed:    None    Follow-up: 4-6 week(s) in-person, or earlier for new or changing lesions    Staff and Resident:     Tess Gan MD  PGY-4 Dermatology Resident     Staff Physician Comments:   I evaluated any available patient photographs with the resident and I edited the assessment and plan as documented in the note. I was present on the line and participated in the entire video visit.    Mukesh Stevenson MD   of Dermatology  Department of Dermatology  [facility]    ____________________________________________    CC: Derm Problem (Itchiness on head for about 3-4 weeks, rash appeared last Friday on forehead near hairline)    HPI:  Ms. Patrick Olson is a(n) 81 year old female who presents today as a new patient for evaluation of a pruritic rash on her scalp. She states that the rash has been present for  approximately 2-3 weeks. She is currently treating her scalp with Neutrogena shampoo every day or every other day and coconut oil. She does apply a retinoid to her face and near the affected area. She is concerned that the rash is due to her plaquenil that she is on for her rheumatoid arthritis.  She has been on plaquenil since October 2021.  She denies any previous history of a similar appearing rash. She denies any difficulty standing from a seated position, muscle weakness, etc.      Patient is otherwise feeling well, without additional skin concerns.    Labs Reviewed:  N/A    Physical Exam:  Vitals: There were no vitals taken for this visit.  SKIN: Teledermatology photos were reviewed; image quality and interpretability: acceptable. Image date: 4/11/22.  - On the frontal scalp hairline, there is an ill-defined pink to dark red patch with minimal scale   - No other lesions of concern on areas examined.     Medications:  Current Outpatient Medications   Medication     acetaminophen (TYLENOL) 500 MG tablet     calcium carb 1250 mg, 500 mg [place],/vitamin D 200 units (OSCAL WITH D) 500-200 MG-UNIT per tablet     famotidine (PEPCID) 20 MG tablet     ferrous sulfate 140 (45 Fe) MG TBCR CR tablet     gabapentin (NEURONTIN) 300 MG capsule     hydroxychloroquine (PLAQUENIL) 200 MG tablet     lifitegrast (XIIDRA) 5 % opthalmic solution     Multiple Vitamins-Minerals (OCUVITE PRESERVISION PO)     tretinoin (RETIN-A) 0.1 % external cream     vitamin C (ASCORBIC ACID) 500 MG tablet     YUVAFEM 10 MCG TABS vaginal tablet     No current facility-administered medications for this visit.      Past Medical/Surgical History:   Patient Active Problem List   Diagnosis     SNHL (sensorineural hearing loss)     GI bleed     Gastrointestinal hemorrhage, unspecified gastrointestinal hemorrhage type     Diverticular hemorrhage     Acute lower GI bleeding     Diverticulosis of large intestine     Lab test negative for COVID-19 virus      Past Medical History:   Diagnosis Date     Diverticulosis of large intestine      Osteoarthritis      Osteoporosis      Rheumatoid arthritis (H)      Sensorineural hearing loss        CC No referring provider defined for this encounter. on close of this encounter.

## 2022-04-15 NOTE — TELEPHONE ENCOUNTER
FUTURE VISIT INFORMATION      FUTURE VISIT INFORMATION:    Date: 4.15.22    Time: 10:00    Location: Video  REFERRAL INFORMATION:    Referring provider:  Tyree    Referring providers clinic:  Rheumatology    Reason for visit/diagnosis  rash per yung    RECORDS REQUESTED FROM:       Clinic name Comments Records Status Photos Status   Rheum 4.11.22  [facility]

## 2022-04-15 NOTE — TELEPHONE ENCOUNTER
[facility] Call Center    Phone Message    May a detailed message be left on voicemail: yes     Reason for Call: Appointment Intake    Referring Provider Name: Dr. Mukesh Stevenson  Diagnosis and/or Symptoms: Per Pt Patrick Dr Stevenson wants to see her in person for a follow-up in a month, but next available appt is 7/28. Please call [phone]    Action Taken: Message routed to:  [facility] Derm    Travel Screening: Not Applicable

## 2022-04-15 NOTE — NURSING NOTE
Chief Complaint   Patient presents with     Derm Problem     Itchiness on head for about 3-4 weeks, rash appeared last Friday on forehead near hairline     Teledermatology Nurse Call Patients:     Are you in the [facility] at the time of the encounter? yes    Today's visit will be billed to you and your insurance.    A teledermatology visit is not as thorough as an in-person visit and the quality of the photograph sent may not be of the same quality as that taken by the dermatology clinic.    Medications and allergies reviewed with patient.    ARACELI Barboza

## 2022-04-15 NOTE — LETTER
4/15/2022       RE: Patrick Olson  [address]  [place] [id number]     Dear Colleague,    Thank you for referring your patient, Patrick Olson, to the [facility] [place] at [facility]. Please see a copy of my visit note below.    [facility] Dermatology Note  Encounter Date: Apr 15, 2022  Store-and-Forward and Video (invitation sent by [employer]). Location of teledermatologist: [facility] [place]. Date of images: 04/15/22. Start time: 10:38am. End time: 10:49 am.    Dermatology Problem List:  1. Pink, scaly patches on frontal scalp/forehead: DDX: Seborheic dermatitis vs. Retinoid dermatitis vs. Irritant contact dermatitis  - ketoconazole shampoo 2-3x per week, fluocinolone oil  - avoid retinoid application to affected area  2. Rheumatoid arthritis and Sjogrens    ____________________________________________    Assessment & Plan:     1. Pink, scaly patches on frontal scalp/forehead: Based on image send via [employer], it is difficult to fully evaluate. DDX: Seborheic dermatitis vs. Retinoid dermatitis vs. Irritant contact dermatitis  - Wash scalp with ketoconazole shampoo 2-3x per week  - Apply fluocinolone oil to scalp daily  - Avoid retinoid application to affected area  - RTC in 4-6 weeks for in person evaluation    Procedures Performed:    None    Follow-up: 4-6 week(s) in-person, or earlier for new or changing lesions    Staff and Resident:     Tess Gan MD  PGY-4 Dermatology Resident     Staff Physician Comments:   I evaluated any available patient photographs with the resident and I edited the assessment and plan as documented in the note. I was present on the line and participated in the entire video visit.    Mukesh Stevenson MD   of Dermatology  Department of Dermatology  [facility]  Medicine    ____________________________________________    CC: Derm Problem (Itchiness on head for about 3-4 weeks, rash appeared last Friday on forehead near hairline)    HPI:  Ms. Patrick Olson is a(n) 81 year old female who presents today as a new patient for evaluation of a pruritic rash on her scalp. She states that the rash has been present for approximately 2-3 weeks. She is currently treating her scalp with Neutrogena shampoo every day or every other day and coconut oil. She does apply a retinoid to her face and near the affected area. She is concerned that the rash is due to her plaquenil that she is on for her rheumatoid arthritis.  She has been on plaquenil since October 2021.  She denies any previous history of a similar appearing rash. She denies any difficulty standing from a seated position, muscle weakness, etc.      Patient is otherwise feeling well, without additional skin concerns.    Labs Reviewed:  N/A    Physical Exam:  Vitals: There were no vitals taken for this visit.  SKIN: Teledermatology photos were reviewed; image quality and interpretability: acceptable. Image date: 4/11/22.  - On the frontal scalp hairline, there is an ill-defined pink to dark red patch with minimal scale   - No other lesions of concern on areas examined.     Medications:  Current Outpatient Medications   Medication     acetaminophen (TYLENOL) 500 MG tablet     calcium carb 1250 mg, 500 mg [place],/vitamin D 200 units (OSCAL WITH D) 500-200 MG-UNIT per tablet     famotidine (PEPCID) 20 MG tablet     ferrous sulfate 140 (45 Fe) MG TBCR CR tablet     gabapentin (NEURONTIN) 300 MG capsule     hydroxychloroquine (PLAQUENIL) 200 MG tablet     lifitegrast (XIIDRA) 5 % opthalmic solution     Multiple Vitamins-Minerals (OCUVITE PRESERVISION PO)     tretinoin (RETIN-A) 0.1 % external cream     vitamin C (ASCORBIC ACID) 500 MG tablet     YUVAFEM 10 MCG TABS vaginal tablet     No current facility-administered medications for this  visit.      Past Medical/Surgical History:   Patient Active Problem List   Diagnosis     SNHL (sensorineural hearing loss)     GI bleed     Gastrointestinal hemorrhage, unspecified gastrointestinal hemorrhage type     Diverticular hemorrhage     Acute lower GI bleeding     Diverticulosis of large intestine     Lab test negative for COVID-19 virus     Past Medical History:   Diagnosis Date     Diverticulosis of large intestine      Osteoarthritis      Osteoporosis      Rheumatoid arthritis (H)      Sensorineural hearing loss        CC No referring provider defined for this encounter. on close of this encounter.

## 2022-04-19 NOTE — PROGRESS NOTES
[facility]    Consulting provider: Elvia Colin MD  [address] [address]    Reason for consultation: Low bone density    HPI:   Patrick Olson is a 81 year old woman with comorbidity of rheumatoid arthritis seropositive, st post injections for spinal stenosis, seeing RA for seropositive  Sjogren syndrome.    Most recent DXA scan 12/23/21 T-score -1.7 right total hip decreased by -6.5% compared to DEXA from 2014  Prior fractures no  Height loss no  Prior osteoporosis medications no   Estrogen treatment estrogen treatment 40s-60    Major osteoporotic fracture 9% hip fracture 2.1%      Falls in the last year no, in the past    Dietary Calcium ok  Dietary Protein intake yes  Calcium supplements with breakfast and dinner  Vitamin D supplements has been high  Alcohol consumption no  Smoking no  Caffein consumption 1-2 cups daily    Exercise walking sveraltimes a week    FH of osteoporosis or fractures no    High risk medications year ago not for long for RA< recent 5 days    Gastric or bariatric surgery no  Stroke or MI in last 12 monthsno  Kidney stones no    Dental status regular three times a year    Current Problem List:   Patient Active Problem List   Diagnosis     SNHL (sensorineural hearing loss)     GI bleed     Gastrointestinal hemorrhage, unspecified gastrointestinal hemorrhage type     Diverticular hemorrhage     Acute lower GI bleeding     Diverticulosis of large intestine     Lab test negative for COVID-19 virus         Assessment:  Low bone density  Elderly woman this low bone density risk risk factor of postmenopausal state, age low body weight and rheumatoid arthritis.  Bone density is in the osteopenic range.  Her FRAX score visit is considering her RA is not elevated.  Medical treatment not indicated.    Supplements vitamin D levels have been high normal, reviewed to hold daily multivitamin  Continue calcium with meals twice daily  Continue  multivitamins for eyes  Continue walking and core strengthening exercises in the senior center    Repeat bone density in 12/20/2023 for 2-year follow-up, this could be done with primary care     Follow-up with me as needed    Kandi Medina MD  Endocrinology and Diabetes  Telephone contact:  [facility] [phone]  [facility] [place] [phone]        Past Medical and Past Surgical History:  Past Medical History:   Diagnosis Date     Diverticulosis of large intestine      Osteoarthritis      Osteoporosis      Rheumatoid arthritis (H)      Sensorineural hearing loss        Past Surgical History:   Procedure Laterality Date     CAPSULE/PILL CAM ENDOSCOPY N/A 11/18/2021    Procedure: IMAGING PROCEDURE, GI TRACT, INTRALUMINAL, VIA CAPSULE;  Surgeon: Deric Rodriguez MD;  Location: [facility]     COLONOSCOPY N/A 3/14/2017    Procedure: COMBINED COLONOSCOPY, SINGLE OR MULTIPLE BIOPSY/POLYPECTOMY BY BIOPSY;  Surgeon: Spencer Downey MD;  Location: [facility]     ENTEROSCOPY SMALL BOWEL N/A 11/20/2021    Procedure: ENTEROSCOPY, colonoscopy with endoscopic mucosal resection and tattoo placement;  Surgeon: Kirby Arthur MD;  Location: [facility] OR     IR VISCERAL EMBOLIZATION  1/22/2021     ORTHOPEDIC SURGERY       SIGMOIDOSCOPY FLEXIBLE N/A 1/23/2021    Procedure: SIGMOIDOSCOPY, FLEXIBLE;  Surgeon: Jaime Steinberg MD;  Location:  GI       Medications:   Current Outpatient Medications   Medication Sig Dispense Refill     acetaminophen (TYLENOL) 500 MG tablet Take 500 mg by mouth every 6 hours as needed for mild pain       calcium carb 1250 mg, 500 mg [place],/vitamin D 200 units (OSCAL WITH D) 500-200 MG-UNIT per tablet Take 1 tablet by mouth 2 times daily        famotidine (PEPCID) 20 MG tablet TAKE 1 TABLET BY MOUTH TWICE A DAY       ferrous sulfate 140 (45 Fe) MG TBCR CR tablet Take 140 mg by mouth daily       Fluocinolone Acetonide Scalp 0.01 % OIL oil Apply  topically daily 118.28 mL 3     gabapentin (NEURONTIN) 300 MG capsule Take 1 capsule (300 mg) by mouth 3 times daily Take 300mg in morning, 300mg in afternoon and 300mg 90 capsule 1     hydroxychloroquine (PLAQUENIL) 200 MG tablet Take 1 tablet (200 mg) by mouth daily Annual Plaquenil toxicity eye screening required. 90 tablet 0     ketoconazole (NIZORAL) 2 % external shampoo Apply topically once a week 120 mL 11     lifitegrast (XIIDRA) 5 % opthalmic solution 1 drop 2 times daily       Multiple Vitamins-Minerals (OCUVITE PRESERVISION PO) Take 1 tablet by mouth 2 times daily        tretinoin (RETIN-A) 0.1 % external cream APPLY TO FACE AT BEDTIME *NOT COVERED, NO PA AVAILABLE PER MD*       vitamin C (ASCORBIC ACID) 500 MG tablet Take 1 tablet (500 mg) by mouth daily 30 tablet 0     YUVAFEM 10 MCG TABS vaginal tablet          Allergies:   Allergies   Allergen Reactions     Bee Venom Anaphylaxis     Shrimp Anaphylaxis     Evoxac [Cevimeline] Unknown     Prevnar Swelling     Other reaction(s): Edema     Prevnar [Pneumococcal 13-Linda Conj Vacc] Unknown       Social History     Tobacco Use     Smoking status: Former Smoker     Smokeless tobacco: Never Used   Substance Use Topics     Alcohol use: No       Physical Examination:  There were no vitals taken for this visit.    not currently breastfeeding.  There is no height or weight on file to calculate BMI.    Wt Readings from Last 4 Encounters:  03/10/22 : 46.2 kg (101 lb 12.8 oz)  02/23/22 : 45.8 kg (101 lb)  02/10/22 : 46.1 kg (101 lb 11.2 oz)  01/12/22 : 45.7 kg (100 lb 11.2 oz)      General: Well appearing woman in no distress,   Eyes: EOMI. Sclerae and conjunctivae are clear.   HENT: No thyromegaly or mass.    Lymphatic: No cervical or supraclavicular lymphadenopathy.  Cardiovascular: RRR, with normal S1+S2 and no murmurs.   Extremities: No peripheral edema.   Neurologic: No tremor with hands outstretched. 2+ patellar reflexes.  Muskuloskeletal: rib-pelvis  distance 2 finger breath    Labs and Studies:   Lab Results   Component Value Date    EJ 9.9 03/10/2022    EJ 9.6 01/03/2022    EJ 9.0 11/17/2021    EJ 9.4 11/16/2021    ALBUMIN 3.9 03/10/2022    ALT 24 01/03/2022    PHOS 4.1 03/10/2022    PTHI 46 03/10/2022    PTHI 39 10/01/2021    AST 24 01/03/2022    BILITOTAL 0.4 01/03/2022    CR 1.14 (H) 03/10/2022    CR 0.99 01/03/2022    CR 1.04 11/17/2021     03/10/2022    ALKPHOS 75 01/03/2022    HGB 11.2 (L) 03/10/2022       DX Hip/Pelvis/Spine    [address] [address]  Phone: [phone]   Fax: [phone]      Patient name:   Patrick Olson  Patient demographics:  81 year old  Female  History:  Evaluation of Bone Density  Current treatments:  Calcium, Immunosuppressants  Scan:    [employer]      Conclusions:  The most negative and valid T-score of -1.7 at the level of the right total hip corresponds with low bone density according to WHO criteria for postmenopausal females and men age 50 and over. The risk of osteoporotic fracture increases approximately 2-fold for each 1.0 SD decrease in T-score.    Low bone density is not the only risk factor for fracture; consider factors such as patient's age, fall risk, injury risk, previous osteoporotic fracture, family history of osteoporosis, etc.  People with an elevated risk of fracture should be regularly evaluated for low bone mineral density.  For patients eligible for Medicare, routine testing is allowed once every 2 years. Testing frequency can be increased for patients on corticosteroids. Clinical correlation is recommended.    Comparison Exams:  Taking into account the precision errors (ref #3 below) for this center:  These calculated changes in BMD to the previous exam are significantly decreased at the level of the right total hip.  These calculated changes in BMD to the baseline exam are significantly  increased at the level of the lumbar spine, and decreased at the level of the right total hip.    Percent changes not mentioned or within remaining regions are either insignificant or invalid.    Please note that the differential diagnosis of BMD increase in the spine includes improvement due to pharmacotherapy vs inter-current progression of spine degeneration or fracture.    Please refer to BMD values in PACS.    Bone densitometry cannot rule out secondary causes of bone loss. Therefore, further metabolic testing to look for secondary causes of low BMD should be performed if indicated. Clinical correlation is recommended    Clinical correlation is strongly recommended.      Feel free to contact DXA services if you have any questions or comments.  Thank you for the opportunity to be of service to you and your patient.      Principal result :  Silvina Lazar MD, MD, [facility]  Division of Endocrinology and Diabetes  Department of Medicine    Technical comments:  Satisfactory.  Abnormal vertebrae may be excluded from analysis if there is more than a 1.0 T-score difference between the vertebrae in question and adjacent vertebrae. Note the wide range in BMD values and T-scores within the lumbar spine. In the circumstances, the lumbar spine is represented by L1-L2    References:  Ref. 1. WHO categories:  T-score > -1.0  = normal             .  T-score -1.0 to -2.5 = low bone density  T-score < -2.5 = osteoporosis        .    Ref. 2. 2015 ISCD official position statements:  www.iscd.org.    Ref. 3.  (LSC = least significant change)  AP spine =  0.032 g/cm2  (6.26.2007)  Left hip = 0.029 g/cm2  (6.15.2007)  Right hip = 0.018 g/cm2  (6.15.2007)  Left mid radius = 0.043 g/cm2  (6.26.2007)  Lateral spine region = pending  Total body = pending  According to the ISCD position statements, total hip rather than femoral neck regions are to be compared because larger areas give better precision.    Ref. 4.  By definition,  osteoporosis may be diagnosed in the presence or with the history of a low trauma or fragility fracture.  Fragility and low trauma fracture is defined as a fracture resulting from the force of a fall from a standing height or less or a bone that breaks under conditions that would not cause a normal bone to break.    Ref. 5. NOF Physician's Guideline Website address:  www.nof.org.        Results for orders placed during the hospital encounter of 11/16/21    CTA Abdomen Pelvis with Contrast    Narrative  EXAMINATION: CTA ABDOMEN PELVIS WITH CONTRAST, 11/16/2021 5:48 PM    TECHNIQUE:  Helical CT images from the lung bases through the  symphysis pubis were obtained with IV contrast. Contrast dose:  iopamidol (ISOVUE-370) solution 61 mL    COMPARISON: CT 1/19/2021    HISTORY: GI bleed    FINDINGS:    ABDOMEN/PELVIS:  LIVER: There are numerous subcentimeter hepatic hypodense lesions, not  significantly changed from 1/19/2021. A few of these lesions,  including that in hepatic segment 6 demonstrates discontinuous,  peripheral enhancement suggesting hemangioma.  GALLBLADDER: No gallbladder wall thickening. The gallbladder is  dilated containing peripherally calcified, hypoattenuating gallstones.  No intra or extrahepatic biliary ductal dilation.  PANCREAS: 4 mm cystic lesion in the pancreatic body (series 13 image  73), not significantly changed. No pancreatic ductal dilation or  suspicious pancreatic mass  SPLEEN: Within normal limits.  ADRENAL GLANDS: Unchanged thickening of the left adrenal gland. No  right adrenal nodule.  URINARY TRACT: Symmetric cortical enhancement bilaterally. Bilateral  hypoattenuating, exophytic renal cysts. Nonobstructing right renal  calculi. Cortical thinning in the posterior right kidney, suggesting  prior insult or ischemia. No hydronephrosis. The urinary bladder is  well distended without focal abnormality.  REPRODUCTIVE ORGANS: Fibroid uterus, partly obscured by streak  artifact from left  total hip arthroplasty.  BOWEL: Small large bowel are normal in caliber. There is  hyperattenuating colonic contents, most pronounced in the cecum and  ascending colon. No increased density on postcontrast images. No  evidence of intraluminal hemorrhage. Diffuse sigmoid diverticulosis  without CT evidence of diverticulitis. Right lower quadrant appendix  is within normal limits.  PERITONEUM/FLUID: No free fluid or air in the abdomen or pelvis.    VESSELS: Scattered atherosclerotic calcifications of the abdominal  aorta. The celiac, superior mesenteric artery and inferior mesenteric  artery are patent and normal in caliber. Patent origins of bilateral  single renal arteries. The iliac vessels and common femoral vessels  are patent. The portal vein, superior mesenteric vein and splenic vein  are patent.    LYMPH NODES: No lymphadenopathy in the abdomen or pelvis.    BONES/SOFT TISSUES: Straightening of the lumbar spine. Multilevel  degenerative changes with opposing endplate sclerosis. Changes of left  total hip arthroplasty. No suspicious or acute osseous lesions.    LUNG BASES: Clear. Heart is not enlarged. No pleural or pericardial  effusion.    Impression  IMPRESSION:  1. No GI bleed, retroperitoneal hemorrhage or evidence of active  extravasation.  2. Colonic diverticulosis without evidence of diverticulitis.  3. Numerous hypoattenuating hepatic lesions, not significantly changed  from 1/19/2021. Based on prior imaging, many of these appear to  represent hemangiomas.    I have personally reviewed the examination and initial interpretation  and I agree with the findings.    MITA DAMICO MD      SYSTEM ID:  [id number]                  Answers for HPI/ROS submitted by the patient on 4/20/2022  General Symptoms: No  Skin Symptoms: Yes  HENT Symptoms: Yes  EYE SYMPTOMS: Yes  HEART SYMPTOMS: Yes  LUNG SYMPTOMS: No  INTESTINAL SYMPTOMS: No  URINARY SYMPTOMS: Yes  GYNECOLOGIC SYMPTOMS: No  BREAST SYMPTOMS: No  SKELETAL  SYMPTOMS: No  BLOOD SYMPTOMS: No  NERVOUS SYSTEM SYMPTOMS: No  MENTAL HEALTH SYMPTOMS: Yes  Ear pain: No  Ear discharge: No  Hearing loss: Yes  Tinnitus: No  Nosebleeds: No  Congestion: No  Sinus pain: No  Trouble swallowing: Yes   Voice hoarseness: No  Mouth sores: No  Sore throat: No  Tooth pain: No  Gum tenderness: No  Bleeding gums: No  Change in taste: No  Change in sense of smell: No  Dry mouth: Yes  Hearing aid used: Yes  Neck lump: No  Changes in hair: No  Changes in moles/birth marks: No  Itching: Yes  Rashes: Yes  Changes in nails: No  Acne: No  Hair in places you don't want it: No  Change in facial hair: No  Warts: No  Non-healing sores: No  Scarring: No  Flaking of skin: Yes  Color changes of hands/feet in cold : No  Sun sensitivity: Yes  Skin thickening: No  Eye pain: No  Vision loss: No  Dry eyes: Yes  Watery eyes: No  Eye bulging: No  Double vision: No  Flashing of lights: No  Spots: No  Floaters: No  Redness: No  Crossed eyes: No  Tunnel Vision: No  Yellowing of eyes: No  Eye irritation: No  Chest pain or pressure: No  Fast or irregular heartbeat: No  Pain in legs with walking: No  Trouble breathing while lying down: No  Fingers or toes appear blue: No  High blood pressure: No  Fainting: No  Murmurs: No  Pacemaker: No  Varicose veins: Yes  Edema or swelling: No  Wake up at night with shortness of breath: No  Light-headedness: No  Exercise intolerance: No  Trouble holding urine or incontinence: No  Pain or burning: No  Trouble starting or stopping: No  Increased frequency of urination: No  Blood in urine: No  Decreased frequency of urination: No  Frequent nighttime urination: Yes  Flank pain: No  Difficulty emptying bladder: No  Nervous or Anxious: Yes  Depression: No  Trouble sleeping: Yes  Trouble thinking or concentrating: No  Mood changes: No  Panic attacks: No

## 2022-04-20 ENCOUNTER — PRE VISIT (OUTPATIENT)
Dept: ENDOCRINOLOGY | Facility: CLINIC | Age: 82
End: 2022-04-20

## 2022-04-20 ENCOUNTER — OFFICE VISIT (OUTPATIENT)
Dept: ENDOCRINOLOGY | Facility: CLINIC | Age: 82
End: 2022-04-20
Payer: COMMERCIAL

## 2022-04-20 ENCOUNTER — TELEPHONE (OUTPATIENT)
Dept: RHEUMATOLOGY | Facility: CLINIC | Age: 82
End: 2022-04-20

## 2022-04-20 VITALS
SYSTOLIC BLOOD PRESSURE: 123 MMHG | HEART RATE: 85 BPM | BODY MASS INDEX: 17.92 KG/M2 | DIASTOLIC BLOOD PRESSURE: 71 MMHG | WEIGHT: 101.9 LBS

## 2022-04-20 DIAGNOSIS — M85.9 LOW BONE DENSITY: Primary | ICD-10-CM

## 2022-04-20 PROCEDURE — 99204 OFFICE O/P NEW MOD 45 MIN: CPT | Performed by: INTERNAL MEDICINE

## 2022-04-20 ASSESSMENT — ENCOUNTER SYMPTOMS
DEPRESSION: 0
EYE IRRITATION: 0
TASTE DISTURBANCE: 0
SYNCOPE: 0
SINUS PAIN: 0
SINUS CONGESTION: 0
ORTHOPNEA: 0
SMELL DISTURBANCE: 0
EYE REDNESS: 0
DECREASED CONCENTRATION: 0
DYSURIA: 0
PALPITATIONS: 0
EYE WATERING: 0
LIGHT-HEADEDNESS: 0
HOARSE VOICE: 0
TROUBLE SWALLOWING: 1
HYPERTENSION: 0
DIFFICULTY URINATING: 0
EYE PAIN: 0
FLANK PAIN: 0
NAIL CHANGES: 0
SLEEP DISTURBANCES DUE TO BREATHING: 0
SORE THROAT: 0
POOR WOUND HEALING: 0
INSOMNIA: 1
NECK MASS: 0
NERVOUS/ANXIOUS: 1
SKIN CHANGES: 0
EXERCISE INTOLERANCE: 0
PANIC: 0
DOUBLE VISION: 0
HEMATURIA: 0
LEG PAIN: 0

## 2022-04-20 ASSESSMENT — PAIN SCALES - GENERAL: PAINLEVEL: NO PAIN (0)

## 2022-04-20 NOTE — LETTER
4/20/2022       RE: Patrick Olson  [address]  [facility] [address]     Dear Colleague,    Thank you for referring your patient, Patrick Olson, to the [facility] END[facility]OLOG[facility] [place] [facility]. Please see a copy of my visit note below.      Patrick Olson is a 81 year old female who is being evaluated via a billable video visit.        Endocrinology and Diabetes Clinic    Consulting provider: Elvia Colin MD  [address] [address]    Reason for consultation: Low bone density    HPI:   Patrick Olson is a 81 year old woman with comorbidity of rheumatoid arthritis seropositive, st post injections for spinal stenosis, seeing RA for seropositive  Sjogren syndrome.    Most recent DXA scan 12/23/21 T-score -1.7 right total hip decreased by -6.5% compared to DEXA from 2014  Prior fractures no  Height loss no  Prior osteoporosis medications no   Estrogen treatment estrogen treatment 40s-60    Major osteoporotic fracture 9% hip fracture 2.1%      Falls in the last year no, in the past    Dietary Calcium ok  Dietary Protein intake yes  Calcium supplements with breakfast and dinner  Vitamin D supplements has been high  Alcohol consumption no  Smoking no  Caffein consumption 1-2 cups daily    Exercise walking sveraltimes a week    FH of osteoporosis or fractures no    High risk medications year ago not for long for RA< recent 5 days    Gastric or bariatric surgery no  Stroke or MI in last 12 monthsno  Kidney stones no    Dental status regular three times a year    Current Problem List:   Patient Active Problem List   Diagnosis     SNHL (sensorineural hearing loss)     GI bleed     Gastrointestinal hemorrhage, unspecified gastrointestinal hemorrhage type     Diverticular hemorrhage     Acute lower GI bleeding     Diverticulosis of large intestine     Lab test negative for COVID-19 virus         Assessment:  Low bone  density  Elderly woman this low bone density risk risk factor of postmenopausal state, age low body weight and rheumatoid arthritis.  Bone density is in the osteopenic range.  Her FRAX score visit is considering her RA is not elevated.  Medical treatment not indicated.    Supplements vitamin D levels have been high normal, reviewed to hold daily multivitamin  Continue calcium with meals twice daily  Continue multivitamins for eyes  Continue walking and core strengthening exercises in the senior center    Repeat bone density in 12/20/2023 for 2-year follow-up, this could be done with primary care     Follow-up with me as needed    Kandi Medina MD  Endocrinology and Diabetes  Telephone contact:  [facility] [place] [phone]  [facility] [phone]        Past Medical and Past Surgical History:  Past Medical History:   Diagnosis Date     Diverticulosis of large intestine      Osteoarthritis      Osteoporosis      Rheumatoid arthritis (H)      Sensorineural hearing loss        Past Surgical History:   Procedure Laterality Date     CAPSULE/PILL CAM ENDOSCOPY N/A 11/18/2021    Procedure: IMAGING PROCEDURE, GI TRACT, INTRALUMINAL, VIA CAPSULE;  Surgeon: Deric Rodriguez MD;  Location:  GI     COLONOSCOPY N/A 3/14/2017    Procedure: COMBINED COLONOSCOPY, SINGLE OR MULTIPLE BIOPSY/POLYPECTOMY BY BIOPSY;  Surgeon: Spencer Downey MD;  Location:  GI     ENTEROSCOPY SMALL BOWEL N/A 11/20/2021    Procedure: ENTEROSCOPY, colonoscopy with endoscopic mucosal resection and tattoo placement;  Surgeon: Kirby Arthur MD;  Location: [facility] OR     IR VISCERAL EMBOLIZATION  1/22/2021     ORTHOPEDIC SURGERY       SIGMOIDOSCOPY FLEXIBLE N/A 1/23/2021    Procedure: SIGMOIDOSCOPY, FLEXIBLE;  Surgeon: Jaime Steinberg MD;  Location:  GI       Medications:   Current Outpatient Medications   Medication Sig Dispense Refill     acetaminophen (TYLENOL) 500 MG tablet Take  500 mg by mouth every 6 hours as needed for mild pain       calcium carb 1250 mg, 500 mg [place],/vitamin D 200 units (OSCAL WITH D) 500-200 MG-UNIT per tablet Take 1 tablet by mouth 2 times daily        famotidine (PEPCID) 20 MG tablet TAKE 1 TABLET BY MOUTH TWICE A DAY       ferrous sulfate 140 (45 Fe) MG TBCR CR tablet Take 140 mg by mouth daily       Fluocinolone Acetonide Scalp 0.01 % OIL oil Apply topically daily 118.28 mL 3     gabapentin (NEURONTIN) 300 MG capsule Take 1 capsule (300 mg) by mouth 3 times daily Take 300mg in morning, 300mg in afternoon and 300mg 90 capsule 1     hydroxychloroquine (PLAQUENIL) 200 MG tablet Take 1 tablet (200 mg) by mouth daily Annual Plaquenil toxicity eye screening required. 90 tablet 0     ketoconazole (NIZORAL) 2 % external shampoo Apply topically once a week 120 mL 11     lifitegrast (XIIDRA) 5 % opthalmic solution 1 drop 2 times daily       Multiple Vitamins-Minerals (OCUVITE PRESERVISION PO) Take 1 tablet by mouth 2 times daily        tretinoin (RETIN-A) 0.1 % external cream APPLY TO FACE AT BEDTIME *NOT COVERED, NO PA AVAILABLE PER MD*       vitamin C (ASCORBIC ACID) 500 MG tablet Take 1 tablet (500 mg) by mouth daily 30 tablet 0     YUVAFEM 10 MCG TABS vaginal tablet          Allergies:   Allergies   Allergen Reactions     Bee Venom Anaphylaxis     Shrimp Anaphylaxis     Evoxac [Cevimeline] Unknown     Prevnar Swelling     Other reaction(s): Edema     Prevnar [Pneumococcal 13-Linda Conj Vacc] Unknown       Social History     Tobacco Use     Smoking status: Former Smoker     Smokeless tobacco: Never Used   Substance Use Topics     Alcohol use: No       Physical Examination:  There were no vitals taken for this visit.    not currently breastfeeding.  There is no height or weight on file to calculate BMI.    Wt Readings from Last 4 Encounters:  03/10/22 : 46.2 kg (101 lb 12.8 oz)  02/23/22 : 45.8 kg (101 lb)  02/10/22 : 46.1 kg (101 lb 11.2 oz)  01/12/22 : 45.7 kg (100 lb  11.2 oz)      General: Well appearing woman in no distress,   Eyes: EOMI. Sclerae and conjunctivae are clear.   HENT: No thyromegaly or mass.    Lymphatic: No cervical or supraclavicular lymphadenopathy.  Cardiovascular: RRR, with normal S1+S2 and no murmurs.   Extremities: No peripheral edema.   Neurologic: No tremor with hands outstretched. 2+ patellar reflexes.  Muskuloskeletal: rib-pelvis distance 2 finger breath    Labs and Studies:   Lab Results   Component Value Date    EJ 9.9 03/10/2022    EJ 9.6 01/03/2022    EJ 9.0 11/17/2021    EJ 9.4 11/16/2021    ALBUMIN 3.9 03/10/2022    ALT 24 01/03/2022    PHOS 4.1 03/10/2022    PTHI 46 03/10/2022    PTHI 39 10/01/2021    AST 24 01/03/2022    BILITOTAL 0.4 01/03/2022    CR 1.14 (H) 03/10/2022    CR 0.99 01/03/2022    CR 1.04 11/17/2021     03/10/2022    ALKPHOS 75 01/03/2022    HGB 11.2 (L) 03/10/2022       DX Hip/Pelvis/Spine    [address] [address]  Phone: [phone]   Fax: [phone]      Patient name:   Patrick Olson  Patient demographics:  81 year old  Female  History:  Evaluation of Bone Density  Current treatments:  Calcium, Immunosuppressants  Scan:    [employer]      Conclusions:  The most negative and valid T-score of -1.7 at the level of the right total hip corresponds with low bone density according to WHO criteria for postmenopausal females and men age 50 and over. The risk of osteoporotic fracture increases approximately 2-fold for each 1.0 SD decrease in T-score.    Low bone density is not the only risk factor for fracture; consider factors such as patient's age, fall risk, injury risk, previous osteoporotic fracture, family history of osteoporosis, etc.  People with an elevated risk of fracture should be regularly evaluated for low bone mineral density.  For patients eligible for Medicare, routine testing is allowed once every 2  years. Testing frequency can be increased for patients on corticosteroids. Clinical correlation is recommended.    Comparison Exams:  Taking into account the precision errors (ref #3 below) for this center:  These calculated changes in BMD to the previous exam are significantly decreased at the level of the right total hip.  These calculated changes in BMD to the baseline exam are significantly increased at the level of the lumbar spine, and decreased at the level of the right total hip.    Percent changes not mentioned or within remaining regions are either insignificant or invalid.    Please note that the differential diagnosis of BMD increase in the spine includes improvement due to pharmacotherapy vs inter-current progression of spine degeneration or fracture.    Please refer to BMD values in PACS.    Bone densitometry cannot rule out secondary causes of bone loss. Therefore, further metabolic testing to look for secondary causes of low BMD should be performed if indicated. Clinical correlation is recommended    Clinical correlation is strongly recommended.      Feel free to contact DXA services if you have any questions or comments.  Thank you for the opportunity to be of service to you and your patient.      Principal result :  Silvina Lazar MD, MD, [employer]  Division of Endocrinology and Diabetes  Department of Medicine    Technical comments:  Satisfactory.  Abnormal vertebrae may be excluded from analysis if there is more than a 1.0 T-score difference between the vertebrae in question and adjacent vertebrae. Note the wide range in BMD values and T-scores within the lumbar spine. In the circumstances, the lumbar spine is represented by L1-L2    References:  Ref. 1. WHO categories:  T-score > -1.0  = normal             .  T-score -1.0 to -2.5 = low bone density  T-score < -2.5 = osteoporosis        .    Ref. 2. 2015 ISCD official position statements:  www.iscd.org.    Ref. 3.  (LSC = least significant  change)  AP spine =  0.032 g/cm2  (6.26.2007)  Left hip = 0.029 g/cm2  (6.15.2007)  Right hip = 0.018 g/cm2  (6.15.2007)  Left mid radius = 0.043 g/cm2  (6.26.2007)  Lateral spine region = pending  Total body = pending  According to the ISCD position statements, total hip rather than femoral neck regions are to be compared because larger areas give better precision.    Ref. 4.  By definition, osteoporosis may be diagnosed in the presence or with the history of a low trauma or fragility fracture.  Fragility and low trauma fracture is defined as a fracture resulting from the force of a fall from a standing height or less or a bone that breaks under conditions that would not cause a normal bone to break.    Ref. 5. NOF Physician's Guideline Website address:  www.nof.org.        Results for orders placed during the hospital encounter of 11/16/21    CTA Abdomen Pelvis with Contrast    Narrative  EXAMINATION: CTA ABDOMEN PELVIS WITH CONTRAST, 11/16/2021 5:48 PM    TECHNIQUE:  Helical CT images from the lung bases through the  symphysis pubis were obtained with IV contrast. Contrast dose:  iopamidol (ISOVUE-370) solution 61 mL    COMPARISON: CT 1/19/2021    HISTORY: GI bleed    FINDINGS:    ABDOMEN/PELVIS:  LIVER: There are numerous subcentimeter hepatic hypodense lesions, not  significantly changed from 1/19/2021. A few of these lesions,  including that in hepatic segment 6 demonstrates discontinuous,  peripheral enhancement suggesting hemangioma.  GALLBLADDER: No gallbladder wall thickening. The gallbladder is  dilated containing peripherally calcified, hypoattenuating gallstones.  No intra or extrahepatic biliary ductal dilation.  PANCREAS: 4 mm cystic lesion in the pancreatic body (series 13 image  73), not significantly changed. No pancreatic ductal dilation or  suspicious pancreatic mass  SPLEEN: Within normal limits.  ADRENAL GLANDS: Unchanged thickening of the left adrenal gland. No  right adrenal nodule.  URINARY  TRACT: Symmetric cortical enhancement bilaterally. Bilateral  hypoattenuating, exophytic renal cysts. Nonobstructing right renal  calculi. Cortical thinning in the posterior right kidney, suggesting  prior insult or ischemia. No hydronephrosis. The urinary bladder is  well distended without focal abnormality.  REPRODUCTIVE ORGANS: Fibroid uterus, partly obscured by streak  artifact from left total hip arthroplasty.  BOWEL: Small large bowel are normal in caliber. There is  hyperattenuating colonic contents, most pronounced in the cecum and  ascending colon. No increased density on postcontrast images. No  evidence of intraluminal hemorrhage. Diffuse sigmoid diverticulosis  without CT evidence of diverticulitis. Right lower quadrant appendix  is within normal limits.  PERITONEUM/FLUID: No free fluid or air in the abdomen or pelvis.    VESSELS: Scattered atherosclerotic calcifications of the abdominal  aorta. The celiac, superior mesenteric artery and inferior mesenteric  artery are patent and normal in caliber. Patent origins of bilateral  single renal arteries. The iliac vessels and common femoral vessels  are patent. The portal vein, superior mesenteric vein and splenic vein  are patent.    LYMPH NODES: No lymphadenopathy in the abdomen or pelvis.    BONES/SOFT TISSUES: Straightening of the lumbar spine. Multilevel  degenerative changes with opposing endplate sclerosis. Changes of left  total hip arthroplasty. No suspicious or acute osseous lesions.    LUNG BASES: Clear. Heart is not enlarged. No pleural or pericardial  effusion.    Impression  IMPRESSION:  1. No GI bleed, retroperitoneal hemorrhage or evidence of active  extravasation.  2. Colonic diverticulosis without evidence of diverticulitis.  3. Numerous hypoattenuating hepatic lesions, not significantly changed  from 1/19/2021. Based on prior imaging, many of these appear to  represent hemangiomas.    I have personally reviewed the examination and initial  interpretation  and I agree with the findings.    MITA DAMICO MD      SYSTEM ID:  [id number]                  Answers for HPI/ROS submitted by the patient on 4/20/2022  General Symptoms: No  Skin Symptoms: Yes  HENT Symptoms: Yes  EYE SYMPTOMS: Yes  HEART SYMPTOMS: Yes  LUNG SYMPTOMS: No  INTESTINAL SYMPTOMS: No  URINARY SYMPTOMS: Yes  GYNECOLOGIC SYMPTOMS: No  BREAST SYMPTOMS: No  SKELETAL SYMPTOMS: No  BLOOD SYMPTOMS: No  NERVOUS SYSTEM SYMPTOMS: No  MENTAL HEALTH SYMPTOMS: Yes  Ear pain: No  Ear discharge: No  Hearing loss: Yes  Tinnitus: No  Nosebleeds: No  Congestion: No  Sinus pain: No  Trouble swallowing: Yes   Voice hoarseness: No  Mouth sores: No  Sore throat: No  Tooth pain: No  Gum tenderness: No  Bleeding gums: No  Change in taste: No  Change in sense of smell: No  Dry mouth: Yes  Hearing aid used: Yes  Neck lump: No  Changes in hair: No  Changes in moles/birth marks: No  Itching: Yes  Rashes: Yes  Changes in nails: No  Acne: No  Hair in places you don't want it: No  Change in facial hair: No  Warts: No  Non-healing sores: No  Scarring: No  Flaking of skin: Yes  Color changes of hands/feet in cold : No  Sun sensitivity: Yes  Skin thickening: No  Eye pain: No  Vision loss: No  Dry eyes: Yes  Watery eyes: No  Eye bulging: No  Double vision: No  Flashing of lights: No  Spots: No  Floaters: No  Redness: No  Crossed eyes: No  Tunnel Vision: No  Yellowing of eyes: No  Eye irritation: No  Chest pain or pressure: No  Fast or irregular heartbeat: No  Pain in legs with walking: No  Trouble breathing while lying down: No  Fingers or toes appear blue: No  High blood pressure: No  Fainting: No  Murmurs: No  Pacemaker: No  Varicose veins: Yes  Edema or swelling: No  Wake up at night with shortness of breath: No  Light-headedness: No  Exercise intolerance: No  Trouble holding urine or incontinence: No  Pain or burning: No  Trouble starting or stopping: No  Increased frequency of urination: No  Blood in urine:  No  Decreased frequency of urination: No  Frequent nighttime urination: Yes  Flank pain: No  Difficulty emptying bladder: No  Nervous or Anxious: Yes  Depression: No  Trouble sleeping: Yes  Trouble thinking or concentrating: No  Mood changes: No  Panic attacks: No

## 2022-04-20 NOTE — TELEPHONE ENCOUNTER
[facility] Call Center    Phone Message    May a detailed message be left on voicemail: yes     Reason for Call: Symptoms or Concerns     If patient has red-flag symptoms, warm transfer to triage line    Current symptom or concern: Scalp itching and patches on the the face; Pt states she is not sure if this is due to the Hydroxychloroquine medication she is on?    Pt states she stopped taking this medication 4/13/22.     Pt saw the dermatologist and said it was okay to restart but Pt is wondering if there might be an alternative?    Symptoms have been present for:  3-4 week(s) for scalp itching    Has patient previously been seen for this? Yes    By : Tyree      Are there any new or worsening symptoms? Yes: worsening for patches are spreading to the face, started on 4/11    Pt would like a call back ASAP; Pt states she sent a [employer] message with pictures on Monday 4/18/22. Pt states she will try to resend a new message and photos.      Action Taken: Message routed to:  [facility] Adult Rheumatology Triage

## 2022-04-20 NOTE — NURSING NOTE
"Chief Complaint   Patient presents with     New Patient     Osteopenia     Vital signs:      BP: 123/71 Pulse: 85             Weight: 46.2 kg (101 lb 14.4 oz)  Estimated body mass index is 17.92 kg/m  as calculated from the following:    Height as of 1/12/22: 1.606 m (5' 3.23\").    Weight as of this encounter: 46.2 kg (101 lb 14.4 oz).          "

## 2022-04-20 NOTE — LETTER
4/20/2022      RE: Patrick Olson  [id number]  [facility] [address]         Patrick Olson is a 81 year old female who is being evaluated via a billable video visit.        [facility]in[facility]    Consulting provider: Elvia Colin MD  [address] [address]    Reason for consultation: Low bone density    HPI:   Patrick Olson is a 81 year old woman with comorbidity of rheumatoid arthritis seropositive, st post injections for spinal stenosis, seeing RA for seropositive  Sjogren syndrome.    Most recent DXA scan 12/23/21 T-score -1.7 right total hip decreased by -6.5% compared to DEXA from 2014  Prior fractures no  Height loss no  Prior osteoporosis medications no   Estrogen treatment estrogen treatment 40s-60    Major osteoporotic fracture 9% hip fracture 2.1%      Falls in the last year no, in the past    Dietary Calcium ok  Dietary Protein intake yes  Calcium supplements with breakfast and dinner  Vitamin D supplements has been high  Alcohol consumption no  Smoking no  Caffein consumption 1-2 cups daily    Exercise walking sveraltimes a week    FH of osteoporosis or fractures no    High risk medications year ago not for long for RA< recent 5 days    Gastric or bariatric surgery no  Stroke or MI in last 12 monthsno  Kidney stones no    Dental status regular three times a year    Current Problem List:   Patient Active Problem List   Diagnosis     SNHL (sensorineural hearing loss)     GI bleed     Gastrointestinal hemorrhage, unspecified gastrointestinal hemorrhage type     Diverticular hemorrhage     Acute lower GI bleeding     Diverticulosis of large intestine     Lab test negative for COVID-19 virus         Assessment:  Low bone density  Elderly woman this low bone density risk risk factor of postmenopausal state, age low body weight and rheumatoid arthritis.  Bone density is in the osteopenic range.  Her FRAX score visit is considering her RA is not elevated.   Medical treatment not indicated.    Supplements vitamin D levels have been high normal, reviewed to hold daily multivitamin  Continue calcium with meals twice daily  Continue multivitamins for eyes  Continue walking and core strengthening exercises in the senior center    Repeat bone density in 12/20/2023 for 2-year follow-up, this could be done with primary care     Follow-up with me as needed    Kandi Medina MD  Endocrinology and Diabetes  Telephone contact:  [facility] [place] [phone]  [facility] [phone]        Past Medical and Past Surgical History:  Past Medical History:   Diagnosis Date     Diverticulosis of large intestine      Osteoarthritis      Osteoporosis      Rheumatoid arthritis (H)      Sensorineural hearing loss        Past Surgical History:   Procedure Laterality Date     CAPSULE/PILL CAM ENDOSCOPY N/A 11/18/2021    Procedure: IMAGING PROCEDURE, GI TRACT, INTRALUMINAL, VIA CAPSULE;  Surgeon: Deric Rodriguez MD;  [facility]     COLONOSCOPY N/A 3/14/2017    Procedure: COMBINED COLONOSCOPY, SINGLE OR MULTIPLE BIOPSY/POLYPECTOMY BY BIOPSY;  Surgeon: Spencer Downey MD;  Location:  GI     ENTEROSCOPY SMALL BOWEL N/A 11/20/2021    Procedure: ENTEROSCOPY, colonoscopy with endoscopic mucosal resection and tattoo placement;  Surgeon: Kirby Arthur MD;  [facility]     IR VISCERAL EMBOLIZATION  1/22/2021     ORTHOPEDIC SURGERY       SIGMOIDOSCOPY FLEXIBLE N/A 1/23/2021    Procedure: SIGMOIDOSCOPY, FLEXIBLE;  Surgeon: Jaime Steinberg MD;  Location:  GI       Medications:   Current Outpatient Medications   Medication Sig Dispense Refill     acetaminophen (TYLENOL) 500 MG tablet Take 500 mg by mouth every 6 hours as needed for mild pain       calcium carb 1250 mg, 500 mg [place],/vitamin D 200 units (OSCAL WITH D) 500-200 MG-UNIT per tablet Take 1 tablet by mouth 2 times daily        famotidine (PEPCID) 20 MG tablet  TAKE 1 TABLET BY MOUTH TWICE A DAY       ferrous sulfate 140 (45 Fe) MG TBCR CR tablet Take 140 mg by mouth daily       Fluocinolone Acetonide Scalp 0.01 % OIL oil Apply topically daily 118.28 mL 3     gabapentin (NEURONTIN) 300 MG capsule Take 1 capsule (300 mg) by mouth 3 times daily Take 300mg in morning, 300mg in afternoon and 300mg 90 capsule 1     hydroxychloroquine (PLAQUENIL) 200 MG tablet Take 1 tablet (200 mg) by mouth daily Annual Plaquenil toxicity eye screening required. 90 tablet 0     ketoconazole (NIZORAL) 2 % external shampoo Apply topically once a week 120 mL 11     lifitegrast (XIIDRA) 5 % opthalmic solution 1 drop 2 times daily       Multiple Vitamins-Minerals (OCUVITE PRESERVISION PO) Take 1 tablet by mouth 2 times daily        tretinoin (RETIN-A) 0.1 % external cream APPLY TO FACE AT BEDTIME *NOT COVERED, NO PA AVAILABLE PER MD*       vitamin C (ASCORBIC ACID) 500 MG tablet Take 1 tablet (500 mg) by mouth daily 30 tablet 0     YUVAFEM 10 MCG TABS vaginal tablet          Allergies:   Allergies   Allergen Reactions     Bee Venom Anaphylaxis     Shrimp Anaphylaxis     Evoxac [Cevimeline] Unknown     Prevnar Swelling     Other reaction(s): Edema     Prevnar [Pneumococcal 13-Linda Conj Vacc] Unknown       Social History     Tobacco Use     Smoking status: Former Smoker     Smokeless tobacco: Never Used   Substance Use Topics     Alcohol use: No       Physical Examination:  There were no vitals taken for this visit.    not currently breastfeeding.  There is no height or weight on file to calculate BMI.    Wt Readings from Last 4 Encounters:  03/10/22 : 46.2 kg (101 lb 12.8 oz)  02/23/22 : 45.8 kg (101 lb)  02/10/22 : 46.1 kg (101 lb 11.2 oz)  01/12/22 : 45.7 kg (100 lb 11.2 oz)      General: Well appearing woman in no distress,   Eyes: EOMI. Sclerae and conjunctivae are clear.   HENT: No thyromegaly or mass.    Lymphatic: No cervical or supraclavicular lymphadenopathy.  Cardiovascular: RRR, with  normal S1+S2 and no murmurs.   Extremities: No peripheral edema.   Neurologic: No tremor with hands outstretched. 2+ patellar reflexes.  Muskuloskeletal: rib-pelvis distance 2 finger breath    Labs and Studies:   Lab Results   Component Value Date    EJ 9.9 03/10/2022    EJ 9.6 01/03/2022    EJ 9.0 11/17/2021    EJ 9.4 11/16/2021    ALBUMIN 3.9 03/10/2022    ALT 24 01/03/2022    PHOS 4.1 03/10/2022    PTHI 46 03/10/2022    PTHI 39 10/01/2021    AST 24 01/03/2022    BILITOTAL 0.4 01/03/2022    CR 1.14 (H) 03/10/2022    CR 0.99 01/03/2022    CR 1.04 11/17/2021     03/10/2022    ALKPHOS 75 01/03/2022    HGB 11.2 (L) 03/10/2022       DX Hip/Pelvis/Spine    [address] [address]  Phone: [phone]   Fax: [phone]      Patient name:   Patrick Olson  Patient demographics:  81 year old  Female  History:  Evaluation of Bone Density  Current treatments:  Calcium, Immunosuppressants  Scan:    12Bisigy      Conclusions:  The most negative and valid T-score of -1.7 at the level of the right total hip corresponds with low bone density according to WHO criteria for postmenopausal females and men age 50 and over. The risk of osteoporotic fracture increases approximately 2-fold for each 1.0 SD decrease in T-score.    Low bone density is not the only risk factor for fracture; consider factors such as patient's age, fall risk, injury risk, previous osteoporotic fracture, family history of osteoporosis, etc.  People with an elevated risk of fracture should be regularly evaluated for low bone mineral density.  For patients eligible for Medicare, routine testing is allowed once every 2 years. Testing frequency can be increased for patients on corticosteroids. Clinical correlation is recommended.    Comparison Exams:  Taking into account the precision errors (ref #3 below) for this center:  These calculated changes in  BMD to the previous exam are significantly decreased at the level of the right total hip.  These calculated changes in BMD to the baseline exam are significantly increased at the level of the lumbar spine, and decreased at the level of the right total hip.    Percent changes not mentioned or within remaining regions are either insignificant or invalid.    Please note that the differential diagnosis of BMD increase in the spine includes improvement due to pharmacotherapy vs inter-current progression of spine degeneration or fracture.    Please refer to BMD values in PACS.    Bone densitometry cannot rule out secondary causes of bone loss. Therefore, further metabolic testing to look for secondary causes of low BMD should be performed if indicated. Clinical correlation is recommended    Clinical correlation is strongly recommended.      Feel free to contact DXA services if you have any questions or comments.  Thank you for the opportunity to be of service to you and your patient.      Principal result :  Silvina Lazar MD, MD, [facility]  Division of Endocrinology and Diabetes  Department of Medicine    Technical comments:  Satisfactory.  Abnormal vertebrae may be excluded from analysis if there is more than a 1.0 T-score difference between the vertebrae in question and adjacent vertebrae. Note the wide range in BMD values and T-scores within the lumbar spine. In the circumstances, the lumbar spine is represented by L1-L2    References:  Ref. 1. WHO categories:  T-score > -1.0  = normal             .  T-score -1.0 to -2.5 = low bone density  T-score < -2.5 = osteoporosis        .    Ref. 2. 2015 ISCD official position statements:  www.iscd.org.    Ref. 3.  (LSC = least significant change)  AP spine =  0.032 g/cm2  (6.26.2007)  Left hip = 0.029 g/cm2  (6.15.2007)  Right hip = 0.018 g/cm2  (6.15.2007)  Left mid radius = 0.043 g/cm2  (6.26.2007)  Lateral spine region = pending  Total body = pending  According to the  ISCD position statements, total hip rather than femoral neck regions are to be compared because larger areas give better precision.    Ref. 4.  By definition, osteoporosis may be diagnosed in the presence or with the history of a low trauma or fragility fracture.  Fragility and low trauma fracture is defined as a fracture resulting from the force of a fall from a standing height or less or a bone that breaks under conditions that would not cause a normal bone to break.    Ref. 5. NOF Physician's Guideline Website address:  www.nof.org.        Results for orders placed during the hospital encounter of 11/16/21    CTA Abdomen Pelvis with Contrast    Narrative  EXAMINATION: CTA ABDOMEN PELVIS WITH CONTRAST, 11/16/2021 5:48 PM    TECHNIQUE:  Helical CT images from the lung bases through the  symphysis pubis were obtained with IV contrast. Contrast dose:  iopamidol (ISOVUE-370) solution 61 mL    COMPARISON: CT 1/19/2021    HISTORY: GI bleed    FINDINGS:    ABDOMEN/PELVIS:  LIVER: There are numerous subcentimeter hepatic hypodense lesions, not  significantly changed from 1/19/2021. A few of these lesions,  including that in hepatic segment 6 demonstrates discontinuous,  peripheral enhancement suggesting hemangioma.  GALLBLADDER: No gallbladder wall thickening. The gallbladder is  dilated containing peripherally calcified, hypoattenuating gallstones.  No intra or extrahepatic biliary ductal dilation.  PANCREAS: 4 mm cystic lesion in the pancreatic body (series 13 image  73), not significantly changed. No pancreatic ductal dilation or  suspicious pancreatic mass  SPLEEN: Within normal limits.  ADRENAL GLANDS: Unchanged thickening of the left adrenal gland. No  right adrenal nodule.  URINARY TRACT: Symmetric cortical enhancement bilaterally. Bilateral  hypoattenuating, exophytic renal cysts. Nonobstructing right renal  calculi. Cortical thinning in the posterior right kidney, suggesting  prior insult or ischemia. No  hydronephrosis. The urinary bladder is  well distended without focal abnormality.  REPRODUCTIVE ORGANS: Fibroid uterus, partly obscured by streak  artifact from left total hip arthroplasty.  BOWEL: Small large bowel are normal in caliber. There is  hyperattenuating colonic contents, most pronounced in the cecum and  ascending colon. No increased density on postcontrast images. No  evidence of intraluminal hemorrhage. Diffuse sigmoid diverticulosis  without CT evidence of diverticulitis. Right lower quadrant appendix  is within normal limits.  PERITONEUM/FLUID: No free fluid or air in the abdomen or pelvis.    VESSELS: Scattered atherosclerotic calcifications of the abdominal  aorta. The celiac, superior mesenteric artery and inferior mesenteric  artery are patent and normal in caliber. Patent origins of bilateral  single renal arteries. The iliac vessels and common femoral vessels  are patent. The portal vein, superior mesenteric vein and splenic vein  are patent.    LYMPH NODES: No lymphadenopathy in the abdomen or pelvis.    BONES/SOFT TISSUES: Straightening of the lumbar spine. Multilevel  degenerative changes with opposing endplate sclerosis. Changes of left  total hip arthroplasty. No suspicious or acute osseous lesions.    LUNG BASES: Clear. Heart is not enlarged. No pleural or pericardial  effusion.    Impression  IMPRESSION:  1. No GI bleed, retroperitoneal hemorrhage or evidence of active  extravasation.  2. Colonic diverticulosis without evidence of diverticulitis.  3. Numerous hypoattenuating hepatic lesions, not significantly changed  from 1/19/2021. Based on prior imaging, many of these appear to  represent hemangiomas.    I have personally reviewed the examination and initial interpretation  and I agree with the findings.    MITA DAMICO MD      SYSTEM ID:  [id number]                  Answers for HPI/ROS submitted by the patient on 4/20/2022  General Symptoms: No  Skin Symptoms: Yes  HENT Symptoms:  Yes  EYE SYMPTOMS: Yes  HEART SYMPTOMS: Yes  LUNG SYMPTOMS: No  INTESTINAL SYMPTOMS: No  URINARY SYMPTOMS: Yes  GYNECOLOGIC SYMPTOMS: No  BREAST SYMPTOMS: No  SKELETAL SYMPTOMS: No  BLOOD SYMPTOMS: No  NERVOUS SYSTEM SYMPTOMS: No  MENTAL HEALTH SYMPTOMS: Yes  Ear pain: No  Ear discharge: No  Hearing loss: Yes  Tinnitus: No  Nosebleeds: No  Congestion: No  Sinus pain: No  Trouble swallowing: Yes   Voice hoarseness: No  Mouth sores: No  Sore throat: No  Tooth pain: No  Gum tenderness: No  Bleeding gums: No  Change in taste: No  Change in sense of smell: No  Dry mouth: Yes  Hearing aid used: Yes  Neck lump: No  Changes in hair: No  Changes in moles/birth marks: No  Itching: Yes  Rashes: Yes  Changes in nails: No  Acne: No  Hair in places you don't want it: No  Change in facial hair: No  Warts: No  Non-healing sores: No  Scarring: No  Flaking of skin: Yes  Color changes of hands/feet in cold : No  Sun sensitivity: Yes  Skin thickening: No  Eye pain: No  Vision loss: No  Dry eyes: Yes  Watery eyes: No  Eye bulging: No  Double vision: No  Flashing of lights: No  Spots: No  Floaters: No  Redness: No  Crossed eyes: No  Tunnel Vision: No  Yellowing of eyes: No  Eye irritation: No  Chest pain or pressure: No  Fast or irregular heartbeat: No  Pain in legs with walking: No  Trouble breathing while lying down: No  Fingers or toes appear blue: No  High blood pressure: No  Fainting: No  Murmurs: No  Pacemaker: No  Varicose veins: Yes  Edema or swelling: No  Wake up at night with shortness of breath: No  Light-headedness: No  Exercise intolerance: No  Trouble holding urine or incontinence: No  Pain or burning: No  Trouble starting or stopping: No  Increased frequency of urination: No  Blood in urine: No  Decreased frequency of urination: No  Frequent nighttime urination: Yes  Flank pain: No  Difficulty emptying bladder: No  Nervous or Anxious: Yes  Depression: No  Trouble sleeping: Yes  Trouble thinking or concentrating: No  Mood  changes: No  Panic attacks: No

## 2022-04-20 NOTE — PATIENT INSTRUCTIONS
Continue take Calcium supplement twice daily    Repeat bone density 12/23    Keep walking, do core strength exercises

## 2022-04-21 ENCOUNTER — TELEPHONE (OUTPATIENT)
Dept: RHEUMATOLOGY | Facility: CLINIC | Age: 82
End: 2022-04-21

## 2022-04-21 DIAGNOSIS — Z79.899 HIGH RISK MEDICATION USE: ICD-10-CM

## 2022-04-21 DIAGNOSIS — M06.9 RHEUMATOID ARTHRITIS INVOLVING MULTIPLE SITES, UNSPECIFIED WHETHER RHEUMATOID FACTOR PRESENT (H): Primary | ICD-10-CM

## 2022-04-21 NOTE — TELEPHONE ENCOUNTER
Pt returned call, she is fine with starting Methotrexate at 3 tablets, and she has some left over from her last fill of the prescription.     She is very concerned because the dermatitis continued to spread down her forehead.  She was able to get an in person appt with Dr. Stevenson on 5/5.    Viki Yousif RN  Rheumatology Clinic

## 2022-04-21 NOTE — TELEPHONE ENCOUNTER
Patient is calling because she needs an rx for folic acid sent to the pharmacy. [facility]/pharmacy #[id number] - [place] [id number] [place] [address] (Ph: [phone])

## 2022-04-21 NOTE — TELEPHONE ENCOUNTER
Discussed with Dr. Clements, he is fine if pt does not want to restart HCQ.  He is recommending that pt restart Methotrexate at 7.5 mg per week.  He is also recommending that she restart folic acid as well.  He states it will be safe as her Anemia was from GI Bleed not Methotrexate and the Methotrexate did not cause the GI Bleed.  He is also recommending that she have monitoring labs every 4 weeks.    Attempted to call pt, left message.    My chart message also sent to pt.    Viki Yousif RN  Rheumatology Clinic

## 2022-04-22 ENCOUNTER — TRANSFERRED RECORDS (OUTPATIENT)
Dept: HEALTH INFORMATION MANAGEMENT | Facility: CLINIC | Age: 82
End: 2022-04-22
Payer: COMMERCIAL

## 2022-04-22 RX ORDER — FOLIC ACID 1 MG/1
1 TABLET ORAL DAILY
Qty: 90 TABLET | Refills: 3 | Status: SHIPPED | OUTPATIENT
Start: 2022-04-22 | End: 2023-06-12

## 2022-04-28 ENCOUNTER — TELEPHONE (OUTPATIENT)
Dept: RHEUMATOLOGY | Facility: CLINIC | Age: 82
End: 2022-04-28
Payer: COMMERCIAL

## 2022-04-28 VITALS — BODY MASS INDEX: 17.91 KG/M2 | WEIGHT: 101.85 LBS

## 2022-04-28 NOTE — TELEPHONE ENCOUNTER
Plaquenil Eye Exam    Date of last eye exam specifically commenting on HCQ toxicity: 4/22/2022  Clinic: [facility] Phone Number: [phone]  Ophthalmologist: Ayla Ruby  Toxicity: NO  Records scanned to chart: Yes      Carley Rai CMA   4/28/2022 10:40 AM

## 2022-05-05 ENCOUNTER — OFFICE VISIT (OUTPATIENT)
Dept: DERMATOLOGY | Facility: CLINIC | Age: 82
End: 2022-05-05
Payer: COMMERCIAL

## 2022-05-05 ENCOUNTER — LAB (OUTPATIENT)
Dept: LAB | Facility: CLINIC | Age: 82
End: 2022-05-05
Payer: COMMERCIAL

## 2022-05-05 DIAGNOSIS — D84.9 IMMUNOSUPPRESSION (H): ICD-10-CM

## 2022-05-05 DIAGNOSIS — M06.9 RHEUMATOID ARTHRITIS INVOLVING MULTIPLE SITES, UNSPECIFIED WHETHER RHEUMATOID FACTOR PRESENT (H): ICD-10-CM

## 2022-05-05 DIAGNOSIS — L30.9 DERMATITIS: Primary | ICD-10-CM

## 2022-05-05 LAB
BASOPHILS # BLD AUTO: 0 10E3/UL (ref 0–0.2)
BASOPHILS NFR BLD AUTO: 1 %
CRP SERPL-MCNC: <2.9 MG/L (ref 0–8)
EOSINOPHIL # BLD AUTO: 0.1 10E3/UL (ref 0–0.7)
EOSINOPHIL NFR BLD AUTO: 3 %
ERYTHROCYTE [DISTWIDTH] IN BLOOD BY AUTOMATED COUNT: 15.7 % (ref 10–15)
ERYTHROCYTE [SEDIMENTATION RATE] IN BLOOD BY WESTERGREN METHOD: 38 MM/HR (ref 0–30)
HCT VFR BLD AUTO: 33 % (ref 35–47)
HGB BLD-MCNC: 10.6 G/DL (ref 11.7–15.7)
LYMPHOCYTES # BLD AUTO: 1 10E3/UL (ref 0.8–5.3)
LYMPHOCYTES NFR BLD AUTO: 28 %
MCH RBC QN AUTO: 31.6 PG (ref 26.5–33)
MCHC RBC AUTO-ENTMCNC: 32.1 G/DL (ref 31.5–36.5)
MCV RBC AUTO: 99 FL (ref 78–100)
MONOCYTES # BLD AUTO: 0.3 10E3/UL (ref 0–1.3)
MONOCYTES NFR BLD AUTO: 9 %
NEUTROPHILS # BLD AUTO: 2.1 10E3/UL (ref 1.6–8.3)
NEUTROPHILS NFR BLD AUTO: 60 %
PLATELET # BLD AUTO: 230 10E3/UL (ref 150–450)
RBC # BLD AUTO: 3.35 10E6/UL (ref 3.8–5.2)
WBC # BLD AUTO: 3.6 10E3/UL (ref 4–11)

## 2022-05-05 PROCEDURE — 85652 RBC SED RATE AUTOMATED: CPT

## 2022-05-05 PROCEDURE — 86140 C-REACTIVE PROTEIN: CPT

## 2022-05-05 PROCEDURE — 99213 OFFICE O/P EST LOW 20 MIN: CPT | Performed by: DERMATOLOGY

## 2022-05-05 PROCEDURE — 36415 COLL VENOUS BLD VENIPUNCTURE: CPT

## 2022-05-05 PROCEDURE — 85025 COMPLETE CBC W/AUTO DIFF WBC: CPT

## 2022-05-05 PROCEDURE — 80053 COMPREHEN METABOLIC PANEL: CPT

## 2022-05-05 RX ORDER — HYDROCORTISONE 25 MG/G
OINTMENT TOPICAL 2 TIMES DAILY
Qty: 30 G | Refills: 3 | Status: SHIPPED | OUTPATIENT
Start: 2022-05-05 | End: 2023-02-07

## 2022-05-05 ASSESSMENT — PAIN SCALES - GENERAL: PAINLEVEL: NO PAIN (0)

## 2022-05-05 NOTE — PROGRESS NOTES
[facility] Dermatology Note    Encounter Date: May 5, 2022    Dermatology Problem List:  1. Eczematous dermatitis: DDx irritant contact dermatitis  - ketoconazole shampoo 2-3x per week, fluocinolone oil  - avoid retinoid application to affected area  2. Rheumatoid arthritis and Sjogren's: methotrexate 7.5 mg weekly  - prior: hydroxychloroquine 200 mg daily       ______________________________________    Impression/Plan:  1. Eczematous dermatitis: on cheeks, hands, torso, neck. Query irritant dermatitis as trigger, vs intrisic eczema. Will start hydrocortisone 2.5% ointment BID to affected areas. Forehead now clear but can use previously prescribed topicals PRN.  - hydrocortisone 2.5% ointment BID      Follow-up in 2-3 months.       Staff Involved:  Staff Only    Mukesh Stevenson MD   of Dermatology  Department of Dermatology  [facility]      CC:   Chief Complaint   Patient presents with     Derm Problem     Rash on face       History of Present Illness:  Ms. Patrick Olson is a 81 year old female who presents as a return patient for rash    Rash on hairline - now improved  - initially appeared on April 8  - hasn't used fluocinolone oil yet - used dandruff shampoo and coconut oil   - calmed down ~4/13; one place with flakes  - itching persisted a bit after the visible rash disappeared  - applied OTC hydrocortisone 1%  - started fluocinolone oil about 3 days ago   - don't sleep well at night at baseline   - applied in late afternoon and left on for about 5 hours    New rash on cheeks, dorsal hands, upper chest, back of neck    Didn't want to restart hydroxychloroquine  - starting methotrexate 10 mg weekly per rheumatology with folate supplementation  - had been on 20 mg weekly in the past    Labs:  N/A    Past Medical History:   Past Medical History:   Diagnosis Date     Diverticulosis of large intestine      Osteoarthritis      Osteoporosis       Rheumatoid arthritis (H)      Sensorineural hearing loss      Past Surgical History:   Procedure Laterality Date     CAPSULE/PILL CAM ENDOSCOPY N/A 11/18/2021    Procedure: IMAGING PROCEDURE, GI TRACT, INTRALUMINAL, VIA CAPSULE;  Surgeon: Deric Rodriguez MD;  [facility]     COLONOSCOPY N/A 3/14/2017    Procedure: COMBINED COLONOSCOPY, SINGLE OR MULTIPLE BIOPSY/POLYPECTOMY BY BIOPSY;  Surgeon: Spencer Downey MD;  [facility]     ENTEROSCOPY SMALL BOWEL N/A 11/20/2021    Procedure: ENTEROSCOPY, colonoscopy with endoscopic mucosal resection and tattoo placement;  Surgeon: Kirby Arthur MD;  [facility]     IR VISCERAL EMBOLIZATION  1/22/2021     ORTHOPEDIC SURGERY       SIGMOIDOSCOPY FLEXIBLE N/A 1/23/2021    Procedure: SIGMOIDOSCOPY, FLEXIBLE;  Surgeon: Jaime Steinberg MD;  Location:  GI       Social History:   reports that she has quit smoking. She has never used smokeless tobacco. She reports that she does not drink alcohol and does not use drugs.    Family History:  No family history on file.    Medications:  Current Outpatient Medications   Medication Sig Dispense Refill     acetaminophen (TYLENOL) 500 MG tablet Take 500 mg by mouth every 6 hours as needed for mild pain       calcium carb 1250 mg, 500 mg [place],/vitamin D 200 units (OSCAL WITH D) 500-200 MG-UNIT per tablet Take 1 tablet by mouth 2 times daily        famotidine (PEPCID) 20 MG tablet TAKE 1 TABLET BY MOUTH TWICE A DAY       ferrous sulfate 140 (45 Fe) MG TBCR CR tablet Take 140 mg by mouth daily       Fluocinolone Acetonide Scalp 0.01 % OIL oil Apply topically daily 118.28 mL 3     folic acid (FOLVITE) 1 MG tablet Take 1 tablet (1 mg) by mouth daily 90 tablet 3     gabapentin (NEURONTIN) 300 MG capsule Take 1 capsule (300 mg) by mouth 3 times daily Take 300mg in morning, 300mg in afternoon and 300mg 90 capsule 1     ketoconazole (NIZORAL) 2 % external shampoo Apply topically once a week 120 mL 11     lifitegrast  (XIIDRA) 5 % opthalmic solution 1 drop 2 times daily       methotrexate 2.5 MG tablet Take 2.5 mg by mouth every 7 days       Multiple Vitamins-Minerals (OCUVITE PRESERVISION PO) Take 1 tablet by mouth 2 times daily        tretinoin (RETIN-A) 0.1 % external cream APPLY TO FACE AT BEDTIME *NOT COVERED, NO PA AVAILABLE PER MD*       hydroxychloroquine (PLAQUENIL) 200 MG tablet Take 1 tablet (200 mg) by mouth daily Annual Plaquenil toxicity eye screening required. (Patient not taking: Reported on 5/5/2022) 90 tablet 0     Allergies   Allergen Reactions     Bee Venom Anaphylaxis     Shrimp Anaphylaxis     Evoxac [Cevimeline] Unknown     Prevnar Swelling     Other reaction(s): Edema     Prevnar [Pneumococcal 13-Linda Conj Vacc] Unknown       Physical exam:  Vitals: There were no vitals taken for this visit.  GEN: This is a well developed, well-nourished female in no acute distress, in a pleasant mood.    SKIN: Anthony phototype III  - Focused examination of the face, scalp, neck, chest, hands including nails/digits was performed.  - erythematous patches on the lower cheeks, clavicles, posterior neck, dorsal hands with scant scaling  - No other lesions of concern on areas examined.

## 2022-05-05 NOTE — LETTER
5/5/2022       RE: Patrick Olson  [address]  [facility] [address]     Dear Colleague,    Thank you for referring your patient, Patrick Olson, to the [facility] [place] [facility]. Please see a copy of my visit note below.    [facility] Dermatology Note    Encounter Date: May 5, 2022    Dermatology Problem List:  1. Eczematous dermatitis: DDx irritant contact dermatitis  - ketoconazole shampoo 2-3x per week, fluocinolone oil  - avoid retinoid application to affected area  2. Rheumatoid arthritis and Sjogren's: methotrexate 7.5 mg weekly  - prior: hydroxychloroquine 200 mg daily       ______________________________________    Impression/Plan:  1. Eczematous dermatitis: on cheeks, hands, torso, neck. Query irritant dermatitis as trigger, vs intrisic eczema. Will start hydrocortisone 2.5% ointment BID to affected areas. Forehead now clear but can use previously prescribed topicals PRN.  - hydrocortisone 2.5% ointment BID      Follow-up in 2-3 months.       Staff Involved:  Staff Only    Mukesh Stevenson MD   of Dermatology  Department of Dermatology  [facility]      CC:   Chief Complaint   Patient presents with     Derm Problem     Rash on face       History of Present Illness:  Ms. Patrick Olson is a 81 year old female who presents as a return patient for rash    Rash on hairline - now improved  - initially appeared on April 8  - hasn't used fluocinolone oil yet - used dandruff shampoo and coconut oil   - calmed down ~4/13; one place with flakes  - itching persisted a bit after the visible rash disappeared  - applied OTC hydrocortisone 1%  - started fluocinolone oil about 3 days ago   - don't sleep well at night at baseline   - applied in late afternoon and left on for about 5 hours    New rash on cheeks, dorsal hands, upper chest, back of neck    Didn't  want to restart hydroxychloroquine  - starting methotrexate 10 mg weekly per rheumatology with folate supplementation  - had been on 20 mg weekly in the past    Labs:  N/A    Past Medical History:   Past Medical History:   Diagnosis Date     Diverticulosis of large intestine      Osteoarthritis      Osteoporosis      Rheumatoid arthritis (H)      Sensorineural hearing loss      Past Surgical History:   Procedure Laterality Date     CAPSULE/PILL CAM ENDOSCOPY N/A 11/18/2021    Procedure: IMAGING PROCEDURE, GI TRACT, INTRALUMINAL, VIA CAPSULE;  Surgeon: Deric Rodriguez MD;  Location: [facility] GI     COLONOSCOPY N/A 3/14/2017    Procedure: COMBINED COLONOSCOPY, SINGLE OR MULTIPLE BIOPSY/POLYPECTOMY BY BIOPSY;  Surgeon: Spencer Downey MD;  Location: [facility] GI     ENTEROSCOPY SMALL BOWEL N/A 11/20/2021    Procedure: ENTEROSCOPY, colonoscopy with endoscopic mucosal resection and tattoo placement;  Surgeon: Kirby Arthur MD;  Location: [facility] OR     IR VISCERAL EMBOLIZATION  1/22/2021     ORTHOPEDIC SURGERY       SIGMOIDOSCOPY FLEXIBLE N/A 1/23/2021    Procedure: SIGMOIDOSCOPY, FLEXIBLE;  Surgeon: Jaime Steinberg MD;  Location:  GI       Social History:   reports that she has quit smoking. She has never used smokeless tobacco. She reports that she does not drink alcohol and does not use drugs.    Family History:  No family history on file.    Medications:  Current Outpatient Medications   Medication Sig Dispense Refill     acetaminophen (TYLENOL) 500 MG tablet Take 500 mg by mouth every 6 hours as needed for mild pain       calcium carb 1250 mg, 500 mg [place],/vitamin D 200 units (OSCAL WITH D) 500-200 MG-UNIT per tablet Take 1 tablet by mouth 2 times daily        famotidine (PEPCID) 20 MG tablet TAKE 1 TABLET BY MOUTH TWICE A DAY       ferrous sulfate 140 (45 Fe) MG TBCR CR tablet Take 140 mg by mouth daily       Fluocinolone Acetonide Scalp 0.01 % OIL oil Apply topically daily 118.28 mL 3     folic acid  (FOLVITE) 1 MG tablet Take 1 tablet (1 mg) by mouth daily 90 tablet 3     gabapentin (NEURONTIN) 300 MG capsule Take 1 capsule (300 mg) by mouth 3 times daily Take 300mg in morning, 300mg in afternoon and 300mg 90 capsule 1     ketoconazole (NIZORAL) 2 % external shampoo Apply topically once a week 120 mL 11     lifitegrast (XIIDRA) 5 % opthalmic solution 1 drop 2 times daily       methotrexate 2.5 MG tablet Take 2.5 mg by mouth every 7 days       Multiple Vitamins-Minerals (OCUVITE PRESERVISION PO) Take 1 tablet by mouth 2 times daily        tretinoin (RETIN-A) 0.1 % external cream APPLY TO FACE AT BEDTIME *NOT COVERED, NO PA AVAILABLE PER MD*       hydroxychloroquine (PLAQUENIL) 200 MG tablet Take 1 tablet (200 mg) by mouth daily Annual Plaquenil toxicity eye screening required. (Patient not taking: Reported on 5/5/2022) 90 tablet 0     Allergies   Allergen Reactions     Bee Venom Anaphylaxis     Shrimp Anaphylaxis     Evoxac [Cevimeline] Unknown     Prevnar Swelling     Other reaction(s): Edema     Prevnar [Pneumococcal 13-Linda Conj Vacc] Unknown       Physical exam:  Vitals: There were no vitals taken for this visit.  GEN: This is a well developed, well-nourished female in no acute distress, in a pleasant mood.    SKIN: Anthony phototype III  - Focused examination of the face, scalp, neck, chest, hands including nails/digits was performed.  - erythematous patches on the lower cheeks, clavicles, posterior neck, dorsal hands with scant scaling  - No other lesions of concern on areas examined.

## 2022-05-06 LAB
ALBUMIN SERPL-MCNC: 3.8 G/DL (ref 3.4–5)
ALP SERPL-CCNC: 68 U/L (ref 40–150)
ALT SERPL W P-5'-P-CCNC: 22 U/L (ref 0–50)
ANION GAP SERPL CALCULATED.3IONS-SCNC: <1 MMOL/L (ref 3–14)
AST SERPL W P-5'-P-CCNC: 24 U/L (ref 0–45)
BILIRUB SERPL-MCNC: 0.4 MG/DL (ref 0.2–1.3)
BUN SERPL-MCNC: 25 MG/DL (ref 7–30)
CALCIUM SERPL-MCNC: 9.9 MG/DL (ref 8.5–10.1)
CHLORIDE BLD-SCNC: 107 MMOL/L (ref 94–109)
CO2 SERPL-SCNC: 34 MMOL/L (ref 20–32)
CREAT SERPL-MCNC: 0.92 MG/DL (ref 0.52–1.04)
GFR SERPL CREATININE-BSD FRML MDRD: 63 ML/MIN/1.73M2
GLUCOSE BLD-MCNC: 124 MG/DL (ref 70–99)
POTASSIUM BLD-SCNC: 4.6 MMOL/L (ref 3.4–5.3)
PROT SERPL-MCNC: 7.4 G/DL (ref 6.8–8.8)
SODIUM SERPL-SCNC: 140 MMOL/L (ref 133–144)

## 2022-05-11 ENCOUNTER — OFFICE VISIT (OUTPATIENT)
Dept: ORTHOPEDICS | Facility: CLINIC | Age: 82
End: 2022-05-11
Payer: COMMERCIAL

## 2022-05-11 VITALS
WEIGHT: 100.7 LBS | HEIGHT: 64 IN | BODY MASS INDEX: 17.19 KG/M2 | SYSTOLIC BLOOD PRESSURE: 122 MMHG | DIASTOLIC BLOOD PRESSURE: 58 MMHG

## 2022-05-11 DIAGNOSIS — M54.16 LUMBAR RADICULOPATHY, ACUTE: ICD-10-CM

## 2022-05-11 DIAGNOSIS — M51.369 LUMBAR DEGENERATIVE DISC DISEASE: Primary | ICD-10-CM

## 2022-05-11 PROCEDURE — 99213 OFFICE O/P EST LOW 20 MIN: CPT | Performed by: FAMILY MEDICINE

## 2022-05-11 ASSESSMENT — PAIN SCALES - GENERAL: PAINLEVEL: MODERATE PAIN (4)

## 2022-05-11 NOTE — PROGRESS NOTES
"ESTABLISHED PATIENT FOLLOW-UP:  Pain of the Lower Back       HISTORY OF PRESENT ILLNESS  Ms. Olson is a pleasant 81 year old year old female who presents to clinic today for follow-up of low back pain. She had relief in symptoms after starting physical therapy but reports her pain returned when her rheumatologist was changing her medication regiment.     Date of injury: 4/2021  Date last seen: 2/23/2022  Following Therapeutic Plan: physical therapy home exercise program daily  Pain: 4/10  Function: pain with bending, walking long distances  Interval History: Patrick had improvement in symptoms with epidural steroid injection on 2/7/2022.    Has altered  Her rheumatology medications due to recent dermatitis and its possible link to plaquenil.  She discontinued Plaquenil and is now currently taking methotrexate.  She is currently seeing dermatology for her dermatitis which was thought to be atopic.  Using topical steroid medications.    Additional medical/Social/Surgical histories reviewed in [facility] and updated as appropriate.    REVIEW OF SYSTEMS (5/11/2022)  CONSTITUTIONAL: Denies fever and weight loss  GASTROINTESTINAL: Denies abdominal pain, nausea, vomiting  MUSCULOSKELETAL: See HPI  SKIN: Denies any recent rash or lesion  NEUROLOGICAL: Denies numbness or focal weakness     PHYSICAL EXAM  /58   Ht 1.626 m (5' 4\")   Wt 45.7 kg (100 lb 11.2 oz)   BMI 17.29 kg/m      General  - normal appearance, in no obvious distress  Musculoskeletal - lumbar spine  - inspection: normal bone and joint alignment, no obvious scoliosis  - palpation: Tenderness palpation of bilateral L3-L4 region of paraspinal musculature.  - ROM: Full range of motion in flexion-extension side bending and rotation.  Pain with flexion and sidebending at L3-L4 region  - strength: lower extremities 5/5 in all planes  - special tests:  (-) straight leg raise    (-) slump test    Neuro  - patellar and Achilles DTRs 2+ bilaterally, No lower extremity " sensory deficit throughout L5 distribution, grossly normal coordination, normal muscle tone  Skin  - no ecchymosis, erythema, warmth, or induration, no obvious rash  Psych  - interactive, appropriate, normal mood and affect    IMAGING :        ASSESSMENT & PLAN  Ms. Olson is a 81 year old year old female who presents to clinic today with ongoing lumbar back pain and known DDD.    Worsening back pain now 3 months from LEE ANN and has a trip to [place] in 3 weeks.    Diagnosis:  Lumbar stenosis with intermittent neurogenic claudication.    -Repeat LEE ANN ordered  -Continue with HEP  -Heat/ice therapy  -Follow up my chart to discuss efficacy in 2 weeks; consideration for facet injections if axial pain with motion persists; arthropathy greatest L2-L3.    Rheumatoid Arthritis    -Continue methotrexate  -Follow with rheumatology    Atopic dermatitis    -Follow dermatology, continue ketoconazole shampoo, topical corticosteroid medications      It was a pleasure seeing Imani Lantigua DO, CAQSM  Primary Care Sports Medicine

## 2022-05-11 NOTE — LETTER
"    5/11/2022         RE: Patrick [place]  [address] [facility] [address]        Dear Colleague,    Thank you for referring your patient, Patrick Olson, to the [facility] SPORTS [facility]ICINE CLIN[facility] [place]. Please see a copy of my visit note below.    ESTABLISHED PATIENT FOLLOW-UP:  Pain of the Lower Back       HISTORY OF PRESENT ILLNESS  Ms. Olson is a pleasant 81 year old year old female who presents to clinic today for follow-up of low back pain. She had relief in symptoms after starting physical therapy but reports her pain returned when her rheumatologist was changing her medication regiment.     Date of injury: 4/2021  Date last seen: 2/23/2022  Following Therapeutic Plan: physical therapy home exercise program daily  Pain: 4/10  Function: pain with bending, walking long distances  Interval History: Patrick had improvement in symptoms with epidural steroid injection on 2/7/2022.    Has altered  Her rheumatology medications due to recent dermatitis and its possible link to plaquenil.  She discontinued Plaquenil and is now currently taking methotrexate.  She is currently seeing dermatology for her dermatitis which was thought to be atopic.  Using topical steroid medications.    Additional medical/Social/Surgical histories reviewed in [facility] and updated as appropriate.    REVIEW OF SYSTEMS (5/11/2022)  CONSTITUTIONAL: Denies fever and weight loss  GASTROINTESTINAL: Denies abdominal pain, nausea, vomiting  MUSCULOSKELETAL: See HPI  SKIN: Denies any recent rash or lesion  NEUROLOGICAL: Denies numbness or focal weakness     PHYSICAL EXAM  /58   Ht 1.626 m (5' 4\")   Wt 45.7 kg (100 lb 11.2 oz)   BMI 17.29 kg/m      General  - normal appearance, in no obvious distress  Musculoskeletal - lumbar spine  - inspection: normal bone and joint alignment, no obvious scoliosis  - palpation: Tenderness palpation of bilateral L3-L4 region of paraspinal musculature.  - ROM: Full range of motion in " flexion-extension side bending and rotation.  Pain with flexion and sidebending at L3-L4 region  - strength: lower extremities 5/5 in all planes  - special tests:  (-) straight leg raise    (-) slump test    Neuro  - patellar and Achilles DTRs 2+ bilaterally, No lower extremity sensory deficit throughout L5 distribution, grossly normal coordination, normal muscle tone  Skin  - no ecchymosis, erythema, warmth, or induration, no obvious rash  Psych  - interactive, appropriate, normal mood and affect    IMAGING :        ASSESSMENT & PLAN  Ms. Olson is a 81 year old year old female who presents to clinic today with ongoing lumbar back pain and known DDD.    Worsening back pain now 3 months from LEE ANN and has a trip to [place] in 3 weeks.    Diagnosis:  Lumbar stenosis with intermittent neurogenic claudication.    -Repeat LEE ANN ordered  -Continue with HEP  -Heat/ice therapy  -Follow up my chart to discuss efficacy in 2 weeks; consideration for facet injections if axial pain with motion persists; arthropathy greatest L2-L3.    Rheumatoid Arthritis    -Continue methotrexate  -Follow with rheumatology    Atopic dermatitis    -Follow dermatology, continue ketoconazole shampoo, topical corticosteroid medications      It was a pleasure seeing Imani Lantigua DO, [facility]  Primary Care Sports Medicine          Again, thank you for allowing me to participate in the care of your patient.        Sincerely,        Mukesh Lantigua DO

## 2022-05-12 ENCOUNTER — TELEPHONE (OUTPATIENT)
Dept: ORTHOPEDICS | Facility: CLINIC | Age: 82
End: 2022-05-12
Payer: COMMERCIAL

## 2022-05-12 NOTE — TELEPHONE ENCOUNTER
[facility] Call Center    Phone Message    May a detailed message be left on voicemail: yes     Reason for Call: Order(s): Other:   Reason for requested: Injection orders to be sent to [facility]   Date needed: 05/13/2022  Provider name: Dr. Lantigua    Pt asking for orders to be sent to [facility] for inj.  Pt stated she called [facility] but they have not received the order from Dr. Lantigua yet.  Pt would like call back at [phone]      Action Taken: Message routed to:  [facility] Sports Medicine - Dr. Mukesh Lantigua     Travel Screening: Not Applicable

## 2022-05-12 NOTE — TELEPHONE ENCOUNTER
Order for XR guided lumbar LEE ANN printed, and faxed to [facility] per patient request.   Confirmed fax via [employer].     Called and left voicemail informing patient that injection orders have been faxed to [facility].   Writer also notified patient via [employer] message.     Provided call back number to [facility] Triage.     Viridiana Nathan, ATC

## 2022-05-16 ENCOUNTER — TELEPHONE (OUTPATIENT)
Dept: MEDSURG UNIT | Facility: CLINIC | Age: 82
End: 2022-05-16
Payer: COMMERCIAL

## 2022-05-16 RX ORDER — NICOTINE POLACRILEX 4 MG
15-30 LOZENGE BUCCAL
Status: DISCONTINUED | OUTPATIENT
Start: 2022-05-16 | End: 2022-05-18 | Stop reason: HOSPADM

## 2022-05-16 RX ORDER — DEXTROSE MONOHYDRATE 25 G/50ML
25-50 INJECTION, SOLUTION INTRAVENOUS
Status: DISCONTINUED | OUTPATIENT
Start: 2022-05-16 | End: 2022-05-18 | Stop reason: HOSPADM

## 2022-05-17 ENCOUNTER — HOSPITAL ENCOUNTER (OUTPATIENT)
Facility: CLINIC | Age: 82
Discharge: HOME | End: 2022-05-17
Admitting: PHYSICIAN ASSISTANT
Payer: COMMERCIAL

## 2022-05-17 ENCOUNTER — HOSPITAL ENCOUNTER (OUTPATIENT)
Dept: GENERAL RADIOLOGY | Facility: CLINIC | Age: 82
Discharge: HOME | End: 2022-05-17
Attending: FAMILY MEDICINE
Payer: COMMERCIAL

## 2022-05-17 VITALS — OXYGEN SATURATION: 100 % | DIASTOLIC BLOOD PRESSURE: 48 MMHG | SYSTOLIC BLOOD PRESSURE: 106 MMHG | HEART RATE: 67 BPM

## 2022-05-17 VITALS — SYSTOLIC BLOOD PRESSURE: 114 MMHG | HEART RATE: 62 BPM | DIASTOLIC BLOOD PRESSURE: 47 MMHG | RESPIRATION RATE: 16 BRPM

## 2022-05-17 DIAGNOSIS — M51.369 LUMBAR DEGENERATIVE DISC DISEASE: ICD-10-CM

## 2022-05-17 DIAGNOSIS — M54.16 LUMBAR RADICULOPATHY, ACUTE: ICD-10-CM

## 2022-05-17 PROCEDURE — 999N000154 HC STATISTIC RADIOLOGY XRAY, US, CT, MAR, NM

## 2022-05-17 PROCEDURE — 62323 NJX INTERLAMINAR LMBR/SAC: CPT

## 2022-05-17 PROCEDURE — 255N000002 HC RX 255 OP 636: Performed by: PHYSICIAN ASSISTANT

## 2022-05-17 PROCEDURE — 250N000009 HC RX 250: Performed by: PHYSICIAN ASSISTANT

## 2022-05-17 PROCEDURE — 250N000011 HC RX IP 250 OP 636: Performed by: PHYSICIAN ASSISTANT

## 2022-05-17 RX ORDER — IOPAMIDOL 408 MG/ML
10 INJECTION, SOLUTION INTRATHECAL ONCE
Status: COMPLETED | OUTPATIENT
Start: 2022-05-17 | End: 2022-05-17

## 2022-05-17 RX ORDER — BETAMETHASONE SODIUM PHOSPHATE AND BETAMETHASONE ACETATE 3; 3 MG/ML; MG/ML
5 INJECTION, SUSPENSION INTRA-ARTICULAR; INTRALESIONAL; INTRAMUSCULAR; SOFT TISSUE ONCE
Status: COMPLETED | OUTPATIENT
Start: 2022-05-17 | End: 2022-05-17

## 2022-05-17 RX ORDER — LIDOCAINE HYDROCHLORIDE 10 MG/ML
30 INJECTION, SOLUTION EPIDURAL; INFILTRATION; INTRACAUDAL; PERINEURAL ONCE
Status: COMPLETED | OUTPATIENT
Start: 2022-05-17 | End: 2022-05-17

## 2022-05-17 RX ADMIN — LIDOCAINE HYDROCHLORIDE 5 ML: 10 INJECTION, SOLUTION EPIDURAL; INFILTRATION; INTRACAUDAL; PERINEURAL at 11:57

## 2022-05-17 RX ADMIN — BETAMETHASONE SODIUM PHOSPHATE AND BETAMETHASONE ACETATE 3 ML: 3; 3 INJECTION, SUSPENSION INTRA-ARTICULAR; INTRALESIONAL; INTRAMUSCULAR at 11:59

## 2022-05-17 RX ADMIN — IOPAMIDOL 2 ML: 408 INJECTION, SOLUTION INTRATHECAL at 11:59

## 2022-05-17 NOTE — PROGRESS NOTES
Care Suites Discharge Nursing Note    Patient Information  Name: Patrick Olson  Age: 81 year old    Discharge Education:  Discharge instructions reviewed: Yes  Additional education/resources provided: NA  Patient/patient representative verbalizes understanding: Yes  Patient discharging on new medications: No  Medication education completed: N/A    Discharge Plans:   Discharge location: home  Discharge ride contacted: Yes  Approximate discharge time: 1245    Discharge Criteria:  Discharge criteria met and vital signs stable: Yes.  Site remains CDI, no swelling.  Tolerated PO.  Able to ambulate and is steady on her feet.    Patient Belongs:  Patient belongings returned to patient: Yes    Eloina Mosqueda RN

## 2022-05-17 NOTE — DISCHARGE INSTRUCTIONS
Steroid Injection Discharge Instructions     After you go home:    You may resume your normal diet.    Care of Puncture Site:    If you have a bandaid on your puncture site, you may remove it the next morning  You may shower tomorrow  No bath tubs, whirlpools or swimming pool for at least 48 hours  Use ice packs as needed for discomfort     Activity:    Minimize your activity today. You may gradually resume your normal activity as tolerated  Avoid vigorous or strenuous activity until your symptoms improve or as directed by your doctor  Do NOT drive a vehicle for a few hours after the injection - or longer if you develop numbness in your arm or leg    Medicines:    You may resume all medications, including blood thinners  Resume your Warfarin/Coumadin at your regular dose today. Follow up with your provider to have your INR rechecked  Resume your Platelet Inhibitors and Aspirin tomorrow at your regular dose  For minor discomfort, you may take Acetaminophen (Tylenol) or Ibuprofen (Advil)    Pain:     You may experience increased or different pain over the next 24-48 hours  For the next 48 hrs - you may use ice packs for discomfort     Call your primary care doctor if:    You have severe pain that does not improve with pain medication  You have chills or a fever greater than 101 F (38 C)  The site is red, swollen, hot or tender  Increase in pain, weakness or numbness  New problems with your bowel or bladder  Any questions or concerns    What to watch for:    It can be normal to have some bruising or slight swelling at the puncture site.   After the procedure, you may have some new weakness or numbness down your arm/leg from the numbing medicine. This should resolve in a few hours.   You may feel some temporary relief from the numbing medicine, but that will wear off within a few hours.  Your symptoms may return to pre procedure level, or can even be worse for the first 1-2 days.  For many people, the steroid begins to  provide some relief within 2-3 days, but it can take up to 2 weeks to obtain the full results.  Some people will get lasting relief from a single injection. Others may require up to 3 injections to get results. If you have more than one steroid injection, they should be given 2 weeks apart.  If you have no improvement in your symptoms after two weeks, please contact the doctor who ordered this procedure to discuss the next steps.  Side effects of your steroid injection are mild and will go away in 2-3 days  Insomnia  Irritability  Flushed face  Water retention  Restlessness  Difficulty sleeping  Increased appetite  Increased blood sugar  If you are diabetic, monitor your blood sugar closely. Contact the provider who manages your diabetes to help you control your blood sugar if needed.    If you have questions or concerns call:                  [facility] Radiology Dept @ [phone]                                    between 8am-4:30pm Mon-Fri    If you have urgent questions outside of these normal business hours, please contact the [facility] on call doctor @ [phone]      The provider who performed your procedure was _________________.

## 2022-05-17 NOTE — PROCEDURES
[facility]    Procedure: L3-L4 LEE ANN    Date/Time: 5/17/2022 12:05 PM  Performed by: Evin Pearce PA-C  Authorized by: Evin Pearce PA-C       UNIVERSAL PROTOCOL   Site Marked: Yes  Prior Images Obtained and Reviewed:  Yes  Required items: Required blood products, implants, devices and special equipment available    Patient identity confirmed:  Verbally with patient  Patient was reevaluated immediately before administering moderate or deep sedation or anesthesia  Confirmation Checklist:  Patient's identity using two indicators, relevant allergies, procedure was appropriate and matched the consent or emergent situation and correct equipment/implants were available  Time out: Immediately prior to the procedure a time out was called    Universal Protocol: the Joint Commission Universal Protocol was followed    Preparation: Patient was prepped and draped in usual sterile fashion       ANESTHESIA    Local Anesthetic: Lidocaine 1% without epinephrine      SEDATION    Patient Sedated: No    See dictated procedure note for full details.    PROCEDURE  Describe Procedure: Pt arrived believing we were repeating the last injection approach which was an L3-L4 LEE ANN.  Order stated L2-L3.  Pt stated that previous injections have been helpful and she is having bilateral discomfort through the hips and down the outside of the thighs.  We agreed to repeat the L3-L4 as this approach has a nice area for landing the needle and it is between the L2-3 and L4-L5 affected areas. Attempted to call the office but was not able to connect with the appropriate staff.  If no improvement, we can do an L2-L3 in 2 weeks if needed before her trip.  Patient Tolerance:  Patient tolerated the procedure well with no immediate complications  Length of time physician/provider present for 1:1 monitoring during sedation: 0

## 2022-05-17 NOTE — PROGRESS NOTES
ESTABLISHED PATIENT FOLLOW-UP:  Follow Up of the Lower Back       HISTORY OF PRESENT ILLNESS  Ms. Olson is a pleasant 81 year old year old female who presents to clinic today for follow-up of low back pain.    Date of injury: 4/2021  Date last seen: 7/1/21  Following Therapeutic Plan: Yes  Pain: Worse  Function: Worse   Interval History: when having 1st injection completed in July she did not get much relief and now pain is worse. Looking for next steps.  Pain is not radiating down legs.     Right elbow pain as well radiating into forearm. Also has finger pains . She notes this increased in November. Relates this to stopping her methotrexate and starting plaquenil.  Sees rheumatology mid February.     Additional medical/Social/Surgical histories reviewed in [facility] and updated as appropriate.    REVIEW OF SYSTEMS (12/31/2021)  CONSTITUTIONAL: Denies fever and weight loss  GASTROINTESTINAL: Denies abdominal pain, nausea, vomiting  MUSCULOSKELETAL: See HPI  SKIN: Denies any recent rash or lesion  NEUROLOGICAL: Denies numbness or focal weakness     PHYSICAL EXAM  There were no vitals taken for this visit.    General  - normal appearance, in no obvious distress  Musculoskeletal - left UE  - inspection: normal joint alignment, no obvious deformity, no joint effusion or swelling. No swelling or erythema of elbow or hand.  - palpation: tender at the origin of the common extensor tendon into extensor musculature.  - ROM:  160 deg flexion   0 deg extension   90 deg pronation   90 deg supination  - strength: 5/5 wrist extension with elbow flexed, 5/5 flexion, 5/5  strength  - special tests:  (-) varus  (-) valgus  (-) Tinel's  Musculoskeletal - lumbar spine  - inspection: normal bone and joint alignment, no obvious scoliosis  - palpation: Significant tenderness palpation of lumbar paraspinal musculature.  - ROM: Full range of motion in flexion-extension side bending and rotation.  Pain with flexion and sidebending  -  strength: lower extremities 5/5 in all planes  - special tests:  (-) straight leg raise    (-) slump test    Neuro  - patellar and Achilles DTRs 2+ bilaterally, No lower extremity sensory deficit throughout L5 distribution, grossly normal coordination, normal muscle tone  Skin  - no ecchymosis, erythema, warmth, or induration, no obvious rash  Psych  - interactive, appropriate, normal mood and affect    IMAGING :       ASSESSMENT & PLAN  Ms. Olson is a 81 year old year old female who presents to clinic today with Follow Up of the Lower Back    Diagnosis:   Lumbar degenerative disc disease  Lumbar radiculitis affecting bilateral lower extremities  Polyarthralgias  Rheumatoid arthritis    Current exacerbation of known lumbar disc herniation.  This time we discussed consideration for repeat epidural steroid injection.  After review of previous injections, she had the most significant relief from L3-L4 injection rather than cephalad.  We will repeat at this time to see what kind of relief she is able to achieve.  In addition of this steroid burst will be provided for more immediate relief.  She is currently taking gabapentin at night only and I would like her to increase it at this juncture in time up to 300 mg 3 times daily.    I will see her back 2 weeks after epidural steroid injection.  We can review efficacy.  Discussed gabapentin and whether regimen should be modified.  Continue with her home exercise program.    Provided tennis elbow rehab program to determine whether this is helpful.  Continue this discussion of polyarthralgias with rheumatology. Determine whether medication change from methotrexate could be contributory. Monitor other joint pains.    It was a pleasure seeing Imani Lantigua, , [facility]  Primary Care Sports Medicine       Yes - the patient is able to be screened

## 2022-05-17 NOTE — PROGRESS NOTES
Care Suites Post Procedure Note    Patient Information  Name: Patrick Olson  Age: 81 year old    Post Procedure  Time patient returned to Care Suites: 1215  Concerns/abnormal assessment: None at this time.  If abnormal assessment, provider notified: N/A  Plan/Other: Per orders.    Mid posterior LEE ANN site covered with bandaid.  Site CDI, no swelling.  Denies pain and or numbness.  Sitting in chair and eating a boxed lunch.    Eloina Mosqueda RN

## 2022-05-19 ENCOUNTER — OFFICE VISIT (OUTPATIENT)
Dept: RHEUMATOLOGY | Facility: CLINIC | Age: 82
End: 2022-05-19
Attending: INTERNAL MEDICINE
Payer: COMMERCIAL

## 2022-05-19 VITALS
SYSTOLIC BLOOD PRESSURE: 126 MMHG | HEIGHT: 64 IN | BODY MASS INDEX: 17.48 KG/M2 | HEART RATE: 74 BPM | DIASTOLIC BLOOD PRESSURE: 66 MMHG | WEIGHT: 102.4 LBS

## 2022-05-19 DIAGNOSIS — Z79.899 HIGH RISK MEDICATION USE: ICD-10-CM

## 2022-05-19 DIAGNOSIS — M06.9 RHEUMATOID ARTHRITIS INVOLVING MULTIPLE SITES, UNSPECIFIED WHETHER RHEUMATOID FACTOR PRESENT (H): Primary | ICD-10-CM

## 2022-05-19 PROCEDURE — 99214 OFFICE O/P EST MOD 30 MIN: CPT | Performed by: INTERNAL MEDICINE

## 2022-05-19 PROCEDURE — G0463 HOSPITAL OUTPT CLINIC VISIT: HCPCS

## 2022-05-19 ASSESSMENT — PAIN SCALES - GENERAL: PAINLEVEL: NO PAIN (0)

## 2022-05-19 NOTE — PATIENT INSTRUCTIONS
1) Labs in 3 months  2) Follow up and labs in 6 months    Continue on methotrexate 4 tabs weekly and 1mg folic acid daily    Let me know if anything comes up before that visit.

## 2022-05-19 NOTE — PROGRESS NOTES
Outpatient Rheumatology follow-up    Name: Patrick Olson    MRN [id number]   Today's date: 5/19/22         Reason for follow-up: rheumatoid arthritis on methotrexate   Requesting physician: Lauryn Tamayo MD             Assessment & Plan:   81-year-old female with a history of seropositive rheumatoid arthritis with secondary Sjogren's syndrome on methotrexate 10 mg weekly (previously on 20 mg weekly)/folic acid 2 mg daily and 5 mg Salagen nightly presented to rheumatology to establish care in Oct 2021.  At that time she had recently seen Dr Sen of hematology for evaluation and treatment of macrocytic anemia likely secondary to chronic inflammation vs methotrexate therapy vs recurrent diverticular bleeding  most recently in January 2021 required hospitalization and transfusion.  Her seropositive rheumatoid arthritis was under good control and in remission on low-dose of methotrexate which was on the low end of therapeutic dose range.  Given the likely contribution to her anemia and possibly leukopenia in the context of well-controlled low disease activity we discussed other options for her inflammatory arthritis we did initially transition her from methotrexate to hydroxychloroquine and she did well on this from RA/sjogrens/cytopenia perspective, though she developed a rash which was evaluated by Dr Stevenson of Dermatology and thought NOT secondary to HCQ. However, patient still wished to discontinue HCQ and so we discussed going back on very low dose methotrexate while we monitor cell counts. Patient returns today for follow-up    Seropositive rheumatoid arthritis with secondary sjogrens syndrome: disease is currently in remission without evidence of synovitis on exam, minimal if any early morning stiffness, and lack of interval flares of red/hot/swollen joints consistent with prior flares. This is supported with negative/normal age/geneder adjusted ESR of 38 (was 39, 4 months prior) and negative CRP <2.9.  Will continue.  PLAN  - Continue methotrexate 10mg weekly and folic acid 1mg daily. No plan to further ramp up methotrexate    High risk medication use: Methotrexate  -No evidence of toxicity by history/exam today.  Most recent labs without evidence of drug toxicity.  -Risks and benefits discussed again today to include infection, bone marrow suppression/cytopenias, GI/hepatoxicity, malignancy, rash among others. She is agreeable given potential benefit  -Will continue with routine drug toxicity monitoring with CBC, CMP, ESR, CRP q3 months. Standing lab orders placed    Will follow-up in 6 months    Greg Clements MD  Rheumatology        Subjective:   Interval History 5/19/22  Rash is continuing to improve. She changed/soap/fragrance. She had cortisone injection into her back 2 days ago, unable to sleep for 2 nights after that but now resolved and has notices much improvement. Today got up, did some cooking. Energy level improved. Has started on methotrexate and without any side effects. Started this on 4/23/22. Takes this each week on Monday evenings. No GI or other side effects. Also takes folic acid 1mg daily. Currently takes 10mg weekly and 1mg folic acid daily. No fatigue. Fevers/chills/night sweats. No infections since last visit. No joint pain. No red/hot/swollen joints. Minimal AM stiffness lasting for only a few minutes. She is looking forward to her upcoming trip to [place] with her family. No unintentional weight loss. 14 point ROS collected and negative.         Interval history 2/10/22  Did not notice any return or worsening of inflammatory arthritis with transition from methotrexate to Plaquenil.  Reports less than 5 minutes of early morning stiffness.  No red hot swollen joints.  Rates her pain at less than a 2 out of 10.  No rash, headache, change in vision, GI upset.  No interval infection.  Continues to have ongoing left greater than right intermittent hip pain which is worse when lying on that  side.  She continues to be active with frequent walking almost daily weather permitting.  Her hip pain is there a few days per month.  Resolves without intervention.  Had follow-up with GI on 01/12/2022.  They plan for repeat colonoscopy in 6 to 12 months.  She was put on a course of prednisone after a visit with sports medicine for her established lumbar disc disease and likely exacerbation.  She had a positive response of this for her axial pain.  She did not note much difference in regards to her peripheral arthritis symptoms as they had been inactive prior to starting this prednisone course.  Review of systems otherwise negative.    History from initial consultation in 10/2021  Patient with a diagnosis of seropositive rheumatoid arthritis and secondary Sjogren's syndrome on methotrexate 10 mg weekly, folic acid 2 mg daily and Salagen 5 mg each evening presents to rheumatology to discuss other therapies for her inflammatory arthritis in the setting of chronic macrocytic anemia and mild leukopenia.  She reports that overall her joint symptoms of peripheral joints have been well controlled even with her reduction of methotrexate dose from 20 mg weekly earlier this year down to 10 mg weekly due to the above concern.  She has not had worsening of joint pain, stiffness or any return of erythema edema or warmth.  She is able to make full fist upon waking in the morning.  Denies any early morning stiffness lasting more than 3 to 5 minutes.  Denies any fevers chills.  She was also previously taking Celebrex though in the context of her recurrent diverticular bleeds this was discontinued earlier this year as well.  No worsening after this discontinuation either.  Has mild dry eyes and sees an ophthalmologist yearly.  She does have dry mouth and finds that the Salagen has been helpful.  She denies any swelling of her parotid glands or submandibular glands.  She drinks water frequently including overnight when needed.   Denies recurrent/progressive dental disease.  Sees a dentist regularly.  No new rash.  No fevers or chills.  No recurrent infection.  Her weight is now stable though she initially did have some weight loss earlier this year with her diverticular disease and change in diet.  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  No history of DVT.  No hair loss.  No inflammatory eye disease history.  No oral or nasal ulcers.  No recurrent epistaxes.  No photosensitive rash.  No chest pain or shortness of breath.  No change in strength or sensation in arms or legs.  On complaint at this time is ongoing lumbar spine pain for which she had previously seen orthospine.  Previously had injections which were helpful though the most recent was not.    Past Medical History  Past Medical History:   Diagnosis Date     Diverticulosis of large intestine      Osteoarthritis      Osteoporosis      Rheumatoid arthritis (H)      Sensorineural hearing loss      Past Surgical History  Past Surgical History:   Procedure Laterality Date     CAPSULE/PILL CAM ENDOSCOPY N/A 11/18/2021    Procedure: IMAGING PROCEDURE, GI TRACT, INTRALUMINAL, VIA CAPSULE;  Surgeon: Deric Rodriguez MD;  Location:  GI     COLONOSCOPY N/A 3/14/2017    Procedure: COMBINED COLONOSCOPY, SINGLE OR MULTIPLE BIOPSY/POLYPECTOMY BY BIOPSY;  Surgeon: Spencer Downey MD;  Location: [facility] GI     ENTEROSCOPY SMALL BOWEL N/A 11/20/2021    Procedure: ENTEROSCOPY, colonoscopy with endoscopic mucosal resection and tattoo placement;  Surgeon: Kirby Arthur MD;  Location: [facility] OR     IR VISCERAL EMBOLIZATION  1/22/2021     ORTHOPEDIC SURGERY       SIGMOIDOSCOPY FLEXIBLE N/A 1/23/2021    Procedure: SIGMOIDOSCOPY, FLEXIBLE;  Surgeon: Jaime Steinberg MD;  Location:  GI     Medications  Current Outpatient Medications   Medication     acetaminophen (TYLENOL) 500 MG tablet     calcium carb  "1250 mg, 500 mg [place],/vitamin D 200 units (OSCAL WITH D) 500-200 MG-UNIT per tablet     famotidine (PEPCID) 20 MG tablet     ferrous sulfate 140 (45 Fe) MG TBCR CR tablet     Fluocinolone Acetonide Scalp 0.01 % OIL oil     folic acid (FOLVITE) 1 MG tablet     gabapentin (NEURONTIN) 300 MG capsule     hydrocortisone 2.5 % ointment     hydroxychloroquine (PLAQUENIL) 200 MG tablet     ketoconazole (NIZORAL) 2 % external shampoo     lifitegrast (XIIDRA) 5 % opthalmic solution     methotrexate 2.5 MG tablet     Multiple Vitamins-Minerals (OCUVITE PRESERVISION PO)     tretinoin (RETIN-A) 0.1 % external cream     No current facility-administered medications for this visit.       Allergies  Allergies   Allergen Reactions     Bee Venom Anaphylaxis     Shrimp Anaphylaxis     Evoxac [Cevimeline] Unknown     Prevnar Swelling     Other reaction(s): Edema     Prevnar [Pneumococcal 13-Linda Conj Vacc] Unknown       Family History: No family hx of autoimmune disease    Social History: No alcohol, drug use, smoking history.       Objective:   /66   Pulse 74   Ht 1.626 m (5' 4\")   Wt 46.4 kg (102 lb 6.4 oz)   BMI 17.58 kg/m    GEN: sitting up unassisted, NAD  HEENT: No facial rash, sclera clear, no oral or nasal ulcers, no inflammatory nasal bridge/external ear changes, good saliva pool  CV: RRR, no m/r/g  Pulm: CTAB no crackles wheezing ronchi  Abdomen: soft, non tender, not distended, no masses  Extremities: Full active and passive ROM of bilateral shoulders, elbows, wrists, MCPs, PIPs, DIPs, hips, knees, ankles. Able to make a full fist with good strength bilaterally. No synovitis of any joints. No dactylitis. No digital pitting. No nail changes. No sclerodactyly  Skin: No acute cutaneous changes. The cutaneous lesions which she sent in pictures of has now completely resolved.    WBC   Date Value Ref Range Status   01/20/2021 5.0 4.0 - 11.0 10e9/L Final     WBC Count   Date Value Ref Range Status   05/05/2022 3.6 (L) 4.0 - " 11.0 10e3/uL Final     Hemoglobin   Date Value Ref Range Status   05/05/2022 10.6 (L) 11.7 - 15.7 g/dL Final   01/25/2021 8.0 (L) 11.7 - 15.7 g/dL Final     Platelet Count   Date Value Ref Range Status   05/05/2022 230 150 - 450 10e3/uL Final   01/20/2021 207 150 - 450 10e9/L Final     Creatinine   Date Value Ref Range Status   05/05/2022 0.92 0.52 - 1.04 mg/dL Final   01/23/2021 0.75 0.52 - 1.04 mg/dL Final     Lab Results   Component Value Date    ALKPHOS 68 05/05/2022    ALKPHOS 77 03/13/2017     AST   Date Value Ref Range Status   05/05/2022 24 0 - 45 U/L Final   03/13/2017 10 0 - 45 U/L Final     Lab Results   Component Value Date    ALT 22 05/05/2022    ALT 12 03/13/2017     Sed Rate   Date Value Ref Range Status   03/13/2017 63 (H) 0 - 30 mm/h Final     Erythrocyte Sedimentation Rate   Date Value Ref Range Status   05/05/2022 38 (H) 0 - 30 mm/hr Final     CRP Inflammation   Date Value Ref Range Status   05/05/2022 <2.9 0.0 - 8.0 mg/L Final   03/13/2017 10.0 (H) 0.0 - 8.0 mg/L Final     UA RESULTS:  Recent Labs   Lab Test 03/10/22  1420   COLOR Yellow   APPEARANCE Clear   URINEGLC Negative   URINEBILI Negative   URINEKETONE Negative   SG 1.010   UBLD Negative   URINEPH 7.0   PROTEIN Negative   NITRITE Negative   LEUKEST Negative   RBCU 2   WBCU <1      Rheumatoid Factor   Date Value Ref Range Status   01/03/2022 8 <12 IU/mL Final     Cyclic Citrullinated Peptide Antibody IgG   Date Value Ref Range Status   01/03/2022 3.6 <7.0 U/mL Final     Comment:     Negative       Imaging:   No peripheral x-rays to review in our system

## 2022-05-19 NOTE — NURSING NOTE
"Chief Complaint   Patient presents with     RECHECK     Follow up     /66   Pulse 74   Ht 1.626 m (5' 4\")   Wt 46.4 kg (102 lb 6.4 oz)   BMI 17.58 kg/m    Joie Page CMA on 5/19/2022 at 10:39 AM    "

## 2022-05-19 NOTE — LETTER
5/19/2022       RE: Patrick Olson  [address] [facility] [address]     Dear Colleague,    Thank you for referring your patient, Patrick Olson, to the [facility] [place] at [facility]. Please see a copy of my visit note below.      Outpatient Rheumatology follow-up    Name: Patrick Olson    MRN [id number]   Today's date: 5/19/22         Reason for follow-up: rheumatoid arthritis on methotrexate   Requesting physician: Lauryn Tamayo MD             Assessment & Plan:   81-year-old female with a history of seropositive rheumatoid arthritis with secondary Sjogren's syndrome on methotrexate 10 mg weekly (previously on 20 mg weekly)/folic acid 2 mg daily and 5 mg Salagen nightly presented to rheumatology to establish care in Oct 2021.  At that time she had recently seen Dr Sen of hematology for evaluation and treatment of macrocytic anemia likely secondary to chronic inflammation vs methotrexate therapy vs recurrent diverticular bleeding  most recently in January 2021 required hospitalization and transfusion.  Her seropositive rheumatoid arthritis was under good control and in remission on low-dose of methotrexate which was on the low end of therapeutic dose range.  Given the likely contribution to her anemia and possibly leukopenia in the context of well-controlled low disease activity we discussed other options for her inflammatory arthritis we did initially transition her from methotrexate to hydroxychloroquine and she did well on this from RA/sjogrens/cytopenia perspective, though she developed a rash which was evaluated by Dr Stevenson of Dermatology and thought NOT secondary to HCQ. However, patient still wished to discontinue HCQ and so we discussed going back on very low dose methotrexate while we monitor cell counts. Patient returns today for follow-up    Seropositive rheumatoid arthritis with secondary sjogrens  syndrome: disease is currently in remission without evidence of synovitis on exam, minimal if any early morning stiffness, and lack of interval flares of red/hot/swollen joints consistent with prior flares. This is supported with negative/normal age/geneder adjusted ESR of 38 (was 39, 4 months prior) and negative CRP <2.9. Will continue.  PLAN  - Continue methotrexate 10mg weekly and folic acid 1mg daily. No plan to further ramp up methotrexate    High risk medication use: Methotrexate  -No evidence of toxicity by history/exam today.  Most recent labs without evidence of drug toxicity.  -Risks and benefits discussed again today to include infection, bone marrow suppression/cytopenias, GI/hepatoxicity, malignancy, rash among others. She is agreeable given potential benefit  -Will continue with routine drug toxicity monitoring with CBC, CMP, ESR, CRP q3 months. Standing lab orders placed    Will follow-up in 6 months    Greg Clements MD  Rheumatology        Subjective:   Interval History 5/19/22  Rash is continuing to improve. She changed/soap/fragrance. She had cortisone injection into her back 2 days ago, unable to sleep for 2 nights after that but now resolved and has notices much improvement. Today got up, did some cooking. Energy level improved. Has started on methotrexate and without any side effects. Started this on 4/23/22. Takes this each week on Monday evenings. No GI or other side effects. Also takes folic acid 1mg daily. Currently takes 10mg weekly and 1mg folic acid daily. No fatigue. Fevers/chills/night sweats. No infections since last visit. No joint pain. No red/hot/swollen joints. Minimal AM stiffness lasting for only a few minutes. She is looking forward to her upcoming trip to [place] with her family. No unintentional weight loss. 14 point ROS collected and negative.         Interval history 2/10/22  Did not notice any return or worsening of inflammatory arthritis with transition from methotrexate  to Plaquenil.  Reports less than 5 minutes of early morning stiffness.  No red hot swollen joints.  Rates her pain at less than a 2 out of 10.  No rash, headache, change in vision, GI upset.  No interval infection.  Continues to have ongoing left greater than right intermittent hip pain which is worse when lying on that side.  She continues to be active with frequent walking almost daily weather permitting.  Her hip pain is there a few days per month.  Resolves without intervention.  Had follow-up with GI on 01/12/2022.  They plan for repeat colonoscopy in 6 to 12 months.  She was put on a course of prednisone after a visit with sports medicine for her established lumbar disc disease and likely exacerbation.  She had a positive response of this for her axial pain.  She did not note much difference in regards to her peripheral arthritis symptoms as they had been inactive prior to starting this prednisone course.  Review of systems otherwise negative.    History from initial consultation in 10/2021  Patient with a diagnosis of seropositive rheumatoid arthritis and secondary Sjogren's syndrome on methotrexate 10 mg weekly, folic acid 2 mg daily and Salagen 5 mg each evening presents to rheumatology to discuss other therapies for her inflammatory arthritis in the setting of chronic macrocytic anemia and mild leukopenia.  She reports that overall her joint symptoms of peripheral joints have been well controlled even with her reduction of methotrexate dose from 20 mg weekly earlier this year down to 10 mg weekly due to the above concern.  She has not had worsening of joint pain, stiffness or any return of erythema edema or warmth.  She is able to make full fist upon waking in the morning.  Denies any early morning stiffness lasting more than 3 to 5 minutes.  Denies any fevers chills.  She was also previously taking Celebrex though in the context of her recurrent diverticular bleeds this was discontinued earlier this year  as well.  No worsening after this discontinuation either.  Has mild dry eyes and sees an ophthalmologist yearly.  She does have dry mouth and finds that the Salagen has been helpful.  She denies any swelling of her parotid glands or submandibular glands.  She drinks water frequently including overnight when needed.  Denies recurrent/progressive dental disease.  Sees a dentist regularly.  No new rash.  No fevers or chills.  No recurrent infection.  Her weight is now stable though she initially did have some weight loss earlier this year with her diverticular disease and change in diet.  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  No history of DVT.  No hair loss.  No inflammatory eye disease history.  No oral or nasal ulcers.  No recurrent epistaxes.  No photosensitive rash.  No chest pain or shortness of breath.  No change in strength or sensation in arms or legs.  On complaint at this time is ongoing lumbar spine pain for which she had previously seen orthospine.  Previously had injections which were helpful though the most recent was not.    Past Medical History  Past Medical History:   Diagnosis Date     Diverticulosis of large intestine      Osteoarthritis      Osteoporosis      Rheumatoid arthritis (H)      Sensorineural hearing loss      Past Surgical History  Past Surgical History:   Procedure Laterality Date     CAPSULE/PILL CAM ENDOSCOPY N/A 11/18/2021    Procedure: IMAGING PROCEDURE, GI TRACT, INTRALUMINAL, VIA CAPSULE;  Surgeon: Deric Rodriguez MD;  Location:  GI     COLONOSCOPY N/A 3/14/2017    Procedure: COMBINED COLONOSCOPY, SINGLE OR MULTIPLE BIOPSY/POLYPECTOMY BY BIOPSY;  Surgeon: Spencer Downey MD;  Location:  GI     ENTEROSCOPY SMALL BOWEL N/A 11/20/2021    Procedure: ENTEROSCOPY, colonoscopy with endoscopic mucosal resection and tattoo placement;  Surgeon: Kirby Arthur MD;  Location:   "OR     IR VISCERAL EMBOLIZATION  1/22/2021     ORTHOPEDIC SURGERY       SIGMOIDOSCOPY FLEXIBLE N/A 1/23/2021    Procedure: SIGMOIDOSCOPY, FLEXIBLE;  Surgeon: Jaime Steinberg MD;  Location:  GI     Medications  Current Outpatient Medications   Medication     acetaminophen (TYLENOL) 500 MG tablet     calcium carb 1250 mg, 500 mg [place],/vitamin D 200 units (OSCAL WITH D) 500-200 MG-UNIT per tablet     famotidine (PEPCID) 20 MG tablet     ferrous sulfate 140 (45 Fe) MG TBCR CR tablet     Fluocinolone Acetonide Scalp 0.01 % OIL oil     folic acid (FOLVITE) 1 MG tablet     gabapentin (NEURONTIN) 300 MG capsule     hydrocortisone 2.5 % ointment     hydroxychloroquine (PLAQUENIL) 200 MG tablet     ketoconazole (NIZORAL) 2 % external shampoo     lifitegrast (XIIDRA) 5 % opthalmic solution     methotrexate 2.5 MG tablet     Multiple Vitamins-Minerals (OCUVITE PRESERVISION PO)     tretinoin (RETIN-A) 0.1 % external cream     No current facility-administered medications for this visit.       Allergies  Allergies   Allergen Reactions     Bee Venom Anaphylaxis     Shrimp Anaphylaxis     Evoxac [Cevimeline] Unknown     Prevnar Swelling     Other reaction(s): Edema     Prevnar [Pneumococcal 13-Linda Conj Vacc] Unknown       Family History: No family hx of autoimmune disease    Social History: No alcohol, drug use, smoking history.       Objective:   /66   Pulse 74   Ht 1.626 m (5' 4\")   Wt 46.4 kg (102 lb 6.4 oz)   BMI 17.58 kg/m    GEN: sitting up unassisted, NAD  HEENT: No facial rash, sclera clear, no oral or nasal ulcers, no inflammatory nasal bridge/external ear changes, good saliva pool  CV: RRR, no m/r/g  Pulm: CTAB no crackles wheezing ronchi  Abdomen: soft, non tender, not distended, no masses  Extremities: Full active and passive ROM of bilateral shoulders, elbows, wrists, MCPs, PIPs, DIPs, hips, knees, ankles. Able to make a full fist with good strength bilaterally. No synovitis of any joints. No " dactylitis. No digital pitting. No nail changes. No sclerodactyly  Skin: No acute cutaneous changes. The cutaneous lesions which she sent in pictures of has now completely resolved.    WBC   Date Value Ref Range Status   01/20/2021 5.0 4.0 - 11.0 10e9/L Final     WBC Count   Date Value Ref Range Status   05/05/2022 3.6 (L) 4.0 - 11.0 10e3/uL Final     Hemoglobin   Date Value Ref Range Status   05/05/2022 10.6 (L) 11.7 - 15.7 g/dL Final   01/25/2021 8.0 (L) 11.7 - 15.7 g/dL Final     Platelet Count   Date Value Ref Range Status   05/05/2022 230 150 - 450 10e3/uL Final   01/20/2021 207 150 - 450 10e9/L Final     Creatinine   Date Value Ref Range Status   05/05/2022 0.92 0.52 - 1.04 mg/dL Final   01/23/2021 0.75 0.52 - 1.04 mg/dL Final     Lab Results   Component Value Date    ALKPHOS 68 05/05/2022    ALKPHOS 77 03/13/2017     AST   Date Value Ref Range Status   05/05/2022 24 0 - 45 U/L Final   03/13/2017 10 0 - 45 U/L Final     Lab Results   Component Value Date    ALT 22 05/05/2022    ALT 12 03/13/2017     Sed Rate   Date Value Ref Range Status   03/13/2017 63 (H) 0 - 30 mm/h Final     Erythrocyte Sedimentation Rate   Date Value Ref Range Status   05/05/2022 38 (H) 0 - 30 mm/hr Final     CRP Inflammation   Date Value Ref Range Status   05/05/2022 <2.9 0.0 - 8.0 mg/L Final   03/13/2017 10.0 (H) 0.0 - 8.0 mg/L Final     UA RESULTS:  Recent Labs   Lab Test 03/10/22  1420   COLOR Yellow   APPEARANCE Clear   URINEGLC Negative   URINEBILI Negative   URINEKETONE Negative   SG 1.010   UBLD Negative   URINEPH 7.0   PROTEIN Negative   NITRITE Negative   LEUKEST Negative   RBCU 2   WBCU <1      Rheumatoid Factor   Date Value Ref Range Status   01/03/2022 8 <12 IU/mL Final     Cyclic Citrullinated Peptide Antibody IgG   Date Value Ref Range Status   01/03/2022 3.6 <7.0 U/mL Final     Comment:     Negative       Imaging:   No peripheral x-rays to review in our system

## 2022-05-26 ENCOUNTER — PATIENT OUTREACH (OUTPATIENT)
Dept: DERMATOLOGY | Facility: CLINIC | Age: 82
End: 2022-05-26
Payer: COMMERCIAL

## 2022-05-26 NOTE — CONFIDENTIAL NOTE
Attempted to reach patient to schedule follow up in the Dermatology Clinic.  No answer,  LM on VM to call office and Letter mailed.    Schedule with Dr. Mukesh Stevenson - rtn auto- [facility]- July/Aug..

## 2022-05-26 NOTE — LETTER
May 26, 2022          Patrick Olson  [address] [address]        Dear Patrick Olson:    We recently reviewed your medical records from [facility] and found that you are due for an appointment with Dr. Mukesh Stevenson.  Our office has attempted to reach you via telephone and left a message.    At your earliest convenience, please call the clinic at [phone] to arrange the recommended exam and possible testing.  Please kindly mention this letter when calling so that your appointment(s) can be accurately scheduled.      Sincerely,       The Clinic Staff        Medical Record Number:  [id number]

## 2022-05-31 DIAGNOSIS — M54.16 LUMBAR RADICULOPATHY, ACUTE: Primary | ICD-10-CM

## 2022-05-31 RX ORDER — PREDNISONE 20 MG/1
TABLET ORAL
Qty: 13 TABLET | Refills: 0 | Status: SHIPPED | OUTPATIENT
Start: 2022-05-31 | End: 2022-09-23

## 2022-06-23 ENCOUNTER — ANCILLARY PROCEDURE (OUTPATIENT)
Dept: MAMMOGRAPHY | Facility: CLINIC | Age: 82
End: 2022-06-23
Attending: INTERNAL MEDICINE
Payer: COMMERCIAL

## 2022-06-23 DIAGNOSIS — Z12.31 VISIT FOR SCREENING MAMMOGRAM: ICD-10-CM

## 2022-06-23 PROCEDURE — 77067 SCR MAMMO BI INCL CAD: CPT | Performed by: STUDENT IN AN ORGANIZED HEALTH CARE EDUCATION/TRAINING PROGRAM

## 2022-07-07 ENCOUNTER — OFFICE VISIT (OUTPATIENT)
Dept: DERMATOLOGY | Facility: CLINIC | Age: 82
End: 2022-07-07
Payer: COMMERCIAL

## 2022-07-07 DIAGNOSIS — L30.9 ECZEMA, UNSPECIFIED TYPE: Primary | ICD-10-CM

## 2022-07-07 DIAGNOSIS — L82.1 DERMATOSIS PAPULOSA NIGRA: ICD-10-CM

## 2022-07-07 DIAGNOSIS — L21.9 DERMATITIS, SEBORRHEIC: ICD-10-CM

## 2022-07-07 DIAGNOSIS — L85.3 XEROSIS OF SKIN: ICD-10-CM

## 2022-07-07 PROCEDURE — 99213 OFFICE O/P EST LOW 20 MIN: CPT | Performed by: DERMATOLOGY

## 2022-07-07 ASSESSMENT — PAIN SCALES - GENERAL: PAINLEVEL: NO PAIN (0)

## 2022-07-07 NOTE — LETTER
"7/7/2022       RE: Patrick Olson  [address]  [place] [address]     Dear Colleague,    Thank you for referring your patient, Patrick Olson, to the [facility] [place] at [facility]. Please see a copy of my visit note below.    [facility] Dermatology Note  Encounter Date: Jul 7, 2022  Office Visit     Dermatology Problem List:  1. Xerosis cutis, well-controlled  - Using \"sensitive skin\" products, avoiding irritants, moisturizing regimen  - avoiding retinoid application to affected areas  - hydrocortisone ointment BID PRN to affected areas until resolved  2. Seb Derm, well-controlled  - currently head and shoulders with intermittent ketoconazole shampoo weekly  - Previous: fluocinolone oil for scalp  (too messy)  2. Rheumatoid arthritis and Sjogren's: methotrexate 7.5 mg weekly  - prior: hydroxychloroquine 200 mg daily    ____________________________________________    Assessment & Plan:     1. 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 Well-controlled with topical moisturizers, avoidance of irritants and scented products, hydrocortisone ointment PRN. Etiology and chronic but benign nature of condition reviewed.  - Moisturizing products and regimens reviewed, current regimen acceptable but ok to change products if desired  - Discussed gentle skin regimen, including bathing habits and avoidance of products that contain perfumes or scents (cerave, cetaphil, vanicream are good options), using gentle non-drying soaps on skin (Dove is an excellent choice, as she has been doing)  - Discussed she may need to increase her moisturizing regimen in the winter  - Discussed that she may need a separate moisturizer for her face as body moisturizer may be too thick  - Ok to try test spot of Shiseido cream on face (does contain plant-derived ingredients, possibly " "triggering rash), if no reaction, okay to use on whole face if desired  - Given list of recommended products for face and body in AVS  - Continue hydrocortisone ointment BID PRN to itchy spots    2. Seborrheic dermatitis  Well-controlled with pyrithione zinc shampoo every other day + ketoconazole shampoo weekly.  - Continue current shampoo regimen  - Okay to go back to gentle perm, emphasis on gentle. Would discontinue if causes irritation.    3. Dermatosis papulosa nigra  Etiology and benign nature reviewed with patient.  - No treatment is indicated at this time      Procedures Performed:   None    Follow-up: PRN for new or changing lesions    Staff and Resident:     Staffed with Dr. Elva Chapa MD  Medicine/Dermatology PGY-2  *[id number]    Staff Physician Comments:   I saw and evaluated the patient with the resident and I edited the assessment and plan as documented in the note. I was present for the examination.    Mukesh Stevenson MD   of Dermatology  Department of Dermatology  [facility]      ____________________________________________    CC: Derm Problem (Rheumatoid arthritis and Sjogren's - Patrick states she has been stable and itches at times )    HPI:  Ms. Patrick Olson is a(n) 81 year old female who presents today as a return patient for rash    - Rash resolved entirely but continues to watch skin closely and is worried about recurrence  - Has changed soaps, shampoos, detergent to gentle/sensitive-skin products and has stopped using dryer sheets  - Only used oil treatment to scalp ~3 times, too messy  - Has been using neutrogena hydroboost to face but is wondering if she needs even more moisture, notes she had been using Shiseido Uplifting and Firming Cream Enriched which she liked more, still has brand new jar of it  - Has had a couple of itchy spots in the last 2 weeks (one on hand, one on neck), but this resolves \"right away\" after " applying hydrocortisone ointment  - Has been using head and shoulders and Dove shampoo as well as Dove products to body. Occasionally rotates in the ketoconazole shampoo once every week or so.  - Taking much shorter showers and showering only every other day, though moisturizes whole body daily with vitamin E [place] oil from  [employer]  - Would like to go back to her prior routine of perm treatments for hair at [facility], has not had a hair cut since her itchy scalp rash appeared    Patient is otherwise feeling well, without additional skin concerns.    Labs Reviewed:  N/A    Physical Exam:  Vitals: There were no vitals taken for this visit.  Joints: No synovitis, swelling, inflammation in small joints of hands and feet, large joints of arms or legs. ROM intact passive and active throughout without pain.  SKIN: Sun-exposed skin, which includes the head/face, neck, both arms, digits, and/or nails was examined.   - No inflammation, erythema, or flaking to scalp, no areas of follicular dropout or scarring, follicle density normal  - Xerosis with slight scale and flaking to b/l calves and feet  - There are fine lines and mild dyspigmentation on sun exposed areas of the face and forearms.   - Well-circumscribed dark brown 1- to 3-mm papules across malar cheeks without secondary changes  - No other lesions of concern on areas examined.     Medications:  Current Outpatient Medications   Medication     acetaminophen (TYLENOL) 500 MG tablet     calcium carb 1250 mg, 500 mg [place],/vitamin D 200 units (OSCAL WITH D) 500-200 MG-UNIT per tablet     famotidine (PEPCID) 20 MG tablet     ferrous sulfate 140 (45 Fe) MG TBCR CR tablet     Fluocinolone Acetonide Scalp 0.01 % OIL oil     folic acid (FOLVITE) 1 MG tablet     gabapentin (NEURONTIN) 300 MG capsule     hydrocortisone 2.5 % ointment     ketoconazole (NIZORAL) 2 % external shampoo     lifitegrast (XIIDRA) 5 % opthalmic solution     methotrexate 2.5 MG tablet     methotrexate  2.5 MG tablet     Multiple Vitamins-Minerals (OCUVITE PRESERVISION PO)     predniSONE (DELTASONE) 20 MG tablet     tretinoin (RETIN-A) 0.1 % external cream     No current facility-administered medications for this visit.      Past Medical History:   Patient Active Problem List   Diagnosis     SNHL (sensorineural hearing loss)     GI bleed     Gastrointestinal hemorrhage, unspecified gastrointestinal hemorrhage type     Diverticular hemorrhage     Acute lower GI bleeding     Diverticulosis of large intestine     Lab test negative for COVID-19 virus     Past Medical History:   Diagnosis Date     Diverticulosis of large intestine      Osteoarthritis      Osteoporosis      Rheumatoid arthritis (H)      Sensorineural hearing loss        CC Mukesh Stevenson MD  [address] [place] [address] on close of this encounter.

## 2022-07-07 NOTE — PROGRESS NOTES
"[facility] Dermatology Note  Encounter Date: Jul 7, 2022  Office Visit     Dermatology Problem List:  1. Xerosis cutis, well-controlled  - Using \"sensitive skin\" products, avoiding irritants, moisturizing regimen  - avoiding retinoid application to affected areas  - hydrocortisone ointment BID PRN to affected areas until resolved  2. Seb Derm, well-controlled  - currently head and shoulders with intermittent ketoconazole shampoo weekly  - Previous: fluocinolone oil for scalp  (too messy)  2. Rheumatoid arthritis and Sjogren's: methotrexate 7.5 mg weekly  - prior: hydroxychloroquine 200 mg daily    ____________________________________________    Assessment & Plan:     1. 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 Well-controlled with topical moisturizers, avoidance of irritants and scented products, hydrocortisone ointment PRN. Etiology and chronic but benign nature of condition reviewed.  - Moisturizing products and regimens reviewed, current regimen acceptable but ok to change products if desired  - Discussed gentle skin regimen, including bathing habits and avoidance of products that contain perfumes or scents (cerave, cetaphil, vanicream are good options), using gentle non-drying soaps on skin (Dove is an excellent choice, as she has been doing)  - Discussed she may need to increase her moisturizing regimen in the winter  - Discussed that she may need a separate moisturizer for her face as body moisturizer may be too thick  - Ok to try test spot of Shiseido cream on face (does contain plant-derived ingredients, possibly triggering rash), if no reaction, okay to use on whole face if desired  - Given list of recommended products for face and body in AVS  - Continue hydrocortisone ointment BID PRN to itchy spots    2. Seborrheic dermatitis  Well-controlled with pyrithione zinc shampoo every other day + ketoconazole shampoo weekly.  - " "Continue current shampoo regimen  - Okay to go back to gentle perm, emphasis on gentle. Would discontinue if causes irritation.    3. Dermatosis papulosa nigra  Etiology and benign nature reviewed with patient.  - No treatment is indicated at this time      Procedures Performed:   None    Follow-up: PRN for new or changing lesions    Staff and Resident:     Staffed with Dr. Elva Chapa MD  Medicine/Dermatology PGY-2  *[id number]    Staff Physician Comments:   I saw and evaluated the patient with the resident and I edited the assessment and plan as documented in the note. I was present for the examination.    Mukesh Stevenson MD   of Dermatology  Department of Dermatology  [facility] [employer] [facility]      ____________________________________________    CC: Derm Problem (Rheumatoid arthritis and Sjogren's - Patrick states she has been stable and itches at times )    HPI:  Ms. Patrick Olson is a(n) 81 year old female who presents today as a return patient for rash    - Rash resolved entirely but continues to watch skin closely and is worried about recurrence  - Has changed soaps, shampoos, detergent to gentle/sensitive-skin products and has stopped using dryer sheets  - Only used oil treatment to scalp ~3 times, too messy  - Has been using neutrogena hydroboost to face but is wondering if she needs even more moisture, notes she had been using Shiseido Uplifting and Firming Cream Enriched which she liked more, still has brand new jar of it  - Has had a couple of itchy spots in the last 2 weeks (one on hand, one on neck), but this resolves \"right away\" after applying hydrocortisone ointment  - Has been using head and shoulders and Dove shampoo as well as Dove products to body. Occasionally rotates in the ketoconazole shampoo once every week or so.  - Taking much shorter showers and showering only every other day, though moisturizes whole body daily with vitamin E [place] oil " from  [employer]  - Would like to go back to her prior routine of perm treatments for hair at [facility], has not had a hair cut since her itchy scalp rash appeared    Patient is otherwise feeling well, without additional skin concerns.    Labs Reviewed:  N/A    Physical Exam:  Vitals: There were no vitals taken for this visit.  Joints: No synovitis, swelling, inflammation in small joints of hands and feet, large joints of arms or legs. ROM intact passive and active throughout without pain.  SKIN: Sun-exposed skin, which includes the head/face, neck, both arms, digits, and/or nails was examined.   - No inflammation, erythema, or flaking to scalp, no areas of follicular dropout or scarring, follicle density normal  - Xerosis with slight scale and flaking to b/l calves and feet  - There are fine lines and mild dyspigmentation on sun exposed areas of the face and forearms.   - Well-circumscribed dark brown 1- to 3-mm papules across malar cheeks without secondary changes  - No other lesions of concern on areas examined.     Medications:  Current Outpatient Medications   Medication     acetaminophen (TYLENOL) 500 MG tablet     calcium carb 1250 mg, 500 mg [place],/vitamin D 200 units (OSCAL WITH D) 500-200 MG-UNIT per tablet     famotidine (PEPCID) 20 MG tablet     ferrous sulfate 140 (45 Fe) MG TBCR CR tablet     Fluocinolone Acetonide Scalp 0.01 % OIL oil     folic acid (FOLVITE) 1 MG tablet     gabapentin (NEURONTIN) 300 MG capsule     hydrocortisone 2.5 % ointment     ketoconazole (NIZORAL) 2 % external shampoo     lifitegrast (XIIDRA) 5 % opthalmic solution     methotrexate 2.5 MG tablet     methotrexate 2.5 MG tablet     Multiple Vitamins-Minerals (OCUVITE PRESERVISION PO)     predniSONE (DELTASONE) 20 MG tablet     tretinoin (RETIN-A) 0.1 % external cream     No current facility-administered medications for this visit.      Past Medical History:   Patient Active Problem List   Diagnosis     SNHL (sensorineural hearing  loss)     GI bleed     Gastrointestinal hemorrhage, unspecified gastrointestinal hemorrhage type     Diverticular hemorrhage     Acute lower GI bleeding     Diverticulosis of large intestine     Lab test negative for COVID-19 virus     Past Medical History:   Diagnosis Date     Diverticulosis of large intestine      Osteoarthritis      Osteoporosis      Rheumatoid arthritis (H)      Sensorineural hearing loss        CC Mukesh Stevenson MD  [address] [place] [address] on close of this encounter.

## 2022-07-07 NOTE — PATIENT INSTRUCTIONS
Moisturizers:  CeraVe, Cetaphil, Vanicream, Vaseline    Facial moisturizers:  CeraVe, Cetaphil, Vanicream    Soaps:  Dove for sensitive skin (bar soap), CeraVe, Cetaphil, Vanicream

## 2022-07-07 NOTE — NURSING NOTE
Dermatology Rooming Note    Patrick Olson's goals for this visit include:   Chief Complaint   Patient presents with     Derm Problem     Rheumatoid arthritis and Sjogren's - Patrick states she has been stable and itches at times      Nina Yip CMA

## 2022-07-14 ENCOUNTER — TELEPHONE (OUTPATIENT)
Dept: DERMATOLOGY | Facility: CLINIC | Age: 82
End: 2022-07-14

## 2022-08-08 DIAGNOSIS — M06.9 RHEUMATOID ARTHRITIS INVOLVING MULTIPLE SITES, UNSPECIFIED WHETHER RHEUMATOID FACTOR PRESENT (H): Primary | ICD-10-CM

## 2022-08-10 NOTE — TELEPHONE ENCOUNTER
Medication/Dose: methotrexate 2.5 MG tablet     Last Written : 5/19/22  Last Quantity: 48, # refills: 0  Last Office Visit :  5/19/22  Pending appointment: 11/17/22    WBC   Date Value Ref Range Status   01/20/2021 5.0 4.0 - 11.0 10e9/L Final     WBC Count   Date Value Ref Range Status   05/05/2022 3.6 (L) 4.0 - 11.0 10e3/uL Final     RBC Count   Date Value Ref Range Status   05/05/2022 3.35 (L) 3.80 - 5.20 10e6/uL Final   01/20/2021 2.51 (L) 3.8 - 5.2 10e12/L Final     Hemoglobin   Date Value Ref Range Status   05/05/2022 10.6 (L) 11.7 - 15.7 g/dL Final   01/25/2021 8.0 (L) 11.7 - 15.7 g/dL Final     Hematocrit   Date Value Ref Range Status   05/05/2022 33.0 (L) 35.0 - 47.0 % Final   01/20/2021 25.2 (L) 35.0 - 47.0 % Final     MCV   Date Value Ref Range Status   05/05/2022 99 78 - 100 fL Final   01/20/2021 100 78 - 100 fl Final     MCH   Date Value Ref Range Status   05/05/2022 31.6 26.5 - 33.0 pg Final   01/20/2021 33.1 (H) 26.5 - 33.0 pg Final     MCHC   Date Value Ref Range Status   05/05/2022 32.1 31.5 - 36.5 g/dL Final   01/20/2021 32.9 31.5 - 36.5 g/dL Final     RDW   Date Value Ref Range Status   05/05/2022 15.7 (H) 10.0 - 15.0 % Final   01/20/2021 15.9 (H) 10.0 - 15.0 % Final     Platelet Count   Date Value Ref Range Status   05/05/2022 230 150 - 450 10e3/uL Final   01/20/2021 207 150 - 450 10e9/L Final     AST   Date Value Ref Range Status   05/05/2022 24 0 - 45 U/L Final   03/13/2017 10 0 - 45 U/L Final     ALT   Date Value Ref Range Status   05/05/2022 22 0 - 50 U/L Final   03/13/2017 12 0 - 50 U/L Final     Creatinine   Date Value Ref Range Status   05/05/2022 0.92 0.52 - 1.04 mg/dL Final   01/23/2021 0.75 0.52 - 1.04 mg/dL Final     Albumin   Date Value Ref Range Status   05/05/2022 3.8 3.4 - 5.0 g/dL Final   03/13/2017 3.6 3.4 - 5.0 g/dL Final     CRP Inflammation   Date Value Ref Range Status   05/05/2022 <2.9 0.0 - 8.0 mg/L Final   03/13/2017 10.0 (H) 0.0 - 8.0 mg/L Final     No components found  for: ESR      Prescription set up and routed to provider per Refill Protocol.    Dustin Walker, DEREKN, RN

## 2022-08-12 ENCOUNTER — MYC MEDICAL ADVICE (OUTPATIENT)
Dept: DERMATOLOGY | Facility: CLINIC | Age: 82
End: 2022-08-12

## 2022-08-12 NOTE — TELEPHONE ENCOUNTER
Patient messaging concerned for monkeypox. No systemic sx. She indicates a possible (but unconfirmed) exposure and is immunosuppressed. Relayed that concern is low given demographics, etc, but difficult to tell from provided photos so encouraged her to visit  tomorrow out of an abundance of caution for an in person eval. See MyChart encounter for this exchange.    CC'ing Dr. Stevenson so he is aware.    Elisabet Burrows MD  Dermatology Resident PGY3

## 2022-08-13 ENCOUNTER — OFFICE VISIT (OUTPATIENT)
Dept: URGENT CARE | Facility: URGENT CARE | Age: 82
End: 2022-08-13
Payer: COMMERCIAL

## 2022-08-13 VITALS
BODY MASS INDEX: 17.42 KG/M2 | OXYGEN SATURATION: 100 % | HEIGHT: 64 IN | HEART RATE: 77 BPM | RESPIRATION RATE: 16 BRPM | WEIGHT: 102 LBS | TEMPERATURE: 97.6 F | SYSTOLIC BLOOD PRESSURE: 130 MMHG | DIASTOLIC BLOOD PRESSURE: 67 MMHG

## 2022-08-13 DIAGNOSIS — R21 RASH AND NONSPECIFIC SKIN ERUPTION: Primary | ICD-10-CM

## 2022-08-13 PROCEDURE — 99000 SPECIMEN HANDLING OFFICE-LAB: CPT

## 2022-08-13 PROCEDURE — 99214 OFFICE O/P EST MOD 30 MIN: CPT

## 2022-08-13 NOTE — PROGRESS NOTES
"  Assessment & Plan     Rash and nonspecific skin eruption    - Other Laboratory; [facility]; monkeypox (Laboratory Miscellaneous Order); Future  - Other Laboratory; [facility]; monkeypox (Laboratory Miscellaneous Order); Future    We will test for monkeypox with patient's concern and potential low risk exposure.  On methotraxate po for RA.  Advised to isolate from  and avoid skin-to-skin contact or sharing of sheets and towels at this time.  Close Follow-up if any new or worsening sx prn.    Anabella Alonzo MD   Urgent Care Provider  [facility] BRYAN Nguyen is a 81 year old, presenting for the following health issues:  Urgent Care and Rash (X2 days ago rash lower body.)      HPI     Presents with concern of possible monkeypox.  Called [facility] and sent photos yesterday- they sent her to be seen in  today.    Hugged her hairdresser last week who has similar lesions on her face.  She does not have a confirmed diagnosis that patient is aware of- patient just is suspicious that she is the one she got this rash from.    Patient noticed lesions on her waist and BLEs in past 48 hours and feels they are spreading.  They are itchy.    Lives at home with .  He has no lesions.    On methotraxate po for RA.    [facility] called this AM and testing has been okayed.      Review of Systems   Constitutional, HEENT, cardiovascular, pulmonary, GI, , musculoskeletal, neuro, skin, endocrine and psych systems are negative, except as otherwise noted.      Objective    /67   Pulse 77   Temp 97.6  F (36.4  C) (Oral)   Resp 16   Ht 1.626 m (5' 4\")   Wt 46.3 kg (102 lb)   SpO2 100%   BMI 17.51 kg/m    Body mass index is 17.51 kg/m .  Physical Exam   GENERAL: healthy, alert and no distress  EYES: Eyes grossly normal to inspection, PERRL and conjunctivae and sclerae normal  MS: no gross musculoskeletal defects noted, no edema  SKIN: small scattered fluid filled vesicular lesions on BLEs  PSYCH: " mentation appears normal, affect normal/bright                    .  ..

## 2022-08-16 LAB
SCANNED LAB RESULT: NORMAL
SCANNED LAB RESULT: NORMAL

## 2022-08-23 NOTE — PATIENT INSTRUCTIONS
1) Gabapentin plan:  Each tablet currently is 300mg  -Week 1: 300mg in the AM, 600mg in the afternoon, 600mg in the evening  -Week 2 onwards: 600mg in the AM, 600mg in the afternoon, 600mg in the evening    2) Start tylenol daily    3) Continue on hydroxychloroquine as you are    4) Follow-up in 3 months. Between now and then, think about how you are feeling.   
4 = No assist / stand by assistance

## 2022-08-25 ENCOUNTER — MEDICAL CORRESPONDENCE (OUTPATIENT)
Dept: HEALTH INFORMATION MANAGEMENT | Facility: CLINIC | Age: 82
End: 2022-08-25

## 2022-08-25 ENCOUNTER — TRANSFERRED RECORDS (OUTPATIENT)
Dept: HEALTH INFORMATION MANAGEMENT | Facility: CLINIC | Age: 82
End: 2022-08-25

## 2022-08-26 ENCOUNTER — LAB (OUTPATIENT)
Dept: LAB | Facility: CLINIC | Age: 82
End: 2022-08-26
Payer: COMMERCIAL

## 2022-08-26 ENCOUNTER — TELEPHONE (OUTPATIENT)
Dept: GASTROENTEROLOGY | Facility: CLINIC | Age: 82
End: 2022-08-26

## 2022-08-26 ENCOUNTER — TRANSCRIBE ORDERS (OUTPATIENT)
Dept: OTHER | Age: 82
End: 2022-08-26

## 2022-08-26 DIAGNOSIS — N18.4 CKD (CHRONIC KIDNEY DISEASE) STAGE 4, GFR 15-29 ML/MIN (H): ICD-10-CM

## 2022-08-26 DIAGNOSIS — M06.9 RHEUMATOID ARTHRITIS INVOLVING MULTIPLE SITES, UNSPECIFIED WHETHER RHEUMATOID FACTOR PRESENT (H): ICD-10-CM

## 2022-08-26 DIAGNOSIS — K57.31 DIVERTICULAR HEMORRHAGE: Primary | ICD-10-CM

## 2022-08-26 LAB
ALBUMIN SERPL-MCNC: 4.1 G/DL (ref 3.4–5)
ALP SERPL-CCNC: 79 U/L (ref 40–150)
ALT SERPL W P-5'-P-CCNC: 22 U/L (ref 0–50)
ANION GAP SERPL CALCULATED.3IONS-SCNC: 5 MMOL/L (ref 3–14)
AST SERPL W P-5'-P-CCNC: 22 U/L (ref 0–45)
BASOPHILS # BLD AUTO: 0 10E3/UL (ref 0–0.2)
BASOPHILS NFR BLD AUTO: 1 %
BILIRUB SERPL-MCNC: 0.5 MG/DL (ref 0.2–1.3)
BUN SERPL-MCNC: 22 MG/DL (ref 7–30)
CALCIUM SERPL-MCNC: 10.1 MG/DL (ref 8.5–10.1)
CHLORIDE BLD-SCNC: 105 MMOL/L (ref 94–109)
CO2 SERPL-SCNC: 30 MMOL/L (ref 20–32)
CREAT SERPL-MCNC: 0.92 MG/DL (ref 0.52–1.04)
CRP SERPL-MCNC: <2.9 MG/L (ref 0–8)
EOSINOPHIL # BLD AUTO: 0.2 10E3/UL (ref 0–0.7)
EOSINOPHIL NFR BLD AUTO: 4 %
ERYTHROCYTE [DISTWIDTH] IN BLOOD BY AUTOMATED COUNT: 14.6 % (ref 10–15)
ERYTHROCYTE [SEDIMENTATION RATE] IN BLOOD BY WESTERGREN METHOD: 39 MM/HR (ref 0–30)
GFR SERPL CREATININE-BSD FRML MDRD: 63 ML/MIN/1.73M2
GLUCOSE BLD-MCNC: 103 MG/DL (ref 70–99)
HCT VFR BLD AUTO: 36.1 % (ref 35–47)
HGB BLD-MCNC: 11.5 G/DL (ref 11.7–15.7)
IMM GRANULOCYTES # BLD: 0 10E3/UL
IMM GRANULOCYTES NFR BLD: 0 %
LYMPHOCYTES # BLD AUTO: 1.3 10E3/UL (ref 0.8–5.3)
LYMPHOCYTES NFR BLD AUTO: 35 %
MCH RBC QN AUTO: 33.2 PG (ref 26.5–33)
MCHC RBC AUTO-ENTMCNC: 31.9 G/DL (ref 31.5–36.5)
MCV RBC AUTO: 104 FL (ref 78–100)
MONOCYTES # BLD AUTO: 0.4 10E3/UL (ref 0–1.3)
MONOCYTES NFR BLD AUTO: 11 %
NEUTROPHILS # BLD AUTO: 1.8 10E3/UL (ref 1.6–8.3)
NEUTROPHILS NFR BLD AUTO: 49 %
NRBC # BLD AUTO: 0 10E3/UL
NRBC BLD AUTO-RTO: 0 /100
PLATELET # BLD AUTO: 278 10E3/UL (ref 150–450)
POTASSIUM BLD-SCNC: 4.2 MMOL/L (ref 3.4–5.3)
PROT SERPL-MCNC: 7.6 G/DL (ref 6.8–8.8)
RBC # BLD AUTO: 3.46 10E6/UL (ref 3.8–5.2)
SODIUM SERPL-SCNC: 140 MMOL/L (ref 133–144)
WBC # BLD AUTO: 3.7 10E3/UL (ref 4–11)

## 2022-08-26 PROCEDURE — 86140 C-REACTIVE PROTEIN: CPT

## 2022-08-26 PROCEDURE — 85652 RBC SED RATE AUTOMATED: CPT

## 2022-08-26 PROCEDURE — 85025 COMPLETE CBC W/AUTO DIFF WBC: CPT

## 2022-08-26 PROCEDURE — 82306 VITAMIN D 25 HYDROXY: CPT

## 2022-08-26 PROCEDURE — 80053 COMPREHEN METABOLIC PANEL: CPT

## 2022-08-26 PROCEDURE — 36415 COLL VENOUS BLD VENIPUNCTURE: CPT

## 2022-08-26 NOTE — TELEPHONE ENCOUNTER
Screening Questions    BlueKIND OF PREP RedLOCATION [review exclusion criteria] GreenSEDATION TYPE      1. Are you active on mychart? Y    2. What insurance is in the chart? [employer]     3.   Ordering/Referring Provider: Essence Tamayo MD     4. BMI   (If greater than 40 review exclusion criteria [PAC APPT IF [MAC] @ [facility])  17.5  [If yes, BMI OVER 40-EXTENDED PREP]      **(Sedation review/consideration needed)**  Do you have a legal guardian or Medical Power of    and/or are you able to give consent for your medical care?     Y    5. Have you had a positive covid test in the last 90 days?   N -     6.  Are you currently on dialysis?   N [ If yes, G-PREP & HOSPITAL setting ONLY]     7.  Do you have chronic kidney disease?  N [ If yes, G-PREP ]    8.   Do you have a diagnosis of diabetes?   N   [ If yes, G-PREP ]    9.  On a regular basis do you go 3-5 days between bowel movements?   N   [ If yes, EXTENDED PREP]    10.  Are you taking any prescription pain medications on a routine schedule?    N -  [ If yes, EXTENDED PREP] [If yes, MAC]      11.   Do you have any chemical dependencies such as alcohol, street drugs, or methadone?    N [If yes, MAC]    12.   Do you have any history of post-traumatic stress syndrome, severe anxiety or history of psychosis?    N  [If yes, MAC]    13.  [FEMALES] Are you currently pregnant? N    If yes, how many weeks?       Respiratory/Heart Screening:  [If yes to any of the following HOSPITAL setting only]     14. Do you have Pulmonary Hypertension [Lungs]?   N       15. Do you have UNCONTROLLED asthma?   N     16.  Do you use daily home oxygen?  N      17. Do you have mod to severe Obstructive Sleep Apnea?         (OKAY @ [facility]  [facility]  SH  PH  RI  MG - if pt is not on OXYGEN)  N      18.   Have you had a heart or lung transplant?   N      19.   Have you had a stroke or Transient ischemic attack (TIA - aka  mini stroke ) within 6 months?  (If yes, please review exclusion  "criteria)  N     20.   In the past 6 months, have you had any heart related issues including cardiomyopathy or heart attack?   N           If yes, did it require cardiac stenting or other implantable device?   NA      21.   Do you have any implantable devices in your body (pacemaker, defib, LVAD)? (If yes, please review exclusion criteria)  N, LEFT HIP REPLACMENT     22.  Do you take the medication Phentermine?  NO        23. Do you take nitroglycerin?   N           If yes, how often? NA  (if yes, HOSPITAL setting ONLY)    24.  Are you currently taking any blood thinners?    [If yes, INFORM patient to follw up w/ ORDERING PROVIDER FOR BRIDGING INSTRUCTIONS]     N    25.   Do you transfer independently?                (If NO, please HOSPITAL setting ONLY)  Y    26.   Preferred LOCAL Pharmacy for Pre Prescription:      [facility]Y #[id number] [facility] [address] [place] [address]    Scheduling Details  (Please ask for phone number if not scheduled by patient)      Caller : Patrick Olson        Additional comments:     Wants to schedule with Dr. Arthur.     On 11/16/21 Per Dr. Arthur- \"Repeat colonoscopy in 6 months to follow up piecemeal resection with Dr. Arthur.\"    Staff message sent to PANC/Bili team.  "

## 2022-08-26 NOTE — TELEPHONE ENCOUNTER
"----- Message -----  From: Fabiana Drew, RN  Sent: 8/26/2022   2:38 PM CDT  To: [facility] Team-  Subject: RE: Wants to schedule with Dr. Jerson Miller, I got it    Thank you  Dory    ----- Message -----  From: Sylvia Butcher  Sent: 8/26/2022   2:26 PM CDT  To: [facility] Team-  Subject: Wants to schedule with Dr. Arthur                 Per Dr. Martinez on 01/12/2022- \"will check with Dr. Arthur about follow-up colonoscopy, likely 6-12 months as he reportedly discussed with you\"      Per Dr. Arthur on 11/16/2021- \"Repeat colonoscopy in 6 months to follow up piecemeal resection with Dr. Arthur\"     Pt called to schedule with Dr. Arthur for her follow up colonoscopy. Are you able to help this patient get scheduled with him?    Thank you!    Sylvia Butcher  Endo Scheduling Team     "

## 2022-08-27 LAB — DEPRECATED CALCIDIOL+CALCIFEROL SERPL-MC: 73 UG/L (ref 20–75)

## 2022-08-31 NOTE — PROGRESS NOTES
Nephrology Progress Note  09/01/2022         Assessment/Plan:  CKD3  Baseline ~0.8 until last year, now between 0.9-1.1  Recent Scr 0.92, eGFR 63  Last urine prot/cr 0.27 g/g  UA bland, UPCR negative.   Patient did have multiple cysts noted on recent CT but these are likely acquired given her age and normal BP.  CKD likely in the setting of advanced age and possible contribution from chronic  methotrexate use. Cr likely overestimating GFR given weight loss and low BMI. GFR using Cockroft Gault equation is 35 ml/min.  - renal US bilateral benign appearing renal cysts, otherwise unremarkable  - she stopped methotrexate in October   - Avoid nephrotoxins  - Stopped vitamin C as this gets metabolized to oxalate and can cause toxicity - she continued this per her PCP, helps with iron absorption.    BP/Volume  History of Orthostasis  Asked patient to check BP in the morning during her symptoms.   - she stopped gabapentin in the mornings and her dizziness has improved. She is still taking two tablets in the afternoon and at bedtime.   - /65 in clinic  - does not take any antihypertensives or diuretics  - has no swelling in LE, no shortness of breath    Electrolytes:   - potassium 4.2, sodium 140, bicarb 30  - continue low potassium diet    Anemia, recurrent GI bleed  Hgb 11.5, recent Iron sat 28%  - switched to 140 mg of po iron, 325 mg caused GI upset    Bone/Mineral  - 8/24/22 Calcium high normal 10.1, vit D 73  - vit D in 3/22 was 75, recommended to stop vit D/calcium supplement  - still taking oscal with D, 500/200 mg BID  - she agrees to cut back to once a day.   - BMP in 1 month    Disposition: follow up in 6 months with Dr. Garzon       History of Present Illness:   Patrikc Olson is a 81 year old female who presents for evaluation of Cr elevation. PMH includes RA on methotrexate weekly since 1980s (current dose is 7.5 mg weekly), recurrent GI bleed with last admission in Jan 2021.     Patient notes slow weight  loss over the past year. 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 Colonoscopy showed diverticulosis and non-bleeding external hemorrhoids. She has some dizziness with exercise and bending over. She is not drinking as much water, tries for 30 ounces per day. Also continues to note fatigue and less exercise tolerance, previously was able to walk about 2 miles but now can only do about 1. No SOB, chest pain, abdominal pain, n/v/d, fevers/chills, dysuria, change in frequency, hematuria. Arthritis usually affects R elbow and hand joints as well as lower back. Denies any significant LUTS, notes nocturia x1.       Cr was noted to be 1.18 in July which prompted referral. UA has been bland.  Cr 0.7 in 2017. Up to 1.2 intermittently since Jan 2021. Recently has been stable at 0.92.      Started taking PO iron and vit C supplement on 8/27/21. She is still taking calcium/vit D supplement. She is back on methotrexate.     - Swelling: none  - Hx of UTIs: none  - Hx of stones: none  - Rashes: none new  - Family hx of kidney disease: none, father and sister have diabetes  - NSAID use: none      Review of system: negative except noted in HPI.     PHYSICAL EXAM:   Vitals: /65  Pulse 75  Resp 16  SpO2 99%  General: Awake and alert, no acute distress  Skin: Warm and dry, no visible rash  Heart: RRR  Lungs: CTA  Extremities: no LE edema      Labs:   All labs reviewed by me  Electrolytes/Renal -   Recent Labs   Lab Test 08/26/22  1003 05/05/22  1201 03/10/22  1410 11/05/21  1356 10/01/21  0927    140 142   < > 141   POTASSIUM 4.2 4.6 5.2   < > 5.2   CHLORIDE 105 107 104   < > 107   CO2 30 34* 34*   < > 30   BUN 22 25 24   < > 19   CR 0.92 0.92 1.14*   < > 0.99   * 124* 113*   < > 148*   EJ 10.1 9.9 9.9   < > 9.6   PHOS  --   --  4.1  --  3.4    < > = values in this interval not displayed.       CBC -   Recent Labs   Lab Test 08/26/22  1003 05/05/22  1205  03/10/22  1410   WBC 3.7* 3.6* 4.1   HGB 11.5* 10.6* 11.2*    230 220       LFTs -   Recent Labs   Lab Test 08/26/22  1003 05/05/22  1201 03/10/22  1410 01/03/22  0955   ALKPHOS 79 68  --  75   BILITOTAL 0.5 0.4  --  0.4   ALT 22 22  --  24   AST 22 24  --  24   PROTTOTAL 7.6 7.4  --  7.5   ALBUMIN 4.1 3.8 3.9 3.9       Iron Panel -   Recent Labs   Lab Test 03/10/22  1410 10/01/21  0927   IRON 83 60   IRONSAT 28 22   ANMOL 92 116         Imaging:  Reviewed      PATO MIX PA-C     Visit length 15 minutes. An additional 20 minutes was spent on date of service in chart review, documentation, and other activities as noted.

## 2022-09-01 ENCOUNTER — OFFICE VISIT (OUTPATIENT)
Dept: NEPHROLOGY | Facility: CLINIC | Age: 82
End: 2022-09-01
Attending: PHYSICIAN ASSISTANT
Payer: COMMERCIAL

## 2022-09-01 VITALS
SYSTOLIC BLOOD PRESSURE: 115 MMHG | OXYGEN SATURATION: 99 % | TEMPERATURE: 98.2 F | HEART RATE: 75 BPM | WEIGHT: 102.8 LBS | BODY MASS INDEX: 17.65 KG/M2 | DIASTOLIC BLOOD PRESSURE: 65 MMHG

## 2022-09-01 DIAGNOSIS — M06.9 RHEUMATOID ARTHRITIS, RHEUMATOID FACTOR STATUS UNKNOWN (H): ICD-10-CM

## 2022-09-01 DIAGNOSIS — N18.9 ANEMIA IN CHRONIC KIDNEY DISEASE, UNSPECIFIED CKD STAGE: ICD-10-CM

## 2022-09-01 DIAGNOSIS — D63.1 ANEMIA IN CHRONIC KIDNEY DISEASE, UNSPECIFIED CKD STAGE: ICD-10-CM

## 2022-09-01 DIAGNOSIS — N18.31 STAGE 3A CHRONIC KIDNEY DISEASE (H): Primary | ICD-10-CM

## 2022-09-01 PROCEDURE — 99214 OFFICE O/P EST MOD 30 MIN: CPT | Performed by: PHYSICIAN ASSISTANT

## 2022-09-01 PROCEDURE — G0463 HOSPITAL OUTPT CLINIC VISIT: HCPCS

## 2022-09-01 ASSESSMENT — PAIN SCALES - GENERAL: PAINLEVEL: NO PAIN (0)

## 2022-09-01 NOTE — NURSING NOTE
Chief Complaint   Patient presents with     RECHECK     Return visit.     Blood pressure 115/65, pulse 75, temperature 98.2  F (36.8  C), weight 46.6 kg (102 lb 12.8 oz), SpO2 99 %, not currently breastfeeding.    LUCÍA JOHNSON

## 2022-09-01 NOTE — LETTER
9/1/2022       RE: Patrick Olson  [address]  [place] [facility] [id number]     Dear Colleague,    Thank you for referring your patient, Patrick Olson, to the [facility] [place] [facility]. Please see a copy of my visit note below.    Nephrology Progress Note  09/01/2022         Assessment/Plan:  CKD3  Baseline ~0.8 until last year, now between 0.9-1.1  Recent Scr 0.92, eGFR 63  Last urine prot/cr 0.27 g/g  UA bland, UPCR negative.   Patient did have multiple cysts noted on recent CT but these are likely acquired given her age and normal BP.  CKD likely in the setting of advanced age and possible contribution from chronic  methotrexate use. Cr likely overestimating GFR given weight loss and low BMI. GFR using Cockroft Gault equation is 35 ml/min.  - renal US bilateral benign appearing renal cysts, otherwise unremarkable  - she stopped methotrexate in October   - Avoid nephrotoxins  - Stopped vitamin C as this gets metabolized to oxalate and can cause toxicity - she continued this per her PCP, helps with iron absorption.    BP/Volume  History of Orthostasis  Asked patient to check BP in the morning during her symptoms.   - she stopped gabapentin in the mornings and her dizziness has improved. She is still taking two tablets in the afternoon and at bedtime.   - /65 in clinic  - does not take any antihypertensives or diuretics  - has no swelling in LE, no shortness of breath    Electrolytes:   - potassium 4.2, sodium 140, bicarb 30  - continue low potassium diet    Anemia, recurrent GI bleed  Hgb 11.5, recent Iron sat 28%  - switched to 140 mg of po iron, 325 mg caused GI upset    Bone/Mineral  - 8/24/22 Calcium high normal 10.1, vit D 73  - vit D in 3/22 was 75, recommended to stop vit D/calcium supplement  - still taking oscal with D, 500/200 mg BID  - she agrees to cut back to once a day.   - BMP in 1 month    Disposition:  follow up in 6 months with Dr. Garzon       History of Present Illness:   Patrick Olson is a 81 year old female who presents for evaluation of Cr elevation. PMH includes RA on methotrexate weekly since 1980s (current dose is 7.5 mg weekly), recurrent GI bleed with last admission in Jan 2021.     Patient notes slow weight loss over the past year. 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 Colonoscopy showed diverticulosis and non-bleeding external hemorrhoids. She has some dizziness with exercise and bending over. She is not drinking as much water, tries for 30 ounces per day. Also continues to note fatigue and less exercise tolerance, previously was able to walk about 2 miles but now can only do about 1. No SOB, chest pain, abdominal pain, n/v/d, fevers/chills, dysuria, change in frequency, hematuria. Arthritis usually affects R elbow and hand joints as well as lower back. Denies any significant LUTS, notes nocturia x1.       Cr was noted to be 1.18 in July which prompted referral. UA has been bland.  Cr 0.7 in 2017. Up to 1.2 intermittently since Jan 2021. Recently has been stable at 0.92.      Started taking PO iron and vit C supplement on 8/27/21. She is still taking calcium/vit D supplement. She is back on methotrexate.     - Swelling: none  - Hx of UTIs: none  - Hx of stones: none  - Rashes: none new  - Family hx of kidney disease: none, father and sister have diabetes  - NSAID use: none      Review of system: negative except noted in HPI.     PHYSICAL EXAM:   Vitals: /65  Pulse 75  Resp 16  SpO2 99%  General: Awake and alert, no acute distress  Skin: Warm and dry, no visible rash  Heart: RRR  Lungs: CTA  Extremities: no LE edema      Labs:   All labs reviewed by me  Electrolytes/Renal -   Recent Labs   Lab Test 08/26/22  1003 05/05/22  1201 03/10/22  1410 11/05/21  1356 10/01/21  0927    140 142   < > 141   POTASSIUM 4.2 4.6 5.2   < > 5.2    CHLORIDE 105 107 104   < > 107   CO2 30 34* 34*   < > 30   BUN 22 25 24   < > 19   CR 0.92 0.92 1.14*   < > 0.99   * 124* 113*   < > 148*   EJ 10.1 9.9 9.9   < > 9.6   PHOS  --   --  4.1  --  3.4    < > = values in this interval not displayed.       CBC -   Recent Labs   Lab Test 08/26/22  1003 05/05/22  1201 03/10/22  1410   WBC 3.7* 3.6* 4.1   HGB 11.5* 10.6* 11.2*    230 220       LFTs -   Recent Labs   Lab Test 08/26/22  1003 05/05/22  1201 03/10/22  1410 01/03/22  0955   ALKPHOS 79 68  --  75   BILITOTAL 0.5 0.4  --  0.4   ALT 22 22  --  24   AST 22 24  --  24   PROTTOTAL 7.6 7.4  --  7.5   ALBUMIN 4.1 3.8 3.9 3.9       Iron Panel -   Recent Labs   Lab Test 03/10/22  1410 10/01/21  0927   IRON 83 60   IRONSAT 28 22   ANMOL 92 116         Imaging:  Reviewed      PATO MIX PA-C     Visit length 15 minutes. An additional 20 minutes was spent on date of service in chart review, documentation, and other activities as noted.       Again, thank you for allowing me to participate in the care of your patient.      Sincerely,    PATO MIX PA-C

## 2022-09-01 NOTE — PATIENT INSTRUCTIONS
Decrease calcium/vit D supplement to once a day.   Increase water intake.   Avoid ibuprofen/aleve.   Labs in one month.   Follow up with Dr. Garzon in 6 months.   Call sooner with any questions or concerns.

## 2022-09-06 ENCOUNTER — PREP FOR PROCEDURE (OUTPATIENT)
Dept: GASTROENTEROLOGY | Facility: CLINIC | Age: 82
End: 2022-09-06

## 2022-09-06 ENCOUNTER — TELEPHONE (OUTPATIENT)
Dept: GASTROENTEROLOGY | Facility: CLINIC | Age: 82
End: 2022-09-06

## 2022-09-06 ENCOUNTER — PATIENT OUTREACH (OUTPATIENT)
Dept: GASTROENTEROLOGY | Facility: CLINIC | Age: 82
End: 2022-09-06

## 2022-09-06 DIAGNOSIS — Z86.0100 HISTORY OF COLONIC POLYPS: Primary | ICD-10-CM

## 2022-09-06 DIAGNOSIS — K63.5 CECAL POLYP: Primary | ICD-10-CM

## 2022-09-06 RX ORDER — BISACODYL 5 MG
TABLET, DELAYED RELEASE (ENTERIC COATED) ORAL
Qty: 4 TABLET | Refills: 0 | Status: ON HOLD | OUTPATIENT
Start: 2022-09-06 | End: 2023-02-06

## 2022-09-06 NOTE — PROGRESS NOTES
Repeat colonoscopy in 6 months to follow up piecemeal resection with Dr. Arthur    Procedure/Imaging/Clinic: Colonoscopy  Physician: Jerson  Timing: next available  Procedure length:   Anesthesia: MAC  Dx: history of ileocecal polyp  Tier: 3  Location: [facility]    Explained they can expect a call from  for date and time of procedure, will need a , someone to stay with them for 24 hours and should stay in town for 24 hours (within 45 min of Hospital) post procedure    Patient needs to get pre-op physical completed. If outside [facility] system will need physical faxed to number [phone]   If you do not get a preop physical, your procedure could be cancelled, patient voiced understanding*    Preop Plan: Pt understands this needs to be done prior to procedure, pt will schedule with PCP    Med Review    Blood thinner -  none  ASA - none  Diabetic - none    COVID test discussed: yes, pt would opt to do at home COVID 1-2 days prior to procedure.     Patient Education r/t procedure: tracit    A pre-op nurse will call 1-2 days prior to the procedure.    NPO/Prep: on miralax daily, takes this to stay regular. Golytely prescribed    Verbalized understanding of all instructions. All questions answered.     Procedure order placed, message routed to OR      Fabiana Drew, RN, BSN,   [facility]  Care coordinator

## 2022-09-07 ENCOUNTER — TELEPHONE (OUTPATIENT)
Dept: GASTROENTEROLOGY | Facility: CLINIC | Age: 82
End: 2022-09-07

## 2022-09-07 NOTE — TELEPHONE ENCOUNTER
LVM for patient in regards to scheduling procedure with Dr. Arthur at [facility]. Left direct line for patient to call to go over scheduling details.

## 2022-09-08 ENCOUNTER — TELEPHONE (OUTPATIENT)
Dept: NEPHROLOGY | Facility: CLINIC | Age: 82
End: 2022-09-08

## 2022-09-08 NOTE — TELEPHONE ENCOUNTER
lvm for patient to call us back to schedule a 6 month f/u appointment with Dr Tapia with labs ib 1 month as well

## 2022-09-12 ENCOUNTER — MYC MEDICAL ADVICE (OUTPATIENT)
Dept: ORTHOPEDICS | Facility: CLINIC | Age: 82
End: 2022-09-12

## 2022-09-12 DIAGNOSIS — M54.16 LUMBAR RADICULOPATHY, ACUTE: Primary | ICD-10-CM

## 2022-09-12 NOTE — TELEPHONE ENCOUNTER
Patient last seen 5/11/22 with diagnosis of lumbar stenosis with intermittent neurogenic claudication. She has upcoming appointment on 9/23/22 and is requesting a PT order so she can start PT prior to her upcoming visit. Please advise if PT can be started. Order pended for provider review.     Kailey Finley MSA, ATC  Certified Athletic Trainer

## 2022-09-16 ENCOUNTER — TRANSFERRED RECORDS (OUTPATIENT)
Dept: HEALTH INFORMATION MANAGEMENT | Facility: CLINIC | Age: 82
End: 2022-09-16

## 2022-09-19 ENCOUNTER — THERAPY VISIT (OUTPATIENT)
Dept: PHYSICAL THERAPY | Facility: CLINIC | Age: 82
End: 2022-09-19
Attending: FAMILY MEDICINE
Payer: COMMERCIAL

## 2022-09-19 ENCOUNTER — MEDICAL CORRESPONDENCE (OUTPATIENT)
Dept: HEALTH INFORMATION MANAGEMENT | Facility: CLINIC | Age: 82
End: 2022-09-19

## 2022-09-19 DIAGNOSIS — M54.50 BILATERAL LOW BACK PAIN WITHOUT SCIATICA: ICD-10-CM

## 2022-09-19 DIAGNOSIS — M54.16 LUMBAR RADICULOPATHY, ACUTE: ICD-10-CM

## 2022-09-19 PROCEDURE — 97161 PT EVAL LOW COMPLEX 20 MIN: CPT | Mod: GP | Performed by: PHYSICAL THERAPIST

## 2022-09-19 PROCEDURE — 97110 THERAPEUTIC EXERCISES: CPT | Mod: GP | Performed by: PHYSICAL THERAPIST

## 2022-09-19 PROCEDURE — 97112 NEUROMUSCULAR REEDUCATION: CPT | Mod: GP | Performed by: PHYSICAL THERAPIST

## 2022-09-19 NOTE — PROGRESS NOTES
[facility]    OUTPATIENT Physical Therapy ORTHOPEDIC EVALUATION  PLAN OF TREATMENT FOR OUTPATIENT REHABILITATION  (COMPLETE FOR INITIAL CLAIMS ONLY)  Patient's Last Name, First Name, M.I.  YOB: 1940  Patrick Olson    Provider s Name:  [facility]   Medical Record No.  [id number]   Start of Care Date:  09/19/22   Onset Date:   09/14/22 (MD visit)   Type:     _X__PT   ___OT Medical Diagnosis:    Encounter Diagnoses   Name Primary?    Lumbar radiculopathy, acute     Bilateral low back pain without sciatica         Treatment Diagnosis:  LBP, lumbar stenosis w intermittent neurogenic claudication        Goals:     09/19/22 0500   Body Part   Goals listed below are for LB   Goal #1   Goal #1 ambulation   Previous Functional Level No restrictions   Current Functional Level Miles patient can walk   Performance Level 1.5 miles up to 5/10 pain   STG Target Performance Miles patient will be able to  walk   Performance Level 1.5 miles 0/10 pain   Rationale for safe household ambulation;for safe outdoor household ambulation;for safe community ambulation;to promote a healthy and active lifestyle;to maintain proper body mechanics/posture while ambulating to avoid additional compensatory injury due to improper gait mechanics   Due Date 10/10/22    LTG Target Performance Miles patient will be able to  walk   Performance Level 2 miles 0/10 pain   Rationale for safe household ambulation;for safe outdoor household ambulation;for safe community ambulation;to promote a healthy and active lifestyle;to maintain proper body mechanics/posture while ambulating to avoid additional compensatory injury due to improper gait mechanics   Due Date 11/14/22       Therapy Frequency:  1x/week  Predicted Duration of Therapy Intervention:  4 weeks, tapering to 2x/month x 1 month    Brina Jackson, PT                  I CERTIFY THE NEED FOR THESE SERVICES FURNISHED UNDER        THIS PLAN OF TREATMENT AND WHILE UNDER MY CARE     (Physician attestation of this document indicates review and certification of the therapy plan).                     Certification Date From:  09/19/22   Certification Date To:  11/14/22    Referring Provider:  Mukesh Lantigua    Initial Assessment        See Epic Evaluation SOC Date: 09/19/22

## 2022-09-19 NOTE — PROGRESS NOTES
Physical Therapy Initial Evaluation  Subjective:    Patient Health History  Patrick Olson being seen for chronic LBP, lumbar stenosis.     Problem began: 9/14/2022 (MD visit).   Problem occurred: Chronic low back pain for 5-6 years due to old age.  Lumbar injections 2/7/22 & 5/17/22 don't seem to help right away but over time they seem to help.   Pain is reported as 5/10 on pain scale.  General health as reported by patient is fair.  Pertinent medical history includes: osteoarthritis, rheumatoid arthritis and kidney disease.   Red flags:  Pain at rest/night.  Medical allergies: other. Other medical allergies details: bee stings, shrimp.    Other surgery history details: L hip replacement.    Current medications:  Sleep medication and anti-inflammatory.    Current occupation is retired.                     Therapist Generated HPI Evaluation  Problem details: Able to walk 1 mile, but when tries to walk 1.5 miles then gets increased pain.  Is going to [place] 10/5/22 so wants therapy to help before the trip.      Previous therapy and does routine of exercises in the morning..         Type of problem:  Lumbar.          Patient reports pain:  Lumbar spine right and lumbar spine left.  Pain is described as aching and is intermittent.  Pain radiates to:  No radiation (lateral hips). Pain is worse in the P.M..  Since onset symptoms are gradually worsening (unchanged to getting a little worse).  Associated symptoms:  Loss of motion/stiffness and loss of strength. Symptoms are exacerbated by bending (sitting 30 min, standing 1 hour, walking > 1 mile, sometimes sleeping)  and relieved by heat (injections, ).  Special tests included:  MRI (> 1 year ago R lateral listhesis L3-L4).    Home/work barriers: lives in home with .  Does have stairs.                        Objective:  Standing Alignment:        Lumbar deviations alignment: Right upper lumbar curve, Left lower lumbar curve.                           Lumbar/SI  Evaluation  ROM:    AROM Lumbar:   Flexion:          Min loss R upper curve, and L lower lumbar curve  Ext:                    Min loss   Side Bend:        Left:     Right:   Rotation:           Left:     Right:   Side Glide:        Left:  No loss lateral hip pain    Right:  No loss lateral hip pain        Strength: MMT TA 2/5  Lumbar Myotomes:    T12-L3 (Hip Flex):  Left: 4-    Right: 4-  L2-4 (Quads):  Left:  4    Right:  4  L4 (Ankle DF):  Left:  4+    Right:  4+  L5 (Great Toe Ext): Left: 4+    Right: 4+   S1 (Toe Raise):  Left: 4+    Right: 4+      Lumbar Dermtomes:  not assessed                  Lumbar Palpation:    Tenderness present at Left:    Quadratus Lumborum and Erector Spinae  Tenderness not present at Left:    PSIS  Tenderness present at Right: Quadratus Lumborum and Erector Spinae  Tenderness not present at Right:  PSIS                                                       [place] Lumbar Evaluation    Posture:  Sitting: fair  Standing: fair  Lordosis: WNL  Lateral Shift: no  Correction of Posture: better  Other Observations: ANGEL increase LB and to B hip pain, W after.  R FISitting x 10 centralizes to back but increases, better after    Test Movements:  FIS: During: increases  After: no worse  Pretest Movements: central LBP    EIS: During: no effect  After: no effect    Repeat EIS: During: increases  After: worse  Mechanical Response: no effect                                                     ROS    Assessment/Plan:    Patient is a 81 year old female with lumbar complaints.    Patient has the following significant findings with corresponding treatment plan.                Diagnosis 1:  LBP, lumbar stenosis w/ intermittent neurogenic claudication    Pain -  manual therapy, self management, education and home program  Decreased ROM/flexibility - manual therapy, therapeutic exercise, therapeutic activity and home program  Decreased strength - therapeutic exercise, therapeutic activities and home  program  Decreased function - therapeutic activities and home program  Impaired posture - neuro re-education, therapeutic activities and home program    Therapy Evaluation Codes:   Cumulative Therapy Evaluation is: Low complexity.    Previous and current functional limitations:  (See Goal Flow Sheet for this information)    Short term and Long term goals: (See Goal Flow Sheet for this information)     Communication ability:  Patient appears to be able to clearly communicate and understand verbal and written communication and follow directions correctly.  Treatment Explanation - The following has been discussed with the patient:   RX ordered/plan of care  Anticipated outcomes  Possible risks and side effects  This patient would benefit from PT intervention to resume normal activities.   Rehab potential is good.    Frequency:  1 X week, once daily  Duration:  for 4 weeks, tapering to 2x/month x 1 month  Discharge Plan:  Achieve all LTG.  Independent in home treatment program.  Reach maximal therapeutic benefit.    Please refer to the daily flowsheet for treatment today, total treatment time and time spent performing 1:1 timed codes.

## 2022-09-20 ENCOUNTER — PATIENT OUTREACH (OUTPATIENT)
Dept: ONCOLOGY | Facility: CLINIC | Age: 82
End: 2022-09-20

## 2022-09-20 ENCOUNTER — PREP FOR PROCEDURE (OUTPATIENT)
Dept: ONCOLOGY | Facility: CLINIC | Age: 82
End: 2022-09-20

## 2022-09-20 NOTE — PROGRESS NOTES
"Pt called  with c/o diverticular bleeding, started yesterday and a bit more today. Taking iron, so her stool is usually dark. She noticed her stool was red/brown last night, and then this morning \"was quite red, maybe 1 tsp with one stool\".   No lightheadedness or dizziness.   Normally when that happens she would go to the ED to have a colonoscopy. Thus far the source of bleeding has not been found on scope.    Message routed to Dr Arthur for guidance, pt currently has planned procedure on 11/10    1500  Returned patient call to discuss moving procedure date up to 9/22 in [facility] OR. Left VM    Preop Plan: 9/1/22 office visit with Dr Raymond, nephrology. Hgb 11.5 at last check on 8/26/22     Med Review     Blood thinner -  none  ASA - none  Diabetic - none     COVID test discussed: yes, pt would opt to do at home COVID 1-2 days prior to procedure.      Patient Education r/t procedure: christian     A pre-op nurse will call 1-2 days prior to the procedure.     NPO/Prep: on miralax daily, takes this to stay regular. Golytely prescribed    1515  Pt returned call, discussed that she should stop taking iron supplement now, Dr Arthur aware and ok'd this. Pt has instructions for Golytely and will  from her pharmacy today to start prepping for procedure on Thurs   Message routed to OR       Fabiana Drew, RN, BSN,   [facility]  Care coordinator        "

## 2022-09-21 ENCOUNTER — ANESTHESIA EVENT (OUTPATIENT)
Dept: GASTROENTEROLOGY | Facility: CLINIC | Age: 82
End: 2022-09-21
Payer: COMMERCIAL

## 2022-09-21 NOTE — PROGRESS NOTES
Pt called with question about arrival time for procedure at [facility] tomorrow. Pt did not get a PAN call. Call made to [facility] OR to make sure patient was scheduled for 9/22 procedure in OR (Dr Arthur had a cancellation, so Patrick's case was to fill that opening).  assisted, stating pt would be on for 10am case. Stated that PAN would call pt yet tonight.    I called pt back, explained that procedure was being scheduled and to expect the PAN call. Told her to let me know tomorrow morning if she does not get a call.

## 2022-09-22 ENCOUNTER — ANESTHESIA (OUTPATIENT)
Dept: GASTROENTEROLOGY | Facility: CLINIC | Age: 82
End: 2022-09-22
Payer: COMMERCIAL

## 2022-09-22 ENCOUNTER — HOSPITAL ENCOUNTER (OUTPATIENT)
Facility: CLINIC | Age: 82
Discharge: HOME | End: 2022-09-22
Attending: INTERNAL MEDICINE | Admitting: INTERNAL MEDICINE
Payer: COMMERCIAL

## 2022-09-22 VITALS
DIASTOLIC BLOOD PRESSURE: 62 MMHG | HEART RATE: 68 BPM | SYSTOLIC BLOOD PRESSURE: 124 MMHG | RESPIRATION RATE: 22 BRPM | BODY MASS INDEX: 17.07 KG/M2 | HEIGHT: 64 IN | OXYGEN SATURATION: 100 % | WEIGHT: 100 LBS

## 2022-09-22 LAB
COLONOSCOPY: NORMAL
ERYTHROCYTE [DISTWIDTH] IN BLOOD BY AUTOMATED COUNT: 14.1 % (ref 10–15)
HCT VFR BLD AUTO: 30.2 % (ref 35–47)
HGB BLD-MCNC: 9.8 G/DL (ref 11.7–15.7)
MCH RBC QN AUTO: 33.8 PG (ref 26.5–33)
MCHC RBC AUTO-ENTMCNC: 32.5 G/DL (ref 31.5–36.5)
MCV RBC AUTO: 104 FL (ref 78–100)
PLATELET # BLD AUTO: 231 10E3/UL (ref 150–450)
RBC # BLD AUTO: 2.9 10E6/UL (ref 3.8–5.2)
WBC # BLD AUTO: 4.7 10E3/UL (ref 4–11)

## 2022-09-22 PROCEDURE — 250N000009 HC RX 250

## 2022-09-22 PROCEDURE — 88305 TISSUE EXAM BY PATHOLOGIST: CPT | Mod: TC | Performed by: INTERNAL MEDICINE

## 2022-09-22 PROCEDURE — 45385 COLONOSCOPY W/LESION REMOVAL: CPT | Performed by: INTERNAL MEDICINE

## 2022-09-22 PROCEDURE — 250N000011 HC RX IP 250 OP 636

## 2022-09-22 PROCEDURE — 36415 COLL VENOUS BLD VENIPUNCTURE: CPT | Performed by: INTERNAL MEDICINE

## 2022-09-22 PROCEDURE — 88305 TISSUE EXAM BY PATHOLOGIST: CPT | Mod: 26 | Performed by: PATHOLOGY

## 2022-09-22 PROCEDURE — 85014 HEMATOCRIT: CPT | Performed by: INTERNAL MEDICINE

## 2022-09-22 PROCEDURE — 370N000017 HC ANESTHESIA TECHNICAL FEE, PER MIN: Performed by: INTERNAL MEDICINE

## 2022-09-22 PROCEDURE — 258N000003 HC RX IP 258 OP 636

## 2022-09-22 PROCEDURE — 999N000010 HC STATISTIC ANES STAT CODE-CRNA PER MINUTE: Performed by: INTERNAL MEDICINE

## 2022-09-22 RX ORDER — ONDANSETRON 2 MG/ML
4 INJECTION INTRAMUSCULAR; INTRAVENOUS EVERY 30 MIN PRN
Status: DISCONTINUED | OUTPATIENT
Start: 2022-09-22 | End: 2022-09-22 | Stop reason: HOSPADM

## 2022-09-22 RX ORDER — PROPOFOL 10 MG/ML
INJECTION, EMULSION INTRAVENOUS PRN
Status: DISCONTINUED | OUTPATIENT
Start: 2022-09-22 | End: 2022-09-22

## 2022-09-22 RX ORDER — SODIUM CHLORIDE, SODIUM LACTATE, POTASSIUM CHLORIDE, CALCIUM CHLORIDE 600; 310; 30; 20 MG/100ML; MG/100ML; MG/100ML; MG/100ML
INJECTION, SOLUTION INTRAVENOUS CONTINUOUS PRN
Status: DISCONTINUED | OUTPATIENT
Start: 2022-09-22 | End: 2022-09-22

## 2022-09-22 RX ORDER — PROPOFOL 10 MG/ML
INJECTION, EMULSION INTRAVENOUS CONTINUOUS PRN
Status: DISCONTINUED | OUTPATIENT
Start: 2022-09-22 | End: 2022-09-22

## 2022-09-22 RX ORDER — SODIUM CHLORIDE, SODIUM LACTATE, POTASSIUM CHLORIDE, CALCIUM CHLORIDE 600; 310; 30; 20 MG/100ML; MG/100ML; MG/100ML; MG/100ML
INJECTION, SOLUTION INTRAVENOUS CONTINUOUS
Status: DISCONTINUED | OUTPATIENT
Start: 2022-09-22 | End: 2022-09-22 | Stop reason: HOSPADM

## 2022-09-22 RX ORDER — ONDANSETRON 2 MG/ML
INJECTION INTRAMUSCULAR; INTRAVENOUS PRN
Status: DISCONTINUED | OUTPATIENT
Start: 2022-09-22 | End: 2022-09-22

## 2022-09-22 RX ORDER — LIDOCAINE HYDROCHLORIDE 20 MG/ML
INJECTION, SOLUTION INFILTRATION; PERINEURAL PRN
Status: DISCONTINUED | OUTPATIENT
Start: 2022-09-22 | End: 2022-09-22

## 2022-09-22 RX ORDER — ONDANSETRON 4 MG/1
4 TABLET, ORALLY DISINTEGRATING ORAL EVERY 30 MIN PRN
Status: DISCONTINUED | OUTPATIENT
Start: 2022-09-22 | End: 2022-09-22 | Stop reason: HOSPADM

## 2022-09-22 RX ADMIN — PHENYLEPHRINE HYDROCHLORIDE 100 MCG: 10 INJECTION INTRAVENOUS at 10:37

## 2022-09-22 RX ADMIN — LIDOCAINE HYDROCHLORIDE 50 MG: 20 INJECTION, SOLUTION INFILTRATION; PERINEURAL at 10:26

## 2022-09-22 RX ADMIN — PROPOFOL 125 MCG/KG/MIN: 10 INJECTION, EMULSION INTRAVENOUS at 10:26

## 2022-09-22 RX ADMIN — PHENYLEPHRINE HYDROCHLORIDE 50 MCG: 10 INJECTION INTRAVENOUS at 10:51

## 2022-09-22 RX ADMIN — PHENYLEPHRINE HYDROCHLORIDE 50 MCG: 10 INJECTION INTRAVENOUS at 10:45

## 2022-09-22 RX ADMIN — ONDANSETRON 4 MG: 2 INJECTION INTRAMUSCULAR; INTRAVENOUS at 10:26

## 2022-09-22 RX ADMIN — PROPOFOL 30 MG: 10 INJECTION, EMULSION INTRAVENOUS at 10:27

## 2022-09-22 RX ADMIN — PHENYLEPHRINE HYDROCHLORIDE 50 MCG: 10 INJECTION INTRAVENOUS at 11:09

## 2022-09-22 RX ADMIN — SODIUM CHLORIDE, POTASSIUM CHLORIDE, SODIUM LACTATE AND CALCIUM CHLORIDE: 600; 310; 30; 20 INJECTION, SOLUTION INTRAVENOUS at 10:23

## 2022-09-22 ASSESSMENT — LIFESTYLE VARIABLES: TOBACCO_USE: 0

## 2022-09-22 ASSESSMENT — ENCOUNTER SYMPTOMS
DYSRHYTHMIAS: 0
SEIZURES: 0

## 2022-09-22 ASSESSMENT — ACTIVITIES OF DAILY LIVING (ADL): ADLS_ACUITY_SCORE: 35

## 2022-09-22 NOTE — ANESTHESIA POSTPROCEDURE EVALUATION
Patient: Patrick Olson    Procedure: Procedure(s):  COLONOSCOPY, FLEXIBLE, WITH LESION REMOVAL USING SNARE       Anesthesia Type:  MAC    Note:  Disposition: Outpatient   Postop Pain Control: Uneventful            Sign Out: Well controlled pain   PONV: No   Neuro/Psych: Uneventful            Sign Out: Acceptable/Baseline neuro status   Airway/Respiratory: Uneventful            Sign Out: Acceptable/Baseline resp. status   CV/Hemodynamics: Uneventful            Sign Out: Acceptable CV status   Other NRE: NONE   DID A NON-ROUTINE EVENT OCCUR? No           Last vitals:  Vitals Value Taken Time   /62 09/22/22 1150   Temp     Pulse 70 09/22/22 1200   Resp 16 09/22/22 1200   SpO2 100 % 09/22/22 1159   Vitals shown include unvalidated device data.    Electronically Signed By: Rich Jackson MD  September 22, 2022  1:17 PM

## 2022-09-22 NOTE — H&P
[facility]  GASTROENTEROLOGY H&P NOTE  Patrick Olson [id number]   09/22/2022    HPI  81 year old female with a PMHx of CKD3, rheumatoid arthritis, and a prior history of GI bleeding with previously noted diverticulosis and a large ileocecal valve polyp resected on 11/20/21 in piecemeal. She was due for a surveillance colonoscopy to look at this site again but two days ago started to notice blood in her stool. No abdominal pain. Blood was initially about 1 teaspoon of bright red blood mixed with the stool. She had more blood in her stool during her bowel prep that was darker in color. No light headedness, palpitations, shortness of breath, or chest pain. Has a priot history of orthostasis. Plan for colonoscopy today.    Past Medical History:   Diagnosis Date     Diverticulosis of large intestine      Osteoarthritis      Osteoporosis      Rheumatoid arthritis (H)      Sensorineural hearing loss        Past Surgical History:   Procedure Laterality Date     CAPSULE/PILL CAM ENDOSCOPY N/A 11/18/2021    Procedure: IMAGING PROCEDURE, GI TRACT, INTRALUMINAL, VIA CAPSULE;  Surgeon: Deric Rodriguez MD;  Location: [facility]     COLONOSCOPY N/A 3/14/2017    Procedure: COMBINED COLONOSCOPY, SINGLE OR MULTIPLE BIOPSY/POLYPECTOMY BY BIOPSY;  Surgeon: Spencer Downey MD;  Location: [facility]     ENTEROSCOPY SMALL BOWEL N/A 11/20/2021    Procedure: ENTEROSCOPY, colonoscopy with endoscopic mucosal resection and tattoo placement;  Surgeon: Kirby Arthur MD;  Location: [facility]     IR VISCERAL EMBOLIZATION  1/22/2021     ORTHOPEDIC SURGERY       SIGMOIDOSCOPY FLEXIBLE N/A 1/23/2021    Procedure: SIGMOIDOSCOPY, FLEXIBLE;  Surgeon: Jaime Steinberg MD;  Location:  GI          Allergies   Allergen Reactions     Bee Venom Anaphylaxis     Shrimp Anaphylaxis     Evoxac [Cevimeline] Unknown     Prevnar Swelling     Other reaction(s): Edema     Prevnar [Pneumococcal 13-Linda Conj Vacc] Unknown       No current  "facility-administered medications for this encounter.    No family history on file.    Social History     Socioeconomic History     Marital status:      Spouse name: Not on file     Number of children: Not on file     Years of education: Not on file     Highest education level: Not on file   Occupational History     Not on file   Tobacco Use     Smoking status: Former Smoker     Smokeless tobacco: Never Used   Substance and Sexual Activity     Alcohol use: No     Drug use: No     Sexual activity: Not on file   Other Topics Concern     Not on file   Social History Narrative    - Retired (formerly employed as  at [employer]    - 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[employer])     Social Determinants of Health     Financial Resource Strain: Not on file   Food Insecurity: Not on file   Transportation Needs: Not on file   Physical Activity: Not on file   Stress: Not on file   Social Connections: Not on file   Intimate Partner Violence: Not At Risk     Fear of Current or Ex-Partner: No     Emotionally Abused: No     Physically Abused: No     Sexually Abused: No   Housing Stability: Not on file       Review Of Systems  Skin: negative  Eyes: negative  Ears/Nose/Throat: negative  Respiratory: No shortness of breath, dyspnea on exertion, cough, or hemoptysis  Cardiovascular: negative  Gastrointestinal: as above  Genitourinary: negative  Musculoskeletal: rheumatoid arthritis  Neurologic: negative  Psychiatric: negative  Hematologic/Lymphatic/Immunologic: negative  Endocrine: negative      OBJECTIVE:  VS: /75   Pulse 86   Resp 12   Ht 1.626 m (5' 4\")   Wt 45.4 kg (100 lb)   SpO2 100%   BMI 17.16 kg/m     GEN: A&Ox3, NAD, comfortable  CV:  RRR, no M/G/R  PULM:  CTA B/L  ABD: Normoactive bowel sounds, soft, ND, NT, no HSM  SKIN: no focal lesion  EXT: no LE edema  NEURO: nonfocal    REVIEW OF LABORATORY, PATHOLOGY AND IMAGING " RESULTS:  BMPNo lab results found in last 7 days.    CBCNo lab results found in last 7 days.    INRNo lab results found in last 7 days.    LFTsNo lab results found in last 7 days.     PANCNo lab results found in last 7 days.    IMPRESSION:  Patrick Olson is a 81 year old female with a PMHx of CKD3, rheumatoid arthritis, and a prior history of GI bleeding with previously noted diverticulosis and a large ileocecal valve polyp resected on 11/20/21 in piecemeal with recent development of hematochezia. Colonoscopy for further evaluation.     Kirby Arthur MD  [facility]  Division of Gastroenterology and Hepatology  [facility]  [address] [phone]

## 2022-09-22 NOTE — ANESTHESIA CARE TRANSFER NOTE
Patient: Patrick Olson    Procedure: Procedure(s):  COLONOSCOPY, FLEXIBLE, WITH LESION REMOVAL USING SNARE  COLONOSCOPY, WITH POLYPECTOMY AND BIOPSY       Diagnosis: History of colonic polyps [Z86.010]  Diagnosis Additional Information: No value filed.    Anesthesia Type:   MAC     Note:    Oropharynx: oropharynx clear of all foreign objects and spontaneously breathing  Level of Consciousness: awake  Oxygen Supplementation: nasal cannula  Level of Supplemental Oxygen (L/min / FiO2): 3  Independent Airway: airway patency satisfactory and stable  Dentition: dentition unchanged  Vital Signs Stable: post-procedure vital signs reviewed and stable  Report to RN Given: handoff report given  Patient transferred to: PACU (endo )  Comments: VSS and spont breathing. See RN documentation for vitals.  Handoff Report: Identifed the Patient, Identified the Reponsible Provider, Reviewed the pertinent medical history, Discussed the surgical course, Reviewed Intra-OP anesthesia mangement and issues during anesthesia, Set expectations for post-procedure period and Allowed opportunity for questions and acknowledgement of understanding          Electronically Signed By: PATRICIA Da Silva CRNA  September 22, 2022  11:30 AM

## 2022-09-22 NOTE — ANESTHESIA PREPROCEDURE EVALUATION
Anesthesia Pre-Procedure Evaluation    Patient: Patrick Olson   MRN: [id number] : 1940        Procedure : Procedure(s):  COLONOSCOPY          Past Medical History:   Diagnosis Date     Diverticulosis of large intestine      Osteoarthritis      Osteoporosis      Rheumatoid arthritis (H)      Sensorineural hearing loss       Past Surgical History:   Procedure Laterality Date     CAPSULE/PILL CAM ENDOSCOPY N/A 2021    Procedure: IMAGING PROCEDURE, GI TRACT, INTRALUMINAL, VIA CAPSULE;  Surgeon: Deric Rodriguez MD;  Location: [facility]     COLONOSCOPY N/A 3/14/2017    Procedure: COMBINED COLONOSCOPY, SINGLE OR MULTIPLE BIOPSY/POLYPECTOMY BY BIOPSY;  Surgeon: Spencer Downey MD;  [facility]: [facility]     ENTEROSCOPY SMALL BOWEL N/A 2021    Procedure: ENTEROSCOPY, colonoscopy with endoscopic mucosal resection and tattoo placement;  Surgeon: Kirby Arthur MD;  [facility]     IR VISCERAL EMBOLIZATION  2021     ORTHOPEDIC SURGERY       SIGMOIDOSCOPY FLEXIBLE N/A 2021    Procedure: SIGMOIDOSCOPY, FLEXIBLE;  Surgeon: Jaime Steinberg MD;  Location:  GI      Allergies   Allergen Reactions     Bee Venom Anaphylaxis     Shrimp Anaphylaxis     Evoxac [Cevimeline] Unknown     Prevnar Swelling     Other reaction(s): Edema     Prevnar [Pneumococcal 13-Linda Conj Vacc] Unknown      Social History     Tobacco Use     Smoking status: Former Smoker     Smokeless tobacco: Never Used   Substance Use Topics     Alcohol use: No      Wt Readings from Last 1 Encounters:   22 45.4 kg (100 lb)        Anesthesia Evaluation   Pt has had prior anesthetic. Type: General.    No history of anesthetic complications       ROS/MED HX  ENT/Pulmonary:    (-) tobacco use, asthma and sleep apnea   Neurologic:     (+) no peripheral neuropathy  (-) no seizures and no CVA   Cardiovascular:    (-) hypertension, CAD and arrhythmias   METS/Exercise Tolerance:     Hematologic:     (+) anemia,     Musculoskeletal:        GI/Hepatic: Comment: History of colonic polyps (Z86.010)   hx GI bleeds with bleeding this week   (-) GERD   Renal/Genitourinary:     (+) renal disease, type: CRI,     Endo:    (-) Type II DM and thyroid disease   Psychiatric/Substance Use:       Infectious Disease:    (-) Recent Fever   Malignancy:       Other:            Physical Exam    Airway  airway exam normal      Mallampati: II   TM distance: > 3 FB   Neck ROM: full   Mouth opening: > 3 cm    Respiratory Devices and Support         Dental  no notable dental history         Cardiovascular   cardiovascular exam normal          Pulmonary   pulmonary exam normal                OUTSIDE LABS:  CBC:   Lab Results   Component Value Date    WBC 3.7 (L) 08/26/2022    WBC 3.6 (L) 05/05/2022    HGB 11.5 (L) 08/26/2022    HGB 10.6 (L) 05/05/2022    HCT 36.1 08/26/2022    HCT 33.0 (L) 05/05/2022     08/26/2022     05/05/2022     BMP:   Lab Results   Component Value Date     08/26/2022     05/05/2022    POTASSIUM 4.2 08/26/2022    POTASSIUM 4.6 05/05/2022    CHLORIDE 105 08/26/2022    CHLORIDE 107 05/05/2022    CO2 30 08/26/2022    CO2 34 (H) 05/05/2022    BUN 22 08/26/2022    BUN 25 05/05/2022    CR 0.92 08/26/2022    CR 0.92 05/05/2022     (H) 08/26/2022     (H) 05/05/2022     COAGS:   Lab Results   Component Value Date    PTT 29 11/16/2021    INR 1.02 11/16/2021     POC: No results found for: BGM, HCG, HCGS  HEPATIC:   Lab Results   Component Value Date    ALBUMIN 4.1 08/26/2022    PROTTOTAL 7.6 08/26/2022    ALT 22 08/26/2022    AST 22 08/26/2022    ALKPHOS 79 08/26/2022    BILITOTAL 0.5 08/26/2022     OTHER:   Lab Results   Component Value Date    LACT 2.0 11/17/2021    EJ 10.1 08/26/2022    PHOS 4.1 03/10/2022    LIPASE 165 08/06/2010    AMYLASE 102 08/06/2010    CRP <2.9 08/26/2022    SED 39 (H) 08/26/2022       Anesthesia Plan    ASA Status:  2   NPO Status:  NPO Appropriate    Anesthesia Type: MAC.               Consents    Anesthesia Plan(s) and associated risks, benefits, and realistic alternatives discussed. Questions answered and patient/representative(s) expressed understanding.    - Discussed:     - Discussed with:  Patient      - Specific Concerns: Specific risks discussed (but not limited to): Possibility of intraoperative awareness..        Postoperative Care    Pain management: IV analgesics, Oral pain medications.   PONV prophylaxis: Ondansetron (or other 5HT-3), Dexamethasone or Solumedrol     Comments:    Other Comments: GI to complete H&P  Hgb pending            Rich Jackson MD

## 2022-09-23 ENCOUNTER — OFFICE VISIT (OUTPATIENT)
Dept: ORTHOPEDICS | Facility: CLINIC | Age: 82
End: 2022-09-23
Payer: COMMERCIAL

## 2022-09-23 DIAGNOSIS — M54.16 LUMBAR RADICULOPATHY, ACUTE: Primary | ICD-10-CM

## 2022-09-23 PROCEDURE — 99214 OFFICE O/P EST MOD 30 MIN: CPT | Performed by: FAMILY MEDICINE

## 2022-09-23 RX ORDER — PREDNISONE 20 MG/1
TABLET ORAL
Qty: 13 TABLET | Refills: 0 | Status: SHIPPED | OUTPATIENT
Start: 2022-09-23 | End: 2023-01-13

## 2022-09-23 NOTE — LETTER
9/23/2022      RE: Patrick Olson  [address] [employer]  [place] [address]     Dear Colleague,    Thank you for referring your patient, Patrick Olson, to the [facility] [place]. Please see a copy of my visit note below.    ESTABLISHED PATIENT FOLLOW-UP:  Follow Up of the Spine       HISTORY OF PRESENT ILLNESS  Ms. Olson is a pleasant 81 year old year old female who presents to clinic today for follow-up of low back pain    Date of injury: 4/221  Date last seen: 5/11/22  Following Therapeutic Plan: Yes, PT  Pain: increasing  Function: notes a decrease in function recently   Interval History: Noticed an increase in pain after a foot procedure done about 3 weeks ago.      Trip to [facility] October 5-15th.  Physical therapy prescribed a few weeks back and she has been to 1 visit with Brina.  She is working on her home exercises consistently.    Last epidural steroid injection was May 17, 2022.  She achieved moderate relief with this.  She also had a very good trip to [place] and had very little discomfort at that time.     Additional medical/Social/Surgical histories reviewed in [facility] and updated as appropriate.    REVIEW OF SYSTEMS (9/23/2022)  CONSTITUTIONAL: Denies fever and weight loss  GASTROINTESTINAL: Denies abdominal pain, nausea, vomiting  MUSCULOSKELETAL: See HPI  SKIN: Denies any recent rash or lesion  NEUROLOGICAL: Denies numbness or focal weakness     PHYSICAL EXAM  There were no vitals taken for this visit.    General  - normal appearance, in no obvious distress  Musculoskeletal - lumbar spine  - inspection: normal bone and joint alignment, no obvious scoliosis  - palpation: Tenderness palpation of bilateral L3-L4 region of paraspinal musculature.  - ROM: Full range of motion in flexion-extension side bending and rotation.  Pain with flexion and sidebending at L3-L4 region  - strength: lower extremities 5/5 in all planes  - special tests:  (-) straight leg raise     (-) slump test    Neuro  -  No lower extremity sensory deficit throughout L5 distribution, grossly normal coordination, normal muscle tone  Musculoskeletal - Left hip  - stance: normal gait without limp  - inspection: no swelling or effusion, normal bone and joint alignment, no obvious deformity  - palpation: tender over the greater trochanter and TFL  - ROM: pain with active abduction, normal and painless flexion, extension, IR, ER  - strength: 5/5 in all planes  - special tests:  (-) GELA  (-) FADIR  Neuro  - no sensory or motor deficit, grossly normal coordination, normal muscle tone    IMAGING :      ASSESSMENT & PLAN  Ms. Olson is a 81 year old year old female female with possible history of seropositive rheumatoid arthritis with secondary Sjogren's syndrome in remission on hydroxychloroquine, lumbar stenosis with neurogenic claudication presenting with acute on chronic low back pain, left lateral hip pain.    Diagnosis:  Lumbar stenosis with neurogenic claudication  Trochanteric bursitis of left hip    Patrick And I discussed her acute on chronic low back pain.  She will be traveling to [facility] next month and we discussed consideration for repeat epidural steroid injection under the circumstances.  Fortunately has been 3 months, but we discussed in the future we may want to try to space these out closer to 6 months.  In addition, she has been in to see her physical therapist Brina.  I would like her to start working as well on gluteal tendinopathy/stretching and strengthening of her hip abductors, ATFL and IT band.  In the meantime she can use Salonpas patches with lidocaine or other lidocaine patch products, Voltaren gel.    I also provided her with with a safety net approach to prednisone prescription which she can use if she has trouble while overseas.  She understands the risks of decreased bone density with repeated exposure to steroids, most recent DEXA in 2021 was normal.    40 minutes on date of the  encounter doing chart review, history and examination, independent imaging review, documentation, and additional activities noted above.      It was a pleasure seeing Patrick.    Mukesh Lantigua DO, CAQSM  Primary Care Sports Medicine

## 2022-09-23 NOTE — PROGRESS NOTES
ESTABLISHED PATIENT FOLLOW-UP:  Follow Up of the Spine       HISTORY OF PRESENT ILLNESS  Ms. Olson is a pleasant 81 year old year old female who presents to clinic today for follow-up of low back pain    Date of injury: 4/221  Date last seen: 5/11/22  Following Therapeutic Plan: Yes, PT  Pain: increasing  Function: notes a decrease in function recently   Interval History: Noticed an increase in pain after a foot procedure done about 3 weeks ago.      Trip to [facility] October 5-15th.  Physical therapy prescribed a few weeks back and she has been to 1 visit with Brina.  She is working on her home exercises consistently.    Last epidural steroid injection was May 17, 2022.  She achieved moderate relief with this.  She also had a very good trip to [place] and had very little discomfort at that time.     Additional medical/Social/Surgical histories reviewed in [facility] and updated as appropriate.    REVIEW OF SYSTEMS (9/23/2022)  CONSTITUTIONAL: Denies fever and weight loss  GASTROINTESTINAL: Denies abdominal pain, nausea, vomiting  MUSCULOSKELETAL: See HPI  SKIN: Denies any recent rash or lesion  NEUROLOGICAL: Denies numbness or focal weakness     PHYSICAL EXAM  There were no vitals taken for this visit.    General  - normal appearance, in no obvious distress  Musculoskeletal - lumbar spine  - inspection: normal bone and joint alignment, no obvious scoliosis  - palpation: Tenderness palpation of bilateral L3-L4 region of paraspinal musculature.  - ROM: Full range of motion in flexion-extension side bending and rotation.  Pain with flexion and sidebending at L3-L4 region  - strength: lower extremities 5/5 in all planes  - special tests:  (-) straight leg raise    (-) slump test    Neuro  -  No lower extremity sensory deficit throughout L5 distribution, grossly normal coordination, normal muscle tone  Musculoskeletal - Left hip  - stance: normal gait without limp  - inspection: no swelling or effusion, normal bone and joint  alignment, no obvious deformity  - palpation: tender over the greater trochanter and TFL  - ROM: pain with active abduction, normal and painless flexion, extension, IR, ER  - strength: 5/5 in all planes  - special tests:  (-) GELA  (-) FADIR  Neuro  - no sensory or motor deficit, grossly normal coordination, normal muscle tone    IMAGING :      ASSESSMENT & PLAN  Ms. Olson is a 81 year old year old female female with possible history of seropositive rheumatoid arthritis with secondary Sjogren's syndrome in remission on hydroxychloroquine, lumbar stenosis with neurogenic claudication presenting with acute on chronic low back pain, left lateral hip pain.    Diagnosis:  Lumbar stenosis with neurogenic claudication  Trochanteric bursitis of left hip    Patrick And I discussed her acute on chronic low back pain.  She will be traveling to [facility] next month and we discussed consideration for repeat epidural steroid injection under the circumstances.  Fortunately has been 3 months, but we discussed in the future we may want to try to space these out closer to 6 months.  In addition, she has been in to see her physical therapist Brina.  I would like her to start working as well on gluteal tendinopathy/stretching and strengthening of her hip abductors, ATFL and IT band.  In the meantime she can use Salonpas patches with lidocaine or other lidocaine patch products, Voltaren gel.    I also provided her with with a safety net approach to prednisone prescription which she can use if she has trouble while overseas.  She understands the risks of decreased bone density with repeated exposure to steroids, most recent DEXA in 2021 was normal.    40 minutes on date of the encounter doing chart review, history and examination, independent imaging review, documentation, and additional activities noted above.      It was a pleasure seeing Patrick.    Mukesh Lantigua DO, [facility]  Primary Care Sports Medicine

## 2022-09-23 NOTE — PATIENT INSTRUCTIONS
Trochanteric Bursitis / Gluteal Tendinopathy    WHAT IS TROCHANTERIC BURSITIS?    Bursitis is irritation or inflammation of the bursa. A bursa is a fluid-filled sac that acts as a cushion between tendons, bones, and skin. There is a bump on the outer side of the upper part of the thigh bone (femur) called the greater trochanter. The trochanteric bursa is located over the greater trochanter. When this bursa is inflamed it is called trochanteric bursitis.          WHAT IS THE CAUSE?     The trochanteric bursa may be inflamed by a group of muscles or tendons rubbing over the bursa and causing friction against the thigh bone. Your iliotibial band goes from the iliac crest of your pelvis down the outer side of your thigh and attaches just below the knee. A tight iliotibial band can lead to trochanteric bursitis. This injury can occur with running, walking, or bicycling, especially when the bicycle seat is too high.    Trochanteric bursitis may also be caused by a fall, by a spine disorder, by differences in the length of your legs, or as a complication of hip surgery.    WHAT ARE THE SYMPTOMS?    You have pain on the upper outer area of your thigh or on the side of your hip. The pain is worse when you walk, bicycle, or go up or down stairs. You have pain when you move your thigh bone and feel tenderness in the area over the greater trochanter.    HOW IS IT DIAGNOSED?    Your healthcare provider will ask about your symptoms and examine your hip and thigh.    HOW IS IT TREATED?    To treat this condition:    Put an ice pack, gel pack, or package of frozen vegetables wrapped in a cloth on the painful area every 3 to 4 hours for up to 20 minutes at a time until the pain goes away.  Take an anti-inflammatory medicine, such as ibuprofen, as directed by your provider. Nonsteroidal anti-inflammatory medicines (NSAIDs) may cause stomach bleeding and other problems. These risks increase with age. Read the label and take as  directed. Unless recommended by your healthcare provider, do not take for more than 10 days.  Follow your provider s instructions for doing exercises to help you recover.    While you are recovering from your injury you will need to change your sport or activity to one that does not make your condition worse. For example, you may need to swim instead of running or bicycling. If you are bicycling, you may need to lower your bicycle seat.    A bursa that is only mildly inflamed and has just started to hurt may improve within a few weeks. A bursa that is significantly inflamed and has been painful for a long time may take up to a few months to improve. You need to stop doing the activities that cause pain until your bursa has healed.    Follow your healthcare provider's instructions. Ask your provider:    How and when you will hear your test results  How long it will take to recover  What activities you should avoid and when you can return to your normal activities  How to take care of yourself at home  What symptoms or problems you should watch for and what to do if you have them  Make sure you know when you should come back for a checkup.    HOW CAN I HELP PREVENT TROCHANTERIC BURSITIS?    Trochanteric bursitis is best prevented by warming up properly and stretching the muscles on the outer side of your upper thigh.    Developed by [employer].  Published by [employer].  Copyright  2014 [employer] and/or one of its subsidiaries. All rights reserved.    You can do the first 3 stretches to begin stretching the muscles that run along the outside of your hip. You can do the strengthening exercises when the sharp pain lessens.    STRETCHING EXERCISES    Gluteal stretch: Lie on your back with both knees bent. Rest the ankle on your injured side over the knee of your other leg. Grasp the thigh of the leg on the uninjured side and pull toward your chest. You will feel a stretch along the buttocks on the injured  side and possibly along the outside of your hip. Hold the stretch for 15 to 30 seconds. Repeat 3 times.    Iliotibial band stretch, standing: Cross your uninjured leg in front of the other leg and bend down and reach toward the inside of your back foot. Do not bend your knees. Hold this position for 15 to 30 seconds. Return to the starting position. Repeat 3 times.  Iliotibial band stretch, side-leaning: Stand sideways near a wall with your injured side closest to the wall. Place a hand on the wall for support. Cross the leg farther from the wall over the other leg. Keep the foot closest to the wall flat on the floor. Lean your hips into the wall. Hold the stretch for 15 to 30 seconds. Repeat 3 times.    STRENGTHENING EXERCISES    Straight leg raise: Lie on your back with your legs straight out in front of you. Bend the knee on your uninjured side and place the foot flat on the floor. Tighten the thigh muscle on your injured side and lift your leg about 8 inches off the floor. Keep your leg straight and your thigh muscle tight. Slowly lower your leg back down to the floor. Do 2 sets of 15.    Prone hip extension: Lie on your stomach with your legs straight out behind you. Fold your arms under your head and rest your head on your arms. Draw your belly button in towards your spine and tighten your abdominal muscles. Tighten the buttocks and thigh muscles of the leg on your injured side and lift the leg off the floor about 8 inches. Keep your leg straight. Hold for 5 seconds. Then lower your leg and relax. Do 2 sets of 15.    Side-lying leg lift: Lie on your uninjured side. Tighten the front thigh muscles on your injured leg and lift that leg 8 to 10 inches (20 to 25 centimeters) away from the other leg. Keep the leg straight and lower it slowly. Do 2 sets of 15.    Wall squat with a ball: Stand with your back, shoulders, and head against a wall. Look straight ahead. Keep your shoulders relaxed and your feet 3 feet (90  centimeters) from the wall and shoulder's width apart. Place a soccer or basketball-sized ball behind your back. Keeping your back against the wall, slowly squat down to a 45-degree angle. Your thighs will not yet be parallel to the floor. Hold this position for 10 seconds and then slowly slide back up the wall. Repeat 10 times. Build up to 2 sets of 15.    Clam exercise: Lie on your uninjured side with your hips and knees bent and feet together. Slowly raise your top leg toward the ceiling while keeping your heels touching each other. Hold for 2 seconds and lower slowly. Do 2 sets of 15 repetitions.    Side plank: Lie on your side with your legs, hips, and shoulders in a straight line. Prop yourself up onto your forearm with your elbow directly under your shoulder. Lift your hips off the floor and balance on your forearm and the outside of your foot. Try to hold this position for 15 seconds and then slowly lower your hip to the ground. Switch sides and repeat. Work up to holding for 1 minute. This exercise can be made easier by starting with your knees and hips flexed toward your chest.    The plank: Lie on your stomach resting on our forearms. With your legs straight, lift your hips off the floor until they are in line with your shoulders. Support yourself on your forearms and toes. Hold this position for 15 seconds. (If this is too difficult, you can modify it by placing your knees on the floor.) Repeat 3 times. Work up to increasing your hold time to 30 to 60 seconds.    Developed by [employer].  Published by [employer].  Copyright  2014 [employer] and/or one of its subsidiaries. All rights reserved.

## 2022-09-24 LAB
PATH REPORT.COMMENTS IMP SPEC: NORMAL
PATH REPORT.COMMENTS IMP SPEC: NORMAL
PATH REPORT.FINAL DX SPEC: NORMAL
PATH REPORT.GROSS SPEC: NORMAL
PATH REPORT.MICROSCOPIC SPEC OTHER STN: NORMAL
PATH REPORT.RELEVANT HX SPEC: NORMAL
PHOTO IMAGE: NORMAL

## 2022-09-27 ENCOUNTER — THERAPY VISIT (OUTPATIENT)
Dept: PHYSICAL THERAPY | Facility: CLINIC | Age: 82
End: 2022-09-27
Payer: COMMERCIAL

## 2022-09-27 DIAGNOSIS — M54.50 BILATERAL LOW BACK PAIN WITHOUT SCIATICA: Primary | ICD-10-CM

## 2022-09-27 PROCEDURE — 97140 MANUAL THERAPY 1/> REGIONS: CPT | Mod: GP | Performed by: PHYSICAL THERAPIST

## 2022-09-27 PROCEDURE — 97110 THERAPEUTIC EXERCISES: CPT | Mod: GP | Performed by: PHYSICAL THERAPIST

## 2022-09-29 ENCOUNTER — LAB (OUTPATIENT)
Dept: LAB | Facility: CLINIC | Age: 82
End: 2022-09-29
Payer: COMMERCIAL

## 2022-09-29 DIAGNOSIS — N18.31 STAGE 3A CHRONIC KIDNEY DISEASE (H): ICD-10-CM

## 2022-09-29 LAB
ALBUMIN MFR UR ELPH: 6.5 MG/DL
ANION GAP SERPL CALCULATED.3IONS-SCNC: 3 MMOL/L (ref 3–14)
BUN SERPL-MCNC: 21 MG/DL (ref 7–30)
CALCIUM SERPL-MCNC: 9.8 MG/DL (ref 8.5–10.1)
CHLORIDE BLD-SCNC: 108 MMOL/L (ref 94–109)
CO2 SERPL-SCNC: 29 MMOL/L (ref 20–32)
CREAT SERPL-MCNC: 0.87 MG/DL (ref 0.52–1.04)
CREAT UR-MCNC: 38.6 MG/DL
GFR SERPL CREATININE-BSD FRML MDRD: 67 ML/MIN/1.73M2
GLUCOSE BLD-MCNC: 100 MG/DL (ref 70–99)
POTASSIUM BLD-SCNC: 4.7 MMOL/L (ref 3.4–5.3)
PROT/CREAT 24H UR: 0.17 MG/MG CR (ref 0–0.2)
SODIUM SERPL-SCNC: 140 MMOL/L (ref 133–144)

## 2022-09-29 PROCEDURE — 80048 BASIC METABOLIC PNL TOTAL CA: CPT

## 2022-09-29 PROCEDURE — 84156 ASSAY OF PROTEIN URINE: CPT

## 2022-09-29 PROCEDURE — 36415 COLL VENOUS BLD VENIPUNCTURE: CPT

## 2022-10-03 DIAGNOSIS — M06.9 RHEUMATOID ARTHRITIS INVOLVING MULTIPLE SITES, UNSPECIFIED WHETHER RHEUMATOID FACTOR PRESENT (H): ICD-10-CM

## 2022-10-04 ENCOUNTER — THERAPY VISIT (OUTPATIENT)
Dept: PHYSICAL THERAPY | Facility: CLINIC | Age: 82
End: 2022-10-04
Payer: COMMERCIAL

## 2022-10-04 DIAGNOSIS — M54.50 BILATERAL LOW BACK PAIN WITHOUT SCIATICA: Primary | ICD-10-CM

## 2022-10-04 PROCEDURE — 97140 MANUAL THERAPY 1/> REGIONS: CPT | Mod: GP | Performed by: PHYSICAL THERAPIST

## 2022-10-04 PROCEDURE — 97112 NEUROMUSCULAR REEDUCATION: CPT | Mod: GP | Performed by: PHYSICAL THERAPIST

## 2022-10-04 PROCEDURE — 97110 THERAPEUTIC EXERCISES: CPT | Mod: GP | Performed by: PHYSICAL THERAPIST

## 2022-10-05 NOTE — TELEPHONE ENCOUNTER
methotrexate 2.5 MG tablet  Last Written Prescription Date:  8/10/2022-11/8/2022  Last Fill Quantity: 4,   # refills: 0  Last Office Visit:  5/19/2022  Future Office visit:   12/29/2022    CBC RESULTS: Recent Labs   Lab Test 09/22/22  1005   WBC 4.7   RBC 2.90*   HGB 9.8*   HCT 30.2*   *   MCH 33.8*   MCHC 32.5   RDW 14.1          Creatinine   Date Value Ref Range Status   09/29/2022 0.87 0.52 - 1.04 mg/dL Final   01/23/2021 0.75 0.52 - 1.04 mg/dL Final   ]    Liver Function Studies -   Recent Labs   Lab Test 08/26/22  1003   PROTTOTAL 7.6   ALBUMIN 4.1   BILITOTAL 0.5   ALKPHOS 79   AST 22   ALT 22       Routing refill request to provider for review/approval because:  Drug not on the [facility], [facility] or [facility] refill protocol or controlled substance      Fabiana Carias RN  Central Triage Red Flags/Med Refills

## 2022-11-07 ENCOUNTER — MYC MEDICAL ADVICE (OUTPATIENT)
Dept: RHEUMATOLOGY | Facility: CLINIC | Age: 82
End: 2022-11-07

## 2022-11-07 DIAGNOSIS — Z79.899 HIGH RISK MEDICATION USE: Primary | ICD-10-CM

## 2022-11-07 DIAGNOSIS — M06.9 RHEUMATOID ARTHRITIS INVOLVING MULTIPLE SITES, UNSPECIFIED WHETHER RHEUMATOID FACTOR PRESENT (H): ICD-10-CM

## 2022-11-08 NOTE — TELEPHONE ENCOUNTER
MyC message received, patient updating and questioning the elevated LFT's.              Patient has called back and reports on 10/24 when the labs were drawn, she took all of her medications before the lab were obtained.  She reports she has not had alcohol for several years and was not experiencing any sickness that she was aware of.    She did have polyps in her colon removed appx 1 month prior to the lab draw.  She is surprised because she has taken Methotrexate for quite sometime and recently had a small break when placed on hydroxychloroquine (Plaquenil) because she had developed a skin rash which she thought might be caused by the Methotrexate, but it was not.      Message alert to Dr. Clements   Pt walked to [facility], urine obtained and sent to lab.

## 2022-11-08 NOTE — TELEPHONE ENCOUNTER
Per dr. Clements's message he would like patient to have the ALT and AST re drawn.   Left VM updating patient.    Orders entered, assigned to the [facility] lab    Irish Byrd RN  [facility]

## 2022-11-10 ENCOUNTER — LAB (OUTPATIENT)
Dept: LAB | Facility: CLINIC | Age: 82
End: 2022-11-10
Payer: COMMERCIAL

## 2022-11-10 DIAGNOSIS — M06.9 RHEUMATOID ARTHRITIS INVOLVING MULTIPLE SITES, UNSPECIFIED WHETHER RHEUMATOID FACTOR PRESENT (H): ICD-10-CM

## 2022-11-10 DIAGNOSIS — Z79.899 HIGH RISK MEDICATION USE: ICD-10-CM

## 2022-11-10 LAB
ALT SERPL W P-5'-P-CCNC: 28 U/L (ref 0–50)
AST SERPL W P-5'-P-CCNC: 24 U/L (ref 0–45)

## 2022-11-10 PROCEDURE — 84460 ALANINE AMINO (ALT) (SGPT): CPT

## 2022-11-10 PROCEDURE — 84450 TRANSFERASE (AST) (SGOT): CPT

## 2022-11-10 PROCEDURE — 36415 COLL VENOUS BLD VENIPUNCTURE: CPT

## 2022-11-21 ENCOUNTER — MEDICAL CORRESPONDENCE (OUTPATIENT)
Dept: HEALTH INFORMATION MANAGEMENT | Facility: CLINIC | Age: 82
End: 2022-11-21

## 2022-11-21 ENCOUNTER — TRANSFERRED RECORDS (OUTPATIENT)
Dept: HEALTH INFORMATION MANAGEMENT | Facility: CLINIC | Age: 82
End: 2022-11-21

## 2022-11-21 LAB
ALT SERPL-CCNC: 21 U/L (ref 14–59)
AST SERPL-CCNC: 25 U/L (ref 0–45)
CREATININE (EXTERNAL): 0.95 MG/DL (ref 0.55–1.02)
GFR ESTIMATED (EXTERNAL): >60 ML/MIN/1.73M2
GLUCOSE (EXTERNAL): 129 MG/DL (ref 70–124)
POTASSIUM (EXTERNAL): 4.7 MMOL/L (ref 3.4–5.3)

## 2022-11-22 ENCOUNTER — TRANSFERRED RECORDS (OUTPATIENT)
Dept: HEALTH INFORMATION MANAGEMENT | Facility: CLINIC | Age: 82
End: 2022-11-22

## 2022-11-23 ENCOUNTER — TRANSCRIBE ORDERS (OUTPATIENT)
Dept: OTHER | Age: 82
End: 2022-11-23

## 2022-11-23 DIAGNOSIS — R10.13 EPIGASTRIC PAIN: ICD-10-CM

## 2022-11-23 DIAGNOSIS — K31.819 GASTRIC AVM: Primary | ICD-10-CM

## 2023-01-03 PROBLEM — M54.50 BILATERAL LOW BACK PAIN WITHOUT SCIATICA: Status: RESOLVED | Noted: 2022-09-19 | Resolved: 2023-01-03

## 2023-01-03 NOTE — PROGRESS NOTES
Discharge Note    Progress reporting period is from initial evaluation date (please see noted date below) to Oct 4, 2022.  Linked Episodes   Type: Episode: Status: Noted: Resolved: Last update: Updated by:   PHYSICAL THERAPY LBP 9/19/22 Active 9/19/2022  10/4/2022  9:24 AM Brina Jackson, PT      Comments:       Patrick failed to follow up and current status is unknown.  Please see information below for last relevant information on current status.  Patient seen for 3 visits.    SUBJECTIVE  Subjective changes noted by patient:  Patient had flu shot and booster shot so not able to have lumbar injection.  Was able to walk 1.7 miles with mild L posterior/lateral thigh/knee- usually if lies down for an hour after then does well, but if keeps walking pain worsens.  Packed cane for trip.  .  Current pain level is 4/10.     Previous pain level was  5/10.   Changes in function:  Yes (See Goal flowsheet attached for changes in current functional level)  Adverse reaction to treatment or activity: None    OBJECTIVE  Changes noted in objective findings: Improving core stabilization.  Very mild hip drop bridging #2a.  Supine knee to chest feels good.     ASSESSMENT/PLAN  Diagnosis: LBP, lumbar stenosis w intermittent neurogenic claudication   Updated problem list and treatment plan:   Pain - HEP  Decreased ROM/flexibility - HEP  Decreased function - HEP  Decreased strength - HEP  Impaired posture - HEP  STG/LTGs have been met or progress has been made towards goals:  Yes, please see goal flowsheet for most current information  Assessment of Progress: current status is unknown.    Last current status: Pt is progressing as expected   Self Management Plans:  HEP  I have re-evaluated this patient and find that the nature, scope, duration and intensity of the therapy is appropriate for the medical condition of the patient.  Patrick continues to require the following intervention to meet STG and LTG's:  HEP.    Recommendations:  Discharge  with current home program.  Patient to follow up with MD as needed.    Please refer to the daily flowsheet for treatment today, total treatment time and time spent performing 1:1 timed codes.

## 2023-01-10 ENCOUNTER — PATIENT OUTREACH (OUTPATIENT)
Dept: GASTROENTEROLOGY | Facility: CLINIC | Age: 83
End: 2023-01-10

## 2023-01-10 ENCOUNTER — TELEPHONE (OUTPATIENT)
Dept: GASTROENTEROLOGY | Facility: CLINIC | Age: 83
End: 2023-01-10

## 2023-01-10 ENCOUNTER — TELEPHONE (OUTPATIENT)
Dept: OBGYN | Facility: CLINIC | Age: 83
End: 2023-01-10

## 2023-01-10 ENCOUNTER — TELEPHONE (OUTPATIENT)
Dept: GASTROENTEROLOGY | Facility: CLINIC | Age: 83
End: 2023-01-10
Payer: COMMERCIAL

## 2023-01-10 ENCOUNTER — PREP FOR PROCEDURE (OUTPATIENT)
Dept: GASTROENTEROLOGY | Facility: CLINIC | Age: 83
End: 2023-01-10

## 2023-01-10 ENCOUNTER — TRANSFERRED RECORDS (OUTPATIENT)
Dept: HEALTH INFORMATION MANAGEMENT | Facility: CLINIC | Age: 83
End: 2023-01-10

## 2023-01-10 DIAGNOSIS — K92.1 HEMATOCHEZIA: Primary | ICD-10-CM

## 2023-01-10 DIAGNOSIS — K57.32 DIVERTICULITIS OF COLON: Primary | ICD-10-CM

## 2023-01-10 LAB
ALT SERPL-CCNC: 23 U/L (ref 14–59)
AST SERPL-CCNC: 26 U/L (ref 0–45)
CREATININE (EXTERNAL): 0.98 MG/DL (ref 0.55–1.02)
GLUCOSE (EXTERNAL): 106 MG/DL (ref 70–124)
POTASSIUM (EXTERNAL): 5.5 MMOL/L (ref 3.4–5.3)

## 2023-01-10 RX ORDER — BISACODYL 5 MG
TABLET, DELAYED RELEASE (ENTERIC COATED) ORAL
Qty: 4 TABLET | Refills: 0 | Status: ON HOLD | OUTPATIENT
Start: 2023-01-10 | End: 2023-02-06

## 2023-01-10 NOTE — TELEPHONE ENCOUNTER
The Pt called in for an update on below. I did advise her message was routed to GI Clinic team.   Due to the Pt having active, somewhat heavy rectal bleeding, this RN did transfer her to the red flag triage line to be assessed.     Arianna Smith, RN   Endoscopy Procedure Pre Assessment RN

## 2023-01-10 NOTE — TELEPHONE ENCOUNTER
[facility] Call Center    Phone Message    May a detailed message be left on voicemail: yes     Reason for Call: Order(s): Other:  Colonoscopy  Reason for requested: Patient has diverticular bleeding  Date needed: ASAP - urgent, per patient  Provider name: Dr. Altagracia Martinez    NOTE:  Patient called and is trying to avoid going to ER; therefore, she is requesting that Dr. Martinez put in urgent/emergent colonoscopy order.  Please follow up with patient.      Action Taken: Message routed to:  [facility]    Travel Screening: Not Applicable

## 2023-01-10 NOTE — TELEPHONE ENCOUNTER
Received transferred call from endoscopy scheduling regarding urgent colonoscopy procedure.    Pre assessment questions completed for upcoming colonoscopy  procedure scheduled on 1/13/23    COVID policy reviewed.     Pre-op scheduled  N/A    Reviewed procedural arrival time 0930 and facility location [facility]; [address] [place] [address], [address], [place] [address]    Designated  policy reviewed. Instructed to have someone stay 6 hours post procedure.     Anticoagulation/blood thinners? No    Electronic implanted devices? No    Diabetic? No    Procedure indication: Pt has h/o diverticulitis and bleeding     Bowel prep recommendation: Standard Golytely    Hx CKD noted in chart.    Reviewed procedure prep instructions.     Prep instructions previously sent via [employer].  Bowel prep script sent to [id number] [address] [place] [address].     Patient verbalized understanding and had no questions or concerns at this time.    Dulce Mcknight RN  Endoscopy Procedure Pre Assessment RN

## 2023-01-10 NOTE — PROGRESS NOTES
Pt called this morning, stating that she has started bleeding again. Instead of going to the ER, wants to know if she can get in for a colonoscopy outpatient.   Has been having orangish colored stools for the last week. Thought maybe she had eaten something.  Felt slight dizzyness yesterday, went away right away. Is going to check with her PCP to see if labs could be checked while a date for colonoscopy is being determined.  Discussed s/s of going to the ED, pt verbalized understanding.     1250  Called pt to relay that I have placed a general GI procedure order for urgent colonoscopy. Message routed to Endo schedulers to assist pt in scheduling asap, and to consider for any cancellation spot.   Pt understands that she will get a call from scheduling. But to seek urgent care if bleeding continues or having s/s of anemia. Did have labs drawn at PCP clinic this afternoon.     Fabiana Drew, RN, BSN,   [facility]  Care coordinator

## 2023-01-10 NOTE — TELEPHONE ENCOUNTER
Screening Questions  BLUE  KIND OF PREP RED  LOCATION [review exclusion criteria] GREEN  SEDATION TYPE    Y Are you active on mychart?   JOAN, FRAN  Ordering/Referring Provider?    [employer]  What type of coverage do you have?  N Have you had a positive covid test in the last 14 days?    16.8  1. BMI  [BMI 40+ - review exclusion criteria - [facility] + [facility]]            *NEED PAC APPT AT [facility]*     SELF   2. Are you able to give consent for your medical care? [IF NO,RN REVIEW]          N  3. Are you taking any prescription pain medications on a routine schedule   (ex narcotics: tramadol, oxycodone, roxicodone, oxycontin,  and percocet)?               N - GABAPENTIN   3a. EXTENDED PREP What kind of prescription?     N 4. Do you have any chemical dependencies such as alcohol, street drugs, or methadone?    **If yes 3- 5 , please schedule with MAC sedation.**          IF YES TO ANY 6 - 10 - HOSPITAL SETTING ONLY.     N 6.   Do you need assistance transferring?     N 7.   Have you had a heart or lung transplant?    N 8.   Are you currently on dialysis?   N 9.   Do you use daily home oxygen?   N 10. Do you take nitroglycerin?   10a. N If yes, how often?     11. [FEMALES]   Are you currently pregnant?     11a.  If yes, how many weeks? [ Greater than 12 weeks, OR NEEDED]    N 12. [review exclusion criteria]  Do you have any implantable devices in your body (pacemaker, defib, LVAD)?            *NEED PAC APPT AT [facility]*     N 13. Do you have Pulmonary Hypertension?             *NEED PAC APPT AT [facility]*     N 14. In the past 6 months, have you had any heart related issues including cardiomyopathy or heart attack?     N 14a. If yes, did it require cardiac stenting if so when?     N 15. Have you had a stroke or Transient ischemic attack (TIA - aka  mini stroke ) within 6 months?      N 16. Do you have mod to severe Obstructive Sleep Apnea?  [Hospital only - Ok at [place]]    N 17. Do you have SEVERE AND UNCONTROLLED asthma?               "*NEED PAC APPT AT [facility]*     N 18. Are you currently taking any blood thinners?     18a. If yes, inform patient to \"follow up w/ ordering provider for bridging instructions.\"    N 19. Do you take the medication Phentermine?    19a. If yes, \"Hold for 7 days before procedure.  Please consult your prescribing provider if you have questions about holding this medication.\"     N  20. Do you have chronic kidney disease?      N  21. Do you have a diagnosis of diabetes?     N  22. On a regular basis do you go 3-5 days between bowel movements?     23. Preferred LOCAL Pharmacy for Pre Prescription    [ LIST ONLY ONE PHARMACY]     [id number] [address] [place] [address]        - CLOSING REMINDERS -    Informed patient they will need an adult          Cannot take any type of public or medical transportation alone    Conscious Sedation- Needs  for 6 hours after the procedure        MAC/General-Needs  for 24 hours after procedure    Pre-Procedure Covid test to be completed         [[employer] PCR/HOME Testing Required] + ADULT to ACCOMPANY     Confirmed Nurse will call to complete assessment       - SCHEDULING DETAILS -      Hospital Setting Required? If yes, what is the exclusion?:  N      Additional comments:  URGENT 3-5 DAY ORDER       PAM   Surgeon   01/13/2023  Date of Procedure  MODTERATE  Sedation Type     N PAC / Pre-op Required   Location  [facility]        Type of Procedure Scheduled  Lower Endoscopy [Colonoscopy]      Which Colonoscopy Prep was Sent?     STANDARD GOLYTELY-If you answer yes to questions #8, #20, #21          "

## 2023-01-10 NOTE — TELEPHONE ENCOUNTER
Pt has diverticulitis. Pt started bleeding this morning. Pt has colonoscopy's when this happens. Calling to get orders from her GI clinic.    Reviewing with pt, she has already spoken to someone and her request has been sent to her GI clinic.     Pt was informed that this request was sent over.     Ruba Hutton RN  [facility]

## 2023-01-11 NOTE — TELEPHONE ENCOUNTER
Patient wanted to ensure that they will be able to meet with scoping provider prior to starting procedure.  RN informed pt that the provider will go over consent form in the prep room.  Patient can relay information at this time.  RN informed pt that there is a care coordination note in chart from 1.10.2023 that states the s/sx pt is having which led to colonoscopy order.    Pt was reminded of s/sx that would need an ED visit.    Pt has no further questions or concerns.      Lubna Mclean RN

## 2023-01-12 NOTE — TELEPHONE ENCOUNTER
Patient scheduled for a colonoscopy on 1-13.  Spoke with Patrick today and she continues to have blood  In her stool.  She had 3 episodes yesterday where the toilet water turned pink and she had blood on the toilet paper   Patient stated that she is feeling fine and she is getting ready to start the prep.

## 2023-01-13 ENCOUNTER — ANESTHESIA EVENT (OUTPATIENT)
Dept: SURGERY | Facility: AMBULATORY SURGERY CENTER | Age: 83
End: 2023-01-13
Payer: COMMERCIAL

## 2023-01-13 ENCOUNTER — HOSPITAL ENCOUNTER (OUTPATIENT)
Facility: AMBULATORY SURGERY CENTER | Age: 83
Discharge: HOME | End: 2023-01-13
Attending: INTERNAL MEDICINE
Payer: COMMERCIAL

## 2023-01-13 ENCOUNTER — ANESTHESIA (OUTPATIENT)
Dept: SURGERY | Facility: AMBULATORY SURGERY CENTER | Age: 83
End: 2023-01-13
Payer: COMMERCIAL

## 2023-01-13 ENCOUNTER — LAB (OUTPATIENT)
Dept: LAB | Facility: CLINIC | Age: 83
End: 2023-01-13
Payer: COMMERCIAL

## 2023-01-13 VITALS
RESPIRATION RATE: 14 BRPM | HEART RATE: 70 BPM | SYSTOLIC BLOOD PRESSURE: 114 MMHG | BODY MASS INDEX: 16.39 KG/M2 | DIASTOLIC BLOOD PRESSURE: 51 MMHG | OXYGEN SATURATION: 100 % | HEIGHT: 64 IN | TEMPERATURE: 97.9 F | WEIGHT: 96 LBS

## 2023-01-13 VITALS — HEART RATE: 71 BPM

## 2023-01-13 DIAGNOSIS — K92.1 HEMATOCHEZIA: ICD-10-CM

## 2023-01-13 DIAGNOSIS — K92.1 HEMATOCHEZIA: Primary | ICD-10-CM

## 2023-01-13 LAB
COLONOSCOPY: NORMAL
ERYTHROCYTE [DISTWIDTH] IN BLOOD BY AUTOMATED COUNT: 13.9 % (ref 10–15)
HCT VFR BLD AUTO: 31.4 % (ref 35–47)
HGB BLD-MCNC: 10.5 G/DL (ref 11.7–15.7)
INR BLD: 1 (ref 0.9–1.1)
MCH RBC QN AUTO: 33.3 PG (ref 26.5–33)
MCHC RBC AUTO-ENTMCNC: 33.4 G/DL (ref 31.5–36.5)
MCV RBC AUTO: 100 FL (ref 78–100)
PLATELET # BLD AUTO: 224 10E3/UL (ref 150–450)
RBC # BLD AUTO: 3.15 10E6/UL (ref 3.8–5.2)
WBC # BLD AUTO: 3.8 10E3/UL (ref 4–11)

## 2023-01-13 PROCEDURE — 85027 COMPLETE CBC AUTOMATED: CPT

## 2023-01-13 PROCEDURE — 36415 COLL VENOUS BLD VENIPUNCTURE: CPT

## 2023-01-13 PROCEDURE — 85610 PROTHROMBIN TIME: CPT | Performed by: PATHOLOGY

## 2023-01-13 PROCEDURE — 45378 DIAGNOSTIC COLONOSCOPY: CPT

## 2023-01-13 RX ORDER — NALOXONE HYDROCHLORIDE 0.4 MG/ML
0.4 INJECTION, SOLUTION INTRAMUSCULAR; INTRAVENOUS; SUBCUTANEOUS
Status: DISCONTINUED | OUTPATIENT
Start: 2023-01-13 | End: 2023-01-14 | Stop reason: HOSPADM

## 2023-01-13 RX ORDER — FLUMAZENIL 0.1 MG/ML
0.2 INJECTION, SOLUTION INTRAVENOUS
Status: ACTIVE | OUTPATIENT
Start: 2023-01-13 | End: 2023-01-13

## 2023-01-13 RX ORDER — PROCHLORPERAZINE MALEATE 5 MG
5 TABLET ORAL EVERY 6 HOURS PRN
Status: DISCONTINUED | OUTPATIENT
Start: 2023-01-13 | End: 2023-01-14 | Stop reason: HOSPADM

## 2023-01-13 RX ORDER — LIDOCAINE 40 MG/G
CREAM TOPICAL
Status: CANCELLED | OUTPATIENT
Start: 2023-01-13

## 2023-01-13 RX ORDER — NALOXONE HYDROCHLORIDE 0.4 MG/ML
0.2 INJECTION, SOLUTION INTRAMUSCULAR; INTRAVENOUS; SUBCUTANEOUS
Status: DISCONTINUED | OUTPATIENT
Start: 2023-01-13 | End: 2023-01-14 | Stop reason: HOSPADM

## 2023-01-13 RX ORDER — ONDANSETRON 2 MG/ML
4 INJECTION INTRAMUSCULAR; INTRAVENOUS EVERY 6 HOURS PRN
Status: DISCONTINUED | OUTPATIENT
Start: 2023-01-13 | End: 2023-01-14 | Stop reason: HOSPADM

## 2023-01-13 RX ORDER — SODIUM CHLORIDE, SODIUM LACTATE, POTASSIUM CHLORIDE, CALCIUM CHLORIDE 600; 310; 30; 20 MG/100ML; MG/100ML; MG/100ML; MG/100ML
INJECTION, SOLUTION INTRAVENOUS CONTINUOUS PRN
Status: DISCONTINUED | OUTPATIENT
Start: 2023-01-13 | End: 2023-01-13

## 2023-01-13 RX ORDER — PROPOFOL 10 MG/ML
INJECTION, EMULSION INTRAVENOUS CONTINUOUS PRN
Status: DISCONTINUED | OUTPATIENT
Start: 2023-01-13 | End: 2023-01-13

## 2023-01-13 RX ORDER — ONDANSETRON 4 MG/1
4 TABLET, ORALLY DISINTEGRATING ORAL EVERY 6 HOURS PRN
Status: DISCONTINUED | OUTPATIENT
Start: 2023-01-13 | End: 2023-01-14 | Stop reason: HOSPADM

## 2023-01-13 RX ORDER — ONDANSETRON 2 MG/ML
4 INJECTION INTRAMUSCULAR; INTRAVENOUS
Status: CANCELLED | OUTPATIENT
Start: 2023-01-13

## 2023-01-13 RX ORDER — LIDOCAINE HYDROCHLORIDE 20 MG/ML
INJECTION, SOLUTION INFILTRATION; PERINEURAL PRN
Status: DISCONTINUED | OUTPATIENT
Start: 2023-01-13 | End: 2023-01-13

## 2023-01-13 RX ORDER — LIDOCAINE 40 MG/G
CREAM TOPICAL
Status: DISCONTINUED | OUTPATIENT
Start: 2023-01-13 | End: 2023-01-13 | Stop reason: HOSPADM

## 2023-01-13 RX ORDER — PROPOFOL 10 MG/ML
INJECTION, EMULSION INTRAVENOUS PRN
Status: DISCONTINUED | OUTPATIENT
Start: 2023-01-13 | End: 2023-01-13

## 2023-01-13 RX ORDER — ONDANSETRON 2 MG/ML
4 INJECTION INTRAMUSCULAR; INTRAVENOUS
Status: DISCONTINUED | OUTPATIENT
Start: 2023-01-13 | End: 2023-01-13 | Stop reason: HOSPADM

## 2023-01-13 RX ADMIN — LIDOCAINE HYDROCHLORIDE 40 MG: 20 INJECTION, SOLUTION INFILTRATION; PERINEURAL at 10:57

## 2023-01-13 RX ADMIN — PROPOFOL 150 MCG/KG/MIN: 10 INJECTION, EMULSION INTRAVENOUS at 10:57

## 2023-01-13 RX ADMIN — PROPOFOL 50 MG: 10 INJECTION, EMULSION INTRAVENOUS at 10:57

## 2023-01-13 RX ADMIN — SODIUM CHLORIDE, SODIUM LACTATE, POTASSIUM CHLORIDE, CALCIUM CHLORIDE: 600; 310; 30; 20 INJECTION, SOLUTION INTRAVENOUS at 10:53

## 2023-01-13 NOTE — H&P
Patrick Olson  [id number]  female  82 year old      Reason for procedure/surgery: Hematochezia    Patient Active Problem List   Diagnosis     SNHL (sensorineural hearing loss)     GI bleed     Gastrointestinal hemorrhage, unspecified gastrointestinal hemorrhage type     Diverticular hemorrhage     Acute lower GI bleeding     Diverticulosis of large intestine     Lab test negative for COVID-19 virus       Past Surgical History:    Past Surgical History:   Procedure Laterality Date     CAPSULE/PILL CAM ENDOSCOPY N/A 11/18/2021    Procedure: IMAGING PROCEDURE, GI TRACT, INTRALUMINAL, VIA CAPSULE;  Surgeon: Deric Rodriguez MD;  Location:  GI     COLONOSCOPY N/A 03/14/2017    Procedure: COMBINED COLONOSCOPY, SINGLE OR MULTIPLE BIOPSY/POLYPECTOMY BY BIOPSY;  Surgeon: Spencer Downey MD;  Location:  GI     COLONOSCOPY N/A 9/22/2022    Procedure: COLONOSCOPY, FLEXIBLE, WITH LESION REMOVAL USING SNARE;  Surgeon: Kirby Arthur MD;  Location:  GI     ENTEROSCOPY SMALL BOWEL N/A 11/20/2021    Procedure: ENTEROSCOPY, colonoscopy with endoscopic mucosal resection and tattoo placement;  Surgeon: Kirby Arthur MD;  Location: [facility]     IR VISCERAL EMBOLIZATION  01/22/2021     ORTHOPEDIC SURGERY Left     hip replacment     SIGMOIDOSCOPY FLEXIBLE N/A 01/23/2021    Procedure: SIGMOIDOSCOPY, FLEXIBLE;  Surgeon: Jaime Steinberg MD;  Location:  GI       Past Medical History:   Past Medical History:   Diagnosis Date     Anemia      CKD (chronic kidney disease) stage 3, GFR 30-59 ml/min (H)      Diverticulosis of large intestine      Osteoarthritis      Osteoporosis      Rheumatoid arthritis (H)      Sensorineural hearing loss        Social History:   Social History     Tobacco Use     Smoking status: Former     Smokeless tobacco: Never   Substance Use Topics     Alcohol use: No       Family History: History reviewed. No pertinent family history.    Allergies:   Allergies   Allergen Reactions     Bee Venom  "Anaphylaxis     Shrimp Anaphylaxis     Evoxac [Cevimeline] Unknown     Prevnar Swelling     Other reaction(s): Edema     Prevnar [Pneumococcal 13-Linda Conj Vacc] Unknown       Active Medications:   Current Outpatient Medications   Medication Sig Dispense Refill     acetaminophen (TYLENOL) 325 MG tablet Take 600 mg by mouth once       bisacodyl (DULCOLAX) 5 MG EC tablet Take 2 tablets at 3 pm the day before your procedure. If your procedure is before 11 am, take 2 additional tablets at 11 pm. If your procedure is after 11 am, take 2 additional tablets at 6 am. For additional instructions refer to your colonoscopy prep instructions. 4 tablet 0     bisacodyl (DULCOLAX) 5 MG EC tablet Refer to \"Getting Ready for a Colonoscopy\" instruction handout 4 tablet 0     famotidine (PEPCID) 20 MG tablet TAKE 1 TABLET BY MOUTH TWICE A DAY       Fluocinolone Acetonide Scalp 0.01 % OIL oil Apply topically daily 118.28 mL 3     folic acid (FOLVITE) 1 MG tablet Take 1 tablet (1 mg) by mouth daily 90 tablet 3     gabapentin (NEURONTIN) 300 MG capsule Take 1 capsule (300 mg) by mouth 3 times daily Take 300mg in morning, 300mg in afternoon and 300mg 90 capsule 1     hydrocortisone 2.5 % ointment Apply topically 2 times daily 30 g 3     ketoconazole (NIZORAL) 2 % external shampoo Apply topically once a week 120 mL 11     lifitegrast (XIIDRA) 5 % opthalmic solution 1 drop 2 times daily       Multiple Vitamins-Minerals (OCUVITE PRESERVISION PO) Take 1 tablet by mouth 2 times daily        polyethylene glycol (GOLYTELY) 236 g suspension The night before the exam at 6 pm drink an 8-ounce glass every 15 minutes until the jug is half empty. If you arrive before 11 AM: Drink the other half of the Golytely jug at 11 PM night before procedure. If you arrive after 11 AM: Drink the other half of the Golytely jug at 6 AM day of procedure. For additional instructions refer to your colonoscopy prep instructions. 4000 mL 0     methotrexate 2.5 MG tablet " "Take 4 tablets (10 mg) by mouth every 7 days for 90 days LABS REQUIRED EVERY 12 WEEKS WHILE TAKING THIS MEDICATION. 48 tablet 0     methotrexate 2.5 MG tablet Take 4 tablets (10 mg) by mouth every 7 days Labs every 8 - 12 weeks for refills. 48 tablet 0     tretinoin (RETIN-A) 0.1 % external cream APPLY TO FACE AT BEDTIME *NOT COVERED, NO PA AVAILABLE PER MD* (Patient not taking: Reported on 9/1/2022)         Systemic Review:   ENT/MOUTH: NEGATIVE for ear, mouth and throat problems  RESP: NEGATIVE for significant cough or SOB  CV: NEGATIVE for chest pain, palpitations or peripheral edema    Physical Examination:   Vital Signs: /63 (BP Location: Right arm)   Pulse 80   Temp 96.8  F (36  C) (Temporal)   Resp 18   Ht 1.626 m (5' 4\")   Wt 43.5 kg (96 lb)   SpO2 100%   BMI 16.48 kg/m    NECK: no adenopathy, no asymmetry, masses, or scars  RESP: lungs clear to auscultation - no rales, rhonchi or wheezes  CV: regular rate and rhythm, normal S1 S2, no S3 or S4, no murmur, click or rub, no peripheral edema and peripheral pulses strong  ABDOMEN: soft, nontender, no hepatosplenomegaly, no masses and bowel sounds normal  MS: no gross musculoskeletal defects noted, no edema    Plan: Appropriate to proceed as scheduled.      Spencer Downey MD  1/13/2023    PCP:  Essence Tamayo    "

## 2023-01-13 NOTE — ANESTHESIA CARE TRANSFER NOTE
Patient: Patrick Olson    Procedure: Procedure(s):  COLONOSCOPY       Diagnosis: Hematochezia [K92.1]  Diagnosis Additional Information: No value filed.    Anesthesia Type:   No value filed.     Note:    Oropharynx: oropharynx clear of all foreign objects  Level of Consciousness: awake  Oxygen Supplementation: room air    Independent Airway: airway patency satisfactory and stable  Dentition: dentition unchanged  Vital Signs Stable: post-procedure vital signs reviewed and stable  Report to RN Given: handoff report given  Patient transferred to: Phase II    Handoff Report: Identifed the Patient, Identified the Reponsible Provider, Reviewed the pertinent medical history, Discussed the surgical course, Reviewed Intra-OP anesthesia mangement and issues during anesthesia, Set expectations for post-procedure period and Allowed opportunity for questions and acknowledgement of understanding      Vitals:  Vitals Value Taken Time   BP     Temp     Pulse     Resp     SpO2         Electronically Signed By: PATRICIA Santa CRNA  January 13, 2023  11:21 AM

## 2023-01-15 ASSESSMENT — ENCOUNTER SYMPTOMS
DYSRHYTHMIAS: 0
SEIZURES: 0

## 2023-01-15 ASSESSMENT — LIFESTYLE VARIABLES: TOBACCO_USE: 0

## 2023-01-15 NOTE — ANESTHESIA PREPROCEDURE EVALUATION
Anesthesia Pre-Procedure Evaluation    Patient: Patrick Olson   MRN: [id number] : 1940        Procedure : Procedure(s):  COLONOSCOPY          Past Medical History:   Diagnosis Date     Anemia      CKD (chronic kidney disease) stage 3, GFR 30-59 ml/min (H)      Diverticulosis of large intestine      Osteoarthritis      Osteoporosis      Rheumatoid arthritis (H)      Sensorineural hearing loss       Past Surgical History:   Procedure Laterality Date     CAPSULE/PILL CAM ENDOSCOPY N/A 2021    Procedure: IMAGING PROCEDURE, GI TRACT, INTRALUMINAL, VIA CAPSULE;  Surgeon: Deric Rodriguez MD;  Location: [facility]     COLONOSCOPY N/A 2017    Procedure: COMBINED COLONOSCOPY, SINGLE OR MULTIPLE BIOPSY/POLYPECTOMY BY BIOPSY;  Surgeon: Spencer Downey MD;  Location: [facility]     COLONOSCOPY N/A 2022    Procedure: COLONOSCOPY, FLEXIBLE, WITH LESION REMOVAL USING SNARE;  Surgeon: Kirby Arthur MD;  Location:  GI     ENTEROSCOPY SMALL BOWEL N/A 2021    Procedure: ENTEROSCOPY, colonoscopy with endoscopic mucosal resection and tattoo placement;  Surgeon: Kriby Arthur MD;  Location: [facility]     IR VISCERAL EMBOLIZATION  2021     ORTHOPEDIC SURGERY Left     hip replacment     SIGMOIDOSCOPY FLEXIBLE N/A 2021    Procedure: SIGMOIDOSCOPY, FLEXIBLE;  Surgeon: Jaime Steinberg MD;  Location:  GI      Allergies   Allergen Reactions     Bee Venom Anaphylaxis     Shrimp Anaphylaxis     Evoxac [Cevimeline] Unknown     Prevnar Swelling     Other reaction(s): Edema     Prevnar [Pneumococcal 13-Linda Conj Vacc] Unknown      Social History     Tobacco Use     Smoking status: Former     Smokeless tobacco: Never   Substance Use Topics     Alcohol use: No      Wt Readings from Last 1 Encounters:   23 43.5 kg (96 lb)        Anesthesia Evaluation   Pt has had prior anesthetic. Type: General.    No history of anesthetic complications       ROS/MED HX  ENT/Pulmonary: Comment: [place]   (-)  tobacco use, asthma and sleep apnea   Neurologic:     (+) no peripheral neuropathy  (-) no seizures and no CVA   Cardiovascular:    (-) hypertension, CAD and arrhythmias   METS/Exercise Tolerance: >4 METS    Hematologic:     (+) anemia,     Musculoskeletal:       GI/Hepatic: Comment: History of colonic polyps w/bleeding   (-) GERD   Renal/Genitourinary:     (+) renal disease, type: CRI, Pt does not require dialysis,     Endo:    (-) Type II DM and thyroid disease   Psychiatric/Substance Use:       Infectious Disease:    (-) Recent Fever   Malignancy:       Other:            Physical Exam    Airway  airway exam normal           Respiratory Devices and Support         Dental  no notable dental history         Cardiovascular   cardiovascular exam normal          Pulmonary   pulmonary exam normal                OUTSIDE LABS:  CBC:   Lab Results   Component Value Date    WBC 3.8 (L) 01/13/2023    WBC 4.7 09/22/2022    HGB 10.5 (L) 01/13/2023    HGB 9.8 (L) 09/22/2022    HCT 31.4 (L) 01/13/2023    HCT 30.2 (L) 09/22/2022     01/13/2023     09/22/2022     BMP:   Lab Results   Component Value Date     09/29/2022     08/26/2022    POTASSIUM 4.7 09/29/2022    POTASSIUM 4.2 08/26/2022    CHLORIDE 104 10/24/2022    CHLORIDE 108 09/29/2022    CO2 29 09/29/2022    CO2 30 08/26/2022    BUN 21 09/29/2022    BUN 22 08/26/2022    CR 0.87 09/29/2022    CR 0.92 08/26/2022     (H) 09/29/2022     (H) 08/26/2022     COAGS:   Lab Results   Component Value Date    PTT 29 11/16/2021    INR 1.0 01/13/2023     POC: No results found for: BGM, HCG, HCGS  HEPATIC:   Lab Results   Component Value Date    ALBUMIN 4.1 08/26/2022    PROTTOTAL 7.6 08/26/2022    ALT 28 11/10/2022    AST 24 11/10/2022    ALKPHOS 79 08/26/2022    BILITOTAL 0.5 08/26/2022     OTHER:   Lab Results   Component Value Date    LACT 2.0 11/17/2021    EJ 9.8 09/29/2022    PHOS 4.1 03/10/2022    LIPASE 165 08/06/2010    AMYLASE 102  08/06/2010    CRP <2.9 08/26/2022    SED 39 (H) 08/26/2022       Anesthesia Plan    ASA Status:  2   NPO Status:  NPO Appropriate    Anesthesia Type: MAC.     - Reason for MAC: straight local not clinically adequate   Induction: Intravenous.   Maintenance: TIVA.        Consents    Anesthesia Plan(s) and associated risks, benefits, and realistic alternatives discussed. Questions answered and patient/representative(s) expressed understanding.    - Discussed:     - Discussed with:  Patient         Postoperative Care       PONV prophylaxis: Ondansetron (or other 5HT-3)     Comments:                Edgar Moore MD

## 2023-01-15 NOTE — ANESTHESIA POSTPROCEDURE EVALUATION
Patient: Patrick Olson    Procedure: Procedure(s):  COLONOSCOPY       Anesthesia Type:  MAC    Note:  Disposition: Outpatient   Postop Pain Control: Uneventful            Sign Out: Well controlled pain   PONV: No   Neuro/Psych: Uneventful            Sign Out: Acceptable/Baseline neuro status   Airway/Respiratory: Uneventful            Sign Out: Acceptable/Baseline resp. status   CV/Hemodynamics: Uneventful            Sign Out: Acceptable CV status; No obvious hypovolemia; No obvious fluid overload   Other NRE: NONE   DID A NON-ROUTINE EVENT OCCUR? No           Last vitals:  Vitals Value Taken Time   /51 01/13/23 1133   Temp 36.6  C (97.9  F) 01/13/23 1118   Pulse 70 01/13/23 1133   Resp 14 01/13/23 1133   SpO2 100 % 01/13/23 1133       Electronically Signed By: Edgar Moore MD  January 15, 2023  8:39 AM

## 2023-01-17 ENCOUNTER — TELEPHONE (OUTPATIENT)
Dept: GASTROENTEROLOGY | Facility: CLINIC | Age: 83
End: 2023-01-17
Payer: COMMERCIAL

## 2023-01-17 NOTE — TELEPHONE ENCOUNTER
Screening Questions    BlueKIND OF PREP RedLOCATION [review exclusion criteria] GreenSEDATION TYPE    n Have you had a positive covid test in the last 2 weeks?   If yes, what date? Schedule out 14 days from symptoms  y and your able to make your own medical decisions?  Y Are you active on mychart?   [employer] MEDICARE What insurance do you carry?    FLOYD OROZCO Ordering/Referring Provider:     16.5 BMI [BMI OVER 40-EXTENDED PREP]  BMI OVER 40 NEED PAC EVALUATION FOR [facility]    Respiratory Screening  [If yes to any of the following HOSPITAL setting only]     N      Do you use daily home oxygen?   N      Do you have mod to severe Obstructive Sleep Apnea?  N      Do you have Pulmonary Hypertension? NEED PAC APPT AT [facility]    N      Do you have UNCONTROLLED asthma?      N Have you had a heart or lung transplant?       N Are you currently on dialysis? [If yes, G-PREP & HOSPITAL setting only]   N  Do you have chronic kidney disease? [If yes, G-PREP]  N  Do you have a diagnosis of diabetes?[If yes, G-PREP]    N Have you had a stroke or Transient ischemic attack (TIA - aka  mini stroke ) within 6 months? (If yes, please review exclusion criteria)  N  In the past 6 months, have you had any heart related issues including cardiomyopathy or heart attack?   N  If yes, did it require cardiac stenting or other implantable device?       N Do you have any implantable devices in your body (pacemaker, defib, LVAD)? (If yes, please review exclusion criteria)    N Do you take nitroglycerin?   N If yes, how often?  (If yes, HOSPITAL setting ONLY)  N Are you currently taking any blood thinners?           [IF YES, INFORM PATIENT TO FOLLOW UP W/ ORDERING PROVIDER FOR BRIDGING INSTRUCTIONS]     N  Do you take Phentermine?   Yes-> Hold for 7 days before procedure.  Please consult your prescribing provider if you have questions about holding  this medication.         [FEMALES] are you currently pregnant?       If yes, how many weeks? [Greater than 12 weeks, OR NEEDED]    N Any prescription pain medications taken daily like narcotics on a routine schedule? [EXTENDED PREP AND MAC]  (ex narcotics: tramadol, oxycodone, roxicodone, oxyxontin,  and percocet)?    N  Any chemical dependencies such as alcohol, street drugs, or methadone? [If yes, MAC]    N Do you need help transferring? (If NO, please HOSPITAL setting  only)       On a regular basis do you go 3-5 days without a bowel movement?[ If yes, EXTENDED PREP.]     Preferred LOCAL Pharmacy for Pre Prescription:      [id number] [address] [place] [address]                        Scheduling Details    Anhon Caller:   (Please ask for phone number if not scheduled by patient)    Type of Procedure Scheduled: Upper Endoscopy [EGD]  VCE     Which Colonoscopy Prep was Sent:   PAM CF PATIENTS & GROEN'S PATIENTS NEEDS EXTENDED PREP    3/6 Date of Procedure: VCE 3/7   JOSE DANIEL Surgeon:   [facility] Location:      Sedation Type:     Conscious Sedation- Needs  for 6 hours after the procedure  MAC/General-Needs  for 24 hours after procedure    Y Informed patient they will need an adult          Cannot take any type of public or medical transportation alone    Y Confirmed Nurse will call to complete assessment      Pre-Procedure Covid test to be completed [[facility] PCR Testing Required]    DOUBLE CHECK BMI AND KAREN  NEEDS ADULT -CANNOT TAKE PUBILC OR MEDICAL TRANSPOTATION  COVID TEST FOR [facility] 3-4 DAYS  COIVD TEST FOR [facility] CAN BE HOME     Additional comments:

## 2023-01-19 ENCOUNTER — TELEPHONE (OUTPATIENT)
Dept: GASTROENTEROLOGY | Facility: CLINIC | Age: 83
End: 2023-01-19
Payer: COMMERCIAL

## 2023-01-19 NOTE — TELEPHONE ENCOUNTER
Call to patient who is reporting dark/black stools x3 starting today. Patient reports that she is wondering if this is from her colonoscopy on 1/13/23.  Denies nausea, vomiting, abdominal pain. Writer inquired if patient was on a iron supplement, she states she just started one 5 days ago and that must be the reason for the dark stools.  Writer will route to provider. Writer gave ER warnings and Patient verbalized understanding.    Dory Cannon RN   Patient due for annual CT Lung Screening. Reminder letter mailed.     Yanet Gil [address] Lung Navigator  [phone]

## 2023-01-19 NOTE — TELEPHONE ENCOUNTER
[facility] Call Center    Phone Message    May a detailed message be left on voicemail: yes     Reason for Call: Other:      Pt stated they have black stools and believe it may be caused by the upper endoscopy performed by Dr. Downey. Pt is requesting a call back to discuss.       Action Taken: Message routed to:  [facility] ([facility]):  GI    Travel Screening: Not Applicable

## 2023-01-19 NOTE — TELEPHONE ENCOUNTER
Caller: Patrick  Reason for Reschedule/Cancellation (please be detailed, any staff messages or encounters to note?): Patient wants sooner appt      Prior to reschedule please review:    Ordering Provider:Nasreen    Sedation per order:CS    Does patient have any ASC Exclusions, please identify?: N      Notes on Cancelled Procedure:    Procedure:Upper Endoscopy [EGD] and VCE    Date: 3/6/23, 3/7/23    Location:[facility]; [address] [place] [address]    Surgeon: Nasreen for EGD and JR Mesa for VCE        Rescheduled: Yes    Procedure: Upper Endoscopy [EGD]  And VCE    Date: 2/6/23, 2/7/23    Location:[facility]; [address] [place] [address]    Surgeon: Octavia Mesa    Sedation Level Scheduled  CS,  Reason for Sedation Level Protocol    Prep/Instructions updated and sent: Yes

## 2023-01-20 ENCOUNTER — LAB (OUTPATIENT)
Dept: LAB | Facility: CLINIC | Age: 83
End: 2023-01-20
Payer: COMMERCIAL

## 2023-01-20 DIAGNOSIS — K92.1 HEMATOCHEZIA: ICD-10-CM

## 2023-01-20 LAB
ERYTHROCYTE [DISTWIDTH] IN BLOOD BY AUTOMATED COUNT: 14.1 % (ref 10–15)
HCT VFR BLD AUTO: 31.7 % (ref 35–47)
HGB BLD-MCNC: 10.3 G/DL (ref 11.7–15.7)
MCH RBC QN AUTO: 32.9 PG (ref 26.5–33)
MCHC RBC AUTO-ENTMCNC: 32.5 G/DL (ref 31.5–36.5)
MCV RBC AUTO: 101 FL (ref 78–100)
PLATELET # BLD AUTO: 240 10E3/UL (ref 150–450)
RBC # BLD AUTO: 3.13 10E6/UL (ref 3.8–5.2)
WBC # BLD AUTO: 3.7 10E3/UL (ref 4–11)

## 2023-01-20 PROCEDURE — 85027 COMPLETE CBC AUTOMATED: CPT

## 2023-01-20 PROCEDURE — 36415 COLL VENOUS BLD VENIPUNCTURE: CPT

## 2023-01-20 NOTE — TELEPHONE ENCOUNTER
Deric Rodriguez MD Heckt, Angie, RN; Spencer Downey MD  Caller: Unspecified (Yesterday,  3:41 PM)  Most likely from iron supplement, however she has a history of GI bleeding.  I ordered an updated CBC which she can get at her convenience.  Please let me know if any other issues.  Thanks.    Reqluthart message sent to patient with the above provider recommendations. Patient had stated yesterday that a [facility] message to follow up would be great.    Dory Cannon, REA

## 2023-01-25 ENCOUNTER — TELEPHONE (OUTPATIENT)
Dept: GASTROENTEROLOGY | Facility: CLINIC | Age: 83
End: 2023-01-25

## 2023-01-25 NOTE — TELEPHONE ENCOUNTER
Pre assessment questions completed for upcoming Upper endoscopy (EGD) procedure scheduled on 2/6/23    COVID policy reviewed.     Pre-op scheduled  N/A    Reviewed procedural arrival time 1400, procedure time 1500 and facility location [facility]; [address] [place] [address]    Designated  policy reviewed. Instructed to have someone stay 6 hours post procedure.     Anticoagulation/blood thinners? No    Electronic implanted devices? No    Diabetic? No    Procedure indication: Hematochezia     Reviewed procedure prep instructions.     Prep instructions sent via [employer].      Patient verbalized understanding and had no questions or concerns at this time.    Chari Duque RN  Endoscopy Procedure Pre Assessment RN       Composite Graft Text: The defect edges were debeveled with a #15 scalpel blade.  Given the location of the defect, shape of the defect, the proximity to free margins and the fact the defect was full thickness a composite graft was deemed most appropriate.  The defect was outline and then transferred to the donor site.  A full thickness graft was then excised from the donor site. The graft was then placed in the primary defect, oriented appropriately and then sutured into place.  The secondary defect was then repaired using a primary closure.

## 2023-01-27 ENCOUNTER — TELEPHONE (OUTPATIENT)
Dept: GASTROENTEROLOGY | Facility: CLINIC | Age: 83
End: 2023-01-27
Payer: COMMERCIAL

## 2023-01-27 DIAGNOSIS — K92.1 HEMATOCHEZIA: Primary | ICD-10-CM

## 2023-01-27 RX ORDER — ONDANSETRON 4 MG/1
TABLET, FILM COATED ORAL
Qty: 3 TABLET | Refills: 0 | Status: SHIPPED | OUTPATIENT
Start: 2023-01-27 | End: 2023-02-07

## 2023-01-27 RX ORDER — BISACODYL 5 MG
TABLET, DELAYED RELEASE (ENTERIC COATED) ORAL
Qty: 2 TABLET | Refills: 0 | Status: ON HOLD | OUTPATIENT
Start: 2023-01-27 | End: 2023-02-06

## 2023-01-27 NOTE — TELEPHONE ENCOUNTER
Pre assessment questions completed for upcoming Video capsule endoscopy  procedure scheduled on 2/7/23    COVID policy reviewed.     Pre-op scheduled  N/A    Reviewed procedural arrival time 1000, procedure time 1100 and facility location [facility]; [address] [facility], [address], [place] [address]      not required due to no sedation.    Anticoagulation/blood thinners? No    Electronic implanted devices? No    Diabetic? No    Procedure indication: Hematochezia, blood in the small bowel; history of gastric AVMs    Bowel prep recommendation: Golytely prep for video capsule prep d/t hx CKD    Hx nausea/gagging with Golytely prep. Sent Rx for Zofran (ondansetron) to pharmacy.    Reviewed procedure prep instructions.     Pt will be at EGD procedure on 2/6/23 (day before video capsule). Pt will start the bowel prep when she gets home from EGD.    Prep instructions sent via [employer].      Bowel prep & Zofran sent to [id number] [address] [place] [address].     Patient verbalized understanding and had no questions or concerns at this time.    Dulce Mcknight RN  Endoscopy Procedure Pre Assessment RN

## 2023-01-30 NOTE — TELEPHONE ENCOUNTER
Patient calling with concerns regarding her 2 procedures scheduled back to back - EGD on 2.6.23 that is scheduled for 1500, then VCE for 2.7.23 at 1100.     Per VCE instructions, patient is to start bowel prep at 1200 the day prior, however will be NPO then for her EGD at 1500. Patient expressed concern for dehydration and poor prep with the way these procedures are scheduled.     Message sent to scoping provider for review and follow up.     Margarita Huber RN

## 2023-01-30 NOTE — PROGRESS NOTES
"ESTABLISHED PATIENT FOLLOW-UP:  Pain of the Lower Back     HISTORY OF PRESENT ILLNESS  Ms. Olson is a pleasant 82 year old year old female who presents to clinic today for follow-up of low back pain.    Date of injury: 4/2/21  Date last seen: 9/23/22  Following Therapeutic Plan: heat, HEP  Pain: 4/10 today, worsening  Function: worsening  Interval History: Patient notes worsening symptoms since last visit. Pain intermittently radiates to bilateral lateral hips, numbness and tingling radiating down legs. Patient has not yet had epidural injection ordered in the fall.  She went to [place] and did quite well with medrol pack only.  Hoping to pursue another vacation similarly in the fall.    Also notes pain of right shoulder, right elbow over the past 3 weeks.  No known inciting event but difficulty with overhead ROM, lifting objects. Medial elbow and forearm pain.    Additional medical/Social/Surgical histories reviewed in [facility] and updated as appropriate.    REVIEW OF SYSTEMS (1/30/2023)  CONSTITUTIONAL: Denies fever and weight loss  GASTROINTESTINAL: Denies abdominal pain, nausea, vomiting  MUSCULOSKELETAL: See HPI  SKIN: Denies any recent rash or lesion  NEUROLOGICAL: Denies numbness or focal weakness     PHYSICAL EXAM  Ht 1.626 m (5' 4\")   Wt 44.2 kg (97 lb 8 oz)   BMI 16.74 kg/m      General  - normal appearance, in no obvious distress  Musculoskeletal - lumbar spine  - inspection: normal bone and joint alignment, no obvious scoliosis  - palpation: Tenderness palpation of bilateral L3-L4 region of paraspinal musculature.  - ROM: Full range of motion in flexion-extension side bending and rotation.  Pain with flexion and sidebending at L3-L4 region  - strength: lower extremities 5/5 in all planes  - special tests:  (-) straight leg raise    (-) slump test    Neuro  - patellar and Achilles DTRs 2+ bilaterally, No lower extremity sensory deficit throughout L5 distribution, grossly normal coordination, normal muscle " tone  Musculoskeletal - Right shoulder  - inspection: normal bone and joint alignment, no obvious deformity, no scapular winging, no AC step-off  - palpation: mildly tender RC insertion, normal clavicle, non-tender AC  - ROM:  painful but full flexion and ER at end range, mild decrease IR  - strength: 5/5  strength, 5/5 in all shoulder planes  - special tests:  (-) Speed's  (+) Neer  (+) Hawkin's  (+) Izabela's  (-) [place]'s  (-) apprehension  (-) subscap lift-off  Musculoskeletal - R elbow  - inspection: normal joint alignment, no obvious deformity,no soft tissue swelling medially  - palpation: tender at the origin of the common flexor tendon  - ROM:  160 deg flexion   0 deg extension   90 deg pronation   90 deg supination  - strength: 5/5 wrist flexion with elbow flexed, 4/5 with elbow extended, painful resisted flexion of fingers with elbow flexed, worse with extension, 5/5  strength  - special tests:  (-) varus  (-) valgus  (-) Tinel's  Neuro  - no sensory or motor deficit, grossly normal coordination, normal muscle tone  Skin  - no ecchymosis, erythema, warmth, or induration, no obvious rash  Psych  - interactive, appropriate, normal mood and affect    IMAGING :   No recent MRI in last 2 years    ASSESSMENT & PLAN  Ms. Olson is a 81 year old year old female female with possible history of seropositive rheumatoid arthritis with secondary Sjogren's syndrome in remission on hydroxychloroquine, lumbar stenosis with neurogenic claudication presenting with acute on chronic low back pain, right shoulder pain.    -MRI lumbar spine ordered today  -LEE ANN ordered tentatively at L3-L4 interlaminar as this has been the most helpful previously  -Medrol pack repeated for shoulder, lumbar complaints for relief until above can be completed.   -HEP for shoulder and elbow provided today  -Check back in with Rheumatology; appt   -Follow up 2 weeks after LEE ANN ideally    It was a pleasure seeing Patrick.    Mukesh Lantigua DO,  CAQSM  Primary Care Sports Medicine

## 2023-01-30 NOTE — TELEPHONE ENCOUNTER
Jaime Mesa MD Arola, Tracy, RN  I agree you have to be able to do the prep.  I would  recommend she reschedule capsule for  the following week.      Peggy Zabala Tracy, RN  Pt has been rescheduled for VCE  on 02/14/2023 @ [facility] with ([facility])  under NO SEDATION

## 2023-02-01 ENCOUNTER — LAB (OUTPATIENT)
Dept: LAB | Facility: CLINIC | Age: 83
End: 2023-02-01
Payer: COMMERCIAL

## 2023-02-01 ENCOUNTER — OFFICE VISIT (OUTPATIENT)
Dept: ORTHOPEDICS | Facility: CLINIC | Age: 83
End: 2023-02-01
Payer: COMMERCIAL

## 2023-02-01 VITALS — BODY MASS INDEX: 16.65 KG/M2 | WEIGHT: 97.5 LBS | HEIGHT: 64 IN

## 2023-02-01 DIAGNOSIS — M54.16 LUMBAR RADICULOPATHY, ACUTE: ICD-10-CM

## 2023-02-01 DIAGNOSIS — M51.369 LUMBAR DEGENERATIVE DISC DISEASE: ICD-10-CM

## 2023-02-01 DIAGNOSIS — M06.9 RHEUMATOID ARTHRITIS INVOLVING MULTIPLE SITES, UNSPECIFIED WHETHER RHEUMATOID FACTOR PRESENT (H): ICD-10-CM

## 2023-02-01 DIAGNOSIS — Z79.899 HIGH RISK MEDICATION USE: ICD-10-CM

## 2023-02-01 DIAGNOSIS — M25.511 ACUTE PAIN OF RIGHT SHOULDER: Primary | ICD-10-CM

## 2023-02-01 DIAGNOSIS — M77.01 MEDIAL EPICONDYLITIS OF ELBOW, RIGHT: ICD-10-CM

## 2023-02-01 LAB
ALBUMIN SERPL BCG-MCNC: 4.6 G/DL (ref 3.5–5.2)
ALT SERPL W P-5'-P-CCNC: 18 U/L (ref 10–35)
AST SERPL W P-5'-P-CCNC: 30 U/L (ref 10–35)
BASOPHILS # BLD AUTO: 0 10E3/UL (ref 0–0.2)
BASOPHILS NFR BLD AUTO: 1 %
CREAT SERPL-MCNC: 0.96 MG/DL (ref 0.51–0.95)
CRP SERPL-MCNC: <3 MG/L
EOSINOPHIL # BLD AUTO: 0.1 10E3/UL (ref 0–0.7)
EOSINOPHIL NFR BLD AUTO: 2 %
ERYTHROCYTE [DISTWIDTH] IN BLOOD BY AUTOMATED COUNT: 14.9 % (ref 10–15)
ERYTHROCYTE [SEDIMENTATION RATE] IN BLOOD BY WESTERGREN METHOD: 35 MM/HR (ref 0–30)
GFR SERPL CREATININE-BSD FRML MDRD: 60 ML/MIN/1.73M2
HCT VFR BLD AUTO: 33.1 % (ref 35–47)
HGB BLD-MCNC: 11 G/DL (ref 11.7–15.7)
IMM GRANULOCYTES # BLD: 0 10E3/UL
IMM GRANULOCYTES NFR BLD: 0 %
LYMPHOCYTES # BLD AUTO: 1 10E3/UL (ref 0.8–5.3)
LYMPHOCYTES NFR BLD AUTO: 29 %
MCH RBC QN AUTO: 33.5 PG (ref 26.5–33)
MCHC RBC AUTO-ENTMCNC: 33.2 G/DL (ref 31.5–36.5)
MCV RBC AUTO: 101 FL (ref 78–100)
MONOCYTES # BLD AUTO: 0.3 10E3/UL (ref 0–1.3)
MONOCYTES NFR BLD AUTO: 9 %
NEUTROPHILS # BLD AUTO: 2.1 10E3/UL (ref 1.6–8.3)
NEUTROPHILS NFR BLD AUTO: 59 %
PLATELET # BLD AUTO: 255 10E3/UL (ref 150–450)
RBC # BLD AUTO: 3.28 10E6/UL (ref 3.8–5.2)
WBC # BLD AUTO: 3.5 10E3/UL (ref 4–11)

## 2023-02-01 PROCEDURE — 85652 RBC SED RATE AUTOMATED: CPT

## 2023-02-01 PROCEDURE — 99214 OFFICE O/P EST MOD 30 MIN: CPT | Performed by: FAMILY MEDICINE

## 2023-02-01 PROCEDURE — 82040 ASSAY OF SERUM ALBUMIN: CPT

## 2023-02-01 PROCEDURE — 84450 TRANSFERASE (AST) (SGOT): CPT

## 2023-02-01 PROCEDURE — 84460 ALANINE AMINO (ALT) (SGPT): CPT

## 2023-02-01 PROCEDURE — 85025 COMPLETE CBC W/AUTO DIFF WBC: CPT

## 2023-02-01 PROCEDURE — 86140 C-REACTIVE PROTEIN: CPT

## 2023-02-01 PROCEDURE — 36415 COLL VENOUS BLD VENIPUNCTURE: CPT

## 2023-02-01 PROCEDURE — 82565 ASSAY OF CREATININE: CPT

## 2023-02-01 RX ORDER — METHYLPREDNISOLONE 4 MG
TABLET, DOSE PACK ORAL
Qty: 21 TABLET | Refills: 0 | Status: SHIPPED | OUTPATIENT
Start: 2023-02-01 | End: 2023-02-07

## 2023-02-01 ASSESSMENT — PAIN SCALES - GENERAL: PAINLEVEL: MODERATE PAIN (4)

## 2023-02-01 NOTE — LETTER
"    2/1/2023         RE: Patrick Olosn  [address] [id number]        Dear Colleague,    Thank you for referring your patient, Patrick Olson, to the [facility] [place]. Please see a copy of my visit note below.    ESTABLISHED PATIENT FOLLOW-UP:  Pain of the Lower Back     HISTORY OF PRESENT ILLNESS  Ms. Olson is a pleasant 82 year old year old female who presents to clinic today for follow-up of low back pain.    Date of injury: 4/2/21  Date last seen: 9/23/22  Following Therapeutic Plan: heat, HEP  Pain: 4/10 today, worsening  Function: worsening  Interval History: Patient notes worsening symptoms since last visit. Pain intermittently radiates to bilateral lateral hips, numbness and tingling radiating down legs. Patient has not yet had epidural injection ordered in the fall.  She went to [place] and did quite well with medrol pack only.  Hoping to pursue another vacation similarly in the fall.    Also notes pain of right shoulder, right elbow over the past 3 weeks.  No known inciting event but difficulty with overhead ROM, lifting objects. Medial elbow and forearm pain.    Additional medical/Social/Surgical histories reviewed in [facility] and updated as appropriate.    REVIEW OF SYSTEMS (1/30/2023)  CONSTITUTIONAL: Denies fever and weight loss  GASTROINTESTINAL: Denies abdominal pain, nausea, vomiting  MUSCULOSKELETAL: See HPI  SKIN: Denies any recent rash or lesion  NEUROLOGICAL: Denies numbness or focal weakness     PHYSICAL EXAM  Ht 1.626 m (5' 4\")   Wt 44.2 kg (97 lb 8 oz)   BMI 16.74 kg/m      General  - normal appearance, in no obvious distress  Musculoskeletal - lumbar spine  - inspection: normal bone and joint alignment, no obvious scoliosis  - palpation: Tenderness palpation of bilateral L3-L4 region of paraspinal musculature.  - ROM: Full range of motion in flexion-extension side bending and rotation.  Pain with flexion and sidebending at L3-L4 region  - " strength: lower extremities 5/5 in all planes  - special tests:  (-) straight leg raise    (-) slump test    Neuro  - patellar and Achilles DTRs 2+ bilaterally, No lower extremity sensory deficit throughout L5 distribution, grossly normal coordination, normal muscle tone  Musculoskeletal - Right shoulder  - inspection: normal bone and joint alignment, no obvious deformity, no scapular winging, no AC step-off  - palpation: mildly tender RC insertion, normal clavicle, non-tender AC  - ROM:  painful but full flexion and ER at end range, mild decrease IR  - strength: 5/5  strength, 5/5 in all shoulder planes  - special tests:  (-) Speed's  (+) Neer  (+) Hawkin's  (+) Izabela's  (-) [place]'s  (-) apprehension  (-) subscap lift-off  Musculoskeletal - R elbow  - inspection: normal joint alignment, no obvious deformity,no soft tissue swelling medially  - palpation: tender at the origin of the common flexor tendon  - ROM:  160 deg flexion   0 deg extension   90 deg pronation   90 deg supination  - strength: 5/5 wrist flexion with elbow flexed, 4/5 with elbow extended, painful resisted flexion of fingers with elbow flexed, worse with extension, 5/5  strength  - special tests:  (-) varus  (-) valgus  (-) Tinel's  Neuro  - no sensory or motor deficit, grossly normal coordination, normal muscle tone  Skin  - no ecchymosis, erythema, warmth, or induration, no obvious rash  Psych  - interactive, appropriate, normal mood and affect    IMAGING :   No recent MRI in last 2 years    ASSESSMENT & PLAN  Ms. Olson is a 81 year old year old female female with possible history of seropositive rheumatoid arthritis with secondary Sjogren's syndrome in remission on hydroxychloroquine, lumbar stenosis with neurogenic claudication presenting with acute on chronic low back pain, right shoulder pain.    -MRI lumbar spine ordered today  -LEE ANN ordered tentatively at L3-L4 interlaminar as this has been the most helpful previously  -Medrol pack  repeated for shoulder, lumbar complaints for relief until above can be completed.   -HEP for shoulder and elbow provided today  -Check back in with Rheumatology; appt   -Follow up 2 weeks after LEE ANN ideally    It was a pleasure seeing Imani Lantigua DO, [facility]  Primary Care Sports Medicine          Again, thank you for allowing me to participate in the care of your patient.        Sincerely,        Mukesh Lantigua DO

## 2023-02-01 NOTE — PATIENT INSTRUCTIONS
Thank you for choosing [facility]    [facility]  [place] [address], [address]th [place]   [place], [place], [address] [place] [address]   [phone]       APPOINTMENTS: [phone]    CARE QUESTIONS: [phone],    BILLING QUESTIONS: [phone]    FAX NUMBER: [phone]          1. Lumbar radiculopathy, acute    2. Lumbar degenerative disc disease      An order for an MRI and injection were placed today. These will be faxed to [facility]. Please call them at your convenience to schedule.       Rotator Cuff Injury     WHAT IS A ROTATOR CUFF INJURY?    A rotator cuff injury is irritation of or damage to the group of tendons and muscles that hold your shoulder joint together. Tendons are strong bands of tissue that attach muscle to bone. You use the muscles and tendons in your shoulder joint to move your shoulder and raise your arm over your head.        WHAT IS THE CAUSE?    A rotator cuff injury can be caused by:    Overuse of your shoulder in a sport or work activity that involves repetitive overhead movement of your shoulder, like swimming, baseball (mainly pitching), football, tennis, painting, plastering, or housework.  A sudden activity that twists your shoulder or tears your tendon, such as using your arm to break a fall, falling onto your arm, or lifting a heavy object  You may be at higher risk for a rotator cuff injury if you have poor head and shoulder posture, especially if you are older.    WHAT ARE THE SYMPTOMS?    Symptoms may include:    Pain and weakness in your arm and shoulder  Loss of shoulder movement, especially when you try to raise your arm overhead    HOW IS IT DIAGNOSED?    Your healthcare provider will ask about your symptoms, activities, and medical history and examine you. You may have X-rays or other scans or procedures, such as:    An ultrasound, which uses sound waves  to show pictures of your shoulder joint  An MRI, which uses a strong magnetic field and radio waves to show detailed pictures of your shoulder joint  An arthrogram, which is an X-ray or MRI taken after a dye is injected into your joint to outline its shape  Arthroscopy, which is a type of surgery done with a small scope inserted into your joint so your provider can look directly at your joint    HOW IS IT TREATED?    You will need to change or stop doing the activities that cause pain until your injury has healed. For example, avoid strenuous activity or any overhead motion that causes pain. Also, try to make sure that you are practicing good posture and are not slouching forward.    Your healthcare provider may recommend stretching and strengthening exercises to help you heal.    If you have a bad tear, you may need to have it repaired with surgery. After surgery, your treatment plan will include physical therapy to strengthen your shoulder as it heals.    The pain often gets better within a few weeks with self-care, but some injuries may take several months or longer to heal. It s important to follow all of your healthcare provider s instructions.    HOW CAN I TAKE CARE OF MYSELF?    To keep swelling down and help relieve pain:    Put an ice pack, gel pack, or package of frozen vegetables wrapped in a cloth on the area every 3 to 4 hours for up to 20 minutes at a time.  Take nonprescription pain medicine, such as acetaminophen, ibuprofen, or naproxen. Nonsteroidal anti-inflammatory medicines (NSAIDs), such as ibuprofen and naproxen, may cause stomach bleeding and other problems. These risks increase with age. Read the label and take as directed. Unless recommended by your healthcare provider, you should not take this medicine for more than 10 days.  Moist heat may help relieve pain, relax your muscles, and make it easier to move your arm and shoulder. Put moist heat on the injured area for 10 to 15 minutes before  you do warm-up and stretching exercises. Moist heat includes heat patches or moist heating pads that you can buy at most drugstores, a warm wet washcloth, or a hot shower. To prevent burns to your skin, follow directions on the package and do not lie on any type of hot pad. Don t use heat if you have swelling.    Follow your healthcare provider's instructions, including any exercises recommended by your provider. Ask your provider:    How and when you will hear your test results  How long it will take to recover  What activities you should avoid, including how much you can lift, and when you can return to your normal activities  How to take care of yourself at home  What symptoms or problems you should watch for and what to do if you have them  Make sure you know when you should come back for a checkup.    HOW CAN I HELP PREVENT A ROTATOR CUFF INJURY?    Warm-up exercises and stretching before activities can help prevent injuries. For example, do exercises that strengthen your shoulder muscles. If your arm or shoulder hurts after exercise, putting ice on it may help keep it from getting injured.    Follow safety rules and use any protective equipment recommended for your work or sport.    Avoid activities that cause pain. For example, avoid lifting heavy objects over your head.    Developed by [employer].  Published by [employer].  Copyright  2014 [employer] and/or one of its subsidiaries. All rights reserved.    Rotator Cuff Exercises    Isometric shoulder external rotation:  a doorway with your elbow bent 90 degrees and the back of the wrist on your injured side pressed against the door frame. Try to press your hand outward into the door frame. Hold for 5 seconds. Do 2 sets of 15.    Isometric shoulder internal rotation:  a doorway with your elbow bent 90 degrees and the front of the wrist on your injured side pressed against the door frame. Try to press your palm into the door frame.  Hold for 5 seconds. Do 2 sets of 15.  Wand exercise, flexion: Stand upright and hold a stick in both hands, palms down. Stretch your arms by lifting them over your head, keeping your arms straight. Hold for 5 seconds and return to the starting position. Repeat 10 times.    Wand exercise, extension: Stand upright and hold a stick in both hands behind your back. Move the stick away from your back. Hold this position for 5 seconds. Relax and return to the starting position. Repeat 10 times.  Wand exercise, external rotation: Lie on your back and hold a stick in both hands, palms up. Your upper arms should be resting on the floor with your elbows at your sides and bent 90 degrees. Use your uninjured arm to push your injured arm out away from your body. Keep the elbow of your injured arm at your side while it is being pushed. Hold the stretch for 5 seconds. Repeat 10 times.    Wand exercise, shoulder abduction and adduction: Stand and hold a stick with both hands, palms facing away from your body. Rest the stick against the front of your thighs. Use your uninjured arm to push your injured arm out to the side and up as high as possible. Keep your arms straight. Hold for 5 seconds. Repeat 10 times.    Resisted shoulder external rotation: Stand sideways next to a door with your injured arm farther from the door. Tie a knot in the end of the tubing and shut the knot in the door at waist level (or use cable weight machine at gym). Hold the other end of the tubing with the hand of your injured arm. Rest the hand of your injured arm across your stomach. Keeping your elbow in at your side, rotate your arm outward and away from your waist. Slowly return your arm to the starting position. Make sure you keep your elbow bent 90 degrees and your forearm parallel to the floor. Repeat 10 times. Build up to 2 sets of 15.    Resisted shoulder internal rotation: Stand sideways next to a door with your injured arm closest to the door. Tie  a knot in the end of the tubing and shut the knot in the door at waist level (or use cable weight machine at gym). Hold the other end of the tubing with the hand of your injured arm. Bend the elbow of your injured arm 90 degrees. Keeping your elbow in at your side, rotate your forearm across your body and then slowly back to the starting position. Make sure you keep your forearm parallel to the floor. Do 2 sets of 8 to 12.    Scaption: Stand with your arms at your sides and with your elbows straight. Slowly raise your arms to eye level. As you raise your arms, spread them apart so that they are only slightly in front of your body (at about a 30-degree angle to the front of your body). Point your thumbs toward the ceiling. Hold for 2 seconds and lower your arms slowly. Do 2 sets of 15. Progress to holding a soup can or light weight when you are doing the exercise and increase the weight as the exercise gets easier.    Side-lying external rotation: Lie on your uninjured side with your injured arm at your side and your elbow bent 90 degrees. Keeping your elbow against your side, raise your forearm toward the ceiling and hold for 2 seconds. Slowly lower your arm. Do 2 sets of 15. You can start doing this exercise holding a soup can or light weight and gradually increase the weight as long as there is no pain.    Horizontal abduction: Lie on your stomach on a table or the edge of a bed with the arm on your injured side hanging down over the edge. Raise your arm out to the side, with your thumb pointed toward the ceiling, until your arm is parallel to the floor. Hold for 2 seconds and then lower it slowly. Start this exercise with no weight. As you get stronger, add a light weight or hold a soup can. Do 2 sets of 15.    Push-up with a plus: Begin on the floor on your hands and knees. Keep your hands a shoulder width apart and lift your feet off the floor. Arch your back as high as possible and round your shoulders (this  "is the \"plus\" part or the exercise). Bend your elbows and lower your body to the floor. Return to the starting position and arch your back again. Do 2 sets of 15.            Medial Epicondylitis    Golfer s Elbow Instructions    MEDIAL EPICONDYLITIS (AKA GOLFER'S ELBOW):  -pain on the inside of elbow worsened with any gripping or lifting activity  -weakness of   -aching or burning pain  -Usually there is not any numbness or tingling involved (please let us know if this is happening as it may be something else)    HOME EXERCISES:  -Perform exercises as instructed in handout:  Goal:  2 sets of 20 for each strength exercise   1-2 times per day   5 days a week   Stretch after strengthening- Hold stretches for 30 secs, repeat 2-3 times   Avoid stretching through pain  -An excellent therapy program for epicondylitis is called \"Reverse Jeramie Twist\"  - you may do this through formal therapy or on your own.  -To do at home, purchase a flex  bar by [employer] online. there are different strengths so adjust according to your fitness level and pain. The bar may be purchased online as well at sites such as amazon.  - Search online for \"Reverse Jeramie twist elbow exercises\" and you will find videos on how to perform.  -Follow-up in 6 weeks or sooner if not improved, unable to do exercises do to pain, or if further concern.  -If not improving, we need to make sure the diagnosis is correct. Occasionally, elbow pain is due to nerve irritation.    -Once diagnosis is confirmed, we may consider nitroglycerin patch (see below for info), formal occupational therapy if not started, lidocaine injection to stimulate healing response (tenotomy), or other therapy modalities.  -We will try to avoid cortisone injections as they may delay healing but will consider if pain prevents improvement with other modalities  -rarely do you need surgery to alleviate pain    Other treatment options include:  -consider Active release therapy with a " chiropractor.  For more information on ART check [email or web address].  -Therapeutic massage    If problem flares again after resolved, restart brace full-time, restart icing, Aleve, and exercises. If it does not calm down, schedule follow up evaluation.    You may do the stretching exercises right away. You may do the strengthening exercises when stretching is nearly painless.    STRETCHING EXERCISES  Wrist active range of motion, flexion and extension: Bend the wrist of your injured arm forward and back as far as you can. Do 2 sets of 15.    Wrist stretch: Press the back of the hand on your injured side with your other hand to help bend your wrist. Hold for 15 to 30 seconds. Next, stretch the hand back by pressing the fingers in a backward direction. Hold for 15 to 30 seconds. Keep the arm on your injured side straight during this exercise. Do 3 sets.    Forearm pronation and supination: Bend the elbow of your injured arm 90 degrees, keeping your elbow at your side. Turn your palm up and hold for 5 seconds. Then slowly turn your palm down and hold for 5 seconds. Make sure you keep your elbow at your side and bent 90 degrees while you do the exercise. Do 2 sets of 15.    STRENGTHENING EXERCISES  Eccentric wrist flexion: Hold a can or hammer handle in the hand of your injured side with your palm up. Use the hand on the side that is not injured to bend your wrist up. Then let go of your wrist and use just your injured side to lower the weight slowly back to the starting position. Do 3 sets of 15. Gradually increase the weight you are holding.    Eccentric wrist extension: Hold a soup can or hammer handle in the hand of your injured side with your palm facing down. Use the hand on the side that is not injured to bend your wrist up. Then let go of your wrist and use just your injured side to lower the weight slowly back to the starting position. Do 3 sets of 15. Gradually increase the weight you are holding.      strengthening: Squeeze a soft rubber ball and hold the squeeze for 5 seconds. Do 2 sets of 15.    Forearm pronation and supination strengthening: Hold a soup can or hammer handle in your hand and bend your elbow 90 degrees. Slowly turn your hand so your palm is up and then down. Do 2 sets of 15.    Resisted elbow flexion and extension: Hold a can of soup with your palm up. Slowly bend your elbow so that your hand is coming toward your shoulder. Then lower it slowly so your arm is completely straight. Do 2 sets of 15. Slowly increase the weight you are using.      Developed by [employer].  Published by [employer].  Copyright  2014 [employer] and/or one of its subsidiaries. All rights reserved.    References

## 2023-02-02 ENCOUNTER — HOSPITAL ENCOUNTER (OUTPATIENT)
Facility: CLINIC | Age: 83
End: 2023-02-02
Payer: COMMERCIAL

## 2023-02-02 NOTE — TELEPHONE ENCOUNTER
Rescheduled from 2/7/23.    Pre assessment questions completed for upcoming Video capsule endoscopy  procedure scheduled on 2/14/23     COVID policy reviewed.      Pre-op scheduled  N/A     Reviewed procedural arrival time 1100, procedure time 1200 and facility location [facility]; [address], [address], [place] [address]       not required due to no sedation.     Anticoagulation/blood thinners? No     Electronic implanted devices? No     Diabetic? No     Procedure indication: Hematochezia, blood in the small bowel; history of gastric AVMs     Bowel prep recommendation: Golytely prep for video capsule prep d/t hx CKD     Hx nausea/gagging with Golytely prep. Previously sent Rx for Zofran (ondansetron) to pharmacy.     Reviewed procedure prep instructions.      Prep instructions sent via Vettery.       Bowel prep & Zofran previously sent to [facility]/PHARMACY #[id number] [address] [place] [address].      Patient verbalized understanding and had no questions or concerns at this time.     Dulce Mcknight RN  Endoscopy Procedure Pre Assessment RN

## 2023-02-03 DIAGNOSIS — N18.31 STAGE 3A CHRONIC KIDNEY DISEASE (H): Primary | ICD-10-CM

## 2023-02-06 ENCOUNTER — HOSPITAL ENCOUNTER (OUTPATIENT)
Facility: CLINIC | Age: 83
Discharge: HOME | End: 2023-02-06
Attending: INTERNAL MEDICINE | Admitting: INTERNAL MEDICINE
Payer: COMMERCIAL

## 2023-02-06 VITALS
OXYGEN SATURATION: 97 % | HEART RATE: 78 BPM | DIASTOLIC BLOOD PRESSURE: 55 MMHG | RESPIRATION RATE: 11 BRPM | SYSTOLIC BLOOD PRESSURE: 101 MMHG

## 2023-02-06 LAB — UPPER GI ENDOSCOPY: NORMAL

## 2023-02-06 PROCEDURE — 250N000011 HC RX IP 250 OP 636: Performed by: INTERNAL MEDICINE

## 2023-02-06 PROCEDURE — 250N000009 HC RX 250: Performed by: INTERNAL MEDICINE

## 2023-02-06 PROCEDURE — 999N000099 HC STATISTIC MODERATE SEDATION < 10 MIN: Performed by: INTERNAL MEDICINE

## 2023-02-06 PROCEDURE — 43235 EGD DIAGNOSTIC BRUSH WASH: CPT | Performed by: INTERNAL MEDICINE

## 2023-02-06 RX ORDER — ONDANSETRON 2 MG/ML
4 INJECTION INTRAMUSCULAR; INTRAVENOUS
Status: DISCONTINUED | OUTPATIENT
Start: 2023-02-06 | End: 2023-02-06 | Stop reason: HOSPADM

## 2023-02-06 RX ORDER — NALOXONE HYDROCHLORIDE 0.4 MG/ML
0.4 INJECTION, SOLUTION INTRAMUSCULAR; INTRAVENOUS; SUBCUTANEOUS
Status: DISCONTINUED | OUTPATIENT
Start: 2023-02-06 | End: 2023-02-06 | Stop reason: HOSPADM

## 2023-02-06 RX ORDER — NALOXONE HYDROCHLORIDE 0.4 MG/ML
0.2 INJECTION, SOLUTION INTRAMUSCULAR; INTRAVENOUS; SUBCUTANEOUS
Status: DISCONTINUED | OUTPATIENT
Start: 2023-02-06 | End: 2023-02-06 | Stop reason: HOSPADM

## 2023-02-06 RX ORDER — ONDANSETRON 2 MG/ML
4 INJECTION INTRAMUSCULAR; INTRAVENOUS EVERY 6 HOURS PRN
Status: DISCONTINUED | OUTPATIENT
Start: 2023-02-06 | End: 2023-02-06 | Stop reason: HOSPADM

## 2023-02-06 RX ORDER — LIDOCAINE 40 MG/G
CREAM TOPICAL
Status: DISCONTINUED | OUTPATIENT
Start: 2023-02-06 | End: 2023-02-06 | Stop reason: HOSPADM

## 2023-02-06 RX ORDER — PROCHLORPERAZINE MALEATE 5 MG
5 TABLET ORAL EVERY 6 HOURS PRN
Status: DISCONTINUED | OUTPATIENT
Start: 2023-02-06 | End: 2023-02-06 | Stop reason: HOSPADM

## 2023-02-06 RX ORDER — FENTANYL CITRATE 50 UG/ML
INJECTION, SOLUTION INTRAMUSCULAR; INTRAVENOUS PRN
Status: DISCONTINUED | OUTPATIENT
Start: 2023-02-06 | End: 2023-02-06 | Stop reason: HOSPADM

## 2023-02-06 RX ORDER — ONDANSETRON 4 MG/1
4 TABLET, ORALLY DISINTEGRATING ORAL EVERY 6 HOURS PRN
Status: DISCONTINUED | OUTPATIENT
Start: 2023-02-06 | End: 2023-02-06 | Stop reason: HOSPADM

## 2023-02-06 RX ORDER — FLUMAZENIL 0.1 MG/ML
0.2 INJECTION, SOLUTION INTRAVENOUS
Status: DISCONTINUED | OUTPATIENT
Start: 2023-02-06 | End: 2023-02-06 | Stop reason: HOSPADM

## 2023-02-06 ASSESSMENT — ACTIVITIES OF DAILY LIVING (ADL): ADLS_ACUITY_SCORE: 35

## 2023-02-06 NOTE — H&P
[facility] Anesthesia Pre-op History and Physical    Patrick Olson MRN# [id number]   Age: 82 year old YOB: 1940      Date of Surgery: 2/6/2023 Location [facility]      Date of Exam 2/6/2023 Facility (In hospital)       Home clinic: [facility]  Primary care provider: Essence Tamayo         Chief Complaint and/or Reason for Procedure:   No chief complaint on file.           Active problem list:     Patient Active Problem List    Diagnosis Date Noted     Lab test negative for COVID-19 virus 11/18/2021     Priority: Medium     Diverticulosis of large intestine      Priority: Medium     Acute lower GI bleeding 01/21/2021     Priority: Medium     Diverticular hemorrhage 01/20/2021     Priority: Medium     Gastrointestinal hemorrhage, unspecified gastrointestinal hemorrhage type 01/19/2021     Priority: Medium     GI bleed 03/13/2017     Priority: Medium     SNHL (sensorineural hearing loss) 11/29/2012     Priority: Medium            Medications (include herbals and vitamins):   Any Plavix use in the last 7 days? No     Current Facility-Administered Medications   Medication     lidocaine (LMX4) cream     lidocaine 1 % 0.1-1 mL     ondansetron (ZOFRAN) injection 4 mg     sodium chloride (PF) 0.9% PF flush 3 mL     sodium chloride (PF) 0.9% PF flush 3 mL             Allergies:      Allergies   Allergen Reactions     Bee Venom Anaphylaxis     Shrimp Anaphylaxis     Evoxac [Cevimeline] Unknown     Prevnar Swelling     Other reaction(s): Edema     Prevnar [Pneumococcal 13-Linda Conj Vacc] Unknown     Allergy to Latex? No  Allergy to tape?   No  Intolerances: None            Physical Exam:   All vitals have been reviewed  Patient Vitals for the past 8 hrs:   BP Pulse Resp   02/06/23 1423 116/63 79 16     No intake/output data recorded.            Lab / Radiology Results:            Anesthetic risk and/or ASA classification:   ASA  2    Spencer Downey MD

## 2023-02-07 ENCOUNTER — OFFICE VISIT (OUTPATIENT)
Dept: NEPHROLOGY | Facility: CLINIC | Age: 83
End: 2023-02-07
Attending: INTERNAL MEDICINE
Payer: COMMERCIAL

## 2023-02-07 ENCOUNTER — LAB (OUTPATIENT)
Dept: LAB | Facility: CLINIC | Age: 83
End: 2023-02-07
Payer: COMMERCIAL

## 2023-02-07 VITALS
OXYGEN SATURATION: 100 % | TEMPERATURE: 97.5 F | SYSTOLIC BLOOD PRESSURE: 136 MMHG | BODY MASS INDEX: 16.44 KG/M2 | WEIGHT: 95.8 LBS | DIASTOLIC BLOOD PRESSURE: 77 MMHG | HEART RATE: 68 BPM

## 2023-02-07 DIAGNOSIS — D72.819 LEUKOPENIA, UNSPECIFIED TYPE: ICD-10-CM

## 2023-02-07 DIAGNOSIS — N18.31 STAGE 3A CHRONIC KIDNEY DISEASE (H): ICD-10-CM

## 2023-02-07 DIAGNOSIS — D53.9 MACROCYTIC ANEMIA: Primary | ICD-10-CM

## 2023-02-07 LAB
ALBUMIN MFR UR ELPH: 10 MG/DL (ref 1–14)
ALBUMIN SERPL BCG-MCNC: 4.6 G/DL (ref 3.5–5.2)
ALBUMIN UR-MCNC: NEGATIVE MG/DL
ANION GAP SERPL CALCULATED.3IONS-SCNC: 13 MMOL/L (ref 7–15)
APPEARANCE UR: CLEAR
BILIRUB UR QL STRIP: NEGATIVE
BUN SERPL-MCNC: 22.6 MG/DL (ref 8–23)
CALCIUM SERPL-MCNC: 9.8 MG/DL (ref 8.8–10.2)
CHLORIDE SERPL-SCNC: 101 MMOL/L (ref 98–107)
COLOR UR AUTO: YELLOW
CREAT SERPL-MCNC: 1.03 MG/DL (ref 0.51–0.95)
CREAT UR-MCNC: 61.2 MG/DL
DEPRECATED HCO3 PLAS-SCNC: 26 MMOL/L (ref 22–29)
ERYTHROCYTE [DISTWIDTH] IN BLOOD BY AUTOMATED COUNT: 15.2 % (ref 10–15)
GFR SERPL CREATININE-BSD FRML MDRD: 55 ML/MIN/1.73M2
GLUCOSE SERPL-MCNC: 108 MG/DL (ref 70–99)
GLUCOSE UR STRIP-MCNC: NEGATIVE MG/DL
HCT VFR BLD AUTO: 33.3 % (ref 35–47)
HGB BLD-MCNC: 11.1 G/DL (ref 11.7–15.7)
HGB UR QL STRIP: ABNORMAL
KETONES UR STRIP-MCNC: NEGATIVE MG/DL
LEUKOCYTE ESTERASE UR QL STRIP: NEGATIVE
MCH RBC QN AUTO: 33.4 PG (ref 26.5–33)
MCHC RBC AUTO-ENTMCNC: 33.3 G/DL (ref 31.5–36.5)
MCV RBC AUTO: 100 FL (ref 78–100)
NITRATE UR QL: NEGATIVE
PH UR STRIP: 6 [PH] (ref 5–7)
PHOSPHATE SERPL-MCNC: 4 MG/DL (ref 2.5–4.5)
PLATELET # BLD AUTO: 247 10E3/UL (ref 150–450)
POTASSIUM SERPL-SCNC: 5.3 MMOL/L (ref 3.4–5.3)
PROT/CREAT 24H UR: 0.16 MG/MG CR (ref 0–0.2)
PTH-INTACT SERPL-MCNC: 43 PG/ML (ref 15–65)
RBC # BLD AUTO: 3.32 10E6/UL (ref 3.8–5.2)
RBC #/AREA URNS AUTO: NORMAL /HPF
SODIUM SERPL-SCNC: 140 MMOL/L (ref 136–145)
SP GR UR STRIP: 1.01 (ref 1–1.03)
UROBILINOGEN UR STRIP-ACNC: 0.2 E.U./DL
VIT B12 SERPL-MCNC: 1434 PG/ML (ref 232–1245)
WBC # BLD AUTO: 3.2 10E3/UL (ref 4–11)
WBC #/AREA URNS AUTO: NORMAL /HPF

## 2023-02-07 PROCEDURE — 84156 ASSAY OF PROTEIN URINE: CPT

## 2023-02-07 PROCEDURE — 85027 COMPLETE CBC AUTOMATED: CPT

## 2023-02-07 PROCEDURE — 36415 COLL VENOUS BLD VENIPUNCTURE: CPT

## 2023-02-07 PROCEDURE — 80069 RENAL FUNCTION PANEL: CPT

## 2023-02-07 PROCEDURE — G0463 HOSPITAL OUTPT CLINIC VISIT: HCPCS | Performed by: INTERNAL MEDICINE

## 2023-02-07 PROCEDURE — 83970 ASSAY OF PARATHORMONE: CPT

## 2023-02-07 PROCEDURE — 99215 OFFICE O/P EST HI 40 MIN: CPT | Performed by: INTERNAL MEDICINE

## 2023-02-07 PROCEDURE — 82607 VITAMIN B-12: CPT | Performed by: INTERNAL MEDICINE

## 2023-02-07 PROCEDURE — 81001 URINALYSIS AUTO W/SCOPE: CPT

## 2023-02-07 ASSESSMENT — PAIN SCALES - GENERAL: PAINLEVEL: NO PAIN (0)

## 2023-02-07 NOTE — LETTER
2/7/2023       RE: Patrick Olson  [address]  [facility] [address]     Dear Colleague,    Thank you for referring your patient, Patrick Olson, to the [facility] [place] [facility]. Please see a copy of my visit note below.      Nephrology Progress Note  02/07/2023   Chief complaint: Follow-up CKD 3  History of Present Illness:    Patrick Olson is a 81 year old female who presents for evaluation of Cr elevation. PMH includes RA on methotrexate weekly since 1980s (current dose is 7.5 mg weekly), recurrent GI bleed who is here for CKD 3 follow-up.      Patient notes slow weight loss over the past year. 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 Colonoscopy showed diverticulosis and non-bleeding external hemorrhoids. She has some dizziness with exercise and bending over. She is not drinking as much water, tries for 30 ounces per day. Also continues to note fatigue and less exercise tolerance, previously was able to walk about 2 miles but now can only do about 1. No SOB, chest pain, abdominal pain, n/v/d, fevers/chills, dysuria, change in frequency, hematuria. Arthritis usually affects R elbow and hand joints as well as lower back. Denies any significant LUTS, notes nocturia x1.       Cr was noted to be 1.18 in July which prompted referral. UA has been bland.  Cr 0.7 in 2017. Up to 1.2 intermittently since Jan 2021. Recently has been stable at 0.92.      Started taking PO iron and vit C supplement on 8/27/21. She is still taking calcium/vit D supplement. She is back on methotrexate.     Previously followed by Genie Raymond, last follow-up in September 2022. However, first saw Dr. Power for consult in 10/21.     2/7/23: Today, she feels ok. She just had upper endoscopy yesterday. She will also have capsule endoscopy next week. She is cautious about eating.   She has been  losing weight. She has some ligthheeadedness and dizziness after the procedure.   Labs show K 5.3, Na 140, Cr 1.03, eGFR 55. Calcium 9.8 and Phos 4.0. Albumin 4.6.    WBC 3.2, Hb 11.1. BP is good. UA shows small blood but no RBC. She does not take iron pill anymore. She also has sp[ine problem that is not radiated down her legs.   She does not smoke. Her sister  of colon cancer. Another sister has breast cancer, still living at age 94.     Past medical history  Past Medical History:   Diagnosis Date     Anemia      CKD (chronic kidney disease) stage 3, GFR 30-59 ml/min (H)      Diverticulosis of large intestine      Osteoarthritis      Osteoporosis      Rheumatoid arthritis (H)      Sensorineural hearing loss        Past surgical history  Past Surgical History:   Procedure Laterality Date     CAPSULE/PILL CAM ENDOSCOPY N/A 2021    Procedure: IMAGING PROCEDURE, GI TRACT, INTRALUMINAL, VIA CAPSULE;  Surgeon: Deric Rodriguez MD;  Location: [facility]     COLONOSCOPY N/A 2017    Procedure: COMBINED COLONOSCOPY, SINGLE OR MULTIPLE BIOPSY/POLYPECTOMY BY BIOPSY;  Surgeon: Spencer Downey MD;  Location: [facility]     COLONOSCOPY N/A 2022    Procedure: COLONOSCOPY, FLEXIBLE, WITH LESION REMOVAL USING SNARE;  Surgeon: Kirby Arthur MD;  Location:  GI     COLONOSCOPY N/A 2023    Procedure: COLONOSCOPY;  Surgeon: Spencer Downey MD;  Location: [facility] OR     ENTEROSCOPY SMALL BOWEL N/A 2021    Procedure: ENTEROSCOPY, colonoscopy with endoscopic mucosal resection and tattoo placement;  Surgeon: Kirby Arthur MD;  [facility]     ESOPHAGOSCOPY, GASTROSCOPY, DUODENOSCOPY (EGD), COMBINED N/A 2023    Procedure: ESOPHAGOGASTRODUODENOSCOPY (EGD);  Surgeon: Spencer Downey MD;  Location: [facility]     IR VISCERAL EMBOLIZATION  2021     ORTHOPEDIC SURGERY Left     hip replacment     SIGMOIDOSCOPY FLEXIBLE N/A 2021    Procedure: SIGMOIDOSCOPY, FLEXIBLE;  Surgeon: Idris  Jaime Ortiz MD;  Location:  GI         Review of Systems:   14 systems were reviewed and all negative except as mentioned above.   Current Medications:  Current Outpatient Medications   Medication     acetaminophen (TYLENOL) 325 MG tablet     famotidine (PEPCID) 20 MG tablet     Fluocinolone Acetonide Scalp 0.01 % OIL oil     folic acid (FOLVITE) 1 MG tablet     gabapentin (NEURONTIN) 300 MG capsule     hydrocortisone 2.5 % ointment     ketoconazole (NIZORAL) 2 % external shampoo     lifitegrast (XIIDRA) 5 % opthalmic solution     methotrexate 2.5 MG tablet     methotrexate 2.5 MG tablet     methylPREDNISolone (MEDROL DOSEPAK) 4 MG tablet therapy pack     Multiple Vitamins-Minerals (OCUVITE PRESERVISION PO)     ondansetron (ZOFRAN) 4 MG tablet     No current facility-administered medications for this visit.       Physical Exam:   There were no vitals taken for this visit.   There is no height or weight on file to calculate BMI.    GENERAL APPEARANCE: Alert, not in acute distress  EYES:  Not pale conjunctiva, pupils equal  HENT: Mouth without ulcers or lesions  PULM: lungs clear to auscultation bilaterally, equal air movement, no clubbing  CV: regular rhythm, normal rate, no rub     -JVD no distended.      -edema: none  GI: soft,  - tender, no distended, bowel sounds are present  INTEGUMENT: No rash  NEURO:  Non focal. No asterixis.       Labs:   All labs reviewed by me  Last Renal Panel:  Sodium   Date Value Ref Range Status   09/29/2022 140 133 - 144 mmol/L Final   01/23/2021 143 133 - 144 mmol/L Final     Potassium   Date Value Ref Range Status   09/29/2022 4.7 3.4 - 5.3 mmol/L Final   01/23/2021 3.6 3.4 - 5.3 mmol/L Final     Chloride   Date Value Ref Range Status   01/23/2021 114 (H) 94 - 109 mmol/L Final     Chloride (External) (External)   Date Value Ref Range Status   10/24/2022 104 94 - 109 mmol/L Final     Carbon Dioxide   Date Value Ref Range Status   01/23/2021 26 20 - 32 mmol/L Final     Carbon  Dioxide (CO2)   Date Value Ref Range Status   09/29/2022 29 20 - 32 mmol/L Final     Anion Gap   Date Value Ref Range Status   09/29/2022 3 3 - 14 mmol/L Final   01/23/2021 3 3 - 14 mmol/L Final     Glucose   Date Value Ref Range Status   09/29/2022 100 (H) 70 - 99 mg/dL Final   01/23/2021 157 (H) 70 - 99 mg/dL Final     Urea Nitrogen   Date Value Ref Range Status   09/29/2022 21 7 - 30 mg/dL Final   01/23/2021 6 (L) 7 - 30 mg/dL Final     Creatinine   Date Value Ref Range Status   02/01/2023 0.96 (H) 0.51 - 0.95 mg/dL Final   01/23/2021 0.75 0.52 - 1.04 mg/dL Final     GFR Estimate   Date Value Ref Range Status   02/01/2023 60 (L) >60 mL/min/1.73m2 Final     Comment:     eGFR calculated using 2021 CKD-EPI equation.   01/23/2021 76 >60 mL/min/[1.73_m2] Final     Comment:     Non  GFR Calc  Starting 12/18/2018, serum creatinine based estimated GFR (eGFR) will be   calculated using the Chronic Kidney Disease Epidemiology Collaboration   (CKD-EPI) equation.       Calcium   Date Value Ref Range Status   09/29/2022 9.8 8.5 - 10.1 mg/dL Final   01/23/2021 8.0 (L) 8.5 - 10.1 mg/dL Final     Phosphorus   Date Value Ref Range Status   03/10/2022 4.1 2.5 - 4.5 mg/dL Final     Albumin   Date Value Ref Range Status   02/01/2023 4.6 3.5 - 5.2 g/dL Final   08/26/2022 4.1 3.4 - 5.0 g/dL Final   03/13/2017 3.6 3.4 - 5.0 g/dL Final       Imaging:  I reviewed imaging studies.     Assessment & Recommendations:   Problem list  # CKD3 unclear etiology but could be related to chronic methotrexate use  Baseline ~0.8 until 2021, now new baseline between 0.9-1.1 Patient did have multiple cysts noted on recent CT but these are likely acquired (2 on the right and 1 on the left) given her age and normal BP.CKD likely in the setting of advanced age and possible contribution from chronic methotrexate use. Cr today is 1.03 likely from recently NPO, dehydration from recent EGD. Labs are stable and volume is euvolemic.   - Stay well  hydrated  - Avoid NSAIDS  # BP/Volume  History of Orthostasis but now Ok. Not on BP meds.    /77 today.   # Anemia  # Leukoppenia  # Hx of recurrent GI bleed; undergoing evaluation by GI: EGD, colonoscopy and Capsule  Hgb 11.5, recent Iron sat 28% in 3/22. Unclear etiology. I will refer her to Heme. Currently on Folate in the setting of methotrexate use. Will check B12 level.   # Bone/Mineral  PTH 43 on 2/7/23 and Vit D 73 in 8/22. Pending vit D today. She is still taking oscal with D, 500/200 mg BID  # RA; stable  On methotrexate 10 mg per day.     Follow-up in 6 months with me    I spent  60 minutes on the date of the encounter doing chart review, history and exam, documentation and further activities as noted above. 25 minutes of this visit is dedicated to direct patient interaction via face-to-face.    Cruzito Garzon MD on 02/07/2023

## 2023-02-07 NOTE — NURSING NOTE
Chief Complaint   Patient presents with     RECHECK     6 mo f/u       /77   Pulse 68   Temp 97.5  F (36.4  C) (Oral)   Wt 43.5 kg (95 lb 12.8 oz)   SpO2 100%   BMI 16.44 kg/m      Azeem Simmons on 2/7/2023 at 4:27 PM

## 2023-02-07 NOTE — PATIENT INSTRUCTIONS
Drinks at least 60 Oz if you can  Checking B12  Refer you to see blood doctors for anemia and low white blood cell  See you again in 6 months with labs

## 2023-02-07 NOTE — PROGRESS NOTES
Nephrology Progress Note  02/07/2023   Chief complaint: Follow-up CKD 3  History of Present Illness:    Patrick Olson is a 81 year old female who presents for evaluation of Cr elevation. PMH includes RA on methotrexate weekly since 1980s (current dose is 7.5 mg weekly), recurrent GI bleed who is here for CKD 3 follow-up.      Patient notes slow weight loss over the past year. 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 Colonoscopy showed diverticulosis and non-bleeding external hemorrhoids. She has some dizziness with exercise and bending over. She is not drinking as much water, tries for 30 ounces per day. Also continues to note fatigue and less exercise tolerance, previously was able to walk about 2 miles but now can only do about 1. No SOB, chest pain, abdominal pain, n/v/d, fevers/chills, dysuria, change in frequency, hematuria. Arthritis usually affects R elbow and hand joints as well as lower back. Denies any significant LUTS, notes nocturia x1.       Cr was noted to be 1.18 in July which prompted referral. UA has been bland.  Cr 0.7 in 2017. Up to 1.2 intermittently since Jan 2021. Recently has been stable at 0.92.      Started taking PO iron and vit C supplement on 8/27/21. She is still taking calcium/vit D supplement. She is back on methotrexate.     Previously followed by Genie Raymond, last follow-up in September 2022. However, first saw Dr. Power for consult in 10/21.     2/7/23: Today, she feels ok. She just had upper endoscopy yesterday. She will also have capsule endoscopy next week. She is cautious about eating.   She has been losing weight. She has some ligthheeadedness and dizziness after the procedure.   Labs show K 5.3, Na 140, Cr 1.03, eGFR 55. Calcium 9.8 and Phos 4.0. Albumin 4.6.    WBC 3.2, Hb 11.1. BP is good. UA shows small blood but no RBC. She does not take iron pill anymore. She also has sp[ine problem that is not radiated down  her legs.   She does not smoke. Her sister  of colon cancer. Another sister has breast cancer, still living at age 94.     Past medical history  Past Medical History:   Diagnosis Date     Anemia      CKD (chronic kidney disease) stage 3, GFR 30-59 ml/min (H)      Diverticulosis of large intestine      Osteoarthritis      Osteoporosis      Rheumatoid arthritis (H)      Sensorineural hearing loss        Past surgical history  Past Surgical History:   Procedure Laterality Date     CAPSULE/PILL CAM ENDOSCOPY N/A 2021    Procedure: IMAGING PROCEDURE, GI TRACT, INTRALUMINAL, VIA CAPSULE;  Surgeon: Deric Rodriguez MD;  Location: [facility]     COLONOSCOPY N/A 2017    Procedure: COMBINED COLONOSCOPY, SINGLE OR MULTIPLE BIOPSY/POLYPECTOMY BY BIOPSY;  Surgeon: Spencer Downey MD;  Location: [facility]     COLONOSCOPY N/A 2022    Procedure: COLONOSCOPY, FLEXIBLE, WITH LESION REMOVAL USING SNARE;  Surgeon: Kirby Arthur MD;  Location:  GI     COLONOSCOPY N/A 2023    Procedure: COLONOSCOPY;  Surgeon: Spencer Downey MD;  Location: [facility] OR     ENTEROSCOPY SMALL BOWEL N/A 2021    Procedure: ENTEROSCOPY, colonoscopy with endoscopic mucosal resection and tattoo placement;  Surgeon: Kirby Arthur MD;  [facility]     ESOPHAGOSCOPY, GASTROSCOPY, DUODENOSCOPY (EGD), COMBINED N/A 2023    Procedure: ESOPHAGOGASTRODUODENOSCOPY (EGD);  Surgeon: Spencer Downey MD;  [facility]     IR VISCERAL EMBOLIZATION  2021     ORTHOPEDIC SURGERY Left     hip replacment     SIGMOIDOSCOPY FLEXIBLE N/A 2021    Procedure: SIGMOIDOSCOPY, FLEXIBLE;  Surgeon: Jaime Steinberg MD;  Location:  GI         Review of Systems:   14 systems were reviewed and all negative except as mentioned above.   Current Medications:  Current Outpatient Medications   Medication     acetaminophen (TYLENOL) 325 MG tablet     famotidine (PEPCID) 20 MG tablet     Fluocinolone Acetonide Scalp 0.01 % OIL  oil     folic acid (FOLVITE) 1 MG tablet     gabapentin (NEURONTIN) 300 MG capsule     hydrocortisone 2.5 % ointment     ketoconazole (NIZORAL) 2 % external shampoo     lifitegrast (XIIDRA) 5 % opthalmic solution     methotrexate 2.5 MG tablet     methotrexate 2.5 MG tablet     methylPREDNISolone (MEDROL DOSEPAK) 4 MG tablet therapy pack     Multiple Vitamins-Minerals (OCUVITE PRESERVISION PO)     ondansetron (ZOFRAN) 4 MG tablet     No current facility-administered medications for this visit.       Physical Exam:   There were no vitals taken for this visit.   There is no height or weight on file to calculate BMI.    GENERAL APPEARANCE: Alert, not in acute distress  EYES:  Not pale conjunctiva, pupils equal  HENT: Mouth without ulcers or lesions  PULM: lungs clear to auscultation bilaterally, equal air movement, no clubbing  CV: regular rhythm, normal rate, no rub     -JVD no distended.      -edema: none  GI: soft,  - tender, no distended, bowel sounds are present  INTEGUMENT: No rash  NEURO:  Non focal. No asterixis.       Labs:   All labs reviewed by me  Last Renal Panel:  Sodium   Date Value Ref Range Status   09/29/2022 140 133 - 144 mmol/L Final   01/23/2021 143 133 - 144 mmol/L Final     Potassium   Date Value Ref Range Status   09/29/2022 4.7 3.4 - 5.3 mmol/L Final   01/23/2021 3.6 3.4 - 5.3 mmol/L Final     Chloride   Date Value Ref Range Status   01/23/2021 114 (H) 94 - 109 mmol/L Final     Chloride (External) (External)   Date Value Ref Range Status   10/24/2022 104 94 - 109 mmol/L Final     Carbon Dioxide   Date Value Ref Range Status   01/23/2021 26 20 - 32 mmol/L Final     Carbon Dioxide (CO2)   Date Value Ref Range Status   09/29/2022 29 20 - 32 mmol/L Final     Anion Gap   Date Value Ref Range Status   09/29/2022 3 3 - 14 mmol/L Final   01/23/2021 3 3 - 14 mmol/L Final     Glucose   Date Value Ref Range Status   09/29/2022 100 (H) 70 - 99 mg/dL Final   01/23/2021 157 (H) 70 - 99 mg/dL Final     Urea  Nitrogen   Date Value Ref Range Status   09/29/2022 21 7 - 30 mg/dL Final   01/23/2021 6 (L) 7 - 30 mg/dL Final     Creatinine   Date Value Ref Range Status   02/01/2023 0.96 (H) 0.51 - 0.95 mg/dL Final   01/23/2021 0.75 0.52 - 1.04 mg/dL Final     GFR Estimate   Date Value Ref Range Status   02/01/2023 60 (L) >60 mL/min/1.73m2 Final     Comment:     eGFR calculated using 2021 CKD-EPI equation.   01/23/2021 76 >60 mL/min/[1.73_m2] Final     Comment:     Non  GFR Calc  Starting 12/18/2018, serum creatinine based estimated GFR (eGFR) will be   calculated using the Chronic Kidney Disease Epidemiology Collaboration   (CKD-EPI) equation.       Calcium   Date Value Ref Range Status   09/29/2022 9.8 8.5 - 10.1 mg/dL Final   01/23/2021 8.0 (L) 8.5 - 10.1 mg/dL Final     Phosphorus   Date Value Ref Range Status   03/10/2022 4.1 2.5 - 4.5 mg/dL Final     Albumin   Date Value Ref Range Status   02/01/2023 4.6 3.5 - 5.2 g/dL Final   08/26/2022 4.1 3.4 - 5.0 g/dL Final   03/13/2017 3.6 3.4 - 5.0 g/dL Final       Imaging:  I reviewed imaging studies.     Assessment & Recommendations:   Problem list  # CKD3 unclear etiology but could be related to chronic methotrexate use  Baseline ~0.8 until 2021, now new baseline between 0.9-1.1 Patient did have multiple cysts noted on recent CT but these are likely acquired (2 on the right and 1 on the left) given her age and normal BP.CKD likely in the setting of advanced age and possible contribution from chronic methotrexate use. Cr today is 1.03 likely from recently NPO, dehydration from recent EGD. Labs are stable and volume is euvolemic.   - Stay well hydrated  - Avoid NSAIDS  # BP/Volume  History of Orthostasis but now Ok. Not on BP meds.    /77 today.   # Anemia  # Leukoppenia  # Hx of recurrent GI bleed; undergoing evaluation by GI: EGD, colonoscopy and Capsule  Hgb 11.5, recent Iron sat 28% in 3/22. Unclear etiology. I will refer her to Heme. Currently on Folate  in the setting of methotrexate use. Will check B12 level.   # Bone/Mineral  PTH 43 on 2/7/23 and Vit D 73 in 8/22. Pending vit D today. She is still taking oscal with D, 500/200 mg BID  # RA; stable  On methotrexate 10 mg per day.     Follow-up in 6 months with me    I spent  60 minutes on the date of the encounter doing chart review, history and exam, documentation and further activities as noted above. 25 minutes of this visit is dedicated to direct patient interaction via face-to-face.    Cruizto Garzon MD on 02/07/2023

## 2023-02-14 ENCOUNTER — HOSPITAL ENCOUNTER (OUTPATIENT)
Facility: CLINIC | Age: 83
Discharge: HOME | End: 2023-02-14
Attending: INTERNAL MEDICINE | Admitting: INTERNAL MEDICINE
Payer: COMMERCIAL

## 2023-02-14 PROCEDURE — 91110 GI TRC IMG INTRAL ESOPH-ILE: CPT | Performed by: INTERNAL MEDICINE

## 2023-02-14 ASSESSMENT — ACTIVITIES OF DAILY LIVING (ADL)
ADLS_ACUITY_SCORE: 35

## 2023-02-14 NOTE — OR NURSING
Procedure: Video Capsule Endoscopy  Sedation: None  Consent: Confirmed procedure consent as signed by patient  Pre-procedure Education:     Confirmed completion of bowel prep with pt/RN.    Pre-video capsule instructions reviewed in depth with pt and .     Printed materials given to pt for further review.     Pt voices questions answered to satisfaction prior to swallowing capsule.     Education included indication, possible benefits, possible risks, procedure, follow-up with particular attention paid to notation of passing capsule via BM.  Pt specifically instructed not to have MRI until confirmation that capsule has been passed.         Wristband applied     Patient ID label placed on clipboard in GI Physician Room for Dr. Shelley.    Patient ID Label placed on Endoscopy Charge (inpatients)/ Book (outpatients & inpatients) clipboard for 24-hour equipment return - data download    NPO Times: SWALLOWED AT 0950  NPO from ingestion of capsule until 1150  (x 2 hrs).  Clear fluids with PO meds after 1150  (2 hrs post ingestion).  Light snack after 1550 (6 hrs post ingestion). - Cup of soup OR half a sandwich.  Regular diet after 2150 (12 hrs post ingestion).     Capsule Insertion: Pt tolerated procedure.  Monitor with belt applied.   Pt swallowed video capsule with 8 oz H2O + simethacone 2 ml added    Primary Care RN Report:  Report given to Primary Care RN including NPO/clear fluids/light snack/regular diet times upon completion of procedure      Additional Notes: Answered questions in depth with patient and spouse. Escorted to lobby in wheelchair by MILENA Ward. Patient label placed on clipboard.    Isa Alvarenga, RN, RN  [facility]

## 2023-02-16 ENCOUNTER — MYC MEDICAL ADVICE (OUTPATIENT)
Dept: ORTHOPEDICS | Facility: CLINIC | Age: 83
End: 2023-02-16
Payer: COMMERCIAL

## 2023-02-16 LAB — VIDEO CAPSULE ENDOSCOPY: NORMAL

## 2023-02-16 NOTE — TELEPHONE ENCOUNTER
Routing to provider to confirm no additional time needed between completion of oral prednisone regimen and scheduled lumbar LEE ANN on 3/2/23. Patient will complete the medrol dosepak on 2/20/23.      Viridiana Nathan ATC

## 2023-02-17 ENCOUNTER — TELEPHONE (OUTPATIENT)
Dept: GASTROENTEROLOGY | Facility: CLINIC | Age: 83
End: 2023-02-17
Payer: COMMERCIAL

## 2023-02-17 NOTE — TELEPHONE ENCOUNTER
[facility]    Phone Message    May a detailed message be left on voicemail: yes     Reason for Call: Requesting Results   Name/type of test: Procedures from 02/06/23 and 02/14/23  Date of test: 02/06/23 and 02/14/23  Was test done at a location other than [facility] (Please fill in the location if not [facility])?: No      Action Taken: Message routed to:  [facility] ([facility] GI    Travel Screening: Not Applicable

## 2023-02-17 NOTE — TELEPHONE ENCOUNTER
Call back to Patient who is asking for her results from the video capsule study. Writer instructed that the ordering provider has not read the results yet, once this happens that provider will make recommendations on next steps. Patient verbalized understanding and was appreciative of the call.    Dory Cannon RN

## 2023-02-24 ENCOUNTER — MYC MEDICAL ADVICE (OUTPATIENT)
Dept: ONCOLOGY | Facility: CLINIC | Age: 83
End: 2023-02-24
Payer: COMMERCIAL

## 2023-03-07 ENCOUNTER — ONCOLOGY VISIT (OUTPATIENT)
Dept: ONCOLOGY | Facility: CLINIC | Age: 83
End: 2023-03-07
Attending: INTERNAL MEDICINE
Payer: COMMERCIAL

## 2023-03-07 VITALS
RESPIRATION RATE: 16 BRPM | HEIGHT: 63 IN | OXYGEN SATURATION: 99 % | BODY MASS INDEX: 16.71 KG/M2 | SYSTOLIC BLOOD PRESSURE: 136 MMHG | WEIGHT: 94.3 LBS | HEART RATE: 87 BPM | TEMPERATURE: 97.6 F | DIASTOLIC BLOOD PRESSURE: 81 MMHG

## 2023-03-07 DIAGNOSIS — Z87.19 HISTORY OF GI BLEED: Primary | ICD-10-CM

## 2023-03-07 DIAGNOSIS — D64.9 ANEMIA, UNSPECIFIED TYPE: ICD-10-CM

## 2023-03-07 LAB
FERRITIN SERPL-MCNC: 96 NG/ML (ref 11–328)
IRON BINDING CAPACITY (ROCHE): 283 UG/DL (ref 240–430)
IRON SATN MFR SERPL: 58 % (ref 15–46)
IRON SERPL-MCNC: 163 UG/DL (ref 37–145)

## 2023-03-07 PROCEDURE — 36415 COLL VENOUS BLD VENIPUNCTURE: CPT | Performed by: INTERNAL MEDICINE

## 2023-03-07 PROCEDURE — 83550 IRON BINDING TEST: CPT | Performed by: INTERNAL MEDICINE

## 2023-03-07 PROCEDURE — 99215 OFFICE O/P EST HI 40 MIN: CPT | Performed by: INTERNAL MEDICINE

## 2023-03-07 PROCEDURE — G0463 HOSPITAL OUTPT CLINIC VISIT: HCPCS | Performed by: INTERNAL MEDICINE

## 2023-03-07 PROCEDURE — 82728 ASSAY OF FERRITIN: CPT | Performed by: INTERNAL MEDICINE

## 2023-03-07 RX ORDER — SENNOSIDES 8.6 MG
1300 CAPSULE ORAL 2 TIMES DAILY
Status: ON HOLD | COMMUNITY
End: 2024-07-21

## 2023-03-07 RX ORDER — CHOLECALCIFEROL (VITAMIN D3) 50 MCG
1 TABLET ORAL DAILY
COMMUNITY
End: 2023-09-05

## 2023-03-07 RX ORDER — CALCIUM CARBONATE/VITAMIN D3 600 MG-10
1 TABLET ORAL DAILY
COMMUNITY

## 2023-03-07 RX ORDER — PILOCARPINE HYDROCHLORIDE 5 MG/1
5 TABLET, FILM COATED ORAL 2 TIMES DAILY PRN
COMMUNITY
End: 2023-08-28

## 2023-03-07 ASSESSMENT — PAIN SCALES - GENERAL: PAINLEVEL: MODERATE PAIN (4)

## 2023-03-07 NOTE — LETTER
3/7/2023         RE: Patrick Olson  [address] [facility] [address]        Dear Colleague,    Thank you for referring your patient, Patrick Olson, to the [facility]. Please see a copy of my visit note below.        [facility] Hematology Visit  [address] [place] [address]  Phone: [phone]    Outpatient Visit Note:    Patient: Patrick Olson   MRN: [id number]   : 1940   SARY: Mar 7, 2023     Reason for Visit: Follow up for anemia, iron deficiency    Assessment  Patrick Olson is a 82 year old woman with RA on methotrexate and GI bleeding from diverticulosis with mild leukopenia (nromal differential) and macrocytic anemia with contributions from chronic inflammation and methotrexate therapy, possibly recurrent iron deficiency.  He biggest concern today is unintentional weight loss due to lack of appetite.  She is also struggling with lower extremity sensation of cold (symmetric) but no hand symptoms.    Recommendations  - Check iron panel today, IV iron if iron deficiency  - Follow up as planned with Dr. Clements and discuss non-myelosuppressive agents for rheumatoid arthritis    45 minutes spent on the date of the encounter doing chart review, review of outside records, review of test results, interpretation of tests, patient visit and documentation     Doug Sen MD   of Medicine  [facility] [employer]     ___________________________________________    HPI:  Patrick Olson is a 82 year old with rheumatoid arthritis (on methotrexate), diverticulosis (hx of diverticular bleed), and CKD3, who presents for evaluation of anemia.     Recent against had a diverticular bleed and had colonoscopies. Bleeding resolved spontaneously. No recurrence of bleeding since.     Of note, the patient has a long term history of rheumatoid arthritis, immunosuppressed on methotrexate.  She previously was on 10mg  "then changed to hydroxychloroquine but had adverse skin reaction and then went back to 7.5mg.      Social History:   - Retired (formerly employed as  at [employer])  - Former  (multiple works displayed at [employer] and [employer]); stopped recently due to back pain limiting her    Vitals:  /81 (BP Location: Right arm, Patient Position: Sitting, Cuff Size: Adult Regular)   Pulse 87   Temp 97.6  F (36.4  C) (Oral)   Resp 16   Ht 1.607 m (5' 3.25\")   Wt 42.8 kg (94 lb 4.8 oz)   SpO2 99%   BMI 16.57 kg/m      Physical Exam:    General: Healthy appearing elderly female. No acute distress.   Skin: No visible bruising/petechiae on exposed skin.  Neuro: Alert and orientated. Normal mentation. Normal speech.   Psych: Normal mood and affect.     "

## 2023-03-07 NOTE — PROGRESS NOTES
[facility] Hematology Visit  [address] [place] [address]  Phone: [phone]    Outpatient Visit Note:    Patient: Patrick Olson   MRN: [id number]   : 1940   SARY: Mar 7, 2023     Reason for Visit: Follow up for anemia, iron deficiency    Assessment  Patrick Olson is a 82 year old woman with RA on methotrexate and GI bleeding from diverticulosis with mild leukopenia (nromal differential) and macrocytic anemia with contributions from chronic inflammation and methotrexate therapy, possibly recurrent iron deficiency.  He biggest concern today is unintentional weight loss due to lack of appetite.  She is also struggling with lower extremity sensation of cold (symmetric) but no hand symptoms.    Recommendations  - Check iron panel today, IV iron if iron deficiency  - Follow up as planned with Dr. Clements and discuss non-myelosuppressive agents for rheumatoid arthritis    45 minutes spent on the date of the encounter doing chart review, review of outside records, review of test results, interpretation of tests, patient visit and documentation     Doug Sen MD   of Medicine  [facility] [employer]     ___________________________________________    HPI:  Patrick Olson is a 82 year old with rheumatoid arthritis (on methotrexate), diverticulosis (hx of diverticular bleed), and CKD3, who presents for evaluation of anemia.     Recent against had a diverticular bleed and had colonoscopies. Bleeding resolved spontaneously. No recurrence of bleeding since.     Of note, the patient has a long term history of rheumatoid arthritis, immunosuppressed on methotrexate.  She previously was on 10mg then changed to hydroxychloroquine but had adverse skin reaction and then went back to 7.5mg.      Social History:   - Retired (formerly employed as  at [email or web address])  - Former  (multiple works displayed at  "[employer] and [employer]); stopped recently due to back pain limiting her    Vitals:  /81 (BP Location: Right arm, Patient Position: Sitting, Cuff Size: Adult Regular)   Pulse 87   Temp 97.6  F (36.4  C) (Oral)   Resp 16   Ht 1.607 m (5' 3.25\")   Wt 42.8 kg (94 lb 4.8 oz)   SpO2 99%   BMI 16.57 kg/m      Physical Exam:    General: Healthy appearing elderly female. No acute distress.   Skin: No visible bruising/petechiae on exposed skin.  Neuro: Alert and orientated. Normal mentation. Normal speech.   Psych: Normal mood and affect.        "

## 2023-03-07 NOTE — NURSING NOTE
"Oncology Rooming Note    March 7, 2023 12:16 PM   Patrick Olson is a 82 year old female who presents for:    Chief Complaint   Patient presents with     Oncology Clinic Visit     Anemia     Initial Vitals: /81 (BP Location: Right arm, Patient Position: Sitting, Cuff Size: Adult Regular)   Pulse 87   Temp 97.6  F (36.4  C) (Oral)   Resp 16   Ht 1.607 m (5' 3.25\")   Wt 42.8 kg (94 lb 4.8 oz)   SpO2 99%   BMI 16.57 kg/m   Estimated body mass index is 16.57 kg/m  as calculated from the following:    Height as of this encounter: 1.607 m (5' 3.25\").    Weight as of this encounter: 42.8 kg (94 lb 4.8 oz). Body surface area is 1.38 meters squared.  Moderate Pain (4) Comment: Data Unavailable   No LMP recorded. Patient is postmenopausal.  Allergies reviewed: Yes  Medications reviewed: Yes    Medications: Medication refills not needed today.  Pharmacy name entered into [employer]: [id number] - [facility] [address] [place] [address]    Clinical concerns: Pt presents today for f/u.    Peggy Kathleen LPN  3/7/2023              "

## 2023-03-08 ENCOUNTER — TELEPHONE (OUTPATIENT)
Dept: MEDSURG UNIT | Facility: CLINIC | Age: 83
End: 2023-03-08
Payer: COMMERCIAL

## 2023-03-08 NOTE — TELEPHONE ENCOUNTER
Chart reviewed for Epidural injection on 3/14/23. CSE. No pertinent allergies. No blood thinners listed.

## 2023-03-09 ENCOUNTER — HOSPITAL ENCOUNTER (OUTPATIENT)
Dept: MRI IMAGING | Facility: CLINIC | Age: 83
Discharge: HOME | End: 2023-03-09
Attending: FAMILY MEDICINE | Admitting: FAMILY MEDICINE
Payer: COMMERCIAL

## 2023-03-09 DIAGNOSIS — M54.16 LUMBAR RADICULOPATHY, ACUTE: ICD-10-CM

## 2023-03-09 DIAGNOSIS — M51.369 LUMBAR DEGENERATIVE DISC DISEASE: ICD-10-CM

## 2023-03-09 PROCEDURE — 72148 MRI LUMBAR SPINE W/O DYE: CPT

## 2023-03-13 ENCOUNTER — MYC MEDICAL ADVICE (OUTPATIENT)
Dept: ORTHOPEDICS | Facility: CLINIC | Age: 83
End: 2023-03-13
Payer: COMMERCIAL

## 2023-03-14 ENCOUNTER — HOSPITAL ENCOUNTER (OUTPATIENT)
Dept: GENERAL RADIOLOGY | Facility: CLINIC | Age: 83
Discharge: HOME | End: 2023-03-14
Attending: FAMILY MEDICINE
Payer: COMMERCIAL

## 2023-03-14 ENCOUNTER — HOSPITAL ENCOUNTER (OUTPATIENT)
Facility: CLINIC | Age: 83
Discharge: HOME | End: 2023-03-14
Admitting: PHYSICIAN ASSISTANT
Payer: COMMERCIAL

## 2023-03-14 VITALS — DIASTOLIC BLOOD PRESSURE: 60 MMHG | HEART RATE: 74 BPM | SYSTOLIC BLOOD PRESSURE: 128 MMHG | OXYGEN SATURATION: 100 %

## 2023-03-14 VITALS
DIASTOLIC BLOOD PRESSURE: 45 MMHG | SYSTOLIC BLOOD PRESSURE: 119 MMHG | RESPIRATION RATE: 16 BRPM | HEART RATE: 76 BPM | OXYGEN SATURATION: 99 %

## 2023-03-14 DIAGNOSIS — M54.16 LUMBAR RADICULOPATHY, ACUTE: ICD-10-CM

## 2023-03-14 PROCEDURE — 250N000011 HC RX IP 250 OP 636: Performed by: FAMILY MEDICINE

## 2023-03-14 PROCEDURE — 999N000154 HC STATISTIC RADIOLOGY XRAY, US, CT, MAR, NM

## 2023-03-14 PROCEDURE — 255N000002 HC RX 255 OP 636: Performed by: FAMILY MEDICINE

## 2023-03-14 PROCEDURE — 250N000009 HC RX 250: Performed by: FAMILY MEDICINE

## 2023-03-14 PROCEDURE — 62323 NJX INTERLAMINAR LMBR/SAC: CPT

## 2023-03-14 RX ORDER — IOPAMIDOL 408 MG/ML
10 INJECTION, SOLUTION INTRATHECAL ONCE
Status: COMPLETED | OUTPATIENT
Start: 2023-03-14 | End: 2023-03-14

## 2023-03-14 RX ORDER — BETAMETHASONE SODIUM PHOSPHATE AND BETAMETHASONE ACETATE 3; 3 MG/ML; MG/ML
3 INJECTION, SUSPENSION INTRA-ARTICULAR; INTRALESIONAL; INTRAMUSCULAR; SOFT TISSUE ONCE
Status: COMPLETED | OUTPATIENT
Start: 2023-03-14 | End: 2023-03-14

## 2023-03-14 RX ORDER — NICOTINE POLACRILEX 4 MG
15-30 LOZENGE BUCCAL
Status: DISCONTINUED | OUTPATIENT
Start: 2023-03-14 | End: 2023-03-15 | Stop reason: HOSPADM

## 2023-03-14 RX ORDER — DEXTROSE MONOHYDRATE 25 G/50ML
25-50 INJECTION, SOLUTION INTRAVENOUS
Status: DISCONTINUED | OUTPATIENT
Start: 2023-03-14 | End: 2023-03-15 | Stop reason: HOSPADM

## 2023-03-14 RX ADMIN — BETAMETHASONE SODIUM PHOSPHATE AND BETAMETHASONE ACETATE 3 ML: 3; 3 INJECTION, SUSPENSION INTRA-ARTICULAR; INTRALESIONAL; INTRAMUSCULAR at 11:24

## 2023-03-14 RX ADMIN — LIDOCAINE HYDROCHLORIDE 2 ML: 10 INJECTION, SOLUTION EPIDURAL; INFILTRATION; INTRACAUDAL; PERINEURAL at 11:24

## 2023-03-14 RX ADMIN — LIDOCAINE HYDROCHLORIDE 3 ML: 10 INJECTION, SOLUTION EPIDURAL; INFILTRATION; INTRACAUDAL; PERINEURAL at 11:00

## 2023-03-14 RX ADMIN — IOPAMIDOL 8 ML: 408 INJECTION, SOLUTION INTRATHECAL at 11:12

## 2023-03-14 ASSESSMENT — ACTIVITIES OF DAILY LIVING (ADL)
ADLS_ACUITY_SCORE: 35
ADLS_ACUITY_SCORE: 35

## 2023-03-14 NOTE — PROGRESS NOTES
Care Suites Post Procedure Note    Patient Information  Name: Patrick Olson  Age: 82 year old    Post Procedure  Time patient returned to Care Suites: 1140  Concerns/abnormal assessment: none band-aid to low back CDI, some numbness in left leg but not as it was with previous LEE ANN. States back feels good. .  If abnormal assessment, provider notified: N/A  Plan/Other: discharge after observation period completed.    Promise Jaramillo RN   Care Suites Discharge Nursing Note    Patient Information  Name: Patrick Olson  Age: 82 year old    Discharge Education:  Discharge instructions reviewed: Yes  Additional education/resources provided: NA  Patient/patient representative verbalizes understanding: Yes  Patient discharging on new medications: No  Medication education completed: N/A    Discharge Plans:   Discharge location: home  Discharge ride contacted: Yes  Approximate discharge time: 1206    Discharge Criteria:  Discharge criteria met and vital signs stable: Yes    Patient Belongs:  Patient belongings returned to patient: Yes    Promise Jaramillo RN

## 2023-03-15 ENCOUNTER — PATIENT OUTREACH (OUTPATIENT)
Dept: ONCOLOGY | Facility: CLINIC | Age: 83
End: 2023-03-15
Payer: COMMERCIAL

## 2023-03-15 NOTE — PROGRESS NOTES
[facility]: Cancer Care                                                                                          Spoke with pt- went over her most recent lab results.  Pt stated understanding and is actually going to see an Sandra NOE in April.  Advised she follow up with her for future lab draws if she plans on changing care to that clinic.      Signature:  Jacqueline Vidal RN

## 2023-03-21 ENCOUNTER — OFFICE VISIT (OUTPATIENT)
Dept: DERMATOLOGY | Facility: CLINIC | Age: 83
End: 2023-03-21
Payer: COMMERCIAL

## 2023-03-21 DIAGNOSIS — L81.4 SOLAR LENTIGO: ICD-10-CM

## 2023-03-21 DIAGNOSIS — L65.9 LOSS OF HAIR: ICD-10-CM

## 2023-03-21 DIAGNOSIS — D18.01 CHERRY ANGIOMA: ICD-10-CM

## 2023-03-21 DIAGNOSIS — D22.9 MULTIPLE BENIGN NEVI: ICD-10-CM

## 2023-03-21 DIAGNOSIS — D49.2 NEOPLASM OF UNSPECIFIED BEHAVIOR OF BONE, SOFT TISSUE, AND SKIN: Primary | ICD-10-CM

## 2023-03-21 DIAGNOSIS — R21 RASH: ICD-10-CM

## 2023-03-21 DIAGNOSIS — L21.9 DERMATITIS, SEBORRHEIC: ICD-10-CM

## 2023-03-21 DIAGNOSIS — L82.1 SEBORRHEIC KERATOSIS: ICD-10-CM

## 2023-03-21 PROCEDURE — 88305 TISSUE EXAM BY PATHOLOGIST: CPT | Mod: TC | Performed by: DERMATOLOGY

## 2023-03-21 PROCEDURE — 88305 TISSUE EXAM BY PATHOLOGIST: CPT | Mod: 26 | Performed by: DERMATOLOGY

## 2023-03-21 PROCEDURE — 99214 OFFICE O/P EST MOD 30 MIN: CPT | Mod: 25 | Performed by: DERMATOLOGY

## 2023-03-21 PROCEDURE — 11102 TANGNTL BX SKIN SINGLE LES: CPT | Performed by: DERMATOLOGY

## 2023-03-21 ASSESSMENT — PAIN SCALES - GENERAL: PAINLEVEL: NO PAIN (0)

## 2023-03-21 NOTE — NURSING NOTE
Dermatology Rooming Note    Patrick Olson's goals for this visit include:   Chief Complaint   Patient presents with     Derm Problem     Patrick is here today for a skin check.      Emily Lockwood RN

## 2023-03-21 NOTE — PROGRESS NOTES
"[facility] Dermatology Note  Encounter Date: Mar 21, 2023  Office Visit     Dermatology Problem List:  # Pertinent PMH: Rheumatoid arthritis and Sjogren's: methotrexate 7.5 mg weekly  - prior: hydroxychloroquine 200 mg daily    # Xerosis cutis, well-controlled  - Using \"sensitive skin\" products, avoiding irritants, moisturizing regimen  - avoiding retinoid application to affected areas  - hydrocortisone ointment BID PRN to affected areas until resolved  # Seb Derm, well-controlled  - currently head and shoulders with intermittent ketoconazole shampoo weekly  - Previous: fluocinolone oil for scalp  (too messy)  #. Non scarring hair loss  - Future considerations: Rogaine    # NUB, left axilla. Ddx. SK vs. Melanoma  - s/p shave bx 3/21/2023   ____________________________________________    Assessment & Plan:    # NUB, left axilla. Ddx. SK vs. Melanoma  - Shave biopsy performed today, see procedure below.     # Seborrheic dermatitis. Chronic, stable.  - Well-controlled on her current shampoo regimen, continue without changes    # Non-scarring hair loss. Chronic, active.  Unclear etiology. 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  - Consider Rogaine if desired  *ferritin recently 96, vitamin D 73, no recent TSH, given weight loss, will check TSH     # Multiple benign nevi.   - Monitor for ABCDEs of melanoma   - Continue sun protection - recommend SPF 30 or higher with frequent application   - Return sooner if noticing changing or symptomatic lesions    # Solar lentigines, cherry angiomas, seborrheic keratoses.   - Benign, no treatment required     # Prior blistering eruption.  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  Pt notes rash that started 5-6 months after taking Plaquenil. Not present today. 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 I would not suspect this rash would be related to the Plaquenil so she could safely resume from my standpoint if needed.    Procedures Performed:   - Shave biopsy procedure note, location(s): Left axilla. After discussion of benefits and risks including but not limited to bleeding, infection, scar, incomplete removal, recurrence, and non-diagnostic biopsy, written consent and photographs were obtained. The area was cleaned with isopropyl alcohol. 0.5mL of 1% lidocaine with epinephrine was injected to obtain adequate anesthesia of lesion(s). Shave biopsy at site(s) performed. Hemostasis was achieved with aluminium chloride. Petrolatum ointment and a sterile dressing were applied. The patient tolerated the procedure and no complications were noted. The patient was provided with verbal and written post care instructions.     Follow-up: 6 months, sooner if concerns.     Staff and Scribe:     Scribe Disclosure:  I, Kirby Spencer, am serving as a scribe to document services personally performed by Rubina Grey MD based on data collection and the provider's statements to me.     Provider Disclosure:   The documentation recorded by the scribe accurately reflects the services I personally performed and the decisions made by me.      Rubina Grey MD    Department of Dermatology  [facility]: Phone: [phone], Fax: [phone]  3/22/2023     ____________________________________________    CC: Derm Problem (Patrick is  here today for a skin check. )    HPI:  Ms. Patrick Olson is a(n) 82 year old female who presents today as a return patient for FBSE. Last seen by Dr. Stevensno on 7/7/2022, at which time patient was continued on current medication regimen     Today, patient denies any family or personal history of skin cancer. She also denies any changing or painful spots. Patient reports itchiness of the scalp. She gets very cold feet as well and was wondering if multiple blankets could potentially irritate the skin. Patient reports losing about 25% of her hair.     Unfortunately she has had multiple medical issues recently including GI bleed, significant weight loss. She is seeing multiple specialists.    Patient is otherwise feeling well, without additional skin concerns.    Labs Reviewed:  N/A    Physical Exam:  Vitals: There were no vitals taken for this visit.  SKIN: Full body skin exam excluding the genitals was performed including face, scalp, neck, ears, chest, back, bilateral arms, hands, bilateral legs, feet, and buttocks.   - thinning of hair diffusely, no PF erythema or scale.  - On the left axilla, there is a 6 mm brown thin papule dermoscopy with cobblestone versus globules of pigment on periphery.  - There are dome shaped bright red papules on the trunk and extremities.   - Multiple regular brown pigmented macules and papules are identified on the trunk and extremities.   - Scattered brown macules on sun exposed areas.  - There are waxy stuck on tan to brown papules on the trunk and extremities.   - No other lesions of concern on areas examined.     Medications:  Current Outpatient Medications   Medication     acetaminophen (TYLENOL) 650 MG CR tablet     calcium carbonate-vitamin D (CALTRATE) 600-10 MG-MCG per tablet     famotidine (PEPCID) 20 MG tablet     folic acid (FOLVITE) 1 MG tablet     gabapentin (NEURONTIN) 300 MG capsule     lifitegrast (XIIDRA) 5 % opthalmic solution     methotrexate 2.5 MG tablet      Multiple Vitamins-Minerals (OCUVITE PRESERVISION PO)     pilocarpine (SALAGEN) 5 MG tablet     vitamin D3 (CHOLECALCIFEROL) 50 mcg (2000 units) tablet     methotrexate 2.5 MG tablet     No current facility-administered medications for this visit.      Past Medical History:   Patient Active Problem List   Diagnosis     SNHL (sensorineural hearing loss)     GI bleed     Gastrointestinal hemorrhage, unspecified gastrointestinal hemorrhage type     Diverticular hemorrhage     Acute lower GI bleeding     Diverticulosis of large intestine     Lab test negative for COVID-19 virus     Past Medical History:   Diagnosis Date     Anemia      CKD (chronic kidney disease) stage 3, GFR 30-59 ml/min (H)      Diverticulosis of large intestine      Osteoarthritis      Osteoporosis      Rheumatoid arthritis (H)      Sensorineural hearing loss         CC No referring provider defined for this encounter. on close of this encounter.

## 2023-03-21 NOTE — PATIENT INSTRUCTIONS
Topical Rogaine (Minoxidil) for Pattern Hair Loss    Minoxidil is an FDA approved over the counter topical for the treatment of hair loss and thinning hair in men and women.   Initially a 2% solution was available however this required application twice daily. A 5% solution is also now approved, only requiring application once per day.     Available Products:   Rogaine 5% solution: Packaged for men however can be used by men or women. Use dropper and apply directly to scalp at bedtime. This product can cause an allergy because of presence of propylene glycol. Stop this product if you develop a rash or itching and contact your physician.   Rogaine 5% foam: Packaged for men and women: Apply foam directly to the scalp once daily. This is less greasy compared to the solution. This formula is preferred for those who had a reaction to the solution product. If you develop rash or itching, stop the product and contact your physician.     What if I stop minoxidil topical?   After stopping minoxidil the hair will return to the usual pattern of thinning. Using the product 3-4 times per week is better than not using product at all.       Can I use generic minoxidil?   Yes, look for 5% minoxidil.     What are the side effects?  The most common side effect is rash or itching of the scalp. This can occur if a contact allergy develops with propylene glycol. A small group of patients noticed the appearance of facial hair if the product runs onto the face or with prolonged use. Keep it away from the face.  This can cause temporary shedding. Please, call us if this happens.  This can irritated the scalp. Please, call us if this happens.  Stop this product if you become pregnant or are breastfeeding      Last updated: 11/2/2021       Wound Care After a Biopsy    What is a skin biopsy?  A skin biopsy allows the doctor to examine a very small piece of tissue under the microscope to determine the diagnosis and the best treatment for the  skin condition. A local anesthetic (numbing medicine)  is injected with a very small needle into the skin area to be tested. A small piece of skin is taken from the area. Sometimes a suture (stitch) is used.     What are the risks of a skin biopsy?  I will experience scar, bleeding, swelling, pain, crusting and redness. I may experience incomplete removal or recurrence. Risks of this procedure are excessive bleeding, bruising, infection, nerve damage, numbness, thick (hypertrophic or keloidal) scar and non-diagnostic biopsy.    How should I care for my wound for the first 24 hours?  Keep the wound dry and covered for 24 hours  If it bleeds, hold direct pressure on the area for 15 minutes. If bleeding does not stop then go to the emergency room  Avoid strenuous exercise the first 1-2 days or as your doctor instructs you    How should I care for the wound after 24 hours?  After 24 hours, remove the bandage  You may bathe or shower as normal  If you had a scalp biopsy, you can shampoo as usual and can use shower water to clean the biopsy site daily  Clean the wound twice a day with gentle soap and water  Do not scrub, be gentle  Apply white petroleum/Vaseline after cleaning the wound with a cotton swab or a clean finger, and keep the site covered with a Bandaid /bandage. Bandages are not necessary with a scalp biopsy  If you are unable to cover the site with a Bandaid /bandage, re-apply ointment 2-3 times a day to keep the site moist. Moisture will help with healing  Avoid strenuous activity for first 1-2 days  Avoid lakes, rivers, pools, and oceans until the stitches are removed or the site is healed    How do I clean my wound?  Wash hands thoroughly with soap or use hand  before all wound care  Clean the wound with gentle soap and water  Apply white petroleum/Vaseline  to wound after it is clean  Replace the Bandaid /bandage to keep the wound covered for the first few days or as instructed by your doctor  If  you had a scalp biopsy, warm shower water to the area on a daily basis should suffice    What should I use to clean my wound?   Cotton-tipped applicators (Qtips )  White petroleum jelly (Vaseline ). Use a clean new container and use Q-tips to apply.  Bandaids   as needed  Gentle soap     How should I care for my wound long term?  Do not get your wound dirty  Keep up with wound care for one week or until the area is healed.  A small scab will form and fall off by itself when the area is completely healed. The area will be red and will become pink in color as it heals. Sun protection is very important for how your scar will turn out. Sunscreen with an SPF 30 or greater is recommended once the area is healed.  You should have some soreness but it should be mild and slowly go away over several days. Talk to your doctor about using tylenol for pain,    When should I call my doctor?  If you have increased:   Pain or swelling  Pus or drainage (clear or slightly yellow drainage is ok)  Temperature over 100F  Spreading redness or warmth around wound    When will I hear about my results?  The biopsy results can take 2 weeks to come back.  Your results will automatically release to [employer] before your provider has even reviewed them.  The clinic will call you with the results, send you a [employer] message, or have you schedule a follow-up clinic or phone time to discuss the results.  Contact our clinics if you do not hear from us in 2 weeks.    Who should I call with questions?  [facility]: [phone]  [facility]: [phone]  For urgent needs outside of business hours call the [facility] at [phone] and ask for the dermatology resident on call     Patient Education     Checking for Skin Cancer  You can find cancer early by checking your skin each month. There are 3 kinds of skin cancer. They are melanoma, basal cell carcinoma, and squamous cell  carcinoma. Doing monthly skin checks is the best way to find new marks or skin changes. Follow the instructions below for checking your skin.   The ABCDEs of checking moles for melanoma   Check your moles or growths for signs of melanoma using ABCDE:   Asymmetry: the sides of the mole or growth don t match  Border: the edges are ragged, notched, or blurred  Color: the color within the mole or growth varies  Diameter: the mole or growth is larger than 6 mm (size of a pencil eraser)  Evolving: the size, shape, or color of the mole or growth is changing (evolving is not shown in the images below)    Checking for other types of skin cancer  Basal cell carcinoma or squamous cell carcinoma have symptoms such as:     A spot or mole that looks different from all other marks on your skin  Changes in how an area feels, such as itching, tenderness, or pain  Changes in the skin's surface, such as oozing, bleeding, or scaliness  A sore that does not heal  New swelling or redness beyond the border of a mole    Who s at risk?  Anyone can get skin cancer. But you are at greater risk if you have:   Fair skin, light-colored hair, or light-colored eyes  Many moles or abnormal moles on your skin  A history of sunburns from sunlight or tanning beds  A family history of skin cancer  A history of exposure to radiation or chemicals  A weakened immune system  If you have had skin cancer in the past, you are at risk for recurring skin cancer.   How to check your skin  Do your monthly skin checkups in front of a full-length mirror. Check all parts of your body, including your:   Head (ears, face, neck, and scalp)  Torso (front, back, and sides)  Arms (tops, undersides, upper, and lower armpits)  Hands (palms, backs, and fingers, including under the nails)  Buttocks and genitals  Legs (front, back, and sides)  Feet (tops, soles, toes, including under the nails, and between toes)  If you have a lot of moles, take digital photos of them each  month. Make sure to take photos both up close and from a distance. These can help you see if any moles change over time.   Most skin changes are not cancer. But if you see any changes in your skin, call your doctor right away. Only he or she can diagnose a problem. If you have skin cancer, seeing your doctor can be the first step toward getting the treatment that could save your life.   [employer] last reviewed this educational content on 4/1/2019 2000-2020 [employer] [address] [place] [address]. All rights reserved. This information is not intended as a substitute for professional medical care. Always follow your healthcare professional's instructions.       When should I call my doctor?  If you are worsening or not improving, please, contact us or seek urgent care as noted below.     Who should I call with questions (adults)?  [facility] (adult and pediatric): [phone]  [facility] (adult): [phone]  For urgent needs outside of business hours call the [facility] at [phone] and ask for the dermatology resident on call to be paged  If this is a medical emergency and you are unable to reach an ER, Call [phone]    Who should I call with questions (pediatric)?  [facility]- Pediatric Dermatology  Dr. Brandy Ramos, Dr. Devendra Bowles, Dr. Lluvia Rao, Mercedes Salgado, PA, Dr. Niru Oliver, Dr. Olga Quinteros & Dr. Mukesh Avila  Non-urgent nurse triage line; [phone]- Josie and Radha LUCIA Care Coordinators   Leeanne (/Complex ) [phone]    If you need a prescription refill, please contact your pharmacy. Refills are approved or denied by our Physicians during normal business hours, Monday through Fridays  Per office policy, refills will not be granted if you have not been seen within the past year (or sooner depending on your child's  condition)    Scheduling Information:  Pediatric Appointment Scheduling and Call Center [phone]  Radiology Scheduling- [phone]  Sedation Unit Scheduling- [phone]  [place] Scheduling- General [phone]; Pediatric Dermatology [phone]  Main  Services: [phone]  Cape Verdean: [phone]  Uzbek: [phone]  Hmong/Gibraltarian/English: [phone]  Preadmission Nursing Department Fax Number: [phone] (Fax all pre-operative paperwork to this number)    For urgent matters arising during evenings, weekends, or holidays that cannot wait for normal business hours please call [phone] and ask for the dermatology resident on call to be paged.

## 2023-03-21 NOTE — LETTER
"3/21/2023       RE: Patrick Olson  [address]  [place] [address]     Dear Colleague,    Thank you for referring your patient, Patrick Olson, to the [facility] DERMAT[facility] [place] at [facility]. Please see a copy of my visit note below.    [facility] Dermatology Note  Encounter Date: Mar 21, 2023  Office Visit     Dermatology Problem List:  # Pertinent PMH: Rheumatoid arthritis and Sjogren's: methotrexate 7.5 mg weekly  - prior: hydroxychloroquine 200 mg daily    # Xerosis cutis, well-controlled  - Using \"sensitive skin\" products, avoiding irritants, moisturizing regimen  - avoiding retinoid application to affected areas  - hydrocortisone ointment BID PRN to affected areas until resolved  # Seb Derm, well-controlled  - currently head and shoulders with intermittent ketoconazole shampoo weekly  - Previous: fluocinolone oil for scalp  (too messy)  #. Non scarring hair loss  - Future considerations: Rogaine    # NUB, left axilla. Ddx. SK vs. Melanoma  - s/p shave bx 3/21/2023   ____________________________________________    Assessment & Plan:    # NUB, left axilla. Ddx. SK vs. Melanoma  - Shave biopsy performed today, see procedure below.     # Seborrheic dermatitis. Chronic, stable.  - Well-controlled on her current shampoo regimen, continue without changes    # Non-scarring hair loss. Chronic, active.  Unclear etiology. 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  - Consider Rogaine if desired  *ferritin recently 96, vitamin D 73, no recent TSH, given weight loss, will check TSH     # Multiple benign nevi.   - Monitor for ABCDEs of melanoma   - Continue sun protection - recommend SPF 30 or higher with frequent application   - Return sooner if noticing " changing or symptomatic lesions    # Solar lentigines, cherry angiomas, seborrheic keratoses.   - Benign, no treatment required     # Prior blistering eruption.  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  Pt notes rash that started 5-6 months after taking Plaquenil. Not present today. 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 I would not suspect this rash would be related to the Plaquenil so she could safely resume from my standpoint if needed.    Procedures Performed:   - Shave biopsy procedure note, location(s): Left axilla. After discussion of benefits and risks including but not limited to bleeding, infection, scar, incomplete removal, recurrence, and non-diagnostic biopsy, written consent and photographs were obtained. The area was cleaned with isopropyl alcohol. 0.5mL of 1% lidocaine with epinephrine was injected to obtain adequate anesthesia of lesion(s). Shave biopsy at site(s) performed. Hemostasis was achieved with aluminium chloride. Petrolatum ointment and a sterile dressing were applied. The patient tolerated the procedure and no complications were noted. The patient was provided with verbal and written post care instructions.     Follow-up: 6 months, sooner if concerns.     Staff and Scribe:     Scribe Disclosure:  I, Kirby Spencer, am serving as a scribe to document services personally performed by Rubina Grey MD based on data collection and the provider's statements to me.     Provider Disclosure:   The documentation recorded by the scribe accurately reflects the services I personally  performed and the decisions made by me.      Rubina Grey MD    Department of Dermatology  [facility]: Phone: [phone], Fax: [phone]  3/22/2023     ____________________________________________    CC: Derm Problem (Patrick is here today for a skin check. )    HPI:  Ms. Patrick Olson is a(n) 82 year old female who presents today as a return patient for FBSE. Last seen by Dr. Stevenson on 7/7/2022, at which time patient was continued on current medication regimen     Today, patient denies any family or personal history of skin cancer. She also denies any changing or painful spots. Patient reports itchiness of the scalp. She gets very cold feet as well and was wondering if multiple blankets could potentially irritate the skin. Patient reports losing about 25% of her hair.     Unfortunately she has had multiple medical issues recently including GI bleed, significant weight loss. She is seeing multiple specialists.    Patient is otherwise feeling well, without additional skin concerns.    Labs Reviewed:  N/A    Physical Exam:  Vitals: There were no vitals taken for this visit.  SKIN: Full body skin exam excluding the genitals was performed including face, scalp, neck, ears, chest, back, bilateral arms, hands, bilateral legs, feet, and buttocks.   - thinning of hair diffusely, no PF erythema or scale.  - On the left axilla, there is a 6 mm brown thin papule dermoscopy with cobblestone versus globules of pigment on periphery.  - There are dome shaped bright red papules on the trunk and extremities.   - Multiple regular brown pigmented macules and papules are identified on the trunk and extremities.   - Scattered brown macules on sun exposed areas.  - There are waxy stuck on tan to brown papules on the trunk and extremities.   - No other lesions of concern on areas examined.     Medications:  Current Outpatient Medications    Medication     acetaminophen (TYLENOL) 650 MG CR tablet     calcium carbonate-vitamin D (CALTRATE) 600-10 MG-MCG per tablet     famotidine (PEPCID) 20 MG tablet     folic acid (FOLVITE) 1 MG tablet     gabapentin (NEURONTIN) 300 MG capsule     lifitegrast (XIIDRA) 5 % opthalmic solution     methotrexate 2.5 MG tablet     Multiple Vitamins-Minerals (OCUVITE PRESERVISION PO)     pilocarpine (SALAGEN) 5 MG tablet     vitamin D3 (CHOLECALCIFEROL) 50 mcg (2000 units) tablet     methotrexate 2.5 MG tablet     No current facility-administered medications for this visit.      Past Medical History:   Patient Active Problem List   Diagnosis     SNHL (sensorineural hearing loss)     GI bleed     Gastrointestinal hemorrhage, unspecified gastrointestinal hemorrhage type     Diverticular hemorrhage     Acute lower GI bleeding     Diverticulosis of large intestine     Lab test negative for COVID-19 virus     Past Medical History:   Diagnosis Date     Anemia      CKD (chronic kidney disease) stage 3, GFR 30-59 ml/min (H)      Diverticulosis of large intestine      Osteoarthritis      Osteoporosis      Rheumatoid arthritis (H)      Sensorineural hearing loss         CC No referring provider defined for this encounter. on close of this encounter.

## 2023-03-21 NOTE — NURSING NOTE
Lidocaine-epinephrine 1-1:858031 % injection   1mL once for one use, starting 3/21/2023 ending 3/21/2023,  2mL disp, R-0, injection  Injected by Emily Lockwood RN

## 2023-03-22 ENCOUNTER — MYC MEDICAL ADVICE (OUTPATIENT)
Dept: DERMATOLOGY | Facility: CLINIC | Age: 83
End: 2023-03-22
Payer: COMMERCIAL

## 2023-03-23 LAB
PATH REPORT.COMMENTS IMP SPEC: NORMAL
PATH REPORT.COMMENTS IMP SPEC: NORMAL
PATH REPORT.FINAL DX SPEC: NORMAL
PATH REPORT.GROSS SPEC: NORMAL
PATH REPORT.MICROSCOPIC SPEC OTHER STN: NORMAL
PATH REPORT.RELEVANT HX SPEC: NORMAL

## 2023-03-27 ENCOUNTER — LAB (OUTPATIENT)
Dept: LAB | Facility: CLINIC | Age: 83
End: 2023-03-27
Payer: COMMERCIAL

## 2023-03-27 ENCOUNTER — TELEPHONE (OUTPATIENT)
Dept: GASTROENTEROLOGY | Facility: CLINIC | Age: 83
End: 2023-03-27

## 2023-03-27 DIAGNOSIS — L65.9 LOSS OF HAIR: ICD-10-CM

## 2023-03-27 PROCEDURE — 36415 COLL VENOUS BLD VENIPUNCTURE: CPT

## 2023-03-27 PROCEDURE — 84443 ASSAY THYROID STIM HORMONE: CPT

## 2023-03-27 NOTE — TELEPHONE ENCOUNTER
Called and left message for Pt. Called Pt to see if we can move Pt appointment time on 3/31/2023 at 12pm to 10am with Belen Delacruz. Provider is unavailable that afternoon and the appointment would need to be rescheduled. Writer left call back number.

## 2023-03-27 NOTE — TELEPHONE ENCOUNTER
Pt called back and confirmed that 10am on 3/31/2023 will work for the Pt. Writer will make the time change from 12pm to 10am on 3/31/2023 with Belen Delacruz.

## 2023-03-28 LAB — TSH SERPL DL<=0.005 MIU/L-ACNC: 1.13 UIU/ML (ref 0.3–4.2)

## 2023-03-30 ENCOUNTER — OFFICE VISIT (OUTPATIENT)
Dept: RHEUMATOLOGY | Facility: CLINIC | Age: 83
End: 2023-03-30
Attending: INTERNAL MEDICINE
Payer: COMMERCIAL

## 2023-03-30 VITALS
BODY MASS INDEX: 16.53 KG/M2 | HEART RATE: 83 BPM | WEIGHT: 94.06 LBS | DIASTOLIC BLOOD PRESSURE: 73 MMHG | OXYGEN SATURATION: 100 % | SYSTOLIC BLOOD PRESSURE: 139 MMHG | TEMPERATURE: 97.4 F | RESPIRATION RATE: 14 BRPM

## 2023-03-30 DIAGNOSIS — N18.31 CHRONIC KIDNEY DISEASE, STAGE 3A (H): ICD-10-CM

## 2023-03-30 DIAGNOSIS — Z79.899 HIGH RISK MEDICATION USE: ICD-10-CM

## 2023-03-30 DIAGNOSIS — M06.9 RHEUMATOID ARTHRITIS INVOLVING MULTIPLE SITES, UNSPECIFIED WHETHER RHEUMATOID FACTOR PRESENT (H): Primary | ICD-10-CM

## 2023-03-30 DIAGNOSIS — R62.7 FAILURE TO THRIVE IN ADULT: ICD-10-CM

## 2023-03-30 DIAGNOSIS — E46 PROTEIN-CALORIE MALNUTRITION, UNSPECIFIED SEVERITY (H): ICD-10-CM

## 2023-03-30 PROCEDURE — 99214 OFFICE O/P EST MOD 30 MIN: CPT | Performed by: INTERNAL MEDICINE

## 2023-03-30 PROCEDURE — G0463 HOSPITAL OUTPT CLINIC VISIT: HCPCS | Performed by: INTERNAL MEDICINE

## 2023-03-30 RX ORDER — HYDROXYCHLOROQUINE SULFATE 200 MG/1
200 TABLET, FILM COATED ORAL DAILY
Qty: 30 TABLET | Refills: 5 | Status: SHIPPED | OUTPATIENT
Start: 2023-03-30 | End: 2023-10-03

## 2023-03-30 RX ORDER — LEFLUNOMIDE 10 MG/1
10 TABLET ORAL DAILY
Qty: 90 TABLET | Refills: 0 | Status: CANCELLED | OUTPATIENT
Start: 2023-03-30

## 2023-03-30 ASSESSMENT — PAIN SCALES - GENERAL: PAINLEVEL: MODERATE PAIN (4)

## 2023-03-30 NOTE — PROGRESS NOTES
Outpatient Rheumatology follow-up    Name: Patrick Olson    MRN [id number]   Today's date: 3/30/23  Date of last visit: 5/19/22         Reason for follow-up: rheumatoid arthritis on methotrexate   Requesting physician: Lauryn Tamayo MD             Assessment & Plan:   82-year-old female with a history of seropositive rheumatoid arthritis with secondary Sjogren's syndrome on methotrexate 10 mg weekly (previously on 20 mg weekly)/folic acid 2 mg daily and 5 mg Salagen nightly presented to rheumatology to establish care in Oct 2021.  At that time she had recently seen Dr Sen of hematology for evaluation and treatment of macrocytic anemia likely secondary to chronic inflammation vs methotrexate therapy vs recurrent diverticular bleeding  most recently in January 2021 required hospitalization and transfusion.  Her seropositive rheumatoid arthritis was under good control and in remission on low-dose of methotrexate which was on the low end of therapeutic dose range.  Given the likely contribution to her anemia and possibly leukopenia in the context of well-controlled low disease activity we discussed other options for her inflammatory arthritis we did initially transition her from methotrexate to hydroxychloroquine and she did well on this from RA/sjogrens/cytopenia perspective, though she developed a rash which was evaluated by Dr Stevenson of Dermatology and thought NOT secondary to HCQ. However, patient still wished to discontinue HCQ and so we discussed going back on very low dose methotrexate while we monitor cell counts. We restarted methotrexate at a dose of 10mg once weekly along with folic acid daily. Unfortunately, her WBC, after restarting have again dropped and continue to down trend. Reassuringly, her HGB has continued to uptrend and along with iron supplementation.     We again discussed that the rash that she developed while on HCQ was very unlikely to be secondary to HCQ, especially given the  evaluation she had with dermatologist, Dr Stevenson/his assessment/expertise in CTD and experience with HCQ. She was reassured by this. We have elected to go back onto HCQ at this time. Her systemic autoimmune disease was well controlled on HCQ in the past, and should her leukopenia be either evidence of toxicity from methotrexate or a manifestation of her CTD, HCQ would be a more appropriate option in both regards. Of note, while she was on HCQ last year, her WBC had normalized.     Seropositive rheumatoid arthritis with secondary sjogrens syndrome: disease is currently in remission without evidence of synovitis on exam, minimal if any early morning stiffness, and lack of interval flares of red/hot/swollen joints consistent with prior flares. Currently on methotrexate, though due to above discussion, will be stopping methotrexate and restarting plaquenil.  PLAN:  -Stop methotrexate/folic acid  -Start HCQ 200mg. Take one tab once daily  -Labs due at the end of may, orders placed  -Follow-up with me at the end of June to assess for ongoing disease remission at that time      High risk medication use: Methotrexate currently, stopping and transitioning to HCQ  -No evidence of toxicity by history/exam today.  Most recent labs from 2/7/23 show progressive leukopenia. HGB improving. PLT normal. Suspect that her leukopenia is secondary to methotrexate toxicity vs underlying manifestation of CTD  -Risks and benefits of HCQ discussed today to include rash (including severe rash/SJS/TEN), HA, GI upset, hepatoxicity, retinal toxicity, need for yearly screening OCT exam, need for CBC, CMP, ESR, CRP for routine screening drug toxicity labs among others. Patient is agreeable.   -Labs due again at the end of may  -Continue routine screening yearly OCT exam, already has ophthalmologist that she is established with    #unintentional weight loss: continues to lose weight. Unclear etiology as to why. Most recent CRP is normal. Age and  gender adjusted ESR is normal. Ferritin is normal. Her subjective history/exam do not identify an etiology for her ongoing weight loss. She requests a referral to nutrition which I agree with.   -nutrition referral    #CKD 3a  -continues to follow with nephrology  -note renal function as we dose her systemic immunosuppression    Follow-up with me in June     Greg Clements MD  Rheumatology      Subjective:   March 30, 2023  -continues to lose weight, unintentional. Despite eating as much as possible of calorie dense foods, unable to stop weight loss and unable to gain  -had low back injection one week ago, not too much helpful. Rates the pain 3-4/10. Before the injection rates the pain 6/10  -No fevers/chills/night sweats  -Saw ortho for her right shoulder, found to have rotator cuff pathology. Not secondary to active inflammatory arthritis etiology  -No red/hot/swollen joints.  -no prolonged EMS  -Does not find that her methotrexate is causing specific GI upset/nausea around the time of her once weekly dose. She is currently taking 10mg once weekly. Has also continued on folic acid 1mg once daily  14 point ROS collected and negative if not documented above     Interval History 5/19/22  Rash is continuing to improve. She changed/soap/fragrance. She had cortisone injection into her back 2 days ago, unable to sleep for 2 nights after that but now resolved and has notices much improvement. Today got up, did some cooking. Energy level improved. Has started on methotrexate and without any side effects. Started this on 4/23/22. Takes this each week on Monday evenings. No GI or other side effects. Also takes folic acid 1mg daily. Currently takes 10mg weekly and 1mg folic acid daily. No fatigue. Fevers/chills/night sweats. No infections since last visit. No joint pain. No red/hot/swollen joints. Minimal AM stiffness lasting for only a few minutes. She is looking forward to her upcoming trip to [place] with her family. No  unintentional weight loss. 14 point ROS collected and negative.         Interval history 2/10/22  Did not notice any return or worsening of inflammatory arthritis with transition from methotrexate to Plaquenil.  Reports less than 5 minutes of early morning stiffness.  No red hot swollen joints.  Rates her pain at less than a 2 out of 10.  No rash, headache, change in vision, GI upset.  No interval infection.  Continues to have ongoing left greater than right intermittent hip pain which is worse when lying on that side.  She continues to be active with frequent walking almost daily weather permitting.  Her hip pain is there a few days per month.  Resolves without intervention.  Had follow-up with GI on 01/12/2022.  They plan for repeat colonoscopy in 6 to 12 months.  She was put on a course of prednisone after a visit with sports medicine for her established lumbar disc disease and likely exacerbation.  She had a positive response of this for her axial pain.  She did not note much difference in regards to her peripheral arthritis symptoms as they had been inactive prior to starting this prednisone course.  Review of systems otherwise negative.    History from initial consultation in 10/2021  Patient with a diagnosis of seropositive rheumatoid arthritis and secondary Sjogren's syndrome on methotrexate 10 mg weekly, folic acid 2 mg daily and Salagen 5 mg each evening presents to rheumatology to discuss other therapies for her inflammatory arthritis in the setting of chronic macrocytic anemia and mild leukopenia.  She reports that overall her joint symptoms of peripheral joints have been well controlled even with her reduction of methotrexate dose from 20 mg weekly earlier this year down to 10 mg weekly due to the above concern.  She has not had worsening of joint pain, stiffness or any return of erythema edema or warmth.  She is able to make full fist upon waking in the morning.  Denies any early morning stiffness  lasting more than 3 to 5 minutes.  Denies any fevers chills.  She was also previously taking Celebrex though in the context of her recurrent diverticular bleeds this was discontinued earlier this year as well.  No worsening after this discontinuation either.  Has mild dry eyes and sees an ophthalmologist yearly.  She does have dry mouth and finds that the Salagen has been helpful.  She denies any swelling of her parotid glands or submandibular glands.  She drinks water frequently including overnight when needed.  Denies recurrent/progressive dental disease.  Sees a dentist regularly.  No new rash.  No fevers or chills.  No recurrent infection.  Her weight is now stable though she initially did have some weight loss earlier this year with her diverticular disease and change in diet.  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  No history of DVT.  No hair loss.  No inflammatory eye disease history.  No oral or nasal ulcers.  No recurrent epistaxes.  No photosensitive rash.  No chest pain or shortness of breath.  No change in strength or sensation in arms or legs.  On complaint at this time is ongoing lumbar spine pain for which she had previously seen orthospine.  Previously had injections which were helpful though the most recent was not.    Past Medical History  Past Medical History:   Diagnosis Date     Anemia      CKD (chronic kidney disease) stage 3, GFR 30-59 ml/min (H)      Diverticulosis of large intestine      Osteoarthritis      Osteoporosis      Rheumatoid arthritis (H)      Sensorineural hearing loss      Past Surgical History  Past Surgical History:   Procedure Laterality Date     CAPSULE/PILL CAM ENDOSCOPY N/A 11/18/2021    Procedure: IMAGING PROCEDURE, GI TRACT, INTRALUMINAL, VIA CAPSULE;  Surgeon: Deric Rodriguez MD;  Location:  GI     CAPSULE/PILL CAM ENDOSCOPY N/A 2/14/2023    Procedure: IMAGING  PROCEDURE, GI TRACT, INTRALUMINAL, VIA CAPSULE;  Surgeon: Jaime Mesa MD;  Location: [facility]     COLONOSCOPY N/A 03/14/2017    Procedure: COMBINED COLONOSCOPY, SINGLE OR MULTIPLE BIOPSY/POLYPECTOMY BY BIOPSY;  Surgeon: Spencer Downey MD;  Location: [facility]     COLONOSCOPY N/A 9/22/2022    Procedure: COLONOSCOPY, FLEXIBLE, WITH LESION REMOVAL USING SNARE;  Surgeon: Kirby Arthur MD;  Location:  GI     COLONOSCOPY N/A 1/13/2023    Procedure: COLONOSCOPY;  Surgeon: Spencer Downey MD;  Location: [facility] OR     ENTEROSCOPY SMALL BOWEL N/A 11/20/2021    Procedure: ENTEROSCOPY, colonoscopy with endoscopic mucosal resection and tattoo placement;  Surgeon: Kirby Arthur MD;  Location: [facility] OR     ESOPHAGOSCOPY, GASTROSCOPY, DUODENOSCOPY (EGD), COMBINED N/A 2/6/2023    Procedure: ESOPHAGOGASTRODUODENOSCOPY (EGD);  Surgeon: Spencer Downey MD;  Location: [facility]     IR VISCERAL EMBOLIZATION  01/22/2021     ORTHOPEDIC SURGERY Left     hip replacment     SIGMOIDOSCOPY FLEXIBLE N/A 01/23/2021    Procedure: SIGMOIDOSCOPY, FLEXIBLE;  Surgeon: Jaime Steinberg MD;  Location:  GI     Medications  Current Outpatient Medications   Medication     acetaminophen (TYLENOL) 650 MG CR tablet     calcium carbonate-vitamin D (CALTRATE) 600-10 MG-MCG per tablet     famotidine (PEPCID) 20 MG tablet     folic acid (FOLVITE) 1 MG tablet     gabapentin (NEURONTIN) 300 MG capsule     lifitegrast (XIIDRA) 5 % opthalmic solution     methotrexate 2.5 MG tablet     methotrexate 2.5 MG tablet     Multiple Vitamins-Minerals (OCUVITE PRESERVISION PO)     pilocarpine (SALAGEN) 5 MG tablet     vitamin D3 (CHOLECALCIFEROL) 50 mcg (2000 units) tablet     No current facility-administered medications for this visit.       Allergies  Allergies   Allergen Reactions     Bee Venom Anaphylaxis     Shrimp Anaphylaxis     Evoxac [Cevimeline] Unknown     Prevnar Swelling     Other reaction(s): Edema     Prevnar [Pneumococcal 13-Linda Conj Vacc] Unknown        Family History: No family hx of autoimmune disease    Social History: No alcohol, drug use, smoking history.       Objective:   /73 (BP Location: Right arm, Patient Position: Sitting, Cuff Size: Adult Small)   Pulse 83   Temp 97.4  F (36.3  C) (Oral)   Resp 14   Wt 42.7 kg (94 lb 1 oz)   SpO2 100%   BMI 16.53 kg/m    GEN: sitting up unassisted, NAD  HEENT: No facial rash, sclera clear  CV: RRR, no m/r/g  Extremities: Full active and passive ROM of bilateral shoulders, elbows, wrists, MCPs, PIPs, DIPs, hips, knees, ankles. Able to make a full fist with good strength bilaterally. No synovitis of any joints.  Skin: No acute cutaneous changes.     WBC   Date Value Ref Range Status   01/20/2021 5.0 4.0 - 11.0 10e9/L Final     WBC Count   Date Value Ref Range Status   02/07/2023 3.2 (L) 4.0 - 11.0 10e3/uL Final     Hemoglobin   Date Value Ref Range Status   02/07/2023 11.1 (L) 11.7 - 15.7 g/dL Final   01/25/2021 8.0 (L) 11.7 - 15.7 g/dL Final     Platelet Count   Date Value Ref Range Status   02/07/2023 247 150 - 450 10e3/uL Final   01/20/2021 207 150 - 450 10e9/L Final     Creatinine   Date Value Ref Range Status   02/07/2023 1.03 (H) 0.51 - 0.95 mg/dL Final   01/23/2021 0.75 0.52 - 1.04 mg/dL Final     Lab Results   Component Value Date    ALKPHOS 68 05/05/2022    ALKPHOS 77 03/13/2017     AST   Date Value Ref Range Status   02/01/2023 30 10 - 35 U/L Final   03/13/2017 10 0 - 45 U/L Final     Lab Results   Component Value Date    ALT 22 05/05/2022    ALT 12 03/13/2017     Sed Rate   Date Value Ref Range Status   03/13/2017 63 (H) 0 - 30 mm/h Final     Erythrocyte Sedimentation Rate   Date Value Ref Range Status   02/01/2023 35 (H) 0 - 30 mm/hr Final     CRP Inflammation   Date Value Ref Range Status   08/26/2022 <2.9 0.0 - 8.0 mg/L Final   03/13/2017 10.0 (H) 0.0 - 8.0 mg/L Final     UA RESULTS:  Recent Labs   Lab Test 03/10/22  1420   COLOR Yellow   APPEARANCE Clear   URINEGLC Negative   URINEBILI  Negative   URINEKETONE Negative   SG 1.010   UBLD Negative   URINEPH 7.0   PROTEIN Negative   NITRITE Negative   LEUKEST Negative   RBCU 2   WBCU <1      Rheumatoid Factor   Date Value Ref Range Status   01/03/2022 8 <12 IU/mL Final     Cyclic Citrullinated Peptide Antibody IgG   Date Value Ref Range Status   01/03/2022 3.6 <7.0 U/mL Final     Comment:     Negative       Imaging:   No peripheral x-rays to review in our system

## 2023-03-30 NOTE — PATIENT INSTRUCTIONS
1) stop methotrexate  2) start plaquenil. Each tablet is 200mg. You will take one tab every morning with food.  3) If you develop any side effect, rash or anything else, let me know  4) Labs end of May  5) Follow-up with me end of June

## 2023-03-30 NOTE — LETTER
3/30/2023       RE: Patrick Olson  [address]  [facility] [id number]     Dear Colleague,    Thank you for referring your patient, Patrick Olson, to the [facility] [place] at [facility]. Please see a copy of my visit note below.      Outpatient Rheumatology follow-up    Name: Patrick Olson    MRN [id number]   Today's date: 3/30/23  Date of last visit: 5/19/22         Reason for follow-up: rheumatoid arthritis on methotrexate   Requesting physician: Lauryn Tamayo MD             Assessment & Plan:   82-year-old female with a history of seropositive rheumatoid arthritis with secondary Sjogren's syndrome on methotrexate 10 mg weekly (previously on 20 mg weekly)/folic acid 2 mg daily and 5 mg Salagen nightly presented to rheumatology to establish care in Oct 2021.  At that time she had recently seen Dr Sen of hematology for evaluation and treatment of macrocytic anemia likely secondary to chronic inflammation vs methotrexate therapy vs recurrent diverticular bleeding  most recently in January 2021 required hospitalization and transfusion.  Her seropositive rheumatoid arthritis was under good control and in remission on low-dose of methotrexate which was on the low end of therapeutic dose range.  Given the likely contribution to her anemia and possibly leukopenia in the context of well-controlled low disease activity we discussed other options for her inflammatory arthritis we did initially transition her from methotrexate to hydroxychloroquine and she did well on this from RA/sjogrens/cytopenia perspective, though she developed a rash which was evaluated by Dr Stevenson of Dermatology and thought NOT secondary to HCQ. However, patient still wished to discontinue HCQ and so we discussed going back on very low dose methotrexate while we monitor cell counts. We restarted methotrexate at a dose of 10mg once weekly along with folic  acid daily. Unfortunately, her WBC, after restarting have again dropped and continue to down trend. Reassuringly, her HGB has continued to uptrend and along with iron supplementation.     We again discussed that the rash that she developed while on HCQ was very unlikely to be secondary to HCQ, especially given the evaluation she had with dermatologist, Dr Stevenson/his assessment/expertise in CTD and experience with HCQ. She was reassured by this. We have elected to go back onto HCQ at this time. Her systemic autoimmune disease was well controlled on HCQ in the past, and should her leukopenia be either evidence of toxicity from methotrexate or a manifestation of her CTD, HCQ would be a more appropriate option in both regards. Of note, while she was on HCQ last year, her WBC had normalized.     Seropositive rheumatoid arthritis with secondary sjogrens syndrome: disease is currently in remission without evidence of synovitis on exam, minimal if any early morning stiffness, and lack of interval flares of red/hot/swollen joints consistent with prior flares. Currently on methotrexate, though due to above discussion, will be stopping methotrexate and restarting plaquenil.  PLAN:  -Stop methotrexate/folic acid  -Start HCQ 200mg. Take one tab once daily  -Labs due at the end of may, orders placed  -Follow-up with me at the end of June to assess for ongoing disease remission at that time      High risk medication use: Methotrexate currently, stopping and transitioning to HCQ  -No evidence of toxicity by history/exam today.  Most recent labs from 2/7/23 show progressive leukopenia. HGB improving. PLT normal. Suspect that her leukopenia is secondary to methotrexate toxicity vs underlying manifestation of CTD  -Risks and benefits of HCQ discussed today to include rash (including severe rash/SJS/TEN), HA, GI upset, hepatoxicity, retinal toxicity, need for yearly screening OCT exam, need for CBC, CMP, ESR, CRP for routine screening  drug toxicity labs among others. Patient is agreeable.   -Labs due again at the end of may  -Continue routine screening yearly OCT exam, already has ophthalmologist that she is established with    #unintentional weight loss: continues to lose weight. Unclear etiology as to why. Most recent CRP is normal. Age and gender adjusted ESR is normal. Ferritin is normal. Her subjective history/exam do not identify an etiology for her ongoing weight loss. She requests a referral to nutrition which I agree with.   -nutrition referral    #CKD 3a  -continues to follow with nephrology  -note renal function as we dose her systemic immunosuppression    Follow-up with me in June     Greg Clements MD  Rheumatology      Subjective:   March 30, 2023  -continues to lose weight, unintentional. Despite eating as much as possible of calorie dense foods, unable to stop weight loss and unable to gain  -had low back injection one week ago, not too much helpful. Rates the pain 3-4/10. Before the injection rates the pain 6/10  -No fevers/chills/night sweats  -Saw ortho for her right shoulder, found to have rotator cuff pathology. Not secondary to active inflammatory arthritis etiology  -No red/hot/swollen joints.  -no prolonged EMS  -Does not find that her methotrexate is causing specific GI upset/nausea around the time of her once weekly dose. She is currently taking 10mg once weekly. Has also continued on folic acid 1mg once daily  14 point ROS collected and negative if not documented above     Interval History 5/19/22  Rash is continuing to improve. She changed/soap/fragrance. She had cortisone injection into her back 2 days ago, unable to sleep for 2 nights after that but now resolved and has notices much improvement. Today got up, did some cooking. Energy level improved. Has started on methotrexate and without any side effects. Started this on 4/23/22. Takes this each week on Monday evenings. No GI or other side effects. Also takes  folic acid 1mg daily. Currently takes 10mg weekly and 1mg folic acid daily. No fatigue. Fevers/chills/night sweats. No infections since last visit. No joint pain. No red/hot/swollen joints. Minimal AM stiffness lasting for only a few minutes. She is looking forward to her upcoming trip to [place] with her family. No unintentional weight loss. 14 point ROS collected and negative.         Interval history 2/10/22  Did not notice any return or worsening of inflammatory arthritis with transition from methotrexate to Plaquenil.  Reports less than 5 minutes of early morning stiffness.  No red hot swollen joints.  Rates her pain at less than a 2 out of 10.  No rash, headache, change in vision, GI upset.  No interval infection.  Continues to have ongoing left greater than right intermittent hip pain which is worse when lying on that side.  She continues to be active with frequent walking almost daily weather permitting.  Her hip pain is there a few days per month.  Resolves without intervention.  Had follow-up with GI on 01/12/2022.  They plan for repeat colonoscopy in 6 to 12 months.  She was put on a course of prednisone after a visit with sports medicine for her established lumbar disc disease and likely exacerbation.  She had a positive response of this for her axial pain.  She did not note much difference in regards to her peripheral arthritis symptoms as they had been inactive prior to starting this prednisone course.  Review of systems otherwise negative.    History from initial consultation in 10/2021  Patient with a diagnosis of seropositive rheumatoid arthritis and secondary Sjogren's syndrome on methotrexate 10 mg weekly, folic acid 2 mg daily and Salagen 5 mg each evening presents to rheumatology to discuss other therapies for her inflammatory arthritis in the setting of chronic macrocytic anemia and mild leukopenia.  She reports that overall her joint symptoms of peripheral joints have been well controlled even  with her reduction of methotrexate dose from 20 mg weekly earlier this year down to 10 mg weekly due to the above concern.  She has not had worsening of joint pain, stiffness or any return of erythema edema or warmth.  She is able to make full fist upon waking in the morning.  Denies any early morning stiffness lasting more than 3 to 5 minutes.  Denies any fevers chills.  She was also previously taking Celebrex though in the context of her recurrent diverticular bleeds this was discontinued earlier this year as well.  No worsening after this discontinuation either.  Has mild dry eyes and sees an ophthalmologist yearly.  She does have dry mouth and finds that the Salagen has been helpful.  She denies any swelling of her parotid glands or submandibular glands.  She drinks water frequently including overnight when needed.  Denies recurrent/progressive dental disease.  Sees a dentist regularly.  No new rash.  No fevers or chills.  No recurrent infection.  Her weight is now stable though she initially did have some weight loss earlier this year with her diverticular disease and change in diet.  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  No history of DVT.  No hair loss.  No inflammatory eye disease history.  No oral or nasal ulcers.  No recurrent epistaxes.  No photosensitive rash.  No chest pain or shortness of breath.  No change in strength or sensation in arms or legs.  On complaint at this time is ongoing lumbar spine pain for which she had previously seen orthospine.  Previously had injections which were helpful though the most recent was not.    Past Medical History  Past Medical History:   Diagnosis Date    Anemia     CKD (chronic kidney disease) stage 3, GFR 30-59 ml/min (H)     Diverticulosis of large intestine     Osteoarthritis     Osteoporosis     Rheumatoid arthritis (H)     Sensorineural hearing loss      Past Surgical  History  Past Surgical History:   Procedure Laterality Date    CAPSULE/PILL CAM ENDOSCOPY N/A 11/18/2021    Procedure: IMAGING PROCEDURE, GI TRACT, INTRALUMINAL, VIA CAPSULE;  Surgeon: Deric Rodriguez MD;  Location: [facility]    CAPSULE/PILL CAM ENDOSCOPY N/A 2/14/2023    Procedure: IMAGING PROCEDURE, GI TRACT, INTRALUMINAL, VIA CAPSULE;  Surgeon: Jaiem Mesa MD;  Location: [facility]    COLONOSCOPY N/A 03/14/2017    Procedure: COMBINED COLONOSCOPY, SINGLE OR MULTIPLE BIOPSY/POLYPECTOMY BY BIOPSY;  Surgeon: Spencer Downey MD;  Location: [facility]    COLONOSCOPY N/A 9/22/2022    Procedure: COLONOSCOPY, FLEXIBLE, WITH LESION REMOVAL USING SNARE;  Surgeon: Kirby Arthur MD;  Location:  GI    COLONOSCOPY N/A 1/13/2023    Procedure: COLONOSCOPY;  Surgeon: Spencer Downey MD;  Location: [facility] OR    ENTEROSCOPY SMALL BOWEL N/A 11/20/2021    Procedure: ENTEROSCOPY, colonoscopy with endoscopic mucosal resection and tattoo placement;  Surgeon: Kirby Arthur MD;  Location: [facility] OR    ESOPHAGOSCOPY, GASTROSCOPY, DUODENOSCOPY (EGD), COMBINED N/A 2/6/2023    Procedure: ESOPHAGOGASTRODUODENOSCOPY (EGD);  Surgeon: Spencer Downey MD;  Location: [facility]    IR VISCERAL EMBOLIZATION  01/22/2021    ORTHOPEDIC SURGERY Left     hip replacment    SIGMOIDOSCOPY FLEXIBLE N/A 01/23/2021    Procedure: SIGMOIDOSCOPY, FLEXIBLE;  Surgeon: Jaime Steinberg MD;  Location:  GI     Medications  Current Outpatient Medications   Medication    acetaminophen (TYLENOL) 650 MG CR tablet    calcium carbonate-vitamin D (CALTRATE) 600-10 MG-MCG per tablet    famotidine (PEPCID) 20 MG tablet    folic acid (FOLVITE) 1 MG tablet    gabapentin (NEURONTIN) 300 MG capsule    lifitegrast (XIIDRA) 5 % opthalmic solution    methotrexate 2.5 MG tablet    methotrexate 2.5 MG tablet    Multiple Vitamins-Minerals (OCUVITE PRESERVISION PO)    pilocarpine (SALAGEN) 5 MG tablet    vitamin D3 (CHOLECALCIFEROL) 50 mcg (2000 units) tablet     No current  facility-administered medications for this visit.       Allergies  Allergies   Allergen Reactions    Bee Venom Anaphylaxis    Shrimp Anaphylaxis    Evoxac [Cevimeline] Unknown    Prevnar Swelling     Other reaction(s): Edema    Prevnar [Pneumococcal 13-Linda Conj Vacc] Unknown       Family History: No family hx of autoimmune disease    Social History: No alcohol, drug use, smoking history.       Objective:   /73 (BP Location: Right arm, Patient Position: Sitting, Cuff Size: Adult Small)   Pulse 83   Temp 97.4  F (36.3  C) (Oral)   Resp 14   Wt 42.7 kg (94 lb 1 oz)   SpO2 100%   BMI 16.53 kg/m    GEN: sitting up unassisted, NAD  HEENT: No facial rash, sclera clear  CV: RRR, no m/r/g  Extremities: Full active and passive ROM of bilateral shoulders, elbows, wrists, MCPs, PIPs, DIPs, hips, knees, ankles. Able to make a full fist with good strength bilaterally. No synovitis of any joints.  Skin: No acute cutaneous changes.     WBC   Date Value Ref Range Status   01/20/2021 5.0 4.0 - 11.0 10e9/L Final     WBC Count   Date Value Ref Range Status   02/07/2023 3.2 (L) 4.0 - 11.0 10e3/uL Final     Hemoglobin   Date Value Ref Range Status   02/07/2023 11.1 (L) 11.7 - 15.7 g/dL Final   01/25/2021 8.0 (L) 11.7 - 15.7 g/dL Final     Platelet Count   Date Value Ref Range Status   02/07/2023 247 150 - 450 10e3/uL Final   01/20/2021 207 150 - 450 10e9/L Final     Creatinine   Date Value Ref Range Status   02/07/2023 1.03 (H) 0.51 - 0.95 mg/dL Final   01/23/2021 0.75 0.52 - 1.04 mg/dL Final     Lab Results   Component Value Date    ALKPHOS 68 05/05/2022    ALKPHOS 77 03/13/2017     AST   Date Value Ref Range Status   02/01/2023 30 10 - 35 U/L Final   03/13/2017 10 0 - 45 U/L Final     Lab Results   Component Value Date    ALT 22 05/05/2022    ALT 12 03/13/2017     Sed Rate   Date Value Ref Range Status   03/13/2017 63 (H) 0 - 30 mm/h Final     Erythrocyte Sedimentation Rate   Date Value Ref Range Status   02/01/2023 35 (H)  0 - 30 mm/hr Final     CRP Inflammation   Date Value Ref Range Status   08/26/2022 <2.9 0.0 - 8.0 mg/L Final   03/13/2017 10.0 (H) 0.0 - 8.0 mg/L Final     UA RESULTS:  Recent Labs   Lab Test 03/10/22  1420   COLOR Yellow   APPEARANCE Clear   URINEGLC Negative   URINEBILI Negative   URINEKETONE Negative   SG 1.010   UBLD Negative   URINEPH 7.0   PROTEIN Negative   NITRITE Negative   LEUKEST Negative   RBCU 2   WBCU <1      Rheumatoid Factor   Date Value Ref Range Status   01/03/2022 8 <12 IU/mL Final     Cyclic Citrullinated Peptide Antibody IgG   Date Value Ref Range Status   01/03/2022 3.6 <7.0 U/mL Final     Comment:     Negative       Imaging:   No peripheral x-rays to review in our system       Greg Clements MD

## 2023-03-30 NOTE — NURSING NOTE
Chief Complaint   Patient presents with     RECHECK     Anemia, med check     /73 (BP Location: Right arm, Patient Position: Sitting, Cuff Size: Adult Small)   Pulse 83   Temp 97.4  F (36.3  C) (Oral)   Resp 14   Wt 42.7 kg (94 lb 1 oz)   SpO2 100%   BMI 16.53 kg/m

## 2023-03-31 ENCOUNTER — TELEPHONE (OUTPATIENT)
Dept: NUTRITION | Facility: CLINIC | Age: 83
End: 2023-03-31

## 2023-03-31 ENCOUNTER — OFFICE VISIT (OUTPATIENT)
Dept: GASTROENTEROLOGY | Facility: CLINIC | Age: 83
End: 2023-03-31
Payer: COMMERCIAL

## 2023-03-31 VITALS
OXYGEN SATURATION: 97 % | HEIGHT: 64 IN | WEIGHT: 93.3 LBS | DIASTOLIC BLOOD PRESSURE: 76 MMHG | BODY MASS INDEX: 15.93 KG/M2 | SYSTOLIC BLOOD PRESSURE: 146 MMHG | HEART RATE: 80 BPM

## 2023-03-31 DIAGNOSIS — K26.9 DUODENAL ULCER WITHOUT HEMORRHAGE OR PERFORATION AND WITHOUT OBSTRUCTION: Primary | ICD-10-CM

## 2023-03-31 PROCEDURE — 99215 OFFICE O/P EST HI 40 MIN: CPT | Performed by: DIETITIAN, REGISTERED

## 2023-03-31 PROCEDURE — 99417 PROLNG OP E/M EACH 15 MIN: CPT | Performed by: DIETITIAN, REGISTERED

## 2023-03-31 RX ORDER — OMEPRAZOLE 40 MG/1
40 CAPSULE, DELAYED RELEASE ORAL DAILY
Qty: 90 CAPSULE | Refills: 3 | Status: SHIPPED | OUTPATIENT
Start: 2023-03-31 | End: 2024-07-15

## 2023-03-31 ASSESSMENT — PAIN SCALES - GENERAL: PAINLEVEL: MILD PAIN (3)

## 2023-03-31 NOTE — TELEPHONE ENCOUNTER
[facility] Call Center    Phone Message    May a detailed message be left on voicemail: yes     Reason for Call: Other: Pharmacist with [facility] is calling in regards to a drug interaction with the patient's omeprazole (PRILOSEC) 40 MG DR capsule prescription and her methotrexate. They need confirmation that this is okay to distribute or an alternative medication. Please call back to discuss.     Action Taken: Message routed to:  [facility] ([facility] GI    Travel Screening: Not Applicable

## 2023-03-31 NOTE — LETTER
3/31/2023         RE: Patrick Olson  [address] [facility] [address]        Dear Colleague,    Thank you for referring your patient, Patrick Olson, to the [facility] GASTRO[facility]OLOGY CLINIC [place]. Please see a copy of my visit note below.    GI CLINIC VISIT    CC/REFERRING MD:  Essence Tamayo  REASON FOR CONSULTATION:   Ms. Olson is a 81 year old female who I was asked to see in consultation at the request of Dr. Essence Tamayo for gastric AVM.     ASSESSMENT/PLAN:  #Gastric AVM , small bowel AVM, Cecal polyp, duodenal erosions  #Diverticular hemorrhage  #Hemorrhoids, unspecified hemorrhoid type  #Drug-induced constipation  #Dyspepsia    GI bleeding - clinical presentation has varied over time so suspect there is not one  of these bleeds. Recent work up in January with EGD, colonoscopy, and VCE revealed duodenal erosions, small bowel angiectasia, and diverticulosis without bleeding. Given darker blood and no evidence of bleeding at diverticula shortly after bleeding onset, suspect gastric/small bowel AVM. Duodenal erosion may contribute. Given she is no longer on methotrexate, we will start a 3 month course of omeprazole 40 mg daily given duodenal ulcers, given history of bleeding, could consider ongoing therapy. Certainly diverticular bleeding and AVM bleeding could recur and we discussed when to present to the ED for further evaluation.     Given finding of prior AVM, there will likely be additional AVMs. This should be addressed with periodic hgb checks and iron replacement as necessary (oral or infusions). Presently Hgb trending up which is reassuring. Iron replacement per hematology and primary teams. Ablative therapies (EGD, colonoscopy, and deep SB enteroscopy with use of APC) would not be appropriate unless there is active overt (visible) bleeding.  Disease-modifying therapies (SQ octreotide) would be considered in those patients refractory to the above measures,  generally with repeated hospitalizations (note incredible cost for octreotide).      Would also address hemorrhoidal bleeding with aggressive constipation management (see below). Could consider colorectal surgery clinic referral if concern for recurrent hemorrhoidal bleeding which could be addressed with possible banding.      Noted improved dyspepsia with use of famotidine and small dietary changes. We discussed this medication; it is safe for longterm use (benefit outweighs risk). She will likely also have benefit from omeprazole which we are starting given duodenal ulcers.    - continue iron replacement as you are to support anemia in the setting of AVMs, ok to use periodically directed by your primary care team based on hgb levels (pending clinical picture this may not need to be an every medication, but one use periodically)    Plan:  -- Hgb checks and iron replacement per hematology and primary teams  -- start omeprazole, take for 12 weeks 30-60 minutes before eating or drinking  -- can continue famotidine up to twice daily  -- visit with registered dietitian regarding weight loss and getting fiber for constipation  -- continue good hydration  -- continue your walking and exercises  -- continue Miralax - ok to adjust dose from 1/2 capful - 2 capfuls daily, goal is at least three satisfactory BMs a week and no more than three a day with minimizing of constipation symptoms in-between      RTC 6 months    Thank you for this consultation. It was a pleasure to participate in the care of this patient; please contact us with any further questions.      70 Minutes was spent on the date of the encounter during chart review, history and exam, documentation, and further activities as noted     Belen Delacruz PA-C  Division of Gastroenterology, Hepatology & Nutrition  [facility]    ---------------------------------------------------------------------------------------------------  HPI:     Ms. Olson is a 81  "year old female with RA (recently switched from methotrexate to plaquenil), spine pain planned for injection, OA, osteoporosis, and prior GI bleeding (AVM, possible diverticular, polyp-related --> see below) who was originally referred to GI clinic due to her history of gastric AVM as well as early satiety with dyspepsia. After her referral she was admitted to our institution for GI bleeding. She has previously followed with [facility] for her GI care (since 2017 per her report) and has undergone endoscopic exams at [facility], [facility], and [facility] since 2010.     History per initial encounter with Dr. Martinez:  Dating back several years per her recollection (2010 based on available records) she began to have recurrent overt GI bleeding with anemia. The bleeding was described as bright red. She underwent inpatient colonoscopic exams with no active bleeding found. She's been seen to have both diverticulosis and hemorrhoids on these exams. At these times, diverticular bleeding had been felt to be a contributor given her clinical presentation, though no active bleeding lesion was ever identified.      She had EGD and colonoscopy 5/26/21 at [facility] for anemia. EGD revealed a non-bleeding gastric AVM which was treated with APC. Colonoscopy demonstrated diverticulosis and small hemorrhoids.     Ms. Olson was referred to  given this AVM and anemia, but prior to the appointment was admitted to [facility] with hematochezia (per patient this was \"a bit darker\" than her prior bleeding episodes). A CTA did not demonstrate any active extravasation. Thus a capsule was done with bleeding seen in the distal ileum to cecum on prelim capsule read. Colonoscopy with ileal intubation  (20 cm beyond the IC valve) was done thereafter with no active bleeding found, but a very large 30 mm polyp was found on the IC valve and removed via EMR. This was felt to be a likely source of " "bleeding. No other findings were seen in the area of bleeding noted on capsule endoscopy.      In regards to her anemia, Ms. Olson has been evaluated by both her PCP and hematology. Per records it seems she was felt to have a component of anemia of chronic disease, though in the setting of recent GI bleeding she was given IV iron infusions which she has since completed, has taken PO iron supplementation in the past.    Ms. Olson was also referred for postprandial stomach pain she began experiencing earlier this year. For it she was started on famotidine 20 mg BID. She also increased her water and fiber intake and cut out gluten (now re-introduced).  She tracked her symptoms and felt that her stomach also hurt more when it was empty. She is working on more frequent snacking throughout the day. Over time, with all these interventions she feels the abdominal discomfort is gone and no longer an issue.  This continues to be well controlled today.    Her GI history is notable for \"fatty liver\" reportedly identified on imaging done through [facility]. She states a liver MRI was done thereafter to follow-up which was reportedly normal.     Ms. Olson also has a history of constipation which was much worse with higher doses of oral iron. She is on Miralax 1 capful daily. With this moves her bowels 1-3 times a day. She does feel that at night she has a lot of flatulence. She denies any straining with defecation, denies noting any tissue prolapse or rectal symptoms.    Since last visit Ms. Olson, has had recurrent bleeding in January. She underwent colonoscopy 1/13/23 which showed no inflammation but residual specks of blood throughout bowel (to 30 cm to TI), extensive diverticulosis with no active bleeding. EGD was subsequently completed 2/6/23 which showed duodenal erosion without bleeding, normal stomach and esophagus. VCE the following week 2/14/23 showed multiple localized erosions with no bleeding in " the duodenum (likely first portion), single small angioectasia without bleeding was seen in the small bowel (probable duodenum), and lymphangiectasia in the small bowel (mid small bowel), one of these was peduncluated and polypoid, not bleeding, benign appearing.  A single spot with no bleeding was found in the colon (probable cecum).     She has not had any ongoing bleeding since January - notes that this lasted about a day and resolved. Notes that blood was a bit darker again as compared to previous episodes.      Recently had labs including iron studies which showed normal ferritin, iron binding capacity, slightly elevated iron and iron sat index 3/7/23. Hemoglobin 11.1 (trending up)when last checked 2/7/23. Not currently taking any iron supplementation and per ordering MD, no plans to restart at this time given levels.    Stopped methotrexate yesterday and restarted plaquenil. She is hopeful that this will be helpful for her RA and prevent other side effects.    She does report slow weight loss/difficulty gaining weight. Has lost about 9 lb over two years (102 lb -- 93 lb). She has been referred to a dietitian and is eager to make this appointment.        ROS:    + Dry mouth, most notable at night, has water at bedside which helps  + OP dysphagia - feels solids take a long time to pass through her throat, but no issues in her esophagus - denies sense of food sticking or food impactions --> ? Worse with dry mouth      PROBLEM LIST  Patient Active Problem List    Diagnosis Date Noted     Lab test negative for COVID-19 virus 11/18/2021     Priority: Medium     Diverticulosis of large intestine      Priority: Medium     Acute lower GI bleeding 01/21/2021     Priority: Medium     Diverticular hemorrhage 01/20/2021     Priority: Medium     Gastrointestinal hemorrhage, unspecified gastrointestinal hemorrhage type 01/19/2021     Priority: Medium     GI bleed 03/13/2017     Priority: Medium     SNHL (sensorineural  hearing loss) 11/29/2012     Priority: Medium       PERTINENT PAST MEDICAL HISTORY:  Past Medical History:   Diagnosis Date     Anemia      CKD (chronic kidney disease) stage 3, GFR 30-59 ml/min (H)      Diverticulosis of large intestine      Osteoarthritis      Osteoporosis      Rheumatoid arthritis (H)      Sensorineural hearing loss        PREVIOUS SURGERIES:  Past Surgical History:   Procedure Laterality Date     CAPSULE/PILL CAM ENDOSCOPY N/A 11/18/2021    Procedure: IMAGING PROCEDURE, GI TRACT, INTRALUMINAL, VIA CAPSULE;  Surgeon: Deric Rodriguez MD;  Location:  GI     CAPSULE/PILL CAM ENDOSCOPY N/A 2/14/2023    Procedure: IMAGING PROCEDURE, GI TRACT, INTRALUMINAL, VIA CAPSULE;  Surgeon: Jaime Mesa MD;  Location:  GI     COLONOSCOPY N/A 03/14/2017    Procedure: COMBINED COLONOSCOPY, SINGLE OR MULTIPLE BIOPSY/POLYPECTOMY BY BIOPSY;  Surgeon: Spencer Downey MD;  Location: [facility] GI     COLONOSCOPY N/A 9/22/2022    Procedure: COLONOSCOPY, FLEXIBLE, WITH LESION REMOVAL USING SNARE;  Surgeon: Kirby Arthur MD;  Location:  GI     COLONOSCOPY N/A 1/13/2023    Procedure: COLONOSCOPY;  Surgeon: Spencer Downey MD;  Location: [facility] OR     ENTEROSCOPY SMALL BOWEL N/A 11/20/2021    Procedure: ENTEROSCOPY, colonoscopy with endoscopic mucosal resection and tattoo placement;  Surgeon: Kirby Arthur MD;  Location: [facility] OR     ESOPHAGOSCOPY, GASTROSCOPY, DUODENOSCOPY (EGD), COMBINED N/A 2/6/2023    Procedure: ESOPHAGOGASTRODUODENOSCOPY (EGD);  Surgeon: Spencer Downey MD;  Location:  GI     IR VISCERAL EMBOLIZATION  01/22/2021     ORTHOPEDIC SURGERY Left     hip replacment     SIGMOIDOSCOPY FLEXIBLE N/A 01/23/2021    Procedure: SIGMOIDOSCOPY, FLEXIBLE;  Surgeon: Jaime Steinberg MD;  Location:  GI       ALLERGIES:   Allergies   Allergen Reactions     Bee Venom Anaphylaxis     Shrimp Anaphylaxis     Evoxac [Cevimeline] Unknown     Prevnar Swelling     Other reaction(s): Edema     Prevnar  [Pneumococcal 13-Linda Conj Vacc] Unknown       PERTINENT MEDICATIONS:  Current Outpatient Medications   Medication     acetaminophen (TYLENOL) 650 MG CR tablet     calcium carbonate-vitamin D (CALTRATE) 600-10 MG-MCG per tablet     famotidine (PEPCID) 20 MG tablet     folic acid (FOLVITE) 1 MG tablet     gabapentin (NEURONTIN) 300 MG capsule     hydroxychloroquine (PLAQUENIL) 200 MG tablet     lifitegrast (XIIDRA) 5 % opthalmic solution     methotrexate 2.5 MG tablet     methotrexate 2.5 MG tablet     Multiple Vitamins-Minerals (OCUVITE PRESERVISION PO)     pilocarpine (SALAGEN) 5 MG tablet     vitamin D3 (CHOLECALCIFEROL) 50 mcg (2000 units) tablet     No current facility-administered medications for this visit.       SOCIAL HISTORY:  Social History     Socioeconomic History     Marital status:      Spouse name: Not on file     Number of children: Not on file     Years of education: Not on file     Highest education level: Not on file   Occupational History     Not on file   Tobacco Use     Smoking status: Former     Smokeless tobacco: Never   Substance and Sexual Activity     Alcohol use: No     Drug use: No     Sexual activity: Not on file   Other Topics Concern     Not on file   Social History Narrative    - Retired (formerly employed as  at 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    Past/family/social history reviewed and no changes    PHYSICAL  EXAMINATION:  Vitals There were no vitals taken for this visit.   Wt   Wt Readings from Last 2 Encounters:   03/30/23 42.7 kg (94 lb 1 oz)   03/07/23 42.8 kg (94 lb 4.8 oz)      Gen: Pt sitting up in NAD, interactive and cooperative on exam  Eyes: sclerae anicteric, no injection  Cardiac: RRR, nl S1, S2, no peripheral edema  Resp: Clear on anterior exam  GI: Normoactive BS, abd soft,, nontender  Skin: Warm, dry, no jaundice, nails appear healthy  Neuro: alert, oriented, answers questions appropriately      PERTINENT STUDIES:    Lab on 01/03/2022   Component Date Value Ref Range Status     Rheumatoid Factor 01/03/2022 8  <12 IU/mL Final     Sodium 01/03/2022 142  133 - 144 mmol/L Final     Potassium 01/03/2022 4.6  3.4 - 5.3 mmol/L Final     Chloride 01/03/2022 109  94 - 109 mmol/L Final     Carbon Dioxide (CO2) 01/03/2022 29  20 - 32 mmol/L Final     Anion Gap 01/03/2022 4  3 - 14 mmol/L Final     Urea Nitrogen 01/03/2022 18  7 - 30 mg/dL Final     Creatinine 01/03/2022 0.99  0.52 - 1.04 mg/dL Final     Calcium 01/03/2022 9.6  8.5 - 10.1 mg/dL Final     Glucose 01/03/2022 104* 70 - 99 mg/dL Final     Alkaline Phosphatase 01/03/2022 75  40 - 150 U/L Final     AST 01/03/2022 24  0 - 45 U/L Final     ALT 01/03/2022 24  0 - 50 U/L Final     Protein Total 01/03/2022 7.5  6.8 - 8.8 g/dL Final     Albumin 01/03/2022 3.9  3.4 - 5.0 g/dL Final     Bilirubin Total 01/03/2022 0.4  0.2 - 1.3 mg/dL Final     GFR Estimate 01/03/2022 58* >60 mL/min/1.73m2 Final     CRP Inflammation 01/03/2022 <2.9  0.0 - 8.0 mg/L Final     Cyclic Citrullinated Peptide Antib* 01/03/2022 3.6  <7.0 U/mL Final     Erythrocyte Sedimentation Rate 01/03/2022 39* 0 - 30 mm/hr Final     WBC Count 01/03/2022 3.9* 4.0 - 11.0 10e3/uL Final     RBC Count 01/03/2022 3.27* 3.80 - 5.20 10e6/uL Final     Hemoglobin 01/03/2022 10.5* 11.7 - 15.7 g/dL Final     Hematocrit 01/03/2022 32.5* 35.0 - 47.0 % Final     MCV 01/03/2022 99  78 - 100 fL Final     MCH 01/03/2022  32.1  26.5 - 33.0 pg Final     MCHC 01/03/2022 32.3  31.5 - 36.5 g/dL Final     RDW 01/03/2022 13.7  10.0 - 15.0 % Final     Platelet Count 01/03/2022 226  150 - 450 10e3/uL Final     % Neutrophils 01/03/2022 55  % Final     % Lymphocytes 01/03/2022 32  % Final     % Monocytes 01/03/2022 10  % Final     % Eosinophils 01/03/2022 4  % Final     % Basophils 01/03/2022 0  % Final     Absolute Neutrophils 01/03/2022 2.2  1.6 - 8.3 10e3/uL Final     Absolute Lymphocytes 01/03/2022 1.3  0.8 - 5.3 10e3/uL Final     Absolute Monocytes 01/03/2022 0.4  0.0 - 1.3 10e3/uL Final     Absolute Eosinophils 01/03/2022 0.1  0.0 - 0.7 10e3/uL Final     Absolute Basophils 01/03/2022 0.0  0.0 - 0.2 10e3/uL Final       Video Capsule Endoscopy 2/14/23:  Findings:        The gastric passage time of the capsule enteroscope was 5-hours 1-minute.        The small bowel passage time of the capsule enteroscope was 1-hour 25-minutes.        The entire examined stomach was normal.        Multiple localized erosions with no bleeding were found in the duodenum        (probable first portion of the duodenum).        A single small angioectasia without bleeding was seen in the small bowel        (probable duodenum).        Lymphangiectasia was found in the small bowel (mid small bowel). One of        these was peduncluated and polypoid, not bleeding, benign appearing.        A single spot with no bleeding was found in the colon (probable cecum).                                                                Impression:              - Normal stomach.   - Duodenal erosion.   - A single angioectasia without bleeding in the duodenum.   - Small bowel lymphangiectasia.   - A single mucosal spot with no bleeding in the colon.   - No bleeding seen during exam.   - The capsule entered the colon.       EGD 02/06/23  Impression:              - Normal esophagus.   - Normal stomach.   - Duodenal erosion without bleeding.   - No specimens collected.        Colonoscopy 01/13/23  Findings:        Residual specks of blood throughout (exam to 30 cm into the TI). No        inflammation.        Residual small amounts of blood mixed with prep liquid. Extensive        diverticulosis throughout the colon, left > right. Diverticuli were        carefully examined, but no active bleeding was seen. Site of mucosectomy        at the IC valve was unremarkable.      Impression:              - No specimens collected.              Sincerely,    Belen Delacruz PA-C

## 2023-03-31 NOTE — NURSING NOTE
"Chief Complaint   Patient presents with     Follow Up       Vitals:    03/31/23 0950   BP: (!) 146/76   Pulse: 80   SpO2: 97%   Weight: 42.3 kg (93 lb 4.8 oz)   Height: 1.626 m (5' 4\")       Body mass index is 16.01 kg/m .    Debbie Gutierrez MA    "

## 2023-03-31 NOTE — PATIENT INSTRUCTIONS
It was a pleasure taking care of you today.  I've included a brief summary of our discussion and care plan from today's visit below.  Please review this information with your primary care provider.  ______________________________________________________________________    My recommendations are summarized as follows:  -- start omeprazole, take for 12 weeks 30-60 minutes before eating or drinking  -- can continue famotidine up to twice daily  -- visit with registered dietitian regarding weight loss and getting fiber for constipation  -- continue good hydration  -- continue your walking and exercises  -- use Miralax as needed, if you go a day with out a bowel movement can take 1-2 capfuls  -- call or return to emergency room if you have bleeding again    If you wish to reach me please feel free to send me a message via nScaled or call our clinic phone. For more urgent matters please call our RN Care Coordinator.  GI Clinic Phone Number: [phone]  Kailey RN Care Coordinator: [phone].    -- please see scheduling information provided below     Return to GI Clinic in 6 month to review your progress.    ______________________________________________________________________    How do I schedule labs, imaging studies, or procedures that were ordered in clinic today?     Labs: To schedule lab appointment at the [facility] and [facility] Center, use my chart or call [phone]. If you have a [facility] closer to home where you are regularly seen you can give them a call.     Procedures: If a colonoscopy, upper endoscopy, breath test, esophageal manometry, or pH impedence was ordered today, our endoscopy team will call you to schedule this. If you have not heard from our endoscopy team within a week, please call [phone] to schedule.     Imaging Studies: If you were scheduled for a CT scan, X-ray, MRI, ultrasound, HIDA scan or other imaging study, please call [phone] to have this scheduled.     Referral: If  a referral to another specialty was ordered, expect a phone call or follow instructions above. If you have not heard from anyone regarding your referral in a week, please call our clinic to check the status.     Who do I call with any questions after my visit?  Please be in touch if there are any further questions that arise following today's visit.  There are multiple ways to contact your gastroenterology care team.      During business hours, you may reach a Gastroenterology nurse at [phone]    To schedule or reschedule an appointment, please call [phone].     You can always send a secure message through [email or web address].  [email or web address] messages are answered by your nurse or doctor typically within 24 hours.  Please allow extra time on weekends and holidays.      For urgent/emergent questions after business hours, you may reach the on-call GI Fellow by contacting the [facility]  at [phone].     How will I get the results of any tests ordered?    You will receive all of your results.  If you have signed up for [employer], any tests ordered at your visit will be available to you after your provider reviews them.  Typically this takes 1-2 weeks.  If there are urgent results that require a change in your care plan, your provider or nurse will call you to discuss the next steps.      What is [email or web address]?  [email or web address] is a secure way for you to access all of your healthcare records from the [facility].  It is a web based computer program, so you can sign on to it from any location.  It also allows you to send secure messages to your care team.  I recommend signing up for [email or web address] access if you have not already done so and are comfortable with using a computer.      How to I schedule a follow-up visit?  If you did not schedule a follow-up visit today, please call [phone] to schedule a follow-up office visit.      Sincerely,    Belen Delacruz PA-C  Division of Gastroenterology, Hepatology &  Nutrition  [facility]

## 2023-03-31 NOTE — PROGRESS NOTES
GI CLINIC VISIT    CC/REFERRING MD:  Essence Tamayo  REASON FOR CONSULTATION:   Ms. Olson is a 81 year old female who I was asked to see in consultation at the request of Dr. Essence Tamayo for gastric AVM.     ASSESSMENT/PLAN:  #Gastric AVM , small bowel AVM, Cecal polyp, duodenal erosions  #Diverticular hemorrhage  #Hemorrhoids, unspecified hemorrhoid type  #Drug-induced constipation  #Dyspepsia    GI bleeding - clinical presentation has varied over time so suspect there is not one  of these bleeds. Recent work up in January with EGD, colonoscopy, and VCE revealed duodenal erosions, small bowel angiectasia, and diverticulosis without bleeding. Given darker blood and no evidence of bleeding at diverticula shortly after bleeding onset, suspect gastric/small bowel AVM. Duodenal erosion may contribute. Given she is no longer on methotrexate, we will start a 3 month course of omeprazole 40 mg daily given duodenal ulcers, given history of bleeding, could consider ongoing therapy. Certainly diverticular bleeding and AVM bleeding could recur and we discussed when to present to the ED for further evaluation.     Given finding of prior AVM, there will likely be additional AVMs. This should be addressed with periodic hgb checks and iron replacement as necessary (oral or infusions). Presently Hgb trending up which is reassuring. Iron replacement per hematology and primary teams. Ablative therapies (EGD, colonoscopy, and deep SB enteroscopy with use of APC) would not be appropriate unless there is active overt (visible) bleeding.  Disease-modifying therapies (SQ octreotide) would be considered in those patients refractory to the above measures, generally with repeated hospitalizations (note incredible cost for octreotide).      Would also address hemorrhoidal bleeding with aggressive constipation management (see below). Could consider colorectal surgery clinic referral if concern for recurrent hemorrhoidal bleeding  which could be addressed with possible banding.      Noted improved dyspepsia with use of famotidine and small dietary changes. We discussed this medication; it is safe for longterm use (benefit outweighs risk). She will likely also have benefit from omeprazole which we are starting given duodenal ulcers.    - continue iron replacement as you are to support anemia in the setting of AVMs, ok to use periodically directed by your primary care team based on hgb levels (pending clinical picture this may not need to be an every medication, but one use periodically)    Plan:  -- Hgb checks and iron replacement per hematology and primary teams  -- start omeprazole, take for 12 weeks 30-60 minutes before eating or drinking  -- can continue famotidine up to twice daily  -- visit with registered dietitian regarding weight loss and getting fiber for constipation  -- continue good hydration  -- continue your walking and exercises  -- continue Miralax - ok to adjust dose from 1/2 capful - 2 capfuls daily, goal is at least three satisfactory BMs a week and no more than three a day with minimizing of constipation symptoms in-between      RTC 6 months    Thank you for this consultation. It was a pleasure to participate in the care of this patient; please contact us with any further questions.      70 Minutes was spent on the date of the encounter during chart review, history and exam, documentation, and further activities as noted     Belen Delacruz PA-C  Division of Gastroenterology, Hepatology & Nutrition  [facility]    ---------------------------------------------------------------------------------------------------  HPI:     Ms. Olson is a 81 year old female with RA (recently switched from methotrexate to plaquenil), spine pain planned for injection, OA, osteoporosis, and prior GI bleeding (AVM, possible diverticular, polyp-related --> see below) who was originally referred to GI clinic due to her history of gastric  "AVM as well as early satiety with dyspepsia. After her referral she was admitted to our institution for GI bleeding. She has previously followed with [facility] for her GI care (since 2017 per her report) and has undergone endoscopic exams at [facility], [facility], and [facility] since 2010.     History per initial encounter with Dr. Martinez:  Dating back several years per her recollection (2010 based on available records) she began to have recurrent overt GI bleeding with anemia. The bleeding was described as bright red. She underwent inpatient colonoscopic exams with no active bleeding found. She's been seen to have both diverticulosis and hemorrhoids on these exams. At these times, diverticular bleeding had been felt to be a contributor given her clinical presentation, though no active bleeding lesion was ever identified.      She had EGD and colonoscopy 5/26/21 at [facility] for anemia. EGD revealed a non-bleeding gastric AVM which was treated with APC. Colonoscopy demonstrated diverticulosis and small hemorrhoids.     Ms. Olson was referred to  given this AVM and anemia, but prior to the appointment was admitted to [facility] with hematochezia (per patient this was \"a bit darker\" than her prior bleeding episodes). A CTA did not demonstrate any active extravasation. Thus a capsule was done with bleeding seen in the distal ileum to cecum on prelim capsule read. Colonoscopy with ileal intubation  (20 cm beyond the IC valve) was done thereafter with no active bleeding found, but a very large 30 mm polyp was found on the IC valve and removed via EMR. This was felt to be a likely source of bleeding. No other findings were seen in the area of bleeding noted on capsule endoscopy.      In regards to her anemia, Ms. Olson has been evaluated by both her PCP and hematology. Per records it seems she was felt to have a component of anemia of chronic disease, though in " "the setting of recent GI bleeding she was given IV iron infusions which she has since completed, has taken PO iron supplementation in the past.    Ms. Olson was also referred for postprandial stomach pain she began experiencing earlier this year. For it she was started on famotidine 20 mg BID. She also increased her water and fiber intake and cut out gluten (now re-introduced).  She tracked her symptoms and felt that her stomach also hurt more when it was empty. She is working on more frequent snacking throughout the day. Over time, with all these interventions she feels the abdominal discomfort is gone and no longer an issue.  This continues to be well controlled today.    Her GI history is notable for \"fatty liver\" reportedly identified on imaging done through [facility]. She states a liver MRI was done thereafter to follow-up which was reportedly normal.     Ms. Olson also has a history of constipation which was much worse with higher doses of oral iron. She is on Miralax 1 capful daily. With this moves her bowels 1-3 times a day. She does feel that at night she has a lot of flatulence. She denies any straining with defecation, denies noting any tissue prolapse or rectal symptoms.    Since last visit Ms. Olson, has had recurrent bleeding in January. She underwent colonoscopy 1/13/23 which showed no inflammation but residual specks of blood throughout bowel (to 30 cm to TI), extensive diverticulosis with no active bleeding. EGD was subsequently completed 2/6/23 which showed duodenal erosion without bleeding, normal stomach and esophagus. VCE the following week 2/14/23 showed multiple localized erosions with no bleeding in the duodenum (likely first portion), single small angioectasia without bleeding was seen in the small bowel (probable duodenum), and lymphangiectasia in the small bowel (mid small bowel), one of these was peduncluated and polypoid, not bleeding, benign appearing.  A single spot " with no bleeding was found in the colon (probable cecum).     She has not had any ongoing bleeding since January - notes that this lasted about a day and resolved. Notes that blood was a bit darker again as compared to previous episodes.      Recently had labs including iron studies which showed normal ferritin, iron binding capacity, slightly elevated iron and iron sat index 3/7/23. Hemoglobin 11.1 (trending up)when last checked 2/7/23. Not currently taking any iron supplementation and per ordering MD, no plans to restart at this time given levels.    Stopped methotrexate yesterday and restarted plaquenil. She is hopeful that this will be helpful for her RA and prevent other side effects.    She does report slow weight loss/difficulty gaining weight. Has lost about 9 lb over two years (102 lb -- 93 lb). She has been referred to a dietitian and is eager to make this appointment.        ROS:    + Dry mouth, most notable at night, has water at bedside which helps  + OP dysphagia - feels solids take a long time to pass through her throat, but no issues in her esophagus - denies sense of food sticking or food impactions --> ? Worse with dry mouth      PROBLEM LIST  Patient Active Problem List    Diagnosis Date Noted     Lab test negative for COVID-19 virus 11/18/2021     Priority: Medium     Diverticulosis of large intestine      Priority: Medium     Acute lower GI bleeding 01/21/2021     Priority: Medium     Diverticular hemorrhage 01/20/2021     Priority: Medium     Gastrointestinal hemorrhage, unspecified gastrointestinal hemorrhage type 01/19/2021     Priority: Medium     GI bleed 03/13/2017     Priority: Medium     SNHL (sensorineural hearing loss) 11/29/2012     Priority: Medium       PERTINENT PAST MEDICAL HISTORY:  Past Medical History:   Diagnosis Date     Anemia      CKD (chronic kidney disease) stage 3, GFR 30-59 ml/min (H)      Diverticulosis of large intestine      Osteoarthritis      Osteoporosis       Rheumatoid arthritis (H)      Sensorineural hearing loss        PREVIOUS SURGERIES:  Past Surgical History:   Procedure Laterality Date     CAPSULE/PILL CAM ENDOSCOPY N/A 11/18/2021    Procedure: IMAGING PROCEDURE, GI TRACT, INTRALUMINAL, VIA CAPSULE;  Surgeon: Deric Rodriguez MD;  Location: [facility]     CAPSULE/PILL CAM ENDOSCOPY N/A 2/14/2023    Procedure: IMAGING PROCEDURE, GI TRACT, INTRALUMINAL, VIA CAPSULE;  Surgeon: Jaime Mesa MD;  Location: [facility]     COLONOSCOPY N/A 03/14/2017    Procedure: COMBINED COLONOSCOPY, SINGLE OR MULTIPLE BIOPSY/POLYPECTOMY BY BIOPSY;  Surgeon: Spencer Downey MD;  Location: [facility]     COLONOSCOPY N/A 9/22/2022    Procedure: COLONOSCOPY, FLEXIBLE, WITH LESION REMOVAL USING SNARE;  Surgeon: Kirby Arthur MD;  Location:  GI     COLONOSCOPY N/A 1/13/2023    Procedure: COLONOSCOPY;  Surgeon: Spencer Downey MD;  Location: [facility]     ENTEROSCOPY SMALL BOWEL N/A 11/20/2021    Procedure: ENTEROSCOPY, colonoscopy with endoscopic mucosal resection and tattoo placement;  Surgeon: Kirby Arthur MD;  Location: [facility]     ESOPHAGOSCOPY, GASTROSCOPY, DUODENOSCOPY (EGD), COMBINED N/A 2/6/2023    Procedure: ESOPHAGOGASTRODUODENOSCOPY (EGD);  Surgeon: Spencer Downey MD;  Location: [facility]     IR VISCERAL EMBOLIZATION  01/22/2021     ORTHOPEDIC SURGERY Left     hip replacment     SIGMOIDOSCOPY FLEXIBLE N/A 01/23/2021    Procedure: SIGMOIDOSCOPY, FLEXIBLE;  Surgeon: Jaime Steinberg MD;  Location:  GI       ALLERGIES:   Allergies   Allergen Reactions     Bee Venom Anaphylaxis     Shrimp Anaphylaxis     Evoxac [Cevimeline] Unknown     Prevnar Swelling     Other reaction(s): Edema     Prevnar [Pneumococcal 13-Linda Conj Vacc] Unknown       PERTINENT MEDICATIONS:  Current Outpatient Medications   Medication     acetaminophen (TYLENOL) 650 MG CR tablet     calcium carbonate-vitamin D (CALTRATE) 600-10 MG-MCG per tablet     famotidine (PEPCID) 20 MG tablet     folic acid  (FOLVITE) 1 MG tablet     gabapentin (NEURONTIN) 300 MG capsule     hydroxychloroquine (PLAQUENIL) 200 MG tablet     lifitegrast (XIIDRA) 5 % opthalmic solution     methotrexate 2.5 MG tablet     methotrexate 2.5 MG tablet     Multiple Vitamins-Minerals (OCUVITE PRESERVISION PO)     pilocarpine (SALAGEN) 5 MG tablet     vitamin D3 (CHOLECALCIFEROL) 50 mcg (2000 units) tablet     No current facility-administered medications for this visit.       SOCIAL HISTORY:  Social History     Socioeconomic History     Marital status:      Spouse name: Not on file     Number of children: Not on file     Years of education: Not on file     Highest education level: Not on file   Occupational History     Not on file   Tobacco Use     Smoking status: Former     Smokeless tobacco: Never   Substance and Sexual Activity     Alcohol use: No     Drug use: No     Sexual activity: Not on file   Other Topics Concern     Not on file   Social History Narrative    - Retired (formerly employed as  at [employer])    - 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    Past/family/social history reviewed and no changes    PHYSICAL EXAMINATION:  Vitals There were no vitals taken for this visit.   Wt   Wt Readings from Last 2 Encounters:   03/30/23 42.7 kg (94 lb 1 oz)   03/07/23 42.8 kg (94 lb 4.8 oz)      Gen: Pt sitting up in NAD, interactive and cooperative on exam  Eyes: sclerae anicteric, no  injection  Cardiac: RRR, nl S1, S2, no peripheral edema  Resp: Clear on anterior exam  GI: Normoactive BS, abd soft,, nontender  Skin: Warm, dry, no jaundice, nails appear healthy  Neuro: alert, oriented, answers questions appropriately      PERTINENT STUDIES:    Lab on 01/03/2022   Component Date Value Ref Range Status     Rheumatoid Factor 01/03/2022 8  <12 IU/mL Final     Sodium 01/03/2022 142  133 - 144 mmol/L Final     Potassium 01/03/2022 4.6  3.4 - 5.3 mmol/L Final     Chloride 01/03/2022 109  94 - 109 mmol/L Final     Carbon Dioxide (CO2) 01/03/2022 29  20 - 32 mmol/L Final     Anion Gap 01/03/2022 4  3 - 14 mmol/L Final     Urea Nitrogen 01/03/2022 18  7 - 30 mg/dL Final     Creatinine 01/03/2022 0.99  0.52 - 1.04 mg/dL Final     Calcium 01/03/2022 9.6  8.5 - 10.1 mg/dL Final     Glucose 01/03/2022 104* 70 - 99 mg/dL Final     Alkaline Phosphatase 01/03/2022 75  40 - 150 U/L Final     AST 01/03/2022 24  0 - 45 U/L Final     ALT 01/03/2022 24  0 - 50 U/L Final     Protein Total 01/03/2022 7.5  6.8 - 8.8 g/dL Final     Albumin 01/03/2022 3.9  3.4 - 5.0 g/dL Final     Bilirubin Total 01/03/2022 0.4  0.2 - 1.3 mg/dL Final     GFR Estimate 01/03/2022 58* >60 mL/min/1.73m2 Final     CRP Inflammation 01/03/2022 <2.9  0.0 - 8.0 mg/L Final     Cyclic Citrullinated Peptide Antib* 01/03/2022 3.6  <7.0 U/mL Final     Erythrocyte Sedimentation Rate 01/03/2022 39* 0 - 30 mm/hr Final     WBC Count 01/03/2022 3.9* 4.0 - 11.0 10e3/uL Final     RBC Count 01/03/2022 3.27* 3.80 - 5.20 10e6/uL Final     Hemoglobin 01/03/2022 10.5* 11.7 - 15.7 g/dL Final     Hematocrit 01/03/2022 32.5* 35.0 - 47.0 % Final     MCV 01/03/2022 99  78 - 100 fL Final     MCH 01/03/2022 32.1  26.5 - 33.0 pg Final     MCHC 01/03/2022 32.3  31.5 - 36.5 g/dL Final     RDW 01/03/2022 13.7  10.0 - 15.0 % Final     Platelet Count 01/03/2022 226  150 - 450 10e3/uL Final     % Neutrophils 01/03/2022 55  % Final     % Lymphocytes 01/03/2022 32  % Final     %  Monocytes 01/03/2022 10  % Final     % Eosinophils 01/03/2022 4  % Final     % Basophils 01/03/2022 0  % Final     Absolute Neutrophils 01/03/2022 2.2  1.6 - 8.3 10e3/uL Final     Absolute Lymphocytes 01/03/2022 1.3  0.8 - 5.3 10e3/uL Final     Absolute Monocytes 01/03/2022 0.4  0.0 - 1.3 10e3/uL Final     Absolute Eosinophils 01/03/2022 0.1  0.0 - 0.7 10e3/uL Final     Absolute Basophils 01/03/2022 0.0  0.0 - 0.2 10e3/uL Final       Video Capsule Endoscopy 2/14/23:  Findings:        The gastric passage time of the capsule enteroscope was 5-hours 1-minute.        The small bowel passage time of the capsule enteroscope was 1-hour 25-minutes.        The entire examined stomach was normal.        Multiple localized erosions with no bleeding were found in the duodenum        (probable first portion of the duodenum).        A single small angioectasia without bleeding was seen in the small bowel        (probable duodenum).        Lymphangiectasia was found in the small bowel (mid small bowel). One of        these was peduncluated and polypoid, not bleeding, benign appearing.        A single spot with no bleeding was found in the colon (probable cecum).                                                                Impression:              - Normal stomach.   - Duodenal erosion.   - A single angioectasia without bleeding in the duodenum.   - Small bowel lymphangiectasia.   - A single mucosal spot with no bleeding in the colon.   - No bleeding seen during exam.   - The capsule entered the colon.       EGD 02/06/23  Impression:              - Normal esophagus.   - Normal stomach.   - Duodenal erosion without bleeding.   - No specimens collected.       Colonoscopy 01/13/23  Findings:        Residual specks of blood throughout (exam to 30 cm into the TI). No        inflammation.        Residual small amounts of blood mixed with prep liquid. Extensive        diverticulosis throughout the colon, left > right. Diverticuli were         carefully examined, but no active bleeding was seen. Site of mucosectomy        at the IC valve was unremarkable.      Impression:              - No specimens collected.

## 2023-04-02 ENCOUNTER — HEALTH MAINTENANCE LETTER (OUTPATIENT)
Age: 83
End: 2023-04-02

## 2023-04-03 NOTE — TELEPHONE ENCOUNTER
Message sent to Belen ASHRAF regarding the interaction and she stated that rheumatology had stopped the methotrexate, may move forward with the omeprazole.     Spoke with the pharmacist and gave the above information

## 2023-04-06 ENCOUNTER — MYC MEDICAL ADVICE (OUTPATIENT)
Dept: ORTHOPEDICS | Facility: CLINIC | Age: 83
End: 2023-04-06

## 2023-04-06 ENCOUNTER — OFFICE VISIT (OUTPATIENT)
Dept: ORTHOPEDICS | Facility: CLINIC | Age: 83
End: 2023-04-06
Payer: COMMERCIAL

## 2023-04-06 VITALS
WEIGHT: 93.1 LBS | DIASTOLIC BLOOD PRESSURE: 55 MMHG | SYSTOLIC BLOOD PRESSURE: 110 MMHG | BODY MASS INDEX: 15.89 KG/M2 | HEIGHT: 64 IN

## 2023-04-06 DIAGNOSIS — M25.521 CHRONIC PAIN OF RIGHT ELBOW: ICD-10-CM

## 2023-04-06 DIAGNOSIS — G89.29 CHRONIC PAIN OF RIGHT ELBOW: ICD-10-CM

## 2023-04-06 DIAGNOSIS — M51.369 LUMBAR DEGENERATIVE DISC DISEASE: Primary | ICD-10-CM

## 2023-04-06 DIAGNOSIS — M25.511 ACUTE PAIN OF RIGHT SHOULDER: ICD-10-CM

## 2023-04-06 PROCEDURE — 99214 OFFICE O/P EST MOD 30 MIN: CPT | Performed by: FAMILY MEDICINE

## 2023-04-06 NOTE — LETTER
4/6/2023         RE: Patrick Olson  [address] [facility] [id number]        Dear Colleague,    Thank you for referring your patient, Patrick Olson, to the [facility] [place]. Please see a copy of my visit note below.    ESTABLISHED PATIENT FOLLOW-UP:  No chief complaint on file.       HISTORY OF PRESENT ILLNESS  Ms. Olson is a pleasant 82 year old year old female who presents to clinic today for follow-up of lumbar spine and right shoulder.    Date of onset: chronic lumbar, shoulder began this winter.  Date last seen: 2/1/23  Following Therapeutic Plan: Yes- LEE ANN, Tylenol, HEP (shoulder & elbow)  Pain: minimal improvement  Function: minimal improvement  Interval History: Patient had lumbar LEE ANN on 3/14/23 that has provided minimal relief in pain. Back pain level decreased from 6/10 to 4/10. She notes elbow and shoulder pain are intermittent.     Additional medical/Social/Surgical histories reviewed in [facility] and updated as appropriate.    REVIEW OF SYSTEMS (4/6/2023)  CONSTITUTIONAL: Denies fever and weight loss  GASTROINTESTINAL: Denies abdominal pain, nausea, vomiting  MUSCULOSKELETAL: See HPI  SKIN: Denies any recent rash or lesion  NEUROLOGICAL: Denies numbness or focal weakness     PHYSICAL EXAM  There were no vitals taken for this visit.    General  - normal appearance, in no obvious distress  Musculoskeletal - lumbar spine  - inspection: normal bone and joint alignment, no obvious scoliosis  - palpation: Tenderness palpation of bilateral L3-L4 region of paraspinal musculature.  - ROM: Full range of motion in flexion-extension side bending and rotation.  Pain with flexion and sidebending at L3-L4 region  - strength: lower extremities 5/5 in all planes  - special tests:  (-) straight leg raise    (-) slump test    Neuro  - patellar and Achilles DTRs 2+ bilaterally, No lower extremity sensory deficit throughout L5 distribution, grossly normal coordination, normal muscle  tone  Musculoskeletal - Right shoulder  - inspection: normal bone and joint alignment, no obvious deformity, no scapular winging, no AC step-off  - palpation: mildly tender RC insertion and bicipital groove, normal clavicle, non-tender AC  - ROM:  painful but full flexion and ER at end range, mild decrease IR  - strength: 5/5  strength, 5/5 in all shoulder planes  Musculoskeletal - R elbow  - inspection: normal joint alignment, no obvious deformity,no soft tissue swelling medially  - palpation: tender at the origin of the commonextensor tendon  - ROM:  160 deg flexion   0 deg extension   90 deg pronation   90 deg supination  - strength: 5/5 wrist flexion and extension  Neuro  - no sensory or motor deficit, grossly normal coordination, normal muscle tone  Skin  - no ecchymosis, erythema, warmth, or induration, no obvious rash  Psych  - interactive, appropriate, normal mood and affect    IMAGING :   MRI OF THE LUMBAR SPINE WITHOUT CONTRAST   3/9/2023 11:50 AM      COMPARISON: Lumbar spine MRI 07/12/2018     HISTORY: Lumbar radiculopathy, acute. Lumbar degenerative disc  disease.     TECHNIQUE: Multiplanar, multisequence MRI images of the lumbar spine  were acquired without IV contrast.     FINDINGS: There are five lumbar-type vertebrae for the purposes of  this dictation. The conus ends at the distal L1. 2 mm retrolisthesis  of L3 on L4. Vertebral body heights are maintained. Nonpathologic  marrow signal. Mild Modic type I changes from L2-L3 to L4-L5.  Vertebral body heights are maintained. Nonpathologic marrow signal.  Mild symmetric atrophy of the posterior paraspinal musculature. Normal  diameter of the descending aorta. Redemonstration of a large  gallstone. The patient's known bilateral renal cysts are better  visualized on the abdomen and pelvis CT 11/16/2021.     T12-L1: Normal disc height and signal.  No herniation. Mild bilateral  facet arthropathy.  No spinal canal or neural foraminal  stenosis.     L1-L2: Normal disc height and signal. Small broad-based disc bulge.  Mild bilateral facet arthropathy.  No spinal canal or neural foraminal  stenosis.     L2-L3: Moderate vertebral disc height loss. Loss of the normal T2  signal within the disc. Broad-based disc bulge with superimposed small  central disc protrusion. Mild bilateral facet arthropathy. No spinal  canal narrowing. No right neuroforaminal narrowing. Mild left  neuroforaminal narrowing.     L3-L4: Moderate intervertebral disc height loss. Loss of the normal T2  signal within the dens. Broad-based disc bulge with superimposed left  foraminal disc protrusion, decreased since the prior exam. Moderate  bilateral facet arthropathy. Mild spinal canal narrowing. Mild  narrowing of the left lateral recess. No right neuroforaminal  narrowing. Mild left neural foraminal narrowing.     L4-L5: Mild intervertebral disc height loss. Loss of the normal T2  signal within the disc. Broad-based disc bulge with superimposed right  foraminal disc protrusion. Moderate bilateral facet arthropathy. Mild  narrowing of the right lateral recess. No spinal canal narrowing.  Severe right neuroforaminal narrowing. No left neuroforaminal  narrowing.     L5-S1: Mild intervertebral disc height loss. Loss of the normal T2  signal within the disc. Broad-based disc bulge with superimposed small  central disc protrusion. Mild bilateral facet arthropathy. Mild  narrowing of the lateral recesses. No spinal canal narrowing. No right  neural foraminal narrowing. No left neural foraminal narrowing.                                                                      IMPRESSION:  1.  Compared to prior lumbar spine MRI 07/12/2018, interval decrease  in the previously noted large left foraminal disc protrusion at L3-L4.  2.  Otherwise stable to interval worsening of the moderate multilevel  degenerative changes of the lumbar spine.  3.  Mild spinal canal narrowing and mild narrowing  of the left lateral  recess at L3-L4.  4.  Mild narrowing of the right lateral recess at L4-L5.  5.  Mild narrowing of the lateral recesses at L5-S1.  6.  Severe right neural foraminal narrowing at L4-L5.  7.  Mild left neuroforaminal narrowing at L2-L3 and L3-L4.  8.  Mild Modic type I changes at from L2-L3 to L4-L5.      JEANNE RIBERA MD     ASSESSMENT & PLAN  Patrick Olson is an 82-year-old year old female female with possible history of seropositive rheumatoid arthritis with secondary Sjogren's syndrome in remission on hydroxychloroquine, lumbar stenosis with neurogenic claudication presenting with chronic low back pain, right shoulder pain.    No improvement with recent LEE ANN.  Suspected axial back pain may be facetogenic in nature rather then previous radicular pattern that previously was improved by epidural injections.    Diagnosis:  Lumbar degenerative disc disease  Chronic lumbago without radiculopathy  Acute pain of right shoulder    - Pain management referral at [facility] to discuss additional injections or RFA procedures to consider for chronic lumbago  - HEP as Patrick has an extensive lumbar program from PT  - Consideration for TENS unit  - Continued shoulder pain despite HEP, medrol pack.  Will consider corticosteroid injection into subacromial bursa +/- biceps tendon sheath.  US eval at follow-up  - Hand therapy referral for elbow pain  - Rheumatology following polyarthralgias, current pain likely orthopedic in nature.  -Follow up for injection as above      It was a pleasure seeing Patrick.    Mukesh Lantigua DO, [facility]  Primary Care Sports Medicine          Again, thank you for allowing me to participate in the care of your patient.        Sincerely,        Mukesh Lantigua DO

## 2023-04-06 NOTE — PATIENT INSTRUCTIONS
Thank you for choosing [facility]    [facility]  [place] [address] [facility], [address]th [place]   [place], [place], [address] [place] [address]   [phone]       APPOINTMENTS: [phone]    CARE QUESTIONS: [phone],    BILLING QUESTIONS: [phone]    FAX NUMBER: [phone]        Follow up: please call [phone] to schedule a 40 minute procedure appointment for your shoulder injection with Dr. Lantigua      1. Lumbar degenerative disc disease        A referral has been placed for you to see pain management. Provider recommended is Dr. Saul Berry. His office will call you to schedule a consultation.

## 2023-04-06 NOTE — PROGRESS NOTES
ESTABLISHED PATIENT FOLLOW-UP:  No chief complaint on file.       HISTORY OF PRESENT ILLNESS  Ms. Olson is a pleasant 82 year old year old female who presents to clinic today for follow-up of lumbar spine and right shoulder.    Date of onset: chronic lumbar, shoulder began this winter.  Date last seen: 2/1/23  Following Therapeutic Plan: Yes- LEE ANN, Tylenol, HEP (shoulder & elbow)  Pain: minimal improvement  Function: minimal improvement  Interval History: Patient had lumbar LEE ANN on 3/14/23 that has provided minimal relief in pain. Back pain level decreased from 6/10 to 4/10. She notes elbow and shoulder pain are intermittent.     Additional medical/Social/Surgical histories reviewed in [facility] and updated as appropriate.    REVIEW OF SYSTEMS (4/6/2023)  CONSTITUTIONAL: Denies fever and weight loss  GASTROINTESTINAL: Denies abdominal pain, nausea, vomiting  MUSCULOSKELETAL: See HPI  SKIN: Denies any recent rash or lesion  NEUROLOGICAL: Denies numbness or focal weakness     PHYSICAL EXAM  There were no vitals taken for this visit.    General  - normal appearance, in no obvious distress  Musculoskeletal - lumbar spine  - inspection: normal bone and joint alignment, no obvious scoliosis  - palpation: Tenderness palpation of bilateral L3-L4 region of paraspinal musculature.  - ROM: Full range of motion in flexion-extension side bending and rotation.  Pain with flexion and sidebending at L3-L4 region  - strength: lower extremities 5/5 in all planes  - special tests:  (-) straight leg raise    (-) slump test    Neuro  - patellar and Achilles DTRs 2+ bilaterally, No lower extremity sensory deficit throughout L5 distribution, grossly normal coordination, normal muscle tone  Musculoskeletal - Right shoulder  - inspection: normal bone and joint alignment, no obvious deformity, no scapular winging, no AC step-off  - palpation: mildly tender RC insertion and bicipital groove, normal clavicle, non-tender AC  - ROM:  painful but full  flexion and ER at end range, mild decrease IR  - strength: 5/5  strength, 5/5 in all shoulder planes  Musculoskeletal - R elbow  - inspection: normal joint alignment, no obvious deformity,no soft tissue swelling medially  - palpation: tender at the origin of the commonextensor tendon  - ROM:  160 deg flexion   0 deg extension   90 deg pronation   90 deg supination  - strength: 5/5 wrist flexion and extension  Neuro  - no sensory or motor deficit, grossly normal coordination, normal muscle tone  Skin  - no ecchymosis, erythema, warmth, or induration, no obvious rash  Psych  - interactive, appropriate, normal mood and affect    IMAGING :   MRI OF THE LUMBAR SPINE WITHOUT CONTRAST   3/9/2023 11:50 AM      COMPARISON: Lumbar spine MRI 07/12/2018     HISTORY: Lumbar radiculopathy, acute. Lumbar degenerative disc  disease.     TECHNIQUE: Multiplanar, multisequence MRI images of the lumbar spine  were acquired without IV contrast.     FINDINGS: There are five lumbar-type vertebrae for the purposes of  this dictation. The conus ends at the distal L1. 2 mm retrolisthesis  of L3 on L4. Vertebral body heights are maintained. Nonpathologic  marrow signal. Mild Modic type I changes from L2-L3 to L4-L5.  Vertebral body heights are maintained. Nonpathologic marrow signal.  Mild symmetric atrophy of the posterior paraspinal musculature. Normal  diameter of the descending aorta. Redemonstration of a large  gallstone. The patient's known bilateral renal cysts are better  visualized on the abdomen and pelvis CT 11/16/2021.     T12-L1: Normal disc height and signal.  No herniation. Mild bilateral  facet arthropathy.  No spinal canal or neural foraminal stenosis.     L1-L2: Normal disc height and signal. Small broad-based disc bulge.  Mild bilateral facet arthropathy.  No spinal canal or neural foraminal  stenosis.     L2-L3: Moderate vertebral disc height loss. Loss of the normal T2  signal within the disc. Broad-based disc bulge  with superimposed small  central disc protrusion. Mild bilateral facet arthropathy. No spinal  canal narrowing. No right neuroforaminal narrowing. Mild left  neuroforaminal narrowing.     L3-L4: Moderate intervertebral disc height loss. Loss of the normal T2  signal within the dens. Broad-based disc bulge with superimposed left  foraminal disc protrusion, decreased since the prior exam. Moderate  bilateral facet arthropathy. Mild spinal canal narrowing. Mild  narrowing of the left lateral recess. No right neuroforaminal  narrowing. Mild left neural foraminal narrowing.     L4-L5: Mild intervertebral disc height loss. Loss of the normal T2  signal within the disc. Broad-based disc bulge with superimposed right  foraminal disc protrusion. Moderate bilateral facet arthropathy. Mild  narrowing of the right lateral recess. No spinal canal narrowing.  Severe right neuroforaminal narrowing. No left neuroforaminal  narrowing.     L5-S1: Mild intervertebral disc height loss. Loss of the normal T2  signal within the disc. Broad-based disc bulge with superimposed small  central disc protrusion. Mild bilateral facet arthropathy. Mild  narrowing of the lateral recesses. No spinal canal narrowing. No right  neural foraminal narrowing. No left neural foraminal narrowing.                                                                      IMPRESSION:  1.  Compared to prior lumbar spine MRI 07/12/2018, interval decrease  in the previously noted large left foraminal disc protrusion at L3-L4.  2.  Otherwise stable to interval worsening of the moderate multilevel  degenerative changes of the lumbar spine.  3.  Mild spinal canal narrowing and mild narrowing of the left lateral  recess at L3-L4.  4.  Mild narrowing of the right lateral recess at L4-L5.  5.  Mild narrowing of the lateral recesses at L5-S1.  6.  Severe right neural foraminal narrowing at L4-L5.  7.  Mild left neuroforaminal narrowing at L2-L3 and L3-L4.  8.  Mild Modic  type I changes at from L2-L3 to L4-L5.      JEANNE RIBERA MD     ASSESSMENT & PLAN  Patrick Olson is an 82-year-old year old female female with possible history of seropositive rheumatoid arthritis with secondary Sjogren's syndrome in remission on hydroxychloroquine, lumbar stenosis with neurogenic claudication presenting with chronic low back pain, right shoulder pain.    No improvement with recent LEE ANN.  Suspected axial back pain may be facetogenic in nature rather then previous radicular pattern that previously was improved by epidural injections.    Diagnosis:  Lumbar degenerative disc disease  Chronic lumbago without radiculopathy  Acute pain of right shoulder    - Pain management referral at [facility] to discuss additional injections or RFA procedures to consider for chronic lumbago  - HEP as Patrick has an extensive lumbar program from PT  - Consideration for TENS unit  - Continued shoulder pain despite HEP, medrol pack.  Will consider corticosteroid injection into subacromial bursa +/- biceps tendon sheath.  US eval at follow-up  - Hand therapy referral for elbow pain  - Rheumatology following polyarthralgias, current pain likely orthopedic in nature.  -Follow up for injection as above      It was a pleasure seeing Patrick.    Mukesh Lantigua DO, [facility]  Primary Care Sports Medicine

## 2023-04-21 ENCOUNTER — MYC MEDICAL ADVICE (OUTPATIENT)
Dept: ORTHOPEDICS | Facility: CLINIC | Age: 83
End: 2023-04-21
Payer: COMMERCIAL

## 2023-04-24 NOTE — TELEPHONE ENCOUNTER
Patient last seen 4/6/23 for lumbar DDD and right shoulder pain. Patient is scheduled for right shoulder subacromial +/- biceps tendon sheath injection on 4/27/23. Please see patient MyChart message and advise on response.     Kailey Finley MSA, ATC  Certified Athletic Trainer

## 2023-04-24 NOTE — TELEPHONE ENCOUNTER
Patient is scheduled for right shoulder subacromial bursa +/- biceps tendon sheath injection on 4/27/23. Please see patient MyChart and advise on timeline of beginning PT for elbow.     Kailey Finley MSA, ATC  Certified Athletic Trainer

## 2023-04-25 ENCOUNTER — TRANSFERRED RECORDS (OUTPATIENT)
Dept: HEALTH INFORMATION MANAGEMENT | Facility: CLINIC | Age: 83
End: 2023-04-25
Payer: COMMERCIAL

## 2023-04-30 NOTE — PROGRESS NOTES
[facility]    Hematology/Oncology Established Patient Follow-up Note      Today's Date: 5/11/23    Reason for Follow-up:  Iron deficiency anemia.    HISTORY OF PRESENT ILLNESS: Patrick Olson is a 82 year old female with rheumatoid arthritis on methotrexate who presents with iron deficiency anemia. She has history of gastric AVM, small bowel AVM, cecal polyp, duodenal erosions, and diverticular hemorrhage.    She had an EGD, colonoscopy, and VCE in 1/2023 that showed duodenal erosioons, small bowel angiectasia, and diverticulosis without bleeding. She was started on omeprazole and is following with GI at [facility].    2/7/2023 Labs showed Hgb 11.1, WBC 3.2, platelets 247,000, creatinine 1.03, vitamin B12 elevated at 1434. 3/7/2023 Ferritin was normal at 96, iron elevated at 163, TIBC 283, iron saturation index elevated at 58. 3/27/2023 TSH normal at 1.13.      INTERIM HISTORY:  Patrick Olson reports 10 pound weight loss between 10/21/2022 and 4/23/2022.  She reports normal appetite and has upcoming appointment to see a nutritionist. She asks about why she has elevated B12 level.        REVIEW OF SYSTEMS:   14 point ROS was reviewed and is negative other than as noted above in HPI.       HOME MEDICATIONS:  Current Outpatient Medications   Medication Sig Dispense Refill     acetaminophen (TYLENOL) 650 MG CR tablet Take 650 mg by mouth every 8 hours as needed for fever or pain       calcium carbonate-vitamin D (CALTRATE) 600-10 MG-MCG per tablet Take 1 tablet by mouth 2 times daily       famotidine (PEPCID) 20 MG tablet TAKE 1 TABLET BY MOUTH TWICE A DAY       gabapentin (NEURONTIN) 300 MG capsule Take 1 capsule (300 mg) by mouth 3 times daily Take 300mg in morning, 300mg in afternoon and 300mg (Patient taking differently: Take 600 mg by mouth At Bedtime Take 300mg in morning, 300mg in afternoon and 300mg) 90 capsule 1     hydroxychloroquine (PLAQUENIL) 200 MG tablet Take 1 tablet (200 mg) by mouth  daily for 180 days 30 tablet 5     lifitegrast (XIIDRA) 5 % opthalmic solution 1 drop 2 times daily       Multiple Vitamins-Minerals (OCUVITE PRESERVISION PO) Take 1 tablet by mouth 2 times daily       omeprazole (PRILOSEC) 40 MG DR capsule Take 1 capsule (40 mg) by mouth daily 90 capsule 3     pilocarpine (SALAGEN) 5 MG tablet Take 5 mg by mouth 2 times daily as needed Take 1-2 tablets 2 times daily as needed       vitamin D3 (CHOLECALCIFEROL) 50 mcg (2000 units) tablet Take 1 tablet by mouth daily           ALLERGIES:  Allergies   Allergen Reactions     Bee Venom Anaphylaxis     Shrimp Anaphylaxis     Evoxac [Cevimeline] Unknown     Prevnar Swelling     Other reaction(s): Edema     Prevnar [Pneumococcal 13-Linda Conj Vacc] Unknown         PAST MEDICAL HISTORY:  Past Medical History:   Diagnosis Date     Anemia      CKD (chronic kidney disease) stage 3, GFR 30-59 ml/min (H)      Diverticulosis of large intestine      Osteoarthritis      Osteoporosis      Rheumatoid arthritis (H)      Sensorineural hearing loss          PAST SURGICAL HISTORY:  Past Surgical History:   Procedure Laterality Date     CAPSULE/PILL CAM ENDOSCOPY N/A 11/18/2021    Procedure: IMAGING PROCEDURE, GI TRACT, INTRALUMINAL, VIA CAPSULE;  Surgeon: Deric Rodriguez MD;  Location:  GI     CAPSULE/PILL CAM ENDOSCOPY N/A 2/14/2023    Procedure: IMAGING PROCEDURE, GI TRACT, INTRALUMINAL, VIA CAPSULE;  Surgeon: Jaime Mesa MD;  Location:  GI     COLONOSCOPY N/A 03/14/2017    Procedure: COMBINED COLONOSCOPY, SINGLE OR MULTIPLE BIOPSY/POLYPECTOMY BY BIOPSY;  Surgeon: Spencer Downey MD;  Location:  GI     COLONOSCOPY N/A 9/22/2022    Procedure: COLONOSCOPY, FLEXIBLE, WITH LESION REMOVAL USING SNARE;  Surgeon: Kirby Arthur MD;  Location:  GI     COLONOSCOPY N/A 1/13/2023    Procedure: COLONOSCOPY;  Surgeon: Spencer Downey MD;  Location: [facility] OR     ENTEROSCOPY SMALL BOWEL N/A 11/20/2021    Procedure: ENTEROSCOPY, colonoscopy  "with endoscopic mucosal resection and tattoo placement;  Surgeon: Kirby Arthur MD;  Location: [facility]     ESOPHAGOSCOPY, GASTROSCOPY, DUODENOSCOPY (EGD), COMBINED N/A 2/6/2023    Procedure: ESOPHAGOGASTRODUODENOSCOPY (EGD);  Surgeon: Spencer Downey MD;  Location: [facility] GI     IR VISCERAL EMBOLIZATION  01/22/2021     ORTHOPEDIC SURGERY Left     hip replacment     SIGMOIDOSCOPY FLEXIBLE N/A 01/23/2021    Procedure: SIGMOIDOSCOPY, FLEXIBLE;  Surgeon: Jaime Steinberg MD;  Location:  GI         SOCIAL HISTORY:  Social History     Socioeconomic History     Marital status:      Spouse name: Not on file     Number of children: Not on file     Years of education: Not on file     Highest education level: Not on file   Occupational History     Not on file   Tobacco Use     Smoking status: Former     Smokeless tobacco: Never   Vaping Use     Vaping status: Not on file   Substance and Sexual Activity     Alcohol use: No     Drug use: No     Sexual activity: Not on file   Other Topics Concern     Not on file   Social History Narrative    - Retired (formerly employed as  at [employer])    - 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      PHYSICAL EXAM:  Vital signs:  /74   Pulse 79   Resp 16   Ht 1.626 m (5' 4\")   Wt 42.3 kg (93 lb 3.2 oz)   SpO2 99%   BMI 16.00 kg/m     GENERAL/CONSTITUTIONAL: " No acute distress.  EYES: No scleral icterus.  ENT/MOUTH: Neck supple.  LYMPH: No cervical, supraclavicular, axillary adenopathy.   RESPIRATORY: Clear to auscultation bilaterally. No crackles or wheezing.   CARDIOVASCULAR: Regular rate and rhythm without murmurs.  GASTROINTESTINAL: No hepatosplenomegaly, masses, or tenderness. No guarding.  No distention.  MUSCULOSKELETAL: Warm and well-perfused, no cyanosis, clubbing, or edema.  NEUROLOGIC: Alert, oriented, answers questions appropriately.  INTEGUMENTARY: No rashes or jaundice.  GAIT: Steady, does not use assistive device.      LABS:  CBC RESULTS:   Recent Labs   Lab Test 02/07/23  1000   WBC 3.2*   RBC 3.32*   HGB 11.1*   HCT 33.3*      MCH 33.4*   MCHC 33.3   RDW 15.2*          Recent Labs   Lab Test 02/07/23  1000 02/01/23  1154 10/24/22  1332 09/29/22  1007     --   --  140   POTASSIUM 5.3  --   --  4.7   CHLORIDE 101  --  104 108   CO2 26  --   --  29   ANIONGAP 13  --   --  3   *  --   --  100*   BUN 22.6  --   --  21   CR 1.03* 0.96*  --  0.87   EJ 9.8  --   --  9.8         PATHOLOGY:  None relevant.    IMAGING:  None relevant.    ASSESSMENT/PLAN:  Patrick Olson is a 82 year old female with the following issues:  1. Macrocytic anemia due to iron deficiency/blood loss, drug effect, of chronic disease  2. History of gastric and small bowel AVM, duodenal erosions, diverticular hemorrhage  3. Chronic leukopenia  --Discussed her anemia is of multifactorial etiology, including blood loss, iron deficiency, drug effect from Plaquenil, and anemia of chronic disease.  --Discussed her Hgb is too high to meet criteria for giving an MANDY.  --Will check CBC and iron studies, peripheral blood smear, reticulocyte count, CMP. Will also check LYNNE, haptoglobin, LDH to rule out hemolytic anemia. Will also check SPEP.    3. Rheumatoid arthritis  --She is no longer on methotrexate and now on Plaquenil.  She follows with rheumatology.    4. Weight  loss  --She feels she has normal appetite.  --Lost 10 pounds since 10/2022.  --Could be related to her chronic diseases.  --She will see nutritionist.    I answered questions she had about elevated B12, elevation in RDW, weight loss, family history of cancer.    Return after lab results.    Shantal Brown MD  Hematology/Oncology  [facility]    Total time spent: 45 minutes in patient evaluation, counseling, documentation, and coordination of care.

## 2023-05-05 ENCOUNTER — TELEPHONE (OUTPATIENT)
Dept: RHEUMATOLOGY | Facility: CLINIC | Age: 83
End: 2023-05-05

## 2023-05-05 NOTE — TELEPHONE ENCOUNTER
Plaquenil Eye Exam    Date of last eye exam specifically commenting on HCQ toxicity: 4/25/2023  Clinic: [facility]  Phone Number: [phone]  Ophthalmologist: Ayla Ruby  Toxicity: NO  Records scanned to chart: Yes  Follow up: 1 year      Carley Rai CMA   5/5/2023 11:02 AM

## 2023-05-10 LAB
DAT POLY: NEGATIVE
SPECIMEN EXPIRATION DATE: NORMAL

## 2023-05-11 ENCOUNTER — APPOINTMENT (OUTPATIENT)
Dept: LAB | Facility: CLINIC | Age: 83
End: 2023-05-11
Payer: COMMERCIAL

## 2023-05-11 ENCOUNTER — ONCOLOGY VISIT (OUTPATIENT)
Dept: ONCOLOGY | Facility: CLINIC | Age: 83
End: 2023-05-11
Payer: COMMERCIAL

## 2023-05-11 VITALS
DIASTOLIC BLOOD PRESSURE: 74 MMHG | BODY MASS INDEX: 15.91 KG/M2 | HEIGHT: 64 IN | HEART RATE: 79 BPM | OXYGEN SATURATION: 99 % | WEIGHT: 93.2 LBS | RESPIRATION RATE: 16 BRPM | SYSTOLIC BLOOD PRESSURE: 122 MMHG

## 2023-05-11 DIAGNOSIS — D63.8 ANEMIA OF CHRONIC DISEASE: Primary | ICD-10-CM

## 2023-05-11 DIAGNOSIS — R63.4 WEIGHT LOSS: ICD-10-CM

## 2023-05-11 DIAGNOSIS — D72.819 CHRONIC LEUKOPENIA: ICD-10-CM

## 2023-05-11 DIAGNOSIS — D50.0 IRON DEFICIENCY ANEMIA DUE TO CHRONIC BLOOD LOSS: ICD-10-CM

## 2023-05-11 LAB
ALBUMIN SERPL BCG-MCNC: 4.2 G/DL (ref 3.5–5.2)
ALP SERPL-CCNC: 86 U/L (ref 35–104)
ALT SERPL W P-5'-P-CCNC: 24 U/L (ref 10–35)
ANION GAP SERPL CALCULATED.3IONS-SCNC: 14 MMOL/L (ref 7–15)
AST SERPL W P-5'-P-CCNC: 32 U/L (ref 10–35)
BASOPHILS # BLD AUTO: 0 10E3/UL (ref 0–0.2)
BASOPHILS NFR BLD AUTO: 1 %
BILIRUB SERPL-MCNC: 0.3 MG/DL
BUN SERPL-MCNC: 21.3 MG/DL (ref 8–23)
CALCIUM SERPL-MCNC: 9.8 MG/DL (ref 8.8–10.2)
CHLORIDE SERPL-SCNC: 103 MMOL/L (ref 98–107)
CREAT SERPL-MCNC: 1.03 MG/DL (ref 0.51–0.95)
DEPRECATED HCO3 PLAS-SCNC: 26 MMOL/L (ref 22–29)
EOSINOPHIL # BLD AUTO: 0.2 10E3/UL (ref 0–0.7)
EOSINOPHIL NFR BLD AUTO: 7 %
ERYTHROCYTE [DISTWIDTH] IN BLOOD BY AUTOMATED COUNT: 14.3 % (ref 10–15)
FERRITIN SERPL-MCNC: 95 NG/ML (ref 11–328)
GFR SERPL CREATININE-BSD FRML MDRD: 55 ML/MIN/1.73M2
GLUCOSE SERPL-MCNC: 96 MG/DL (ref 70–99)
HCT VFR BLD AUTO: 34 % (ref 35–47)
HGB BLD-MCNC: 11.2 G/DL (ref 11.7–15.7)
IMM GRANULOCYTES # BLD: 0 10E3/UL
IMM GRANULOCYTES NFR BLD: 1 %
IRON BINDING CAPACITY (ROCHE): 269 UG/DL (ref 240–430)
IRON SATN MFR SERPL: 23 % (ref 15–46)
IRON SERPL-MCNC: 61 UG/DL (ref 37–145)
LDH SERPL L TO P-CCNC: 180 U/L (ref 0–250)
LYMPHOCYTES # BLD AUTO: 0.8 10E3/UL (ref 0.8–5.3)
LYMPHOCYTES NFR BLD AUTO: 25 %
MCH RBC QN AUTO: 32.6 PG (ref 26.5–33)
MCHC RBC AUTO-ENTMCNC: 32.9 G/DL (ref 31.5–36.5)
MCV RBC AUTO: 99 FL (ref 78–100)
MONOCYTES # BLD AUTO: 0.4 10E3/UL (ref 0–1.3)
MONOCYTES NFR BLD AUTO: 11 %
NEUTROPHILS # BLD AUTO: 1.8 10E3/UL (ref 1.6–8.3)
NEUTROPHILS NFR BLD AUTO: 55 %
NRBC # BLD AUTO: 0 10E3/UL
NRBC BLD AUTO-RTO: 0 /100
PATH REPORT.COMMENTS IMP SPEC: NORMAL
PATH REPORT.FINAL DX SPEC: NORMAL
PATH REPORT.MICROSCOPIC SPEC OTHER STN: NORMAL
PATH REPORT.MICROSCOPIC SPEC OTHER STN: NORMAL
PLATELET # BLD AUTO: 232 10E3/UL (ref 150–450)
POTASSIUM SERPL-SCNC: 4.3 MMOL/L (ref 3.4–5.3)
PROT SERPL-MCNC: 7 G/DL (ref 6.4–8.3)
RBC # BLD AUTO: 3.44 10E6/UL (ref 3.8–5.2)
RETICS # AUTO: 0.03 10E6/UL (ref 0.03–0.1)
RETICS/RBC NFR AUTO: 0.9 % (ref 0.5–2)
SODIUM SERPL-SCNC: 143 MMOL/L (ref 136–145)
TOTAL PROTEIN SERUM FOR ELP: 6.7 G/DL (ref 6.4–8.3)
WBC # BLD AUTO: 3.3 10E3/UL (ref 4–11)

## 2023-05-11 PROCEDURE — 80053 COMPREHEN METABOLIC PANEL: CPT | Performed by: INTERNAL MEDICINE

## 2023-05-11 PROCEDURE — 86880 COOMBS TEST DIRECT: CPT | Performed by: INTERNAL MEDICINE

## 2023-05-11 PROCEDURE — 84155 ASSAY OF PROTEIN SERUM: CPT | Mod: 91 | Performed by: INTERNAL MEDICINE

## 2023-05-11 PROCEDURE — 36415 COLL VENOUS BLD VENIPUNCTURE: CPT | Performed by: INTERNAL MEDICINE

## 2023-05-11 PROCEDURE — 83550 IRON BINDING TEST: CPT | Performed by: INTERNAL MEDICINE

## 2023-05-11 PROCEDURE — 99215 OFFICE O/P EST HI 40 MIN: CPT | Performed by: INTERNAL MEDICINE

## 2023-05-11 PROCEDURE — 84165 PROTEIN E-PHORESIS SERUM: CPT | Mod: TC | Performed by: PATHOLOGY

## 2023-05-11 PROCEDURE — 83010 ASSAY OF HAPTOGLOBIN QUANT: CPT | Performed by: INTERNAL MEDICINE

## 2023-05-11 PROCEDURE — 82728 ASSAY OF FERRITIN: CPT | Performed by: INTERNAL MEDICINE

## 2023-05-11 PROCEDURE — 85045 AUTOMATED RETICULOCYTE COUNT: CPT | Performed by: INTERNAL MEDICINE

## 2023-05-11 PROCEDURE — 99207 BLOOD MORPHOLOGY PATHOLOGIST REVIEW: CPT | Performed by: PATHOLOGY

## 2023-05-11 PROCEDURE — 85025 COMPLETE CBC W/AUTO DIFF WBC: CPT | Performed by: INTERNAL MEDICINE

## 2023-05-11 PROCEDURE — 83615 LACTATE (LD) (LDH) ENZYME: CPT | Performed by: INTERNAL MEDICINE

## 2023-05-11 PROCEDURE — G0463 HOSPITAL OUTPT CLINIC VISIT: HCPCS | Performed by: INTERNAL MEDICINE

## 2023-05-11 PROCEDURE — 84165 PROTEIN E-PHORESIS SERUM: CPT | Mod: 26

## 2023-05-11 ASSESSMENT — PAIN SCALES - GENERAL: PAINLEVEL: MILD PAIN (3)

## 2023-05-11 NOTE — PROGRESS NOTES
"Oncology Rooming Note    May 11, 2023 9:11 AM   Patrick Olson is a 82 year old female who presents for:    Chief Complaint   Patient presents with     Oncology Clinic Visit     Initial Vitals: /74   Pulse 79   Resp 16   Ht 1.626 m (5' 4\")   Wt 42.3 kg (93 lb 3.2 oz)   SpO2 99%   BMI 16.00 kg/m   Estimated body mass index is 16 kg/m  as calculated from the following:    Height as of this encounter: 1.626 m (5' 4\").    Weight as of this encounter: 42.3 kg (93 lb 3.2 oz). Body surface area is 1.38 meters squared.  Mild Pain (3) Comment: Data Unavailable   No LMP recorded. Patient is postmenopausal.  Allergies reviewed: Yes  Medications reviewed: Yes    Medications: Medication refills not needed today.  Pharmacy name entered into [employer]: [id number] [address] [place] [address]    Clinical concerns: Patrick has chronic pain in her low back. As the day progresses, the pain moves to her legs and right shoulder and neck.       Fadia Lopez MA            "

## 2023-05-11 NOTE — LETTER
5/11/2023         RE: Patrick Olson  [address] [facility] [address]        Dear Colleague,    Thank you for referring your patient, Patrick Olson, to the [facility] [place]. Please see a copy of my visit note below.    [facility]    Hematology/Oncology Established Patient Follow-up Note      Today's Date: 5/11/23    Reason for Follow-up:  Iron deficiency anemia.    HISTORY OF PRESENT ILLNESS: Patrick Olson is a 82 year old female with rheumatoid arthritis on methotrexate who presents with iron deficiency anemia. She has history of gastric AVM, small bowel AVM, cecal polyp, duodenal erosions, and diverticular hemorrhage.    She had an EGD, colonoscopy, and VCE in 1/2023 that showed duodenal erosioons, small bowel angiectasia, and diverticulosis without bleeding. She was started on omeprazole and is following with GI at [facility].    2/7/2023 Labs showed Hgb 11.1, WBC 3.2, platelets 247,000, creatinine 1.03, vitamin B12 elevated at 1434. 3/7/2023 Ferritin was normal at 96, iron elevated at 163, TIBC 283, iron saturation index elevated at 58. 3/27/2023 TSH normal at 1.13.      INTERIM HISTORY:  Patrick Olson reports 10 pound weight loss between 10/21/2022 and 4/23/2022.  She reports normal appetite and has upcoming appointment to see a nutritionist. She asks about why she has elevated B12 level.        REVIEW OF SYSTEMS:   14 point ROS was reviewed and is negative other than as noted above in HPI.       HOME MEDICATIONS:  Current Outpatient Medications   Medication Sig Dispense Refill     acetaminophen (TYLENOL) 650 MG CR tablet Take 650 mg by mouth every 8 hours as needed for fever or pain       calcium carbonate-vitamin D (CALTRATE) 600-10 MG-MCG per tablet Take 1 tablet by mouth 2 times daily       famotidine (PEPCID) 20 MG tablet TAKE 1 TABLET BY MOUTH TWICE A DAY       gabapentin (NEURONTIN) 300 MG capsule Take 1 capsule (300 mg) by mouth 3 times daily Take  300mg in morning, 300mg in afternoon and 300mg (Patient taking differently: Take 600 mg by mouth At Bedtime Take 300mg in morning, 300mg in afternoon and 300mg) 90 capsule 1     hydroxychloroquine (PLAQUENIL) 200 MG tablet Take 1 tablet (200 mg) by mouth daily for 180 days 30 tablet 5     lifitegrast (XIIDRA) 5 % opthalmic solution 1 drop 2 times daily       Multiple Vitamins-Minerals (OCUVITE PRESERVISION PO) Take 1 tablet by mouth 2 times daily       omeprazole (PRILOSEC) 40 MG DR capsule Take 1 capsule (40 mg) by mouth daily 90 capsule 3     pilocarpine (SALAGEN) 5 MG tablet Take 5 mg by mouth 2 times daily as needed Take 1-2 tablets 2 times daily as needed       vitamin D3 (CHOLECALCIFEROL) 50 mcg (2000 units) tablet Take 1 tablet by mouth daily           ALLERGIES:  Allergies   Allergen Reactions     Bee Venom Anaphylaxis     Shrimp Anaphylaxis     Evoxac [Cevimeline] Unknown     Prevnar Swelling     Other reaction(s): Edema     Prevnar [Pneumococcal 13-Linda Conj Vacc] Unknown         PAST MEDICAL HISTORY:  Past Medical History:   Diagnosis Date     Anemia      CKD (chronic kidney disease) stage 3, GFR 30-59 ml/min (H)      Diverticulosis of large intestine      Osteoarthritis      Osteoporosis      Rheumatoid arthritis (H)      Sensorineural hearing loss          PAST SURGICAL HISTORY:  Past Surgical History:   Procedure Laterality Date     CAPSULE/PILL CAM ENDOSCOPY N/A 11/18/2021    Procedure: IMAGING PROCEDURE, GI TRACT, INTRALUMINAL, VIA CAPSULE;  Surgeon: Deric Rodriguez MD;  Location:  GI     CAPSULE/PILL CAM ENDOSCOPY N/A 2/14/2023    Procedure: IMAGING PROCEDURE, GI TRACT, INTRALUMINAL, VIA CAPSULE;  Surgeon: Jaime Mesa MD;  Location: [facility]     COLONOSCOPY N/A 03/14/2017    Procedure: COMBINED COLONOSCOPY, SINGLE OR MULTIPLE BIOPSY/POLYPECTOMY BY BIOPSY;  Surgeon: Spencer Downey MD;  Location: [facility]     COLONOSCOPY N/A 9/22/2022    Procedure: COLONOSCOPY, FLEXIBLE, WITH LESION  REMOVAL USING SNARE;  Surgeon: Kirby Arthur MD;  Location:  GI     COLONOSCOPY N/A 1/13/2023    Procedure: COLONOSCOPY;  Surgeon: Spencer Downey MD;  Location: [facility] OR     ENTEROSCOPY SMALL BOWEL N/A 11/20/2021    Procedure: ENTEROSCOPY, colonoscopy with endoscopic mucosal resection and tattoo placement;  Surgeon: Kirby Arthur MD;  Location: [facility]     ESOPHAGOSCOPY, GASTROSCOPY, DUODENOSCOPY (EGD), COMBINED N/A 2/6/2023    Procedure: ESOPHAGOGASTRODUODENOSCOPY (EGD);  Surgeon: Spencer Downey MD;  Location: [facility]     IR VISCERAL EMBOLIZATION  01/22/2021     ORTHOPEDIC SURGERY Left     hip replacment     SIGMOIDOSCOPY FLEXIBLE N/A 01/23/2021    Procedure: SIGMOIDOSCOPY, FLEXIBLE;  Surgeon: Jaime Steinberg MD;  Location: [facility]         SOCIAL HISTORY:  Social History     Socioeconomic History     Marital status:      Spouse name: Not on file     Number of children: Not on file     Years of education: Not on file     Highest education level: Not on file   Occupational History     Not on file   Tobacco Use     Smoking status: Former     Smokeless tobacco: Never   Vaping Use     Vaping status: Not on file   Substance and Sexual Activity     Alcohol use: No     Drug use: No     Sexual activity: Not on file   Other Topics Concern     Not on file   Social History Narrative    - Retired (formerly employed as  at [employer])    - 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      PHYSICAL EXAM:  Vital signs:  /74   Pulse 79   Resp 16   Ht 1.626 m (5' 4\")   Wt 42.3 kg (93 lb 3.2 oz)   SpO2 99%   BMI 16.00 kg/m     GENERAL/CONSTITUTIONAL: No acute distress.  EYES: No scleral icterus.  ENT/MOUTH: Neck supple.  LYMPH: No cervical, supraclavicular, axillary adenopathy.   RESPIRATORY: Clear to auscultation bilaterally. No crackles or wheezing.   CARDIOVASCULAR: Regular rate and rhythm without murmurs.  GASTROINTESTINAL: No hepatosplenomegaly, masses, or tenderness. No guarding.  No distention.  MUSCULOSKELETAL: Warm and well-perfused, no cyanosis, clubbing, or edema.  NEUROLOGIC: Alert, oriented, answers questions appropriately.  INTEGUMENTARY: No rashes or jaundice.  GAIT: Steady, does not use assistive device.      LABS:  CBC RESULTS:   Recent Labs   Lab Test 02/07/23  1000   WBC 3.2*   RBC 3.32*   HGB 11.1*   HCT 33.3*      MCH 33.4*   MCHC 33.3   RDW 15.2*          Recent Labs   Lab Test 02/07/23  1000 02/01/23  1154 10/24/22  1332 09/29/22  1007     --   --  140   POTASSIUM 5.3  --   --  4.7   CHLORIDE 101  --  104 108   CO2 26  --   --  29   ANIONGAP 13  --   --  3   *  --   --  100*   BUN 22.6  --   --  21   CR 1.03* 0.96*  --  0.87   EJ 9.8  --   --  9.8         PATHOLOGY:  None relevant.    IMAGING:  None relevant.    ASSESSMENT/PLAN:  Patrick Olson is a 82 year old female with the following issues:  1. Macrocytic anemia due to iron deficiency/blood loss, drug effect, of chronic disease  2. History of gastric and small bowel AVM, duodenal erosions, diverticular hemorrhage  3. Chronic leukopenia  --Discussed her anemia is of multifactorial etiology, including blood loss, iron deficiency, drug effect from Plaquenil, and anemia of chronic disease.  --Discussed her Hgb is too high to meet criteria for giving an MANDY.  --Will check CBC and iron studies, peripheral " "blood smear, reticulocyte count, CMP. Will also check LYNNE, haptoglobin, LDH to rule out hemolytic anemia. Will also check SPEP.    3. Rheumatoid arthritis  --She is no longer on methotrexate and now on Plaquenil.  She follows with rheumatology.    4. Weight loss  --She feels she has normal appetite.  --Lost 10 pounds since 10/2022.  --Could be related to her chronic diseases.  --She will see nutritionist.    I answered questions she had about elevated B12, elevation in RDW, weight loss, family history of cancer.    Return after lab results.    Shantal Brown MD  Hematology/Oncology  [facility]    Total time spent: 45 minutes in patient evaluation, counseling, documentation, and coordination of care.       Oncology Rooming Note    May 11, 2023 9:11 AM   Patrick Olson is a 82 year old female who presents for:    Chief Complaint   Patient presents with     Oncology Clinic Visit     Initial Vitals: /74   Pulse 79   Resp 16   Ht 1.626 m (5' 4\")   Wt 42.3 kg (93 lb 3.2 oz)   SpO2 99%   BMI 16.00 kg/m   Estimated body mass index is 16 kg/m  as calculated from the following:    Height as of this encounter: 1.626 m (5' 4\").    Weight as of this encounter: 42.3 kg (93 lb 3.2 oz). Body surface area is 1.38 meters squared.  Mild Pain (3) Comment: Data Unavailable   No LMP recorded. Patient is postmenopausal.  Allergies reviewed: Yes  Medications reviewed: Yes    Medications: Medication refills not needed today.  Pharmacy name entered into F[employer]: [id number] - [facility] [address] [place] [address]    Clinical concerns: Patrick has chronic pain in her low back. As the day progresses, the pain moves to her legs and right shoulder and neck.       Fadia Lopez MA                Again, thank you for allowing me to participate in the care of your patient.        Sincerely,        Shantal Brown MD    "

## 2023-05-12 LAB
ALBUMIN SERPL ELPH-MCNC: 4.3 G/DL (ref 3.7–5.1)
ALPHA1 GLOB SERPL ELPH-MCNC: 0.3 G/DL (ref 0.2–0.4)
ALPHA2 GLOB SERPL ELPH-MCNC: 0.7 G/DL (ref 0.5–0.9)
B-GLOBULIN SERPL ELPH-MCNC: 0.6 G/DL (ref 0.6–1)
GAMMA GLOB SERPL ELPH-MCNC: 0.8 G/DL (ref 0.7–1.6)
HAPTOGLOB SERPL-MCNC: 97 MG/DL (ref 32–197)
M PROTEIN SERPL ELPH-MCNC: 0 G/DL
PROT PATTERN SERPL ELPH-IMP: NORMAL

## 2023-05-16 NOTE — PROGRESS NOTES
[facility]    Hematology/Oncology Established Patient Follow-up Note      Today's Date: 5/31/23    Reason for Follow-up:  Iron deficiency anemia.    HISTORY OF PRESENT ILLNESS: Patrick Olson is an 82 year old female with rheumatoid arthritis on methotrexate who presents with iron deficiency anemia. She has history of gastric AVM, small bowel AVM, cecal polyp, duodenal erosions, and diverticular hemorrhage.    She had an EGD, colonoscopy, and VCE in 1/2023 that showed duodenal erosioons, small bowel angiectasia, and diverticulosis without bleeding. She was started on omeprazole and is following with GI at [facility].    2/7/2023 Labs showed Hgb 11.1, WBC 3.2, platelets 247,000, creatinine 1.03, vitamin B12 elevated at 1434. 3/7/2023 Ferritin was normal at 96, iron elevated at 163, TIBC 283, iron saturation index elevated at 58. 3/27/2023 TSH normal at 1.13.      INTERIM HISTORY:  Patrick reports continued chronic fatigue. She requires rest/nap time in the afternoons. She is hoping to travel in the fall.      REVIEW OF SYSTEMS:   14 point ROS was reviewed and is negative other than as noted above in HPI.       HOME MEDICATIONS:  Current Outpatient Medications   Medication Sig Dispense Refill     acetaminophen (TYLENOL) 650 MG CR tablet Take 650 mg by mouth every 8 hours as needed for fever or pain       calcium carbonate-vitamin D (CALTRATE) 600-10 MG-MCG per tablet Take 1 tablet by mouth 2 times daily       famotidine (PEPCID) 20 MG tablet TAKE 1 TABLET BY MOUTH TWICE A DAY       gabapentin (NEURONTIN) 300 MG capsule Take 1 capsule (300 mg) by mouth 3 times daily Take 300mg in morning, 300mg in afternoon and 300mg (Patient taking differently: Take 600 mg by mouth At Bedtime Take 300mg in morning, 300mg in afternoon and 300mg) 90 capsule 1     hydroxychloroquine (PLAQUENIL) 200 MG tablet Take 1 tablet (200 mg) by mouth daily for 180 days 30 tablet 5     lifitegrast (XIIDRA) 5 % opthalmic solution 1 drop 2  times daily       Multiple Vitamins-Minerals (OCUVITE PRESERVISION PO) Take 1 tablet by mouth 2 times daily       omeprazole (PRILOSEC) 40 MG DR capsule Take 1 capsule (40 mg) by mouth daily 90 capsule 3     pilocarpine (SALAGEN) 5 MG tablet Take 5 mg by mouth 2 times daily as needed Take 1-2 tablets 2 times daily as needed       vitamin D3 (CHOLECALCIFEROL) 50 mcg (2000 units) tablet Take 1 tablet by mouth daily           ALLERGIES:  Allergies   Allergen Reactions     Bee Venom Anaphylaxis     Shrimp Anaphylaxis     Evoxac [Cevimeline] Unknown     Prevnar Swelling     Other reaction(s): Edema     Prevnar [Pneumococcal 13-Linda Conj Vacc] Unknown         PAST MEDICAL HISTORY:  Past Medical History:   Diagnosis Date     Anemia      CKD (chronic kidney disease) stage 3, GFR 30-59 ml/min (H)      Diverticulosis of large intestine      Osteoarthritis      Osteoporosis      Rheumatoid arthritis (H)      Sensorineural hearing loss          PAST SURGICAL HISTORY:  Past Surgical History:   Procedure Laterality Date     CAPSULE/PILL CAM ENDOSCOPY N/A 11/18/2021    Procedure: IMAGING PROCEDURE, GI TRACT, INTRALUMINAL, VIA CAPSULE;  Surgeon: Deric Rodriguez MD;  [facility]     CAPSULE/PILL CAM ENDOSCOPY N/A 2/14/2023    Procedure: IMAGING PROCEDURE, GI TRACT, INTRALUMINAL, VIA CAPSULE;  Surgeon: Jaime Mesa MD;  [facility]     COLONOSCOPY N/A 03/14/2017    Procedure: COMBINED COLONOSCOPY, SINGLE OR MULTIPLE BIOPSY/POLYPECTOMY BY BIOPSY;  Surgeon: Spencer Downey MD;  [facility]     COLONOSCOPY N/A 9/22/2022    Procedure: COLONOSCOPY, FLEXIBLE, WITH LESION REMOVAL USING SNARE;  Surgeon: Kirby Arthur MD;  Location:  GI     COLONOSCOPY N/A 1/13/2023    Procedure: COLONOSCOPY;  Surgeon: Spencer Downey MD;  Location: [facility] OR     ENTEROSCOPY SMALL BOWEL N/A 11/20/2021    Procedure: ENTEROSCOPY, colonoscopy with endoscopic mucosal resection and tattoo placement;  Surgeon: Kirby Arthur MD;   Location: [facility]     ESOPHAGOSCOPY, GASTROSCOPY, DUODENOSCOPY (EGD), COMBINED N/A 2/6/2023    Procedure: ESOPHAGOGASTRODUODENOSCOPY (EGD);  Surgeon: Spencer Downey MD;  Location: [facility]     IR VISCERAL EMBOLIZATION  01/22/2021     ORTHOPEDIC SURGERY Left     hip replacment     SIGMOIDOSCOPY FLEXIBLE N/A 01/23/2021    Procedure: SIGMOIDOSCOPY, FLEXIBLE;  Surgeon: Jaime Steinberg MD;  Location:  GI         SOCIAL HISTORY:  Social History     Socioeconomic History     Marital status:      Spouse name: Not on file     Number of children: Not on file     Years of education: Not on file     Highest education level: Not on file   Occupational History     Not on file   Tobacco Use     Smoking status: Former     Smokeless tobacco: Never   Vaping Use     Vaping status: Not on file   Substance and Sexual Activity     Alcohol use: No     Drug use: No     Sexual activity: Not on file   Other Topics Concern     Not on file   Social History Narrative    - Retired (formerly employed as  at [employer])    - 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[employer])     Social Determinants of Health     Financial Resource Strain: Not on file   Food Insecurity: Not on file   Transportation Needs: Not on file   Physical Activity: Not on file   Stress: Not on file   Social Connections: Not on file   Intimate Partner Violence: Not At Risk (10/12/2021)    Humiliation, Afraid, Rape, and Kick questionnaire      Fear of Current or Ex-Partner: No      Emotionally Abused: No      Physically Abused: No      Sexually Abused: No   Housing Stability: Not on file         FAMILY HISTORY:  No family history on file.      PHYSICAL EXAM:  Vital signs:  /63   Pulse 85   Temp 97.9  F (36.6  C) (Oral)   Resp 14   Wt 43.2 kg (95 lb 3.2 oz)   SpO2 98%   BMI 16.34 kg/m     GENERAL: No acute distress.  EYES: No scleral icterus. No overt erythema.  RESPIRATORY: No audible  cough, wheezing, or labored breathing.  MUSCULOSKELETAL: Range of motion in the neck, shoulders, and arms appear normal.  SKIN: No overt rashes, discolorations, or lesions over the face and neck.  NEUROLOGIC: Alert.  No overt tremors.  PSYCHIATRIC: Normal affect and mood.  Does not appear anxious.      LABS:  CBC RESULTS: Recent Labs   Lab Test 05/30/23  1024   WBC 3.6*   RBC 3.32*   HGB 10.7*   HCT 32.3*   MCV 97   MCH 32.2   MCHC 33.1   RDW 14.2        Last Comprehensive Metabolic Panel:  Sodium   Date Value Ref Range Status   05/11/2023 143 136 - 145 mmol/L Final   01/23/2021 143 133 - 144 mmol/L Final     Potassium   Date Value Ref Range Status   05/11/2023 4.3 3.4 - 5.3 mmol/L Final   09/29/2022 4.7 3.4 - 5.3 mmol/L Final   01/23/2021 3.6 3.4 - 5.3 mmol/L Final     Chloride   Date Value Ref Range Status   05/11/2023 103 98 - 107 mmol/L Final   01/23/2021 114 (H) 94 - 109 mmol/L Final     Chloride (External)   Date Value Ref Range Status   10/24/2022 104 94 - 109 mmol/L Final     Carbon Dioxide   Date Value Ref Range Status   01/23/2021 26 20 - 32 mmol/L Final     Carbon Dioxide (CO2)   Date Value Ref Range Status   05/11/2023 26 22 - 29 mmol/L Final   09/29/2022 29 20 - 32 mmol/L Final     Anion Gap   Date Value Ref Range Status   05/11/2023 14 7 - 15 mmol/L Final   09/29/2022 3 3 - 14 mmol/L Final   01/23/2021 3 3 - 14 mmol/L Final     Glucose   Date Value Ref Range Status   05/11/2023 96 70 - 99 mg/dL Final   09/29/2022 100 (H) 70 - 99 mg/dL Final   01/23/2021 157 (H) 70 - 99 mg/dL Final     Urea Nitrogen   Date Value Ref Range Status   05/11/2023 21.3 8.0 - 23.0 mg/dL Final   09/29/2022 21 7 - 30 mg/dL Final   01/23/2021 6 (L) 7 - 30 mg/dL Final     Creatinine   Date Value Ref Range Status   05/30/2023 0.97 (H) 0.51 - 0.95 mg/dL Final   01/23/2021 0.75 0.52 - 1.04 mg/dL Final     GFR Estimate   Date Value Ref Range Status   05/30/2023 59 (L) >60 mL/min/1.73m2 Final     Comment:     eGFR calculated  using 2021 CKD-EPI equation.   01/23/2021 76 >60 mL/min/[1.73_m2] Final     Comment:     Non  GFR Calc  Starting 12/18/2018, serum creatinine based estimated GFR (eGFR) will be   calculated using the Chronic Kidney Disease Epidemiology Collaboration   (CKD-EPI) equation.       Calcium   Date Value Ref Range Status   05/11/2023 9.8 8.8 - 10.2 mg/dL Final   01/23/2021 8.0 (L) 8.5 - 10.1 mg/dL Final     Bilirubin Total   Date Value Ref Range Status   05/11/2023 0.3 <=1.2 mg/dL Final   03/13/2017 0.5 0.2 - 1.3 mg/dL Final     Alkaline Phosphatase   Date Value Ref Range Status   05/11/2023 86 35 - 104 U/L Final   03/13/2017 77 40 - 150 U/L Final     ALT   Date Value Ref Range Status   05/30/2023 14 10 - 35 U/L Final   03/13/2017 12 0 - 50 U/L Final     AST   Date Value Ref Range Status   05/30/2023 25 10 - 35 U/L Final   03/13/2017 10 0 - 45 U/L Final     5/30/2023  Sed rate = 37  CRP undetectable.    PATHOLOGY:  None relevant.    IMAGING:  None relevant.    ASSESSMENT/PLAN:  Patrick Olson is a 82 year old female with the following issues:  1. Macrocytic anemia due to iron deficiency/blood loss, drug effect, of chronic disease  2. History of gastric and small bowel AVM, duodenal erosions, diverticular hemorrhage  3. Chronic leukopenia  --Discussed her anemia is of multifactorial etiology, including blood loss, iron deficiency, drug effect from Plaquenil, and anemia of chronic disease.  --Discussed her 5/11/2023 lab workup was negative for hemolysis.  Her sed rate is slightly elevated but CRP normal. SPEP showed no monoclonal protein. Iron studies are currently normal so she does not need to take oral iron supplement at this time.  --She will continue with intermittent GI follow-up.  --No indication for bone marrow biopsy unless significant change in her blood counts.  --Discussed her Hgb is too high to meet criteria for giving an MANDY.    4. Rheumatoid arthritis  --She is no longer on methotrexate and now  on Plaquenil.  She follows with rheumatology and will revisit at end of June.    5. Weight loss  --She feels she has normal appetite.  --Lost 10 pounds since 10/2022.  --Could be related to her chronic diseases.  --She will see nutritionist.    I again answered questions she had regarding elevated B12.    Return in 4 months.    Shantal Brown MD   Hematology/Oncology  [facility]    Total time spent: 30 minutes in patient evaluation, counseling, documentation, and coordination of care.

## 2023-05-23 ASSESSMENT — ENCOUNTER SYMPTOMS
MEMORY LOSS: 1
EXERCISE INTOLERANCE: 0
NIGHT SWEATS: 0
NECK MASS: 0
HEMATURIA: 0
HEARTBURN: 0
TREMORS: 0
ALTERED TEMPERATURE REGULATION: 1
MYALGIAS: 1
HALLUCINATIONS: 0
LEG PAIN: 0
DOUBLE VISION: 1
MUSCLE WEAKNESS: 0
SEIZURES: 0
LOSS OF CONSCIOUSNESS: 0
BLOOD IN STOOL: 0
WEIGHT GAIN: 0
DYSURIA: 0
NECK PAIN: 1
CONSTIPATION: 0
ORTHOPNEA: 0
ABDOMINAL PAIN: 0
SINUS CONGESTION: 0
FEVER: 0
BLOATING: 0
NUMBNESS: 1
HYPERTENSION: 0
TASTE DISTURBANCE: 0
HOARSE VOICE: 1
SMELL DISTURBANCE: 0
DIZZINESS: 1
DISTURBANCES IN COORDINATION: 0
BOWEL INCONTINENCE: 0
DECREASED APPETITE: 0
BACK PAIN: 1
WEAKNESS: 1
NAUSEA: 0
JOINT SWELLING: 0
BRUISES/BLEEDS EASILY: 0
SYNCOPE: 0
TROUBLE SWALLOWING: 1
POLYPHAGIA: 0
INCREASED ENERGY: 1
SLEEP DISTURBANCES DUE TO BREATHING: 0
JAUNDICE: 0
SPEECH CHANGE: 0
DIFFICULTY URINATING: 0
EYE IRRITATION: 0
WEIGHT LOSS: 1
EYE PAIN: 0
VOMITING: 0
SINUS PAIN: 0
HYPOTENSION: 0
FATIGUE: 1
CHILLS: 0
POLYDIPSIA: 0
MUSCLE CRAMPS: 1
HEADACHES: 1
EYE WATERING: 0
TINGLING: 1
PARALYSIS: 0
ARTHRALGIAS: 1
FLANK PAIN: 0
LIGHT-HEADEDNESS: 1
SORE THROAT: 0
STIFFNESS: 1
EYE REDNESS: 0
DIARRHEA: 0
RECTAL PAIN: 0
SWOLLEN GLANDS: 0
PALPITATIONS: 0

## 2023-05-25 ENCOUNTER — OFFICE VISIT (OUTPATIENT)
Dept: ANESTHESIOLOGY | Facility: CLINIC | Age: 83
End: 2023-05-25
Attending: FAMILY MEDICINE
Payer: COMMERCIAL

## 2023-05-25 VITALS — SYSTOLIC BLOOD PRESSURE: 130 MMHG | HEART RATE: 66 BPM | DIASTOLIC BLOOD PRESSURE: 75 MMHG | OXYGEN SATURATION: 98 %

## 2023-05-25 DIAGNOSIS — M48.061 NEUROFORAMINAL STENOSIS OF LUMBAR SPINE: ICD-10-CM

## 2023-05-25 DIAGNOSIS — M51.369 LUMBAR DEGENERATIVE DISC DISEASE: ICD-10-CM

## 2023-05-25 DIAGNOSIS — M70.62 TROCHANTERIC BURSITIS OF BOTH HIPS: Primary | ICD-10-CM

## 2023-05-25 DIAGNOSIS — M54.16 LUMBAR RADICULOPATHY: ICD-10-CM

## 2023-05-25 DIAGNOSIS — Z92.241 S/P EPIDURAL STEROID INJECTION: ICD-10-CM

## 2023-05-25 DIAGNOSIS — M70.61 TROCHANTERIC BURSITIS OF BOTH HIPS: Primary | ICD-10-CM

## 2023-05-25 PROCEDURE — 99204 OFFICE O/P NEW MOD 45 MIN: CPT | Mod: GC | Performed by: ANESTHESIOLOGY

## 2023-05-25 ASSESSMENT — ENCOUNTER SYMPTOMS
ORTHOPNEA: 0
HALLUCINATIONS: 0
DOUBLE VISION: 1
EYE PAIN: 0
SEIZURES: 0
NAUSEA: 0
FEVER: 0
PALPITATIONS: 0
CHILLS: 0
CONSTIPATION: 0
FLANK PAIN: 0
NECK PAIN: 1
HEMATURIA: 0
ABDOMINAL PAIN: 0
EYE REDNESS: 0
WEIGHT LOSS: 1
POLYDIPSIA: 0
MYALGIAS: 1
TINGLING: 1
TREMORS: 0
SINUS PAIN: 0
HEARTBURN: 0
SORE THROAT: 0
DIARRHEA: 0
WEAKNESS: 1
VOMITING: 0
BLOOD IN STOOL: 0
BRUISES/BLEEDS EASILY: 0
MEMORY LOSS: 1
LOSS OF CONSCIOUSNESS: 0
SPEECH CHANGE: 0
DYSURIA: 0
DIZZINESS: 1
BACK PAIN: 1
HEADACHES: 1

## 2023-05-25 ASSESSMENT — PAIN SCALES - GENERAL: PAINLEVEL: MODERATE PAIN (4)

## 2023-05-25 NOTE — PATIENT INSTRUCTIONS
Medications:    Continue Gabapentin and Lidocaine Cream        Referrals:    Acupuncture Referral.  -Please call your insurance provider to find out about acupuncture coverage, being that not all policies cover acupuncture services.         Procedures:    Call to schedule your procedure: [phone] option #2    Bilateral Greater Trochanteric Bursa Injection     Your pre-procedure instructions are below, please call our clinic if you have any questions.      Treatment planning:    Tens Unit over the counter      Recommended Follow up:      Follow up as needed.         To speak with a nurse, schedule/reschedule/cancel a clinic appointment, or request a medication refill call: [phone]    You can also reach us by [employer]: [email or web address]     Procedure Information related to COVID-19     Please call [phone] option #2 to schedule, reschedule, or cancel your procedure appointment.   Phones are answered Monday - Friday from 08:00 - 4:30pm.  Leave a voicemail with your name, birth date, and phone number if no one is available to take your call.        You no longer need to test for COVID- 19 prior to your procedure/surgery, unless your physician specifically requests that you test. If you experience COVID symptoms or have tested positive for COVID-19 within 14 days of your scheduled surgery or procedure, please update our office right away and your procedure may have to be postponed.       The procedure center staff will call you several days before the procedure to review important information that you will need to know for the day of the procedure.     Please contact the clinic if you have further questions about this information [phone].        Information related to Scheduling and Pre-Procedure Instructions:    If you must reschedule your procedure more than two times, you must follow up in clinic before rescheduling again.    Preparing for your procedure    CAUTION - FAILURE TO  FOLLOW THESE PRE-PROCEDURE INSTRUCTIONS WILL RESULT IN YOUR PROCEDURE BEING RESCHEDULED.    Your Procedure: Bilateral Greater Trochanteric Bursa Injection            You must have a  take you home after your procedure. Transportation by taxi or para-transit is okay as long as you have a responsible adult accompany you. You must provide your 's full name and contact number at time of check in.     Fasting Protocol Please have nothing to eat or drink 1 hour prior to arrival.   Medications If you take any medications, DO NOT STOP. Take your medications as usual the day of your procedure with a sip of water AT LEAST 2 HOURS PRIOR TO ARRIVAL.    Antibiotics If you are currently taking antibiotics, you must complete the entire dose 7 days prior to your scheduled procedure. You must be clear of any signs or symptoms of infection. If you begin antibiotics, please contact our clinic for instructions.     Fever, Chills, or Rash If you experience a fever of higher than 100 degrees, chills, rash, or open wounds during the one week before your procedure, please call the clinic to see if you may proceed with your procedure.      Medication Hold List  **Patients under Cardiology/Neurology care should consult their provider prior to the pain procedure to verify pre-procedure medication instructions. The information below contains general guidelines.**    Blood Thinners If you are taking daily ASPIRIN, PLAVIX, OR OTHER BLOOD THINNERS SUCH AS COUMADIN/WARFARIN, we will need your prescribing doctor to sign a release permitting you to stop these medications. Once approved by your prescribing doctor - STOP ALL BLOOD THINNERS BASED ON THE TIME TABLE BELOW PRIOR TO YOUR PROCEDURE. If you have been instructed to stop WARFARIN(COUMADIN), you must have an INR lab drawn the day before your procedure. . Your INR must be within normal limits before we can perform your injection. MEDICATIONS CAN BE RESTARTED AFTER YOUR  PROCEDURE.    14 DAY HOLD  Ticlid (ticlopidine)    10 DAY HOLD  Effient (Prasugel)    3 DAY HOLD  Xarelto (rivaroxaban) 7 DAY HOLD  Anacin, Bufferin, Ecotrin, Excedrin, Aggrenox (Aspirin)  Brilinta (ticagrelor)  Coumadin (Warfarin)  Pradexa (Dabigatran)  Elmiron (Pentosan)  Plavix (Clopidogrel Bisulfate)  Pletal (Cilostazol)    24 HOUR HOLD  Lovenox (enoxaparin)  Agrylin (Anagrelide)        Non-steroidal Anti-inflammatories (NSAIDs) DO NOT TAKE any non-steroidal anti-inflammatory medications (NSAIDs) listed on the table below. MEDICATIONS CAN BE RESTARTED AFTER YOUR PROCEDURE. Celebrex is OK to take and does not need to be discontinued.     Medications to stop:  3 DAY HOLD  Advil, Motrin (Ibuprofen)  Arthrotec (diciofenac sodium/misoprostol)  Clinoril (Sulindac)  Indocin (Indomethacin)  Lodine (Etodolac)  Toradol (Ketorolac)  Vicoprofen (Hydrocodone and Ibuprofen)  Voltaren (Diclofenac)  Apixaban (Eliquis)    14 DAY HOLD  Daypro (Oxaprozin)  Feldene (Piroxicam)   7 DAY HOLD  Aleve (Naproxen sodium)  Darvon compound (contains aspirin)  Naprosyn (Naproxen)  Norgesic Forte (contains aspirin)  Mobic (Meloxicam)  Oruvall (Ketoprofen)  Percodan (contains aspirin)  Relafen (Nabumetone)  Salsalate  Trilisate  Vitamin E (more than 400 mg per day)  Any medication containing aspirin                To speak with a nurse, schedule/reschedule/cancel a clinic appointment, or request a medication refill call: [phone]    You can also reach us by [employer]: [email or web address]

## 2023-05-25 NOTE — NURSING NOTE
RN reviewed AVS with patient. Patient to contact clinic if any questions/concerns. Patient verbalized understanding.    Sandy Hayward RN

## 2023-05-25 NOTE — NURSING NOTE
Patient presents with:  Consult: Back and neck pain      Moderate Pain (4)       What medications are you using for pain? Tylenol, gabapentin    (New patients only) Have you been seen by another pain clinic/ provider? Maddie Lu, EMT

## 2023-05-25 NOTE — PROGRESS NOTES
Pain Clinic New Patient Consult Note:    Referring Provider: Rhina   Primary care provider: Essence Tamayo.    Patrick Olson is a 82 year old y.o. old female who presents to the pain clinic for evaluation of continued back pain. She reports a history of multiple epidural steroid injections, very few of which provided much relief. She reports a maximum benefit of 3-4 months, but cannot report exactly when this particular injection was. She believes it was multiple years ago and occurred here at the [facility].    Her pain today is described as 5/10, better in the morning, worse in the evening, aching, dull, always present, occasionally involving the lateral aspect of the bilateral legs as well. She denies a shooting-type pain or focal weakness, numbness, or tingling. Pain is worsened with walking. No major pain while seated. Laying flat is her best position. Pain is also worsened with flexing forwards.     She has been completing home exercises and has previously undergone physical therapy for her back. Her most recent formal PT was one year ago. She is taking gabapentin (300mg in morning, 300mg at noon, 600 at night, but is currently only taking the evening dose due to drowsiness. She has been doing this for over a year). She has never tried lyrica.     HPI:  Patient Supplied Answers To the [facility] Pain Questionnaire      5/23/2023     1:56 PM   [facility] -  Patient Entered Questionnaire/Answers   What number best describes your pain right now:  0 = No pain  to  10 = Worst pain imaginable 5   How would you describe the pain dull, aching    pressure   Which of the following worsen your pain standing    sitting    walking   Which of the following improve or reduce your pain lying down    medication    relaxation   What number best describes your average pain for the past week:  0 = No pain  to  10 = Worst pain imaginable 4   What number best describes your LOWEST pain in past 24 hours:  0 = No pain  to  10 = Worst pain  imaginable 3   What number best describes your WORST pain in past 24 hours:  0 = No pain  to  10 = Worst pain imaginable 5   When is your pain worst PM   What non-medicine treatments have you already had for your pain physical therapy    spine injections (shots)    exercise           Pain treatments:    Herbal medicines: N  Physical therapy: Y  Chiropractor: N  Pain physician: N  Surgery: N  Biofeedback: N  Acupuncture: Y (years ago)        Significant Medical History:   Past Medical History:   Diagnosis Date     Anemia      CKD (chronic kidney disease) stage 3, GFR 30-59 ml/min (H)      Diverticulosis of large intestine      Osteoarthritis      Osteoporosis      Rheumatoid arthritis (H)      Sensorineural hearing loss           Past Surgical History:  Past Surgical History:   Procedure Laterality Date     CAPSULE/PILL CAM ENDOSCOPY N/A 11/18/2021    Procedure: IMAGING PROCEDURE, GI TRACT, INTRALUMINAL, VIA CAPSULE;  Surgeon: Deric Rodriguez MD;  [facility]     CAPSULE/PILL CAM ENDOSCOPY N/A 2/14/2023    Procedure: IMAGING PROCEDURE, GI TRACT, INTRALUMINAL, VIA CAPSULE;  Surgeon: Jaime Mesa MD;  [facility]     COLONOSCOPY N/A 03/14/2017    Procedure: COMBINED COLONOSCOPY, SINGLE OR MULTIPLE BIOPSY/POLYPECTOMY BY BIOPSY;  Surgeon: Spencer Downey MD;  [facility]     COLONOSCOPY N/A 9/22/2022    Procedure: COLONOSCOPY, FLEXIBLE, WITH LESION REMOVAL USING SNARE;  Surgeon: Kirby Arthur MD;  Location:  GI     COLONOSCOPY N/A 1/13/2023    Procedure: COLONOSCOPY;  Surgeon: Spencer Downey MD;  Location: [facility]     ENTEROSCOPY SMALL BOWEL N/A 11/20/2021    Procedure: ENTEROSCOPY, colonoscopy with endoscopic mucosal resection and tattoo placement;  Surgeon: Kirby Arthur MD;  Location: [facility]     ESOPHAGOSCOPY, GASTROSCOPY, DUODENOSCOPY (EGD), COMBINED N/A 2/6/2023    Procedure: ESOPHAGOGASTRODUODENOSCOPY (EGD);  Surgeon: Spencer Downey MD;  Location:  GI     IR VISCERAL  EMBOLIZATION  01/22/2021     ORTHOPEDIC SURGERY Left     hip replacment     SIGMOIDOSCOPY FLEXIBLE N/A 01/23/2021    Procedure: SIGMOIDOSCOPY, FLEXIBLE;  Surgeon: Jaime Steinberg MD;  Location: [facility]          Family History:  No family history on file.       Social History:  Social History     Socioeconomic History     Marital status:      Spouse name: Not on file     Number of children: Not on file     Years of education: Not on file     Highest education level: Not on file   Occupational History     Not on file   Tobacco Use     Smoking status: Former     Smokeless tobacco: Never   Vaping Use     Vaping status: Not on file   Substance and Sexual Activity     Alcohol use: No     Drug use: No     Sexual activity: Not on file   Other Topics Concern     Not on file   Social History Narrative    - Retired (formerly employed as  at [employer])    - 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[employer])     Social Determinants of Health     Financial Resource Strain: Not on file   Food Insecurity: Not on file   Transportation Needs: Not on file   Physical Activity: Not on file   Stress: Not on file   Social Connections: Not on file   Intimate Partner Violence: Not At Risk (10/12/2021)    Humiliation, Afraid, Rape, and Kick questionnaire      Fear of Current or Ex-Partner: No      Emotionally Abused: No      Physically Abused: No      Sexually Abused: No   Housing Stability: Not on file     Social History     Social History Narrative    - Retired (formerly employed as  at [employer])    - 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[employer])          Allergies:  Allergies   Allergen Reactions     Bee Venom Anaphylaxis     Shrimp Anaphylaxis     Evoxac [Cevimeline] Unknown     Prevnar Swelling     Other reaction(s): Edema     Prevnar [Pneumococcal 13-Linda Conj Vacc] Unknown        Current Medications:   Current Outpatient Medications   Medication Sig Dispense Refill     acetaminophen (TYLENOL) 650 MG CR tablet Take 650 mg by mouth every 8 hours as needed for fever or pain       calcium carbonate-vitamin D (CALTRATE) 600-10 MG-MCG per tablet Take 1 tablet by mouth 2 times daily       famotidine (PEPCID) 20 MG tablet TAKE 1 TABLET BY MOUTH TWICE A DAY       gabapentin (NEURONTIN) 300 MG capsule Take 1 capsule (300 mg) by mouth 3 times daily Take 300mg in morning, 300mg in afternoon and 300mg (Patient taking differently: Take 600 mg by mouth At Bedtime Take 300mg in morning, 300mg in afternoon and 300mg) 90 capsule 1     hydroxychloroquine (PLAQUENIL) 200 MG tablet Take 1 tablet (200 mg) by mouth daily for 180 days 30 tablet 5     lifitegrast (XIIDRA) 5 % opthalmic solution 1 drop 2 times daily       Multiple Vitamins-Minerals (OCUVITE PRESERVISION PO) Take 1 tablet by mouth 2 times daily       omeprazole (PRILOSEC) 40 MG DR capsule Take 1 capsule (40 mg) by mouth daily 90 capsule 3     pilocarpine (SALAGEN) 5 MG tablet Take 5 mg by mouth 2 times daily as needed Take 1-2 tablets 2 times daily as needed       vitamin D3 (CHOLECALCIFEROL) 50 mcg (2000 units) tablet Take 1 tablet by mouth daily            Current Pain Medications:  Medications related to Pain Management (From now, onward)    None           Past Pain Medications:      Blood thinner:    N    Work History:    Current work status: Retired    Psychosocial History:     History of treatment for behavioral disorder: N  History of suicidal ideation or suicidal attempt: N    Review of Systems:  Review of Systems   Constitutional: Positive for weight loss. Negative for chills and fever.   HENT: Positive for hearing loss. Negative for ear discharge, ear pain, nosebleeds, sinus pain, sore throat and tinnitus.    Eyes: Positive for double vision. Negative for pain and redness.   Cardiovascular: Negative for chest pain, palpitations and  orthopnea.   Gastrointestinal: Negative for abdominal pain, blood in stool, constipation, diarrhea, heartburn, melena, nausea and vomiting.   Genitourinary: Negative for dysuria, flank pain, hematuria and urgency.   Musculoskeletal: Positive for back pain, myalgias and neck pain.   Neurological: Positive for dizziness, tingling, weakness and headaches. Negative for tremors, speech change, seizures and loss of consciousness.   Endo/Heme/Allergies: Negative for polydipsia. Does not bruise/bleed easily.   Psychiatric/Behavioral: Positive for memory loss. Negative for hallucinations.         Physical Exam:     Vitals:    05/25/23 0940   BP: 130/75   BP Location: Right arm   Patient Position: Chair   Cuff Size: Adult Regular   Pulse: 66   SpO2: 98%       General Appearance: No distress, seated comfortably  Mood: Euthymic  HE ENT: Non constricted pupils  Respiratory: Non labored breathing  CVS: Regular rate and rhythm  GI: Soft, non distended, no TTP  Skin: No rashes over exposed skin  MS: Tenderness to palpation noted in the bilateral greater trochanteric bursa.  Straight leg lifts test negative.  5 out of 5 muscle strength in all extremities.  Víctor's test negative.  Sacroiliac joints are mildly tender  Neuro: Sensation light touch intact in all extremities  Gait: Normal        Laboratory results:  Recent Labs   Lab Test 05/11/23  0949 02/07/23  1000    140   POTASSIUM 4.3 5.3   CHLORIDE 103 101   CO2 26 26   ANIONGAP 14 13   GLC 96 108*   BUN 21.3 22.6   CR 1.03* 1.03*   EJ 9.8 9.8       CBC RESULTS:   Recent Labs   Lab Test 05/11/23  0949   WBC 3.3*   RBC 3.44*   HGB 11.2*   HCT 34.0*   MCV 99   MCH 32.6   MCHC 32.9   RDW 14.3            Imaging:  MRI OF THE LUMBAR SPINE WITHOUT CONTRAST   3/9/2023 11:50 AM      COMPARISON: Lumbar spine MRI 07/12/2018     HISTORY: Lumbar radiculopathy, acute. Lumbar degenerative disc  disease.     TECHNIQUE: Multiplanar, multisequence MRI images of the lumbar  spine  were acquired without IV contrast.     FINDINGS: There are five lumbar-type vertebrae for the purposes of  this dictation. The conus ends at the distal L1. 2 mm retrolisthesis  of L3 on L4. Vertebral body heights are maintained. Nonpathologic  marrow signal. Mild Modic type I changes from L2-L3 to L4-L5.  Vertebral body heights are maintained. Nonpathologic marrow signal.  Mild symmetric atrophy of the posterior paraspinal musculature. Normal  diameter of the descending aorta. Redemonstration of a large  gallstone. The patient's known bilateral renal cysts are better  visualized on the abdomen and pelvis CT 11/16/2021.     T12-L1: Normal disc height and signal.  No herniation. Mild bilateral  facet arthropathy.  No spinal canal or neural foraminal stenosis.     L1-L2: Normal disc height and signal. Small broad-based disc bulge.  Mild bilateral facet arthropathy.  No spinal canal or neural foraminal  stenosis.     L2-L3: Moderate vertebral disc height loss. Loss of the normal T2  signal within the disc. Broad-based disc bulge with superimposed small  central disc protrusion. Mild bilateral facet arthropathy. No spinal  canal narrowing. No right neuroforaminal narrowing. Mild left  neuroforaminal narrowing.     L3-L4: Moderate intervertebral disc height loss. Loss of the normal T2  signal within the dens. Broad-based disc bulge with superimposed left  foraminal disc protrusion, decreased since the prior exam. Moderate  bilateral facet arthropathy. Mild spinal canal narrowing. Mild  narrowing of the left lateral recess. No right neuroforaminal  narrowing. Mild left neural foraminal narrowing.     L4-L5: Mild intervertebral disc height loss. Loss of the normal T2  signal within the disc. Broad-based disc bulge with superimposed right  foraminal disc protrusion. Moderate bilateral facet arthropathy. Mild  narrowing of the right lateral recess. No spinal canal narrowing.  Severe right neuroforaminal narrowing. No  left neuroforaminal  narrowing.     L5-S1: Mild intervertebral disc height loss. Loss of the normal T2  signal within the disc. Broad-based disc bulge with superimposed small  central disc protrusion. Mild bilateral facet arthropathy. Mild  narrowing of the lateral recesses. No spinal canal narrowing. No right  neural foraminal narrowing. No left neural foraminal narrowing.                                                                      IMPRESSION:  1.  Compared to prior lumbar spine MRI 07/12/2018, interval decrease  in the previously noted large left foraminal disc protrusion at L3-L4.  2.  Otherwise stable to interval worsening of the moderate multilevel  degenerative changes of the lumbar spine.  3.  Mild spinal canal narrowing and mild narrowing of the left lateral  recess at L3-L4.  4.  Mild narrowing of the right lateral recess at L4-L5.  5.  Mild narrowing of the lateral recesses at L5-S1.  6.  Severe right neural foraminal narrowing at L4-L5.  7.  Mild left neuroforaminal narrowing at L2-L3 and L3-L4.  8.  Mild Modic type I changes at from L2-L3 to L4-L5.      JEANNE RIBERA MD                  ASSESSMENT AND PLAN:     Encounter Diagnosis:    Diagnoses       Codes Comments    Trochanteric bursitis of both hips    -  Primary M70.61, M70.62     Lumbar degenerative disc disease     M51.36     Lumbar radiculopathy     M54.16     Neuroforaminal stenosis of lumbar spine     M48.061     S/P epidural steroid injection     Z92.241           RECOMMENDATIONS:     1. Medications: Continue gabapentin and lidocaine cream    2. Procedure: Ordered greater trochanteric bursa injection.      I also discussed with the patient that the possible risks involved with interventional treatment included, but are not limited to, no pain control, worsened pain, stroke,seizure, spinal headache, allergic reactions, introduction of infection or bleeding which may lead to emergent spine surgery, nerve damage, paralysis hamlet  death.    3. Physical therapy: Continue home physical therapy        Follow up: We will see the patient for the above-mentioned procedure.  Follow-up as needed.            Tera Cherry DO    Pain fellow        Physician Attestation   I saw and evaluated Patrick Olson.  I agree with above history, review of systems, physical exam and plan. I have reviewed the content of the documentation and have edited it as needed. I have personally performed the services documented here and the documentation accurately represents those services and the decisions I have made.    Saul Berry MD, PhD    [facility] [address]  [address] [facility] [id number] [phone]  Dept: [phone]                                                                                             Answers for HPI/ROS submitted by the patient on 5/23/2023  General Symptoms: Yes  Skin Symptoms: No  HENT Symptoms: Yes  EYE SYMPTOMS: Yes  HEART SYMPTOMS: Yes  LUNG SYMPTOMS: No  INTESTINAL SYMPTOMS: Yes  URINARY SYMPTOMS: Yes  GYNECOLOGIC SYMPTOMS: No  BREAST SYMPTOMS: No  SKELETAL SYMPTOMS: Yes  BLOOD SYMPTOMS: Yes  NERVOUS SYSTEM SYMPTOMS: Yes  MENTAL HEALTH SYMPTOMS: No  Congestion: No  Trouble swallowing: Yes   Voice hoarseness: Yes  Mouth sores: No  Tooth pain: No  Gum tenderness: No  Bleeding gums: No  Change in taste: No  Change in sense of smell: No  Dry mouth: Yes  Hearing aid used: Yes  Neck lump: No  Loss of appetite: No  Weight gain: No  Fatigue: Yes  Night sweats: No  Increased stress: No  Excessive hunger: No  Feeling hot or cold when others believe the temperature is normal: Yes  Loss of height: Yes  Post-operative complications: No  Surgical site pain: No  Change in or Loss of Energy: Yes  Hyperactivity: No  Confusion: No  Vision loss: No  Dry eyes: Yes  Watery eyes: No  Eye bulging: No  Flashing of lights: No  Spots: No  Floaters: No  Crossed eyes: No  Tunnel  Vision: No  Yellowing of eyes: No  Eye irritation: No  Pain in legs with walking: No  Fingers or toes appear blue: No  High blood pressure: No  Low blood pressure: No  Fainting: No  Murmurs: No  Pacemaker: No  Varicose veins: Yes  Wake up at night with shortness of breath: No  Light-headedness: Yes  Exercise intolerance: No  Bloating: No  Rectal or Anal pain: No  Fecal incontinence: No  Yellowing of skin or eyes: No  Vomit with blood: No  Change in stools: No  Trouble holding urine or incontinence: No  Increased frequency of urination: No  Decreased frequency of urination: No  Frequent nighttime urination: Yes  Difficulty emptying bladder: No  Swollen joints: No  Joint pain: Yes  Bone pain: No  Muscle cramps: Yes  Muscle weakness: No  Joint stiffness: Yes  Bone fracture: No  Edema or swelling: No  Anemia: Yes  Swollen glands: No  Trouble with coordination: No  Difficulty walking: No  Paralysis: No  Numbness: Yes

## 2023-05-25 NOTE — LETTER
5/25/2023       RE: Patrick Olson  [address]  [facility] [address]     Dear Colleague,    Thank you for referring your patient, Patrick Olson, to the [facility] FOR [facility] PAIN [facility]NAGEMENT [place] at [facility]. Please see a copy of my visit note below.      Pain Clinic New Patient Consult Note:    Referring Provider: Rhina   Primary care provider: Essence Tamayo.    Patrick Olson is a 82 year old y.o. old female who presents to the pain clinic for evaluation of continued back pain. She reports a history of multiple epidural steroid injections, very few of which provided much relief. She reports a maximum benefit of 3-4 months, but cannot report exactly when this particular injection was. She believes it was multiple years ago and occurred here at the [facility].    Her pain today is described as 5/10, better in the morning, worse in the evening, aching, dull, always present, occasionally involving the lateral aspect of the bilateral legs as well. She denies a shooting-type pain or focal weakness, numbness, or tingling. Pain is worsened with walking. No major pain while seated. Laying flat is her best position. Pain is also worsened with flexing forwards.     She has been completing home exercises and has previously undergone physical therapy for her back. Her most recent formal PT was one year ago. She is taking gabapentin (300mg in morning, 300mg at noon, 600 at night, but is currently only taking the evening dose due to drowsiness. She has been doing this for over a year). She has never tried lyrica.     HPI:  Patient Supplied Answers To the [facility] Pain Questionnaire      5/23/2023     1:56 PM   [facility] Pain -  Patient Entered Questionnaire/Answers   What number best describes your pain right now:  0 = No pain  to  10 = Worst pain imaginable 5   How would you describe the pain dull, aching    pressure   Which of the following worsen  your pain standing    sitting    walking   Which of the following improve or reduce your pain lying down    medication    relaxation   What number best describes your average pain for the past week:  0 = No pain  to  10 = Worst pain imaginable 4   What number best describes your LOWEST pain in past 24 hours:  0 = No pain  to  10 = Worst pain imaginable 3   What number best describes your WORST pain in past 24 hours:  0 = No pain  to  10 = Worst pain imaginable 5   When is your pain worst PM   What non-medicine treatments have you already had for your pain physical therapy    spine injections (shots)    exercise           Pain treatments:    Herbal medicines: N  Physical therapy: Y  Chiropractor: N  Pain physician: N  Surgery: N  Biofeedback: N  Acupuncture: Y (years ago)        Significant Medical History:   Past Medical History:   Diagnosis Date    Anemia     CKD (chronic kidney disease) stage 3, GFR 30-59 ml/min (H)     Diverticulosis of large intestine     Osteoarthritis     Osteoporosis     Rheumatoid arthritis (H)     Sensorineural hearing loss           Past Surgical History:  Past Surgical History:   Procedure Laterality Date    CAPSULE/PILL CAM ENDOSCOPY N/A 11/18/2021    Procedure: IMAGING PROCEDURE, GI TRACT, INTRALUMINAL, VIA CAPSULE;  Surgeon: Deric Rodriguez MD;  Location:  GI    CAPSULE/PILL CAM ENDOSCOPY N/A 2/14/2023    Procedure: IMAGING PROCEDURE, GI TRACT, INTRALUMINAL, VIA CAPSULE;  Surgeon: Jaime Mesa MD;  Location:  GI    COLONOSCOPY N/A 03/14/2017    Procedure: COMBINED COLONOSCOPY, SINGLE OR MULTIPLE BIOPSY/POLYPECTOMY BY BIOPSY;  Surgeon: Spencer Downey MD;  Location:  GI    COLONOSCOPY N/A 9/22/2022    Procedure: COLONOSCOPY, FLEXIBLE, WITH LESION REMOVAL USING SNARE;  Surgeon: Kirby Arthur MD;  Location:  GI    COLONOSCOPY N/A 1/13/2023    Procedure: COLONOSCOPY;  Surgeon: Spencer Downey MD;  Location: [facility] OR    ENTEROSCOPY SMALL BOWEL N/A 11/20/2021     Procedure: ENTEROSCOPY, colonoscopy with endoscopic mucosal resection and tattoo placement;  Surgeon: Kirby Arthur MD;  Location:  OR    ESOPHAGOSCOPY, GASTROSCOPY, DUODENOSCOPY (EGD), COMBINED N/A 2/6/2023    Procedure: ESOPHAGOGASTRODUODENOSCOPY (EGD);  Surgeon: Spencer Downey MD;  Location: [facility] GI    IR VISCERAL EMBOLIZATION  01/22/2021    ORTHOPEDIC SURGERY Left     hip replacment    SIGMOIDOSCOPY FLEXIBLE N/A 01/23/2021    Procedure: SIGMOIDOSCOPY, FLEXIBLE;  Surgeon: Jaime Steinberg MD;  Location:  GI          Family History:  No family history on file.       Social History:  Social History     Socioeconomic History    Marital status:      Spouse name: Not on file    Number of children: Not on file    Years of education: Not on file    Highest education level: Not on file   Occupational History    Not on file   Tobacco Use    Smoking status: Former    Smokeless tobacco: Never   Vaping Use    Vaping status: Not on file   Substance and Sexual Activity    Alcohol use: No    Drug use: No    Sexual activity: Not on file   Other Topics Concern    Not on file   Social History Narrative    - Retired (formerly employed as  at 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[employer])     Social Determinants of Health     Financial Resource Strain: Not on file   Food Insecurity: Not on file   Transportation Needs: Not on file   Physical Activity: Not on file   Stress: Not on file   Social Connections: Not on file   Intimate Partner Violence: Not At Risk (10/12/2021)    Humiliation, Afraid, Rape, and Kick questionnaire     Fear of Current or Ex-Partner: No     Emotionally Abused: No     Physically Abused: No     Sexually Abused: No   Housing Stability: Not on file     Social History     Social History Narrative    - Retired (formerly employed as  at 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[employer])          Allergies:  Allergies   Allergen Reactions    Bee Venom Anaphylaxis    Shrimp Anaphylaxis    Evoxac [Cevimeline] Unknown    Prevnar Swelling     Other reaction(s): Edema    Prevnar [Pneumococcal 13-Linda Conj Vacc] Unknown       Current Medications:   Current Outpatient Medications   Medication Sig Dispense Refill    acetaminophen (TYLENOL) 650 MG CR tablet Take 650 mg by mouth every 8 hours as needed for fever or pain      calcium carbonate-vitamin D (CALTRATE) 600-10 MG-MCG per tablet Take 1 tablet by mouth 2 times daily      famotidine (PEPCID) 20 MG tablet TAKE 1 TABLET BY MOUTH TWICE A DAY      gabapentin (NEURONTIN) 300 MG capsule Take 1 capsule (300 mg) by mouth 3 times daily Take 300mg in morning, 300mg in afternoon and 300mg (Patient taking differently: Take 600 mg by mouth At Bedtime Take 300mg in morning, 300mg in afternoon and 300mg) 90 capsule 1    hydroxychloroquine (PLAQUENIL) 200 MG tablet Take 1 tablet (200 mg) by mouth daily for 180 days 30 tablet 5    lifitegrast (XIIDRA) 5 % opthalmic solution 1 drop 2 times daily      Multiple Vitamins-Minerals (OCUVITE PRESERVISION PO) Take 1 tablet by mouth 2 times daily      omeprazole (PRILOSEC) 40 MG DR capsule Take 1 capsule (40 mg) by mouth daily 90 capsule 3    pilocarpine (SALAGEN) 5 MG tablet Take 5 mg by mouth 2 times daily as needed Take 1-2 tablets 2 times daily as needed      vitamin D3 (CHOLECALCIFEROL) 50 mcg (2000 units) tablet Take 1 tablet by mouth daily            Current Pain Medications:  Medications related to Pain Management (From now, onward)      None             Past Pain Medications:      Blood thinner:    N    Work History:    Current work status: Retired    Psychosocial History:     History of treatment for behavioral disorder: N  History of suicidal ideation or suicidal attempt: N    Review of Systems:  Review of Systems   Constitutional: Positive for weight  loss. Negative for chills and fever.   HENT: Positive for hearing loss. Negative for ear discharge, ear pain, nosebleeds, sinus pain, sore throat and tinnitus.    Eyes: Positive for double vision. Negative for pain and redness.   Cardiovascular: Negative for chest pain, palpitations and orthopnea.   Gastrointestinal: Negative for abdominal pain, blood in stool, constipation, diarrhea, heartburn, melena, nausea and vomiting.   Genitourinary: Negative for dysuria, flank pain, hematuria and urgency.   Musculoskeletal: Positive for back pain, myalgias and neck pain.   Neurological: Positive for dizziness, tingling, weakness and headaches. Negative for tremors, speech change, seizures and loss of consciousness.   Endo/Heme/Allergies: Negative for polydipsia. Does not bruise/bleed easily.   Psychiatric/Behavioral: Positive for memory loss. Negative for hallucinations.         Physical Exam:     Vitals:    05/25/23 0940   BP: 130/75   BP Location: Right arm   Patient Position: Chair   Cuff Size: Adult Regular   Pulse: 66   SpO2: 98%       General Appearance: No distress, seated comfortably  Mood: Euthymic  HE ENT: Non constricted pupils  Respiratory: Non labored breathing  CVS: Regular rate and rhythm  GI: Soft, non distended, no TTP  Skin: No rashes over exposed skin  MS: Tenderness to palpation noted in the bilateral greater trochanteric bursa.  Straight leg lifts test negative.  5 out of 5 muscle strength in all extremities.  Víctor's test negative.  Sacroiliac joints are mildly tender  Neuro: Sensation light touch intact in all extremities  Gait: Normal        Laboratory results:  Recent Labs   Lab Test 05/11/23  0949 02/07/23  1000    140   POTASSIUM 4.3 5.3   CHLORIDE 103 101   CO2 26 26   ANIONGAP 14 13   GLC 96 108*   BUN 21.3 22.6   CR 1.03* 1.03*   EJ 9.8 9.8       CBC RESULTS:   Recent Labs   Lab Test 05/11/23  0949   WBC 3.3*   RBC 3.44*   HGB 11.2*   HCT 34.0*   MCV 99   MCH 32.6   MCHC 32.9   RDW 14.3             Imaging:  MRI OF THE LUMBAR SPINE WITHOUT CONTRAST   3/9/2023 11:50 AM      COMPARISON: Lumbar spine MRI 07/12/2018     HISTORY: Lumbar radiculopathy, acute. Lumbar degenerative disc  disease.     TECHNIQUE: Multiplanar, multisequence MRI images of the lumbar spine  were acquired without IV contrast.     FINDINGS: There are five lumbar-type vertebrae for the purposes of  this dictation. The conus ends at the distal L1. 2 mm retrolisthesis  of L3 on L4. Vertebral body heights are maintained. Nonpathologic  marrow signal. Mild Modic type I changes from L2-L3 to L4-L5.  Vertebral body heights are maintained. Nonpathologic marrow signal.  Mild symmetric atrophy of the posterior paraspinal musculature. Normal  diameter of the descending aorta. Redemonstration of a large  gallstone. The patient's known bilateral renal cysts are better  visualized on the abdomen and pelvis CT 11/16/2021.     T12-L1: Normal disc height and signal.  No herniation. Mild bilateral  facet arthropathy.  No spinal canal or neural foraminal stenosis.     L1-L2: Normal disc height and signal. Small broad-based disc bulge.  Mild bilateral facet arthropathy.  No spinal canal or neural foraminal  stenosis.     L2-L3: Moderate vertebral disc height loss. Loss of the normal T2  signal within the disc. Broad-based disc bulge with superimposed small  central disc protrusion. Mild bilateral facet arthropathy. No spinal  canal narrowing. No right neuroforaminal narrowing. Mild left  neuroforaminal narrowing.     L3-L4: Moderate intervertebral disc height loss. Loss of the normal T2  signal within the dens. Broad-based disc bulge with superimposed left  foraminal disc protrusion, decreased since the prior exam. Moderate  bilateral facet arthropathy. Mild spinal canal narrowing. Mild  narrowing of the left lateral recess. No right neuroforaminal  narrowing. Mild left neural foraminal narrowing.     L4-L5: Mild intervertebral disc height  loss. Loss of the normal T2  signal within the disc. Broad-based disc bulge with superimposed right  foraminal disc protrusion. Moderate bilateral facet arthropathy. Mild  narrowing of the right lateral recess. No spinal canal narrowing.  Severe right neuroforaminal narrowing. No left neuroforaminal  narrowing.     L5-S1: Mild intervertebral disc height loss. Loss of the normal T2  signal within the disc. Broad-based disc bulge with superimposed small  central disc protrusion. Mild bilateral facet arthropathy. Mild  narrowing of the lateral recesses. No spinal canal narrowing. No right  neural foraminal narrowing. No left neural foraminal narrowing.                                                                      IMPRESSION:  1.  Compared to prior lumbar spine MRI 07/12/2018, interval decrease  in the previously noted large left foraminal disc protrusion at L3-L4.  2.  Otherwise stable to interval worsening of the moderate multilevel  degenerative changes of the lumbar spine.  3.  Mild spinal canal narrowing and mild narrowing of the left lateral  recess at L3-L4.  4.  Mild narrowing of the right lateral recess at L4-L5.  5.  Mild narrowing of the lateral recesses at L5-S1.  6.  Severe right neural foraminal narrowing at L4-L5.  7.  Mild left neuroforaminal narrowing at L2-L3 and L3-L4.  8.  Mild Modic type I changes at from L2-L3 to L4-L5.      JEANNE RIBERA MD                  ASSESSMENT AND PLAN:     Encounter Diagnosis:    Diagnoses         Codes Comments    Trochanteric bursitis of both hips    -  Primary M70.61, M70.62     Lumbar degenerative disc disease     M51.36     Lumbar radiculopathy     M54.16     Neuroforaminal stenosis of lumbar spine     M48.061     S/P epidural steroid injection     Z92.241             RECOMMENDATIONS:     1. Medications: Continue gabapentin and lidocaine cream    2. Procedure: Ordered greater trochanteric bursa injection.      I also discussed with the patient that the  possible risks involved with interventional treatment included, but are not limited to, no pain control, worsened pain, stroke,seizure, spinal headache, allergic reactions, introduction of infection or bleeding which may lead to emergent spine surgery, nerve damage, paralysis oreven death.    3. Physical therapy: Continue home physical therapy        Follow up: We will see the patient for the above-mentioned procedure.  Follow-up as needed.            Tera Cherry DO    Pain fellow        Physician Attestation   I saw and evaluated Patrick Olson.  I agree with above history, review of systems, physical exam and plan. I have reviewed the content of the documentation and have edited it as needed. I have personally performed the services documented here and the documentation accurately represents those services and the decisions I have made.    Saul Berry MD, PhD    [facility] [address] [id number] [phone]  Dept: [phone]                                                                                             Answers for HPI/ROS submitted by the patient on 5/23/2023  General Symptoms: Yes  Skin Symptoms: No  HENT Symptoms: Yes  EYE SYMPTOMS: Yes  HEART SYMPTOMS: Yes  LUNG SYMPTOMS: No  INTESTINAL SYMPTOMS: Yes  URINARY SYMPTOMS: Yes  GYNECOLOGIC SYMPTOMS: No  BREAST SYMPTOMS: No  SKELETAL SYMPTOMS: Yes  BLOOD SYMPTOMS: Yes  NERVOUS SYSTEM SYMPTOMS: Yes  MENTAL HEALTH SYMPTOMS: No  Congestion: No  Trouble swallowing: Yes   Voice hoarseness: Yes  Mouth sores: No  Tooth pain: No  Gum tenderness: No  Bleeding gums: No  Change in taste: No  Change in sense of smell: No  Dry mouth: Yes  Hearing aid used: Yes  Neck lump: No  Loss of appetite: No  Weight gain: No  Fatigue: Yes  Night sweats: No  Increased stress: No  Excessive hunger: No  Feeling hot or cold when others believe the temperature is normal:  Yes  Loss of height: Yes  Post-operative complications: No  Surgical site pain: No  Change in or Loss of Energy: Yes  Hyperactivity: No  Confusion: No  Vision loss: No  Dry eyes: Yes  Watery eyes: No  Eye bulging: No  Flashing of lights: No  Spots: No  Floaters: No  Crossed eyes: No  Tunnel Vision: No  Yellowing of eyes: No  Eye irritation: No  Pain in legs with walking: No  Fingers or toes appear blue: No  High blood pressure: No  Low blood pressure: No  Fainting: No  Murmurs: No  Pacemaker: No  Varicose veins: Yes  Wake up at night with shortness of breath: No  Light-headedness: Yes  Exercise intolerance: No  Bloating: No  Rectal or Anal pain: No  Fecal incontinence: No  Yellowing of skin or eyes: No  Vomit with blood: No  Change in stools: No  Trouble holding urine or incontinence: No  Increased frequency of urination: No  Decreased frequency of urination: No  Frequent nighttime urination: Yes  Difficulty emptying bladder: No  Swollen joints: No  Joint pain: Yes  Bone pain: No  Muscle cramps: Yes  Muscle weakness: No  Joint stiffness: Yes  Bone fracture: No  Edema or swelling: No  Anemia: Yes  Swollen glands: No  Trouble with coordination: No  Difficulty walking: No  Paralysis: No  Numbness: Yes          Again, thank you for allowing me to participate in the care of your patient.      Sincerely,    Saul Berry MD

## 2023-05-30 ENCOUNTER — LAB (OUTPATIENT)
Dept: LAB | Facility: CLINIC | Age: 83
End: 2023-05-30
Payer: COMMERCIAL

## 2023-05-30 DIAGNOSIS — M06.9 RHEUMATOID ARTHRITIS INVOLVING MULTIPLE SITES, UNSPECIFIED WHETHER RHEUMATOID FACTOR PRESENT (H): ICD-10-CM

## 2023-05-30 DIAGNOSIS — Z79.899 HIGH RISK MEDICATION USE: ICD-10-CM

## 2023-05-30 LAB
ALBUMIN SERPL BCG-MCNC: 4.4 G/DL (ref 3.5–5.2)
ALT SERPL W P-5'-P-CCNC: 14 U/L (ref 10–35)
AST SERPL W P-5'-P-CCNC: 25 U/L (ref 10–35)
BASOPHILS # BLD AUTO: 0 10E3/UL (ref 0–0.2)
BASOPHILS NFR BLD AUTO: 1 %
CREAT SERPL-MCNC: 0.97 MG/DL (ref 0.51–0.95)
CRP SERPL-MCNC: <3 MG/L
EOSINOPHIL # BLD AUTO: 0.4 10E3/UL (ref 0–0.7)
EOSINOPHIL NFR BLD AUTO: 10 %
ERYTHROCYTE [DISTWIDTH] IN BLOOD BY AUTOMATED COUNT: 14.2 % (ref 10–15)
ERYTHROCYTE [SEDIMENTATION RATE] IN BLOOD BY WESTERGREN METHOD: 37 MM/HR (ref 0–30)
GFR SERPL CREATININE-BSD FRML MDRD: 59 ML/MIN/1.73M2
HCT VFR BLD AUTO: 32.3 % (ref 35–47)
HGB BLD-MCNC: 10.7 G/DL (ref 11.7–15.7)
IMM GRANULOCYTES # BLD: 0 10E3/UL
IMM GRANULOCYTES NFR BLD: 0 %
LYMPHOCYTES # BLD AUTO: 1 10E3/UL (ref 0.8–5.3)
LYMPHOCYTES NFR BLD AUTO: 27 %
MCH RBC QN AUTO: 32.2 PG (ref 26.5–33)
MCHC RBC AUTO-ENTMCNC: 33.1 G/DL (ref 31.5–36.5)
MCV RBC AUTO: 97 FL (ref 78–100)
MONOCYTES # BLD AUTO: 0.4 10E3/UL (ref 0–1.3)
MONOCYTES NFR BLD AUTO: 11 %
NEUTROPHILS # BLD AUTO: 1.8 10E3/UL (ref 1.6–8.3)
NEUTROPHILS NFR BLD AUTO: 50 %
PLATELET # BLD AUTO: 208 10E3/UL (ref 150–450)
RBC # BLD AUTO: 3.32 10E6/UL (ref 3.8–5.2)
WBC # BLD AUTO: 3.6 10E3/UL (ref 4–11)

## 2023-05-30 PROCEDURE — 84450 TRANSFERASE (AST) (SGOT): CPT

## 2023-05-30 PROCEDURE — 86140 C-REACTIVE PROTEIN: CPT

## 2023-05-30 PROCEDURE — 36415 COLL VENOUS BLD VENIPUNCTURE: CPT

## 2023-05-30 PROCEDURE — 84460 ALANINE AMINO (ALT) (SGPT): CPT

## 2023-05-30 PROCEDURE — 82040 ASSAY OF SERUM ALBUMIN: CPT

## 2023-05-30 PROCEDURE — 85652 RBC SED RATE AUTOMATED: CPT

## 2023-05-30 PROCEDURE — 85025 COMPLETE CBC W/AUTO DIFF WBC: CPT

## 2023-05-30 PROCEDURE — 82565 ASSAY OF CREATININE: CPT

## 2023-05-31 ENCOUNTER — MYC MEDICAL ADVICE (OUTPATIENT)
Dept: ORTHOPEDICS | Facility: CLINIC | Age: 83
End: 2023-05-31
Payer: COMMERCIAL

## 2023-05-31 ENCOUNTER — ONCOLOGY VISIT (OUTPATIENT)
Dept: ONCOLOGY | Facility: CLINIC | Age: 83
End: 2023-05-31
Attending: INTERNAL MEDICINE
Payer: COMMERCIAL

## 2023-05-31 VITALS
TEMPERATURE: 97.9 F | DIASTOLIC BLOOD PRESSURE: 63 MMHG | WEIGHT: 95.2 LBS | HEART RATE: 85 BPM | BODY MASS INDEX: 16.34 KG/M2 | SYSTOLIC BLOOD PRESSURE: 108 MMHG | RESPIRATION RATE: 14 BRPM | OXYGEN SATURATION: 98 %

## 2023-05-31 DIAGNOSIS — D50.0 IRON DEFICIENCY ANEMIA DUE TO CHRONIC BLOOD LOSS: ICD-10-CM

## 2023-05-31 DIAGNOSIS — D63.8 ANEMIA OF CHRONIC DISEASE: Primary | ICD-10-CM

## 2023-05-31 DIAGNOSIS — D72.819 CHRONIC LEUKOPENIA: ICD-10-CM

## 2023-05-31 PROCEDURE — G0463 HOSPITAL OUTPT CLINIC VISIT: HCPCS | Performed by: INTERNAL MEDICINE

## 2023-05-31 PROCEDURE — 99214 OFFICE O/P EST MOD 30 MIN: CPT | Performed by: INTERNAL MEDICINE

## 2023-05-31 ASSESSMENT — PAIN SCALES - GENERAL: PAINLEVEL: MILD PAIN (3)

## 2023-05-31 NOTE — PROGRESS NOTES
[facility]  Outpatient MNT     Time Spent: 30 minutes  Visit Type: Initial  Referring Physician: Greg Clements  Reason for RD Visit: FTT in adult, protein-calorie malnutrition, CKD 3a  Pt accompanied by: self     Nutrition Assessment  H/o RA, anemia, CKD III per pt. She reports unintentional weight loss down to ~90 lbs on her home scale (~95 lbs here in clinic). She is unsure why she lost weight. No N/V/D, appetite is baseline. She reports h/o acid reflux, but now controlled w/ meds. She reports forcing self to eat snacks.     Vitamins, Supplements, Pertinent Meds: calcium with vit D, ocutive, folic acid   Herbal Medicines/Supplements: not asked    Weight hx:   Wt Readings from Last 10 Encounters:   05/31/23 43.2 kg (95 lb 3.2 oz)   05/11/23 42.3 kg (93 lb 3.2 oz)   04/06/23 42.2 kg (93 lb 1.6 oz)   03/31/23 42.3 kg (93 lb 4.8 oz)   03/30/23 42.7 kg (94 lb 1 oz)   03/07/23 42.8 kg (94 lb 4.8 oz)   02/07/23 43.5 kg (95 lb 12.8 oz)   02/01/23 44.2 kg (97 lb 8 oz)   01/13/23 43.5 kg (96 lb)   09/22/22 45.4 kg (100 lb)     Labs: 5/30 eGFR 59, stable x 1.5 years  K 4.3 Phos 4    Diet Recall  Day #1  Avocado toast with 1 piece bread and half avocado   Coffee (black)  1 cup corn flakes with 1/2 cup milk (skim)  Carrots & hummus  1/2 pack miso ramen soup with egg and veggies, 2 cantaloupe slices  Banana, 2 belvita BF biscuits  5 oz pork chop, cranberry sauce, small potatoes (roasted), grilled asparagus, cantaloupe    Day #2  Oatmeal with 1/2 banana and handful of blueberries  Bowl of corn flakes with 1/2 cup milk  2 wedges cantaloupe  Grilled cheese s/w with whole grain oat nut bread, 12 sticks baby carrots with hummus, few pieces cantaloupe  4 oz grilled halibut, 3-4 T risotto, 1 corn on the cob  Toasted oat cereal with 1/2 cup milk and handful of blueberries    Day #3  1 slice PB toast with 1/2 banana  [facility] restaurant- [facility] noodle salad (noodles, lettuce, cucumber, bean sprouts, chopped peanuts, egg  rolls)  1 piece corn on the cob, pork & veggie stir durant over 1 cup white rice     Physical Activity  2 senior exercise classes/week - 45 minutes   Walks additionally 1 mile 2-3x/week (prev would walk further, but reduced distance d/t fatigue), spine issue is a barrier     Anthropometrics   Dosing wt: 93 lbs/42 kg    Estimated Nutrition Needs   Protein: 35-42 grams/day (0.8-1+ g/kg) - err on higher side for weight repletion & CKD    Nutrition Diagnosis  Unintended weight loss related to unknown etiology as evidenced by ~10 lb weight loss x 9 months with unknown etiology.     Nutrition Intervention  Reviewed ways to substitute current foods for higher calorie, nutrient-dense options:  -- Add heavy cream to your coffee  -- Use 2% or whole milk in your cereal  -- Try a higher calorie cereal or use granola as cereal. Try Grape Nuts or Special K Granola  -- Add cream, josh seeds/flax seed, chopped nuts and or almond or peanut butter to your oatmeal  -- Snack on trail mix, apple or banana with peanut butter, cheese sticks, full fat Greek yogurt (Oui, Noosa, Fage 5%, Chobani 5%, Siggis whole milk or triple cream yogurt). You can add granola or nuts/seeds to yogurt to increase calories   -- Add butter or oil to veggies, potato, meats, pasta, rice, etc     Reviewed making a high calorie smoothie, trying a protein bar occasionally, etc.   Would not overly worry about protein intake with kidney function, as she may become more malnourished if she restricts protein.     Patient Understanding: Pt verbalized understanding of education provided.  Expected Engagement: Good  Follow-Up Plans: PRN     Nutrition Goals  Weight gain >90 lbs per pt home scale     Anabella Newman, RD, LD, CCTD

## 2023-05-31 NOTE — PROGRESS NOTES
"Oncology Rooming Note    May 31, 2023 3:50 PM   Patrick Olson is a 82 year old female who presents for:    Chief Complaint   Patient presents with     Oncology Clinic Visit     Initial Vitals: /63   Pulse 85   Temp 97.9  F (36.6  C) (Oral)   Resp 14   Wt 43.2 kg (95 lb 3.2 oz)   SpO2 98%   BMI 16.34 kg/m   Estimated body mass index is 16.34 kg/m  as calculated from the following:    Height as of 5/11/23: 1.626 m (5' 4\").    Weight as of this encounter: 43.2 kg (95 lb 3.2 oz). Body surface area is 1.4 meters squared.  Mild Pain (3) Comment: Data Unavailable   No LMP recorded. Patient is postmenopausal.  Allergies reviewed: Yes  Medications reviewed: Yes    Medications: Medication refills not needed today.  Pharmacy name entered into [facility]: [id number] - [place], [place] - [address] [place] [address]    Clinical concerns: no       Shari J. Schoenberger, CMA            "

## 2023-05-31 NOTE — LETTER
5/31/2023         RE: Patrick Olson  [address] [facility] [id number]        Dear Colleague,    Thank you for referring your patient, Patrick Olson, to the [facility] [place]. Please see a copy of my visit note below.    [facility]    Hematology/Oncology Established Patient Follow-up Note      Today's Date: 5/31/23    Reason for Follow-up:  Iron deficiency anemia.    HISTORY OF PRESENT ILLNESS: Patrick Olson is an 82 year old female with rheumatoid arthritis on methotrexate who presents with iron deficiency anemia. She has history of gastric AVM, small bowel AVM, cecal polyp, duodenal erosions, and diverticular hemorrhage.    She had an EGD, colonoscopy, and VCE in 1/2023 that showed duodenal erosioons, small bowel angiectasia, and diverticulosis without bleeding. She was started on omeprazole and is following with GI at [facility].    2/7/2023 Labs showed Hgb 11.1, WBC 3.2, platelets 247,000, creatinine 1.03, vitamin B12 elevated at 1434. 3/7/2023 Ferritin was normal at 96, iron elevated at 163, TIBC 283, iron saturation index elevated at 58. 3/27/2023 TSH normal at 1.13.      INTERIM HISTORY:  Patrick reports continued chronic fatigue. She requires rest/nap time in the afternoons. She is hoping to travel in the fall.      REVIEW OF SYSTEMS:   14 point ROS was reviewed and is negative other than as noted above in HPI.       HOME MEDICATIONS:  Current Outpatient Medications   Medication Sig Dispense Refill     acetaminophen (TYLENOL) 650 MG CR tablet Take 650 mg by mouth every 8 hours as needed for fever or pain       calcium carbonate-vitamin D (CALTRATE) 600-10 MG-MCG per tablet Take 1 tablet by mouth 2 times daily       famotidine (PEPCID) 20 MG tablet TAKE 1 TABLET BY MOUTH TWICE A DAY       gabapentin (NEURONTIN) 300 MG capsule Take 1 capsule (300 mg) by mouth 3 times daily Take 300mg in morning, 300mg in afternoon and 300mg (Patient taking differently: Take 600  mg by mouth At Bedtime Take 300mg in morning, 300mg in afternoon and 300mg) 90 capsule 1     hydroxychloroquine (PLAQUENIL) 200 MG tablet Take 1 tablet (200 mg) by mouth daily for 180 days 30 tablet 5     lifitegrast (XIIDRA) 5 % opthalmic solution 1 drop 2 times daily       Multiple Vitamins-Minerals (OCUVITE PRESERVISION PO) Take 1 tablet by mouth 2 times daily       omeprazole (PRILOSEC) 40 MG DR capsule Take 1 capsule (40 mg) by mouth daily 90 capsule 3     pilocarpine (SALAGEN) 5 MG tablet Take 5 mg by mouth 2 times daily as needed Take 1-2 tablets 2 times daily as needed       vitamin D3 (CHOLECALCIFEROL) 50 mcg (2000 units) tablet Take 1 tablet by mouth daily           ALLERGIES:  Allergies   Allergen Reactions     Bee Venom Anaphylaxis     Shrimp Anaphylaxis     Evoxac [Cevimeline] Unknown     Prevnar Swelling     Other reaction(s): Edema     Prevnar [Pneumococcal 13-Linda Conj Vacc] Unknown         PAST MEDICAL HISTORY:  Past Medical History:   Diagnosis Date     Anemia      CKD (chronic kidney disease) stage 3, GFR 30-59 ml/min (H)      Diverticulosis of large intestine      Osteoarthritis      Osteoporosis      Rheumatoid arthritis (H)      Sensorineural hearing loss          PAST SURGICAL HISTORY:  Past Surgical History:   Procedure Laterality Date     CAPSULE/PILL CAM ENDOSCOPY N/A 11/18/2021    Procedure: IMAGING PROCEDURE, GI TRACT, INTRALUMINAL, VIA CAPSULE;  Surgeon: Deric Rodriguez MD;  Location:  GI     CAPSULE/PILL CAM ENDOSCOPY N/A 2/14/2023    Procedure: IMAGING PROCEDURE, GI TRACT, INTRALUMINAL, VIA CAPSULE;  Surgeon: Jaime Mesa MD;  Location:  GI     COLONOSCOPY N/A 03/14/2017    Procedure: COMBINED COLONOSCOPY, SINGLE OR MULTIPLE BIOPSY/POLYPECTOMY BY BIOPSY;  Surgeon: Spencer Downey MD;  Location:  GI     COLONOSCOPY N/A 9/22/2022    Procedure: COLONOSCOPY, FLEXIBLE, WITH LESION REMOVAL USING SNARE;  Surgeon: Kirby Arthur MD;  Location:  GI     COLONOSCOPY N/A  1/13/2023    Procedure: COLONOSCOPY;  Surgeon: Spencer Downey MD;  Location: [facility] OR     ENTEROSCOPY SMALL BOWEL N/A 11/20/2021    Procedure: ENTEROSCOPY, colonoscopy with endoscopic mucosal resection and tattoo placement;  Surgeon: Kirby Arthur MD;  Location: [facility] OR     ESOPHAGOSCOPY, GASTROSCOPY, DUODENOSCOPY (EGD), COMBINED N/A 2/6/2023    Procedure: ESOPHAGOGASTRODUODENOSCOPY (EGD);  Surgeon: Spencer Downey MD;  Location: [facility] GI     IR VISCERAL EMBOLIZATION  01/22/2021     ORTHOPEDIC SURGERY Left     hip replacment     SIGMOIDOSCOPY FLEXIBLE N/A 01/23/2021    Procedure: SIGMOIDOSCOPY, FLEXIBLE;  Surgeon: Jaime Steinberg MD;  Location:  GI         SOCIAL HISTORY:  Social History     Socioeconomic History     Marital status:      Spouse name: Not on file     Number of children: Not on file     Years of education: Not on file     Highest education level: Not on file   Occupational History     Not on file   Tobacco Use     Smoking status: Former     Smokeless tobacco: Never   Vaping Use     Vaping status: Not on file   Substance and Sexual Activity     Alcohol use: No     Drug use: No     Sexual activity: Not on file   Other Topics Concern     Not on file   Social History Narrative    - Retired (formerly employed as  at [employer])    - 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      PHYSICAL EXAM:  Vital signs:  /63   Pulse 85   Temp 97.9  F (36.6  C) (Oral)   Resp 14   Wt 43.2 kg (95 lb 3.2 oz)   SpO2 98%   BMI 16.34 kg/m     GENERAL: No acute distress.  EYES: No scleral icterus. No overt erythema.  RESPIRATORY: No audible cough, wheezing, or labored breathing.  MUSCULOSKELETAL: Range of motion in the neck, shoulders, and arms appear normal.  SKIN: No overt rashes, discolorations, or lesions over the face and neck.  NEUROLOGIC: Alert.  No overt tremors.  PSYCHIATRIC: Normal affect and mood.  Does not appear anxious.      LABS:  CBC RESULTS: Recent Labs   Lab Test 05/30/23  1024   WBC 3.6*   RBC 3.32*   HGB 10.7*   HCT 32.3*   MCV 97   MCH 32.2   MCHC 33.1   RDW 14.2        Last Comprehensive Metabolic Panel:  Sodium   Date Value Ref Range Status   05/11/2023 143 136 - 145 mmol/L Final   01/23/2021 143 133 - 144 mmol/L Final     Potassium   Date Value Ref Range Status   05/11/2023 4.3 3.4 - 5.3 mmol/L Final   09/29/2022 4.7 3.4 - 5.3 mmol/L Final   01/23/2021 3.6 3.4 - 5.3 mmol/L Final     Chloride   Date Value Ref Range Status   05/11/2023 103 98 - 107 mmol/L Final   01/23/2021 114 (H) 94 - 109 mmol/L Final     Chloride (External)   Date Value Ref Range Status   10/24/2022 104 94 - 109 mmol/L Final     Carbon Dioxide   Date Value Ref Range Status   01/23/2021 26 20 - 32 mmol/L Final     Carbon Dioxide (CO2)   Date Value Ref Range Status   05/11/2023 26 22 - 29 mmol/L Final   09/29/2022 29 20 - 32 mmol/L Final     Anion Gap   Date Value Ref Range Status   05/11/2023 14 7 - 15 mmol/L Final   09/29/2022 3 3 - 14 mmol/L Final   01/23/2021 3 3 - 14 mmol/L Final     Glucose   Date Value Ref Range Status   05/11/2023 96 70 - 99 mg/dL Final   09/29/2022 100 (H) 70 - 99 mg/dL Final   01/23/2021 157 (H) 70 - 99 mg/dL Final     Urea Nitrogen   Date Value Ref Range Status   05/11/2023 21.3 8.0 - 23.0 mg/dL Final   09/29/2022 21 7 - 30 mg/dL Final   01/23/2021 6 (L) 7 - 30  mg/dL Final     Creatinine   Date Value Ref Range Status   05/30/2023 0.97 (H) 0.51 - 0.95 mg/dL Final   01/23/2021 0.75 0.52 - 1.04 mg/dL Final     GFR Estimate   Date Value Ref Range Status   05/30/2023 59 (L) >60 mL/min/1.73m2 Final     Comment:     eGFR calculated using 2021 CKD-EPI equation.   01/23/2021 76 >60 mL/min/[1.73_m2] Final     Comment:     Non  GFR Calc  Starting 12/18/2018, serum creatinine based estimated GFR (eGFR) will be   calculated using the Chronic Kidney Disease Epidemiology Collaboration   (CKD-EPI) equation.       Calcium   Date Value Ref Range Status   05/11/2023 9.8 8.8 - 10.2 mg/dL Final   01/23/2021 8.0 (L) 8.5 - 10.1 mg/dL Final     Bilirubin Total   Date Value Ref Range Status   05/11/2023 0.3 <=1.2 mg/dL Final   03/13/2017 0.5 0.2 - 1.3 mg/dL Final     Alkaline Phosphatase   Date Value Ref Range Status   05/11/2023 86 35 - 104 U/L Final   03/13/2017 77 40 - 150 U/L Final     ALT   Date Value Ref Range Status   05/30/2023 14 10 - 35 U/L Final   03/13/2017 12 0 - 50 U/L Final     AST   Date Value Ref Range Status   05/30/2023 25 10 - 35 U/L Final   03/13/2017 10 0 - 45 U/L Final     5/30/2023  Sed rate = 37  CRP undetectable.    PATHOLOGY:  None relevant.    IMAGING:  None relevant.    ASSESSMENT/PLAN:  Patrick Olson is a 82 year old female with the following issues:  1. Macrocytic anemia due to iron deficiency/blood loss, drug effect, of chronic disease  2. History of gastric and small bowel AVM, duodenal erosions, diverticular hemorrhage  3. Chronic leukopenia  --Discussed her anemia is of multifactorial etiology, including blood loss, iron deficiency, drug effect from Plaquenil, and anemia of chronic disease.  --Discussed her 5/11/2023 lab workup was negative for hemolysis.  Her sed rate is slightly elevated but CRP normal. SPEP showed no monoclonal protein. Iron studies are currently normal so she does not need to take oral iron supplement at this time.  --She will  "continue with intermittent GI follow-up.  --No indication for bone marrow biopsy unless significant change in her blood counts.  --Discussed her Hgb is too high to meet criteria for giving an MANDY.    4. Rheumatoid arthritis  --She is no longer on methotrexate and now on Plaquenil.  She follows with rheumatology and will revisit at end of June.    5. Weight loss  --She feels she has normal appetite.  --Lost 10 pounds since 10/2022.  --Could be related to her chronic diseases.  --She will see nutritionist.    I again answered questions she had regarding elevated B12.    Return in 4 months.    Shantal Brown MD   Hematology/Oncology  [facility]    Total time spent: 30 minutes in patient evaluation, counseling, documentation, and coordination of care.       Oncology Rooming Note    May 31, 2023 3:50 PM   Patrick Olson is a 82 year old female who presents for:    Chief Complaint   Patient presents with     Oncology Clinic Visit     Initial Vitals: /63   Pulse 85   Temp 97.9  F (36.6  C) (Oral)   Resp 14   Wt 43.2 kg (95 lb 3.2 oz)   SpO2 98%   BMI 16.34 kg/m   Estimated body mass index is 16.34 kg/m  as calculated from the following:    Height as of 5/11/23: 1.626 m (5' 4\").    Weight as of this encounter: 43.2 kg (95 lb 3.2 oz). Body surface area is 1.4 meters squared.  Mild Pain (3) Comment: Data Unavailable   No LMP recorded. Patient is postmenopausal.  Allergies reviewed: Yes  Medications reviewed: Yes    Medications: Medication refills not needed today.  Pharmacy name entered into [employer]: [id number] [address] [place] [address]    Clinical concerns: no       Shari J. Schoenberger, [facility]                Again, thank you for allowing me to participate in the care of your patient.        Sincerely,        Shantal Brown MD    "

## 2023-06-01 ENCOUNTER — TELEPHONE (OUTPATIENT)
Dept: ANESTHESIOLOGY | Facility: CLINIC | Age: 83
End: 2023-06-01
Payer: COMMERCIAL

## 2023-06-01 ENCOUNTER — ALLIED HEALTH/NURSE VISIT (OUTPATIENT)
Dept: TRANSPLANT | Facility: CLINIC | Age: 83
End: 2023-06-01
Attending: INTERNAL MEDICINE
Payer: COMMERCIAL

## 2023-06-01 DIAGNOSIS — R62.7 FAILURE TO THRIVE IN ADULT: ICD-10-CM

## 2023-06-01 DIAGNOSIS — E46 PROTEIN-CALORIE MALNUTRITION, UNSPECIFIED SEVERITY (H): ICD-10-CM

## 2023-06-01 DIAGNOSIS — N18.31 CHRONIC KIDNEY DISEASE, STAGE 3A (H): ICD-10-CM

## 2023-06-01 PROCEDURE — 97802 MEDICAL NUTRITION INDIV IN: CPT | Performed by: DIETITIAN, REGISTERED

## 2023-06-01 NOTE — PATIENT INSTRUCTIONS
Emmanuel Nguyen,  Nice meeting with you.  Here are some ideas to help with weight gain:  -- Add heavy cream to your coffee  -- Use 2% or whole milk in your cereal  -- Try a higher calorie cereal or use granola as cereal. Try Grape Nuts or Special K Granola  -- Add cream, josh seeds/flax seed, chopped nuts and or almond or peanut butter to your oatmeal  -- Snack on trail mix, apple or banana with peanut butter, cheese sticks, full fat Greek yogurt (Oui, Noosa, Fage 5%, Chobani 5%, Siggis whole milk or triple cream yogurt). You can add granola or nuts/seeds to yogurt to increase calories   -- Add butter or oil to veggies, potato, meats, pasta, rice, etc   -- Consider having a spoonful of peanut butter before bed    Make a homemade smoothie and freeze portions of it in smaller cups and take out of the freezer as needed. Consider adding whole milk, half and half, ice cream or full fat yogurt, fruit, part of an avocado, nuts/nut butter, cream cheese.     Some protein bars to consider: Power Crunch Protein Bar, Premier Protein, Atkins, Think!, Pure Protein, Quest    Sources of Protein  Below is a list of high protein foods. Your goal is 45 grams per day. This is not too much for your kidneys, but will help with weight gain.  For animal proteins (chicken, beef, fish), deck of cards size is approximately 3 ounces or 24 grams protein.  Food Portion  Grams of Protein   Animal proteins (chicken, turkey, venison, fish/seafood, red meat) 1 ounce  7-9   Egg  1  6   Cottage cheese  1/4 cup  7    Cheese  1 ounce (1 slice, 1 cheese stick) 6   Milk (cow's) 4 ounces or 1/2 cup  4   Dry skim milk powder 2 Tbsp  4   Ricotta cheese  1/4 cup  7    Ivins Instant Breakfast (made with milk) 1/2 cup  7   Pudding 1/2 cup  4   Yogurt (ie Yoplait) 1/2 cup  5   Greek yogurt 5 oz container 15   Tofu  1/4 cup  5   Soymilk  1/2 cup  3   Tempeh  1/4 cup  8   Lentils  1/4 cup 5   Kidney beans, black beans, etc 1/4 cup  4   Chickpeas 1/4 cup  4   Veggie  burger  1 zoey  7   Soy nuts  1/4 cup  17   Sunflower seeds  2 Tbsp  8   Peanuts  1 ounce 7   Almonds  15 6   Pistachios 25 6   Peanut/almond butter 2 Tbsp  8      Protein bars, ready-to-drink products (ie Premier Protein, Boost, Ensure, etc), or protein powders are other options to supplement your protein intake.

## 2023-06-03 DIAGNOSIS — M25.531 ACUTE PAIN OF RIGHT WRIST: Primary | ICD-10-CM

## 2023-06-05 ENCOUNTER — TELEPHONE (OUTPATIENT)
Dept: ANESTHESIOLOGY | Facility: CLINIC | Age: 83
End: 2023-06-05
Payer: COMMERCIAL

## 2023-06-07 ENCOUNTER — OFFICE VISIT (OUTPATIENT)
Dept: GASTROENTEROLOGY | Facility: CLINIC | Age: 83
End: 2023-06-07
Payer: COMMERCIAL

## 2023-06-07 VITALS
DIASTOLIC BLOOD PRESSURE: 68 MMHG | WEIGHT: 93.7 LBS | BODY MASS INDEX: 16 KG/M2 | OXYGEN SATURATION: 100 % | SYSTOLIC BLOOD PRESSURE: 129 MMHG | HEIGHT: 64 IN | HEART RATE: 71 BPM

## 2023-06-07 DIAGNOSIS — K57.31 DIVERTICULOSIS OF LARGE INTESTINE WITH HEMORRHAGE: ICD-10-CM

## 2023-06-07 DIAGNOSIS — R10.13 DYSPEPSIA: ICD-10-CM

## 2023-06-07 DIAGNOSIS — K55.20 ANGIODYSPLASIA OF SMALL INTESTINE: Primary | ICD-10-CM

## 2023-06-07 DIAGNOSIS — K59.00 CONSTIPATION, UNSPECIFIED CONSTIPATION TYPE: ICD-10-CM

## 2023-06-07 PROCEDURE — 99417 PROLNG OP E/M EACH 15 MIN: CPT | Performed by: INTERNAL MEDICINE

## 2023-06-07 PROCEDURE — 99215 OFFICE O/P EST HI 40 MIN: CPT | Performed by: INTERNAL MEDICINE

## 2023-06-07 ASSESSMENT — PAIN SCALES - GENERAL: PAINLEVEL: MODERATE PAIN (4)

## 2023-06-07 NOTE — NURSING NOTE
"Chief Complaint   Patient presents with     Follow Up       Vitals:    06/07/23 0854   BP: 129/68   Pulse: 71   SpO2: 100%   Weight: 42.5 kg (93 lb 11.2 oz)   Height: 1.626 m (5' 4\")       Body mass index is 16.08 kg/m .    [employer]    "

## 2023-06-07 NOTE — PROGRESS NOTES
GI CLINIC VISIT    CC:  Follow-up      ASSESSMENT/PLAN:  (K55.20) Angiodysplasia of small intestine  (primary encounter diagnosis)  (K57.31) Diverticulosis of large intestine with hemorrhage  (R10.13) Dyspepsia  (K59.00) Constipation, unspecified constipation type       GI bleeding -as noted in previous documentation her clinical presentation has varied over time so suspect there is not one  of these bleeds (large polyp bleeding, diverticular, angioectasias, potentially duodenal erosions). Recent work up in January 2023 with EGD, colonoscopy, and VCE revealed duodenal erosions, small bowel angiectasia, and diverticulosis without bleeding. Given darker blood and no evidence of bleeding at diverticula shortly after bleeding onset, UGI angioectasia bleed supsected, though noted duodenal erosions could contribute as well. Now s/p treatment with 3 months PPI (for duodenal erosions and dyspepsia).     Certainly diverticular bleeding and AVM bleeding could recur and previously discussed when to present to the ED for further evaluation. Given finding of prior AVM, there will likely be additional AVMs. This should be addressed with periodic hgb checks and iron replacement as necessary (oral or infusions). Iron replacement per hematology and primary teams, noted ferritin and iron levels are adequate on recent lab check. Ablative therapies (EGD, colonoscopy, and deep SB enteroscopy with use of APC) would not be appropriate unless there is active overt (visible) bleeding.  Disease-modifying therapies (SQ octreotide) would be considered in those patients refractory to the above measures, generally with repeated hospitalizations.    Improved dyspepsia after PPI and famotidine. Will stop PPI after 12 weeks and continue H2 blocker as outlined below. If symptoms recur, would restart PPI (likely would not need both PPI and H2 blocker).      Continue dietary changes as per dietitian and continue efforts for high fiber intake to  optimize stool consistency. Consider increasing MIralax to a few times a week given issues with straining with periods of harder stools. No current prolapse, rectal pain, obstructive defecation symptoms etc. Discussed optimizing body positioning to ease defecation, placing feet on stool.   Monitor response.    On ROS pt with sense of food sticking, primarily large boluses of dry foods. Discussed optimal chewing and swallowing strategies.       Plan:  - ok to stop omeprazole when the prescription runs out, if symptoms return, let us know and we can discuss restarting this  - continue famotidine, ok to go back to twice daily once you complete your omeprazole  - continue Hgb checks and iron replacement per hematology and primary teams  - continue a high fiber diet  - consider using Miralax 2-3 times a week  - place a stool under your feet while seated on the toilet to optimize positioning for defecation  - continue your excellent work with your dietitian  - cut food into small bites/take very small bites, chew thoroughly, take a sip of liquid before and after every swallow, pause after every swallow to allow for food to pass down before the next bite  - follow-up in GI clinic in 6 months      It was a pleasure to participate in the care of this patient; please contact us with any further questions.  A total of 55 face-to-face minutes was spent with this patient, >50% of which was counseling regarding the above delineated issues. An additional 25 minutes was spent on the date of the encounter doing chart review, documentation, care coordination, and further activities as noted above.    Altagracia Martinez MD   of Medicine  Division of Gastroenterology, Hepatology and Nutrition  [facility]    ---------------------------------------------------------------------------------------------------  HPI:     Ms. Olson is an 82 year old female with RA (stopped methotrexate March 2023, now on  "hydroxycholoroquine) and secondary Sjogren's, lumbar degenerative disc disease, chronic lumbago without radiculopathy, more recent right shoulder pain, OA, osteoporosis, and recurrent GI bleeding (AVM, possible diverticular, polyp-related --> see below) who was originally referred to GI clinic due to her bleeding history as well as early satiety with dyspepsia. She has previously followed with [facility] ([facility] for her GI care (since 2017 per her report) and has undergone endoscopic exams at [facility], and [facility] since 2010.    She was initially seen in our group by this writer on 1/12/2022 and her most recent follow-up was with PASCALE Rosario 3/31/2023.  Please see prior GI documentation for further details.      *GI bleeding  *Anemia, multifactorial  Summarize/taken from my prior documentation:  Dating back several years per her recollection (2010 based on available records) she began to have recurrent overt GI bleeding with anemia. The bleeding was described as bright red. She underwent inpatient colonoscopic exams with no active bleeding found. She's been seen to have both diverticulosis and hemorrhoids on these exams. At these times, diverticular bleeding had been felt to be a contributor given her clinical presentation, though no active bleeding lesion was ever identified.       She had EGD and colonoscopy 5/26/21 at [facility] for anemia. EGD revealed a non-bleeding gastric AVM which was treated with APC. Colonoscopy demonstrated diverticulosis and small hemorrhoids.      Ms. Olson was referred to GI given this bleeding history and anemia in 2021, but prior to the appointment she was admitted to [facility] with hematochezia (per patient this was \"a bit darker\" than her prior bleeding episodes) in November 2021. A CTA did not demonstrate any active extravasation. Thus a capsule was done with bleeding seen in the distal ileum to cecum on prelim capsule " "read. Colonoscopy with ileal intubation  (20 cm beyond the IC valve) was done thereafter with no active bleeding found, but a very large 30 mm polyp was found on the IC valve and removed via EMR. This was felt to be a likely source of bleeding. No other findings were seen in the area of bleeding noted on capsule endoscopy.       In regards to her anemia, Ms. Olson has been evaluated by both her PCP and hematology. Per records it seems she was felt to have a component of anemia of chronic disease, though in the setting of GI bleeding she had received IV iron infusions in the past.  She is also taking oral iron supplementation at various times.      Initially we discussed approach to management of anemia in the setting of chronic GI blood loss from AVMs.  Reviewed that intervention is supportive with ablative therapies only undertaken if active GI hemorrhage is occurring.  Notably polyp bleeding as well as diverticular bleeding have also been suspected at various times in the past.    At her last GI clinic visit, Ms. Morgan's reports that she had no issues with bleeding for a couple of years but ultimately had recurrence in January 2023 with somewhat \"darker\" stools. She underwent colonoscopy 1/13/23 which showed no inflammation but residual specks of blood throughout bowel (to 30 cm to TI), extensive diverticulosis with no active bleeding.  Bleeding from angiectasia's was again suspected and plan was for EGD with capsule.  EGD was subsequently completed 2/6/23 which showed duodenal erosion without bleeding, normal stomach and esophagus. VCE the following week 2/14/23 showed multiple localized erosions with no bleeding in the duodenum (likely first portion), single small angioectasia without bleeding was seen in the small bowel (probable duodenum), and lymphangiectasia in the small bowel (mid small bowel), one of these was peduncluated and polypoid, not bleeding, benign appearing.  A single spot with no bleeding was " found in the colon (probable cecum).  Given no active hemorrhage, there were no endoscopically intervenable lesions.  This January bleeding episode reportedly lasted about a day before resolution and she has not had any ongoing bleeding since.    She continues to follow with hematology for her anemia.  She was most recently seen in May 2023.  Anemia is been attributed to GI blood loss, anemia of chronic disease, and medication effect.  Notably on her recent labs she had a a hemoglobin of 10.7 with MCV of 97.  Ferritin was 95 and iron was 61.     Today, she overall feels well but has a few questions about whether she can do more in regards to improving her anemia. She's had no further bleeding since January, but does report some fatigue around Jan and Feb, attributing some of this to blood loss and some to the efforts of getting through multiple bowel preps.       *Dyspepsia  At Ms. Olson' initial consultation she described a postprandial stomach pain which began in early 2021. For it she was started on famotidine 20 mg BID. She also increased her water and fiber intake and cut out gluten (now re-introduced).  She tracked her symptoms and felt that her stomach also hurt more when it was empty. She is working on more frequent snacking throughout the day. Over time, with all these interventions she feels the abdominal discomfort resolved.    At her last GI clinic visit, she reported increased dyspeptic symptoms and was placed on omeprazole.    Today, she reports that this has been quite helpful with resolution of her postprandial epigastric discomfort/pain.  She is taking this on an empty stomach, 30 min before eating.  She continues on famotidine, though cut her dose to once daily, taking this in the evening; she does forget this occasionally.      *Weight loss over months  Ms. Olson had reported at her last GI clinic visit a weight loss of about 9 pounds over 1 to 2 years.  Her other care team members had referred  "her to a dietitian and she has found there advice quite helpful.    She continues to report today that her appetite is good overall.  She does not feel that she is eating less or experiencing early satiety.  She reports that the focus of the recent dietitian visit was on getting the most nutrition and the amount of food she is already eating as opposed to \"just doubling\" the amount she eats.  She feels this has been going well so far and appreciates the diet advice is given with consideration of her diagnosis of CKD.      *Liver concerns  Ms. Olson had previously reported a diagnosis of \"fatty liver\" reportedly identified on imaging done through [facility] (a note mentions concern for steatosis based on a prior CT enterography). She states a liver MRI was done thereafter to follow-up which was reportedly normal.      We do have some these records now.  In January 2021 in our system his CTA was undertaken to identify bleeding source which did reveal some subcentimeter liver lesions.  This prompted the follow-up liver MRI done outside our system (through [facility]) in May 2021 (results are scanned in the media tab).  Per the available report the liver cysts were felt to be benign hemangiomas did not require any further follow-up.  No cirrhosis nor steatosis was seen on this imaging study.  Liver enzymes have been routinely checked through rheumatology clinic in relation to her RA medications; no marked LFT abnormalities have been noted.      *Constipation  Ms. Olson also has a history of constipation which was much worse with higher doses of oral iron.  At her initial GI appointment she reported using Miralax 1 capful daily. With that dose she moved her bowels 1-3 times a day.     Today, she shares that she is working hard to incorporate fiber into her diet to help improve her bowel movements.  She is currently moving her bowels bowels on most days.  Stool consistency is formed and soft most days, " sometimes a bit firmer - [place] 3, sometimes a bit looser, [place] 5.  She uses Miralax 1 capful prn, generally taking this when she feels her fiber intake has been lower the day or two prior.  She estimates that she uses Miralax about once a week. She has noted some issues with defecation, feeling that at times it's harder to push stool out and it takes a lot of time. It is more difficult the firmer the stool is. She denies prolapse or feeling as if she is pushing up against a wall.  She does report noting hemorrhoids at times in the past.  She has not had any hemorrhoidal bleeding as of late.         ROS:    Reports dry mouth from Sjogren's. Wonders what to do about sense of food sticking in mouth and throat. Note EGD in the last couple months which was normal.   Happens with larger, dry items (e.g. bread)    PROBLEM LIST  Patient Active Problem List    Diagnosis Date Noted     Lab test negative for COVID-19 virus 11/18/2021     Priority: Medium     Diverticulosis of large intestine      Priority: Medium     Acute lower GI bleeding 01/21/2021     Priority: Medium     Diverticular hemorrhage 01/20/2021     Priority: Medium     Gastrointestinal hemorrhage, unspecified gastrointestinal hemorrhage type 01/19/2021     Priority: Medium     GI bleed 03/13/2017     Priority: Medium     SNHL (sensorineural hearing loss) 11/29/2012     Priority: Medium       PERTINENT MEDICATIONS:  Current Outpatient Medications   Medication     acetaminophen (TYLENOL) 650 MG CR tablet     calcium carbonate-vitamin D (CALTRATE) 600-10 MG-MCG per tablet     famotidine (PEPCID) 20 MG tablet     gabapentin (NEURONTIN) 300 MG capsule     hydroxychloroquine (PLAQUENIL) 200 MG tablet     lifitegrast (XIIDRA) 5 % opthalmic solution     Multiple Vitamins-Minerals (OCUVITE PRESERVISION PO)     omeprazole (PRILOSEC) 40 MG DR capsule     pilocarpine (SALAGEN) 5 MG tablet     vitamin D3 (CHOLECALCIFEROL) 50 mcg (2000 units) tablet     No current  "facility-administered medications for this visit.         PHYSICAL EXAMINATION:  Vitals /68   Pulse 71   Ht 1.626 m (5' 4\")   Wt 42.5 kg (93 lb 11.2 oz)   SpO2 100%   BMI 16.08 kg/m     Wt   Wt Readings from Last 2 Encounters:   06/07/23 42.5 kg (93 lb 11.2 oz)   05/31/23 43.2 kg (95 lb 3.2 oz)      Gen: Pt sitting up in NAD, interactive and cooperative on exam  Eyes: sclerae anicteric, no injection  ENT:  OP clear, MMM  Cardiac: RRR, nl S1, S2, no peripheral edema  Resp: Clear bilaterally  GI: Normoactive BS, abd soft, flat  Skin: Warm, dry, no jaundice, nails appear healthy  Neuro: alert, oriented, answers questions appropriately      PERTINENT STUDIES:    Lab on 05/30/2023   Component Date Value Ref Range Status     Albumin 05/30/2023 4.4  3.5 - 5.2 g/dL Final     Creatinine 05/30/2023 0.97 (H)  0.51 - 0.95 mg/dL Final     GFR Estimate 05/30/2023 59 (L)  >60 mL/min/1.73m2 Final     Erythrocyte Sedimentation Rate 05/30/2023 37 (H)  0 - 30 mm/hr Final     CRP Inflammation 05/30/2023 <3.00  <5.00 mg/L Final     ALT 05/30/2023 14  10 - 35 U/L Final     AST 05/30/2023 25  10 - 35 U/L Final     WBC Count 05/30/2023 3.6 (L)  4.0 - 11.0 10e3/uL Final     RBC Count 05/30/2023 3.32 (L)  3.80 - 5.20 10e6/uL Final     Hemoglobin 05/30/2023 10.7 (L)  11.7 - 15.7 g/dL Final     Hematocrit 05/30/2023 32.3 (L)  35.0 - 47.0 % Final     MCV 05/30/2023 97  78 - 100 fL Final     MCH 05/30/2023 32.2  26.5 - 33.0 pg Final     MCHC 05/30/2023 33.1  31.5 - 36.5 g/dL Final     RDW 05/30/2023 14.2  10.0 - 15.0 % Final     Platelet Count 05/30/2023 208  150 - 450 10e3/uL Final     % Neutrophils 05/30/2023 50  % Final     % Lymphocytes 05/30/2023 27  % Final     % Monocytes 05/30/2023 11  % Final     % Eosinophils 05/30/2023 10  % Final     % Basophils 05/30/2023 1  % Final     % Immature Granulocytes 05/30/2023 0  % Final     Absolute Neutrophils 05/30/2023 1.8  1.6 - 8.3 10e3/uL Final     Absolute Lymphocytes 05/30/2023 1.0  " 0.8 - 5.3 10e3/uL Final     Absolute Monocytes 05/30/2023 0.4  0.0 - 1.3 10e3/uL Final     Absolute Eosinophils 05/30/2023 0.4  0.0 - 0.7 10e3/uL Final     Absolute Basophils 05/30/2023 0.0  0.0 - 0.2 10e3/uL Final     Absolute Immature Granulocytes 05/30/2023 0.0  <=0.4 10e3/uL Final

## 2023-06-07 NOTE — PATIENT INSTRUCTIONS
- ok to stop omeprazole when the prescription runs out, if symptoms return, let us know and we can discuss restarting this  - continue famotidine, ok to go back to twice daily once you complete your omeprazole  - continue Hgb checks and iron replacement per hematology and primary teams  - continue a high fiber diet  - consider using Miralax 2-3 times a week  - place a stool under your feet while seated on the toilet to optimize positioning for defecation  - continue your excellent work with your dietitian  - cut food into small bites/take very small bites, chew thoroughly, take a sip of liquid before and after every swallow, pause after every swallow to allow for food to pass down before the next bite  - follow-up in GI clinic in 6 months     If you have any questions, please don't hesitate to contact me through our GI RN Clinic Coordinator, Kailey Barron, at [phone].

## 2023-06-10 DIAGNOSIS — Z79.899 HIGH RISK MEDICATION USE: ICD-10-CM

## 2023-06-10 DIAGNOSIS — M06.9 RHEUMATOID ARTHRITIS INVOLVING MULTIPLE SITES, UNSPECIFIED WHETHER RHEUMATOID FACTOR PRESENT (H): ICD-10-CM

## 2023-06-12 ENCOUNTER — TELEPHONE (OUTPATIENT)
Dept: ANESTHESIOLOGY | Facility: CLINIC | Age: 83
End: 2023-06-12
Payer: COMMERCIAL

## 2023-06-12 RX ORDER — FOLIC ACID 1 MG/1
1 TABLET ORAL DAILY
Qty: 90 TABLET | Refills: 1 | Status: SHIPPED | OUTPATIENT
Start: 2023-06-12 | End: 2023-11-28

## 2023-06-12 NOTE — TELEPHONE ENCOUNTER
folic acid (FOLVITE) 1 MG tablet  Last Written Prescription Date:  4/22/22  Last Fill Quantity: 90,   # refills: 3  Last Office Visit : 3/30/23  Future Office visit:  6/29/23    Routing refill request to provider for review/approval because:  Drug not active on patient's medication list

## 2023-06-13 ENCOUNTER — ANCILLARY PROCEDURE (OUTPATIENT)
Dept: GENERAL RADIOLOGY | Facility: CLINIC | Age: 83
End: 2023-06-13
Attending: FAMILY MEDICINE
Payer: COMMERCIAL

## 2023-06-13 DIAGNOSIS — M25.511 ACUTE PAIN OF RIGHT SHOULDER: ICD-10-CM

## 2023-06-13 DIAGNOSIS — G89.29 CHRONIC PAIN OF RIGHT ELBOW: ICD-10-CM

## 2023-06-13 DIAGNOSIS — M25.521 CHRONIC PAIN OF RIGHT ELBOW: ICD-10-CM

## 2023-06-13 DIAGNOSIS — M25.531 ACUTE PAIN OF RIGHT WRIST: ICD-10-CM

## 2023-06-13 PROCEDURE — 73070 X-RAY EXAM OF ELBOW: CPT | Mod: RT

## 2023-06-13 PROCEDURE — 73030 X-RAY EXAM OF SHOULDER: CPT | Mod: RT

## 2023-06-13 PROCEDURE — 73130 X-RAY EXAM OF HAND: CPT | Mod: RT

## 2023-06-15 NOTE — TELEPHONE ENCOUNTER
3 attempts made to contact patient. Will wait for patient to call back to schedule.    [facility]  Surgery Scheduling [phone]  6/15/23 at 2:22 PM

## 2023-06-16 ENCOUNTER — MYC MEDICAL ADVICE (OUTPATIENT)
Dept: ORTHOPEDICS | Facility: CLINIC | Age: 83
End: 2023-06-16
Payer: COMMERCIAL

## 2023-06-20 DIAGNOSIS — M25.521 CHRONIC PAIN OF RIGHT ELBOW: Primary | ICD-10-CM

## 2023-06-20 DIAGNOSIS — G89.29 CHRONIC PAIN OF RIGHT ELBOW: Primary | ICD-10-CM

## 2023-06-20 NOTE — TELEPHONE ENCOUNTER
Patient would like to proceed with hand therapy referral. Please see referral pending and advise.    Fay Verdugo MBA, ATC

## 2023-06-21 NOTE — TELEPHONE ENCOUNTER
Provider placed order and contacted patient via [employer]. Closing encounter.     Kailey Finley MSA, ATC  Certified Athletic Trainer

## 2023-06-29 ENCOUNTER — OFFICE VISIT (OUTPATIENT)
Dept: RHEUMATOLOGY | Facility: CLINIC | Age: 83
End: 2023-06-29
Attending: INTERNAL MEDICINE
Payer: COMMERCIAL

## 2023-06-29 VITALS
SYSTOLIC BLOOD PRESSURE: 119 MMHG | WEIGHT: 93.4 LBS | DIASTOLIC BLOOD PRESSURE: 71 MMHG | HEART RATE: 79 BPM | BODY MASS INDEX: 16.03 KG/M2 | OXYGEN SATURATION: 99 % | TEMPERATURE: 98.2 F

## 2023-06-29 DIAGNOSIS — Z79.899 HIGH RISK MEDICATION USE: ICD-10-CM

## 2023-06-29 DIAGNOSIS — M06.9 RHEUMATOID ARTHRITIS INVOLVING MULTIPLE SITES, UNSPECIFIED WHETHER RHEUMATOID FACTOR PRESENT (H): Primary | ICD-10-CM

## 2023-06-29 PROCEDURE — 99214 OFFICE O/P EST MOD 30 MIN: CPT | Performed by: INTERNAL MEDICINE

## 2023-06-29 PROCEDURE — G0463 HOSPITAL OUTPT CLINIC VISIT: HCPCS | Performed by: INTERNAL MEDICINE

## 2023-06-29 RX ORDER — PREDNISONE 5 MG/1
TABLET ORAL
Qty: 42 TABLET | Refills: 0 | Status: SHIPPED | OUTPATIENT
Start: 2023-06-29 | End: 2023-07-20

## 2023-06-29 RX ORDER — PILOCARPINE HYDROCHLORIDE 5 MG/1
5 TABLET, FILM COATED ORAL DAILY
Qty: 90 TABLET | Refills: 1 | Status: SHIPPED | OUTPATIENT
Start: 2023-06-29 | End: 2023-12-29

## 2023-06-29 NOTE — NURSING NOTE
Chief Complaint   Patient presents with     RECHECK     3 months follow-up/ ok per Dr. Clements     /71 (BP Location: Right arm, Patient Position: Sitting, Cuff Size: Adult Regular)   Pulse 79   Temp 98.2  F (36.8  C) (Oral)   Wt 42.4 kg (93 lb 6.4 oz)   SpO2 99%   BMI 16.03 kg/m    Rafaela Link [facility]

## 2023-06-29 NOTE — LETTER
6/29/2023       RE: Patrick Olson  [address]  [facility] [address]     Dear Colleague,    Thank you for referring your patient, Patrick Olson, to the [facility] [place] [facility]. Please see a copy of my visit note below.      Outpatient Rheumatology follow-up    Name: Patrick Olson    MRN [id number]   Today's date: 6/29/23  Date of last visit: 3/30/23         Reason for follow-up: Rheumatoid arthritis on hydroxychloroquine   Requesting physician: Lauryn Tamayo MD             Assessment & Plan:   82-year-old female with a history of seropositive rheumatoid arthritis with secondary Sjogren's syndrome on methotrexate 10 mg weekly (previously on 20 mg weekly)/folic acid 2 mg daily and 5 mg Salagen nightly presented to rheumatology to establish care in Oct 2021.  At that time she had recently seen Dr Sen of hematology for evaluation and treatment of macrocytic anemia likely secondary to chronic inflammation vs methotrexate therapy vs recurrent diverticular bleeding most recently in January 2021 required hospitalization and transfusion.  Her seropositive rheumatoid arthritis was under good control and in remission on low-dose of methotrexate which was on the low end of therapeutic dose range.  Given the likely contribution to her anemia and possibly leukopenia in the context of well-controlled low disease activity we discussed other options for her inflammatory arthritis we did initially transition her from methotrexate to hydroxychloroquine and she did well on this from RA/sjogrens/cytopenia perspective, though she developed a rash which was evaluated by Dr Stevenson of Dermatology and thought NOT secondary to HCQ. However, patient still wished to discontinue HCQ and so we discussed going back on very low dose methotrexate while we monitor cell counts. We restarted methotrexate at a dose of 10mg once weekly along with  folic acid daily. Unfortunately, her WBC, after restarting dropped and continued to down trend. Reassuringly, her HGB has continued to uptrend and along with iron supplementation. We again discussed that the rash that she developed while on HCQ was very unlikely to be secondary to HCQ, especially given the evaluation she had with dermatologist, Dr Stevenson/his assessment/expertise in CTD and experience with HCQ. She was reassured by this. We elected to go back onto HCQ at that time. Her systemic autoimmune disease was well controlled on HCQ in the past, and should her leukopenia be either evidence of toxicity from methotrexate or a manifestation of her CTD, HCQ would be a more appropriate option in both regards. Of note, while she was on HCQ last year, her WBC had normalized.     Seropositive rheumatoid arthritis with secondary sjogrens syndrome  -No active inflammatory arthritis today by history or exam. Her quiet disease is further supported by her most recent ESR and CRP, both of which were negative/normal. Her CRP was negative. Her ESR was 37 which, when adjusted for gender and age is likely her baseline. He has never been lower than 35 over the last 6 years.   -Her disease is currently in remission on hydroxychloroquine monotherapy  - She continues on pilocarpine 5 mg once daily as chronic therapy which she is tolerating well and continues to derive benefit  - Ongoing back pains, chronic. Is going to see holistic medicine provider for acupuncture before proceeding with injection which is reasonable.   PLAN:  -continue HCQ 200mg once daily  -Follow-up with me     High risk medication use: HCQ  -No evidence of toxicity by history/exam today.  Most recent labs from 5/30/2023.  White blood cell count improving up to 3.6.  Hemoglobin was 10.7 which is around her baseline her platelets were normal.  HGB improving. PLT normal.  -Risks and benefits of HCQ discussed today to include rash (including severe rash/SJS/TEN),  HA, GI upset, hepatoxicity, retinal toxicity, need for yearly screening OCT exam, need for CBC, CMP, ESR, CRP for routine screening drug toxicity labs among others. Patient is agreeable.   -Labs due again in 6 months for routine screening drug toxicity monitoring of abnormal  -Continue routine screening yearly OCT exam, already has ophthalmologist that she is established with    #CKD 3a  -continues to follow with nephrology  -note renal function as we dose her systemic immunosuppression    Labs in August with nephrology as planned and will have my lab drawn at the time as well.  Standing orders are placed to include CBC, AST, ALT, CRP, ESR, creatinine.    Follow-up with me in 4 months time    Greg Clements MD  Rheumatology      Subjective:   Interval history 6/29/23  -July 7th acupuncture appointment. Trying this prior to her injection which has been recommended  -has continued on HCQ 200mg once daily  -Her joint pains come and go. Had right shoulder/elbow/wrist pain at different times. Self resolves within 2-3 weeks.   -Has appointent with hand OT in 2-3 weeks. Will be asking for brace for the wrist.   -Takes 2, 8 hour tylenol in the AM and 2 at night  -sitting for long periods is painful/difficult  -also with neck pain  -cannot eat large meals. Trying to eat high calorie dense meals. Trying to gain weight. She eats about 1200 calories per day. She is trying to bump it up to 1700 calories per day, but has not bumped up to this yet. She intermittently has 1700 and then back to   -Able to make a full fist upon waking in the AM  -denies red/hot/swollen joints.   -no new progressive fevers/chills/night sweats  -no interval infections  -has been tolerating HCQ well without noticeable side effects, GI cutaneous or other.  14 point ROS collected and negative if not documented above    March 30, 2023  -continues to lose weight, unintentional. Despite eating as much as possible of calorie dense foods, unable to stop  weight loss and unable to gain  -had low back injection one week ago, not too much helpful. Rates the pain 3-4/10. Before the injection rates the pain 6/10  -No fevers/chills/night sweats  -Saw ortho for her right shoulder, found to have rotator cuff pathology. Not secondary to active inflammatory arthritis etiology  -No red/hot/swollen joints.  -no prolonged EMS  -Does not find that her methotrexate is causing specific GI upset/nausea around the time of her once weekly dose. She is currently taking 10mg once weekly. Has also continued on folic acid 1mg once daily  14 point ROS collected and negative if not documented above     Interval History 5/19/22  Rash is continuing to improve. She changed/soap/fragrance. She had cortisone injection into her back 2 days ago, unable to sleep for 2 nights after that but now resolved and has notices much improvement. Today got up, did some cooking. Energy level improved. Has started on methotrexate and without any side effects. Started this on 4/23/22. Takes this each week on Monday evenings. No GI or other side effects. Also takes folic acid 1mg daily. Currently takes 10mg weekly and 1mg folic acid daily. No fatigue. Fevers/chills/night sweats. No infections since last visit. No joint pain. No red/hot/swollen joints. Minimal AM stiffness lasting for only a few minutes. She is looking forward to her upcoming trip to [place] with her family. No unintentional weight loss. 14 point ROS collected and negative.         Interval history 2/10/22  Did not notice any return or worsening of inflammatory arthritis with transition from methotrexate to Plaquenil.  Reports less than 5 minutes of early morning stiffness.  No red hot swollen joints.  Rates her pain at less than a 2 out of 10.  No rash, headache, change in vision, GI upset.  No interval infection.  Continues to have ongoing left greater than right intermittent hip pain which is worse when lying on that side.  She continues to be  active with frequent walking almost daily weather permitting.  Her hip pain is there a few days per month.  Resolves without intervention.  Had follow-up with GI on 01/12/2022.  They plan for repeat colonoscopy in 6 to 12 months.  She was put on a course of prednisone after a visit with sports medicine for her established lumbar disc disease and likely exacerbation.  She had a positive response of this for her axial pain.  She did not note much difference in regards to her peripheral arthritis symptoms as they had been inactive prior to starting this prednisone course.  Review of systems otherwise negative.    History from initial consultation in 10/2021  Patient with a diagnosis of seropositive rheumatoid arthritis and secondary Sjogren's syndrome on methotrexate 10 mg weekly, folic acid 2 mg daily and Salagen 5 mg each evening presents to rheumatology to discuss other therapies for her inflammatory arthritis in the setting of chronic macrocytic anemia and mild leukopenia.  She reports that overall her joint symptoms of peripheral joints have been well controlled even with her reduction of methotrexate dose from 20 mg weekly earlier this year down to 10 mg weekly due to the above concern.  She has not had worsening of joint pain, stiffness or any return of erythema edema or warmth.  She is able to make full fist upon waking in the morning.  Denies any early morning stiffness lasting more than 3 to 5 minutes.  Denies any fevers chills.  She was also previously taking Celebrex though in the context of her recurrent diverticular bleeds this was discontinued earlier this year as well.  No worsening after this discontinuation either.  Has mild dry eyes and sees an ophthalmologist yearly.  She does have dry mouth and finds that the Salagen has been helpful.  She denies any swelling of her parotid glands or submandibular glands.  She drinks water frequently including overnight when needed.  Denies recurrent/progressive  dental disease.  Sees a dentist regularly.  No new rash.  No fevers or chills.  No recurrent infection.  Her weight is now stable though she initially did have some weight loss earlier this year with her diverticular disease and change in diet.  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  No history of DVT.  No hair loss.  No inflammatory eye disease history.  No oral or nasal ulcers.  No recurrent epistaxes.  No photosensitive rash.  No chest pain or shortness of breath.  No change in strength or sensation in arms or legs.  On complaint at this time is ongoing lumbar spine pain for which she had previously seen orthospine.  Previously had injections which were helpful though the most recent was not.    Past Medical History  Past Medical History:   Diagnosis Date    Anemia     CKD (chronic kidney disease) stage 3, GFR 30-59 ml/min (H)     Diverticulosis of large intestine     Osteoarthritis     Osteoporosis     Rheumatoid arthritis (H)     Sensorineural hearing loss      Past Surgical History  Past Surgical History:   Procedure Laterality Date    CAPSULE/PILL CAM ENDOSCOPY N/A 11/18/2021    Procedure: IMAGING PROCEDURE, GI TRACT, INTRALUMINAL, VIA CAPSULE;  Surgeon: Deric Rodriguez MD;  Location:  GI    CAPSULE/PILL CAM ENDOSCOPY N/A 2/14/2023    Procedure: IMAGING PROCEDURE, GI TRACT, INTRALUMINAL, VIA CAPSULE;  Surgeon: Jaime Mesa MD;  [facility]    COLONOSCOPY N/A 03/14/2017    Procedure: COMBINED COLONOSCOPY, SINGLE OR MULTIPLE BIOPSY/POLYPECTOMY BY BIOPSY;  Surgeon: Spencer Downey MD;  Location:  GI    COLONOSCOPY N/A 9/22/2022    Procedure: COLONOSCOPY, FLEXIBLE, WITH LESION REMOVAL USING SNARE;  Surgeon: Kirby Arthur MD;  Location:  GI    COLONOSCOPY N/A 1/13/2023    Procedure: COLONOSCOPY;  Surgeon: Spencer Downey MD;  Location: [facility] OR    ENTEROSCOPY SMALL BOWEL N/A 11/20/2021     Procedure: ENTEROSCOPY, colonoscopy with endoscopic mucosal resection and tattoo placement;  Surgeon: Kirby Arthur MD;  Location: [facility]    ESOPHAGOSCOPY, GASTROSCOPY, DUODENOSCOPY (EGD), COMBINED N/A 2/6/2023    Procedure: ESOPHAGOGASTRODUODENOSCOPY (EGD);  Surgeon: Spencer Downey MD;  Location: [facility]    IR VISCERAL EMBOLIZATION  01/22/2021    ORTHOPEDIC SURGERY Left     hip replacment    SIGMOIDOSCOPY FLEXIBLE N/A 01/23/2021    Procedure: SIGMOIDOSCOPY, FLEXIBLE;  Surgeon: Jaime Steinberg MD;  Location:  GI     Medications  Current Outpatient Medications   Medication    acetaminophen (TYLENOL) 650 MG CR tablet    calcium carbonate-vitamin D (CALTRATE) 600-10 MG-MCG per tablet    famotidine (PEPCID) 20 MG tablet    folic acid (FOLVITE) 1 MG tablet    gabapentin (NEURONTIN) 300 MG capsule    hydroxychloroquine (PLAQUENIL) 200 MG tablet    lifitegrast (XIIDRA) 5 % opthalmic solution    Multiple Vitamins-Minerals (OCUVITE PRESERVISION PO)    omeprazole (PRILOSEC) 40 MG DR capsule    pilocarpine (SALAGEN) 5 MG tablet    vitamin D3 (CHOLECALCIFEROL) 50 mcg (2000 units) tablet     No current facility-administered medications for this visit.       Allergies  Allergies   Allergen Reactions    Bee Venom Anaphylaxis    Shrimp Anaphylaxis    Evoxac [Cevimeline] Unknown    Prevnar Swelling     Other reaction(s): Edema    Prevnar [Pneumococcal 13-Linda Conj Vacc] Unknown       Family History: No family hx of autoimmune disease    Social History: No alcohol, drug use, smoking history.       Objective:   /71 (BP Location: Right arm, Patient Position: Sitting, Cuff Size: Adult Regular)   Pulse 79   Temp 98.2  F (36.8  C) (Oral)   Wt 42.4 kg (93 lb 6.4 oz)   SpO2 99%   BMI 16.03 kg/m    GEN: sitting up unassisted NAD, pleasant and interactive as always  HEENT: no facial rash, sclera appear clear  Breathing comfortably on RA, no audible wheeze, no cough. No use of accessory muscles  MSK: Full active and passive  ROM of bilateral shoulders, elbows, wrists, MCPs, PIPs, DIPs. Able to make a full fist bilaterally.  strength is tight. No synovitis of any of the joints in the upper or lower extremities. No effusion of synovitis of the bilateral knees, ankles, MTPs.   Skin: no acute cutaneous lesions appreciated on exposed skin    WBC   Date Value Ref Range Status   01/20/2021 5.0 4.0 - 11.0 10e9/L Final     WBC Count   Date Value Ref Range Status   05/30/2023 3.6 (L) 4.0 - 11.0 10e3/uL Final     Hemoglobin   Date Value Ref Range Status   05/30/2023 10.7 (L) 11.7 - 15.7 g/dL Final   01/25/2021 8.0 (L) 11.7 - 15.7 g/dL Final     Platelet Count   Date Value Ref Range Status   05/30/2023 208 150 - 450 10e3/uL Final   01/20/2021 207 150 - 450 10e9/L Final     Creatinine   Date Value Ref Range Status   05/30/2023 0.97 (H) 0.51 - 0.95 mg/dL Final   01/23/2021 0.75 0.52 - 1.04 mg/dL Final     Lab Results   Component Value Date    ALKPHOS 68 05/05/2022    ALKPHOS 77 03/13/2017     AST   Date Value Ref Range Status   05/30/2023 25 10 - 35 U/L Final   03/13/2017 10 0 - 45 U/L Final     Lab Results   Component Value Date    ALT 22 05/05/2022    ALT 12 03/13/2017     Sed Rate   Date Value Ref Range Status   03/13/2017 63 (H) 0 - 30 mm/h Final     Erythrocyte Sedimentation Rate   Date Value Ref Range Status   05/30/2023 37 (H) 0 - 30 mm/hr Final     CRP Inflammation   Date Value Ref Range Status   08/26/2022 <2.9 0.0 - 8.0 mg/L Final   03/13/2017 10.0 (H) 0.0 - 8.0 mg/L Final     [facility] RESULTS:  Recent Labs   Lab Test 03/10/22  1420   COLOR Yellow   APPEARANCE Clear   URINEGLC Negative   URINEBILI Negative   URINEKETONE Negative   SG 1.010   UBLD Negative   URINEPH 7.0   PROTEIN Negative   NITRITE Negative   LEUKEST Negative   RBCU 2   WBCU <1      Rheumatoid Factor   Date Value Ref Range Status   01/03/2022 8 <12 IU/mL Final     Cyclic Citrullinated Peptide Antibody IgG   Date Value Ref Range Status   01/03/2022 3.6 <7.0 U/mL Final      Comment:     Negative       Imaging:   No peripheral x-rays to review in our system          Greg Clements MD

## 2023-06-29 NOTE — PROGRESS NOTES
Outpatient Rheumatology follow-up    Name: Patrick Olson    MRN [id number]   Today's date: 6/29/23  Date of last visit: 3/30/23         Reason for follow-up: Rheumatoid arthritis on hydroxychloroquine   Requesting physician: Lauryn Tamayo MD             Assessment & Plan:   82-year-old female with a history of seropositive rheumatoid arthritis with secondary Sjogren's syndrome on methotrexate 10 mg weekly (previously on 20 mg weekly)/folic acid 2 mg daily and 5 mg Salagen nightly presented to rheumatology to establish care in Oct 2021.  At that time she had recently seen Dr Sen of hematology for evaluation and treatment of macrocytic anemia likely secondary to chronic inflammation vs methotrexate therapy vs recurrent diverticular bleeding most recently in January 2021 required hospitalization and transfusion.  Her seropositive rheumatoid arthritis was under good control and in remission on low-dose of methotrexate which was on the low end of therapeutic dose range.  Given the likely contribution to her anemia and possibly leukopenia in the context of well-controlled low disease activity we discussed other options for her inflammatory arthritis we did initially transition her from methotrexate to hydroxychloroquine and she did well on this from RA/sjogrens/cytopenia perspective, though she developed a rash which was evaluated by Dr Stevensno of Dermatology and thought NOT secondary to HCQ. However, patient still wished to discontinue HCQ and so we discussed going back on very low dose methotrexate while we monitor cell counts. We restarted methotrexate at a dose of 10mg once weekly along with folic acid daily. Unfortunately, her WBC, after restarting dropped and continued to down trend. Reassuringly, her HGB has continued to uptrend and along with iron supplementation. We again discussed that the rash that she developed while on HCQ was very unlikely to be secondary to HCQ, especially given the evaluation  she had with dermatologist, Dr Stevenson/his assessment/expertise in CTD and experience with HCQ. She was reassured by this. We elected to go back onto HCQ at that time. Her systemic autoimmune disease was well controlled on HCQ in the past, and should her leukopenia be either evidence of toxicity from methotrexate or a manifestation of her CTD, HCQ would be a more appropriate option in both regards. Of note, while she was on HCQ last year, her WBC had normalized.     Seropositive rheumatoid arthritis with secondary sjogrens syndrome  -No active inflammatory arthritis today by history or exam. Her quiet disease is further supported by her most recent ESR and CRP, both of which were negative/normal. Her CRP was negative. Her ESR was 37 which, when adjusted for gender and age is likely her baseline. He has never been lower than 35 over the last 6 years.   -Her disease is currently in remission on hydroxychloroquine monotherapy  - She continues on pilocarpine 5 mg once daily as chronic therapy which she is tolerating well and continues to derive benefit  - Ongoing back pains, chronic. Is going to see holistic medicine provider for acupuncture before proceeding with injection which is reasonable.   PLAN:  -continue HCQ 200mg once daily  -Follow-up with me     High risk medication use: HCQ  -No evidence of toxicity by history/exam today.  Most recent labs from 5/30/2023.  White blood cell count improving up to 3.6.  Hemoglobin was 10.7 which is around her baseline her platelets were normal.  HGB improving. PLT normal.  -Risks and benefits of HCQ discussed today to include rash (including severe rash/SJS/TEN), HA, GI upset, hepatoxicity, retinal toxicity, need for yearly screening OCT exam, need for CBC, CMP, ESR, CRP for routine screening drug toxicity labs among others. Patient is agreeable.   -Labs due again in 6 months for routine screening drug toxicity monitoring of abnormal  -Continue routine screening yearly OCT  exam, already has ophthalmologist that she is established with    #CKD 3a  -continues to follow with nephrology  -note renal function as we dose her systemic immunosuppression    Labs in August with nephrology as planned and will have my lab drawn at the time as well.  Standing orders are placed to include CBC, AST, ALT, CRP, ESR, creatinine.    Follow-up with me in 4 months time    Greg Clements MD  Rheumatology      Subjective:   Interval history 6/29/23  -July 7th acupuncture appointment. Trying this prior to her injection which has been recommended  -has continued on HCQ 200mg once daily  -Her joint pains come and go. Had right shoulder/elbow/wrist pain at different times. Self resolves within 2-3 weeks.   -Has appointent with hand OT in 2-3 weeks. Will be asking for brace for the wrist.   -Takes 2, 8 hour tylenol in the AM and 2 at night  -sitting for long periods is painful/difficult  -also with neck pain  -cannot eat large meals. Trying to eat high calorie dense meals. Trying to gain weight. She eats about 1200 calories per day. She is trying to bump it up to 1700 calories per day, but has not bumped up to this yet. She intermittently has 1700 and then back to   -Able to make a full fist upon waking in the AM  -denies red/hot/swollen joints.   -no new progressive fevers/chills/night sweats  -no interval infections  -has been tolerating HCQ well without noticeable side effects, GI cutaneous or other.  14 point ROS collected and negative if not documented above    March 30, 2023  -continues to lose weight, unintentional. Despite eating as much as possible of calorie dense foods, unable to stop weight loss and unable to gain  -had low back injection one week ago, not too much helpful. Rates the pain 3-4/10. Before the injection rates the pain 6/10  -No fevers/chills/night sweats  -Saw ortho for her right shoulder, found to have rotator cuff pathology. Not secondary to active inflammatory arthritis  etiology  -No red/hot/swollen joints.  -no prolonged EMS  -Does not find that her methotrexate is causing specific GI upset/nausea around the time of her once weekly dose. She is currently taking 10mg once weekly. Has also continued on folic acid 1mg once daily  14 point ROS collected and negative if not documented above     Interval History 5/19/22  Rash is continuing to improve. She changed/soap/fragrance. She had cortisone injection into her back 2 days ago, unable to sleep for 2 nights after that but now resolved and has notices much improvement. Today got up, did some cooking. Energy level improved. Has started on methotrexate and without any side effects. Started this on 4/23/22. Takes this each week on Monday evenings. No GI or other side effects. Also takes folic acid 1mg daily. Currently takes 10mg weekly and 1mg folic acid daily. No fatigue. Fevers/chills/night sweats. No infections since last visit. No joint pain. No red/hot/swollen joints. Minimal AM stiffness lasting for only a few minutes. She is looking forward to her upcoming trip to [place] with her family. No unintentional weight loss. 14 point ROS collected and negative.         Interval history 2/10/22  Did not notice any return or worsening of inflammatory arthritis with transition from methotrexate to Plaquenil.  Reports less than 5 minutes of early morning stiffness.  No red hot swollen joints.  Rates her pain at less than a 2 out of 10.  No rash, headache, change in vision, GI upset.  No interval infection.  Continues to have ongoing left greater than right intermittent hip pain which is worse when lying on that side.  She continues to be active with frequent walking almost daily weather permitting.  Her hip pain is there a few days per month.  Resolves without intervention.  Had follow-up with GI on 01/12/2022.  They plan for repeat colonoscopy in 6 to 12 months.  She was put on a course of prednisone after a visit with sports medicine for her  established lumbar disc disease and likely exacerbation.  She had a positive response of this for her axial pain.  She did not note much difference in regards to her peripheral arthritis symptoms as they had been inactive prior to starting this prednisone course.  Review of systems otherwise negative.    History from initial consultation in 10/2021  Patient with a diagnosis of seropositive rheumatoid arthritis and secondary Sjogren's syndrome on methotrexate 10 mg weekly, folic acid 2 mg daily and Salagen 5 mg each evening presents to rheumatology to discuss other therapies for her inflammatory arthritis in the setting of chronic macrocytic anemia and mild leukopenia.  She reports that overall her joint symptoms of peripheral joints have been well controlled even with her reduction of methotrexate dose from 20 mg weekly earlier this year down to 10 mg weekly due to the above concern.  She has not had worsening of joint pain, stiffness or any return of erythema edema or warmth.  She is able to make full fist upon waking in the morning.  Denies any early morning stiffness lasting more than 3 to 5 minutes.  Denies any fevers chills.  She was also previously taking Celebrex though in the context of her recurrent diverticular bleeds this was discontinued earlier this year as well.  No worsening after this discontinuation either.  Has mild dry eyes and sees an ophthalmologist yearly.  She does have dry mouth and finds that the Salagen has been helpful.  She denies any swelling of her parotid glands or submandibular glands.  She drinks water frequently including overnight when needed.  Denies recurrent/progressive dental disease.  Sees a dentist regularly.  No new rash.  No fevers or chills.  No recurrent infection.  Her weight is now stable though she initially did have some weight loss earlier this year with her diverticular disease and change in diet.  She is previously been prescribed Voltaren topical gel which is  somewhat helpful for her noninflammatory OA to superficial joints.  No history of DVT.  No hair loss.  No inflammatory eye disease history.  No oral or nasal ulcers.  No recurrent epistaxes.  No photosensitive rash.  No chest pain or shortness of breath.  No change in strength or sensation in arms or legs.  On complaint at this time is ongoing lumbar spine pain for which she had previously seen orthospine.  Previously had injections which were helpful though the most recent was not.    Past Medical History  Past Medical History:   Diagnosis Date    Anemia     CKD (chronic kidney disease) stage 3, GFR 30-59 ml/min (H)     Diverticulosis of large intestine     Osteoarthritis     Osteoporosis     Rheumatoid arthritis (H)     Sensorineural hearing loss      Past Surgical History  Past Surgical History:   Procedure Laterality Date    CAPSULE/PILL CAM ENDOSCOPY N/A 11/18/2021    Procedure: IMAGING PROCEDURE, GI TRACT, INTRALUMINAL, VIA CAPSULE;  Surgeon: Deric Rodriguez MD;  Location: [facility]    CAPSULE/PILL CAM ENDOSCOPY N/A 2/14/2023    Procedure: IMAGING PROCEDURE, GI TRACT, INTRALUMINAL, VIA CAPSULE;  Surgeon: Jaime Mesa MD;  Location: [facility]    COLONOSCOPY N/A 03/14/2017    Procedure: COMBINED COLONOSCOPY, SINGLE OR MULTIPLE BIOPSY/POLYPECTOMY BY BIOPSY;  Surgeon: Spencer Downey MD;  Location: [facility]    COLONOSCOPY N/A 9/22/2022    Procedure: COLONOSCOPY, FLEXIBLE, WITH LESION REMOVAL USING SNARE;  Surgeon: Kirby Arthur MD;  Location:  GI    COLONOSCOPY N/A 1/13/2023    Procedure: COLONOSCOPY;  Surgeon: Spencer Downey MD;  Location: [facility]    ENTEROSCOPY SMALL BOWEL N/A 11/20/2021    Procedure: ENTEROSCOPY, colonoscopy with endoscopic mucosal resection and tattoo placement;  Surgeon: Kirby Arthur MD;  Location: [facility]    ESOPHAGOSCOPY, GASTROSCOPY, DUODENOSCOPY (EGD), COMBINED N/A 2/6/2023    Procedure: ESOPHAGOGASTRODUODENOSCOPY (EGD);  Surgeon: Spencer Downey MD;  Location: [facility]     IR VISCERAL EMBOLIZATION  01/22/2021    ORTHOPEDIC SURGERY Left     hip replacment    SIGMOIDOSCOPY FLEXIBLE N/A 01/23/2021    Procedure: SIGMOIDOSCOPY, FLEXIBLE;  Surgeon: Jaime Steinberg MD;  Location:  GI     Medications  Current Outpatient Medications   Medication    acetaminophen (TYLENOL) 650 MG CR tablet    calcium carbonate-vitamin D (CALTRATE) 600-10 MG-MCG per tablet    famotidine (PEPCID) 20 MG tablet    folic acid (FOLVITE) 1 MG tablet    gabapentin (NEURONTIN) 300 MG capsule    hydroxychloroquine (PLAQUENIL) 200 MG tablet    lifitegrast (XIIDRA) 5 % opthalmic solution    Multiple Vitamins-Minerals (OCUVITE PRESERVISION PO)    omeprazole (PRILOSEC) 40 MG DR capsule    pilocarpine (SALAGEN) 5 MG tablet    vitamin D3 (CHOLECALCIFEROL) 50 mcg (2000 units) tablet     No current facility-administered medications for this visit.       Allergies  Allergies   Allergen Reactions    Bee Venom Anaphylaxis    Shrimp Anaphylaxis    Evoxac [Cevimeline] Unknown    Prevnar Swelling     Other reaction(s): Edema    Prevnar [Pneumococcal 13-Linda Conj Vacc] Unknown       Family History: No family hx of autoimmune disease    Social History: No alcohol, drug use, smoking history.       Objective:   /71 (BP Location: Right arm, Patient Position: Sitting, Cuff Size: Adult Regular)   Pulse 79   Temp 98.2  F (36.8  C) (Oral)   Wt 42.4 kg (93 lb 6.4 oz)   SpO2 99%   BMI 16.03 kg/m    GEN: sitting up unassisted NAD, pleasant and interactive as always  HEENT: no facial rash, sclera appear clear  Breathing comfortably on RA, no audible wheeze, no cough. No use of accessory muscles  MSK: Full active and passive ROM of bilateral shoulders, elbows, wrists, MCPs, PIPs, DIPs. Able to make a full fist bilaterally.  strength is tight. No synovitis of any of the joints in the upper or lower extremities. No effusion of synovitis of the bilateral knees, ankles, MTPs.   Skin: no acute cutaneous lesions appreciated on  exposed skin    WBC   Date Value Ref Range Status   01/20/2021 5.0 4.0 - 11.0 10e9/L Final     WBC Count   Date Value Ref Range Status   05/30/2023 3.6 (L) 4.0 - 11.0 10e3/uL Final     Hemoglobin   Date Value Ref Range Status   05/30/2023 10.7 (L) 11.7 - 15.7 g/dL Final   01/25/2021 8.0 (L) 11.7 - 15.7 g/dL Final     Platelet Count   Date Value Ref Range Status   05/30/2023 208 150 - 450 10e3/uL Final   01/20/2021 207 150 - 450 10e9/L Final     Creatinine   Date Value Ref Range Status   05/30/2023 0.97 (H) 0.51 - 0.95 mg/dL Final   01/23/2021 0.75 0.52 - 1.04 mg/dL Final     Lab Results   Component Value Date    ALKPHOS 68 05/05/2022    ALKPHOS 77 03/13/2017     AST   Date Value Ref Range Status   05/30/2023 25 10 - 35 U/L Final   03/13/2017 10 0 - 45 U/L Final     Lab Results   Component Value Date    ALT 22 05/05/2022    ALT 12 03/13/2017     Sed Rate   Date Value Ref Range Status   03/13/2017 63 (H) 0 - 30 mm/h Final     Erythrocyte Sedimentation Rate   Date Value Ref Range Status   05/30/2023 37 (H) 0 - 30 mm/hr Final     CRP Inflammation   Date Value Ref Range Status   08/26/2022 <2.9 0.0 - 8.0 mg/L Final   03/13/2017 10.0 (H) 0.0 - 8.0 mg/L Final     UA RESULTS:  Recent Labs   Lab Test 03/10/22  1420   COLOR Yellow   APPEARANCE Clear   URINEGLC Negative   URINEBILI Negative   URINEKETONE Negative   SG 1.010   UBLD Negative   URINEPH 7.0   PROTEIN Negative   NITRITE Negative   LEUKEST Negative   RBCU 2   WBCU <1      Rheumatoid Factor   Date Value Ref Range Status   01/03/2022 8 <12 IU/mL Final     Cyclic Citrullinated Peptide Antibody IgG   Date Value Ref Range Status   01/03/2022 3.6 <7.0 U/mL Final     Comment:     Negative       Imaging:   No peripheral x-rays to review in our system

## 2023-07-10 NOTE — PROGRESS NOTES
OCCUPATIONAL THERAPY EVALUATION  Type of Visit: Evaluation    See electronic medical record for Abuse and Falls Screening details.    Subjective      Presenting condition or subjective complaint: Right Wrist and Thumb Pain  Date of onset: 06/20/23 (MD order)    Relevant medical history: Anemia; Arthritis; Cold or hot arm or leg; Dizziness; Hearing problems; Implanted device; Kidney disease; Osteoarthritis; Osteoporosis; Rheumatoid arthritis; Unexplained weight loss; Vision problems   Dates & types of surgery: 2011 L WINTER    Prior diagnostic imaging/testing results: X-ray  Xray (No acute fracture or dislocation. Degenerative arthritis in the wrist, severe in the midcarpal and midcarpal compartments. Mild chondrocalcinosis. Moderate degenerative arthritis of the thumb IP joint. Diffuse bony demineralization)  Prior therapy history for the same diagnosis, illness or injury: No      Living Environment  Social support: With a significant other or spouse   Type of home: House; 1 level   Stairs to enter the home:         Ramp:     Stairs inside the home:         Help at home: None  Equipment owned:       Employment: No Retired  Hobbies/Interests: Travel, Reading    Patient goals for therapy: Use my right hand/thumb with less pain    Pain assessment: See objective evaluation for additional pain details     Objective    Right hand dominant  Patient reports symptoms of pain, stiffness/loss of motion and weakness/loss of strength    Pain Level (Scale 0-10)   7/11/2023   At Rest 0   With Use 5-6     Pain Description  Date 7/11/2023   Location Right wrist - dorsal, thumb MPJ   Pain Quality Sharp with the wrong motions, dull and stiff   Frequency Intermittent, daily    Pain is worst daytime   Exacerbated by Twisting motions, opening jars and doors, writing, carrying things, gripping   Relieved by Rest, tylenol, trial of son's wrist splint   Progression Gradually worsening     Edema (Circumference measured in cm)   7/11/2023  7/11/2023    Left Right   DWC 14.4 15.0     Sensation   WNL Per patient report    ROM  Pain Report: - none  + mild    ++ moderate    +++ severe   Wrist 7/11/2023 7/11/2023   AROM (PROM) Left Right   Extension 55 45+   Flexion 65 35+   RD 20 15   UD 30 30   Supination 85 85+   Pronation 85 85+     ROM  Thumb 7/11/2023 7/11/2023   AROM  (PROM) Left Right   MP -25/50 -25/66   IP +/55 +/40   RABD 50 40   PABD 35 40     Strength   (Measured in pounds)  Pain Report: - none  + mild    ++ moderate    +++ severe    7/11/2023 7/11/2023   Trials Left Right   1  2  3 30 19+   Average 30 19     Lat Pinch 7/11/2023 7/11/2023   Trials Left Right   1  2  3 11 7-   Average 11 7     3 Pt Pinch 7/11/2023 7/11/2023   Trials Left Right   1  2  3 10+ volar wrist 8+ dorsal wrist   Average 10 8     Assessment & Plan   CLINICAL IMPRESSIONS   Medical Diagnosis: Right Wrist and Thumb Pain    Treatment Diagnosis: Right Wrist and Thumb Pain      Clinical Decision Making (Complexity):   Assessment of Occupational Performance: 3-5 Performance Deficits  Occupational Performance Limitations: dressing, home establishment and management, meal preparation and cleanup and leisure activities  Clinical Decision Making (Complexity): Low complexity    PLAN OF CARE  Treatment Interventions:   Modalities:  US, Paraffin and Laser Light  Therapeutic Exercise:  AROM, Blocking, Isometrics and Stabilization  Neuromuscular re-education:  Kinesiotaping  Manual Techniques:  Joint mobilization and Myofascial release  Orthotic Fabrication:  Forearm based  Self Care:  Self Care Tasks    Long Term Goals   OT Goal 1  Goal Identifier: Household Chores  Goal Description: Open a tight or new jar with 2/10 pain or less, using AE as needed  Target Date: 10/08/23      Frequency of Treatment: 2 x Month  Duration of Treatment: 3 MOnths     Education Assessment: Learner/Method: Patient     Risks and benefits of evaluation/treatment have been explained.    Patient/Family/caregiver agrees with Plan of Care.     Evaluation Time:    OT Abbie, Low Complexity Minutes (12177): 30    Home Exercise Program:  Heat/Epsom salt soaks  Compression sleeves/tubi   R Custom Wrist cock up - night and day as needed for activity  Gentle Wrist AROM - Flex/Ext (GE), DTM  Gentle Thumb AROM - Opposition, Thumb IPJ blocking    Next visit:  Check response to splint and HEP  Consider fabrication of R HBTSS?  Progress to gentle wrist/thumb strengthening    Signing Clinician: SAMEERA [facility]                                                                                   OUTPATIENT OCCUPATIONAL THERAPY      PLAN OF TREATMENT FOR OUTPATIENT REHABILITATION   Patient's Last Name, First Name, MARIELYDorisKELLIDoris  WesleyPatrick YOB: 1940   Provider's Name   [facility]   Medical Record No.  [id number]     Onset Date: 06/20/23 (MD order) Start of Care Date: 07/11/23     Medical Diagnosis:  Right Wrist and Thumb Pain      OT Treatment Diagnosis:  Right Wrist and Thumb Pain Plan of Treatment  Frequency/Duration:2 x Month/3 MOnths    Certification date from 07/11/23   To 10/08/23        See note for plan of treatment details and functional goals     Jana Salter OT                         I CERTIFY THE NEED FOR THESE SERVICES FURNISHED UNDER        THIS PLAN OF TREATMENT AND WHILE UNDER MY CARE     (Physician attestation of this document indicates review and certification of the therapy plan).                  Referring Provider:  Mukesh Lantigua      Initial Assessment  See Epic Evaluation- 07/11/23

## 2023-07-11 ENCOUNTER — THERAPY VISIT (OUTPATIENT)
Dept: OCCUPATIONAL THERAPY | Facility: CLINIC | Age: 83
End: 2023-07-11
Attending: FAMILY MEDICINE
Payer: COMMERCIAL

## 2023-07-11 DIAGNOSIS — G89.29 CHRONIC PAIN OF RIGHT ELBOW: ICD-10-CM

## 2023-07-11 DIAGNOSIS — M25.521 CHRONIC PAIN OF RIGHT ELBOW: ICD-10-CM

## 2023-07-11 DIAGNOSIS — M25.531 RIGHT WRIST PAIN: ICD-10-CM

## 2023-07-11 PROCEDURE — 97110 THERAPEUTIC EXERCISES: CPT | Mod: GO | Performed by: OCCUPATIONAL THERAPIST

## 2023-07-11 PROCEDURE — 97165 OT EVAL LOW COMPLEX 30 MIN: CPT | Mod: GO | Performed by: OCCUPATIONAL THERAPIST

## 2023-07-11 PROCEDURE — 97760 ORTHOTIC MGMT&TRAING 1ST ENC: CPT | Mod: GO | Performed by: OCCUPATIONAL THERAPIST

## 2023-08-11 ENCOUNTER — NURSE TRIAGE (OUTPATIENT)
Dept: NURSING | Facility: CLINIC | Age: 83
End: 2023-08-11
Payer: COMMERCIAL

## 2023-08-11 ENCOUNTER — HOSPITAL ENCOUNTER (EMERGENCY)
Facility: CLINIC | Age: 83
Discharge: LEFT BEFORE DISCHARGE | DRG: 378 | End: 2023-08-11
Attending: EMERGENCY MEDICINE | Admitting: EMERGENCY MEDICINE
Payer: COMMERCIAL

## 2023-08-11 ENCOUNTER — TELEPHONE (OUTPATIENT)
Dept: GASTROENTEROLOGY | Facility: CLINIC | Age: 83
End: 2023-08-11
Payer: COMMERCIAL

## 2023-08-11 VITALS
RESPIRATION RATE: 16 BRPM | OXYGEN SATURATION: 99 % | DIASTOLIC BLOOD PRESSURE: 54 MMHG | HEART RATE: 80 BPM | SYSTOLIC BLOOD PRESSURE: 121 MMHG | TEMPERATURE: 97.9 F

## 2023-08-11 DIAGNOSIS — Z53.29 LEFT AGAINST MEDICAL ADVICE: ICD-10-CM

## 2023-08-11 PROCEDURE — 99281 EMR DPT VST MAYX REQ PHY/QHP: CPT

## 2023-08-11 NOTE — TELEPHONE ENCOUNTER
Nurse Triage SBAR    Is this a 2nd Level Triage? NO    Situation: Patient calling with blood in stool 3 times in the last 3 days  Consent: not needed    Background: Patient states she frequently has blood in stool during a flair up of diverticulitis, but this is the first time seeing dark blood clots - Wed evening blood when she wiped, Thurs after lunch - saw dark clots mixed in stool, and again today    History of Diverticulitis  Colonoscopy in January - provider suspected more than diverticulitis  Upper Endoscopy - negative  Referred to Dr. Mesa for video capsule test    Assessment:   Several dark blood clots mixed in with stool  No nausea  Discomfort in stomach after eating  No fever  No dizziness    Protocol Recommended Disposition:   Go to ED    Recommendation: Advised patient to go to ED. Patient is declining at this time. Advised speaking with GI clinic for further guidance. Patient did call GI clinic earlier today. Care advice given. Patient verbalized understanding and agreed with plan. Transferred call to GI clinic.    Carley Vidal RN [facility] 8/11/2023 2:16 PM    Reason for Disposition   Bloody, black, or tarry bowel movements  (Exception: Chronic-unchanged black-grey bowel movements and is taking iron pills or Pepto-Bismol.)    Additional Information   Negative: Passed out (i.e., fainted, collapsed and was not responding)   Negative: Shock suspected (e.g., cold/pale/clammy skin, too weak to stand, low BP, rapid pulse)   Negative: Vomiting red blood or black (coffee ground) material   Negative: Sounds like a life-threatening emergency to the triager   Negative: Diarrhea is main symptom   Negative: Rectal symptoms   Negative: SEVERE rectal bleeding (large blood clots; constant or on and off bleeding)   Negative: SEVERE dizziness (e.g., unable to stand, requires support to walk, feels like passing out now)   Negative: MODERATE rectal bleeding (small blood clots, passing blood without  stool, or toilet water turns red) more than once a day    Protocols used: Rectal Bleeding-A-OH

## 2023-08-11 NOTE — TELEPHONE ENCOUNTER
[facility] Call Center    Phone Message    May a detailed message be left on voicemail: yes     Reason for Call: Symptoms or Concerns     If patient has red-flag symptoms, warm transfer to triage line    Current symptom or concern: Blood Clots in Stool (More Then Usual)     Symptoms have been present for:  3 day(s)    Has patient previously been seen for this? Yes    By : Dr. Martinez     Date: 08/11/23    Are there any new or worsening symptoms? Yes: Blood Clots in Stool (More Then Usual)     Action Taken: Message routed to:  [facility] ([facility] GI    Travel Screening: Not Applicable

## 2023-08-11 NOTE — ED NOTES
Pt LWBS after triage. Pt was offered a medical exam by an ER MD, and pt declined. She says she will return to ED if symptoms get worse. This RN advised pt be seen by MD, and she declined. Signed paperwork and  picked her up.

## 2023-08-11 NOTE — TELEPHONE ENCOUNTER
Spoke with Patrick and she had blood in the toilet paper after stooling on Wed. Thursday she had a blood on her stool x1   Today she had blood on her stool again x1    She is not interested in going to the ED   We discussed going to the urgent care to be evaluated   She will talk to  to decide on the ED or urgent care

## 2023-08-11 NOTE — ED TRIAGE NOTES
Triage Assessment       Row Name 08/11/23 1543       Triage Assessment (Adult)    Airway WDL WDL       Respiratory WDL    Respiratory WDL WDL       Skin Circulation/Temperature WDL    Skin Circulation/Temperature WDL WDL       Cardiac WDL    Cardiac WDL WDL       Peripheral/Neurovascular WDL    Peripheral Neurovascular WDL WDL       Cognitive/Neuro/Behavioral WDL    Cognitive/Neuro/Behavioral WDL WDL

## 2023-08-11 NOTE — ED TRIAGE NOTES
Pt arrives ambulatory to triage w/ c/o blood in stools. Further endorses clots mixed in with stool. Endorses onset since Wednesday. Denies abd pain, dizziness. Hx of diverticulitis w/ bleeding- per pt this seems different. Further endorses stools as appearing more dark, tarry.

## 2023-08-12 ENCOUNTER — NURSE TRIAGE (OUTPATIENT)
Dept: NURSING | Facility: CLINIC | Age: 83
End: 2023-08-12
Payer: COMMERCIAL

## 2023-08-12 ENCOUNTER — HOSPITAL ENCOUNTER (INPATIENT)
Facility: CLINIC | Age: 83
LOS: 2 days | Discharge: HOME | DRG: 378 | End: 2023-08-14
Attending: EMERGENCY MEDICINE | Admitting: STUDENT IN AN ORGANIZED HEALTH CARE EDUCATION/TRAINING PROGRAM
Payer: COMMERCIAL

## 2023-08-12 DIAGNOSIS — M54.16 LUMBAR RADICULOPATHY, ACUTE: ICD-10-CM

## 2023-08-12 DIAGNOSIS — K92.2 GASTROINTESTINAL HEMORRHAGE, UNSPECIFIED GASTROINTESTINAL HEMORRHAGE TYPE: Primary | ICD-10-CM

## 2023-08-12 DIAGNOSIS — K92.2 LOWER GI BLEED: ICD-10-CM

## 2023-08-12 DIAGNOSIS — K57.91 DIVERTICULOSIS OF INTESTINE WITHOUT PERFORATION OR ABSCESS WITH BLEEDING: ICD-10-CM

## 2023-08-12 DIAGNOSIS — D62 ACUTE BLOOD LOSS ANEMIA: ICD-10-CM

## 2023-08-12 LAB
ALBUMIN SERPL BCG-MCNC: 4.2 G/DL (ref 3.5–5.2)
ALP SERPL-CCNC: 76 U/L (ref 35–104)
ALT SERPL W P-5'-P-CCNC: 13 U/L (ref 0–50)
ANION GAP SERPL CALCULATED.3IONS-SCNC: 13 MMOL/L (ref 7–15)
APTT PPP: 27 SECONDS (ref 22–38)
AST SERPL W P-5'-P-CCNC: 27 U/L (ref 0–45)
BASOPHILS # BLD AUTO: 0 10E3/UL (ref 0–0.2)
BASOPHILS NFR BLD AUTO: 1 %
BILIRUB SERPL-MCNC: 0.3 MG/DL
BUN SERPL-MCNC: 23 MG/DL (ref 8–23)
CALCIUM SERPL-MCNC: 9.6 MG/DL (ref 8.8–10.2)
CHLORIDE SERPL-SCNC: 106 MMOL/L (ref 98–107)
CREAT SERPL-MCNC: 1.24 MG/DL (ref 0.51–0.95)
DEPRECATED HCO3 PLAS-SCNC: 22 MMOL/L (ref 22–29)
EOSINOPHIL # BLD AUTO: 0.1 10E3/UL (ref 0–0.7)
EOSINOPHIL NFR BLD AUTO: 1 %
ERYTHROCYTE [DISTWIDTH] IN BLOOD BY AUTOMATED COUNT: 14.5 % (ref 10–15)
GFR SERPL CREATININE-BSD FRML MDRD: 44 ML/MIN/1.73M2
GLUCOSE SERPL-MCNC: 86 MG/DL (ref 70–99)
HCT VFR BLD AUTO: 28.6 % (ref 35–47)
HGB BLD-MCNC: 9.3 G/DL (ref 11.7–15.7)
IMM GRANULOCYTES # BLD: 0 10E3/UL
IMM GRANULOCYTES NFR BLD: 0 %
INR PPP: 1.07 (ref 0.85–1.15)
LIPASE SERPL-CCNC: 85 U/L (ref 13–60)
LYMPHOCYTES # BLD AUTO: 1 10E3/UL (ref 0.8–5.3)
LYMPHOCYTES NFR BLD AUTO: 22 %
MCH RBC QN AUTO: 31.2 PG (ref 26.5–33)
MCHC RBC AUTO-ENTMCNC: 32.5 G/DL (ref 31.5–36.5)
MCV RBC AUTO: 96 FL (ref 78–100)
MONOCYTES # BLD AUTO: 0.4 10E3/UL (ref 0–1.3)
MONOCYTES NFR BLD AUTO: 8 %
NEUTROPHILS # BLD AUTO: 3 10E3/UL (ref 1.6–8.3)
NEUTROPHILS NFR BLD AUTO: 68 %
NRBC # BLD AUTO: 0 10E3/UL
NRBC BLD AUTO-RTO: 0 /100
PLATELET # BLD AUTO: 241 10E3/UL (ref 150–450)
POTASSIUM SERPL-SCNC: 4.1 MMOL/L (ref 3.4–5.3)
PROT SERPL-MCNC: 6.8 G/DL (ref 6.4–8.3)
RBC # BLD AUTO: 2.98 10E6/UL (ref 3.8–5.2)
SODIUM SERPL-SCNC: 141 MMOL/L (ref 136–145)
WBC # BLD AUTO: 4.5 10E3/UL (ref 4–11)

## 2023-08-12 PROCEDURE — 120N000002 HC R&B MED SURG/OB UMMC

## 2023-08-12 PROCEDURE — 36415 COLL VENOUS BLD VENIPUNCTURE: CPT | Performed by: EMERGENCY MEDICINE

## 2023-08-12 PROCEDURE — 99222 1ST HOSP IP/OBS MODERATE 55: CPT | Mod: GC | Performed by: STUDENT IN AN ORGANIZED HEALTH CARE EDUCATION/TRAINING PROGRAM

## 2023-08-12 PROCEDURE — 80053 COMPREHEN METABOLIC PANEL: CPT | Performed by: EMERGENCY MEDICINE

## 2023-08-12 PROCEDURE — 85730 THROMBOPLASTIN TIME PARTIAL: CPT | Performed by: EMERGENCY MEDICINE

## 2023-08-12 PROCEDURE — 99285 EMERGENCY DEPT VISIT HI MDM: CPT | Mod: 25 | Performed by: EMERGENCY MEDICINE

## 2023-08-12 PROCEDURE — 99285 EMERGENCY DEPT VISIT HI MDM: CPT | Performed by: EMERGENCY MEDICINE

## 2023-08-12 PROCEDURE — 250N000013 HC RX MED GY IP 250 OP 250 PS 637: Performed by: STUDENT IN AN ORGANIZED HEALTH CARE EDUCATION/TRAINING PROGRAM

## 2023-08-12 PROCEDURE — 85025 COMPLETE CBC W/AUTO DIFF WBC: CPT | Performed by: EMERGENCY MEDICINE

## 2023-08-12 PROCEDURE — 85610 PROTHROMBIN TIME: CPT | Performed by: EMERGENCY MEDICINE

## 2023-08-12 PROCEDURE — 250N000013 HC RX MED GY IP 250 OP 250 PS 637

## 2023-08-12 PROCEDURE — 258N000003 HC RX IP 258 OP 636: Performed by: EMERGENCY MEDICINE

## 2023-08-12 PROCEDURE — 83690 ASSAY OF LIPASE: CPT | Performed by: EMERGENCY MEDICINE

## 2023-08-12 RX ORDER — PILOCARPINE HYDROCHLORIDE 5 MG/1
5 TABLET, FILM COATED ORAL 2 TIMES DAILY PRN
Status: DISCONTINUED | OUTPATIENT
Start: 2023-08-12 | End: 2023-08-14 | Stop reason: HOSPADM

## 2023-08-12 RX ORDER — LIDOCAINE 40 MG/G
CREAM TOPICAL
Status: DISCONTINUED | OUTPATIENT
Start: 2023-08-12 | End: 2023-08-14 | Stop reason: HOSPADM

## 2023-08-12 RX ORDER — ONDANSETRON 2 MG/ML
4 INJECTION INTRAMUSCULAR; INTRAVENOUS EVERY 6 HOURS PRN
Status: DISCONTINUED | OUTPATIENT
Start: 2023-08-12 | End: 2023-08-14 | Stop reason: HOSPADM

## 2023-08-12 RX ORDER — ACETAMINOPHEN 500 MG
1000 TABLET ORAL ONCE
Status: COMPLETED | OUTPATIENT
Start: 2023-08-12 | End: 2023-08-12

## 2023-08-12 RX ORDER — GABAPENTIN 300 MG/1
600 CAPSULE ORAL
Status: DISCONTINUED | OUTPATIENT
Start: 2023-08-12 | End: 2023-08-14 | Stop reason: HOSPADM

## 2023-08-12 RX ORDER — PANTOPRAZOLE SODIUM 40 MG/1
40 TABLET, DELAYED RELEASE ORAL 2 TIMES DAILY
Status: DISCONTINUED | OUTPATIENT
Start: 2023-08-12 | End: 2023-08-14 | Stop reason: HOSPADM

## 2023-08-12 RX ORDER — FOLIC ACID 1 MG/1
1 TABLET ORAL DAILY
Status: DISCONTINUED | OUTPATIENT
Start: 2023-08-13 | End: 2023-08-14 | Stop reason: HOSPADM

## 2023-08-12 RX ORDER — ONDANSETRON 4 MG/1
4 TABLET, ORALLY DISINTEGRATING ORAL EVERY 6 HOURS PRN
Status: DISCONTINUED | OUTPATIENT
Start: 2023-08-12 | End: 2023-08-14 | Stop reason: HOSPADM

## 2023-08-12 RX ADMIN — PANTOPRAZOLE SODIUM 40 MG: 40 TABLET, DELAYED RELEASE ORAL at 20:45

## 2023-08-12 RX ADMIN — SODIUM CHLORIDE 1000 ML: 9 INJECTION, SOLUTION INTRAVENOUS at 16:51

## 2023-08-12 RX ADMIN — GABAPENTIN 600 MG: 300 CAPSULE ORAL at 21:02

## 2023-08-12 RX ADMIN — ACETAMINOPHEN 1000 MG: 500 TABLET ORAL at 17:30

## 2023-08-12 ASSESSMENT — ACTIVITIES OF DAILY LIVING (ADL)
ADLS_ACUITY_SCORE: 35
ADLS_ACUITY_SCORE: 22
ADLS_ACUITY_SCORE: 35
ADLS_ACUITY_SCORE: 35

## 2023-08-12 NOTE — ED PROVIDER NOTES
ED Provider Note  [facility]      History     Chief Complaint   Patient presents with    Rectal Bleeding     HPI  Patrick Olson is a 82 year old female with past medical history of GI bleed, diverticular hemorrhage, diverticulosis presents emergency department for chief complaint of rectal bleeding.  She states 2 days ago she began to have bright and dark red clots surrounding her stool.  She has had this previously in the past, however states that there is never source of bleeding on endoscopy.  Yesterday she began to have lower pelvic and left lower quadrant pain.  She has chronic back pain, which is unchanged today.  She denies straining, painful bowel movements, constipation, urinary symptoms.  She has a history of external hemorrhoids.    She denies any melena, fevers, chills, chest pain, shortness of breath, lightheadedness, pallor.  Denies any nausea, vomiting.  She is not anticoagulated.  Denies any trauma.  Denies urinary symptoms.        2/14/2020 3 capsule endoscopy revealed duodenal erosion, single angiectasia without bleeding in the duodenum, small bowel lymphangiectasia.  There was a single mucosal spot with no bleeding in the colon.    2/6/2023 upper endoscopy revealed duodenal erosion    1/13/2023 colonoscopy revealed residual specks of blood throughout the exam, no inflammation.  Extensive diverticulosis throughout the colon.  No active bleeding.    Past Medical History  Past Medical History:   Diagnosis Date    Anemia     CKD (chronic kidney disease) stage 3, GFR 30-59 ml/min (H)     Diverticulosis of large intestine     Osteoarthritis     Osteoporosis     Rheumatoid arthritis (H)     Sensorineural hearing loss      Past Surgical History:   Procedure Laterality Date    CAPSULE/PILL CAM ENDOSCOPY N/A 11/18/2021    Procedure: IMAGING PROCEDURE, GI TRACT, INTRALUMINAL, VIA CAPSULE;  Surgeon: Deric Rodriguez MD;  Location:  GI    CAPSULE/PILL CAM ENDOSCOPY N/A  2/14/2023    Procedure: IMAGING PROCEDURE, GI TRACT, INTRALUMINAL, VIA CAPSULE;  Surgeon: Jaime Mesa MD;  Location: [facility]    COLONOSCOPY N/A 03/14/2017    Procedure: COMBINED COLONOSCOPY, SINGLE OR MULTIPLE BIOPSY/POLYPECTOMY BY BIOPSY;  Surgeon: Spencer Downey MD;  Location: [facility]    COLONOSCOPY N/A 9/22/2022    Procedure: COLONOSCOPY, FLEXIBLE, WITH LESION REMOVAL USING SNARE;  Surgeon: Kirby Arthur MD;  Location:  GI    COLONOSCOPY N/A 1/13/2023    Procedure: COLONOSCOPY;  Surgeon: Spencer Downey MD;  Location: [facility] OR    ENTEROSCOPY SMALL BOWEL N/A 11/20/2021    Procedure: ENTEROSCOPY, colonoscopy with endoscopic mucosal resection and tattoo placement;  Surgeon: Kirby Arthur MD;  Location: [facility]    ESOPHAGOSCOPY, GASTROSCOPY, DUODENOSCOPY (EGD), COMBINED N/A 2/6/2023    Procedure: ESOPHAGOGASTRODUODENOSCOPY (EGD);  Surgeon: Spencer Downey MD;  Location: [facility]    IR VISCERAL EMBOLIZATION  01/22/2021    ORTHOPEDIC SURGERY Left     hip replacment    SIGMOIDOSCOPY FLEXIBLE N/A 01/23/2021    Procedure: SIGMOIDOSCOPY, FLEXIBLE;  Surgeon: Jaime Steinberg MD;  Location:  GI     acetaminophen (TYLENOL) 650 MG CR tablet  calcium carbonate-vitamin D (CALTRATE) 600-10 MG-MCG per tablet  famotidine (PEPCID) 20 MG tablet  folic acid (FOLVITE) 1 MG tablet  gabapentin (NEURONTIN) 300 MG capsule  hydroxychloroquine (PLAQUENIL) 200 MG tablet  lifitegrast (XIIDRA) 5 % opthalmic solution  Multiple Vitamins-Minerals (OCUVITE PRESERVISION PO)  omeprazole (PRILOSEC) 40 MG DR capsule  pilocarpine (SALAGEN) 5 MG tablet  pilocarpine (SALAGEN) 5 MG tablet  vitamin D3 (CHOLECALCIFEROL) 50 mcg (2000 units) tablet      Allergies   Allergen Reactions    Bee Venom Anaphylaxis    Shrimp Anaphylaxis    Evoxac [Cevimeline] Unknown    Prevnar Swelling     Other reaction(s): Edema    Prevnar [Pneumococcal 13-Linda Conj Vacc] Unknown     Family History  No family history on file.  Social History   Social History      Tobacco Use    Smoking status: Former    Smokeless tobacco: Never   Substance Use Topics    Alcohol use: No    Drug use: No         A medically appropriate review of systems was performed with pertinent positives and negatives noted in the HPI, and all other systems negative.    Physical Exam   BP: 133/61  Pulse: 81  Temp: 97.8  F (36.6  C)  Resp: 16  SpO2: 99 %  Physical Exam  General: no acute distress.    HENT: Normocephalic and atraumatic. Trachea midline.  Eyes: EOMI, conjunctivae normal.  Normal voice.  Cardiovascular:  Normal rate and regular rhythm.   No murmur heard.  Radial pulses 2+ bilaterally.  Pulmonary:  No respiratory distress. Normal breath sounds bilaterally.  Abdominal: no distension.  Abdomen is soft. There is no mass.  Mild tenderness to the pelvic and left lower quadrant.  No rebound or guarding.  Musculoskeletal:    Moving all extremities spontaneously.  No cyanosis, clubbing, or edema.  GI: Rectal exam with small external hemorrhoid at the 6 o'clock position, nontender inflamed.  No fissures.  No evidence of external bleeding.  Digital rectal exam positive for gross dark red blood.  Skin: Warm, dry, and well perfused. Good turgor.  Good capillary refill.  Neurological:  No focal deficit present.    Psychiatric:   The patient is awake, alert.  Appropriate mood and affect.    ED Course, Procedures, & Data      Procedures                      No results found for any visits on 08/12/23.  Medications - No data to display  Labs Ordered and Resulted from Time of ED Arrival to Time of ED Departure - No data to display  CT Abdomen Pelvis w Contrast    (Results Pending)          Critical care was not performed.     Medical Decision Making  The patient's presentation was of moderate complexity (a chronic illness mild to moderate exacerbation, progression, or side effect of treatment).    The patient's evaluation involved:  review of external note(s) from 3+ sources (see separate area of note for  details)  ordering and/or review of 3+ test(s) in this encounter (see separate area of note for details)  review of 3+ test result(s) ordered prior to this encounter (see separate area of note for details)    The patient's management necessitated high risk (consideration of hospitalization)    Assessment & Plan    Patient presents for complaint of bright red blood per rectum, blood clots in the toilet bowl in the setting of history of diverticulosis and lower GI bleeding.  On exam, patient appears well-appearing.  She has good capillary refill and no pallor.   Vitals within normal limits.   Dark red blood on digital rectal exam, no melena.    Patient's creatinine is 1.24, elevated from 0.97.  GFR decreased from 59 to 44.  Hemoglobin 9.3, baseline appears to be 10-11.  INR and PTT within normal limits.  NS bolus initiated.    Patient is afebrile and has very mild lower abdominal tenderness.  Due to very low suspicion for diverticulitis, will not obtain CT abdomen with contrast due to decreased GFR.  She likely has GI bleed secondary to known diverticulosis.  I discussed with GI, who will likely be able to perform colonoscopy while inpatient over the next couple of days.  Patient was admitted to the medicine service for acute blood loss anemia secondary to lower GI bleed.    I have reviewed the nursing notes. I have reviewed the findings, diagnosis, plan and need for follow up with the patient.    New Prescriptions    No medications on file       Final diagnoses:   None        Lubna Chan MD  08/12/23 [id number]

## 2023-08-12 NOTE — ED TRIAGE NOTES
Triage Assessment       Row Name 08/12/23 1114       Triage Assessment (Adult)    Airway WDL WDL       Respiratory WDL    Respiratory WDL WDL       Skin Circulation/Temperature WDL    Skin Circulation/Temperature WDL WDL       Cardiac WDL    Cardiac WDL WDL       Peripheral/Neurovascular WDL    Peripheral Neurovascular WDL WDL       Cognitive/Neuro/Behavioral WDL    Cognitive/Neuro/Behavioral WDL WDL

## 2023-08-12 NOTE — ED TRIAGE NOTES
Pt arrives ambulatory to triage w/ c/o rectal bleeding, clots per rectum. Pt was seen yesterday, subsequently left- states symptoms have worsened since. Hx diverticulitis, likens to past flares.

## 2023-08-12 NOTE — TELEPHONE ENCOUNTER
"Pt calling that she has blood in her stool, very slow bleeding for the past three days - more blood clot.  Not new blood, more red but more of a clot.  Pt intends to go to ED.  Donna Ortiz RN  [facility] Nurse Advisor    Reason for Disposition   [1] MODERATE rectal bleeding (small blood clots, passing blood without stool, or toilet water turns red) AND [2] more than once a day    Additional Information   Negative: Shock suspected (e.g., cold/pale/clammy skin, too weak to stand, low BP, rapid pulse)   Negative: Difficult to awaken or acting confused (e.g., disoriented, slurred speech)   Negative: Passed out (i.e., lost consciousness, collapsed and was not responding)   Negative: [1] Vomiting AND [2] contains red blood or black (\"coffee ground\") material  (Exception: few red streaks in vomit that only happened once)   Negative: Sounds like a life-threatening emergency to the triager   Negative: Diarrhea is main symptom   Negative: Stool color other than brown or tan is main concern  (no bleeding and no melena)   Negative: SEVERE rectal bleeding (large blood clots; constant or on and off bleeding)   Negative: SEVERE dizziness (e.g., unable to stand, requires support to walk, feels like passing out now)    Protocols used: Rectal Bleeding-A-AH    "

## 2023-08-13 LAB
ALBUMIN SERPL BCG-MCNC: 3.7 G/DL (ref 3.5–5.2)
ALBUMIN UR-MCNC: NEGATIVE MG/DL
ALP SERPL-CCNC: 67 U/L (ref 35–104)
ALT SERPL W P-5'-P-CCNC: 11 U/L (ref 0–50)
ANION GAP SERPL CALCULATED.3IONS-SCNC: 8 MMOL/L (ref 7–15)
APPEARANCE UR: CLEAR
AST SERPL W P-5'-P-CCNC: 27 U/L (ref 0–45)
BILIRUB SERPL-MCNC: 0.4 MG/DL
BILIRUB UR QL STRIP: NEGATIVE
BUN SERPL-MCNC: 17.9 MG/DL (ref 8–23)
CALCIUM SERPL-MCNC: 9.1 MG/DL (ref 8.8–10.2)
CHLORIDE SERPL-SCNC: 111 MMOL/L (ref 98–107)
COLOR UR AUTO: ABNORMAL
CREAT SERPL-MCNC: 1.2 MG/DL (ref 0.51–0.95)
DEPRECATED HCO3 PLAS-SCNC: 25 MMOL/L (ref 22–29)
ERYTHROCYTE [DISTWIDTH] IN BLOOD BY AUTOMATED COUNT: 14.6 % (ref 10–15)
GFR SERPL CREATININE-BSD FRML MDRD: 46 ML/MIN/1.73M2
GLUCOSE SERPL-MCNC: 87 MG/DL (ref 70–99)
GLUCOSE UR STRIP-MCNC: NEGATIVE MG/DL
HCT VFR BLD AUTO: 26.5 % (ref 35–47)
HGB BLD-MCNC: 8.6 G/DL (ref 11.7–15.7)
HGB UR QL STRIP: NEGATIVE
KETONES UR STRIP-MCNC: NEGATIVE MG/DL
LEUKOCYTE ESTERASE UR QL STRIP: NEGATIVE
MCH RBC QN AUTO: 31.6 PG (ref 26.5–33)
MCHC RBC AUTO-ENTMCNC: 32.5 G/DL (ref 31.5–36.5)
MCV RBC AUTO: 97 FL (ref 78–100)
MUCOUS THREADS #/AREA URNS LPF: PRESENT /LPF
NITRATE UR QL: NEGATIVE
PH UR STRIP: 5 [PH] (ref 5–7)
PLATELET # BLD AUTO: 203 10E3/UL (ref 150–450)
POTASSIUM SERPL-SCNC: 4.2 MMOL/L (ref 3.4–5.3)
PROT SERPL-MCNC: 6 G/DL (ref 6.4–8.3)
RBC # BLD AUTO: 2.72 10E6/UL (ref 3.8–5.2)
RBC URINE: <1 /HPF
SODIUM SERPL-SCNC: 144 MMOL/L (ref 136–145)
SP GR UR STRIP: 1.01 (ref 1–1.03)
UROBILINOGEN UR STRIP-MCNC: NORMAL MG/DL
WBC # BLD AUTO: 3.7 10E3/UL (ref 4–11)
WBC URINE: 1 /HPF

## 2023-08-13 PROCEDURE — 250N000013 HC RX MED GY IP 250 OP 250 PS 637: Performed by: STUDENT IN AN ORGANIZED HEALTH CARE EDUCATION/TRAINING PROGRAM

## 2023-08-13 PROCEDURE — 80053 COMPREHEN METABOLIC PANEL: CPT

## 2023-08-13 PROCEDURE — 81001 URINALYSIS AUTO W/SCOPE: CPT | Performed by: EMERGENCY MEDICINE

## 2023-08-13 PROCEDURE — 85014 HEMATOCRIT: CPT

## 2023-08-13 PROCEDURE — 99222 1ST HOSP IP/OBS MODERATE 55: CPT | Mod: GC | Performed by: INTERNAL MEDICINE

## 2023-08-13 PROCEDURE — 36415 COLL VENOUS BLD VENIPUNCTURE: CPT

## 2023-08-13 PROCEDURE — 250N000013 HC RX MED GY IP 250 OP 250 PS 637

## 2023-08-13 PROCEDURE — 120N000002 HC R&B MED SURG/OB UMMC

## 2023-08-13 PROCEDURE — 99232 SBSQ HOSP IP/OBS MODERATE 35: CPT | Mod: GC | Performed by: STUDENT IN AN ORGANIZED HEALTH CARE EDUCATION/TRAINING PROGRAM

## 2023-08-13 RX ORDER — POLYETHYLENE GLYCOL 3350 17 G/17G
238 POWDER, FOR SOLUTION ORAL ONCE
Status: DISCONTINUED | OUTPATIENT
Start: 2023-08-13 | End: 2023-08-13

## 2023-08-13 RX ADMIN — FOLIC ACID 1 MG: 1 TABLET ORAL at 08:46

## 2023-08-13 RX ADMIN — GABAPENTIN 600 MG: 300 CAPSULE ORAL at 22:37

## 2023-08-13 RX ADMIN — POLYETHYLENE GLYCOL 3350, SODIUM SULFATE ANHYDROUS, SODIUM BICARBONATE, SODIUM CHLORIDE, POTASSIUM CHLORIDE 4000 ML: 236; 22.74; 6.74; 5.86; 2.97 POWDER, FOR SOLUTION ORAL at 14:43

## 2023-08-13 RX ADMIN — PANTOPRAZOLE SODIUM 40 MG: 40 TABLET, DELAYED RELEASE ORAL at 22:37

## 2023-08-13 RX ADMIN — PANTOPRAZOLE SODIUM 40 MG: 40 TABLET, DELAYED RELEASE ORAL at 08:46

## 2023-08-13 ASSESSMENT — ACTIVITIES OF DAILY LIVING (ADL)
ADLS_ACUITY_SCORE: 22

## 2023-08-13 NOTE — PLAN OF CARE
Goal Outcome Evaluation:      Plan of Care Reviewed With: patient    Overall Patient Progress: improvingOverall Patient Progress: improving    Outcome Evaluation: Patient alert and oriented, presented with complaints of bloody stools.    Neuro: Patient alert and oriented x 4  Cardiac:WNL   Respiratory: Denies SOB  GI/: Patient alert and oriented, presented with complaints of bloody stools. Last hgb 8.3, on golytely for bowel prep  Diet/appetite: Clear  Activity: Independent with ambulation  Pain: Denies pain  Skin: Intact.  Lines: PIV    /53 (BP Location: Left arm)   Pulse 73   Temp 98.9  F (37.2  C) (Oral)   Resp 16   SpO2 100%

## 2023-08-13 NOTE — H&P
[facility]    History and Physical - Medicine Service, MARGAY TEAM 1       Date of Admission:  8/12/2023    Assessment & Plan      Patrick Olson is a 82 year old female with a history of diverticulosis, prior GI bleeds, admitted on 8/12/2023. She is admitted with concern for diverticular bleed.     BRBPR  Concern for diverticular bleed  Acute on chronic normocytic anemia  Patient with reports of red blood in stool for past several days, just on outside of stool, minimal if any abd pain. Has had prior diverticular bleeds and similar to this in the past. Mild decrease in hgb to 9s but vitally stable. Had long discussion with patient about risks/benefits of staying for further treatments including a flex sig per GI's recommendations vs going to another hospital where she would have to wait in the ED again vs going home and doing a prep at home for a full colonoscopy with potential to have it done Monday and ultimately she agreed to stay, discussed with GI and will perform 2-3 tap water enemas morning of 8/13 with plan to perform flex sig that afternoon for further evaluation.   - clear liquid diet  - check CBC in AM  - 2 tap water enemas to be performed morning of 8/13  - GI consulted, appreciate assistance      KUSHAL  No difficulties urinating and reports some poor PO intake recently, more concern for pre-renal process  - CTM       Diet: Clear Liquid Diet    DVT Prophylaxis: Low Risk/Ambulatory with no VTE prophylaxis indicated  Parra Catheter: Not present  Fluids: PO, clear liquid diet  Lines: None     Cardiac Monitoring: None  Code Status: Full Code      Clinically Significant Risk Factors Present on Admission                                Disposition Plan      Expected Discharge Date: 08/14/2023                The patient's care was discussed with the Attending Physician, Dr. Chan .      Donald Justin MD  Medicine Service, [facility]HAO TEAM 1  [facility]  [facility]  Securely message with [employer] (more info)  Text page via [employer] Paging/Directory   See signed in provider for up to date coverage information  ______________________________________________________________________    Chief Complaint   Bright red blood in stool    History is obtained from the patient    History of Present Illness   Patrick Olson is a 82 year old female who presents with blood in her stool    Patient reports that she has noticed bright red blood in her stool since last Wednesday, not loose stools but blood on outside of stool. No other sings/sources of bleeding. Had some lower abd pain occasionally but nothing persistent. No N/V, no fevers or chills. No black stools. No lightheadedness, dizziness or SOB      Past Medical History    Past Medical History:   Diagnosis Date    Anemia     CKD (chronic kidney disease) stage 3, GFR 30-59 ml/min (H)     Diverticulosis of large intestine     Osteoarthritis     Osteoporosis     Rheumatoid arthritis (H)     Sensorineural hearing loss        Past Surgical History   Past Surgical History:   Procedure Laterality Date    CAPSULE/PILL CAM ENDOSCOPY N/A 11/18/2021    Procedure: IMAGING PROCEDURE, GI TRACT, INTRALUMINAL, VIA CAPSULE;  Surgeon: Deric Rodriguez MD;  Location:  GI    CAPSULE/PILL CAM ENDOSCOPY N/A 2/14/2023    Procedure: IMAGING PROCEDURE, GI TRACT, INTRALUMINAL, VIA CAPSULE;  Surgeon: Jaime Mesa MD;  Location:  GI    COLONOSCOPY N/A 03/14/2017    Procedure: COMBINED COLONOSCOPY, SINGLE OR MULTIPLE BIOPSY/POLYPECTOMY BY BIOPSY;  Surgeon: Spencer Downey MD;  Location:  GI    COLONOSCOPY N/A 9/22/2022    Procedure: COLONOSCOPY, FLEXIBLE, WITH LESION REMOVAL USING SNARE;  Surgeon: Kirby Arthur MD;  Location:  GI    COLONOSCOPY N/A 1/13/2023    Procedure: COLONOSCOPY;  Surgeon: Spencer Downey MD;  Location: [facility] OR    ENTEROSCOPY SMALL BOWEL N/A 11/20/2021    Procedure: ENTEROSCOPY,  colonoscopy with endoscopic mucosal resection and tattoo placement;  Surgeon: Kirby Arthur MD;  Location: [facility]    ESOPHAGOSCOPY, GASTROSCOPY, DUODENOSCOPY (EGD), COMBINED N/A 2023    Procedure: ESOPHAGOGASTRODUODENOSCOPY (EGD);  Surgeon: Spencer Downey MD;  Location: [facility]    IR VISCERAL EMBOLIZATION  2021    ORTHOPEDIC SURGERY Left     hip replacment    SIGMOIDOSCOPY FLEXIBLE N/A 2021    Procedure: SIGMOIDOSCOPY, FLEXIBLE;  Surgeon: Jaime Steinberg MD;  Location:  GI       Prior to Admission Medications   Prior to Admission Medications   Prescriptions Last Dose Informant Patient Reported? Taking?   Multiple Vitamins-Minerals (OCUVITE PRESERVISION PO)  Self Yes No   Sig: Take 1 tablet by mouth 2 times daily   acetaminophen (TYLENOL) 650 MG CR tablet   Yes No   Sig: Take 650 mg by mouth every 8 hours as needed for fever or pain Taking 2 - 650 mg tablets every 8 hrs.   calcium carbonate-vitamin D (CALTRATE) 600-10 MG-MCG per tablet   Yes No   Sig: Take 1 tablet by mouth 2 times daily   famotidine (PEPCID) 20 MG tablet   Yes No   Sig: TAKE 1 TABLET BY MOUTH TWICE A DAY   folic acid (FOLVITE) 1 MG tablet   No No   Sig: Take 1 tablet (1 mg) by mouth daily   gabapentin (NEURONTIN) 300 MG capsule   No No   Sig: Take 1 capsule (300 mg) by mouth 3 times daily Take 300mg in morning, 300mg in afternoon and 300mg   Patient taking differently: Take 600 mg by mouth At Bedtime Take 300mg in morning, 300mg in afternoon and 300mg   hydroxychloroquine (PLAQUENIL) 200 MG tablet   No No   Sig: Take 1 tablet (200 mg) by mouth daily for 180 days   lifitegrast (XIIDRA) 5 % opthalmic solution   Yes No   Si drop 2 times daily   omeprazole (PRILOSEC) 40 MG DR capsule   No No   Sig: Take 1 capsule (40 mg) by mouth daily   pilocarpine (SALAGEN) 5 MG tablet   Yes No   Sig: Take 5 mg by mouth 2 times daily as needed Take 1-2 tablets 2 times daily as needed   pilocarpine (SALAGEN) 5 MG tablet   No No   Sig: Take  1 tablet (5 mg) by mouth daily   vitamin D3 (CHOLECALCIFEROL) 50 mcg (2000 units) tablet   Yes No   Sig: Take 1 tablet by mouth daily      Facility-Administered Medications: None          Physical Exam   Vital Signs: Temp: 97.8  F (36.6  C) Temp src: Oral BP: 133/61 Pulse: 81   Resp: 16 SpO2: 99 % O2 Device: None (Room air)    Weight: 0 lbs 0 oz    General: alert, pleasant, no acute distress sitting/laying on bed  HEENT: PERRLA, EOMI, no conjunctival injection or scleral icterus, MMM, neck supple with no lymphadenopathy, ROM intact  Cardiac: RRR, no clicks rubs or murmurs  Respiratory: good respiratory effort, lungs CTAB in all lung fields bilaterally, no wheezes/rhonchi/rales or crackles appreciated, air movement throughout  GI: BS normoactive, nondistended, nontender to palpation, no hepatosplenomegaly or other  Skin: no erythema, open skin wounds, or concerning lesions  Extremities: moving all extremities equally, no peripheral edema, brisk pulses, warm and well perfused  Neuro: alert and oriented X4, 5/5 strength in upper and lower extremities bilaterally, sensation intact      Medical Decision Making       Data     I have personally reviewed the following data over the past 24 hrs:    4.5  \   9.3 (L)   / 241     141 106 23.0 /  86   4.1 22 1.24 (H) \     ALT: 13 AST: 27 AP: 76 TBILI: 0.3   ALB: 4.2 TOT PROTEIN: 6.8 LIPASE: 85 (H)     INR:  1.07 PTT:  27   D-dimer:  N/A Fibrinogen:  N/A

## 2023-08-13 NOTE — CONSULTS
GASTROENTEROLOGY CONSULTATION      Date of Admission:  8/12/2023           Reason for Consultation:   We were asked by Dr. Chan to evaluate this patient with hematochezia.            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    Likely differentials include diverticular bleeding vs AV malformations vs hemorrhoidal bleeding. Less likely ischemic colitis considering that this painless. Unlikely to be upper GI source considering relative hemodynamic stability and normal Bun/Cr.     Pt's last colonoscopy was in 1/2023 and considering this we suggested to do a flexible sigmoidoscopy with tap water enemas but pt was opposed to this and has requested for a full colonoscopy evaluation. I explained to her that the yield of finding an active lesion is extremely less likely in case of diverticular bleeding and a colonoscopy could put her at higher risk for complications as compared to a flex sigmoidoscopy but patient was adamant on pursing colonoscopy.      Recommendations  - Clear liquid diet today  - NPO after midnight   - Colon prep with 4 liters of golytely, if not clear by 5 am tomorrow will do additional 2 liters       Thank you for involving us in this patient's care. Please do not hesitate to contact the GI service with any questions or concerns.     Pt care plan discussed with Dr. Woodard GI staff physician.    Dexter Denney MD  -------------------------------------------------------------------------------------------------------------------           History of Present Illness:   Patrick Olson is a 82 year old female with a history of rheumatoid arthritis, chronic kidney disease,  history of intermittent bright red bleeding per rectum who presented to the ED with hematochezia.    Patient reports that she has been noticing blood clots in the stool since last Wednesday.  This has progressed over the past few days and yesterday it was a lot more than usual and then she decided to come to the ED. she actually came to the ER the previous day as well but left the ER before full evaluation but due to recurrence of symptoms yesterday morning she decided to come to the ED. in the ED her hemoglobin was 9.3 as opposed to her usual baseline of 10.7.  Patient was admitted for further management and GI was consulted.    Patient has a history of recurrent intermittent lower GI bleeding since 2021.  She underwent numerous upper and lower endoscopies and video capsule endoscopy which have been unyielding so far but she is suspected to have diverticular bleeding. She did have evidence of Avms in upper GI tract and duodenal erosions as well, these were suspected to be source of bleeding at one point of time, she was on omeprazole for few months which was discontinued in June 2023.   She reports that she was told to come to the hospital as soon as possible after an episode and hence she presented here.    She denies using any NSAIDs, she is not on any blood thinners.  Except for some abdominal discomfort at the time of the onset of this episode on Wednesday she denies any pain.  She lives with her  and his primary caregiver for her  and her 95-year-old sister.          Data:   Key relevant labs:    Latest Reference Range & Units 05/30/23 10:24 08/12/23 11:47 08/13/23 06:19   WBC 4.0 - 11.0 10e3/uL 3.6 (L) 4.5 3.7 (L)   Hemoglobin 11.7 - 15.7 g/dL 10.7 (L) 9.3 (L) 8.6 (L)   Hematocrit 35.0 - 47.0 % 32.3 (L) 28.6 (L) 26.5 (L)   Platelet Count 150 - 450 10e3/uL 208 241 203   (L): Data is abnormally low    Key relevant imaging:           Previous Endoscopy:   VCE 2/11/2023  Impression:            -  Normal stomach.                          - Duodenal erosion.                          - A single angioectasia without bleeding in the                          duodenum.                          - Small bowel lymphangiectasia.                          - A single mucosal spot with no bleeding in the colon.                          - No bleeding seen during exam.   EGD 2/6/2023  Impression:            - Normal esophagus.                          - Normal stomach.                          - Duodenal erosion without bleeding.                          - No specimens collected.   Colonoscopy 1/13/2023  Findings:       Residual specks of blood throughout (exam to 30 cm into the TI). No        inflammation.        Residual small amounts of blood mixed with prep liquid. Extensive        diverticulosis throughout the colon, left > right. Diverticuli were        carefully examined, but no active bleeding was seen. Site of mucosectomy        at the IC valve was unremarkable.     9/22/2022   Colonoscopy  Impression:               - Blood started to be seen on insertion in the                             descending/transverse colon region. Some old clot,                             some bright red blood. After clearing all the                             blood. No active bleeding seen. Diverticuli closely                             examined throughout the colon and no stigmata seen                             to identify culprit lesion.                             - The examined portion of the ileum was normal. No                             blood seen upstream.                             - Post mucosectomy scar at the ileocecal valve.                             Polypoid tissue seen and resected to ensure                             complete removal of adenoma. May just be                             granulation tissue.                             - Diverticulosis in the entire examined colon.                             - The  examination was otherwise normal on direct                             and retroflexion views.     11/16/2021  Colonoscopy   Impression:            - No signs of active bleedig or stigmata of old                          bleeding seen.                          - The examined portion of the ileum was normal.                          Advanced at least ~40 cm. Maximal insertion depth                          tattooed to aid with future localization if recurrent                          bleeding although may not be post-polypectomy in                          nature if occurs in the next 1-2 weeks.                          - One 30 mm polyp at the ileocecal valve (non-granular                          LST, NICE type II), removed with mucosal resection.                          Resected and retrieved. Snare tip soft coagulation of                          the margins. Clips (MR conditional) were placed.                          Possible source of hematochezia that was seen on                          capsule study.                          - Pill cam in the ascending colon. Removed.                          - Diverticulosis in the entire examined colon.                          - The distal rectum and anal verge are normal on                          retroflexion view.     11/16/2021  VCE  Impression:            Blood suggesting active bleeding was found in the                          colon. No clear source of bleeding identified as the                          view was obscured by blood.   1/23/2021  Flexible sigmoidoscopy  Impression:               - Diverticulosis in the sigmoid colon.                             - No specimens collected.          Medications:     Medications Prior to Admission   Medication Sig Dispense Refill Last Dose    acetaminophen (TYLENOL) 650 MG CR tablet Take 650 mg by mouth every 8 hours as needed for fever or pain Taking 2 - 650 mg tablets every 8 hrs.       calcium carbonate-vitamin D  (CALTRATE) 600-10 MG-MCG per tablet Take 1 tablet by mouth 2 times daily       famotidine (PEPCID) 20 MG tablet TAKE 1 TABLET BY MOUTH TWICE A DAY       folic acid (FOLVITE) 1 MG tablet Take 1 tablet (1 mg) by mouth daily 90 tablet 1     gabapentin (NEURONTIN) 300 MG capsule Take 1 capsule (300 mg) by mouth 3 times daily Take 300mg in morning, 300mg in afternoon and 300mg (Patient taking differently: Take 600 mg by mouth At Bedtime Take 300mg in morning, 300mg in afternoon and 300mg) 90 capsule 1     hydroxychloroquine (PLAQUENIL) 200 MG tablet Take 1 tablet (200 mg) by mouth daily for 180 days 30 tablet 5     lifitegrast (XIIDRA) 5 % opthalmic solution 1 drop 2 times daily       Multiple Vitamins-Minerals (OCUVITE PRESERVISION PO) Take 1 tablet by mouth 2 times daily       omeprazole (PRILOSEC) 40 MG DR capsule Take 1 capsule (40 mg) by mouth daily 90 capsule 3     pilocarpine (SALAGEN) 5 MG tablet Take 1 tablet (5 mg) by mouth daily 90 tablet 1     pilocarpine (SALAGEN) 5 MG tablet Take 5 mg by mouth 2 times daily as needed Take 1-2 tablets 2 times daily as needed       vitamin D3 (CHOLECALCIFEROL) 50 mcg (2000 units) tablet Take 1 tablet by mouth daily                Physical Exam:   /61 (BP Location: Left arm)   Pulse 72   Temp 98.5  F (36.9  C) (Oral)   Resp 20   SpO2 100%   Wt:   Wt Readings from Last 2 Encounters:   06/29/23 42.4 kg (93 lb 6.4 oz)   06/07/23 42.5 kg (93 lb 11.2 oz)      Constitutional: cooperative, pleasant, not dyspneic/diaphoretic, no acute distress  Eyes: Sclera anicteric/injected  Ears/nose/mouth/throat: hearing intact  CV: No edema  Respiratory: Unlabored breathing  Abd: Soft, non tender, non distended  Skin: warm, perfused, no jaundice  Neuro: AAO x 3, No asterixis  Psych: Normal affect  MSK: No gross deformities          Past Medical History:   Reviewed and edited as appropriate  Past Medical History:   Diagnosis Date    Anemia     CKD (chronic kidney disease) stage 3, GFR  30-59 ml/min (H)     Diverticulosis of large intestine     Osteoarthritis     Osteoporosis     Rheumatoid arthritis (H)     Sensorineural hearing loss             Past Surgical History:   Reviewed and edited as appropriate   Past Surgical History:   Procedure Laterality Date    CAPSULE/PILL CAM ENDOSCOPY N/A 11/18/2021    Procedure: IMAGING PROCEDURE, GI TRACT, INTRALUMINAL, VIA CAPSULE;  Surgeon: Deric Rodriguez MD;  Location: [facility]    CAPSULE/PILL CAM ENDOSCOPY N/A 2/14/2023    Procedure: IMAGING PROCEDURE, GI TRACT, INTRALUMINAL, VIA CAPSULE;  Surgeon: Jaime Mesa MD;  Location: [facility]    COLONOSCOPY N/A 03/14/2017    Procedure: COMBINED COLONOSCOPY, SINGLE OR MULTIPLE BIOPSY/POLYPECTOMY BY BIOPSY;  Surgeon: Spencer Downey MD;  Location: [facility]    COLONOSCOPY N/A 9/22/2022    Procedure: COLONOSCOPY, FLEXIBLE, WITH LESION REMOVAL USING SNARE;  Surgeon: Kirby Arthur MD;  Location: [facility]    COLONOSCOPY N/A 1/13/2023    Procedure: COLONOSCOPY;  Surgeon: Spencer Downey MD;  Location: [facility] OR    ENTEROSCOPY SMALL BOWEL N/A 11/20/2021    Procedure: ENTEROSCOPY, colonoscopy with endoscopic mucosal resection and tattoo placement;  Surgeon: Kirby Arthur MD;  Location: [facility]    ESOPHAGOSCOPY, GASTROSCOPY, DUODENOSCOPY (EGD), COMBINED N/A 2/6/2023    Procedure: ESOPHAGOGASTRODUODENOSCOPY (EGD);  Surgeon: Spencer Downey MD;  Location:  GI    IR VISCERAL EMBOLIZATION  01/22/2021    ORTHOPEDIC SURGERY Left     hip replacment    SIGMOIDOSCOPY FLEXIBLE N/A 01/23/2021    Procedure: SIGMOIDOSCOPY, FLEXIBLE;  Surgeon: Jaime Steinberg MD;  Location: [facility]            Social History:   Alcohol use: None  Smoking history: Denies   Living situation: Lives with  and sister, she is their primary caregiver.         Family History:   Reviewed and edited as appropriate  No family history on file.           Allergies:   Reviewed and edited as appropriate     Allergies   Allergen Reactions    Bee  Venom Anaphylaxis    Shrimp Anaphylaxis    Evoxac [Cevimeline] Unknown    Prevnar Swelling     Other reaction(s): Edema    Prevnar [Pneumococcal 13-Linda Conj Vacc] Unknown              Review of Systems:     A complete 10 point review of systems was performed and is negative except as noted in the HPI

## 2023-08-13 NOTE — PROGRESS NOTES
Admission to Obs3         08/12/2023   08:50 PM  -----------------------------------------------------------  Diagnosis: possible lower GI bleed/diverticulosis     Admitted from: ED  For: follow up, prep for flex sig  Report given from: ED RN  Via: phone  Accompanied by: ED RN   Family Aware of Admission: yes  Belongings: remain with pt (see adult profile)  Admission Profile: completed  Teaching: done  Access: KATELYN HICKEY  Telemetry: no

## 2023-08-13 NOTE — PROGRESS NOTES
[facility]    Progress Note - Medicine Service, ZHAO TEAM 1       Date of Admission:  8/12/2023    Assessment & Plan   Patrick Olson is a 82 year old female with a history of diverticulosis, prior GI bleeds, admitted on 8/12/2023. She is admitted with concern for diverticular bleed.     - plan for colonoscopy 8/14  - prepping today     BRBPR  Concern for diverticular bleed  Acute on chronic normocytic anemia  Patient with reports of red blood in stool for past several days, just on outside of stool, minimal if any abd pain. Has had prior diverticular bleeds and similar to this in the past. Mild decrease in hgb to 9s but vitally stable. Had long discussion with patient about risks/benefits of staying for further treatments including a flex sig per GI's recommendations vs going to another hospital where she would have to wait in the ED again vs going home and doing a prep at home for a full colonoscopy with potential to have it done Monday and ultimately she agreed to stay, was going to have flex sig but refused enema, now planning for colonoscopy 8/14  - clear liquid diet until midngiht  - NPO at midnight  - trend CBC  - bowel prep with 4L miralax  - GI consulted, appreciate assistance     KUSHAL  No difficulties urinating and reports some poor PO intake recently, more concern for pre-renal process  - CTM       Diet: Clear Liquid Diet  NPO per Anesthesia Guidelines for Procedure/Surgery Except for: Meds    DVT Prophylaxis: Low Risk/Ambulatory with no VTE prophylaxis indicated  Parra Catheter: Not present  Fluids: oral  Lines: None     Cardiac Monitoring: None  Code Status: Full Code      Clinically Significant Risk Factors                                  Disposition Plan     Expected Discharge Date: 08/14/2023                The patient's care was discussed with the Attending Physician, Dr. Chan .    Donald Justin MD  Medicine Service, ZHAO TEAM 1  [facility]  [facility]  Securely message with [employer] (more info)  Text page via [employer] Paging/Directory   See signed in provider for up to date coverage information  ______________________________________________________________________    Interval History   Patient reports that she does not want to do the enema and would be a waste of time for the flex sig, would like to just do colonoscopy. Had lengthy discussion with GI and plan is now to proceed with full colonoscopy 8/14 with prep initiating 8/13.     Physical Exam   Vital Signs: Temp: 98.5  F (36.9  C) Temp src: Oral BP: 126/61 Pulse: 72   Resp: 20 SpO2: 100 % O2 Device: None (Room air)    Weight: 0 lbs 0 oz    General: alert, pleasant, no acute distress sitting/laying on bed  HEENT: PERRLA, EOMI, no conjunctival injection or scleral icterus, MMM, neck supple with no lymphadenopathy, ROM intact  Cardiac: RRR, no clicks rubs or murmurs  Respiratory: good respiratory effort, lungs CTAB in all lung fields bilaterally, no wheezes/rhonchi/rales or crackles appreciated, air movement throughout  GI: BS normoactive, nondistended, nontender to palpation, no hepatosplenomegaly or other  Skin: no erythema, open skin wounds, or concerning lesions  Extremities: moving all extremities equally, no peripheral edema, brisk pulses, warm and well perfused  Neuro: alert and oriented X4, 5/5 strength in upper and lower extremities bilaterally, sensation intact    Medical Decision Making         Data     I have personally reviewed the following data over the past 24 hrs:    3.7 (L)  \   8.6 (L)   / 203     144 111 (H) 17.9 /  87   4.2 25 1.20 (H) \     ALT: 11 AST: 27 AP: 67 TBILI: 0.4   ALB: 3.7 TOT PROTEIN: 6.0 (L) LIPASE: N/A

## 2023-08-13 NOTE — PROGRESS NOTES
Temp: 98.2  F (36.8  C) Temp src: Oral BP: 101/58 Pulse: 73   Resp: 16 SpO2: 98 % O2 Device: None (Room air)          Neuro: A&O x4. Able to make needs known. Pt slept most of shift.   Cardiac: Tele in place, SR. Afebrile. Denies chest pain.  Resp: VSS on 2L NC overnight. Denies SOB.   GI/: Voiding in bedside urinal. Last BM 10/23   Skin: No new deficits noted.   LDA's: L PIV in place SL.dressing change due 10/25  Pain: Denies     Plan: Continue to monitor and follow POC. Notify with changes.

## 2023-08-13 NOTE — UTILIZATION REVIEW
Admission Status; Secondary Review Determination         Under the authority of the Utilization Management Committee, the utilization review process indicated a secondary review on the above patient.  The review outcome is based on review of the medical records, discussions with staff, and applying clinical experience noted on the date of the review.        (x)      Inpatient Status Appropriate - This patient's medical care is consistent with medical management for inpatient care and reasonable inpatient medical practice.              RATIONALE FOR DETERMINATION   The patient is an 82-year-old female with a history of diverticulosis and diverticular GI bleeds.  She comes in with acute bleeding per rectum.  Hemoglobin on admission was 9.3.  It was 10.7 in June of this year and currently is dropped to 8.6.  Patient is scheduled for colonoscopy tomorrow by GI.  Patient also has chronic renal failure with a 1.2 creatinine which is higher than previous creatinine of 0.97 last May.  Based on continued hemoglobin drop and need for colonoscopy, recommend continuation of inpatient status.      The severity of illness, intensity of service provided, expected LOS and risk for adverse outcome make the care complex, high risk and appropriate for hospital admission.        The information on this document is developed by the utilization review team in order for the business office to ensure compliance.  This only denotes the appropriateness of proper admission status and does not reflect the quality of care rendered.         The definitions of Inpatient Status and Observation Status used in making the determination above are those provided in the CMS Coverage Manual, Chapter 1 and Chapter 6, section 70.4.      Sincerely,     Kian Chavez MD  Physician Advisor  Utilization Review/ Case Management  [facility].

## 2023-08-14 VITALS
DIASTOLIC BLOOD PRESSURE: 40 MMHG | RESPIRATION RATE: 18 BRPM | HEART RATE: 82 BPM | SYSTOLIC BLOOD PRESSURE: 111 MMHG | TEMPERATURE: 98.4 F | OXYGEN SATURATION: 98 %

## 2023-08-14 LAB
ANION GAP SERPL CALCULATED.3IONS-SCNC: 16 MMOL/L (ref 7–15)
APTT PPP: 26 SECONDS (ref 22–38)
BUN SERPL-MCNC: 15 MG/DL (ref 8–23)
CALCIUM SERPL-MCNC: 9.7 MG/DL (ref 8.8–10.2)
CHLORIDE SERPL-SCNC: 106 MMOL/L (ref 98–107)
COLONOSCOPY: NORMAL
CREAT SERPL-MCNC: 1.36 MG/DL (ref 0.51–0.95)
DEPRECATED HCO3 PLAS-SCNC: 21 MMOL/L (ref 22–29)
ERYTHROCYTE [DISTWIDTH] IN BLOOD BY AUTOMATED COUNT: 14.5 % (ref 10–15)
GFR SERPL CREATININE-BSD FRML MDRD: 39 ML/MIN/1.73M2
GLUCOSE SERPL-MCNC: 73 MG/DL (ref 70–99)
HCT VFR BLD AUTO: 28.9 % (ref 35–47)
HGB BLD-MCNC: 9.3 G/DL (ref 11.7–15.7)
INR PPP: 1.12 (ref 0.85–1.15)
MCH RBC QN AUTO: 31.4 PG (ref 26.5–33)
MCHC RBC AUTO-ENTMCNC: 32.2 G/DL (ref 31.5–36.5)
MCV RBC AUTO: 98 FL (ref 78–100)
PLATELET # BLD AUTO: 214 10E3/UL (ref 150–450)
POTASSIUM SERPL-SCNC: 3.9 MMOL/L (ref 3.4–5.3)
RBC # BLD AUTO: 2.96 10E6/UL (ref 3.8–5.2)
SODIUM SERPL-SCNC: 143 MMOL/L (ref 136–145)
WBC # BLD AUTO: 4.2 10E3/UL (ref 4–11)

## 2023-08-14 PROCEDURE — 85730 THROMBOPLASTIN TIME PARTIAL: CPT

## 2023-08-14 PROCEDURE — 85610 PROTHROMBIN TIME: CPT

## 2023-08-14 PROCEDURE — 250N000013 HC RX MED GY IP 250 OP 250 PS 637

## 2023-08-14 PROCEDURE — G0500 MOD SEDAT ENDO SERVICE >5YRS: HCPCS | Performed by: INTERNAL MEDICINE

## 2023-08-14 PROCEDURE — 0DJD8ZZ INSPECTION OF LOWER INTESTINAL TRACT, VIA NATURAL OR ARTIFICIAL OPENING ENDOSCOPIC: ICD-10-PCS | Performed by: INTERNAL MEDICINE

## 2023-08-14 PROCEDURE — 36415 COLL VENOUS BLD VENIPUNCTURE: CPT

## 2023-08-14 PROCEDURE — 82310 ASSAY OF CALCIUM: CPT

## 2023-08-14 PROCEDURE — 45378 DIAGNOSTIC COLONOSCOPY: CPT | Performed by: INTERNAL MEDICINE

## 2023-08-14 PROCEDURE — 99153 MOD SED SAME PHYS/QHP EA: CPT | Performed by: INTERNAL MEDICINE

## 2023-08-14 PROCEDURE — 258N000003 HC RX IP 258 OP 636: Performed by: INTERNAL MEDICINE

## 2023-08-14 PROCEDURE — 85027 COMPLETE CBC AUTOMATED: CPT

## 2023-08-14 PROCEDURE — 99239 HOSP IP/OBS DSCHRG MGMT >30: CPT | Performed by: STUDENT IN AN ORGANIZED HEALTH CARE EDUCATION/TRAINING PROGRAM

## 2023-08-14 PROCEDURE — 250N000011 HC RX IP 250 OP 636: Performed by: INTERNAL MEDICINE

## 2023-08-14 RX ORDER — SODIUM CHLORIDE, SODIUM LACTATE, POTASSIUM CHLORIDE, CALCIUM CHLORIDE 600; 310; 30; 20 MG/100ML; MG/100ML; MG/100ML; MG/100ML
INJECTION, SOLUTION INTRAVENOUS CONTINUOUS PRN
Status: DISCONTINUED | OUTPATIENT
Start: 2023-08-14 | End: 2023-08-14 | Stop reason: HOSPADM

## 2023-08-14 RX ORDER — FENTANYL CITRATE 50 UG/ML
INJECTION, SOLUTION INTRAMUSCULAR; INTRAVENOUS PRN
Status: DISCONTINUED | OUTPATIENT
Start: 2023-08-14 | End: 2023-08-14 | Stop reason: HOSPADM

## 2023-08-14 RX ADMIN — FOLIC ACID 1 MG: 1 TABLET ORAL at 07:46

## 2023-08-14 RX ADMIN — PANTOPRAZOLE SODIUM 40 MG: 40 TABLET, DELAYED RELEASE ORAL at 07:46

## 2023-08-14 ASSESSMENT — ACTIVITIES OF DAILY LIVING (ADL)
ADLS_ACUITY_SCORE: 22

## 2023-08-14 NOTE — PROGRESS NOTES
Shift:[id number]  Vitals: Hypertensive at times, w/n MD parameters. OVSS on RA  /57 (BP Location: Left arm)   Pulse 78   Temp 98.6  F (37  C) (Oral)   Resp 18   SpO2 98%    Neuros: A&O x4. Able to make needs known  IV: PIV SL  Labs/Electrolytes: most recent Hgb 8.6 and WBC 3.7   Diet: NPO for colonoscopy this morning  Bowel status: Finished GoLytely prior to shift. Stool is now clear liquid with some blood present  : Voids w/o difficulty  Skin: WDL  Pain: Denies pain  Activity: Up ad shayla  Plan: NPO at midnight, colonoscopy this morning

## 2023-08-14 NOTE — PROGRESS NOTES
Gastroenterology Endoscopy Suite Brief Operative Note    Procedure:  Colonoscopy   Post-operative diagnosis:  Diverticular bleeding   Staff Physician:  Dr. Spencer Downey   Fellow/Assistant(s):  Dr. Chelsi Molina    Specimens:  Please see final procedure note for further details.   Findings:  No active bleeding. Old blood in the entire colon. There is old blood in the terminal ileum likely back wash from the colon. Diverticulosis in the entire colon.     Complications:  None.   Condition:  Stable   Recommendations  Diet:  Return to previous diet  PPI:  N/A  Anti-coagulants/platelets:  N/A  Octreotide:  N/A  Discharge Planning:   Can discharge from GI perspective if no further bleeding.

## 2023-08-14 NOTE — PROGRESS NOTES
/40   Pulse 82   Temp 98.4  F (36.9  C)   Resp 18   SpO2 98%     Patient's condition and vital signs are stable/WNL. Discharge instructions reviewed with patient and questions answered. Patient verbalizes understanding. PIV removed. Pain under control. Patient is tolerating regular diet and denies any N/V. Patient to be discharged to home via . Patient has all belongings.

## 2023-08-14 NOTE — PLAN OF CARE
Status: Pt admitted with concern for diverticular bleed, pt has had red bloody stool for the past several days.   Vitals: Hypertensive at times, w/n MD parameters. OVSS on RA  Neuros: A&O x4. Able to make needs known  IV: PIV SL  Labs/Electrolytes: Hgb 8.6 and WBC 3.7 this AM  Diet: Clear liquid until midnight  Bowel status: Finishing Golytely tonight  : Voids w/o difficulty  Skin: WDL  Pain: Denies pain  Activity: Up ad shayla  Plan: NPO at midnight, colonoscopy tomorrow.

## 2023-08-15 ENCOUNTER — THERAPY VISIT (OUTPATIENT)
Dept: OCCUPATIONAL THERAPY | Facility: CLINIC | Age: 83
End: 2023-08-15
Payer: COMMERCIAL

## 2023-08-15 ENCOUNTER — TELEPHONE (OUTPATIENT)
Dept: NEPHROLOGY | Facility: CLINIC | Age: 83
End: 2023-08-15

## 2023-08-15 DIAGNOSIS — M25.531 RIGHT WRIST PAIN: ICD-10-CM

## 2023-08-15 DIAGNOSIS — M25.521 CHRONIC PAIN OF RIGHT ELBOW: Primary | ICD-10-CM

## 2023-08-15 DIAGNOSIS — G89.29 CHRONIC PAIN OF RIGHT ELBOW: Primary | ICD-10-CM

## 2023-08-15 PROCEDURE — 97110 THERAPEUTIC EXERCISES: CPT | Mod: GO | Performed by: OCCUPATIONAL THERAPIST

## 2023-08-15 NOTE — PROGRESS NOTES
08/15/23 0500   Appointment Info   Treating Provider Jana Salter OTR/L CHT   Total/Authorized Visits 6 (POC)   Visits Used 2   Medical Diagnosis Right Wrist and Thumb Pain   OT Tx Diagnosis Right Wrist and Thumb Pain   Quick Add  Certification   Progress Note/Certification   Start Of Care Date 07/11/23   Onset of Illness/Injury or Date of Surgery 06/20/23  (MD order)   Therapy Frequency 2 x Month   Predicted Duration 3 MOnths   Certification date from 07/11/23   Certification date to 10/08/23   Progress Note Due Date 09/13/23   Progress Note Completed Date 08/15/23       Present No   OT Goal 1   Goal Identifier Household Chores   Goal Description Open a tight or new jar with 2/10 pain or less, using AE as needed   Goal Progress reducing pain   Target Date 10/08/23   Subjective Report   Subjective Report The wrist and thumb have been feeling better!   Objective Measures   Objective Measures Objective Measure 1;Objective Measure 2;Objective Measure 3;Objective Measure 4;Objective Measure 5;Objective Measure 6;Objective Measure 7;Objective Measure 8   Objective Measure 1   Objective Measure Pain   Details Rest: 0/10; Use: 0-6/10. Sharp with twisting motions. Less painful with cutting   Objective Measure 2   Objective Measure Edema   Details 14.6   Objective Measure 3   Objective Measure Wrist AROM Extension   Details 50   Objective Measure 4   Objective Measure Wrist AROM Flexion   Details 45+ dorsal wrist   Objective Measure 5   Objective Measure    Details R: 23   Objective Measure 6   Objective Measure Lateral Pinch   Details L: 7-   Objective Measure 7   Objective Measure 3 Point Pinch   Details R: 8+ volar wrist   Treatment Interventions (OT)   Interventions Therapeutic Procedure/Exercise;Orthotics   Therapeutic Procedure/Exercise   Therapeutic Procedure: strength, endurance, ROM, flexibillity minutes (81535) 25   HAND Therapeutic Exercise ROM/AROM;Special Techniques;Other  Exercises/Activities;Strengthening   Patient Response/Progress Good, however painful with  so discontinued and did not assig for HEP   Skilled Intervention To instruct in proper strengthening technique   ROM/AROM   ROM/AROM AROM Thumb;AROM Wrist   AROM Thumb Opp   Sets/Reps 10 reps;reviewed   AROM Wrist Extension;Flexion   Sets/Reps 10 reps;reviewed   Other Exer/Activities/Educ   Other Exer/Activities/Educ - All exercises are part of HEP unless otherwise noted Exercise 1   Exercise 1 Dart Thrower's Motion   Description 1 10 reps   Special Techniques   Special Techniques BLOCKING SPECIAL TECHNIQUES   Blocking DIP   Sets/Reps 10 reps   Details thumb IPJ   Strengthening   Strengthening Isometrics ;Isometrics Wrist   Isometrics  5 reps   Equipment Wash Cloth - DC due to pain   Isometrics Wrist Extension;Flexion   Sets/Reps 5 reps;2 sets   Orthotics   HAND Splinting Forearm   ForeArm Splint Desciption Wrist cock-up   Wear Schedule Night;Use/ADL's;Per comfort   Off for: Hygiene;Exercise;Driving   Comments Small adjustment to webspace   Skilled Intervention Adjusted splint for comfort - no charge   Patient Response/Progress Good   Orthotic Management, Subsequent session minutes (80233) 5 Minutes   Education   Learner/Method Patient   Plan   Updates to plan of care Wrist isometrics - flexion/extension   Plan for next session Check response to wrist isometrics, progress to thumb stability strengthening   Total Session Time   Timed Code Treatment Minutes 30   Total Treatment Time (sum of timed and untimed services) 30         PLAN  Continue therapy per current plan of care.    Beginning/End Dates of Progress Note Reporting Period:    to 08/15/2023    Referring Provider:  Mukesh Lantigua

## 2023-08-15 NOTE — DISCHARGE SUMMARY
[facility]  Hospitalist Discharge Summary      Date of Admission:  8/12/2023  Date of Discharge:  8/14/2023  3:39 PM  Discharging Provider: Chyna Chan MD  Discharge Service: Medicine Service, ZHAO TEAM 1    Discharge Diagnoses   BRBPR, resolved  diverticular bleed  Diverticulosis   Acute on chronic normocytic anemia  KUSHAL  RA    Clinically Significant Risk Factors          Follow-ups Needed After Discharge   Follow-up Appointments     Follow Up and recommended labs and tests      You should follow up with your primary care provider or somebody wihtin   the same clinic as your primary care provider in the next 2-3 weeks to   ensure you are feeling well and having no further significant bleeding   episodes. You should also follow up with your nephrologist as previously   scheduled. We will place orders to have other lab tests done on Monday 8/21/23 when you already had a lab appointment to ensure that you are not   having any further significant blood loss or other concerns.            Unresulted Labs Ordered in the Past 30 Days of this Admission     No orders found from 7/13/2023 to 8/13/2023.          Discharge Disposition   Discharged to home  Condition at discharge: Stable    Hospital Course   Diverticular bleed  Acute on chronic normocytic anemia  Presented to ED w/Patient reports of red blood in stool for past several days, just on outside of stool, minimal if any abd pain. Has had prior diverticular bleeds and similar to this in the past. Mild decrease in hgb to 9s but vitally stable. Had long discussion with patient about risks/benefits of staying for further treatments vs going home and following up OP. Ultimately patient stayed and received a colonoscopy which demonstrated: multiple diverticula in colon, most pronounced in sigmoid. Only residual blood was noted, no active bleeding.     Of note per GI : The patient had a complete endoscopic work-up.  Diverticular bleeding is the most likely reason for repeated episodes of hematochezia. Further endoscopic management is not likely to add much value unless the bleeding is hemodynamically significant or unrelenting.                            KUSHAL  CKD3  No difficulties urinating and reports some poor PO intake recently, more concern for pre-renal process, Cr on discharge 1.36.  - repeat BMP outpatient w/ PCP for monitoring  - follows with nephrology OP     RA  Noted to have hx, did not change medications. PTA medication appears to be plaquenil.     Consultations This Hospital Stay   GI LUMINAL ADULT IP CONSULT    Code Status   Prior    Time Spent on this Encounter   I, Chyna Chan MD, personally saw the patient today and spent greater than 30 minutes discharging this patient.       Chyna Chan MD  [facility]  [address] [place] [id number]  Phone: [phone]  Fax: [phone]  ______________________________________________________________________    Physical Exam   Vital Signs: Temp: 98.4  F (36.9  C)   BP: 111/40 Pulse: 82   Resp: 18 SpO2: 98 %      Weight: 0 lbs 0 oz    Gen: no acute distress, alert, interactive  HEENT: normal conjunctivae, EOMI  Nares: No discharge noted from nares bilaterally   CV: RRR, no murmursl  Pulm: CTBA, no crackles or wheezing  Abd: soft, nl BS, NT, no HSM  Msk: no deformities  Extremities: no edema  Neuro: moving all extremities spontaneously   Skin: no rashes, dry         Primary Care Physician   Essence Tamayo    Discharge Orders      CBC with platelets     Basic metabolic panel     Reason for your hospital stay    You were admitted with concern for blood in your stool and a concern for a bleed in your colon. Ultimately we were able to perform a colonoscopy on you and there was no active bleed that required any intervention that they were able to find. Your Hgb was stable during this admission and we felt you were safe to  discharge home with close follow up and monitoring.     Activity    Your activity upon discharge: activity as tolerated     Discharge Instructions    You will likely continue to have some blood in your stool for the next 1-2 days though if you are having worsening bleeding/increasing blood in your stools pleas reach out to your GI clinic or come be evaluated in an emergency department. Additionally if you start having blood in your stool with worsening abdominal pain you should be evaluated by a medical provider. If you start having symptoms of low amount of blood such as worsening fatigue or shortness of breath you should also reach out or be seen by a provider.     Follow Up and recommended labs and tests    You should follow up with your primary care provider or somebody wihtin the same clinic as your primary care provider in the next 2-3 weeks to ensure you are feeling well and having no further significant bleeding episodes. You should also follow up with your nephrologist as previously scheduled. We will place orders to have other lab tests done on Monday 8/21/23 when you already had a lab appointment to ensure that you are not having any further significant blood loss or other concerns.     Diet    Follow this diet upon discharge: Orders Placed This Encounter      Regular Diet Adult       Significant Results and Procedures   Most Recent 3 CBC's:Recent Labs   Lab Test 08/14/23  0726 08/13/23  0619 08/12/23  1147   WBC 4.2 3.7* 4.5   HGB 9.3* 8.6* 9.3*   MCV 98 97 96    203 241     Most Recent 3 BMP's:Recent Labs   Lab Test 08/14/23  0726 08/13/23  0619 08/12/23  1147    144 141   POTASSIUM 3.9 4.2 4.1   CHLORIDE 106 111* 106   CO2 21* 25 22   BUN 15.0 17.9 23.0   CR 1.36* 1.20* 1.24*   ANIONGAP 16* 8 13   EJ 9.7 9.1 9.6   GLC 73 87 86   ,   Results for orders placed or performed in visit on 06/13/23   X-ray rt Elbow 2 vw    Narrative    EXAM: XR ELBOW RIGHT 2 VIEWS  LOCATION: [facility]  DATE: 6/13/2023    INDICATION:  Chronic pain of right elbow, Chronic pain of right elbow  COMPARISON: None.      Impression    IMPRESSION: Normal joint spaces and alignment. No fracture or joint effusion.       Discharge Medications   Discharge Medication List as of 8/14/2023  2:55 PM      CONTINUE these medications which have NOT CHANGED    Details   acetaminophen (TYLENOL) 650 MG CR tablet Take 650 mg by mouth every 8 hours as needed for fever or pain Taking 2 - 650 mg tablets every 8 hrs., Historical      calcium carbonate-vitamin D (CALTRATE) 600-10 MG-MCG per tablet Take 1 tablet by mouth 2 times daily, Historical      famotidine (PEPCID) 20 MG tablet TAKE 1 TABLET BY MOUTH TWICE A DAY, Historical      folic acid (FOLVITE) 1 MG tablet Take 1 tablet (1 mg) by mouth daily, Disp-90 tablet, R-1, E-Prescribe      gabapentin (NEURONTIN) 300 MG capsule Take 1 capsule (300 mg) by mouth 3 times daily Take 300mg in morning, 300mg in afternoon and 300mg, Disp-90 capsule, R-1, E-Prescribe      hydroxychloroquine (PLAQUENIL) 200 MG tablet Take 1 tablet (200 mg) by mouth daily for 180 days, Disp-30 tablet, R-5, E-Prescribe      lifitegrast (XIIDRA) 5 % opthalmic solution 1 drop 2 times daily, Historical      Multiple Vitamins-Minerals (OCUVITE PRESERVISION PO) Take 1 tablet by mouth 2 times daily, Historical      omeprazole (PRILOSEC) 40 MG DR capsule Take 1 capsule (40 mg) by mouth daily, Disp-90 capsule, R-3, E-Prescribe      !! pilocarpine (SALAGEN) 5 MG tablet Take 1 tablet (5 mg) by mouth daily, Disp-90 tablet, R-1, E-Prescribe      !! pilocarpine (SALAGEN) 5 MG tablet Take 5 mg by mouth 2 times daily as needed Take 1-2 tablets 2 times daily as needed, Historical      vitamin D3 (CHOLECALCIFEROL) 50 mcg (2000 units) tablet Take 1 tablet by mouth daily, Historical       !! - Potential duplicate medications found. Please discuss with provider.        Allergies   Allergies   Allergen Reactions     Bee  Venom Anaphylaxis     Shrimp Anaphylaxis     Evoxac [Cevimeline] Unknown     Prevnar Swelling     Other reaction(s): Edema     Prevnar [Pneumococcal 13-Linda Conj Vacc] Unknown

## 2023-08-15 NOTE — TELEPHONE ENCOUNTER
rescheduled pts appt on 8.21.23 with Dr. Garzon to be with Selene Bruner on 9.1.23 - per pt to see NP

## 2023-08-16 ENCOUNTER — PATIENT OUTREACH (OUTPATIENT)
Dept: CARE COORDINATION | Facility: CLINIC | Age: 83
End: 2023-08-16
Payer: COMMERCIAL

## 2023-08-16 NOTE — PROGRESS NOTES
"Clinic Care Coordination Contact  [facility]: Post-Discharge Note  SITUATION                                                      Admission:    Admission Date: 08/12/23   Reason for Admission: Lower GI bleed  Discharge:   Discharge Date: 08/14/23  Discharge Diagnosis: Lower GI bleed    BACKGROUND                                                      Per hospital discharge summary and inpatient provider notes:  Presented to ED w/Patient reports of red blood in stool for past several days, just on outside of stool, minimal if any abd pain. Has had prior diverticular bleeds and similar to this in the past. Mild decrease in hgb to 9s but vitally stable. Had long discussion with patient about risks/benefits of staying for further treatments vs going home and following up OP. Ultimately patient stayed and received a colonoscopy which demonstrated: multiple diverticula in colon, most pronounced in sigmoid. Only residual blood was noted, no active bleeding.      Of note per GI : The patient had a complete endoscopic work-up. Diverticular bleeding is the most likely reason for repeated episodes of hematochezia. Further endoscopic management is not likely to add much value unless the bleeding is hemodynamically significant or unrelenting.     ASSESSMENT           Discharge Assessment  How are you doing now that you are home?: \" I'm doing okay \"  How are your symptoms? (Red Flag symptoms escalate to triage hotline per guidelines): Unchanged  Do you feel your condition is stable enough to be safe at home until your provider visit?: Yes  Does the patient have their discharge instructions? : Yes  Does the patient have questions regarding their discharge instructions? : No  Were you started on any new medications or were there changes to any of your previous medications? : Yes  Does the patient have all of their medications?: Yes  Do you have questions regarding any of your medications? : No  Do you have all of your needed " medical supplies or equipment (DME)?  (i.e. oxygen tank, CPAP, cane, etc.): Yes  Discharge follow-up appointment scheduled within 14 calendar days? : Yes  Discharge Follow Up Appointment Date: 08/21/23  Discharge Follow Up Appointment Scheduled with?: Specialty Care Provider    Post-op ([facility] CTA Only)  If the patient had a surgery or procedure, do they have any questions for a nurse?: No             PLAN                                                      Outpatient Plan:   Your activity upon discharge: activity as tolerated          Discharge Instructions     You will likely continue to have some blood in your stool for the next 1-2 days though if you are having worsening bleeding/increasing blood in your stools pleas reach out to your GI clinic or come be evaluated in an emergency department. Additionally if you start having blood in your stool with worsening abdominal pain you should be evaluated by a medical provider. If you start having symptoms of low amount of blood such as worsening fatigue or shortness of breath you should also reach out or be seen by a provider.          Follow Up and recommended labs and tests     You should follow up with your primary care provider or somebody wihtin the same clinic as your primary care provider in the next 2-3 weeks to ensure you are feeling well and having no further significant bleeding episodes. You should also follow up with your nephrologist as previously scheduled. We will place orders to have other lab tests done on Monday 8/21/23 when you already had a lab appointment to ensure that you are not having any further significant blood loss or other concerns.          Diet     Follow this diet upon discharge: Orders Placed This Encounter      Regular Diet Adult       Future Appointments   Date Time Provider Department Center   8/21/2023  9:00 AM UP LAB UPLABR UP   9/1/2023  1:00 PM Francine Bruner NP [facility]   10/4/2023  1:00 PM UP LAB UPLABR UP    10/12/2023 10:00 AM Shantal Brown MD [facility]   10/19/2023 11:30 AM Greg Clements MD [facility]   11/1/2023 10:00 AM Belen Delacruz PA-C [facility]   1/5/2024  9:15 AM Rubina Grey MD [facility]   1/24/2024 11:20 AM Altagracia Martinez MD [facility]         For any urgent concerns, please contact our 24 hour nurse triage line: [phone] ([phone])         Tori Castelan MA

## 2023-08-21 ENCOUNTER — LAB (OUTPATIENT)
Dept: LAB | Facility: CLINIC | Age: 83
End: 2023-08-21
Payer: COMMERCIAL

## 2023-08-21 DIAGNOSIS — N18.31 STAGE 3A CHRONIC KIDNEY DISEASE (H): ICD-10-CM

## 2023-08-21 DIAGNOSIS — Z79.899 HIGH RISK MEDICATION USE: ICD-10-CM

## 2023-08-21 DIAGNOSIS — K92.2 GASTROINTESTINAL HEMORRHAGE, UNSPECIFIED GASTROINTESTINAL HEMORRHAGE TYPE: ICD-10-CM

## 2023-08-21 DIAGNOSIS — M06.9 RHEUMATOID ARTHRITIS INVOLVING MULTIPLE SITES, UNSPECIFIED WHETHER RHEUMATOID FACTOR PRESENT (H): ICD-10-CM

## 2023-08-21 LAB
ALBUMIN MFR UR ELPH: 12.8 MG/DL
ALBUMIN UR-MCNC: NEGATIVE MG/DL
ALT SERPL W P-5'-P-CCNC: 17 U/L (ref 0–50)
ANION GAP SERPL CALCULATED.3IONS-SCNC: 13 MMOL/L (ref 7–15)
APPEARANCE UR: CLEAR
AST SERPL W P-5'-P-CCNC: 33 U/L (ref 0–45)
BACTERIA #/AREA URNS HPF: ABNORMAL /HPF
BILIRUB UR QL STRIP: NEGATIVE
BUN SERPL-MCNC: 16.6 MG/DL (ref 8–23)
CALCIUM SERPL-MCNC: 10 MG/DL (ref 8.8–10.2)
CHLORIDE SERPL-SCNC: 105 MMOL/L (ref 98–107)
COLOR UR AUTO: YELLOW
CREAT SERPL-MCNC: 1.2 MG/DL (ref 0.51–0.95)
CREAT UR-MCNC: 65 MG/DL
CRP SERPL-MCNC: <3 MG/L
DEPRECATED CALCIDIOL+CALCIFEROL SERPL-MC: 89 UG/L (ref 20–75)
DEPRECATED HCO3 PLAS-SCNC: 26 MMOL/L (ref 22–29)
ERYTHROCYTE [DISTWIDTH] IN BLOOD BY AUTOMATED COUNT: 15.1 % (ref 10–15)
ERYTHROCYTE [SEDIMENTATION RATE] IN BLOOD BY WESTERGREN METHOD: 62 MM/HR (ref 0–30)
GFR SERPL CREATININE-BSD FRML MDRD: 46 ML/MIN/1.73M2
GLUCOSE SERPL-MCNC: 91 MG/DL (ref 70–99)
GLUCOSE UR STRIP-MCNC: NEGATIVE MG/DL
HCT VFR BLD AUTO: 28.5 % (ref 35–47)
HGB BLD-MCNC: 9.4 G/DL (ref 11.7–15.7)
HGB UR QL STRIP: ABNORMAL
KETONES UR STRIP-MCNC: ABNORMAL MG/DL
LEUKOCYTE ESTERASE UR QL STRIP: ABNORMAL
MCH RBC QN AUTO: 32.4 PG (ref 26.5–33)
MCHC RBC AUTO-ENTMCNC: 33 G/DL (ref 31.5–36.5)
MCV RBC AUTO: 98 FL (ref 78–100)
NITRATE UR QL: NEGATIVE
PH UR STRIP: 6.5 [PH] (ref 5–7)
PLATELET # BLD AUTO: 272 10E3/UL (ref 150–450)
POTASSIUM SERPL-SCNC: 4.6 MMOL/L (ref 3.4–5.3)
PROT/CREAT 24H UR: 0.2 MG/MG CR (ref 0–0.2)
PTH-INTACT SERPL-MCNC: 38 PG/ML (ref 15–65)
RBC # BLD AUTO: 2.9 10E6/UL (ref 3.8–5.2)
RBC #/AREA URNS AUTO: ABNORMAL /HPF
SODIUM SERPL-SCNC: 144 MMOL/L (ref 136–145)
SP GR UR STRIP: 1.01 (ref 1–1.03)
UROBILINOGEN UR STRIP-ACNC: 0.2 E.U./DL
WBC # BLD AUTO: 4.4 10E3/UL (ref 4–11)
WBC #/AREA URNS AUTO: ABNORMAL /HPF

## 2023-08-21 PROCEDURE — 80048 BASIC METABOLIC PNL TOTAL CA: CPT

## 2023-08-21 PROCEDURE — 84460 ALANINE AMINO (ALT) (SGPT): CPT

## 2023-08-21 PROCEDURE — 85652 RBC SED RATE AUTOMATED: CPT

## 2023-08-21 PROCEDURE — 83970 ASSAY OF PARATHORMONE: CPT

## 2023-08-21 PROCEDURE — 36415 COLL VENOUS BLD VENIPUNCTURE: CPT

## 2023-08-21 PROCEDURE — 82306 VITAMIN D 25 HYDROXY: CPT

## 2023-08-21 PROCEDURE — 84450 TRANSFERASE (AST) (SGOT): CPT

## 2023-08-21 PROCEDURE — 85027 COMPLETE CBC AUTOMATED: CPT

## 2023-08-21 PROCEDURE — 84156 ASSAY OF PROTEIN URINE: CPT

## 2023-08-21 PROCEDURE — 86140 C-REACTIVE PROTEIN: CPT

## 2023-08-21 PROCEDURE — 81001 URINALYSIS AUTO W/SCOPE: CPT

## 2023-08-24 ENCOUNTER — PATIENT OUTREACH (OUTPATIENT)
Dept: GASTROENTEROLOGY | Facility: CLINIC | Age: 83
End: 2023-08-24
Payer: COMMERCIAL

## 2023-08-24 ENCOUNTER — MYC MEDICAL ADVICE (OUTPATIENT)
Dept: RHEUMATOLOGY | Facility: CLINIC | Age: 83
End: 2023-08-24
Payer: COMMERCIAL

## 2023-08-24 NOTE — PROGRESS NOTES
Spoke with Dr. Martinez and she indicated Patrick does not need to go to the ED unless she is short of breath, the rectal bleeding has increased or if she has increased fatigue or lethargy.   She verbalized understanding.     We ae looking at schedules and will call Patrick back

## 2023-08-24 NOTE — TELEPHONE ENCOUNTER
Patient hospitalized 8/12-8/14 for diverticular bleed.  She states while she was there she was not given her plaquenil.  Writer cannot find evidence in hospital records that it was dc'd while she was there.  She states she is still with rectal bleeding.  Advised patient to call GI for evaluation.  From a rheum standpoint, c/o arm, elbow, and wrist pain.  Pain is achy in nature and comes and goes.  She is able to complete ADL's.  She is wondering if the plaquenil could be prolonging her s/s?  Is she ok to resume taking it?    Fracnisca Robertson RN

## 2023-08-24 NOTE — TELEPHONE ENCOUNTER
"Per Dr Clements, \"Resume plaquenil \".    Patient called and aware.     Francisca Robertson RN    "

## 2023-08-25 NOTE — PROGRESS NOTES
"Patrick is a 82 year old who is being evaluated via a billable video visit.      How would you like to obtain your AVS? MyChart  If the video visit is dropped, the invitation should be resent by: Text to cell phone: [phone]  Will anyone else be joining your video visit? No          Subjective   Patrick is a 82 year old, presenting for the following health issues:  Consult (Gastric AVM/Epigastric pain//)    HPI         8/16/2023     9:10 AM   Post Discharge Outreach   Admission Date 8/12/2023   Reason for Admission Lower GI bleed   Discharge Date 8/14/2023   Discharge Diagnosis Lower GI bleed   How are you doing now that you are home? \" I'm doing okay \"   How are your symptoms? (Red Flag symptoms escalate to triage hotline per guidelines) Unchanged   Do you feel your condition is stable enough to be safe at home until your provider visit? Yes   Does the patient have their discharge instructions?  Yes   Does the patient have questions regarding their discharge instructions?  No   Were you started on any new medications or were there changes to any of your previous medications?  Yes   Does the patient have all of their medications? Yes   Do you have questions regarding any of your medications?  No   Do you have all of your needed medical supplies or equipment (DME)?  (i.e. oxygen tank, CPAP, cane, etc.) Yes   Discharge follow-up appointment scheduled within 14 calendar days?  Yes   Discharge Follow Up Appointment Date 8/21/2023   Discharge Follow Up Appointment Scheduled with? Specialty Care Provider     Hospital Follow-up Visit:    Hospital/Nursing Home/IP Rehab Facility: [facility]  Date of Admission: 8/12/2023  Date of Discharge: 8/14/2023  Reason(s) for Admission: GI bleed    Was your hospitalization related to COVID-19? No   Problems taking medications regularly:  None  Medication changes since discharge: None  Problems adhering to non-medication therapy:  " None    Summary of hospitalization:  [facility] discharge summary reviewed  Diagnostic Tests/Treatments reviewed.  Follow up needed: none  Other Healthcare Providers Involved in Patient s Care:         None  Update since discharge: improved.         Plan of care communicated with patient                 Review of Systems   Neg    Return patient visit.  We discussed recent inpatient admission from the 12th to the 14th.  Patient's planning a trip to [place] where she will be staying in a house from the 11 to 25 September.  Colonoscopy on the 14th multiple diverticula were appreciated most of these are on the left side there is no active bleeding internal hemorrhoids were seen examination of the TI was normal.  This was a performed for hematochezia.  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  Previous colonoscopy November 16, 2021 previous polyp site treated diverticulosis no bleeding source determined.    Patient notes stable hemoglobin although she will have episodic small amounts of blood perhaps 2 to 3 tablespoons/day sometimes mucus mixed with blood.  At times this will persist for many days once to twice per day.  Patient has questions about prevention and other ways to treat this.  It was advised that she go to the [facility] but this was declined or pending review.  Patient is not on aspirin or nonsteroidals.  She is on Tylenol 1300 mg a day for chronic back pain.  She is on Plaquenil per her rheumatologist.  She notes in her life that she has been transfused 2 units twice but this is not a chronic ongoing ongoing basis.  She has questions about anemia which is normochromic normocytic although anemia of chronic disease is difficult to exclude.  Patient notes his sister with colon cancer age 72.    No exam is possible.      Objective           Vitals:  No vitals were obtained today due to virtual visit.    Physical Exam   GENERAL: Healthy, alert and  no distress  EYES: Eyes grossly normal to inspection.  No discharge or erythema, or obvious scleral/conjunctival abnormalities.  RESP: No audible wheeze, cough, or visible cyanosis.  No visible retractions or increased work of breathing.    SKIN: Visible skin clear. No significant rash, abnormal pigmentation or lesions.  NEURO: Cranial nerves grossly intact.  Mentation and speech appropriate for age.  PSYCH: Mentation appears normal, affect normal/bright, judgement and insight intact, normal speech and appearance well-groomed.    Impression/plan: Discussed that to possible lower sources are present.  Vascular tissue such as hemorrhoids often can contribute to outlet bleeding wipe hematochezia low amounts of blood.  Diverticula if an active bleeding source is found this can be treated but this is unlikely she was treated in 2021 with interventional radiology angiogram.  Diverticular bleeds typically are much much larger volume and would require inpatient care and observation for the possibility of transfusion etc.  No etiology of small bowel bleeding other than prior angiectasia treated.  With symptoms I do not think it is likely that this represents a more proximal or upper source.  Patient is [place] larger  city.  A smaller place without healthcare would be more concerning.  Surgical approaches for her symptoms do not seem warranted or indicated and may offer more risk with less benefit.  Unfortunately there are no prescription medicines or other products that are beneficial for her.  I would avoid aspirin nonsteroidals fish oil etc.  At this time observation seems warranted.  Discussed questions answered and as needed return.            Video-Visit Details    Type of service:  Video Visit   Video Start Time:  1:40  Video End Time: 2:13    Originating Location (pt. Location): Home    Distant Location (provider location):  On-site  Platform used for Video Visit: [employer]

## 2023-08-25 NOTE — PROGRESS NOTES
Coordinated scheduling with Consuelo GRAF    Spoke with Patrick to offered a 140 virtual appointment  on 8-28 with Dr. En Cunningham updated to schedule the appointment

## 2023-08-28 ENCOUNTER — VIRTUAL VISIT (OUTPATIENT)
Dept: GASTROENTEROLOGY | Facility: CLINIC | Age: 83
End: 2023-08-28
Payer: COMMERCIAL

## 2023-08-28 DIAGNOSIS — Z98.890 HISTORY OF ESOPHAGOGASTRODUODENOSCOPY (EGD): Primary | ICD-10-CM

## 2023-08-28 DIAGNOSIS — Z98.890 HISTORY OF COLONOSCOPY: ICD-10-CM

## 2023-08-28 DIAGNOSIS — N18.31 STAGE 3A CHRONIC KIDNEY DISEASE (H): Primary | ICD-10-CM

## 2023-08-28 PROCEDURE — 99495 TRANSJ CARE MGMT MOD F2F 14D: CPT | Mod: VID | Performed by: INTERNAL MEDICINE

## 2023-08-28 NOTE — PATIENT INSTRUCTIONS
As we discussed    1.  Large significant amounts of red blood and/or clots is typical of acute diverticular bleed.  This would require evaluation healthcare and possible transfusion if needed    2.  Smaller amounts of minor outlet bleeding wiping on tissue may be from hemorrhoids or vascular cushions.    3.  Hemoglobin seems relatively stable.  Cell size is upper limit of normal.  Plaquenil will not contribute to bleeding.  Would continue to avoid aspirin and Motrin and fish oil.  Continue omeprazole.    At this time observation seems indicated.    There is medical care available at your destination ideally would be best not to use it although this is safer than a [place] or extremely remote destination.  His need to return

## 2023-09-01 ENCOUNTER — LAB (OUTPATIENT)
Dept: LAB | Facility: CLINIC | Age: 83
End: 2023-09-01
Payer: COMMERCIAL

## 2023-09-01 ENCOUNTER — OFFICE VISIT (OUTPATIENT)
Dept: NEPHROLOGY | Facility: CLINIC | Age: 83
End: 2023-09-01
Payer: COMMERCIAL

## 2023-09-01 VITALS
SYSTOLIC BLOOD PRESSURE: 112 MMHG | DIASTOLIC BLOOD PRESSURE: 72 MMHG | HEART RATE: 67 BPM | WEIGHT: 91.9 LBS | OXYGEN SATURATION: 99 % | BODY MASS INDEX: 15.77 KG/M2

## 2023-09-01 DIAGNOSIS — N18.31 STAGE 3A CHRONIC KIDNEY DISEASE (H): ICD-10-CM

## 2023-09-01 DIAGNOSIS — N17.9 ACUTE KIDNEY INJURY (H): Primary | ICD-10-CM

## 2023-09-01 DIAGNOSIS — M06.9 RHEUMATOID ARTHRITIS INVOLVING MULTIPLE SITES, UNSPECIFIED WHETHER RHEUMATOID FACTOR PRESENT (H): ICD-10-CM

## 2023-09-01 DIAGNOSIS — Z79.899 HIGH RISK MEDICATION USE: ICD-10-CM

## 2023-09-01 LAB
ALBUMIN MFR UR ELPH: 9 MG/DL
ALBUMIN SERPL BCG-MCNC: 4.3 G/DL (ref 3.5–5.2)
ALBUMIN UR-MCNC: NEGATIVE MG/DL
ANION GAP SERPL CALCULATED.3IONS-SCNC: 9 MMOL/L (ref 7–15)
APPEARANCE UR: CLEAR
BASOPHILS # BLD AUTO: 0 10E3/UL (ref 0–0.2)
BASOPHILS NFR BLD AUTO: 1 %
BILIRUB UR QL STRIP: NEGATIVE
BUN SERPL-MCNC: 13 MG/DL (ref 8–23)
CALCIUM SERPL-MCNC: 9.9 MG/DL (ref 8.8–10.2)
CHLORIDE SERPL-SCNC: 106 MMOL/L (ref 98–107)
COLOR UR AUTO: NORMAL
CREAT SERPL-MCNC: 1.23 MG/DL (ref 0.51–0.95)
CREAT UR-MCNC: 41.5 MG/DL
CREAT UR-MCNC: 41.9 MG/DL
DEPRECATED HCO3 PLAS-SCNC: 27 MMOL/L (ref 22–29)
EOSINOPHIL # BLD AUTO: 0.2 10E3/UL (ref 0–0.7)
EOSINOPHIL NFR BLD AUTO: 5 %
ERYTHROCYTE [DISTWIDTH] IN BLOOD BY AUTOMATED COUNT: 15.5 % (ref 10–15)
GFR SERPL CREATININE-BSD FRML MDRD: 44 ML/MIN/1.73M2
GLUCOSE SERPL-MCNC: 98 MG/DL (ref 70–99)
GLUCOSE UR STRIP-MCNC: NEGATIVE MG/DL
HCT VFR BLD AUTO: 26.8 % (ref 35–47)
HGB BLD-MCNC: 8.6 G/DL (ref 11.7–15.7)
HGB UR QL STRIP: NEGATIVE
IMM GRANULOCYTES # BLD: 0 10E3/UL
IMM GRANULOCYTES NFR BLD: 0 %
KETONES UR STRIP-MCNC: NEGATIVE MG/DL
LEUKOCYTE ESTERASE UR QL STRIP: NEGATIVE
LYMPHOCYTES # BLD AUTO: 0.8 10E3/UL (ref 0.8–5.3)
LYMPHOCYTES NFR BLD AUTO: 24 %
MCH RBC QN AUTO: 32.3 PG (ref 26.5–33)
MCHC RBC AUTO-ENTMCNC: 32.1 G/DL (ref 31.5–36.5)
MCV RBC AUTO: 101 FL (ref 78–100)
MICROALBUMIN UR-MCNC: <12 MG/L
MICROALBUMIN/CREAT UR: NORMAL MG/G{CREAT}
MONOCYTES # BLD AUTO: 0.4 10E3/UL (ref 0–1.3)
MONOCYTES NFR BLD AUTO: 12 %
NEUTROPHILS # BLD AUTO: 1.9 10E3/UL (ref 1.6–8.3)
NEUTROPHILS NFR BLD AUTO: 58 %
NITRATE UR QL: NEGATIVE
NRBC # BLD AUTO: 0 10E3/UL
NRBC BLD AUTO-RTO: 0 /100
PH UR STRIP: 7 [PH] (ref 5–7)
PHOSPHATE SERPL-MCNC: 3.8 MG/DL (ref 2.5–4.5)
PLATELET # BLD AUTO: 228 10E3/UL (ref 150–450)
POTASSIUM SERPL-SCNC: 5.3 MMOL/L (ref 3.4–5.3)
PROT/CREAT 24H UR: 0.21 MG/MG CR (ref 0–0.2)
RBC # BLD AUTO: 2.66 10E6/UL (ref 3.8–5.2)
RBC URINE: 1 /HPF
SODIUM SERPL-SCNC: 142 MMOL/L (ref 136–145)
SP GR UR STRIP: 1.01 (ref 1–1.03)
SQUAMOUS EPITHELIAL: <1 /HPF
UROBILINOGEN UR STRIP-MCNC: NORMAL MG/DL
WBC # BLD AUTO: 3.3 10E3/UL (ref 4–11)
WBC URINE: 2 /HPF

## 2023-09-01 PROCEDURE — 81001 URINALYSIS AUTO W/SCOPE: CPT | Performed by: PATHOLOGY

## 2023-09-01 PROCEDURE — 82570 ASSAY OF URINE CREATININE: CPT

## 2023-09-01 PROCEDURE — 36415 COLL VENOUS BLD VENIPUNCTURE: CPT | Performed by: PATHOLOGY

## 2023-09-01 PROCEDURE — 80069 RENAL FUNCTION PANEL: CPT | Performed by: PATHOLOGY

## 2023-09-01 PROCEDURE — 99214 OFFICE O/P EST MOD 30 MIN: CPT

## 2023-09-01 PROCEDURE — G0463 HOSPITAL OUTPT CLINIC VISIT: HCPCS

## 2023-09-01 PROCEDURE — 99000 SPECIMEN HANDLING OFFICE-LAB: CPT | Performed by: PATHOLOGY

## 2023-09-01 PROCEDURE — 84156 ASSAY OF PROTEIN URINE: CPT | Performed by: PATHOLOGY

## 2023-09-01 PROCEDURE — 85025 COMPLETE CBC W/AUTO DIFF WBC: CPT | Performed by: PATHOLOGY

## 2023-09-01 NOTE — LETTER
"9/1/2023       RE: Patrick Olson  [address]  [facility] [address]     Dear Colleague,    Thank you for referring your patient, Patrick Olson, to the [facility] [place] at [facility]. Please see a copy of my visit note below.    Nephrology Clinic Visit 9/1/23    Assessment and Plan:    KUSHAL/CKD3 - Creat 1.2, eGFR 44 ml/mn. No albuminuria   - Baseline creat 0.9-1.1   - Recovering from her most recent KUSHAL   - UA neg for blood/protein   - Etiology for her KUSHAL is likely hemodynamic given GIB   - Etiology for her CKD is unclear, possibly long term Methotrexate   - She is off Methotrexate   - Blood pressures borderline in clinic. No h/o hypertension   - Does not use NSAIDs    2. Recurrent GIB - Hgb 8.6   - Had capsule endoscopy 2/23 EGD 2/23, colonoscopy 8/23   - Capsule endoscopy found \"A single angioectasia without bleeding in the                          duodenum\"   - The EGD findings were duodenal erosion w/o bleeding   - The Colonoscopy findings were widespread diverticulosis throughout the entire colon w/o active bleeding. There was residual blood in the colon   - Has further follow up with GI and Hematology in the next few months    3. Elevated blood pressure w/o history of HTN - Clinic b/ps today 135-147/68-74. No edema. Does have a home b/p device   - Begin home monitoring and bring in device and readings next visit    4. RA - Off Methotrexate and now on Plaquenil.    - Has Rheum follow up 10/19/23.     5. Electrolytes - No acute concerns. K 5.3 Na 142    6. Acid base - No acute concerns. Bicarb 27    7. BMD - Ca 9.9 Phos 3.8 Albumin 4.3   Vit D 89 PTH 38 ( 8/23)   Discontinue Vit D for now ( taking 50 mcg every day)    8. Disposition - RTC 2 months for follow up w/labs prior given unresolved KUSHAL and borderline b/ps    Assessment and plan was discussed with patient and she voiced her understanding and agreement.    Reason for " Visit:  Post hospital KUSHAL/CKD3    HPI:  Ms Bang is an 81 yo female with RA, Recurrent GIB, CKD3 with recent hospital admission 8/12-8/14/23 for Diverticular bleed c/b mild KUSHAL with Creat 1.3 upon admission.   Discharge creat 1.2 .Baseline creat 0.9-1.1    ROS:   A comprehensive review of systems was obtained and negative, except as noted in the HPI or PMH.  Denies NSAIDs  No home blood pressures  Had blood tinged stool up to 3 days ago, now resolved    Chronic Health Problems:    RA ( previously on Methotrexate)   Recurrent GIB  CKD3   Anemia  Diverticulosis    Family Hx:   No family history on file.    Personal Hx:   , retired [employer] , NS, ETOH none    Allergies:  Allergies   Allergen Reactions    Bee Venom Anaphylaxis    Shrimp Anaphylaxis    Evoxac [Cevimeline] Unknown    Prevnar Swelling     Other reaction(s): Edema    Prevnar [Pneumococcal 13-Linda Conj Vacc] Unknown       Medications:  Current Outpatient Medications   Medication Sig    acetaminophen (TYLENOL) 650 MG CR tablet Take 650 mg by mouth every 8 hours as needed for fever or pain Taking 2 - 650 mg tablets every 8 hrs.    calcium carbonate-vitamin D (CALTRATE) 600-10 MG-MCG per tablet Take 1 tablet by mouth daily    famotidine (PEPCID) 20 MG tablet Take 20 mg by mouth At Bedtime    folic acid (FOLVITE) 1 MG tablet Take 1 tablet (1 mg) by mouth daily    gabapentin (NEURONTIN) 300 MG capsule Take 1 capsule (300 mg) by mouth 3 times daily Take 300mg in morning, 300mg in afternoon and 300mg    hydroxychloroquine (PLAQUENIL) 200 MG tablet Take 1 tablet (200 mg) by mouth daily for 180 days    lifitegrast (XIIDRA) 5 % opthalmic solution 1 drop 2 times daily    Multiple Vitamins-Minerals (OCUVITE PRESERVISION PO) Take 1 tablet by mouth 2 times daily    omeprazole (PRILOSEC) 40 MG DR capsule Take 1 capsule (40 mg) by mouth daily    pilocarpine (SALAGEN) 5 MG tablet Take 1 tablet (5 mg) by mouth daily    vitamin D3 (CHOLECALCIFEROL) 50 mcg (2000  units) tablet Take 1 tablet by mouth daily     No current facility-administered medications for this visit.      Vitals:  /72   Pulse 67   Wt 41.7 kg (91 lb 14.4 oz)   SpO2 99%   BMI 15.77 kg/m      Exam:  GENERAL APPEARANCE: alert and no distress  RESP: lungs clear to auscultation  CV: regular rhythm, normal rate  EDEMA: no LE edema bilaterally  ABDOMEN: soft, nondistended  MS: extremities normal - no gross deformities noted, no evidence of inflammation in joints, no muscle tenderness  SKIN: no rash  NEURO: mentation intact and speech normal  PSYCH: affect normal/bright    LABS:   CMP  Recent Labs   Lab Test 09/01/23  1224 08/21/23  1014 08/14/23  0726 08/13/23  0619 10/01/21  0927 01/23/21  0902 01/20/21  0851 01/19/21 2001 03/13/17  1042    144 143 144   < > 143 142 142 144   POTASSIUM 5.3 4.6 3.9 4.2   < > 3.6 4.7 4.0 3.7   CHLORIDE 106 105 106 111*   < > 114* 110* 108 107   CO2 27 26 21* 25   < > 26 28 30 28   ANIONGAP 9 13 16* 8   < > 3 4 3 9   GLC 98 91 73 87   < > 157* 95 121* 98   BUN 13.0 16.6 15.0 17.9   < > 6* 17 24 12   CR 1.23* 1.20* 1.36* 1.20*   < > 0.75 0.82 0.94 0.78   GFRESTIMATED 44* 46* 39* 46*   < > 76 69 58* 73   GFRESTBLACK  --   --   --   --   --  88 80 67 88   EJ 9.9 10.0 9.7 9.1   < > 8.0* 8.8 9.4 9.0    < > = values in this interval not displayed.     Recent Labs   Lab Test 08/21/23  1014 08/13/23  0619 08/12/23  1147 05/30/23  1024 05/11/23  0949 11/10/22  1000 08/26/22  1003   BILITOTAL  --  0.4 0.3  --  0.3  --  0.5   ALKPHOS  --  67 76  --  86  --  79   ALT 17 11 13 14 24   < > 22   AST 33 27 27 25 32   < > 22    < > = values in this interval not displayed.     CBC  Recent Labs   Lab Test 09/01/23  1224 08/21/23  1014 08/14/23  0726 08/13/23  0619   HGB 8.6* 9.4* 9.3* 8.6*   WBC 3.3* 4.4 4.2 3.7*   RBC 2.66* 2.90* 2.96* 2.72*   HCT 26.8* 28.5* 28.9* 26.5*   * 98 98 97   MCH 32.3 32.4 31.4 31.6   MCHC 32.1 33.0 32.2 32.5   RDW 15.5* 15.1* 14.5 14.6     272 214 203     URINE STUDIES  Recent Labs   Lab Test 09/01/23  1229 08/21/23  1019 08/13/23  1017 02/07/23  1000   COLOR Light Yellow Yellow Straw Yellow   APPEARANCE Clear Clear Clear Clear   URINEGLC Negative Negative Negative Negative   URINEBILI Negative Negative Negative Negative   URINEKETONE Negative Trace* Negative Negative   SG 1.009 1.015 1.009 1.015   UBLD Negative Trace* Negative Small*   URINEPH 7.0 6.5 5.0 6.0   PROTEIN Negative Negative Negative Negative   UROBILINOGEN  --  0.2  --  0.2   NITRITE Negative Negative Negative Negative   LEUKEST Negative Small* Negative Negative   RBCU 1 0-2 <1 0-2   WBCU 2 5-10* 1 0-5     Recent Labs   Lab Test 03/10/22  1420   UTPG 0.27*     PTH  Recent Labs   Lab Test 08/21/23  1014 02/07/23  1000 03/10/22  1410   PTHI 38 43 46     IRON STUDIES  Recent Labs   Lab Test 05/11/23  0949 03/07/23  1318 03/10/22  1410   IRON 61 163* 83    283 296   IRONSAT 23 58* 28   ANMOL 95 96 92       Francine Bruner NP        Again, thank you for allowing me to participate in the care of your patient.

## 2023-09-03 NOTE — PROGRESS NOTES
"Nephrology Clinic Visit 9/1/23    Assessment and Plan:    KUSHAL/CKD3 - Creat 1.2, eGFR 44 ml/mn. No albuminuria   - Baseline creat 0.9-1.1   - Recovering from her most recent KUSHAL   - UA neg for blood/protein   - Etiology for her KUSHAL is likely hemodynamic given GIB   - Etiology for her CKD is unclear, possibly long term Methotrexate   - She is off Methotrexate   - Blood pressures borderline in clinic. No h/o hypertension   - Does not use NSAIDs    2. Recurrent GIB - Hgb 8.6   - Had capsule endoscopy 2/23 EGD 2/23, colonoscopy 8/23   - Capsule endoscopy found \"A single angioectasia without bleeding in the                          duodenum\"   - The EGD findings were duodenal erosion w/o bleeding   - The Colonoscopy findings were widespread diverticulosis throughout the entire colon w/o active bleeding. There was residual blood in the colon   - Has further follow up with GI and Hematology in the next few months    3. Elevated blood pressure w/o history of HTN - Clinic b/ps today 135-147/68-74. No edema. Does have a home b/p device   - Begin home monitoring and bring in device and readings next visit    4. RA - Off Methotrexate and now on Plaquenil.    - Has Rheum follow up 10/19/23.     5. Electrolytes - No acute concerns. K 5.3 Na 142    6. Acid base - No acute concerns. Bicarb 27    7. BMD - Ca 9.9 Phos 3.8 Albumin 4.3   Vit D 89 PTH 38 ( 8/23)   Discontinue Vit D for now ( taking 50 mcg every day)    8. Disposition - RTC 2 months for follow up w/labs prior given unresolved KUSHAL and borderline b/ps    Assessment and plan was discussed with patient and she voiced her understanding and agreement.    Reason for Visit:  Post hospital KUSHAL/CKD3    HPI:  Ms Bang is an 81 yo female with RA, Recurrent GIB, CKD3 with recent hospital admission 8/12-8/14/23 for Diverticular bleed c/b mild KUSHAL with Creat 1.3 upon admission.   Discharge creat 1.2 .Baseline creat 0.9-1.1    ROS:   A comprehensive review of systems was obtained and " negative, except as noted in the HPI or PMH.  Denies NSAIDs  No home blood pressures  Had blood tinged stool up to 3 days ago, now resolved    Chronic Health Problems:    RA ( previously on Methotrexate)   Recurrent GIB  CKD3   Anemia  Diverticulosis    Family Hx:   No family history on file.    Personal Hx:   , retired [employer] , NS, ETOH none    Allergies:  Allergies   Allergen Reactions    Bee Venom Anaphylaxis    Shrimp Anaphylaxis    Evoxac [Cevimeline] Unknown    Prevnar Swelling     Other reaction(s): Edema    Prevnar [Pneumococcal 13-Linda Conj Vacc] Unknown       Medications:  Current Outpatient Medications   Medication Sig    acetaminophen (TYLENOL) 650 MG CR tablet Take 650 mg by mouth every 8 hours as needed for fever or pain Taking 2 - 650 mg tablets every 8 hrs.    calcium carbonate-vitamin D (CALTRATE) 600-10 MG-MCG per tablet Take 1 tablet by mouth daily    famotidine (PEPCID) 20 MG tablet Take 20 mg by mouth At Bedtime    folic acid (FOLVITE) 1 MG tablet Take 1 tablet (1 mg) by mouth daily    gabapentin (NEURONTIN) 300 MG capsule Take 1 capsule (300 mg) by mouth 3 times daily Take 300mg in morning, 300mg in afternoon and 300mg    hydroxychloroquine (PLAQUENIL) 200 MG tablet Take 1 tablet (200 mg) by mouth daily for 180 days    lifitegrast (XIIDRA) 5 % opthalmic solution 1 drop 2 times daily    Multiple Vitamins-Minerals (OCUVITE PRESERVISION PO) Take 1 tablet by mouth 2 times daily    omeprazole (PRILOSEC) 40 MG DR capsule Take 1 capsule (40 mg) by mouth daily    pilocarpine (SALAGEN) 5 MG tablet Take 1 tablet (5 mg) by mouth daily    vitamin D3 (CHOLECALCIFEROL) 50 mcg (2000 units) tablet Take 1 tablet by mouth daily     No current facility-administered medications for this visit.      Vitals:  /72   Pulse 67   Wt 41.7 kg (91 lb 14.4 oz)   SpO2 99%   BMI 15.77 kg/m      Exam:  GENERAL APPEARANCE: alert and no distress  RESP: lungs clear to auscultation  CV: regular rhythm,  normal rate  EDEMA: no LE edema bilaterally  ABDOMEN: soft, nondistended  MS: extremities normal - no gross deformities noted, no evidence of inflammation in joints, no muscle tenderness  SKIN: no rash  NEURO: mentation intact and speech normal  PSYCH: affect normal/bright    LABS:   CMP  Recent Labs   Lab Test 09/01/23  1224 08/21/23  1014 08/14/23  0726 08/13/23  0619 10/01/21  0927 01/23/21  0902 01/20/21  0851 01/19/21 2001 03/13/17  1042    144 143 144   < > 143 142 142 144   POTASSIUM 5.3 4.6 3.9 4.2   < > 3.6 4.7 4.0 3.7   CHLORIDE 106 105 106 111*   < > 114* 110* 108 107   CO2 27 26 21* 25   < > 26 28 30 28   ANIONGAP 9 13 16* 8   < > 3 4 3 9   GLC 98 91 73 87   < > 157* 95 121* 98   BUN 13.0 16.6 15.0 17.9   < > 6* 17 24 12   CR 1.23* 1.20* 1.36* 1.20*   < > 0.75 0.82 0.94 0.78   GFRESTIMATED 44* 46* 39* 46*   < > 76 69 58* 73   GFRESTBLACK  --   --   --   --   --  88 80 67 88   EJ 9.9 10.0 9.7 9.1   < > 8.0* 8.8 9.4 9.0    < > = values in this interval not displayed.     Recent Labs   Lab Test 08/21/23  1014 08/13/23  0619 08/12/23  1147 05/30/23  1024 05/11/23  0949 11/10/22  1000 08/26/22  1003   BILITOTAL  --  0.4 0.3  --  0.3  --  0.5   ALKPHOS  --  67 76  --  86  --  79   ALT 17 11 13 14 24   < > 22   AST 33 27 27 25 32   < > 22    < > = values in this interval not displayed.     CBC  Recent Labs   Lab Test 09/01/23  1224 08/21/23  1014 08/14/23  0726 08/13/23  0619   HGB 8.6* 9.4* 9.3* 8.6*   WBC 3.3* 4.4 4.2 3.7*   RBC 2.66* 2.90* 2.96* 2.72*   HCT 26.8* 28.5* 28.9* 26.5*   * 98 98 97   MCH 32.3 32.4 31.4 31.6   MCHC 32.1 33.0 32.2 32.5   RDW 15.5* 15.1* 14.5 14.6    272 214 203     URINE STUDIES  Recent Labs   Lab Test 09/01/23  1229 08/21/23  1019 08/13/23  1017 02/07/23  1000   COLOR Light Yellow Yellow Straw Yellow   APPEARANCE Clear Clear Clear Clear   URINEGLC Negative Negative Negative Negative   URINEBILI Negative Negative Negative Negative   URINEKETONE Negative Trace*  Negative Negative   SG 1.009 1.015 1.009 1.015   UBLD Negative Trace* Negative Small*   URINEPH 7.0 6.5 5.0 6.0   PROTEIN Negative Negative Negative Negative   UROBILINOGEN  --  0.2  --  0.2   NITRITE Negative Negative Negative Negative   LEUKEST Negative Small* Negative Negative   RBCU 1 0-2 <1 0-2   WBCU 2 5-10* 1 0-5     Recent Labs   Lab Test 03/10/22  1420   UTPG 0.27*     PTH  Recent Labs   Lab Test 08/21/23  1014 02/07/23  1000 03/10/22  1410   PTHI 38 43 46     IRON STUDIES  Recent Labs   Lab Test 05/11/23  0949 03/07/23  1318 03/10/22  1410   IRON 61 163* 83    283 296   IRONSAT 23 58* 28   ANMOL 95 96 92       Francine Bruner, NP

## 2023-09-05 ENCOUNTER — TELEPHONE (OUTPATIENT)
Dept: NEPHROLOGY | Facility: CLINIC | Age: 83
End: 2023-09-05
Payer: COMMERCIAL

## 2023-09-05 NOTE — TELEPHONE ENCOUNTER
Francine Bruner, Elisabet Nassar RN Becky can you ask Patrick to discontinue her Vit D given elevated level?  She can continue Ca/D but stop the Cholecalciferol  Thanks  Selene

## 2023-09-29 DIAGNOSIS — M06.9 RHEUMATOID ARTHRITIS INVOLVING MULTIPLE SITES, UNSPECIFIED WHETHER RHEUMATOID FACTOR PRESENT (H): ICD-10-CM

## 2023-09-29 DIAGNOSIS — Z79.899 HIGH RISK MEDICATION USE: ICD-10-CM

## 2023-10-02 NOTE — TELEPHONE ENCOUNTER
hydroxychloroquine (PLAQUENIL) 200 MG tablet   30 tablet 5 3/30/2023 9/26/2023   Take 1 tablet (200 mg) by mouth daily for 180 days - Oral        Routing refill request to provider for review/approval because:  Drug not active on patient's medication list    Eye exam found on   4-     Eye Exam [facility]    Labs completed on :8-    Creatinine 0.51 - 0.95 mg/dL 1.20 High

## 2023-10-03 RX ORDER — HYDROXYCHLOROQUINE SULFATE 200 MG/1
200 TABLET, FILM COATED ORAL DAILY
Qty: 90 TABLET | Refills: 1 | Status: SHIPPED | OUTPATIENT
Start: 2023-10-03 | End: 2024-01-12

## 2023-10-04 ENCOUNTER — LAB (OUTPATIENT)
Dept: LAB | Facility: CLINIC | Age: 83
End: 2023-10-04
Payer: COMMERCIAL

## 2023-10-04 DIAGNOSIS — D50.0 IRON DEFICIENCY ANEMIA DUE TO CHRONIC BLOOD LOSS: ICD-10-CM

## 2023-10-04 DIAGNOSIS — M06.9 RHEUMATOID ARTHRITIS INVOLVING MULTIPLE SITES, UNSPECIFIED WHETHER RHEUMATOID FACTOR PRESENT (H): ICD-10-CM

## 2023-10-04 DIAGNOSIS — Z79.899 HIGH RISK MEDICATION USE: ICD-10-CM

## 2023-10-04 DIAGNOSIS — D63.8 ANEMIA OF CHRONIC DISEASE: ICD-10-CM

## 2023-10-04 LAB
BASO+EOS+MONOS # BLD AUTO: ABNORMAL 10*3/UL
BASO+EOS+MONOS NFR BLD AUTO: ABNORMAL %
BASOPHILS # BLD AUTO: 0.1 10E3/UL (ref 0–0.2)
BASOPHILS NFR BLD AUTO: 1 %
EOSINOPHIL # BLD AUTO: 0.1 10E3/UL (ref 0–0.7)
EOSINOPHIL NFR BLD AUTO: 3 %
ERYTHROCYTE [DISTWIDTH] IN BLOOD BY AUTOMATED COUNT: 13.7 % (ref 10–15)
ERYTHROCYTE [SEDIMENTATION RATE] IN BLOOD BY WESTERGREN METHOD: 48 MM/HR (ref 0–30)
HCT VFR BLD AUTO: 33.2 % (ref 35–47)
HGB BLD-MCNC: 10.7 G/DL (ref 11.7–15.7)
IMM GRANULOCYTES # BLD: 0 10E3/UL
IMM GRANULOCYTES NFR BLD: 0 %
LYMPHOCYTES # BLD AUTO: 1 10E3/UL (ref 0.8–5.3)
LYMPHOCYTES NFR BLD AUTO: 26 %
MCH RBC QN AUTO: 32.7 PG (ref 26.5–33)
MCHC RBC AUTO-ENTMCNC: 32.2 G/DL (ref 31.5–36.5)
MCV RBC AUTO: 102 FL (ref 78–100)
MONOCYTES # BLD AUTO: 0.5 10E3/UL (ref 0–1.3)
MONOCYTES NFR BLD AUTO: 12 %
NEUTROPHILS # BLD AUTO: 2.4 10E3/UL (ref 1.6–8.3)
NEUTROPHILS NFR BLD AUTO: 59 %
PLATELET # BLD AUTO: 241 10E3/UL (ref 150–450)
RBC # BLD AUTO: 3.27 10E6/UL (ref 3.8–5.2)
WBC # BLD AUTO: 4 10E3/UL (ref 4–11)

## 2023-10-04 PROCEDURE — 85025 COMPLETE CBC W/AUTO DIFF WBC: CPT

## 2023-10-04 PROCEDURE — 84460 ALANINE AMINO (ALT) (SGPT): CPT

## 2023-10-04 PROCEDURE — 86140 C-REACTIVE PROTEIN: CPT

## 2023-10-04 PROCEDURE — 82565 ASSAY OF CREATININE: CPT

## 2023-10-04 PROCEDURE — 85652 RBC SED RATE AUTOMATED: CPT

## 2023-10-04 PROCEDURE — 82040 ASSAY OF SERUM ALBUMIN: CPT

## 2023-10-04 PROCEDURE — 83550 IRON BINDING TEST: CPT

## 2023-10-04 PROCEDURE — 84450 TRANSFERASE (AST) (SGOT): CPT

## 2023-10-04 PROCEDURE — 82728 ASSAY OF FERRITIN: CPT

## 2023-10-04 PROCEDURE — 83540 ASSAY OF IRON: CPT

## 2023-10-04 PROCEDURE — 36415 COLL VENOUS BLD VENIPUNCTURE: CPT

## 2023-10-05 LAB
ALBUMIN SERPL BCG-MCNC: 4.5 G/DL (ref 3.5–5.2)
ALT SERPL W P-5'-P-CCNC: 15 U/L (ref 0–50)
AST SERPL W P-5'-P-CCNC: 27 U/L (ref 0–45)
CREAT SERPL-MCNC: 1.17 MG/DL (ref 0.51–0.95)
CRP SERPL-MCNC: <3 MG/L
EGFRCR SERPLBLD CKD-EPI 2021: 47 ML/MIN/1.73M2
FERRITIN SERPL-MCNC: 42 NG/ML (ref 11–328)
IRON BINDING CAPACITY (ROCHE): 290 UG/DL (ref 240–430)
IRON SATN MFR SERPL: 16 % (ref 15–46)
IRON SERPL-MCNC: 45 UG/DL (ref 37–145)

## 2023-10-19 ENCOUNTER — OFFICE VISIT (OUTPATIENT)
Dept: RHEUMATOLOGY | Facility: CLINIC | Age: 83
End: 2023-10-19
Attending: INTERNAL MEDICINE
Payer: COMMERCIAL

## 2023-10-19 VITALS
OXYGEN SATURATION: 99 % | WEIGHT: 94.8 LBS | TEMPERATURE: 97.6 F | BODY MASS INDEX: 16.27 KG/M2 | SYSTOLIC BLOOD PRESSURE: 127 MMHG | DIASTOLIC BLOOD PRESSURE: 76 MMHG | HEART RATE: 68 BPM

## 2023-10-19 DIAGNOSIS — Z79.899 HIGH RISK MEDICATION USE: ICD-10-CM

## 2023-10-19 DIAGNOSIS — M06.9 RHEUMATOID ARTHRITIS INVOLVING MULTIPLE SITES, UNSPECIFIED WHETHER RHEUMATOID FACTOR PRESENT (H): Primary | ICD-10-CM

## 2023-10-19 PROCEDURE — G0463 HOSPITAL OUTPT CLINIC VISIT: HCPCS | Performed by: INTERNAL MEDICINE

## 2023-10-19 PROCEDURE — 99214 OFFICE O/P EST MOD 30 MIN: CPT | Performed by: INTERNAL MEDICINE

## 2023-10-19 RX ORDER — HYDROXYCHLOROQUINE SULFATE 200 MG/1
200 TABLET, FILM COATED ORAL DAILY
Qty: 90 TABLET | Refills: 1 | Status: SHIPPED | OUTPATIENT
Start: 2023-10-19 | End: 2023-11-09

## 2023-10-19 ASSESSMENT — PAIN SCALES - GENERAL: PAINLEVEL: MILD PAIN (3)

## 2023-10-19 NOTE — NURSING NOTE
Chief Complaint   Patient presents with    RECHECK   /76 (BP Location: Right arm, Patient Position: Sitting, Cuff Size: Adult Small)   Pulse 68   Temp 97.6  F (36.4  C) (Oral)   Wt 43 kg (94 lb 12.8 oz)   SpO2 99%   BMI 16.27 kg/m  Cammie Sosa on 10/19/2023 at 11:19 AM

## 2023-10-19 NOTE — PROGRESS NOTES
Outpatient Rheumatology follow-up    Name: Patrick Olson    MRN [id number]   Today's date: 10/19/2023  Date of last visit: 6/29/23         Reason for follow-up: Rheumatoid arthritis on hydroxychloroquine   Requesting physician: Lauryn Tamayo MD             Assessment & Plan:   82-year-old female with a history of seropositive rheumatoid arthritis with secondary Sjogren's syndrome on methotrexate 10 mg weekly (previously on 20 mg weekly)/folic acid 2 mg daily and 5 mg Salagen nightly presented to rheumatology to establish care in Oct 2021.  At that time she had recently seen Dr Sen of hematology for evaluation and treatment of macrocytic anemia likely secondary to chronic inflammation vs methotrexate therapy vs recurrent diverticular bleeding most recently in January 2021 required hospitalization and transfusion.  Her seropositive rheumatoid arthritis was under good control and in remission on low-dose of methotrexate which was on the low end of therapeutic dose range.  Given the likely contribution to her anemia and possibly leukopenia in the context of well-controlled low disease activity we discussed other options for her inflammatory arthritis we did initially transition her from methotrexate to hydroxychloroquine and she did well on this from RA/sjogrens/cytopenia perspective, though she developed a rash which was evaluated by Dr Stevenson of Dermatology and thought NOT secondary to HCQ. However, patient still wished to discontinue HCQ and so we discussed going back on very low dose methotrexate while we monitor cell counts. We restarted methotrexate at a dose of 10mg once weekly along with folic acid daily. Unfortunately, her WBC, after restarting dropped and continued to down trend. Reassuringly, her HGB has continued to uptrend and along with iron supplementation. We again discussed that the rash that she developed while on HCQ was very unlikely to be secondary to HCQ, especially given the evaluation  she had with dermatologist, Dr Stevenson/his assessment/expertise in CTD and experience with HCQ. She was reassured by this. We elected to go back onto HCQ at that time. Her systemic autoimmune disease was well controlled on HCQ in the past, and should her leukopenia be either evidence of toxicity from methotrexate or a manifestation of her CTD, HCQ would be a more appropriate option in both regards. Of note, while she was on HCQ last year, her WBC had normalized.     1. Rheumatoid Arthritis:  - Patient reports improvement in joint pain and stiffness with the use of prednisone.  - Joint examination shows no inflammation in MCPs, PIPs, and DIPs. Right wrist and elbow are tender with limited range of motion. Left wrist and elbow are also tender but with better range of motion.  - ESR has improved from 60s to 40s, indicating a decrease in inflammation.  - Plan: Continue Plaquenil 200 mg once daily. Schedule follow-up appointment in 4-5 months with labs in the week prior.    2. Back pain:  - Patient is receiving acupuncture once a week and reports improvement in pain.  - Plan: Continue acupuncture as needed for pain management.    3. Difficulty gaining weight:  - Patient has tried increasing fat intake but experienced lactose intolerance.  - Plan: Consider trying non-dairy shakes high in calories and protein, such as Boost or Ensure.    4. Skin concerns:  - Patient reports dark spots on the ankle and has an appointment with a dermatologist in January.  - Plan: Continue monitoring skin and follow up with dermatologist as scheduled.    5. Dry mouth:  - Patient is taking pilocarpine 5 mg once daily and reports managing dry mouth with water intake during the night.  - Plan: Continue pilocarpine 5 mg once daily as needed for dry mouth symptoms.    6. High risk medication use: HCQ  -No evidence of toxicity by history/exam today.  Most recent labs from 10/4/2023 reviewed to include ESR, CRP, Ast, ALT, creatinine, albumin, CBC.  No evidence of acute drug toxicity identified on her labs. Will continue with routine screening drug toxicity monitoring every 6 months  -Risks and benefits of HCQ discussed today to include rash (including severe rash/SJS/TEN), HA, GI upset, hepatoxicity, retinal toxicity, need for yearly screening OCT exam, need for CBC, CMP, ESR, CRP for routine screening drug toxicity labs among others. Patient is agreeable.   -Continue routine screening yearly OCT exam, already has ophthalmologist that she is established with    #CKD 3a  -continues to follow with nephrology  -note renal function as we dose her systemic immunosuppression    Follow-up with me in 5 months time    Greg Clements MD  Rheumatology      Subjective:   Interval history 10/19/2023  - Continues on hydroxychloroquine 200 mg twice daily  - Had recent flare of inflammatory arthritis and was prescribed a tapering course of prednisone 15 mg daily for 7 days then 10 mg daily for 7 days then 5 mg daily for 7 days then stop.  She also required use of Tylenol during that time  -The patient reports that the prednisone prescribed has been effective, allowing her to walk more comfortably. She experiences intermittent joint pain, predominantly on the right side, affecting her thumb, wrist, and elbow. The patient has been performing exercises for her thumb and other affected joints. She mentions that her shoulder pain is more difficult to manage and has considered getting a cortisone shot but has not yet done so due to the pain subsiding before appointments.    The patient has been receiving acupuncture for her back pain, which she finds helpful. She undergoes acupuncture sessions once a week. To manage morning stiffness, she performs stretches and strengthening exercises before getting out of bed. She also experiences pain in the late afternoon, which she manages by resting for half an hour and taking eight-hour Tylenol.    Regarding her skin, the patient reports no  significant issues, but she has noticed dark spots on her ankle and has scheduled an appointment with a dermatologist in January. She has been diligent in using appropriate shampoos and plans to ask the dermatologist about non-alcoholic and non-fragrant products.    The patient denies experiencing any fevers, chills, or night sweats. She reports stable weight, although she has attempted to gain weight by consuming high-fat dairy products. However, she has become lactose intolerant and has stopped trying to gain weight. She has tried non-dairy shakes high in calories and protein.    She also mentions taking pilocarpine once daily at night and keeping water by her bed to manage dryness.    14 point review of systems collected and negative if not documented above.    Interval history 6/29/23  -July 7th acupuncture appointment. Trying this prior to her injection which has been recommended  -has continued on HCQ 200mg once daily  -Her joint pains come and go. Had right shoulder/elbow/wrist pain at different times. Self resolves within 2-3 weeks.   -Has appointent with hand OT in 2-3 weeks. Will be asking for brace for the wrist.   -Takes 2, 8 hour tylenol in the AM and 2 at night  -sitting for long periods is painful/difficult  -also with neck pain  -cannot eat large meals. Trying to eat high calorie dense meals. Trying to gain weight. She eats about 1200 calories per day. She is trying to bump it up to 1700 calories per day, but has not bumped up to this yet. She intermittently has 1700 and then back to   -Able to make a full fist upon waking in the AM  -denies red/hot/swollen joints.   -no new progressive fevers/chills/night sweats  -no interval infections  -has been tolerating HCQ well without noticeable side effects, GI cutaneous or other.  14 point ROS collected and negative if not documented above    March 30, 2023  -continues to lose weight, unintentional. Despite eating as much as possible of calorie dense foods,  unable to stop weight loss and unable to gain  -had low back injection one week ago, not too much helpful. Rates the pain 3-4/10. Before the injection rates the pain 6/10  -No fevers/chills/night sweats  -Saw ortho for her right shoulder, found to have rotator cuff pathology. Not secondary to active inflammatory arthritis etiology  -No red/hot/swollen joints.  -no prolonged EMS  -Does not find that her methotrexate is causing specific GI upset/nausea around the time of her once weekly dose. She is currently taking 10mg once weekly. Has also continued on folic acid 1mg once daily  14 point ROS collected and negative if not documented above     Interval History 5/19/22  Rash is continuing to improve. She changed/soap/fragrance. She had cortisone injection into her back 2 days ago, unable to sleep for 2 nights after that but now resolved and has notices much improvement. Today got up, did some cooking. Energy level improved. Has started on methotrexate and without any side effects. Started this on 4/23/22. Takes this each week on Monday evenings. No GI or other side effects. Also takes folic acid 1mg daily. Currently takes 10mg weekly and 1mg folic acid daily. No fatigue. Fevers/chills/night sweats. No infections since last visit. No joint pain. No red/hot/swollen joints. Minimal AM stiffness lasting for only a few minutes. She is looking forward to her upcoming trip to [place] with her family. No unintentional weight loss. 14 point ROS collected and negative.         Interval history 2/10/22  Did not notice any return or worsening of inflammatory arthritis with transition from methotrexate to Plaquenil.  Reports less than 5 minutes of early morning stiffness.  No red hot swollen joints.  Rates her pain at less than a 2 out of 10.  No rash, headache, change in vision, GI upset.  No interval infection.  Continues to have ongoing left greater than right intermittent hip pain which is worse when lying on that side.  She  continues to be active with frequent walking almost daily weather permitting.  Her hip pain is there a few days per month.  Resolves without intervention.  Had follow-up with GI on 01/12/2022.  They plan for repeat colonoscopy in 6 to 12 months.  She was put on a course of prednisone after a visit with sports medicine for her established lumbar disc disease and likely exacerbation.  She had a positive response of this for her axial pain.  She did not note much difference in regards to her peripheral arthritis symptoms as they had been inactive prior to starting this prednisone course.  Review of systems otherwise negative.    History from initial consultation in 10/2021  Patient with a diagnosis of seropositive rheumatoid arthritis and secondary Sjogren's syndrome on methotrexate 10 mg weekly, folic acid 2 mg daily and Salagen 5 mg each evening presents to rheumatology to discuss other therapies for her inflammatory arthritis in the setting of chronic macrocytic anemia and mild leukopenia.  She reports that overall her joint symptoms of peripheral joints have been well controlled even with her reduction of methotrexate dose from 20 mg weekly earlier this year down to 10 mg weekly due to the above concern.  She has not had worsening of joint pain, stiffness or any return of erythema edema or warmth.  She is able to make full fist upon waking in the morning.  Denies any early morning stiffness lasting more than 3 to 5 minutes.  Denies any fevers chills.  She was also previously taking Celebrex though in the context of her recurrent diverticular bleeds this was discontinued earlier this year as well.  No worsening after this discontinuation either.  Has mild dry eyes and sees an ophthalmologist yearly.  She does have dry mouth and finds that the Salagen has been helpful.  She denies any swelling of her parotid glands or submandibular glands.  She drinks water frequently including overnight when needed.  Denies  recurrent/progressive dental disease.  Sees a dentist regularly.  No new rash.  No fevers or chills.  No recurrent infection.  Her weight is now stable though she initially did have some weight loss earlier this year with her diverticular disease and change in diet.  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  No history of DVT.  No hair loss.  No inflammatory eye disease history.  No oral or nasal ulcers.  No recurrent epistaxes.  No photosensitive rash.  No chest pain or shortness of breath.  No change in strength or sensation in arms or legs.  On complaint at this time is ongoing lumbar spine pain for which she had previously seen orthospine.  Previously had injections which were helpful though the most recent was not.    Past Medical History  Past Medical History:   Diagnosis Date    Anemia     CKD (chronic kidney disease) stage 3, GFR 30-59 ml/min (H)     Diverticulosis of large intestine     Osteoarthritis     Osteoporosis     Rheumatoid arthritis (H)     Sensorineural hearing loss      Past Surgical History  Past Surgical History:   Procedure Laterality Date    CAPSULE/PILL CAM ENDOSCOPY N/A 11/18/2021    Procedure: IMAGING PROCEDURE, GI TRACT, INTRALUMINAL, VIA CAPSULE;  Surgeon: Deric Rodriguez MD;  Location:  GI    CAPSULE/PILL CAM ENDOSCOPY N/A 2/14/2023    Procedure: IMAGING PROCEDURE, GI TRACT, INTRALUMINAL, VIA CAPSULE;  Surgeon: Jaime Mesa MD;  Location:  GI    COLONOSCOPY N/A 03/14/2017    Procedure: COMBINED COLONOSCOPY, SINGLE OR MULTIPLE BIOPSY/POLYPECTOMY BY BIOPSY;  Surgeon: Spencer Downey MD;  Location:  GI    COLONOSCOPY N/A 9/22/2022    Procedure: COLONOSCOPY, FLEXIBLE, WITH LESION REMOVAL USING SNARE;  Surgeon: Kirby Arthur MD;  Location:  GI    COLONOSCOPY N/A 1/13/2023    Procedure: COLONOSCOPY;  Surgeon: Spencer Downey MD;  Location: [facility] OR    COLONOSCOPY N/A  8/14/2023    Procedure: Colonoscopy;  Surgeon: Spencer Downey MD;  Location: [facility]    ENTEROSCOPY SMALL BOWEL N/A 11/20/2021    Procedure: ENTEROSCOPY, colonoscopy with endoscopic mucosal resection and tattoo placement;  Surgeon: Kirby Arthur MD;  Location: [facility] OR    ESOPHAGOSCOPY, GASTROSCOPY, DUODENOSCOPY (EGD), COMBINED N/A 2/6/2023    Procedure: ESOPHAGOGASTRODUODENOSCOPY (EGD);  Surgeon: Spencer Downey MD;  Location: [facility] GI    IR VISCERAL EMBOLIZATION  01/22/2021    ORTHOPEDIC SURGERY Left     hip replacment    SIGMOIDOSCOPY FLEXIBLE N/A 01/23/2021    Procedure: SIGMOIDOSCOPY, FLEXIBLE;  Surgeon: Jaime Steinberg MD;  Location:  GI     Medications  Current Outpatient Medications   Medication    acetaminophen (TYLENOL) 650 MG CR tablet    calcium carbonate-vitamin D (CALTRATE) 600-10 MG-MCG per tablet    famotidine (PEPCID) 20 MG tablet    folic acid (FOLVITE) 1 MG tablet    gabapentin (NEURONTIN) 300 MG capsule    hydroxychloroquine (PLAQUENIL) 200 MG tablet    lifitegrast (XIIDRA) 5 % opthalmic solution    Multiple Vitamins-Minerals (OCUVITE PRESERVISION PO)    omeprazole (PRILOSEC) 40 MG DR capsule    pilocarpine (SALAGEN) 5 MG tablet     No current facility-administered medications for this visit.       Allergies  Allergies   Allergen Reactions    Bee Venom Anaphylaxis    Shrimp Anaphylaxis    Evoxac [Cevimeline] Unknown    Prevnar Swelling     Other reaction(s): Edema    Prevnar [Pneumococcal 13-Linda Conj Vacc] Unknown       Family History: No family hx of autoimmune disease    Social History: No alcohol, drug use, smoking history.       Objective:   /76 (BP Location: Right arm, Patient Position: Sitting, Cuff Size: Adult Small)   Pulse 68   Temp 97.6  F (36.4  C) (Oral)   Wt 43 kg (94 lb 12.8 oz)   SpO2 99%   BMI 16.27 kg/m    GEN: sitting up unassisted NAD, pleasant and interactive as always  HEENT: no facial rash, sclera appear clear  Breathing comfortably on RA, no audible  wheeze, no cough. No use of accessory muscles  - Right hand: No inflammation in MCPs and PIPs; limited range of motion in the right wrist with some pain and discomfort; tenderness in the right elbow  - Left hand: No inflammation in MCPs and PIPs; better range of motion in the left wrist compared to the right; tenderness in the left elbow  - Shoulders: No pain or discomfort during range of motion exercises (putting hands behind the head and in the back pocket)  Skin: no acute cutaneous lesions appreciated on exposed skin      WBC   Date Value Ref Range Status   01/20/2021 5.0 4.0 - 11.0 10e9/L Final     WBC Count   Date Value Ref Range Status   10/04/2023 4.0 4.0 - 11.0 10e3/uL Final     Hemoglobin   Date Value Ref Range Status   10/04/2023 10.7 (L) 11.7 - 15.7 g/dL Final   01/25/2021 8.0 (L) 11.7 - 15.7 g/dL Final     Platelet Count   Date Value Ref Range Status   10/04/2023 241 150 - 450 10e3/uL Final   01/20/2021 207 150 - 450 10e9/L Final     Creatinine   Date Value Ref Range Status   10/04/2023 1.17 (H) 0.51 - 0.95 mg/dL Final   01/23/2021 0.75 0.52 - 1.04 mg/dL Final     Lab Results   Component Value Date    ALKPHOS 68 05/05/2022    ALKPHOS 77 03/13/2017     AST   Date Value Ref Range Status   10/04/2023 27 0 - 45 U/L Final     Comment:     Reference intervals for this test were updated on 6/12/2023 to more accurately reflect our healthy population. There may be differences in the flagging of prior results with similar values performed with this method. Interpretation of those prior results can be made in the context of the updated reference intervals.   03/13/2017 10 0 - 45 U/L Final     Lab Results   Component Value Date    ALT 22 05/05/2022    ALT 12 03/13/2017     Sed Rate   Date Value Ref Range Status   03/13/2017 63 (H) 0 - 30 mm/h Final     Erythrocyte Sedimentation Rate   Date Value Ref Range Status   10/04/2023 48 (H) 0 - 30 mm/hr Final     CRP Inflammation   Date Value Ref Range Status   08/26/2022  <2.9 0.0 - 8.0 mg/L Final   03/13/2017 10.0 (H) 0.0 - 8.0 mg/L Final     UA RESULTS:  Recent Labs   Lab Test 03/10/22  1420   COLOR Yellow   APPEARANCE Clear   URINEGLC Negative   URINEBILI Negative   URINEKETONE Negative   SG 1.010   UBLD Negative   URINEPH 7.0   PROTEIN Negative   NITRITE Negative   LEUKEST Negative   RBCU 2   WBCU <1      Rheumatoid Factor   Date Value Ref Range Status   01/03/2022 8 <12 IU/mL Final     Cyclic Citrullinated Peptide Antibody IgG   Date Value Ref Range Status   01/03/2022 3.6 <7.0 U/mL Final     Comment:     Negative       Imaging:   No peripheral x-rays to review in our system

## 2023-10-19 NOTE — LETTER
10/19/2023       RE: Patrick Olson  [address]  [facility] [address]     Dear Colleague,    Thank you for referring your patient, Patrick Olson, to the [facility] Preemption at [facility]. Please see a copy of my visit note below.      Outpatient Rheumatology follow-up    Name: Patrick Olson    MRN [id number]   Today's date: 10/19/2023  Date of last visit: 6/29/23         Reason for follow-up: Rheumatoid arthritis on hydroxychloroquine   Requesting physician: Lauryn Tamayo MD             Assessment & Plan:   82-year-old female with a history of seropositive rheumatoid arthritis with secondary Sjogren's syndrome on methotrexate 10 mg weekly (previously on 20 mg weekly)/folic acid 2 mg daily and 5 mg Salagen nightly presented to rheumatology to establish care in Oct 2021.  At that time she had recently seen Dr Sen of hematology for evaluation and treatment of macrocytic anemia likely secondary to chronic inflammation vs methotrexate therapy vs recurrent diverticular bleeding most recently in January 2021 required hospitalization and transfusion.  Her seropositive rheumatoid arthritis was under good control and in remission on low-dose of methotrexate which was on the low end of therapeutic dose range.  Given the likely contribution to her anemia and possibly leukopenia in the context of well-controlled low disease activity we discussed other options for her inflammatory arthritis we did initially transition her from methotrexate to hydroxychloroquine and she did well on this from RA/sjogrens/cytopenia perspective, though she developed a rash which was evaluated by Dr Stevenson of Dermatology and thought NOT secondary to HCQ. However, patient still wished to discontinue HCQ and so we discussed going back on very low dose methotrexate while we monitor cell counts. We restarted methotrexate at a dose of 10mg once weekly along  with folic acid daily. Unfortunately, her WBC, after restarting dropped and continued to down trend. Reassuringly, her HGB has continued to uptrend and along with iron supplementation. We again discussed that the rash that she developed while on HCQ was very unlikely to be secondary to HCQ, especially given the evaluation she had with dermatologist, Dr Stevenson/his assessment/expertise in CTD and experience with HCQ. She was reassured by this. We elected to go back onto HCQ at that time. Her systemic autoimmune disease was well controlled on HCQ in the past, and should her leukopenia be either evidence of toxicity from methotrexate or a manifestation of her CTD, HCQ would be a more appropriate option in both regards. Of note, while she was on HCQ last year, her WBC had normalized.     1. Rheumatoid Arthritis:  - Patient reports improvement in joint pain and stiffness with the use of prednisone.  - Joint examination shows no inflammation in MCPs, PIPs, and DIPs. Right wrist and elbow are tender with limited range of motion. Left wrist and elbow are also tender but with better range of motion.  - ESR has improved from 60s to 40s, indicating a decrease in inflammation.  - Plan: Continue Plaquenil 200 mg once daily. Schedule follow-up appointment in 4-5 months with labs in the week prior.    2. Back pain:  - Patient is receiving acupuncture once a week and reports improvement in pain.  - Plan: Continue acupuncture as needed for pain management.    3. Difficulty gaining weight:  - Patient has tried increasing fat intake but experienced lactose intolerance.  - Plan: Consider trying non-dairy shakes high in calories and protein, such as Boost or Ensure.    4. Skin concerns:  - Patient reports dark spots on the ankle and has an appointment with a dermatologist in January.  - Plan: Continue monitoring skin and follow up with dermatologist as scheduled.    5. Dry mouth:  - Patient is taking pilocarpine 5 mg once daily and  reports managing dry mouth with water intake during the night.  - Plan: Continue pilocarpine 5 mg once daily as needed for dry mouth symptoms.    6. High risk medication use: HCQ  -No evidence of toxicity by history/exam today.  Most recent labs from 10/4/2023 reviewed to include ESR, CRP, Ast, ALT, creatinine, albumin, CBC. No evidence of acute drug toxicity identified on her labs. Will continue with routine screening drug toxicity monitoring every 6 months  -Risks and benefits of HCQ discussed today to include rash (including severe rash/SJS/TEN), HA, GI upset, hepatoxicity, retinal toxicity, need for yearly screening OCT exam, need for CBC, CMP, ESR, CRP for routine screening drug toxicity labs among others. Patient is agreeable.   -Continue routine screening yearly OCT exam, already has ophthalmologist that she is established with    #CKD 3a  -continues to follow with nephrology  -note renal function as we dose her systemic immunosuppression    Follow-up with me in 5 months time    Greg Clements MD  Rheumatology      Subjective:   Interval history 10/19/2023  - Continues on hydroxychloroquine 200 mg twice daily  - Had recent flare of inflammatory arthritis and was prescribed a tapering course of prednisone 15 mg daily for 7 days then 10 mg daily for 7 days then 5 mg daily for 7 days then stop.  She also required use of Tylenol during that time  -The patient reports that the prednisone prescribed has been effective, allowing her to walk more comfortably. She experiences intermittent joint pain, predominantly on the right side, affecting her thumb, wrist, and elbow. The patient has been performing exercises for her thumb and other affected joints. She mentions that her shoulder pain is more difficult to manage and has considered getting a cortisone shot but has not yet done so due to the pain subsiding before appointments.    The patient has been receiving acupuncture for her back pain, which she finds helpful.  She undergoes acupuncture sessions once a week. To manage morning stiffness, she performs stretches and strengthening exercises before getting out of bed. She also experiences pain in the late afternoon, which she manages by resting for half an hour and taking eight-hour Tylenol.    Regarding her skin, the patient reports no significant issues, but she has noticed dark spots on her ankle and has scheduled an appointment with a dermatologist in January. She has been diligent in using appropriate shampoos and plans to ask the dermatologist about non-alcoholic and non-fragrant products.    The patient denies experiencing any fevers, chills, or night sweats. She reports stable weight, although she has attempted to gain weight by consuming high-fat dairy products. However, she has become lactose intolerant and has stopped trying to gain weight. She has tried non-dairy shakes high in calories and protein.    She also mentions taking pilocarpine once daily at night and keeping water by her bed to manage dryness.    14 point review of systems collected and negative if not documented above.    Interval history 6/29/23  -July 7th acupuncture appointment. Trying this prior to her injection which has been recommended  -has continued on HCQ 200mg once daily  -Her joint pains come and go. Had right shoulder/elbow/wrist pain at different times. Self resolves within 2-3 weeks.   -Has appointent with hand OT in 2-3 weeks. Will be asking for brace for the wrist.   -Takes 2, 8 hour tylenol in the AM and 2 at night  -sitting for long periods is painful/difficult  -also with neck pain  -cannot eat large meals. Trying to eat high calorie dense meals. Trying to gain weight. She eats about 1200 calories per day. She is trying to bump it up to 1700 calories per day, but has not bumped up to this yet. She intermittently has 1700 and then back to   -Able to make a full fist upon waking in the AM  -denies red/hot/swollen joints.   -no new  progressive fevers/chills/night sweats  -no interval infections  -has been tolerating HCQ well without noticeable side effects, GI cutaneous or other.  14 point ROS collected and negative if not documented above    March 30, 2023  -continues to lose weight, unintentional. Despite eating as much as possible of calorie dense foods, unable to stop weight loss and unable to gain  -had low back injection one week ago, not too much helpful. Rates the pain 3-4/10. Before the injection rates the pain 6/10  -No fevers/chills/night sweats  -Saw ortho for her right shoulder, found to have rotator cuff pathology. Not secondary to active inflammatory arthritis etiology  -No red/hot/swollen joints.  -no prolonged EMS  -Does not find that her methotrexate is causing specific GI upset/nausea around the time of her once weekly dose. She is currently taking 10mg once weekly. Has also continued on folic acid 1mg once daily  14 point ROS collected and negative if not documented above     Interval History 5/19/22  Rash is continuing to improve. She changed/soap/fragrance. She had cortisone injection into her back 2 days ago, unable to sleep for 2 nights after that but now resolved and has notices much improvement. Today got up, did some cooking. Energy level improved. Has started on methotrexate and without any side effects. Started this on 4/23/22. Takes this each week on Monday evenings. No GI or other side effects. Also takes folic acid 1mg daily. Currently takes 10mg weekly and 1mg folic acid daily. No fatigue. Fevers/chills/night sweats. No infections since last visit. No joint pain. No red/hot/swollen joints. Minimal AM stiffness lasting for only a few minutes. She is looking forward to her upcoming trip to [place] with her family. No unintentional weight loss. 14 point ROS collected and negative.         Interval history 2/10/22  Did not notice any return or worsening of inflammatory arthritis with transition from methotrexate to  Plaquenil.  Reports less than 5 minutes of early morning stiffness.  No red hot swollen joints.  Rates her pain at less than a 2 out of 10.  No rash, headache, change in vision, GI upset.  No interval infection.  Continues to have ongoing left greater than right intermittent hip pain which is worse when lying on that side.  She continues to be active with frequent walking almost daily weather permitting.  Her hip pain is there a few days per month.  Resolves without intervention.  Had follow-up with GI on 01/12/2022.  They plan for repeat colonoscopy in 6 to 12 months.  She was put on a course of prednisone after a visit with sports medicine for her established lumbar disc disease and likely exacerbation.  She had a positive response of this for her axial pain.  She did not note much difference in regards to her peripheral arthritis symptoms as they had been inactive prior to starting this prednisone course.  Review of systems otherwise negative.    History from initial consultation in 10/2021  Patient with a diagnosis of seropositive rheumatoid arthritis and secondary Sjogren's syndrome on methotrexate 10 mg weekly, folic acid 2 mg daily and Salagen 5 mg each evening presents to rheumatology to discuss other therapies for her inflammatory arthritis in the setting of chronic macrocytic anemia and mild leukopenia.  She reports that overall her joint symptoms of peripheral joints have been well controlled even with her reduction of methotrexate dose from 20 mg weekly earlier this year down to 10 mg weekly due to the above concern.  She has not had worsening of joint pain, stiffness or any return of erythema edema or warmth.  She is able to make full fist upon waking in the morning.  Denies any early morning stiffness lasting more than 3 to 5 minutes.  Denies any fevers chills.  She was also previously taking Celebrex though in the context of her recurrent diverticular bleeds this was discontinued earlier this year as  well.  No worsening after this discontinuation either.  Has mild dry eyes and sees an ophthalmologist yearly.  She does have dry mouth and finds that the Salagen has been helpful.  She denies any swelling of her parotid glands or submandibular glands.  She drinks water frequently including overnight when needed.  Denies recurrent/progressive dental disease.  Sees a dentist regularly.  No new rash.  No fevers or chills.  No recurrent infection.  Her weight is now stable though she initially did have some weight loss earlier this year with her diverticular disease and change in diet.  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  No history of DVT.  No hair loss.  No inflammatory eye disease history.  No oral or nasal ulcers.  No recurrent epistaxes.  No photosensitive rash.  No chest pain or shortness of breath.  No change in strength or sensation in arms or legs.  On complaint at this time is ongoing lumbar spine pain for which she had previously seen orthospine.  Previously had injections which were helpful though the most recent was not.    Past Medical History  Past Medical History:   Diagnosis Date    Anemia     CKD (chronic kidney disease) stage 3, GFR 30-59 ml/min (H)     Diverticulosis of large intestine     Osteoarthritis     Osteoporosis     Rheumatoid arthritis (H)     Sensorineural hearing loss      Past Surgical History  Past Surgical History:   Procedure Laterality Date    CAPSULE/PILL CAM ENDOSCOPY N/A 11/18/2021    Procedure: IMAGING PROCEDURE, GI TRACT, INTRALUMINAL, VIA CAPSULE;  Surgeon: Deric Rodriguez MD;  Location:  GI    CAPSULE/PILL CAM ENDOSCOPY N/A 2/14/2023    Procedure: IMAGING PROCEDURE, GI TRACT, INTRALUMINAL, VIA CAPSULE;  Surgeon: Jaime Mesa MD;  Location: [facility]    COLONOSCOPY N/A 03/14/2017    Procedure: COMBINED COLONOSCOPY, SINGLE OR MULTIPLE BIOPSY/POLYPECTOMY BY BIOPSY;   Surgeon: Spencer Downey MD;  Location: [facility]    COLONOSCOPY N/A 9/22/2022    Procedure: COLONOSCOPY, FLEXIBLE, WITH LESION REMOVAL USING SNARE;  Surgeon: Kirby Arthur MD;  Location:  GI    COLONOSCOPY N/A 1/13/2023    Procedure: COLONOSCOPY;  Surgeon: Spencer Downey MD;  Location: [facility] OR    COLONOSCOPY N/A 8/14/2023    Procedure: Colonoscopy;  Surgeon: Spencer Downey MD;  Location: [facility]    ENTEROSCOPY SMALL BOWEL N/A 11/20/2021    Procedure: ENTEROSCOPY, colonoscopy with endoscopic mucosal resection and tattoo placement;  Surgeon: Kirby Arthur MD;  Location: [facility] OR    ESOPHAGOSCOPY, GASTROSCOPY, DUODENOSCOPY (EGD), COMBINED N/A 2/6/2023    Procedure: ESOPHAGOGASTRODUODENOSCOPY (EGD);  Surgeon: Spencer Downey MD;  Location: [facility]    IR VISCERAL EMBOLIZATION  01/22/2021    ORTHOPEDIC SURGERY Left     hip replacment    SIGMOIDOSCOPY FLEXIBLE N/A 01/23/2021    Procedure: SIGMOIDOSCOPY, FLEXIBLE;  Surgeon: Jaime Steinberg MD;  Location:  GI     Medications  Current Outpatient Medications   Medication    acetaminophen (TYLENOL) 650 MG CR tablet    calcium carbonate-vitamin D (CALTRATE) 600-10 MG-MCG per tablet    famotidine (PEPCID) 20 MG tablet    folic acid (FOLVITE) 1 MG tablet    gabapentin (NEURONTIN) 300 MG capsule    hydroxychloroquine (PLAQUENIL) 200 MG tablet    lifitegrast (XIIDRA) 5 % opthalmic solution    Multiple Vitamins-Minerals (OCUVITE PRESERVISION PO)    omeprazole (PRILOSEC) 40 MG DR capsule    pilocarpine (SALAGEN) 5 MG tablet     No current facility-administered medications for this visit.       Allergies  Allergies   Allergen Reactions    Bee Venom Anaphylaxis    Shrimp Anaphylaxis    Evoxac [Cevimeline] Unknown    Prevnar Swelling     Other reaction(s): Edema    Prevnar [Pneumococcal 13-Linda Conj Vacc] Unknown       Family History: No family hx of autoimmune disease    Social History: No alcohol, drug use, smoking history.       Objective:   /76 (BP Location:  Right arm, Patient Position: Sitting, Cuff Size: Adult Small)   Pulse 68   Temp 97.6  F (36.4  C) (Oral)   Wt 43 kg (94 lb 12.8 oz)   SpO2 99%   BMI 16.27 kg/m    GEN: sitting up unassisted NAD, pleasant and interactive as always  HEENT: no facial rash, sclera appear clear  Breathing comfortably on RA, no audible wheeze, no cough. No use of accessory muscles  - Right hand: No inflammation in MCPs and PIPs; limited range of motion in the right wrist with some pain and discomfort; tenderness in the right elbow  - Left hand: No inflammation in MCPs and PIPs; better range of motion in the left wrist compared to the right; tenderness in the left elbow  - Shoulders: No pain or discomfort during range of motion exercises (putting hands behind the head and in the back pocket)  Skin: no acute cutaneous lesions appreciated on exposed skin      WBC   Date Value Ref Range Status   01/20/2021 5.0 4.0 - 11.0 10e9/L Final     WBC Count   Date Value Ref Range Status   10/04/2023 4.0 4.0 - 11.0 10e3/uL Final     Hemoglobin   Date Value Ref Range Status   10/04/2023 10.7 (L) 11.7 - 15.7 g/dL Final   01/25/2021 8.0 (L) 11.7 - 15.7 g/dL Final     Platelet Count   Date Value Ref Range Status   10/04/2023 241 150 - 450 10e3/uL Final   01/20/2021 207 150 - 450 10e9/L Final     Creatinine   Date Value Ref Range Status   10/04/2023 1.17 (H) 0.51 - 0.95 mg/dL Final   01/23/2021 0.75 0.52 - 1.04 mg/dL Final     Lab Results   Component Value Date    ALKPHOS 68 05/05/2022    ALKPHOS 77 03/13/2017     AST   Date Value Ref Range Status   10/04/2023 27 0 - 45 U/L Final     Comment:     Reference intervals for this test were updated on 6/12/2023 to more accurately reflect our healthy population. There may be differences in the flagging of prior results with similar values performed with this method. Interpretation of those prior results can be made in the context of the updated reference intervals.   03/13/2017 10 0 - 45 U/L Final     Lab  Results   Component Value Date    ALT 22 05/05/2022    ALT 12 03/13/2017     Sed Rate   Date Value Ref Range Status   03/13/2017 63 (H) 0 - 30 mm/h Final     Erythrocyte Sedimentation Rate   Date Value Ref Range Status   10/04/2023 48 (H) 0 - 30 mm/hr Final     CRP Inflammation   Date Value Ref Range Status   08/26/2022 <2.9 0.0 - 8.0 mg/L Final   03/13/2017 10.0 (H) 0.0 - 8.0 mg/L Final     UA RESULTS:  Recent Labs   Lab Test 03/10/22  1420   COLOR Yellow   APPEARANCE Clear   URINEGLC Negative   URINEBILI Negative   URINEKETONE Negative   SG 1.010   UBLD Negative   URINEPH 7.0   PROTEIN Negative   NITRITE Negative   LEUKEST Negative   RBCU 2   WBCU <1      Rheumatoid Factor   Date Value Ref Range Status   01/03/2022 8 <12 IU/mL Final     Cyclic Citrullinated Peptide Antibody IgG   Date Value Ref Range Status   01/03/2022 3.6 <7.0 U/mL Final     Comment:     Negative     Imaging:   No peripheral x-rays to review in our system

## 2023-10-29 NOTE — PROGRESS NOTES
[facility]    Hematology/Oncology Established Patient Follow-up Note      Today's Date: 11/9/2023    Reason for Follow-up:  Iron deficiency anemia.    HISTORY OF PRESENT ILLNESS: Patrick Olson is an 82 year old female with rheumatoid arthritis on methotrexate who presents with iron deficiency anemia. She has history of gastric AVM, small bowel AVM, cecal polyp, duodenal erosions, and diverticular hemorrhage.    She had an EGD, colonoscopy, and VCE in 1/2023 that showed duodenal erosioons, small bowel angiectasia, and diverticulosis without bleeding. She was started on omeprazole and is following with GI at [facility].    2/7/2023 Labs showed Hgb 11.1, WBC 3.2, platelets 247,000, creatinine 1.03, vitamin B12 elevated at 1434. 3/7/2023 Ferritin was normal at 96, iron elevated at 163, TIBC 283, iron saturation index elevated at 58. 3/27/2023 TSH normal at 1.13.      INTERIM HISTORY:  Patrick reports hospitalization 8/12/2023 for diverticular bleed and acute on chronic anemia. She was able to travel to [place] in 9/2023 without any issues.      REVIEW OF SYSTEMS:   14 point ROS was reviewed and is negative other than as noted above in HPI.       HOME MEDICATIONS:  Current Outpatient Medications   Medication Sig Dispense Refill    acetaminophen (TYLENOL) 650 MG CR tablet Take 650 mg by mouth every 8 hours as needed for fever or pain Taking 2 - 650 mg tablets every 8 hrs.      calcium carbonate-vitamin D (CALTRATE) 600-10 MG-MCG per tablet Take 1 tablet by mouth daily      famotidine (PEPCID) 20 MG tablet Take 20 mg by mouth At Bedtime      folic acid (FOLVITE) 1 MG tablet Take 1 tablet (1 mg) by mouth daily 90 tablet 1    gabapentin (NEURONTIN) 300 MG capsule Take 1 capsule (300 mg) by mouth 3 times daily Take 300mg in morning, 300mg in afternoon and 300mg 90 capsule 1    hydroxychloroquine (PLAQUENIL) 200 MG tablet Take 1 tablet (200 mg) by mouth daily Annual Plaquenil toxicity eye screening required. 90  tablet 1    hydroxychloroquine (PLAQUENIL) 200 MG tablet Take 1 tablet (200 mg) by mouth daily 90 tablet 1    lifitegrast (XIIDRA) 5 % opthalmic solution 1 drop 2 times daily      Multiple Vitamins-Minerals (OCUVITE PRESERVISION PO) Take 1 tablet by mouth 2 times daily      omeprazole (PRILOSEC) 40 MG DR capsule Take 1 capsule (40 mg) by mouth daily 90 capsule 3    pilocarpine (SALAGEN) 5 MG tablet Take 1 tablet (5 mg) by mouth daily 90 tablet 1         ALLERGIES:  Allergies   Allergen Reactions    Bee Venom Anaphylaxis    Shrimp Anaphylaxis    Evoxac [Cevimeline] Unknown    Prevnar Swelling     Other reaction(s): Edema    Prevnar [Pneumococcal 13-Linda Conj Vacc] Unknown         PAST MEDICAL HISTORY:  Past Medical History:   Diagnosis Date    Anemia     CKD (chronic kidney disease) stage 3, GFR 30-59 ml/min (H)     Diverticulosis of large intestine     Osteoarthritis     Osteoporosis     Rheumatoid arthritis (H)     Sensorineural hearing loss          PAST SURGICAL HISTORY:  Past Surgical History:   Procedure Laterality Date    CAPSULE/PILL CAM ENDOSCOPY N/A 11/18/2021    Procedure: IMAGING PROCEDURE, GI TRACT, INTRALUMINAL, VIA CAPSULE;  Surgeon: Deric Rodriguez MD;  Location:  GI    CAPSULE/PILL CAM ENDOSCOPY N/A 2/14/2023    Procedure: IMAGING PROCEDURE, GI TRACT, INTRALUMINAL, VIA CAPSULE;  Surgeon: Jaime Mesa MD;  Location:  GI    COLONOSCOPY N/A 03/14/2017    Procedure: COMBINED COLONOSCOPY, SINGLE OR MULTIPLE BIOPSY/POLYPECTOMY BY BIOPSY;  Surgeon: Spencer Downey MD;  Location:  GI    COLONOSCOPY N/A 9/22/2022    Procedure: COLONOSCOPY, FLEXIBLE, WITH LESION REMOVAL USING SNARE;  Surgeon: Kirby Arthur MD;  Location:  GI    COLONOSCOPY N/A 1/13/2023    Procedure: COLONOSCOPY;  Surgeon: Spencer Downey MD;  Location: [facility] OR    COLONOSCOPY N/A 8/14/2023    Procedure: Colonoscopy;  Surgeon: Spencer Downey MD;  Location:  GI    ENTEROSCOPY SMALL BOWEL N/A 11/20/2021     Procedure: ENTEROSCOPY, colonoscopy with endoscopic mucosal resection and tattoo placement;  Surgeon: Kirby Arthur MD;  Location: [facility] OR    ESOPHAGOSCOPY, GASTROSCOPY, DUODENOSCOPY (EGD), COMBINED N/A 2/6/2023    Procedure: ESOPHAGOGASTRODUODENOSCOPY (EGD);  Surgeon: Spencer Downey MD;  Location: [facility] GI    IR VISCERAL EMBOLIZATION  01/22/2021    ORTHOPEDIC SURGERY Left     hip replacment    SIGMOIDOSCOPY FLEXIBLE N/A 01/23/2021    Procedure: SIGMOIDOSCOPY, FLEXIBLE;  Surgeon: Jaime Steinberg MD;  Location:  GI         SOCIAL HISTORY:  Social History     Socioeconomic History    Marital status:      Spouse name: Not on file    Number of children: Not on file    Years of education: Not on file    Highest education level: Not on file   Occupational History    Not on file   Tobacco Use    Smoking status: Former    Smokeless tobacco: Never   Vaping Use    Vaping Use: Never used   Substance and Sexual Activity    Alcohol use: No    Drug use: No    Sexual activity: Not on file   Other Topics Concern    Not on file   Social History Narrative    - Retired (formerly employed as  at [employer])    - 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 No pertinent family history.      PHYSICAL EXAM:  Vital signs:  /58   Pulse 72   Temp 97.7  F (36.5  C) (Oral)   Resp 18   Wt 42.6 kg (94 lb)   SpO2 100%   BMI 16.14  kg/m     GENERAL: No acute distress.  EYES: No scleral icterus. No overt erythema.  RESPIRATORY: No audible cough, wheezing, or labored breathing.  MUSCULOSKELETAL: Range of motion in the neck, shoulders, and arms appear normal.  SKIN: No overt rashes, discolorations, or lesions over the face and neck.  NEUROLOGIC: Alert.  No overt tremors.  PSYCHIATRIC: Normal affect and mood.  Does not appear anxious.      LABS:  CBC RESULTS:   Recent Labs   Lab Test 10/04/23  1320   WBC 4.0   RBC 3.27*   HGB 10.7*   HCT 33.2*   *   MCH 32.7   MCHC 32.2   RDW 13.7        Last Comprehensive Metabolic Panel:  Sodium   Date Value Ref Range Status   09/01/2023 142 136 - 145 mmol/L Final   01/23/2021 143 133 - 144 mmol/L Final     Potassium   Date Value Ref Range Status   09/01/2023 5.3 3.4 - 5.3 mmol/L Final   09/29/2022 4.7 3.4 - 5.3 mmol/L Final   01/23/2021 3.6 3.4 - 5.3 mmol/L Final     Chloride   Date Value Ref Range Status   09/01/2023 106 98 - 107 mmol/L Final   01/23/2021 114 (H) 94 - 109 mmol/L Final     Chloride (External)   Date Value Ref Range Status   10/24/2022 104 94 - 109 mmol/L Final     Carbon Dioxide   Date Value Ref Range Status   01/23/2021 26 20 - 32 mmol/L Final     Carbon Dioxide (CO2)   Date Value Ref Range Status   09/01/2023 27 22 - 29 mmol/L Final   09/29/2022 29 20 - 32 mmol/L Final     Anion Gap   Date Value Ref Range Status   09/01/2023 9 7 - 15 mmol/L Final   09/29/2022 3 3 - 14 mmol/L Final   01/23/2021 3 3 - 14 mmol/L Final     Glucose   Date Value Ref Range Status   09/01/2023 98 70 - 99 mg/dL Final   09/29/2022 100 (H) 70 - 99 mg/dL Final   01/23/2021 157 (H) 70 - 99 mg/dL Final     Urea Nitrogen   Date Value Ref Range Status   09/01/2023 13.0 8.0 - 23.0 mg/dL Final   09/29/2022 21 7 - 30 mg/dL Final   01/23/2021 6 (L) 7 - 30 mg/dL Final     Creatinine   Date Value Ref Range Status   10/04/2023 1.17 (H) 0.51 - 0.95 mg/dL Final   01/23/2021 0.75 0.52 - 1.04 mg/dL Final     GFR Estimate    Date Value Ref Range Status   10/04/2023 47 (L) >60 mL/min/1.73m2 Final   01/23/2021 76 >60 mL/min/[1.73_m2] Final     Comment:     Non  GFR Calc  Starting 12/18/2018, serum creatinine based estimated GFR (eGFR) will be   calculated using the Chronic Kidney Disease Epidemiology Collaboration   (CKD-EPI) equation.       Calcium   Date Value Ref Range Status   09/01/2023 9.9 8.8 - 10.2 mg/dL Final   01/23/2021 8.0 (L) 8.5 - 10.1 mg/dL Final     Bilirubin Total   Date Value Ref Range Status   08/13/2023 0.4 <=1.2 mg/dL Final   03/13/2017 0.5 0.2 - 1.3 mg/dL Final     Alkaline Phosphatase   Date Value Ref Range Status   08/13/2023 67 35 - 104 U/L Final   03/13/2017 77 40 - 150 U/L Final     ALT   Date Value Ref Range Status   10/04/2023 15 0 - 50 U/L Final     Comment:     Reference intervals for this test were updated on 6/12/2023 to more accurately reflect our healthy population. There may be differences in the flagging of prior results with similar values performed with this method. Interpretation of those prior results can be made in the context of the updated reference intervals.     03/13/2017 12 0 - 50 U/L Final     AST   Date Value Ref Range Status   10/04/2023 27 0 - 45 U/L Final     Comment:     Reference intervals for this test were updated on 6/12/2023 to more accurately reflect our healthy population. There may be differences in the flagging of prior results with similar values performed with this method. Interpretation of those prior results can be made in the context of the updated reference intervals.   03/13/2017 10 0 - 45 U/L Final     Ferritin   Date Value Ref Range Status   10/04/2023 42 11 - 328 ng/mL Final     Iron   Date Value Ref Range Status   10/04/2023 45 37 - 145 ug/dL Final     Iron Binding Capacity   Date Value Ref Range Status   10/04/2023 290 240 - 430 ug/dL Final   03/10/2022 296 240 - 430 ug/dL Final       10/4/2023  Sed rate elevated at 48  CRP  undetectable.    PATHOLOGY:  None relevant.    IMAGING:  None relevant.    ASSESSMENT/PLAN:  Patrick Olson is a 82 year old female with the following issues:  1. Macrocytic anemia due to iron deficiency/blood loss, drug effect, of chronic disease  2. History of gastric and small bowel AVM, duodenal erosions, diverticular hemorrhage  3. Chronic leukopenia  --Discussed with Patrick her anemia is of multifactorial etiology, including blood loss, iron deficiency, drug effect from Plaquenil, and anemia of chronic disease.  --Discussed her 10/4/2023 labs showed Hgb improved from 8.6  to 10.7, still macrocytic anemia.  Her sed rate is slightly elevated but CRP normal. Prior SPEP showed no monoclonal protein. Iron studies are currently normal so she does not need to take oral iron supplement at this time.  --She will continue with intermittent GI follow-up. She had colonoscopy 8/2023 that showed multiple diverticula in colon, residual blood; no active bleeding.  --She will continue to watch closely for hematochezia or melena.  --No indication for bone marrow biopsy unless significant change in her blood counts.  --Discussed her Hgb is too high to meet criteria for giving an MANDY.    4. Rheumatoid arthritis  --She is no longer on methotrexate and now on Plaquenil.  She follows with rheumatology and will revisit at end of June.    5. Weight loss  --She feels she has normal appetite.  --Lost 10 pounds since 10/2022.  --Could be related to her chronic diseases.  --She will see nutritionist.    Return in 1 year.    Shantal Brown MD   Hematology/Oncology  [facility]    Total time spent today: 30 minutes in chart review, patient evaluation, counseling, documentation, test and/or medication/prescription orders, and coordination of care.

## 2023-10-31 ENCOUNTER — TELEPHONE (OUTPATIENT)
Dept: RHEUMATOLOGY | Facility: CLINIC | Age: 83
End: 2023-10-31
Payer: COMMERCIAL

## 2023-10-31 NOTE — TELEPHONE ENCOUNTER
Left Voicemail (1st Attempt) and Sent Mychart (1st Attempt) for the patient to call back and schedule the following:    Appointment type: 5 month return follow up  Provider: Dr. Clements  Return date: March 2024  Specialty phone number: [phone]  Additional appointment(s) needed: Labs week prior to appt  Additonal Notes: NA

## 2023-11-01 ENCOUNTER — VIRTUAL VISIT (OUTPATIENT)
Dept: GASTROENTEROLOGY | Facility: CLINIC | Age: 83
End: 2023-11-01
Payer: COMMERCIAL

## 2023-11-01 ENCOUNTER — TELEPHONE (OUTPATIENT)
Dept: GASTROENTEROLOGY | Facility: CLINIC | Age: 83
End: 2023-11-01

## 2023-11-01 DIAGNOSIS — K26.9 DUODENAL ULCER WITHOUT HEMORRHAGE OR PERFORATION AND WITHOUT OBSTRUCTION: ICD-10-CM

## 2023-11-01 DIAGNOSIS — R10.13 DYSPEPSIA: ICD-10-CM

## 2023-11-01 DIAGNOSIS — K57.31 DIVERTICULOSIS OF LARGE INTESTINE WITH HEMORRHAGE: Primary | ICD-10-CM

## 2023-11-01 DIAGNOSIS — K92.1 HEMATOCHEZIA: ICD-10-CM

## 2023-11-01 PROCEDURE — 99417 PROLNG OP E/M EACH 15 MIN: CPT | Mod: VID | Performed by: DIETITIAN, REGISTERED

## 2023-11-01 PROCEDURE — 99215 OFFICE O/P EST HI 40 MIN: CPT | Mod: VID | Performed by: DIETITIAN, REGISTERED

## 2023-11-01 NOTE — NURSING NOTE
Is the patient currently in the state of [place]? YES    Visit mode:VIDEO    If the visit is dropped, the patient can be reconnected by: VIDEO VISIT: Text to cell phone:   Telephone Information:   Mobile [phone]       Will anyone else be joining the visit? NO  (If patient encounters technical issues they should call [phone]    How would you like to obtain your AVS? MyChart    Are changes needed to the allergy or medication list? No    Reason for visit: RECHECK    Fernandez CHARLES

## 2023-11-01 NOTE — PROGRESS NOTES
GI CLINIC VISIT    CC/REFERRING MD:  Essence Tamayo  REASON FOR CONSULTATION:   Ms. Olson is a 81 year old female who I was asked to see in consultation at the request of Dr. Essence Tamayo for gastric AVM.     ASSESSMENT/PLAN:  #Gastric AVM , small bowel AVM, Cecal polyp, duodenal erosions  #Diverticular hemorrhage  #Hemorrhoids, unspecified hemorrhoid type  #Drug-induced constipation  #Dyspepsia    GI bleeding -as noted in previous documentation her clinical presentation has varied over time so suspect there is not one  of these bleeds (large polyp bleeding, diverticular, angioectasias, potentially duodenal erosions, potentially internal hemorrhoids). Work up in January 2023 with EGD, colonoscopy, and VCE revealed duodenal erosions, small bowel angiectasia, and diverticulosis without bleeding. Given darker blood and no evidence of bleeding at diverticula shortly after bleeding onset, UGI angioectasia bleed supsected, though noted duodenal erosions could contribute as well. Now s/p treatment with 3 months PPI (for duodenal erosions and dyspepsia). Most recent episode with bright red blood most suspicious for diverticular bleed, no active bleeding identified during colonoscopy.     Certainly diverticular bleeding and AVM bleeding could recur, we have discussed when to present to the ED for further evaluation. Given finding of prior AVM, there will likely be additional AVMs. This should be addressed with periodic hgb checks and iron replacement as necessary (oral or infusions). Iron replacement per hematology and primary teams. Reassuring that hemoglobin is trending up and ferritin adequate on recent lab check last month. She is following up with hematology this month. Ablative therapies (EGD, colonoscopy, and deep SB enteroscopy with use of APC) would not be appropriate unless there is active overt (visible) bleeding.  Disease-modifying therapies (SQ octreotide) would be considered in those patients  refractory to the above measures, generally with repeated hospitalizations.      Dyspepsia has improved, no longer having symptoms. She has restarted taking omeprazole, though did not have symptoms off it. We will discontinue omeprazole (ok to go to every other day for 1-2 weeks then stop) and continue restart famotidine. If symptoms recur can restart omeprazole.    It seems she is likely lactose intolerance based on recent symptoms  of gas and bloating with dairy intake, resolved when stopping. She should continue to avoid lactose containing products, utilize lactose free dairy, and use lactaid (lactase) enzyme with meals that contain dairy. Can take 1-2 tablets with a meal.    For her bowel pattern, recommend trying Miralax 2-3 times per week given some harder stool and occasional need to strain. She should continue higher fiber diet as well as this is helping stool consistency. No current prolapse, rectal pain, obstructive defecation symptoms etc.     Plan:  -- Hgb checks and iron replacement per hematology and primary teams  -- start omeprazole, take for 12 weeks 30-60 minutes before eating or drinking  -- can continue famotidine up to twice daily  -- visit with registered dietitian regarding weight loss and getting fiber for constipation  -- continue good hydration  -- continue your walking and exercises  -- continue Miralax - ok to adjust dose from 1/2 capful - 2 capfuls daily, goal is at least three satisfactory BMs a week and no more than three a day with minimizing of constipation symptoms in-between      RTC 3 months with Dr. Martinez (scheduled)    Thank you for this consultation. It was a pleasure to participate in the care of this patient; please contact us with any further questions.      70 Minutes was spent on the date of the encounter during chart review, history and exam, documentation, and further activities as noted     Belen Delacruz PA-C  Division of Gastroenterology, Hepatology &  "Nutrition  [facility]    ---------------------------------------------------------------------------------------------------  HPI:    Ms. Olson is an 82 year old female with RA (stopped methotrexate March 2023, now on hydroxycholoroquine) and secondary Sjogren's, lumbar degenerative disc disease, chronic lumbago without radiculopathy, more recent right shoulder pain, OA, osteoporosis, and recurrent GI bleeding (AVM, possible diverticular, polyp-related --> see below) who was originally referred to GI clinic due to her bleeding history as well as early satiety with dyspepsia. She has previously followed with [facility] ([facility]) for her GI care (since 2017 per her report) and has undergone endoscopic exams at [facility], [facility], and [facility] since 2010. She has been seen by multiple providers with our group including Dr. Martinez, myself, and most recently Dr. Gatica.    Dating back to at least 2010 Ms. Olson has experienced recurrent overt GI bleeding with anemia.  She underwent inpatient colonoscopic exams with both diverticulosis and hemorrhoids on these exams. Diverticular bleeding had been felt to be a contributor given her clinical presentation, though no active bleeding lesion was ever identified.      She had EGD and colonoscopy 5/26/21 at [facility] for anemia. EGD revealed a non-bleeding gastric AVM which was treated with APC. Colonoscopy demonstrated diverticulosis and small hemorrhoids. Ms. Olson was referred to GI given AVM and anemia, but prior to the appointment was admitted to [facility] with hematochezia (per patient this was \"a bit darker\" than her prior bleeding episodes). A CTA did not demonstrate any active extravasation. Thus a capsule was done with bleeding seen in the distal ileum to cecum on prelim capsule read. Colonoscopy with ileal intubation  (20 cm beyond the IC valve) was done thereafter with no active bleeding found, but a very " large 30 mm polyp was found on the IC valve and removed via EMR. This was felt to be a likely source of bleeding. No other findings were seen in the area of bleeding noted on capsule endoscopy.      She then had recurrent bleeding in January. She underwent colonoscopy 1/13/23 which showed no inflammation but residual specks of blood throughout bowel (to 30 cm to TI), extensive diverticulosis with no active bleeding. EGD was subsequently completed 2/6/23 which showed duodenal erosion without bleeding, normal stomach and esophagus. VCE the following week 2/14/23 showed multiple localized erosions with no bleeding in the duodenum (likely first portion), single small angioectasia without bleeding was seen in the small bowel (probable duodenum), and lymphangiectasia in the small bowel (mid small bowel), one of these was peduncluated and polypoid, not bleeding, benign appearing.  A single spot with no bleeding was found in the colon (probable cecum). She completed 12 week course of omeprazole (while off of methotrexate), starting March for dyspepsia and finding of duodenal erosions and reported dyspepsia.    She again had several days of hematochezia that prompted hospitalization in August. She was hospitalized 8/12/23-8/14/23 at [facility], Hgb with mild decrease to 9's, she remained hemodynamically stable. She underwent colonoscopy which showed diverticulosis throughout the colon, no active bleeding noted. Examined ileum was normal. Mild internal hemorrhoids felt not to be source of bleed. Per inpatient assessment, diverticular bleeding most likely reason for repeated episodes of hematochezia.     She has been evaluated by both her PCP and hematology for anemia, which has been attributed to GI blood loss, anemia of chronic disease, and medication effect. .I the setting of GI bleeding she was given IV iron infusions which she has since completed, has taken PO iron supplementation in the past.    -------  Today  11/1/23:    Today patient reports no recurrent bleeding since August. She reports that in August bleeding started while on a local trip, was taking Miralax daily, she had episode of 4 bowel movements in one day and noticed blood in the fourth stool. Bright red, not as much as prior episodes to fully discolor toilet water.  Persisted for about two weeks total and then resolved.    She continues to follow with hematology for her anemia. Has appointment coming up this month. Most recent hemoglobin  10.7 (10/4/23), . Ferritin 42. She is not currently taking any iron.    History of constipation, made significant with iron use. Currently she is having 2-3 stools per day. Stools are formed, start a little harder then become softer ([place] 3-5). She often does not feel empty with first stool, does after second or third. Tries not to strain, if she feels she needs to strain will take a capful of Miralax - currently taking about 1 capful per week. She has been eating more vegetables and fiber and this has helped keep her stool softer.    Ms. Olson has also has history of dyspepsia with postprandial discomfort. This has now essentially resolved with course of omeprazole, famotidine, and dietary changes including increasing fiber and fluids, and not going too long without food. She notes that she restarted omeprazole prior to her [place] trip in September as she did not want to be in discomfort there - continues on omeprazole now. Does not think she had return of dyspepsia when she was off omeprazole previously this year. Open to stopping and restarting famotidine.    She does note that she believes she is lactose intolerant. While working with dietitian to assist with weight gain, she was recommended to use more high fat dairy products. She noted abdominal discomfort, gas, and some looser stools with this - when she excluded lactose products or took lactaid she does not have an issue. She has started to purchase only  lactose free dairy products and takes lactaid pill if out and having something with diary - this is working well.    Weight overall stable, still having trouble gaining. Trying to snack more and eat more nutrient dense foods. Happy that she is no longer losing weight.    ROS:    + Dry mouth  No fevers or chills  No weight loss  No blurry vision, double vision or change in vision  No sore throat  No lymphadenopathy  No headache, paraesthesias, or weakness in a limb  No shortness of breath or wheezing  No chest pain or pressure  No arthralgias or myalgias  No rashes or skin changes  No odynophagia or dysphagia  No BRBPR, hematochezia, melena  No dysuria, frequency or urgency  No hot/cold intolerance or polyria  No anxiety or depression      PROBLEM LIST  Patient Active Problem List    Diagnosis Date Noted    Lower GI bleed 08/12/2023     Priority: Medium    Chronic pain of right elbow 07/11/2023     Priority: Medium    Right wrist pain 07/11/2023     Priority: Medium    Lab test negative for COVID-19 virus 11/18/2021     Priority: Medium    Diverticulosis of large intestine      Priority: Medium    Acute lower GI bleeding 01/21/2021     Priority: Medium    Diverticular hemorrhage 01/20/2021     Priority: Medium    Gastrointestinal hemorrhage, unspecified gastrointestinal hemorrhage type 01/19/2021     Priority: Medium    GI bleed 03/13/2017     Priority: Medium    SNHL (sensorineural hearing loss) 11/29/2012     Priority: Medium       PERTINENT PAST MEDICAL HISTORY:  Past Medical History:   Diagnosis Date    Anemia     CKD (chronic kidney disease) stage 3, GFR 30-59 ml/min (H)     Diverticulosis of large intestine     Osteoarthritis     Osteoporosis     Rheumatoid arthritis (H)     Sensorineural hearing loss        PREVIOUS SURGERIES:  Past Surgical History:   Procedure Laterality Date    CAPSULE/PILL CAM ENDOSCOPY N/A 11/18/2021    Procedure: IMAGING PROCEDURE, GI TRACT, INTRALUMINAL, VIA CAPSULE;  Surgeon: Michael  Deric Lara MD;  Location: [facility]    CAPSULE/PILL CAM ENDOSCOPY N/A 2/14/2023    Procedure: IMAGING PROCEDURE, GI TRACT, INTRALUMINAL, VIA CAPSULE;  Surgeon: Jaime Mesa MD;  Location: [facility]    COLONOSCOPY N/A 03/14/2017    Procedure: COMBINED COLONOSCOPY, SINGLE OR MULTIPLE BIOPSY/POLYPECTOMY BY BIOPSY;  Surgeon: pSencer Downey MD;  Location: [facility]    COLONOSCOPY N/A 9/22/2022    Procedure: COLONOSCOPY, FLEXIBLE, WITH LESION REMOVAL USING SNARE;  Surgeon: Kirby Arthur MD;  Location:  GI    COLONOSCOPY N/A 1/13/2023    Procedure: COLONOSCOPY;  Surgeon: Spencer Downey MD;  Location: [facility]    COLONOSCOPY N/A 8/14/2023    Procedure: Colonoscopy;  Surgeon: Spencer Downey MD;  Location: [facility]    ENTEROSCOPY SMALL BOWEL N/A 11/20/2021    Procedure: ENTEROSCOPY, colonoscopy with endoscopic mucosal resection and tattoo placement;  Surgeon: Kirby Arthur MD;  Location: [facility]    ESOPHAGOSCOPY, GASTROSCOPY, DUODENOSCOPY (EGD), COMBINED N/A 2/6/2023    Procedure: ESOPHAGOGASTRODUODENOSCOPY (EGD);  Surgeon: Spencer Downey MD;  Location: [facility]    IR VISCERAL EMBOLIZATION  01/22/2021    ORTHOPEDIC SURGERY Left     hip replacment    SIGMOIDOSCOPY FLEXIBLE N/A 01/23/2021    Procedure: SIGMOIDOSCOPY, FLEXIBLE;  Surgeon: Jaime Steinberg MD;  Location:  GI       ALLERGIES:   Allergies   Allergen Reactions    Bee Venom Anaphylaxis    Shrimp Anaphylaxis    Evoxac [Cevimeline] Unknown    Prevnar Swelling     Other reaction(s): Edema    Prevnar [Pneumococcal 13-Linda Conj Vacc] Unknown       PERTINENT MEDICATIONS:  Current Outpatient Medications   Medication    acetaminophen (TYLENOL) 650 MG CR tablet    calcium carbonate-vitamin D (CALTRATE) 600-10 MG-MCG per tablet    famotidine (PEPCID) 20 MG tablet    folic acid (FOLVITE) 1 MG tablet    gabapentin (NEURONTIN) 300 MG capsule    hydroxychloroquine (PLAQUENIL) 200 MG tablet    hydroxychloroquine (PLAQUENIL) 200 MG tablet    lifitegrast (XIIDRA) 5 %  opthalmic solution    Multiple Vitamins-Minerals (OCUVITE PRESERVISION PO)    omeprazole (PRILOSEC) 40 MG DR capsule    pilocarpine (SALAGEN) 5 MG tablet     No current facility-administered medications for this visit.       SOCIAL HISTORY:  Social History     Socioeconomic History    Marital status:      Spouse name: Not on file    Number of children: Not on file    Years of education: Not on file    Highest education level: Not on file   Occupational History    Not on file   Tobacco Use    Smoking status: Former    Smokeless tobacco: Never   Vaping Use    Vaping Use: Never used   Substance and Sexual Activity    Alcohol use: No    Drug use: No    Sexual activity: Not on file   Other Topics Concern    Not on file   Social History Narrative    - Retired (formerly employed as  at [employer])    - 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    Past/family/social history reviewed and no changes    PHYSICAL EXAMINATION:  Vitals There were no vitals taken for this visit.   Wt   Wt Readings from Last 2 Encounters:   10/19/23 43 kg (94 lb 12.8 oz)   09/01/23 41.7 kg (91 lb 14.4 oz)      Gen: Pt sitting up in NAD, interactive and cooperative on exam  Eyes: sclerae anicteric, no injection  Cardiac: RRR, nl S1, S2, no peripheral edema  Resp: Clear on anterior exam  GI: Normoactive BS, abd soft,, nontender  Skin: Warm,  dry, no jaundice, nails appear healthy  Neuro: alert, oriented, answers questions appropriately      PERTINENT STUDIES:    Lab on 01/03/2022   Component Date Value Ref Range Status    Rheumatoid Factor 01/03/2022 8  <12 IU/mL Final    Sodium 01/03/2022 142  133 - 144 mmol/L Final    Potassium 01/03/2022 4.6  3.4 - 5.3 mmol/L Final    Chloride 01/03/2022 109  94 - 109 mmol/L Final    Carbon Dioxide (CO2) 01/03/2022 29  20 - 32 mmol/L Final    Anion Gap 01/03/2022 4  3 - 14 mmol/L Final    Urea Nitrogen 01/03/2022 18  7 - 30 mg/dL Final    Creatinine 01/03/2022 0.99  0.52 - 1.04 mg/dL Final    Calcium 01/03/2022 9.6  8.5 - 10.1 mg/dL Final    Glucose 01/03/2022 104* 70 - 99 mg/dL Final    Alkaline Phosphatase 01/03/2022 75  40 - 150 U/L Final    AST 01/03/2022 24  0 - 45 U/L Final    ALT 01/03/2022 24  0 - 50 U/L Final    Protein Total 01/03/2022 7.5  6.8 - 8.8 g/dL Final    Albumin 01/03/2022 3.9  3.4 - 5.0 g/dL Final    Bilirubin Total 01/03/2022 0.4  0.2 - 1.3 mg/dL Final    GFR Estimate 01/03/2022 58* >60 mL/min/1.73m2 Final    CRP Inflammation 01/03/2022 <2.9  0.0 - 8.0 mg/L Final    Cyclic Citrullinated Peptide Antib* 01/03/2022 3.6  <7.0 U/mL Final    Erythrocyte Sedimentation Rate 01/03/2022 39* 0 - 30 mm/hr Final    WBC Count 01/03/2022 3.9* 4.0 - 11.0 10e3/uL Final    RBC Count 01/03/2022 3.27* 3.80 - 5.20 10e6/uL Final    Hemoglobin 01/03/2022 10.5* 11.7 - 15.7 g/dL Final    Hematocrit 01/03/2022 32.5* 35.0 - 47.0 % Final    MCV 01/03/2022 99  78 - 100 fL Final    MCH 01/03/2022 32.1  26.5 - 33.0 pg Final    MCHC 01/03/2022 32.3  31.5 - 36.5 g/dL Final    RDW 01/03/2022 13.7  10.0 - 15.0 % Final    Platelet Count 01/03/2022 226  150 - 450 10e3/uL Final    % Neutrophils 01/03/2022 55  % Final    % Lymphocytes 01/03/2022 32  % Final    % Monocytes 01/03/2022 10  % Final    % Eosinophils 01/03/2022 4  % Final    % Basophils 01/03/2022 0  % Final    Absolute Neutrophils 01/03/2022 2.2  1.6 - 8.3 10e3/uL Final     Absolute Lymphocytes 01/03/2022 1.3  0.8 - 5.3 10e3/uL Final    Absolute Monocytes 01/03/2022 0.4  0.0 - 1.3 10e3/uL Final    Absolute Eosinophils 01/03/2022 0.1  0.0 - 0.7 10e3/uL Final    Absolute Basophils 01/03/2022 0.0  0.0 - 0.2 10e3/uL Final       Video Capsule Endoscopy 2/14/23:  Findings:        The gastric passage time of the capsule enteroscope was 5-hours 1-minute.        The small bowel passage time of the capsule enteroscope was 1-hour 25-minutes.        The entire examined stomach was normal.        Multiple localized erosions with no bleeding were found in the duodenum        (probable first portion of the duodenum).        A single small angioectasia without bleeding was seen in the small bowel        (probable duodenum).        Lymphangiectasia was found in the small bowel (mid small bowel). One of        these was peduncluated and polypoid, not bleeding, benign appearing.        A single spot with no bleeding was found in the colon (probable cecum).                                                                Impression:              - Normal stomach.   - Duodenal erosion.   - A single angioectasia without bleeding in the duodenum.   - Small bowel lymphangiectasia.   - A single mucosal spot with no bleeding in the colon.   - No bleeding seen during exam.   - The capsule entered the colon.       EGD 02/06/23  Impression:              - Normal esophagus.   - Normal stomach.   - Duodenal erosion without bleeding.   - No specimens collected.       Colonoscopy 01/13/23  Findings:        Residual specks of blood throughout (exam to 30 cm into the TI). No        inflammation.        Residual small amounts of blood mixed with prep liquid. Extensive        diverticulosis throughout the colon, left > right. Diverticuli were        carefully examined, but no active bleeding was seen. Site of mucosectomy        at the IC valve was unremarkable.      Impression:              - No specimens collected.        Colonoscopy 8/12/23  Findings:       Many small-mouthed diverticula were found in the entire colon. Most        pronounced diverticulosis is in the sigmoid colon. Residual blood was        seen throughout the colon, somewhat more on the left side. No active        bleeding was noted.        The terminal ileum appeared normal. Slight tinging with blood, likely        washed up or brought in by the scope.        Mild internal hemorrhoids were noted, not a source of bleeding.                                                                                    Impression:              - Diverticulosis in the entire examined colon.   - The examined portion of the ileum was normal.   - No specimens collected.

## 2023-11-01 NOTE — PROGRESS NOTES
Virtual Visit Details    Type of service:  Video Visit   Video Start Time:  10:02 AM  Video End Time: 10:23 AM    Originating Location (pt. Location): Home    Distant Location (provider location):  Off-site  Platform used for Video Visit: Well

## 2023-11-01 NOTE — TELEPHONE ENCOUNTER
LVM for patient regarding active request to schedule in or around 8/28/2024. Left call center number.

## 2023-11-01 NOTE — PATIENT INSTRUCTIONS
It was a pleasure taking care of you today.  I've included a brief summary of our discussion and care plan from today's visit below.  Please review this information with your primary care provider.  ______________________________________________________________________    My recommendations are summarized as follows:  --Ok to stop taking omeprazole, can transition to every other day for the next two weeks then stop. Let us know if your symptoms return.  -- Can take famotidine up to twice daily when you stop the omeprazole   -- Continue Hgb checks and iron replacement per hematology and primary teams  -- Continue a high fiber diet  -- Continue higher calorie/protein meals and snacks as discussed with dietitian  -- Consider using Miralax 2-3 times a week, can start with 1/2 caps  -- Ok to take 1-2 tablets of lactaid with dairy foods    -- please see scheduling information provided below     Return to GI Clinic in in January with Belen Santamaria August to review your progress.    ______________________________________________________________________    How do I schedule labs, imaging studies, or procedures that were ordered in clinic today?     Labs: To schedule lab appointment [facility], use my chart or call [phone]. If you have a [facility] lab closer to home where you are regularly seen you can give them a call.     Procedures: If a colonoscopy, upper endoscopy, breath test, esophageal manometry, or pH impedence was ordered today, our endoscopy team will call you to schedule this. If you have not heard from our endoscopy team within a week, please call [phone] to schedule.     Imaging Studies: If you were scheduled for a CT scan, X-ray, MRI, ultrasound, HIDA scan or other imaging study, please call [phone] to have this scheduled.     Referral: If a referral to another specialty was ordered, expect a phone call or follow instructions above. If you have not heard from anyone  regarding your referral in a week, please call our clinic to check the status.     Who do I call with any questions after my visit?  Please be in touch if there are any further questions that arise following today's visit.  There are multiple ways to contact your gastroenterology care team.      During business hours, you may reach a Gastroenterology nurse at [phone]    To schedule or reschedule an appointment, please call [phone].     You can always send a secure message through [employer].  [employer] messages are answered by your nurse or doctor typically within 24 hours.  Please allow extra time on weekends and holidays.      For urgent/emergent questions after business hours, you may reach the on-call GI Fellow by contacting the [facility]  at [phone].     How will I get the results of any tests ordered?    You will receive all of your results.  If you have signed up for [employer], any tests ordered at your visit will be available to you after your provider reviews them.  Typically this takes 1-2 weeks.  If there are urgent results that require a change in your care plan, your provider or nurse will call you to discuss the next steps.      What is [employer]?  [employer] is a secure way for you to access all of your healthcare records from the [facility].  It is a web based computer program, so you can sign on to it from any location.  It also allows you to send secure messages to your care team.  I recommend signing up for [employer] access if you have not already done so and are comfortable with using a computer.      How to I schedule a follow-up visit?  If you did not schedule a follow-up visit today, please call [phone] to schedule a follow-up office visit.      Sincerely,    Belen Delacruz PA-C  Division of Gastroenterology, Hepatology & Nutrition  [facility]

## 2023-11-01 NOTE — LETTER
11/1/2023         RE: Patrick Olson  [id number]  [facility] [id number]        Dear Colleague,    Thank you for referring your patient, Patrick Olson, to the [facility] [place]. Please see a copy of my visit note below.    GI CLINIC VISIT    CC/REFERRING MD:  Essence Tamayo  REASON FOR CONSULTATION:   Ms. Olson is a 81 year old female who I was asked to see in consultation at the request of Dr. Essence Tamayo for gastric AVM.     ASSESSMENT/PLAN:  #Gastric AVM , small bowel AVM, Cecal polyp, duodenal erosions  #Diverticular hemorrhage  #Hemorrhoids, unspecified hemorrhoid type  #Drug-induced constipation  #Dyspepsia    GI bleeding -as noted in previous documentation her clinical presentation has varied over time so suspect there is not one  of these bleeds (large polyp bleeding, diverticular, angioectasias, potentially duodenal erosions, potentially internal hemorrhoids). Work up in January 2023 with EGD, colonoscopy, and VCE revealed duodenal erosions, small bowel angiectasia, and diverticulosis without bleeding. Given darker blood and no evidence of bleeding at diverticula shortly after bleeding onset, UGI angioectasia bleed supsected, though noted duodenal erosions could contribute as well. Now s/p treatment with 3 months PPI (for duodenal erosions and dyspepsia). Most recent episode with bright red blood most suspicious for diverticular bleed, no active bleeding identified during colonoscopy.     Certainly diverticular bleeding and AVM bleeding could recur, we have discussed when to present to the ED for further evaluation. Given finding of prior AVM, there will likely be additional AVMs. This should be addressed with periodic hgb checks and iron replacement as necessary (oral or infusions). Iron replacement per hematology and primary teams. Reassuring that hemoglobin is trending up and ferritin adequate on recent lab check last month. She is  following up with hematology this month. Ablative therapies (EGD, colonoscopy, and deep SB enteroscopy with use of APC) would not be appropriate unless there is active overt (visible) bleeding.  Disease-modifying therapies (SQ octreotide) would be considered in those patients refractory to the above measures, generally with repeated hospitalizations.      Dyspepsia has improved, no longer having symptoms. She has restarted taking omeprazole, though did not have symptoms off it. We will discontinue omeprazole (ok to go to every other day for 1-2 weeks then stop) and continue restart famotidine. If symptoms recur can restart omeprazole.    It seems she is likely lactose intolerance based on recent symptoms  of gas and bloating with dairy intake, resolved when stopping. She should continue to avoid lactose containing products, utilize lactose free dairy, and use lactaid (lactase) enzyme with meals that contain dairy. Can take 1-2 tablets with a meal.    For her bowel pattern, recommend trying Miralax 2-3 times per week given some harder stool and occasional need to strain. She should continue higher fiber diet as well as this is helping stool consistency. No current prolapse, rectal pain, obstructive defecation symptoms etc.     Plan:  -- Hgb checks and iron replacement per hematology and primary teams  -- start omeprazole, take for 12 weeks 30-60 minutes before eating or drinking  -- can continue famotidine up to twice daily  -- visit with registered dietitian regarding weight loss and getting fiber for constipation  -- continue good hydration  -- continue your walking and exercises  -- continue Miralax - ok to adjust dose from 1/2 capful - 2 capfuls daily, goal is at least three satisfactory BMs a week and no more than three a day with minimizing of constipation symptoms in-between      RTC 3 months with Dr. Martinez (scheduled)    Thank you for this consultation. It was a pleasure to participate in the care of this  "patient; please contact us with any further questions.      70 Minutes was spent on the date of the encounter during chart review, history and exam, documentation, and further activities as noted     Belen Delacruz PA-C  Division of Gastroenterology, Hepatology & Nutrition  [facility]    ---------------------------------------------------------------------------------------------------  HPI:    Ms. Olson is an 82 year old female with RA (stopped methotrexate March 2023, now on hydroxycholoroquine) and secondary Sjogren's, lumbar degenerative disc disease, chronic lumbago without radiculopathy, more recent right shoulder pain, OA, osteoporosis, and recurrent GI bleeding (AVM, possible diverticular, polyp-related --> see below) who was originally referred to GI clinic due to her bleeding history as well as early satiety with dyspepsia. She has previously followed with [facility] ([facility]) for her GI care (since 2017 per her report) and has undergone endoscopic exams at [facility], [facility], and [facility] since 2010. She has been seen by multiple providers with our group including Dr. Martinez, myself, and most recently Dr. Gatica.    Dating back to at least 2010 Ms. Olson has experienced recurrent overt GI bleeding with anemia.  She underwent inpatient colonoscopic exams with both diverticulosis and hemorrhoids on these exams. Diverticular bleeding had been felt to be a contributor given her clinical presentation, though no active bleeding lesion was ever identified.      She had EGD and colonoscopy 5/26/21 at [facility] for anemia. EGD revealed a non-bleeding gastric AVM which was treated with APC. Colonoscopy demonstrated diverticulosis and small hemorrhoids. Ms. Olson was referred to GI given AVM and anemia, but prior to the appointment was admitted to [facility] with hematochezia (per patient this was \"a bit darker\" than her prior bleeding episodes). A CTA did " not demonstrate any active extravasation. Thus a capsule was done with bleeding seen in the distal ileum to cecum on prelim capsule read. Colonoscopy with ileal intubation  (20 cm beyond the IC valve) was done thereafter with no active bleeding found, but a very large 30 mm polyp was found on the IC valve and removed via EMR. This was felt to be a likely source of bleeding. No other findings were seen in the area of bleeding noted on capsule endoscopy.      She then had recurrent bleeding in January. She underwent colonoscopy 1/13/23 which showed no inflammation but residual specks of blood throughout bowel (to 30 cm to TI), extensive diverticulosis with no active bleeding. EGD was subsequently completed 2/6/23 which showed duodenal erosion without bleeding, normal stomach and esophagus. VCE the following week 2/14/23 showed multiple localized erosions with no bleeding in the duodenum (likely first portion), single small angioectasia without bleeding was seen in the small bowel (probable duodenum), and lymphangiectasia in the small bowel (mid small bowel), one of these was peduncluated and polypoid, not bleeding, benign appearing.  A single spot with no bleeding was found in the colon (probable cecum). She completed 12 week course of omeprazole (while off of methotrexate), starting March for dyspepsia and finding of duodenal erosions and reported dyspepsia.    She again had several days of hematochezia that prompted hospitalization in August. She was hospitalized 8/12/23-8/14/23 at [facility], Hgb with mild decrease to 9's, she remained hemodynamically stable. She underwent colonoscopy which showed diverticulosis throughout the colon, no active bleeding noted. Examined ileum was normal. Mild internal hemorrhoids felt not to be source of bleed. Per inpatient assessment, diverticular bleeding most likely reason for repeated episodes of hematochezia.     She has been evaluated by both her PCP and hematology for anemia, which  has been attributed to GI blood loss, anemia of chronic disease, and medication effect. .I the setting of GI bleeding she was given IV iron infusions which she has since completed, has taken PO iron supplementation in the past.    -------  Today 11/1/23:    Today patient reports no recurrent bleeding since August. She reports that in August bleeding started while on a local trip, was taking Miralax daily, she had episode of 4 bowel movements in one day and noticed blood in the fourth stool. Bright red, not as much as prior episodes to fully discolor toilet water.  Persisted for about two weeks total and then resolved.    She continues to follow with hematology for her anemia. Has appointment coming up this month. Most recent hemoglobin  10.7 (10/4/23), . Ferritin 42. She is not currently taking any iron.    History of constipation, made significant with iron use. Currently she is having 2-3 stools per day. Stools are formed, start a little harder then become softer (Esmeralda 3-5). She often does not feel empty with first stool, does after second or third. Tries not to strain, if she feels she needs to strain will take a capful of Miralax - currently taking about 1 capful per week. She has been eating more vegetables and fiber and this has helped keep her stool softer.    Ms. Olson has also has history of dyspepsia with postprandial discomfort. This has now essentially resolved with course of omeprazole, famotidine, and dietary changes including increasing fiber and fluids, and not going too long without food. She notes that she restarted omeprazole prior to her [place] trip in September as she did not want to be in discomfort there - continues on omeprazole now. Does not think she had return of dyspepsia when she was off omeprazole previously this year. Open to stopping and restarting famotidine.    She does note that she believes she is lactose intolerant. While working with dietitian to assist with weight  gain, she was recommended to use more high fat dairy products. She noted abdominal discomfort, gas, and some looser stools with this - when she excluded lactose products or took lactaid she does not have an issue. She has started to purchase only lactose free dairy products and takes lactaid pill if out and having something with diary - this is working well.    Weight overall stable, still having trouble gaining. Trying to snack more and eat more nutrient dense foods. Happy that she is no longer losing weight.    ROS:    + Dry mouth  No fevers or chills  No weight loss  No blurry vision, double vision or change in vision  No sore throat  No lymphadenopathy  No headache, paraesthesias, or weakness in a limb  No shortness of breath or wheezing  No chest pain or pressure  No arthralgias or myalgias  No rashes or skin changes  No odynophagia or dysphagia  No BRBPR, hematochezia, melena  No dysuria, frequency or urgency  No hot/cold intolerance or polyria  No anxiety or depression      PROBLEM LIST  Patient Active Problem List    Diagnosis Date Noted    Lower GI bleed 08/12/2023     Priority: Medium    Chronic pain of right elbow 07/11/2023     Priority: Medium    Right wrist pain 07/11/2023     Priority: Medium    Lab test negative for COVID-19 virus 11/18/2021     Priority: Medium    Diverticulosis of large intestine      Priority: Medium    Acute lower GI bleeding 01/21/2021     Priority: Medium    Diverticular hemorrhage 01/20/2021     Priority: Medium    Gastrointestinal hemorrhage, unspecified gastrointestinal hemorrhage type 01/19/2021     Priority: Medium    GI bleed 03/13/2017     Priority: Medium    SNHL (sensorineural hearing loss) 11/29/2012     Priority: Medium       PERTINENT PAST MEDICAL HISTORY:  Past Medical History:   Diagnosis Date    Anemia     CKD (chronic kidney disease) stage 3, GFR 30-59 ml/min (H)     Diverticulosis of large intestine     Osteoarthritis     Osteoporosis     Rheumatoid arthritis  (H)     Sensorineural hearing loss        PREVIOUS SURGERIES:  Past Surgical History:   Procedure Laterality Date    CAPSULE/PILL CAM ENDOSCOPY N/A 11/18/2021    Procedure: IMAGING PROCEDURE, GI TRACT, INTRALUMINAL, VIA CAPSULE;  Surgeon: Deric Rodriguez MD;  Location: [facility]    CAPSULE/PILL CAM ENDOSCOPY N/A 2/14/2023    Procedure: IMAGING PROCEDURE, GI TRACT, INTRALUMINAL, VIA CAPSULE;  Surgeon: Jaime Mesa MD;  Location: [facility]    COLONOSCOPY N/A 03/14/2017    Procedure: COMBINED COLONOSCOPY, SINGLE OR MULTIPLE BIOPSY/POLYPECTOMY BY BIOPSY;  Surgeon: Spencer Downey MD;  Location: [facility]    COLONOSCOPY N/A 9/22/2022    Procedure: COLONOSCOPY, FLEXIBLE, WITH LESION REMOVAL USING SNARE;  Surgeon: Kirby Arthur MD;  Location:  GI    COLONOSCOPY N/A 1/13/2023    Procedure: COLONOSCOPY;  Surgeon: Spencer Downey MD;  Location: [facility]    COLONOSCOPY N/A 8/14/2023    Procedure: Colonoscopy;  Surgeon: Spencer Downey MD;  Location: [facility]    ENTEROSCOPY SMALL BOWEL N/A 11/20/2021    Procedure: ENTEROSCOPY, colonoscopy with endoscopic mucosal resection and tattoo placement;  Surgeon: Kirby Arthur MD;  Location: [facility]    ESOPHAGOSCOPY, GASTROSCOPY, DUODENOSCOPY (EGD), COMBINED N/A 2/6/2023    Procedure: ESOPHAGOGASTRODUODENOSCOPY (EGD);  Surgeon: Spencer Downey MD;  Location: [facility]    IR VISCERAL EMBOLIZATION  01/22/2021    ORTHOPEDIC SURGERY Left     hip replacment    SIGMOIDOSCOPY FLEXIBLE N/A 01/23/2021    Procedure: SIGMOIDOSCOPY, FLEXIBLE;  Surgeon: Jaime Steinberg MD;  Location:  GI       ALLERGIES:   Allergies   Allergen Reactions    Bee Venom Anaphylaxis    Shrimp Anaphylaxis    Evoxac [Cevimeline] Unknown    Prevnar Swelling     Other reaction(s): Edema    Prevnar [Pneumococcal 13-Linda Conj Vacc] Unknown       PERTINENT MEDICATIONS:  Current Outpatient Medications   Medication    acetaminophen (TYLENOL) 650 MG CR tablet    calcium carbonate-vitamin D (CALTRATE) 600-10 MG-MCG  per tablet    famotidine (PEPCID) 20 MG tablet    folic acid (FOLVITE) 1 MG tablet    gabapentin (NEURONTIN) 300 MG capsule    hydroxychloroquine (PLAQUENIL) 200 MG tablet    hydroxychloroquine (PLAQUENIL) 200 MG tablet    lifitegrast (XIIDRA) 5 % opthalmic solution    Multiple Vitamins-Minerals (OCUVITE PRESERVISION PO)    omeprazole (PRILOSEC) 40 MG DR capsule    pilocarpine (SALAGEN) 5 MG tablet     No current facility-administered medications for this visit.       SOCIAL HISTORY:  Social History     Socioeconomic History    Marital status:      Spouse name: Not on file    Number of children: Not on file    Years of education: Not on file    Highest education level: Not on file   Occupational History    Not on file   Tobacco Use    Smoking status: Former    Smokeless tobacco: Never   Vaping Use    Vaping Use: Never used   Substance and Sexual Activity    Alcohol use: No    Drug use: No    Sexual activity: Not on file   Other Topics Concern    Not on file   Social History Narrative    - Retired (formerly employed as  at [employer] and [employer])    - 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    Past/family/social history reviewed and no changes    PHYSICAL EXAMINATION:  Vitals There were no vitals taken for this visit.   Wt   Wt Readings from Last 2 Encounters:   10/19/23 43 kg (94 lb 12.8 oz)   09/01/23 41.7 kg (91 lb  14.4 oz)      Gen: Pt sitting up in NAD, interactive and cooperative on exam  Eyes: sclerae anicteric, no injection  Cardiac: RRR, nl S1, S2, no peripheral edema  Resp: Clear on anterior exam  GI: Normoactive BS, abd soft,, nontender  Skin: Warm, dry, no jaundice, nails appear healthy  Neuro: alert, oriented, answers questions appropriately      PERTINENT STUDIES:    Lab on 01/03/2022   Component Date Value Ref Range Status    Rheumatoid Factor 01/03/2022 8  <12 IU/mL Final    Sodium 01/03/2022 142  133 - 144 mmol/L Final    Potassium 01/03/2022 4.6  3.4 - 5.3 mmol/L Final    Chloride 01/03/2022 109  94 - 109 mmol/L Final    Carbon Dioxide (CO2) 01/03/2022 29  20 - 32 mmol/L Final    Anion Gap 01/03/2022 4  3 - 14 mmol/L Final    Urea Nitrogen 01/03/2022 18  7 - 30 mg/dL Final    Creatinine 01/03/2022 0.99  0.52 - 1.04 mg/dL Final    Calcium 01/03/2022 9.6  8.5 - 10.1 mg/dL Final    Glucose 01/03/2022 104* 70 - 99 mg/dL Final    Alkaline Phosphatase 01/03/2022 75  40 - 150 U/L Final    AST 01/03/2022 24  0 - 45 U/L Final    ALT 01/03/2022 24  0 - 50 U/L Final    Protein Total 01/03/2022 7.5  6.8 - 8.8 g/dL Final    Albumin 01/03/2022 3.9  3.4 - 5.0 g/dL Final    Bilirubin Total 01/03/2022 0.4  0.2 - 1.3 mg/dL Final    GFR Estimate 01/03/2022 58* >60 mL/min/1.73m2 Final    CRP Inflammation 01/03/2022 <2.9  0.0 - 8.0 mg/L Final    Cyclic Citrullinated Peptide Antib* 01/03/2022 3.6  <7.0 U/mL Final    Erythrocyte Sedimentation Rate 01/03/2022 39* 0 - 30 mm/hr Final    WBC Count 01/03/2022 3.9* 4.0 - 11.0 10e3/uL Final    RBC Count 01/03/2022 3.27* 3.80 - 5.20 10e6/uL Final    Hemoglobin 01/03/2022 10.5* 11.7 - 15.7 g/dL Final    Hematocrit 01/03/2022 32.5* 35.0 - 47.0 % Final    MCV 01/03/2022 99  78 - 100 fL Final    MCH 01/03/2022 32.1  26.5 - 33.0 pg Final    MCHC 01/03/2022 32.3  31.5 - 36.5 g/dL Final    RDW 01/03/2022 13.7  10.0 - 15.0 % Final    Platelet Count 01/03/2022 226  150 - 450 10e3/uL Final    %  Neutrophils 01/03/2022 55  % Final    % Lymphocytes 01/03/2022 32  % Final    % Monocytes 01/03/2022 10  % Final    % Eosinophils 01/03/2022 4  % Final    % Basophils 01/03/2022 0  % Final    Absolute Neutrophils 01/03/2022 2.2  1.6 - 8.3 10e3/uL Final    Absolute Lymphocytes 01/03/2022 1.3  0.8 - 5.3 10e3/uL Final    Absolute Monocytes 01/03/2022 0.4  0.0 - 1.3 10e3/uL Final    Absolute Eosinophils 01/03/2022 0.1  0.0 - 0.7 10e3/uL Final    Absolute Basophils 01/03/2022 0.0  0.0 - 0.2 10e3/uL Final       Video Capsule Endoscopy 2/14/23:  Findings:        The gastric passage time of the capsule enteroscope was 5-hours 1-minute.        The small bowel passage time of the capsule enteroscope was 1-hour 25-minutes.        The entire examined stomach was normal.        Multiple localized erosions with no bleeding were found in the duodenum        (probable first portion of the duodenum).        A single small angioectasia without bleeding was seen in the small bowel        (probable duodenum).        Lymphangiectasia was found in the small bowel (mid small bowel). One of        these was peduncluated and polypoid, not bleeding, benign appearing.        A single spot with no bleeding was found in the colon (probable cecum).                                                                Impression:              - Normal stomach.   - Duodenal erosion.   - A single angioectasia without bleeding in the duodenum.   - Small bowel lymphangiectasia.   - A single mucosal spot with no bleeding in the colon.   - No bleeding seen during exam.   - The capsule entered the colon.       EGD 02/06/23  Impression:              - Normal esophagus.   - Normal stomach.   - Duodenal erosion without bleeding.   - No specimens collected.       Colonoscopy 01/13/23  Findings:        Residual specks of blood throughout (exam to 30 cm into the TI). No        inflammation.        Residual small amounts of blood mixed with prep liquid. Extensive         diverticulosis throughout the colon, left > right. Diverticuli were        carefully examined, but no active bleeding was seen. Site of mucosectomy        at the IC valve was unremarkable.      Impression:              - No specimens collected.       Colonoscopy 8/12/23  Findings:       Many small-mouthed diverticula were found in the entire colon. Most        pronounced diverticulosis is in the sigmoid colon. Residual blood was        seen throughout the colon, somewhat more on the left side. No active        bleeding was noted.        The terminal ileum appeared normal. Slight tinging with blood, likely        washed up or brought in by the scope.        Mild internal hemorrhoids were noted, not a source of bleeding.                                                                                    Impression:              - Diverticulosis in the entire examined colon.   - The examined portion of the ileum was normal.   - No specimens collected.           Again, thank you for allowing me to participate in the care of your patient.      Sincerely,    Belen Delacruz PA-C

## 2023-11-09 ENCOUNTER — ONCOLOGY VISIT (OUTPATIENT)
Dept: ONCOLOGY | Facility: CLINIC | Age: 83
End: 2023-11-09
Attending: INTERNAL MEDICINE
Payer: COMMERCIAL

## 2023-11-09 VITALS
RESPIRATION RATE: 18 BRPM | SYSTOLIC BLOOD PRESSURE: 125 MMHG | BODY MASS INDEX: 16.14 KG/M2 | OXYGEN SATURATION: 100 % | WEIGHT: 94 LBS | TEMPERATURE: 97.7 F | DIASTOLIC BLOOD PRESSURE: 58 MMHG | HEART RATE: 72 BPM

## 2023-11-09 DIAGNOSIS — D50.0 IRON DEFICIENCY ANEMIA DUE TO CHRONIC BLOOD LOSS: Primary | ICD-10-CM

## 2023-11-09 DIAGNOSIS — D72.819 CHRONIC LEUKOPENIA: ICD-10-CM

## 2023-11-09 DIAGNOSIS — D63.8 ANEMIA OF CHRONIC DISEASE: ICD-10-CM

## 2023-11-09 PROCEDURE — G0463 HOSPITAL OUTPT CLINIC VISIT: HCPCS | Performed by: INTERNAL MEDICINE

## 2023-11-09 PROCEDURE — 99214 OFFICE O/P EST MOD 30 MIN: CPT | Performed by: INTERNAL MEDICINE

## 2023-11-09 ASSESSMENT — PAIN SCALES - GENERAL: PAINLEVEL: MILD PAIN (3)

## 2023-11-09 NOTE — LETTER
11/9/2023         RE: Patrick Olson  [address] [facility] [address]        Dear Colleague,    Thank you for referring your patient, Patrick Olson, to the [facility] [place]. Please see a copy of my visit note below.    [facility]    Hematology/Oncology Established Patient Follow-up Note      Today's Date: 11/9/2023    Reason for Follow-up:  Iron deficiency anemia.    HISTORY OF PRESENT ILLNESS: Patrick Olson is an 82 year old female with rheumatoid arthritis on methotrexate who presents with iron deficiency anemia. She has history of gastric AVM, small bowel AVM, cecal polyp, duodenal erosions, and diverticular hemorrhage.    She had an EGD, colonoscopy, and VCE in 1/2023 that showed duodenal erosioons, small bowel angiectasia, and diverticulosis without bleeding. She was started on omeprazole and is following with GI at [facility].    2/7/2023 Labs showed Hgb 11.1, WBC 3.2, platelets 247,000, creatinine 1.03, vitamin B12 elevated at 1434. 3/7/2023 Ferritin was normal at 96, iron elevated at 163, TIBC 283, iron saturation index elevated at 58. 3/27/2023 TSH normal at 1.13.      INTERIM HISTORY:  Patrick reports hospitalization 8/12/2023 for diverticular bleed and acute on chronic anemia. She was able to travel to [place] in 9/2023 without any issues.      REVIEW OF SYSTEMS:   14 point ROS was reviewed and is negative other than as noted above in HPI.       HOME MEDICATIONS:  Current Outpatient Medications   Medication Sig Dispense Refill     acetaminophen (TYLENOL) 650 MG CR tablet Take 650 mg by mouth every 8 hours as needed for fever or pain Taking 2 - 650 mg tablets every 8 hrs.       calcium carbonate-vitamin D (CALTRATE) 600-10 MG-MCG per tablet Take 1 tablet by mouth daily       famotidine (PEPCID) 20 MG tablet Take 20 mg by mouth At Bedtime       folic acid (FOLVITE) 1 MG tablet Take 1 tablet (1 mg) by mouth daily 90 tablet 1     gabapentin (NEURONTIN) 300 MG  capsule Take 1 capsule (300 mg) by mouth 3 times daily Take 300mg in morning, 300mg in afternoon and 300mg 90 capsule 1     hydroxychloroquine (PLAQUENIL) 200 MG tablet Take 1 tablet (200 mg) by mouth daily Annual Plaquenil toxicity eye screening required. 90 tablet 1     hydroxychloroquine (PLAQUENIL) 200 MG tablet Take 1 tablet (200 mg) by mouth daily 90 tablet 1     lifitegrast (XIIDRA) 5 % opthalmic solution 1 drop 2 times daily       Multiple Vitamins-Minerals (OCUVITE PRESERVISION PO) Take 1 tablet by mouth 2 times daily       omeprazole (PRILOSEC) 40 MG DR capsule Take 1 capsule (40 mg) by mouth daily 90 capsule 3     pilocarpine (SALAGEN) 5 MG tablet Take 1 tablet (5 mg) by mouth daily 90 tablet 1         ALLERGIES:  Allergies   Allergen Reactions     Bee Venom Anaphylaxis     Shrimp Anaphylaxis     Evoxac [Cevimeline] Unknown     Prevnar Swelling     Other reaction(s): Edema     Prevnar [Pneumococcal 13-Linda Conj Vacc] Unknown         PAST MEDICAL HISTORY:  Past Medical History:   Diagnosis Date     Anemia      CKD (chronic kidney disease) stage 3, GFR 30-59 ml/min (H)      Diverticulosis of large intestine      Osteoarthritis      Osteoporosis      Rheumatoid arthritis (H)      Sensorineural hearing loss          PAST SURGICAL HISTORY:  Past Surgical History:   Procedure Laterality Date     CAPSULE/PILL CAM ENDOSCOPY N/A 11/18/2021    Procedure: IMAGING PROCEDURE, GI TRACT, INTRALUMINAL, VIA CAPSULE;  Surgeon: Deric Rodriguez MD;  Location:  GI     CAPSULE/PILL CAM ENDOSCOPY N/A 2/14/2023    Procedure: IMAGING PROCEDURE, GI TRACT, INTRALUMINAL, VIA CAPSULE;  Surgeon: Jaime Mesa MD;  Location: [facility]     COLONOSCOPY N/A 03/14/2017    Procedure: COMBINED COLONOSCOPY, SINGLE OR MULTIPLE BIOPSY/POLYPECTOMY BY BIOPSY;  Surgeon: Spencer Downey MD;  [facility]     COLONOSCOPY N/A 9/22/2022    Procedure: COLONOSCOPY, FLEXIBLE, WITH LESION REMOVAL USING SNARE;  Surgeon: Kirby Arthur MD;   Location:  GI     COLONOSCOPY N/A 1/13/2023    Procedure: COLONOSCOPY;  Surgeon: Spencer Downey MD;  Location: [facility] OR     COLONOSCOPY N/A 8/14/2023    Procedure: Colonoscopy;  Surgeon: Spencer Downey MD;  Location: [facility] GI     ENTEROSCOPY SMALL BOWEL N/A 11/20/2021    Procedure: ENTEROSCOPY, colonoscopy with endoscopic mucosal resection and tattoo placement;  Surgeon: Kirby Atrhur MD;  Location: [facility] OR     ESOPHAGOSCOPY, GASTROSCOPY, DUODENOSCOPY (EGD), COMBINED N/A 2/6/2023    Procedure: ESOPHAGOGASTRODUODENOSCOPY (EGD);  Surgeon: Spencer Downey MD;  Location: [facility] GI     IR VISCERAL EMBOLIZATION  01/22/2021     ORTHOPEDIC SURGERY Left     hip replacment     SIGMOIDOSCOPY FLEXIBLE N/A 01/23/2021    Procedure: SIGMOIDOSCOPY, FLEXIBLE;  Surgeon: Jaime Steinberg MD;  Location:  GI         SOCIAL HISTORY:  Social History     Socioeconomic History     Marital status:      Spouse name: Not on file     Number of children: Not on file     Years of education: Not on file     Highest education level: Not on file   Occupational History     Not on file   Tobacco Use     Smoking status: Former     Smokeless tobacco: Never   Vaping Use     Vaping Use: Never used   Substance and Sexual Activity     Alcohol use: No     Drug use: No     Sexual activity: Not on file   Other Topics Concern     Not on file   Social History Narrative    - Retired (formerly employed as  at [employer])    - 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 No pertinent family history.      PHYSICAL EXAM:  Vital signs:  /58   Pulse 72   Temp 97.7  F (36.5  C) (Oral)   Resp 18   Wt 42.6 kg (94 lb)   SpO2 100%   BMI 16.14 kg/m     GENERAL: No acute distress.  EYES: No scleral icterus. No overt erythema.  RESPIRATORY: No audible cough, wheezing, or labored breathing.  MUSCULOSKELETAL: Range of motion in the neck, shoulders, and arms appear normal.  SKIN: No overt rashes, discolorations, or lesions over the face and neck.  NEUROLOGIC: Alert.  No overt tremors.  PSYCHIATRIC: Normal affect and mood.  Does not appear anxious.      LABS:  CBC RESULTS:   Recent Labs   Lab Test 10/04/23  1320   WBC 4.0   RBC 3.27*   HGB 10.7*   HCT 33.2*   *   MCH 32.7   MCHC 32.2   RDW 13.7        Last Comprehensive Metabolic Panel:  Sodium   Date Value Ref Range Status   09/01/2023 142 136 - 145 mmol/L Final   01/23/2021 143 133 - 144 mmol/L Final     Potassium   Date Value Ref Range Status   09/01/2023 5.3 3.4 - 5.3 mmol/L Final   09/29/2022 4.7 3.4 - 5.3 mmol/L Final   01/23/2021 3.6 3.4 - 5.3 mmol/L Final     Chloride   Date Value Ref Range Status   09/01/2023 106 98 - 107 mmol/L Final   01/23/2021 114 (H) 94 - 109 mmol/L Final     Chloride (External)   Date Value Ref Range Status   10/24/2022 104 94 - 109 mmol/L Final     Carbon Dioxide   Date Value Ref Range Status   01/23/2021 26 20 - 32 mmol/L Final     Carbon Dioxide (CO2)   Date Value Ref Range Status   09/01/2023 27 22 - 29 mmol/L Final   09/29/2022 29 20 - 32 mmol/L Final     Anion Gap   Date Value Ref Range Status   09/01/2023 9 7 - 15 mmol/L Final   09/29/2022 3 3 - 14 mmol/L Final   01/23/2021 3 3 - 14 mmol/L Final     Glucose   Date Value Ref Range Status   09/01/2023 98 70 - 99 mg/dL Final   09/29/2022 100 (H) 70 - 99 mg/dL Final   01/23/2021 157 (H) 70 - 99 mg/dL Final     Urea  Nitrogen   Date Value Ref Range Status   09/01/2023 13.0 8.0 - 23.0 mg/dL Final   09/29/2022 21 7 - 30 mg/dL Final   01/23/2021 6 (L) 7 - 30 mg/dL Final     Creatinine   Date Value Ref Range Status   10/04/2023 1.17 (H) 0.51 - 0.95 mg/dL Final   01/23/2021 0.75 0.52 - 1.04 mg/dL Final     GFR Estimate   Date Value Ref Range Status   10/04/2023 47 (L) >60 mL/min/1.73m2 Final   01/23/2021 76 >60 mL/min/[1.73_m2] Final     Comment:     Non  GFR Calc  Starting 12/18/2018, serum creatinine based estimated GFR (eGFR) will be   calculated using the Chronic Kidney Disease Epidemiology Collaboration   (CKD-EPI) equation.       Calcium   Date Value Ref Range Status   09/01/2023 9.9 8.8 - 10.2 mg/dL Final   01/23/2021 8.0 (L) 8.5 - 10.1 mg/dL Final     Bilirubin Total   Date Value Ref Range Status   08/13/2023 0.4 <=1.2 mg/dL Final   03/13/2017 0.5 0.2 - 1.3 mg/dL Final     Alkaline Phosphatase   Date Value Ref Range Status   08/13/2023 67 35 - 104 U/L Final   03/13/2017 77 40 - 150 U/L Final     ALT   Date Value Ref Range Status   10/04/2023 15 0 - 50 U/L Final     Comment:     Reference intervals for this test were updated on 6/12/2023 to more accurately reflect our healthy population. There may be differences in the flagging of prior results with similar values performed with this method. Interpretation of those prior results can be made in the context of the updated reference intervals.     03/13/2017 12 0 - 50 U/L Final     AST   Date Value Ref Range Status   10/04/2023 27 0 - 45 U/L Final     Comment:     Reference intervals for this test were updated on 6/12/2023 to more accurately reflect our healthy population. There may be differences in the flagging of prior results with similar values performed with this method. Interpretation of those prior results can be made in the context of the updated reference intervals.   03/13/2017 10 0 - 45 U/L Final     Ferritin   Date Value Ref Range Status   10/04/2023  42 11 - 328 ng/mL Final     Iron   Date Value Ref Range Status   10/04/2023 45 37 - 145 ug/dL Final     Iron Binding Capacity   Date Value Ref Range Status   10/04/2023 290 240 - 430 ug/dL Final   03/10/2022 296 240 - 430 ug/dL Final       10/4/2023  Sed rate elevated at 48  CRP undetectable.    PATHOLOGY:  None relevant.    IMAGING:  None relevant.    ASSESSMENT/PLAN:  Patrick Olson is a 82 year old female with the following issues:  1. Macrocytic anemia due to iron deficiency/blood loss, drug effect, of chronic disease  2. History of gastric and small bowel AVM, duodenal erosions, diverticular hemorrhage  3. Chronic leukopenia  --Discussed with Patrick her anemia is of multifactorial etiology, including blood loss, iron deficiency, drug effect from Plaquenil, and anemia of chronic disease.  --Discussed her 10/4/2023 labs showed Hgb improved from 8.6  to 10.7, still macrocytic anemia.  Her sed rate is slightly elevated but CRP normal. Prior SPEP showed no monoclonal protein. Iron studies are currently normal so she does not need to take oral iron supplement at this time.  --She will continue with intermittent GI follow-up. She had colonoscopy 8/2023 that showed multiple diverticula in colon, residual blood; no active bleeding.  --She will continue to watch closely for hematochezia or melena.  --No indication for bone marrow biopsy unless significant change in her blood counts.  --Discussed her Hgb is too high to meet criteria for giving an MANDY.    4. Rheumatoid arthritis  --She is no longer on methotrexate and now on Plaquenil.  She follows with rheumatology and will revisit at end of June.    5. Weight loss  --She feels she has normal appetite.  --Lost 10 pounds since 10/2022.  --Could be related to her chronic diseases.  --She will see nutritionist.    Return in 1 year.    Shantal Brown MD   Hematology/Oncology  [facility]    Total time spent today: 30 minutes in chart review, patient  "evaluation, counseling, documentation, test and/or medication/prescription orders, and coordination of care.     Oncology Rooming Note    November 9, 2023 9:43 AM   Patrick Olson is a 82 year old female who presents for:    Chief Complaint   Patient presents with     Oncology Clinic Visit     Initial Vitals: /58   Pulse 72   Temp 97.7  F (36.5  C) (Oral)   Resp 18   Wt 42.6 kg (94 lb)   SpO2 100%   BMI 16.14 kg/m   Estimated body mass index is 16.14 kg/m  as calculated from the following:    Height as of 6/7/23: 1.626 m (5' 4\").    Weight as of this encounter: 42.6 kg (94 lb). Body surface area is 1.39 meters squared.  Mild Pain (3) Comment: Data Unavailable   No LMP recorded. Patient is postmenopausal.  Allergies reviewed: Yes  Medications reviewed: Yes    Medications: Medication refills not needed today.  Pharmacy name entered into [employer]: [id number] [address] [place] [address]    Clinical concerns: Go over labs       Shari J. Schoenberger, CMA                Again, thank you for allowing me to participate in the care of your patient.        Sincerely,        Shantal Brown MD  "

## 2023-11-09 NOTE — PROGRESS NOTES
"Oncology Rooming Note    November 9, 2023 9:43 AM   Patrick Olson is a 82 year old female who presents for:    Chief Complaint   Patient presents with    Oncology Clinic Visit     Initial Vitals: /58   Pulse 72   Temp 97.7  F (36.5  C) (Oral)   Resp 18   Wt 42.6 kg (94 lb)   SpO2 100%   BMI 16.14 kg/m   Estimated body mass index is 16.14 kg/m  as calculated from the following:    Height as of 6/7/23: 1.626 m (5' 4\").    Weight as of this encounter: 42.6 kg (94 lb). Body surface area is 1.39 meters squared.  Mild Pain (3) Comment: Data Unavailable   No LMP recorded. Patient is postmenopausal.  Allergies reviewed: Yes  Medications reviewed: Yes    Medications: Medication refills not needed today.  Pharmacy name entered into [employer]: [id number] - [facility] [address] [place] [address]    Clinical concerns: Go over labs       Shari J. Schoenberger, CMA              "

## 2023-11-10 ENCOUNTER — LAB (OUTPATIENT)
Dept: LAB | Facility: CLINIC | Age: 83
End: 2023-11-10
Payer: COMMERCIAL

## 2023-11-10 DIAGNOSIS — M06.9 RHEUMATOID ARTHRITIS INVOLVING MULTIPLE SITES, UNSPECIFIED WHETHER RHEUMATOID FACTOR PRESENT (H): ICD-10-CM

## 2023-11-10 DIAGNOSIS — N17.9 ACUTE KIDNEY INJURY (H): ICD-10-CM

## 2023-11-10 DIAGNOSIS — Z79.899 HIGH RISK MEDICATION USE: ICD-10-CM

## 2023-11-10 LAB
ALBUMIN SERPL BCG-MCNC: 4.1 G/DL (ref 3.5–5.2)
ALT SERPL W P-5'-P-CCNC: 12 U/L (ref 0–50)
ANION GAP SERPL CALCULATED.3IONS-SCNC: 9 MMOL/L (ref 7–15)
AST SERPL W P-5'-P-CCNC: 26 U/L (ref 0–45)
BASOPHILS # BLD AUTO: 0 10E3/UL (ref 0–0.2)
BASOPHILS NFR BLD AUTO: 1 %
BUN SERPL-MCNC: 22.7 MG/DL (ref 8–23)
CALCIUM SERPL-MCNC: 10 MG/DL (ref 8.8–10.2)
CHLORIDE SERPL-SCNC: 105 MMOL/L (ref 98–107)
CREAT SERPL-MCNC: 1.22 MG/DL (ref 0.51–0.95)
CRP SERPL-MCNC: <3 MG/L
DEPRECATED HCO3 PLAS-SCNC: 28 MMOL/L (ref 22–29)
EGFRCR SERPLBLD CKD-EPI 2021: 45 ML/MIN/1.73M2
EOSINOPHIL # BLD AUTO: 0.2 10E3/UL (ref 0–0.7)
EOSINOPHIL NFR BLD AUTO: 7 %
ERYTHROCYTE [DISTWIDTH] IN BLOOD BY AUTOMATED COUNT: 13.1 % (ref 10–15)
ERYTHROCYTE [SEDIMENTATION RATE] IN BLOOD BY WESTERGREN METHOD: 38 MM/HR (ref 0–30)
GLUCOSE SERPL-MCNC: 115 MG/DL (ref 70–99)
HCT VFR BLD AUTO: 36.1 % (ref 35–47)
HGB BLD-MCNC: 11.4 G/DL (ref 11.7–15.7)
IMM GRANULOCYTES # BLD: 0 10E3/UL
IMM GRANULOCYTES NFR BLD: 0 %
LYMPHOCYTES # BLD AUTO: 1.2 10E3/UL (ref 0.8–5.3)
LYMPHOCYTES NFR BLD AUTO: 37 %
MCH RBC QN AUTO: 31 PG (ref 26.5–33)
MCHC RBC AUTO-ENTMCNC: 31.6 G/DL (ref 31.5–36.5)
MCV RBC AUTO: 98 FL (ref 78–100)
MONOCYTES # BLD AUTO: 0.3 10E3/UL (ref 0–1.3)
MONOCYTES NFR BLD AUTO: 10 %
NEUTROPHILS # BLD AUTO: 1.4 10E3/UL (ref 1.6–8.3)
NEUTROPHILS NFR BLD AUTO: 44 %
PHOSPHATE SERPL-MCNC: 4 MG/DL (ref 2.5–4.5)
PLATELET # BLD AUTO: 233 10E3/UL (ref 150–450)
POTASSIUM SERPL-SCNC: 4.9 MMOL/L (ref 3.4–5.3)
RBC # BLD AUTO: 3.68 10E6/UL (ref 3.8–5.2)
SODIUM SERPL-SCNC: 142 MMOL/L (ref 135–145)
WBC # BLD AUTO: 3.1 10E3/UL (ref 4–11)

## 2023-11-10 PROCEDURE — 84450 TRANSFERASE (AST) (SGOT): CPT

## 2023-11-10 PROCEDURE — 36415 COLL VENOUS BLD VENIPUNCTURE: CPT

## 2023-11-10 PROCEDURE — 85025 COMPLETE CBC W/AUTO DIFF WBC: CPT

## 2023-11-10 PROCEDURE — 84460 ALANINE AMINO (ALT) (SGPT): CPT

## 2023-11-10 PROCEDURE — 85652 RBC SED RATE AUTOMATED: CPT

## 2023-11-10 PROCEDURE — 80069 RENAL FUNCTION PANEL: CPT

## 2023-11-10 PROCEDURE — 86140 C-REACTIVE PROTEIN: CPT

## 2023-11-17 ENCOUNTER — VIRTUAL VISIT (OUTPATIENT)
Dept: NEPHROLOGY | Facility: CLINIC | Age: 83
End: 2023-11-17
Payer: COMMERCIAL

## 2023-11-17 DIAGNOSIS — N18.31 STAGE 3A CHRONIC KIDNEY DISEASE (H): Primary | ICD-10-CM

## 2023-11-17 DIAGNOSIS — D50.9 IRON DEFICIENCY ANEMIA, UNSPECIFIED IRON DEFICIENCY ANEMIA TYPE: ICD-10-CM

## 2023-11-17 DIAGNOSIS — E55.9 VITAMIN D DEFICIENCY, UNSPECIFIED: ICD-10-CM

## 2023-11-17 PROCEDURE — 99214 OFFICE O/P EST MOD 30 MIN: CPT | Mod: VID

## 2023-11-17 NOTE — PROGRESS NOTES
Virtual Visit Details    Type of service:  Video Visit   Video Start Time:  1300  Video End Time: 1315    Originating Location (pt. Location): Home    Distant Location (provider location):  On-site  Platform used for Video Visit: [employer]

## 2023-11-17 NOTE — LETTER
"11/17/2023       RE: Patrick Olson  [address]  [facility] [address]     Dear Colleague,    Thank you for referring your patient, Patrick Olson, to the [facility] [place] at [facility]. Please see a copy of my visit note below.    Virtual Visit Details    Type of service:  Video Visit   Video Start Time:  1300  Video End Time: 1315    Originating Location (pt. Location): Home    Distant Location (provider location):  On-site  Platform used for Video Visit: [facility]    Nephrology Video Visit 11/17/23    Assessment and Plan:    CKD3a - Creat 1.2, eGFR 45 ml/mn. 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 Recurrent GIB - Hgb 11.4   - Had capsule endoscopy 2/23 EGD 2/23, colonoscopy 8/23   - Capsule endoscopy found \"A single angioectasia without bleeding in the                          duodenum\"   - The EGD findings were duodenal erosion w/o bleeding   - The Colonoscopy findings were widespread diverticulosis throughout the entire colon w/o active bleeding. There was residual blood in the colon   - Has further follow up with GI and Hematology in the next few months    3. CV - No home readings, but clinic readings 125/58 and 127/76.     4. RA - Off Methotrexate and now on Plaquenil.    - Had Rheum follow up 10/19/23 and Plaquenil continued.     5. Electrolytes - No acute concerns. K 4.9 Na 142    6. Acid base - No acute concerns. Bicarb 28    7. BMD - Ca 10.0 Phos 4.0 Albumin 4.1   Vit D 89 PTH 38 ( 8/23)   Discontinued Vit D 9/23   Remains on Ca/D one per day    8. Disposition - RTC 6 months for follow up w/labs prior     Assessment and plan was discussed with " patient and she voiced her understanding and agreement.    Reason for Visit:  CKD3a    HPI:  Ms Bang is an 81 yo female with RA, Recurrent GIB, CKD3 with recent hospital admission 8/12-8/14/23 for Diverticular bleed c/b mild KUSHAL with Creat 1.3 upon admission.   Discharge creat 1.2 .Baseline creat 0.9-1.1    ROS:   A comprehensive review of systems was obtained and negative, except as noted in the HPI or PMH.  Denies NSAIDs  No home blood pressures  No black or bloody stools  Participating in regular exercise ( walking and exercise class)  Patrick is worried about not being able to gain weight    Chronic Health Problems:    RA ( previously on Methotrexate)   Recurrent GIB  CKD3   Anemia  Diverticulosis    Family Hx:   No family history on file.    Personal Hx:   , retired [employer] , NS, ETOH none    Allergies:  Allergies   Allergen Reactions    Bee Venom Anaphylaxis    Shrimp Anaphylaxis    Evoxac [Cevimeline] Unknown    Prevnar Swelling     Other reaction(s): Edema    Prevnar [Pneumococcal 13-Linda Conj Vacc] Unknown       Medications:  Current Outpatient Medications   Medication Sig    acetaminophen (TYLENOL) 650 MG CR tablet Take 650 mg by mouth every 8 hours as needed for fever or pain Taking 2 - 650 mg tablets every 8 hrs.    calcium carbonate-vitamin D (CALTRATE) 600-10 MG-MCG per tablet Take 1 tablet by mouth daily    famotidine (PEPCID) 20 MG tablet Take 20 mg by mouth At Bedtime    folic acid (FOLVITE) 1 MG tablet Take 1 tablet (1 mg) by mouth daily    gabapentin (NEURONTIN) 300 MG capsule Take 1 capsule (300 mg) by mouth 3 times daily Take 300mg in morning, 300mg in afternoon and 300mg    hydroxychloroquine (PLAQUENIL) 200 MG tablet Take 1 tablet (200 mg) by mouth daily    lifitegrast (XIIDRA) 5 % opthalmic solution 1 drop 2 times daily    Multiple Vitamins-Minerals (OCUVITE PRESERVISION PO) Take 1 tablet by mouth 2 times daily    omeprazole (PRILOSEC) 40 MG DR capsule Take 1 capsule (40 mg) by  mouth daily    pilocarpine (SALAGEN) 5 MG tablet Take 1 tablet (5 mg) by mouth daily     No current facility-administered medications for this visit.      Vitals:  There were no vitals taken for this visit.    Exam:  GENERAL APPEARANCE: alert and no distress  RESP: Breathing is non labored. Speaking in full sentences  NEURO: mentation intact and speech normal  PSYCH: affect normal/bright    LABS:   CMP  Recent Labs   Lab Test 11/10/23  0847 10/04/23  1320 09/01/23  1224 08/21/23  1014 08/14/23  0726 10/01/21  0927 01/23/21  0902 01/20/21  0851 01/19/21 2001 03/13/17  1042     --  142 144 143   < > 143 142 142 144   POTASSIUM 4.9  --  5.3 4.6 3.9   < > 3.6 4.7 4.0 3.7   CHLORIDE 105  --  106 105 106   < > 114* 110* 108 107   CO2 28  --  27 26 21*   < > 26 28 30 28   ANIONGAP 9  --  9 13 16*   < > 3 4 3 9   *  --  98 91 73   < > 157* 95 121* 98   BUN 22.7  --  13.0 16.6 15.0   < > 6* 17 24 12   CR 1.22* 1.17* 1.23* 1.20* 1.36*   < > 0.75 0.82 0.94 0.78   GFRESTIMATED 45* 47* 44* 46* 39*   < > 76 69 58* 73   GFRESTBLACK  --   --   --   --   --   --  88 80 67 88   EJ 10.0  --  9.9 10.0 9.7   < > 8.0* 8.8 9.4 9.0    < > = values in this interval not displayed.     Recent Labs   Lab Test 11/10/23  0847 10/04/23  1320 08/21/23  1014 08/13/23  0619 08/12/23  1147 05/30/23  1024 05/11/23  0949 11/10/22  1000 08/26/22  1003   BILITOTAL  --   --   --  0.4 0.3  --  0.3  --  0.5   ALKPHOS  --   --   --  67 76  --  86  --  79   ALT 12 15 17 11 13   < > 24   < > 22   AST 26 27 33 27 27   < > 32   < > 22    < > = values in this interval not displayed.     CBC  Recent Labs   Lab Test 11/10/23  0847 10/04/23  1320 09/01/23  1224 08/21/23  1014   HGB 11.4* 10.7* 8.6* 9.4*   WBC 3.1* 4.0 3.3* 4.4   RBC 3.68* 3.27* 2.66* 2.90*   HCT 36.1 33.2* 26.8* 28.5*   MCV 98 102* 101* 98   MCH 31.0 32.7 32.3 32.4   MCHC 31.6 32.2 32.1 33.0   RDW 13.1 13.7 15.5* 15.1*    241 228 272     URINE STUDIES  Recent Labs   Lab Test  09/01/23  1229 08/21/23  1019 08/13/23  1017 02/07/23  1000   COLOR Light Yellow Yellow Straw Yellow   APPEARANCE Clear Clear Clear Clear   URINEGLC Negative Negative Negative Negative   URINEBILI Negative Negative Negative Negative   URINEKETONE Negative Trace* Negative Negative   SG 1.009 1.015 1.009 1.015   UBLD Negative Trace* Negative Small*   URINEPH 7.0 6.5 5.0 6.0   PROTEIN Negative Negative Negative Negative   UROBILINOGEN  --  0.2  --  0.2   NITRITE Negative Negative Negative Negative   LEUKEST Negative Small* Negative Negative   RBCU 1 0-2 <1 0-2   WBCU 2 5-10* 1 0-5     Recent Labs   Lab Test 03/10/22  1420   [facility] 0.27*     PTH  Recent Labs   Lab Test 08/21/23  1014 02/07/23  1000 03/10/22  1410   PTHI 38 43 46     IRON STUDIES  Recent Labs   Lab Test 10/04/23  1320 05/11/23  0949 03/07/23  1318   IRON 45 61 163*    269 283   IRONSAT 16 23 58*   ANMOL 42 95 96       Francine Bruner, NP

## 2023-11-17 NOTE — NURSING NOTE
Is the patient currently in the state of [place]? YES    Visit mode:VIDEO    If the visit is dropped, the patient can be reconnected by: VIDEO VISIT: Send to e-mail at: [email or web address]    Will anyone else be joining the visit? NO  (If patient encounters technical issues they should call [phone]    How would you like to obtain your AVS? MyChart    Are changes needed to the allergy or medication list? No    Reason for visit: RECHECK    Fernandez CHARLES

## 2023-11-20 NOTE — PROGRESS NOTES
"Nephrology Video Visit 11/17/23    Assessment and Plan:    CKD3a - Creat 1.2, eGFR 45 ml/mn. No albuminuria   - New baseline creat 1.1-1.2    - Etiology for her CKD is unrecovered KUSHAL, recurrent UKSHAL 2/2 GIB and previous Methotrexate   - Creat did decline to 1.1 on 10/4 but back to 1.2 most recently   - UA neg for blood/protein   - She is off Methotrexate   - She is normotensive    - Does not use NSAIDs    2. Recurrent GIB - Hgb 11.4   - Had capsule endoscopy 2/23 EGD 2/23, colonoscopy 8/23   - Capsule endoscopy found \"A single angioectasia without bleeding in the                          duodenum\"   - The EGD findings were duodenal erosion w/o bleeding   - The Colonoscopy findings were widespread diverticulosis throughout the entire colon w/o active bleeding. There was residual blood in the colon   - Has further follow up with GI and Hematology in the next few months    3. CV - No home readings, but clinic readings 125/58 and 127/76.     4. RA - Off Methotrexate and now on Plaquenil.    - Had Rheum follow up 10/19/23 and Plaquenil continued.     5. Electrolytes - No acute concerns. K 4.9 Na 142    6. Acid base - No acute concerns. Bicarb 28    7. BMD - Ca 10.0 Phos 4.0 Albumin 4.1   Vit D 89 PTH 38 ( 8/23)   Discontinued Vit D 9/23   Remains on Ca/D one per day    8. Disposition - RTC 6 months for follow up w/labs prior     Assessment and plan was discussed with patient and she voiced her understanding and agreement.    Reason for Visit:  CKD3a    HPI:  Ms Bang is an 83 yo female with RA, Recurrent GIB, CKD3 with recent hospital admission 8/12-8/14/23 for Diverticular bleed c/b mild KUSHAL with Creat 1.3 upon admission.   Discharge creat 1.2 .Baseline creat 0.9-1.1    ROS:   A comprehensive review of systems was obtained and negative, except as noted in the HPI or PMH.  Denies NSAIDs  No home blood pressures  No black or bloody stools  Participating in regular exercise ( walking and exercise class)  Patrick is worried " about not being able to gain weight    Chronic Health Problems:    RA ( previously on Methotrexate)   Recurrent GIB  CKD3   Anemia  Diverticulosis    Family Hx:   No family history on file.    Personal Hx:   , retired [employer] , NS, ETOH none    Allergies:  Allergies   Allergen Reactions    Bee Venom Anaphylaxis    Shrimp Anaphylaxis    Evoxac [Cevimeline] Unknown    Prevnar Swelling     Other reaction(s): Edema    Prevnar [Pneumococcal 13-Linda Conj Vacc] Unknown       Medications:  Current Outpatient Medications   Medication Sig    acetaminophen (TYLENOL) 650 MG CR tablet Take 650 mg by mouth every 8 hours as needed for fever or pain Taking 2 - 650 mg tablets every 8 hrs.    calcium carbonate-vitamin D (CALTRATE) 600-10 MG-MCG per tablet Take 1 tablet by mouth daily    famotidine (PEPCID) 20 MG tablet Take 20 mg by mouth At Bedtime    folic acid (FOLVITE) 1 MG tablet Take 1 tablet (1 mg) by mouth daily    gabapentin (NEURONTIN) 300 MG capsule Take 1 capsule (300 mg) by mouth 3 times daily Take 300mg in morning, 300mg in afternoon and 300mg    hydroxychloroquine (PLAQUENIL) 200 MG tablet Take 1 tablet (200 mg) by mouth daily    lifitegrast (XIIDRA) 5 % opthalmic solution 1 drop 2 times daily    Multiple Vitamins-Minerals (OCUVITE PRESERVISION PO) Take 1 tablet by mouth 2 times daily    omeprazole (PRILOSEC) 40 MG DR capsule Take 1 capsule (40 mg) by mouth daily    pilocarpine (SALAGEN) 5 MG tablet Take 1 tablet (5 mg) by mouth daily     No current facility-administered medications for this visit.      Vitals:  There were no vitals taken for this visit.    Exam:  GENERAL APPEARANCE: alert and no distress  RESP: Breathing is non labored. Speaking in full sentences  NEURO: mentation intact and speech normal  PSYCH: affect normal/bright    LABS:   CMP  Recent Labs   Lab Test 11/10/23  0847 10/04/23  1320 09/01/23  1224 08/21/23  1014 08/14/23  0726 10/01/21  0927 01/23/21  0902 01/20/21  0851 01/19/21 2001  03/13/17  1042     --  142 144 143   < > 143 142 142 144   POTASSIUM 4.9  --  5.3 4.6 3.9   < > 3.6 4.7 4.0 3.7   CHLORIDE 105  --  106 105 106   < > 114* 110* 108 107   CO2 28  --  27 26 21*   < > 26 28 30 28   ANIONGAP 9  --  9 13 16*   < > 3 4 3 9   *  --  98 91 73   < > 157* 95 121* 98   BUN 22.7  --  13.0 16.6 15.0   < > 6* 17 24 12   CR 1.22* 1.17* 1.23* 1.20* 1.36*   < > 0.75 0.82 0.94 0.78   GFRESTIMATED 45* 47* 44* 46* 39*   < > 76 69 58* 73   GFRESTBLACK  --   --   --   --   --   --  88 80 67 88   EJ 10.0  --  9.9 10.0 9.7   < > 8.0* 8.8 9.4 9.0    < > = values in this interval not displayed.     Recent Labs   Lab Test 11/10/23  0847 10/04/23  1320 08/21/23  1014 08/13/23  0619 08/12/23  1147 05/30/23  1024 05/11/23  0949 11/10/22  1000 08/26/22  1003   BILITOTAL  --   --   --  0.4 0.3  --  0.3  --  0.5   ALKPHOS  --   --   --  67 76  --  86  --  79   ALT 12 15 17 11 13   < > 24   < > 22   AST 26 27 33 27 27   < > 32   < > 22    < > = values in this interval not displayed.     CBC  Recent Labs   Lab Test 11/10/23  0847 10/04/23  1320 09/01/23  1224 08/21/23  1014   HGB 11.4* 10.7* 8.6* 9.4*   WBC 3.1* 4.0 3.3* 4.4   RBC 3.68* 3.27* 2.66* 2.90*   HCT 36.1 33.2* 26.8* 28.5*   MCV 98 102* 101* 98   MCH 31.0 32.7 32.3 32.4   MCHC 31.6 32.2 32.1 33.0   RDW 13.1 13.7 15.5* 15.1*    241 228 272     URINE STUDIES  Recent Labs   Lab Test 09/01/23  1229 08/21/23  1019 08/13/23  1017 02/07/23  1000   COLOR Light Yellow Yellow Straw Yellow   APPEARANCE Clear Clear Clear Clear   URINEGLC Negative Negative Negative Negative   URINEBILI Negative Negative Negative Negative   URINEKETONE Negative Trace* Negative Negative   SG 1.009 1.015 1.009 1.015   UBLD Negative Trace* Negative Small*   URINEPH 7.0 6.5 5.0 6.0   PROTEIN Negative Negative Negative Negative   UROBILINOGEN  --  0.2  --  0.2   NITRITE Negative Negative Negative Negative   LEUKEST Negative Small* Negative Negative   RBCU 1 0-2 <1 0-2    WBCU 2 5-10* 1 0-5     Recent Labs   Lab Test 03/10/22  1420   [facility] 0.27*     PTH  Recent Labs   Lab Test 08/21/23  1014 02/07/23  1000 03/10/22  1410   PTHI 38 43 46     IRON STUDIES  Recent Labs   Lab Test 10/04/23  1320 05/11/23  0949 03/07/23  1318   IRON 45 61 163*    269 283   IRONSAT 16 23 58*   ANMOL 42 95 96       Francine Bruner, NP

## 2023-11-27 ENCOUNTER — TELEPHONE (OUTPATIENT)
Dept: NEPHROLOGY | Facility: CLINIC | Age: 83
End: 2023-11-27
Payer: COMMERCIAL

## 2023-11-27 NOTE — TELEPHONE ENCOUNTER
LVM & sent mychart // pt needs to schedule 6 month Return Neph with Selene Zohreh around 5.17.24 // first attempt, AN 11.27.23

## 2023-11-28 DIAGNOSIS — M06.9 RHEUMATOID ARTHRITIS INVOLVING MULTIPLE SITES, UNSPECIFIED WHETHER RHEUMATOID FACTOR PRESENT (H): ICD-10-CM

## 2023-11-28 DIAGNOSIS — Z79.899 HIGH RISK MEDICATION USE: ICD-10-CM

## 2023-11-28 RX ORDER — FOLIC ACID 1 MG/1
1000 TABLET ORAL DAILY
Qty: 90 TABLET | Refills: 3 | Status: SHIPPED | OUTPATIENT
Start: 2023-11-28 | End: 2024-07-15

## 2023-11-28 NOTE — TELEPHONE ENCOUNTER
10/19/2023  [facility] [place]     Greg Clements MD  Rheumatology   RF per protocol

## 2023-12-07 ENCOUNTER — TELEPHONE (OUTPATIENT)
Dept: RHEUMATOLOGY | Facility: CLINIC | Age: 83
End: 2023-12-07
Payer: COMMERCIAL

## 2023-12-25 DIAGNOSIS — M06.9 RHEUMATOID ARTHRITIS INVOLVING MULTIPLE SITES, UNSPECIFIED WHETHER RHEUMATOID FACTOR PRESENT (H): ICD-10-CM

## 2023-12-29 RX ORDER — PILOCARPINE HYDROCHLORIDE 5 MG/1
5 TABLET, FILM COATED ORAL DAILY
Qty: 90 TABLET | Refills: 1 | Status: SHIPPED | OUTPATIENT
Start: 2023-12-29 | End: 2024-07-15

## 2023-12-29 NOTE — TELEPHONE ENCOUNTER
PILOCARPINE HCL 5 MG TABLET     Last Written Prescription Date:  6/29/23  Last Fill Quantity: 90,   # refills: 1  Last Office Visit : 10/19/23  Future Office visit:  1/12/24    Routing refill request to provider for review/approval because:  Drug not on the [facility], [facility] or [facility] refill protocol

## 2024-01-05 ENCOUNTER — OFFICE VISIT (OUTPATIENT)
Dept: DERMATOLOGY | Facility: CLINIC | Age: 84
End: 2024-01-05
Payer: COMMERCIAL

## 2024-01-05 DIAGNOSIS — L30.9 DERMATITIS: ICD-10-CM

## 2024-01-05 DIAGNOSIS — D22.9 MULTIPLE BENIGN NEVI: ICD-10-CM

## 2024-01-05 DIAGNOSIS — L81.4 LENTIGINES: ICD-10-CM

## 2024-01-05 DIAGNOSIS — L21.9 DERMATITIS, SEBORRHEIC: Primary | ICD-10-CM

## 2024-01-05 DIAGNOSIS — L82.1 SEBORRHEIC KERATOSIS: ICD-10-CM

## 2024-01-05 DIAGNOSIS — L65.9 NON-SCARRING HAIR LOSS: ICD-10-CM

## 2024-01-05 DIAGNOSIS — D18.01 CHERRY ANGIOMA: ICD-10-CM

## 2024-01-05 PROCEDURE — 99214 OFFICE O/P EST MOD 30 MIN: CPT | Performed by: DERMATOLOGY

## 2024-01-05 RX ORDER — CLOBETASOL PROPIONATE 0.5 MG/G
OINTMENT TOPICAL 2 TIMES DAILY
Qty: 60 G | Refills: 3 | Status: SHIPPED | OUTPATIENT
Start: 2024-01-05

## 2024-01-05 ASSESSMENT — PAIN SCALES - GENERAL: PAINLEVEL: NO PAIN (0)

## 2024-01-05 NOTE — PATIENT INSTRUCTIONS
Start clobetasol ointment twice daily as needed to right ankle rash  Recheck 3-4 months    Consider rogaine for scalp - 5% foam is my favorite    Topical Rogaine (Minoxidil) for Pattern Hair Loss    Minoxidil is an FDA approved over the counter topical for the treatment of hair loss and thinning hair in men and women.   Initially a 2% solution was available however this required application twice daily. A 5% solution is also now approved, only requiring application once per day.     Available Products:   Rogaine 5% solution: Packaged for men however can be used by men or women. Use dropper and apply directly to scalp at bedtime. This product can cause an allergy because of presence of propylene glycol. Stop this product if you develop a rash or itching and contact your physician.   Rogaine 5% foam: Packaged for men and women: Apply foam directly to the scalp once daily. This is less greasy compared to the solution. This formula is preferred for those who had a reaction to the solution product. If you develop rash or itching, stop the product and contact your physician.     What if I stop minoxidil topical?   After stopping minoxidil the hair will return to the usual pattern of thinning. Using the product 3-4 times per week is better than not using product at all.       Can I use generic minoxidil?   Yes, look for 5% minoxidil.     What are the side effects?  The most common side effect is rash or itching of the scalp. This can occur if a contact allergy develops with propylene glycol. A small group of patients noticed the appearance of facial hair if the product runs onto the face or with prolonged use. Keep it away from the face.  This can cause temporary shedding. Please, call us if this happens.  This can irritated the scalp. Please, call us if this happens.  Stop this product if you become pregnant or are breastfeeding      Last updated: 11/2/2021           Checking for Skin Cancer  You can help find cancer  early by checking your skin each month. There are 3 main kinds of skin cancer: melanoma, basal cell carcinoma, and squamous cell carcinoma. Doing monthly skin checks is the best way to find new marks, sores, or skin changes. Follow these instructions for checking your skin.   The ABCDEs of checking moles for melanoma   Check your moles or growths for signs of melanoma using ABCDE:   Asymmetry: The sides of the mole or growth don t match.  Border: The edges are ragged, notched, or blurred.  Color: The color within the mole or growth varies. It could be black, brown, tan, white, or shades of red, gray, or blue.  Diameter: The mole or growth is larger than   inch or 6 mm (size of a pencil eraser).  Evolving: The size, shape, texture, or color of the mole or growth is changing.     ABCDE's of moles on light skin.        ABCDE's of moles on dark skin may be harder to identify.     Checking for other types of skin cancer  Basal cell carcinoma or squamous cell carcinoma cause symptoms like:     A spot or mole that looks different from all other marks on your skin  Changes in how an area feels, such as itching, tenderness, or pain  Changes in the skin's surface, such as oozing, bleeding, or scaliness  A sore that doesn't heal  New swelling, redness, or spread of color beyond the border of a mole    Who s at risk?  Anyone of any skin color can get skin cancer. But you're at greater risk if you have:   Fair skin that freckles easily and burns instead of tanning  Light-colored or red hair  Light-colored eyes  Many moles or abnormal moles on your skin  A long history of unprotected exposure to sunlight or tanning beds  A history of many blistering sunburns as a child or teen  A family history of skin cancer  Been exposed to radiation or chemicals  A weakened immune system  Been exposed to arsenic  If you've had skin cancer in the past, you're at high risk of having it again.   How to check your skin  Do your monthly skin  checkups in front of a full-length mirror. Use a room with good lighting so it's easier to see. Use a hand mirror to look at hard-to-see places like your buttocks and back. You can also have a trusted friend or family member help you with these checks. Check every part of your body, including your:   Head (ears, face, neck, and scalp)  Torso (front, back, sides, and under breasts)  Arms (tops, undersides, and armpits)  Hands (palms, backs, and fingers, including under the nails)  Lower back, buttocks, and genitals  Legs (front, back, and sides)  Feet (tops, soles, toes, including under the nails, and between toes)  Watch for new spots on your skin or a spot that's changing in color, shape, size.   If you have a lot of moles, take digital photos of them each month. Make sure to take photos both up close and from a distance. These can help you see if any moles change over time.   Know your skin  Most skin changes aren't cancer. But if you see any changes in your skin, call your healthcare provider right away. Only they can tell you if a change is a problem. If you have skin cancer, seeing your provider can be the first step to getting the treatment that could save your life.   [employer] last reviewed this educational content on 10/1/2021    8844-2924 [employer]. All rights reserved. This information is not intended as a substitute for professional medical care. Always follow your healthcare professional's instructions.

## 2024-01-05 NOTE — NURSING NOTE
Dermatology Rooming Note    Patrick Olson's goals for this visit include:   Chief Complaint   Patient presents with    Skin Check     Here today for a skin check. Rash on right ankle      Emily Lockwood RN

## 2024-01-05 NOTE — PROGRESS NOTES
"[facility] Dermatology Note  Encounter Date: Jan 5, 2024  Office Visit     Dermatology Problem List:  Last skin check 01/05/24  # Pertinent PMH: Rheumatoid arthritis and Sjogren's: methotrexate 7.5 mg weekly  - prior: hydroxychloroquine 200 mg daily  1. Xerosis cutis, well-controlled  - Using \"sensitive skin\" products, avoiding irritants, moisturizing regimen  - avoiding retinoid application to affected areas  - hydrocortisone ointment BID PRN to affected areas until resolved  2. Seb Derm, well-controlled  - currently head and shoulders with intermittent ketoconazole shampoo weekly  - Previous: fluocinolone oil for scalp  (too messy)  3. Non scarring hair loss  - Future considerations: Rogaine  4. SK, left axilla, s/p shave bx 3/21/2023   5. Rash, right ankle and dorsal foot  - Current tx: clobetasol 0.05% ointment BID prn      ____________________________________________    Assessment & Plan:      # Rash, right ankle and dorsal foot, unclear etiology. Consider LSC with underlying varicose veins/stasis changes. Also consider lichen planus, though view as less likely.  - Start clobetasol 0.05% ointment BID prn.  - Photo obtained today.     # Non-scarring hair loss. Chronic, active.  Ddx androgenetic +/- telogen.   - Recommended Rogaine 5% foam  *ferritin recently 42 (10/04/23, vitamin D 89 (08/21/23), TSH 1.13 (03/27/23)     # Multiple benign nevi.   - Monitor for ABCDEs of melanoma   - Continue sun protection - recommend SPF 30 or higher with frequent application   - Return sooner if noticing changing or symptomatic lesions    # Benign lesions - SKs, cherry angiomas, lentigenes.  - No treatment required      Procedures Performed:   None.    Follow-up: 3- 6 month(s) in-person, or earlier for new or changing lesions    Staff and Scribe:     Scribe Disclosure:   SHERLYN PEREIRA, am serving as a scribe; to document services personally performed by Rubina Grey MD -based on data collection " and the provider's statements to me.    Provider Disclosure:   The documentation recorded by the scribe accurately reflects the services I personally performed and the decisions made by me.    Rubina Grey MD    Department of Dermatology  [facility]: Phone: [phone], Fax: [phone]  1/11/2024       ____________________________________________    CC: Skin Check (Here today for a skin check. Rash on right ankle )    HPI:  Ms. Patrick Olson is a(n) 83 year old female who presents today as a return patient for [facility].    She presents with a rash on her right ankle. She reports has been there for about 3 years. Denies pain. Because of the rash, she can't wear sandals.    Patient is otherwise feeling well, without additional skin concerns.    Labs Reviewed:  Pathology 03/21/23:  A(1). Left axilla:  - Seborrheic keratosis    Physical Exam:  Vitals: There were no vitals taken for this visit.  SKIN: Total skin excluding the undergarment areas was performed. The exam included the head/face, neck, both arms, chest, back, abdomen, both legs, digits and/or nails.   - Right ankle and dorsal foot, several hyperpigmented somewhat purplish macules and papules with overlying faint scale.  - There are dome shaped bright red papules on the trunk and extremities .   - Multiple regular brown pigmented macules and papules are identified on the trunk and extremities. .   - Scattered brown macules on sun exposed areas.  - Waxy stuck on papules and plaques on trunk and extremities.  - decreased hair density on scalp, no erythema or scale    - No other lesions of concern on areas examined.     Medications:  Current Outpatient Medications   Medication    acetaminophen (TYLENOL) 650 MG CR tablet    calcium carbonate-vitamin D (CALTRATE) 600-10 MG-MCG per tablet    famotidine (PEPCID) 20 MG tablet    folic acid (FOLVITE) 1 MG tablet     gabapentin (NEURONTIN) 300 MG capsule    hydroxychloroquine (PLAQUENIL) 200 MG tablet    lifitegrast (XIIDRA) 5 % opthalmic solution    Multiple Vitamins-Minerals (OCUVITE PRESERVISION PO)    omeprazole (PRILOSEC) 40 MG DR capsule    pilocarpine (SALAGEN) 5 MG tablet     No current facility-administered medications for this visit.      Past Medical History:   Patient Active Problem List   Diagnosis    SNHL (sensorineural hearing loss)    GI bleed    Gastrointestinal hemorrhage, unspecified gastrointestinal hemorrhage type    Diverticular hemorrhage    Acute lower GI bleeding    Diverticulosis of large intestine    Lab test negative for COVID-19 virus    Chronic pain of right elbow    Right wrist pain    Lower GI bleed     Past Medical History:   Diagnosis Date    Anemia     CKD (chronic kidney disease) stage 3, GFR 30-59 ml/min (H)     Diverticulosis of large intestine     Osteoarthritis     Osteoporosis     Rheumatoid arthritis (H)     Sensorineural hearing loss         CC Referred Self, MD  No address on file on close of this encounter.

## 2024-01-12 ENCOUNTER — OFFICE VISIT (OUTPATIENT)
Dept: RHEUMATOLOGY | Facility: CLINIC | Age: 84
End: 2024-01-12
Payer: COMMERCIAL

## 2024-01-12 VITALS
BODY MASS INDEX: 16.14 KG/M2 | WEIGHT: 94 LBS | SYSTOLIC BLOOD PRESSURE: 127 MMHG | DIASTOLIC BLOOD PRESSURE: 72 MMHG | HEART RATE: 71 BPM | OXYGEN SATURATION: 100 %

## 2024-01-12 DIAGNOSIS — M06.9 RHEUMATOID ARTHRITIS INVOLVING MULTIPLE SITES, UNSPECIFIED WHETHER RHEUMATOID FACTOR PRESENT (H): ICD-10-CM

## 2024-01-12 DIAGNOSIS — Z79.899 HIGH RISK MEDICATION USE: ICD-10-CM

## 2024-01-12 PROCEDURE — 99214 OFFICE O/P EST MOD 30 MIN: CPT | Performed by: STUDENT IN AN ORGANIZED HEALTH CARE EDUCATION/TRAINING PROGRAM

## 2024-01-12 RX ORDER — HYDROXYCHLOROQUINE SULFATE 200 MG/1
200 TABLET, FILM COATED ORAL DAILY
Qty: 90 TABLET | Refills: 1 | Status: SHIPPED | OUTPATIENT
Start: 2024-01-12

## 2024-01-12 ASSESSMENT — PAIN SCALES - GENERAL: PAINLEVEL: MILD PAIN (3)

## 2024-01-12 NOTE — PROGRESS NOTES
Outpatient Rheumatology follow-up    Name: Patrick Olson    MRN [id number]   Today's date: January 12, 2024  Date of last visit: 10/19/2023         Reason for follow-up: Rheumatoid arthritis on hydroxychloroquine   Requesting physician: Lauryn Tamayo MD             Assessment & Plan:   83-year-old female with a history of seropositive rheumatoid arthritis with secondary Sjogren's syndrome on  mg daily and 5 mg Salagen nightly presented to rheumatology to establish care in Oct 2021.  At that time she had recently seen Dr Sen of hematology for evaluation and treatment of macrocytic anemia likely secondary to chronic inflammation vs methotrexate therapy vs recurrent diverticular bleeding most recently in January 2021 required hospitalization and transfusion.  Her seropositive rheumatoid arthritis was under good control and in remission on low-dose of methotrexate which was on the low end of therapeutic dose range.  Given the likely contribution to her anemia and possibly leukopenia in the context of well-controlled low disease activity we discussed other options for her inflammatory arthritis we did initially transition her from methotrexate to hydroxychloroquine and she did well on this from RA/sjogrens/cytopenia perspective, though she developed a rash which was evaluated by Dr Stevenson of Dermatology and thought NOT secondary to HCQ. However, patient still wished to discontinue HCQ and so we discussed going back on very low dose methotrexate while we monitor cell counts. We restarted methotrexate at a dose of 10mg once weekly along with folic acid daily. Unfortunately, her WBC, after restarting dropped and continued to down trend. Reassuringly, her HGB has continued to uptrend and along with iron supplementation. We again discussed that the rash that she developed while on HCQ was very unlikely to be secondary to HCQ, especially given the evaluation she had with dermatologist, Dr Stevenson/his  assessment/expertise in CTD and experience with HCQ. She was reassured by this and elected to go back onto HCQ at that time.  She has been on Plaquenil 200 mg daily for the last 2 years.  Denies any skin rash with Plaquenil use.  No joint pain flares.  She has chronic pain in her fingers, wrist and shoulder which is related to degenerative arthritis rather than RA.    1. Rheumatoid Arthritis:  - Controlled on low-dose hydroxychloroquine 200 mg daily  - Joint examination shows no inflammation in MCPs, PIPs, and DIPs.  Bilateral wrists have reduced ROM but no synovitis or tenderness present.  No tenderness over elbows, normal range of motion.    - ESR has improved from 60s to 40s, ESR on 11/10/2023 was 38 indicating a decrease in inflammation.  - Plan: Continue Plaquenil 200 mg once daily. Schedule follow-up appointment in a year with labs in the week prior.    2. Back pain:  -Stable.  She has done PT, acupuncture before which has helped    3. Difficulty gaining weight:  - Patient has tried increasing fat intake but experienced lactose intolerance.  - Plan: Consider trying non-dairy shakes high in calories and protein, such as Boost or Ensure.    4. Skin concerns:  - Patient reports dark spots on the ankle and follows with dermatology.    - Plan: Continue monitoring skin and follow up with dermatologist as scheduled.    5. Dry mouth:  - Patient is taking pilocarpine 5 mg once daily and reports managing dry mouth with water intake during the night.  We discussed about trying cevimeline but it has been listed as an allergy in her chart.  She is not sure what side effects she had with cevimeline.  She opted not to try cevimeline at this time.  She will increase pilocarpine to 5 mg twice daily to see if that will help.  - Plan: Continue pilocarpine 5 mg once or twice daily as needed for dry mouth symptoms.    6. High risk medication use: HCQ  -No evidence of toxicity by history/exam today.  Most recent labs from 11/10/23  reviewed to include ESR, CRP, Ast, ALT, creatinine, albumin, CBC. No evidence of acute drug toxicity identified on her labs. Will continue with routine screening drug toxicity monitoring every 6 months.    -Continue routine screening yearly OCT exam, already has ophthalmologist that she is established with    #CKD 3a  -continues to follow with nephrology  -note renal function as we dose her systemic immunosuppression    Follow-up in a year    Clark Mix MD  Staff Rheumatologist, [facility]   Division of Rheumatic and Autoimmune diseases   Pager - [phone]       Subjective:     Interval History ( January 12, 2024 ): She is off of Methotrexate now for 2 years and takes  mg daily. Reports some pain in her shoulder, wrists, fingers and lower back.  She takes Tylenol 2 tab of 650 mg a day for pain. Her mouth and throat is dry, she takes Pilocarpine once a day before going to bed. She keep water at her beside and wakes up several times at night to drink water. When she swallow bread and other dry foods then she has trouble. She is seeing dermatologist for skin rash around her ankle.     Interval history 10/19/2023  - Continues on hydroxychloroquine 200 mg twice daily  - Had recent flare of inflammatory arthritis and was prescribed a tapering course of prednisone 15 mg daily for 7 days then 10 mg daily for 7 days then 5 mg daily for 7 days then stop.  She also required use of Tylenol during that time  -The patient reports that the prednisone prescribed has been effective, allowing her to walk more comfortably. She experiences intermittent joint pain, predominantly on the right side, affecting her thumb, wrist, and elbow. The patient has been performing exercises for her thumb and other affected joints. She mentions that her shoulder pain is more difficult to manage and has considered getting a cortisone shot but has not yet done so due to the pain subsiding before appointments.    The  patient has been receiving acupuncture for her back pain, which she finds helpful. She undergoes acupuncture sessions once a week. To manage morning stiffness, she performs stretches and strengthening exercises before getting out of bed. She also experiences pain in the late afternoon, which she manages by resting for half an hour and taking eight-hour Tylenol.    Regarding her skin, the patient reports no significant issues, but she has noticed dark spots on her ankle and has scheduled an appointment with a dermatologist in January. She has been diligent in using appropriate shampoos and plans to ask the dermatologist about non-alcoholic and non-fragrant products.    The patient denies experiencing any fevers, chills, or night sweats. She reports stable weight, although she has attempted to gain weight by consuming high-fat dairy products. However, she has become lactose intolerant and has stopped trying to gain weight. She has tried non-dairy shakes high in calories and protein.    She also mentions taking pilocarpine once daily at night and keeping water by her bed to manage dryness.    14 point review of systems collected and negative if not documented above.    Interval history 6/29/23  -July 7th acupuncture appointment. Trying this prior to her injection which has been recommended  -has continued on HCQ 200mg once daily  -Her joint pains come and go. Had right shoulder/elbow/wrist pain at different times. Self resolves within 2-3 weeks.   -Has appointent with hand OT in 2-3 weeks. Will be asking for brace for the wrist.   -Takes 2, 8 hour tylenol in the AM and 2 at night  -sitting for long periods is painful/difficult  -also with neck pain  -cannot eat large meals. Trying to eat high calorie dense meals. Trying to gain weight. She eats about 1200 calories per day. She is trying to bump it up to 1700 calories per day, but has not bumped up to this yet. She intermittently has 1700 and then back to   -Able to  make a full fist upon waking in the AM  -denies red/hot/swollen joints.   -no new progressive fevers/chills/night sweats  -no interval infections  -has been tolerating HCQ well without noticeable side effects, GI cutaneous or other.  14 point ROS collected and negative if not documented above    March 30, 2023  -continues to lose weight, unintentional. Despite eating as much as possible of calorie dense foods, unable to stop weight loss and unable to gain  -had low back injection one week ago, not too much helpful. Rates the pain 3-4/10. Before the injection rates the pain 6/10  -No fevers/chills/night sweats  -Saw ortho for her right shoulder, found to have rotator cuff pathology. Not secondary to active inflammatory arthritis etiology  -No red/hot/swollen joints.  -no prolonged EMS  -Does not find that her methotrexate is causing specific GI upset/nausea around the time of her once weekly dose. She is currently taking 10mg once weekly. Has also continued on folic acid 1mg once daily  14 point ROS collected and negative if not documented above     Interval History 5/19/22  Rash is continuing to improve. She changed/soap/fragrance. She had cortisone injection into her back 2 days ago, unable to sleep for 2 nights after that but now resolved and has notices much improvement. Today got up, did some cooking. Energy level improved. Has started on methotrexate and without any side effects. Started this on 4/23/22. Takes this each week on Monday evenings. No GI or other side effects. Also takes folic acid 1mg daily. Currently takes 10mg weekly and 1mg folic acid daily. No fatigue. Fevers/chills/night sweats. No infections since last visit. No joint pain. No red/hot/swollen joints. Minimal AM stiffness lasting for only a few minutes. She is looking forward to her upcoming trip to [place] with her family. No unintentional weight loss. 14 point ROS collected and negative.         Interval history 2/10/22  Did not notice any  return or worsening of inflammatory arthritis with transition from methotrexate to Plaquenil.  Reports less than 5 minutes of early morning stiffness.  No red hot swollen joints.  Rates her pain at less than a 2 out of 10.  No rash, headache, change in vision, GI upset.  No interval infection.  Continues to have ongoing left greater than right intermittent hip pain which is worse when lying on that side.  She continues to be active with frequent walking almost daily weather permitting.  Her hip pain is there a few days per month.  Resolves without intervention.  Had follow-up with GI on 01/12/2022.  They plan for repeat colonoscopy in 6 to 12 months.  She was put on a course of prednisone after a visit with sports medicine for her established lumbar disc disease and likely exacerbation.  She had a positive response of this for her axial pain.  She did not note much difference in regards to her peripheral arthritis symptoms as they had been inactive prior to starting this prednisone course.  Review of systems otherwise negative.    History from initial consultation in 10/2021  Patient with a diagnosis of seropositive rheumatoid arthritis and secondary Sjogren's syndrome on methotrexate 10 mg weekly, folic acid 2 mg daily and Salagen 5 mg each evening presents to rheumatology to discuss other therapies for her inflammatory arthritis in the setting of chronic macrocytic anemia and mild leukopenia.  She reports that overall her joint symptoms of peripheral joints have been well controlled even with her reduction of methotrexate dose from 20 mg weekly earlier this year down to 10 mg weekly due to the above concern.  She has not had worsening of joint pain, stiffness or any return of erythema edema or warmth.  She is able to make full fist upon waking in the morning.  Denies any early morning stiffness lasting more than 3 to 5 minutes.  Denies any fevers chills.  She was also previously taking Celebrex though in the  context of her recurrent diverticular bleeds this was discontinued earlier this year as well.  No worsening after this discontinuation either.  Has mild dry eyes and sees an ophthalmologist yearly.  She does have dry mouth and finds that the Salagen has been helpful.  She denies any swelling of her parotid glands or submandibular glands.  She drinks water frequently including overnight when needed.  Denies recurrent/progressive dental disease.  Sees a dentist regularly.  No new rash.  No fevers or chills.  No recurrent infection.  Her weight is now stable though she initially did have some weight loss earlier this year with her diverticular disease and change in diet.  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  No history of DVT.  No hair loss.  No inflammatory eye disease history.  No oral or nasal ulcers.  No recurrent epistaxes.  No photosensitive rash.  No chest pain or shortness of breath.  No change in strength or sensation in arms or legs.  On complaint at this time is ongoing lumbar spine pain for which she had previously seen orthospine.  Previously had injections which were helpful though the most recent was not.    Past Medical History  Past Medical History:   Diagnosis Date    Anemia     CKD (chronic kidney disease) stage 3, GFR 30-59 ml/min (H)     Diverticulosis of large intestine     Osteoarthritis     Osteoporosis     Rheumatoid arthritis (H)     Sensorineural hearing loss      Past Surgical History  Past Surgical History:   Procedure Laterality Date    CAPSULE/PILL CAM ENDOSCOPY N/A 11/18/2021    Procedure: IMAGING PROCEDURE, GI TRACT, INTRALUMINAL, VIA CAPSULE;  Surgeon: Deric Rodriguez MD;  [facility]    CAPSULE/PILL CAM ENDOSCOPY N/A 2/14/2023    Procedure: IMAGING PROCEDURE, GI TRACT, INTRALUMINAL, VIA CAPSULE;  Surgeon: Jaime Mesa MD;  Location: [facility]    COLONOSCOPY N/A 03/14/2017     Procedure: COMBINED COLONOSCOPY, SINGLE OR MULTIPLE BIOPSY/POLYPECTOMY BY BIOPSY;  Surgeon: Spencer Downey MD;  [facility]: [facility]    COLONOSCOPY N/A 9/22/2022    Procedure: COLONOSCOPY, FLEXIBLE, WITH LESION REMOVAL USING SNARE;  Surgeon: Kirby Arthur MD;  Location: [facility]    COLONOSCOPY N/A 1/13/2023    Procedure: COLONOSCOPY;  Surgeon: pSencer Downey MD;  Location: [facility]    COLONOSCOPY N/A 8/14/2023    Procedure: Colonoscopy;  Surgeon: Spencer Downey MD;  Location: [facility]    ENTEROSCOPY SMALL BOWEL N/A 11/20/2021    Procedure: ENTEROSCOPY, colonoscopy with endoscopic mucosal resection and tattoo placement;  Surgeon: Kirby Arthur MD;  [facility]    ESOPHAGOSCOPY, GASTROSCOPY, DUODENOSCOPY (EGD), COMBINED N/A 2/6/2023    Procedure: ESOPHAGOGASTRODUODENOSCOPY (EGD);  Surgeon: Spencer Downey MD;  [facility]: [facility]    IR VISCERAL EMBOLIZATION  01/22/2021    ORTHOPEDIC SURGERY Left     hip replacment    SIGMOIDOSCOPY FLEXIBLE N/A 01/23/2021    Procedure: SIGMOIDOSCOPY, FLEXIBLE;  Surgeon: Jaime Steinberg MD;  Location:  [facility]     Medications  Current Outpatient Medications   Medication    acetaminophen (TYLENOL) 650 MG CR tablet    calcium carbonate-vitamin D (CALTRATE) 600-10 MG-MCG per tablet    famotidine (PEPCID) 20 MG tablet    folic acid (FOLVITE) 1 MG tablet    gabapentin (NEURONTIN) 300 MG capsule    hydroxychloroquine (PLAQUENIL) 200 MG tablet    lifitegrast (XIIDRA) 5 % opthalmic solution    Multiple Vitamins-Minerals (OCUVITE PRESERVISION PO)    pilocarpine (SALAGEN) 5 MG tablet    clobetasol (TEMOVATE) 0.05 % external ointment    omeprazole (PRILOSEC) 40 MG DR capsule     No current facility-administered medications for this visit.       Allergies  Allergies   Allergen Reactions    Bee Venom Anaphylaxis    Shrimp Anaphylaxis    Evoxac [Cevimeline] Unknown    Prevnar Swelling     Other reaction(s): Edema    Prevnar [Pneumococcal 13-Linda Conj Vacc] Unknown       Family History: No  family hx of autoimmune disease    Social History: No alcohol, drug use, smoking history.       Objective:   /72 (BP Location: Left arm, Patient Position: Sitting, Cuff Size: Adult Small)   Pulse 71   Wt 42.6 kg (94 lb)   SpO2 100%   BMI 16.14 kg/m    GEN: sitting up unassisted NAD, pleasant and interactive as always  HEENT: no facial rash, sclera appear clear  Breathing comfortably on RA, no audible wheeze, no cough. No use of accessory muscles  - Right hand: No inflammation in MCPs and PIPs; limited range of motion in the right wrist with no pain and discomfort  - Left hand: No inflammation in MCPs and PIPs; better range of motion in the left wrist compared to the right; no tenderness in the left elbow  - Shoulders: No pain or discomfort during range of motion exercises (putting hands behind the head and in the back pocket)  Skin: no acute cutaneous lesions appreciated on exposed skin. She has dark raised spots on lateral aspect of right ankle around varicose veins. No edema noted.       WBC   Date Value Ref Range Status   01/20/2021 5.0 4.0 - 11.0 10e9/L Final     WBC Count   Date Value Ref Range Status   11/10/2023 3.1 (L) 4.0 - 11.0 10e3/uL Final     Hemoglobin   Date Value Ref Range Status   11/10/2023 11.4 (L) 11.7 - 15.7 g/dL Final   01/25/2021 8.0 (L) 11.7 - 15.7 g/dL Final     Platelet Count   Date Value Ref Range Status   11/10/2023 233 150 - 450 10e3/uL Final   01/20/2021 207 150 - 450 10e9/L Final     Creatinine   Date Value Ref Range Status   11/10/2023 1.22 (H) 0.51 - 0.95 mg/dL Final   01/23/2021 0.75 0.52 - 1.04 mg/dL Final     Lab Results   Component Value Date    ALKPHOS 68 05/05/2022    ALKPHOS 77 03/13/2017     AST   Date Value Ref Range Status   11/10/2023 26 0 - 45 U/L Final     Comment:     Reference intervals for this test were updated on 6/12/2023 to more accurately reflect our healthy population. There may be differences in the flagging of prior results with similar values  performed with this method. Interpretation of those prior results can be made in the context of the updated reference intervals.   03/13/2017 10 0 - 45 U/L Final     Lab Results   Component Value Date    ALT 22 05/05/2022    ALT 12 03/13/2017     Sed Rate   Date Value Ref Range Status   03/13/2017 63 (H) 0 - 30 mm/h Final     Erythrocyte Sedimentation Rate   Date Value Ref Range Status   11/10/2023 38 (H) 0 - 30 mm/hr Final     CRP Inflammation   Date Value Ref Range Status   08/26/2022 <2.9 0.0 - 8.0 mg/L Final   03/13/2017 10.0 (H) 0.0 - 8.0 mg/L Final     UA RESULTS:  Recent Labs   Lab Test 03/10/22  1420   COLOR Yellow   APPEARANCE Clear   URINEGLC Negative   URINEBILI Negative   URINEKETONE Negative   SG 1.010   UBLD Negative   URINEPH 7.0   PROTEIN Negative   NITRITE Negative   LEUKEST Negative   RBCU 2   WBCU <1      Rheumatoid Factor   Date Value Ref Range Status   01/03/2022 8 <12 IU/mL Final     Cyclic Citrullinated Peptide Antibody IgG   Date Value Ref Range Status   01/03/2022 3.6 <7.0 U/mL Final     Comment:     Negative       Imaging:   No peripheral x-rays to review in our system

## 2024-01-12 NOTE — PATIENT INSTRUCTIONS
Will continue Plaquenil 200 mg daily     She can Pilocarpine. Cevimeline possibly caused side effects in the past.     Follow up in March 2025.

## 2024-01-15 ENCOUNTER — TELEPHONE (OUTPATIENT)
Dept: RHEUMATOLOGY | Facility: CLINIC | Age: 84
End: 2024-01-15

## 2024-01-15 NOTE — TELEPHONE ENCOUNTER
[facility] Call Center    Phone Message    May a detailed message be left on voicemail: yes     Reason for Call: Other: Pt would like to transfer care to Dr Neal. Pt saw Dr Mix once but pt feels like Dr Mix didn't go over her concerns for vasculitis. Pt would like to see Dr Neal at [place] the [place] [facility] [place]. Would it be ok for pt to transfer care to Dr Neal?     Action Taken: Message routed to:  Other: [facility]    Travel Screening: Not Applicable

## 2024-01-16 NOTE — TELEPHONE ENCOUNTER
Patient has an appointment outside of [place] and will be seeing that physician due to availability,

## 2024-01-16 NOTE — TELEPHONE ENCOUNTER
[facility]16[facility]   Dr. Neal agrees to see patient either at [facility] or [place] locations as transfer of care.  Please assist in scheduling

## 2024-01-23 ENCOUNTER — TRANSCRIBE ORDERS (OUTPATIENT)
Dept: OTHER | Age: 84
End: 2024-01-23

## 2024-01-23 ENCOUNTER — THERAPY VISIT (OUTPATIENT)
Dept: PHYSICAL THERAPY | Facility: CLINIC | Age: 84
End: 2024-01-23
Payer: COMMERCIAL

## 2024-01-23 DIAGNOSIS — M54.50 LOW BACK PAIN: ICD-10-CM

## 2024-01-23 DIAGNOSIS — M47.816 FACET ARTHRITIS OF LUMBAR REGION: Primary | ICD-10-CM

## 2024-01-23 DIAGNOSIS — G89.29 CHRONIC BILATERAL LOW BACK PAIN WITH BILATERAL SCIATICA: Primary | ICD-10-CM

## 2024-01-23 DIAGNOSIS — M54.41 CHRONIC BILATERAL LOW BACK PAIN WITH BILATERAL SCIATICA: Primary | ICD-10-CM

## 2024-01-23 DIAGNOSIS — M54.42 CHRONIC BILATERAL LOW BACK PAIN WITH BILATERAL SCIATICA: Primary | ICD-10-CM

## 2024-01-23 PROCEDURE — 97110 THERAPEUTIC EXERCISES: CPT | Mod: GP | Performed by: PHYSICAL THERAPIST

## 2024-01-23 PROCEDURE — 97161 PT EVAL LOW COMPLEX 20 MIN: CPT | Mod: GP | Performed by: PHYSICAL THERAPIST

## 2024-01-23 PROCEDURE — 97140 MANUAL THERAPY 1/> REGIONS: CPT | Mod: GP | Performed by: PHYSICAL THERAPIST

## 2024-01-23 NOTE — PROGRESS NOTES
PHYSICAL THERAPY EVALUATION  Type of Visit: Evaluation    See electronic medical record for Abuse and Falls Screening details.    Subjective       Presenting condition or subjective complaint: LBP that extends itno hips and legs  Date of onset: 01/11/24 (MD visit)    Relevant medical history: Anemia; Arthritis; Cold or hot arm or leg; Hearing problems; Implanted device; Osteoarthritis; Rheumatoid arthritis   Dates & types of surgery: s/p L WINTER 2011    Prior diagnostic imaging/testing results: MRI; X-ray     Prior therapy history for the same diagnosis, illness or injury: Yes 2018 and 2022        Living Environment  Social support: With a significant other or spouse   Type of home: House; 1 level   Stairs to enter the home: Yes 2 Is there a railing: No   Ramp: No   Stairs inside the home: Yes 1 Is there a railing: Yes   Help at home: Self Cares (home health aide/personal care attendant, family, etc)  Equipment owned:       Employment: -- (Retired)    Hobbies/Interests: Art, travel    Patient goals for therapy: Reduce(eliminate) the pain and to be more active         Objective   LUMBAR SPINE EVALUATION  PAIN: Pain Level at Rest: 4/10  Pain Level with Use: 7/10  Pain Location: B(L>R) lumbar pain with referred sx into B hips and proximal thighs  Pain Quality: Aching, Dull, Exhausting, Gnawing, Miserable, Nagging, Numb, Penetrating, Pressure, Shooting, Tender, Throbbing, Tingling, Tiring, and Unbearable  Pain Frequency: constant  Pain is Worst: Afternoon  Pain is Exacerbated By: Walking, sitting, lifting  Pain is Relieved By: heat, otc medications, and rest  Pain Progression: Worsened  INTEGUMENTARY (edema, incisions): WNL  POSTURE:  Scoliosis(L convex lumbar/R convex thoracic)  GAIT: :  R trunk lean(due to scoliosis)  BALANCE/PROPRIOCEPTION: Single Leg Stance Eyes Open (seconds): Unable B  ROM:  AROM of lumbar spine-flexion min loss, extension mod-max loss, R SG no loss, and L SG max loss.    STRENGTH:  Upper abdominal  strength 3/5 with increased LBP    MYOTOMES:    Left Right   T12-L3 (Hip Flexion) 4 4   L2-4 (Quads)  4+ 4+   L4 (Ankle DF) 5 5   L5 (Great Toe Ext) 5 5   S1 (Toe Raise)         DERMATOMES: WNL  NEURAL TENSION: Lumbar WNL  FLEXIBILITY: Decreased piriformis L, Decreased hip flexors L, Decreased quadriceps L, Decreased hamstrings L, Decreased gastroc L, Decreased piriformis R, Decreased hip flexors R, Decreased quadriceps R, Decreased hamstrings R, Decreased gastroc R, L Quadratus lumborum    PALPATION:  L Quadratus lumborum  SPINAL SEGMENTAL CONCLUSIONS:    Left Right   T10     T11     T12     L1     L2 Hypo Hypo   L3 Hypo Hypo   L4 Hypo Hypo   L5 Hypo Hypo   S1 Hypo Hypo         Assessment & Plan   CLINICAL IMPRESSIONS  Medical Diagnosis: LBP/facet arthritis of lumbar region    Treatment Diagnosis: LBP/facet arthritis of lumbar region   Impression/Assessment: Patient is a 83 year old female with L>R lumbar/proximal thigh complaints.  The following significant findings have been identified: Pain, Decreased ROM/flexibility, Decreased joint mobility, Decreased strength, Decreased proprioception, Impaired gait, Decreased activity tolerance, and Impaired posture. These impairments interfere with their ability to perform self care tasks, recreational activities, household chores, driving , household mobility, and community mobility as compared to previous level of function.     Clinical Decision Making (Complexity):  Clinical Presentation: Stable/Uncomplicated  Clinical Presentation Rationale: based on medical and personal factors listed in PT evaluation  Clinical Decision Making (Complexity): Low complexity    PLAN OF CARE  Treatment Interventions:  Interventions: Gait Training, Manual Therapy, Neuromuscular Re-education, Therapeutic Activity, Therapeutic Exercise, Self-Care/Home Management    Long Term Goals     PT Goal 1  Goal Identifier: ambulation  Goal Description: Patient will be able to walk 1-2 miles with PL  0-2/10  Rationale: to maximize safety and independence within the community;to maximize safety and independence within the home;to maximize safety and independence with performance of ADLs and functional tasks  Goal Progress: Able to walk 1/2 to 1 mile 5/10 PL  Target Date: 04/16/24      Frequency of Treatment: 1x/week  Duration of Treatment: 12 weeks    Recommended Referrals to Other Professionals: Physical Therapy  Education Assessment:   Learner/Method: Patient;Listening;Reading;Demonstration;Pictures/Video;No Barriers to Learning    Risks and benefits of evaluation/treatment have been explained.   Patient/Family/caregiver agrees with Plan of Care.     Evaluation Time:     PT Eval, Low Complexity Minutes (62330): 15       Signing Clinician: JEANETTE Ba [facility]                                                                                   OUTPATIENT PHYSICAL THERAPY      PLAN OF TREATMENT FOR OUTPATIENT REHABILITATION   Patient's Last Name, First Name, MARIELYPatrick Buenrostro YOB: 1940   Provider's Name   [facility]   Medical Record No.  [id number]     Onset Date: 01/11/24 (MD visit)  Start of Care Date: 01/23/24     Medical Diagnosis:  LBP/facet arthritis of lumbar region      PT Treatment Diagnosis:  LBP/facet arthritis of lumbar region Plan of Treatment  Frequency/Duration: 1x/week/ 12 weeks    Certification date from 01/23/24 to 04/16/24         See note for plan of treatment details and functional goals     Genie Villa, PT                         I CERTIFY THE NEED FOR THESE SERVICES FURNISHED UNDER        THIS PLAN OF TREATMENT AND WHILE UNDER MY CARE .             Physician Signature               Date    X_____________________________________________________                  Referring Provider:  No ref. provider found    Initial Assessment  See Epic Evaluation- Start of Care Date:  01/23/24

## 2024-01-24 ENCOUNTER — OFFICE VISIT (OUTPATIENT)
Dept: GASTROENTEROLOGY | Facility: CLINIC | Age: 84
End: 2024-01-24
Payer: COMMERCIAL

## 2024-01-24 VITALS
HEIGHT: 64 IN | SYSTOLIC BLOOD PRESSURE: 126 MMHG | WEIGHT: 90 LBS | HEART RATE: 74 BPM | OXYGEN SATURATION: 95 % | BODY MASS INDEX: 15.36 KG/M2 | DIASTOLIC BLOOD PRESSURE: 67 MMHG

## 2024-01-24 DIAGNOSIS — R63.4 LOSS OF WEIGHT: Primary | ICD-10-CM

## 2024-01-24 DIAGNOSIS — N18.31 STAGE 3A CHRONIC KIDNEY DISEASE (H): ICD-10-CM

## 2024-01-24 PROCEDURE — 99215 OFFICE O/P EST HI 40 MIN: CPT | Mod: GC | Performed by: INTERNAL MEDICINE

## 2024-01-24 ASSESSMENT — PAIN SCALES - GENERAL: PAINLEVEL: MILD PAIN (2)

## 2024-01-24 NOTE — NURSING NOTE
"Chief Complaint   Patient presents with    Follow Up       Vitals:    01/24/24 1120   BP: 126/67   Pulse: 74   SpO2: 95%   Weight: 40.8 kg (90 lb)   Height: 1.626 m (5' 4\")       Body mass index is 15.45 kg/m .    [employer]    "

## 2024-01-24 NOTE — LETTER
1/24/2024         RE: Patrick Olson  [address] [facility] [address]        Dear Colleague,    Thank you for referring your patient, Patrick Olson, to the [facility] [place]. Please see a copy of my visit note below.    GI CLINIC VISIT    Follow up on GI bleeding and weight loss    ASSESSMENT/PLAN:  #Gastric AVM , small bowel AVM, Cecal polyp, duodenal erosions  #Diverticular hemorrhage  #Hemorrhoids, unspecified hemorrhoid type  #Drug-induced constipation  #Dyspepsia  #Weight loss with BMI 15.5    We spent most of the visit discussing the etiology of her weight and the strategies to help her gain weight.  She was concerned that she may have an underlying malabsorption disorders and showed us a print out from [facility] on malabsorption.  We explained to her that in the absence of diarrhea, it is unlikely that her weight loss is due to malabsorption.  Instead, we think her weight loss is likely due to limited oral intake and food restriction due to her fear of certain foods that could trigger GI bleeding (she reports being told that spicy or fatty foods can trigger diverticular bleeding).  We emphasized several times that she needs to liberate her diet as there is no evidence for these foods causing diverticular bleeding.  We will also refer her to our GI dietitian to discuss further strategies for weight gain.      Plan:  -- Need to liberate diet. There is no evidence of spicy or fatty food causing diverticular bleeding.   --Avoid diary products.   --Start to take Ensure Clear or Boost Breeze. Start with one bottle and can increase to 3 times daily.  --Consider different protein bars to find one that is more appetizing to you.   -- Refer to our GI dietician Iesha Hu for weight loss.         Patient discussed and seen with Gastroenterology staff Dr. Martinez, who is in agreement with the above.      Chelsi Sargent MD, PhD  Gastroenterology  "Fellow  Division of Gastroenterology, Hepatology and Nutrition  [facility]    ---------------------------------------------------------------------------------------------------  HPI:    Ms. Olson is an 83 year old female with RA (stopped methotrexate March 2023, now on hydroxycholoroquine) and secondary Sjogren's, lumbar degenerative disc disease, chronic lumbago without radiculopathy, more recent right shoulder pain, OA, osteoporosis, and recurrent GI bleeding (AVM, possible diverticular, polyp-related --> see below and hemorrhoid) who comes for follow up on GI bleeding and weight loss. She has previously followed with [facility] ([facility]) for her GI care (since 2017 per her report) and has undergone endoscopic exams at [facility], and [facility] since 2010. She has been seen by multiple providers with our group including Belen Santamaria and Dr. Gatica.    Dating back to at least 2010 Ms. Olson has experienced recurrent overt GI bleeding with anemia.  She underwent inpatient colonoscopic exams with both diverticulosis and hemorrhoids on these exams. Diverticular bleeding had been felt to be a contributor given her clinical presentation, though no active bleeding lesion was ever identified.      She had EGD and colonoscopy 5/26/21 at [facility] for anemia. EGD revealed a non-bleeding gastric AVM which was treated with APC. Colonoscopy demonstrated diverticulosis and small hemorrhoids. Ms. Olson was referred to  given AVM and anemia, but prior to the appointment was admitted to [facility] with hematochezia (per patient this was \"a bit darker\" than her prior bleeding episodes). A CTA did not demonstrate any active extravasation. Thus a capsule was done with bleeding seen in the distal ileum to cecum on prelim capsule read. Colonoscopy with ileal intubation  (20 cm beyond the IC valve) was done thereafter with no active bleeding found, but a very " large 30 mm polyp was found on the IC valve and removed via EMR. This was felt to be a likely source of bleeding. No other findings were seen in the area of bleeding noted on capsule endoscopy.      She then had recurrent bleeding in January. She underwent colonoscopy 1/13/23 which showed no inflammation but residual specks of blood throughout bowel (to 30 cm to TI), extensive diverticulosis with no active bleeding. EGD was subsequently completed 2/6/23 which showed duodenal erosion without bleeding, normal stomach and esophagus. VCE the following week 2/14/23 showed multiple localized erosions with no bleeding in the duodenum (likely first portion), single small angioectasia without bleeding was seen in the small bowel (probable duodenum), and lymphangiectasia in the small bowel (mid small bowel), one of these was peduncluated and polypoid, not bleeding, benign appearing.  A single spot with no bleeding was found in the colon (probable cecum). She completed 12 week course of omeprazole (while off of methotrexate), starting March for dyspepsia and finding of duodenal erosions and reported dyspepsia.    She again had several days of hematochezia that prompted hospitalization in August. She was hospitalized 8/12/23-8/14/23 at [facility], Hgb with mild decrease to 9's, she remained hemodynamically stable. She underwent colonoscopy which showed diverticulosis throughout the colon, no active bleeding noted. Examined ileum was normal. Mild internal hemorrhoids felt not to be source of bleed. Per inpatient assessment, diverticular bleeding most likely reason for repeated episodes of hematochezia.     She has been evaluated by both her PCP and hematology for anemia, which has been attributed to GI blood loss, anemia of chronic disease, and medication effect. .In the setting of GI bleeding she was given IV iron infusions which she has since completed, has taken PO iron supplementation in the past.    Seen by GI Dr. Donald Gatica in  8/2023 and felt that the most recent bleeding was lower GI due to diverticular bleeding or rectal outlet bleeding. Also seen by Belen ASHRAF in 11/2023 with what describes as minimal rectal outlet bleeding.  Was recommended MiraLAX for constipation and omeprazole for dyspepsia.    -------  Today, patient reports no recurrent bleeding since August. Her prime concern for today's visit is ongoing weight loss. She was told by her dietician to eat more butter and ice cream. Unfortunately she realized that she has lactose intolerance and eating the diary products actually made her lose more weight. In terms of her diet, she was told previously that fatty or spicy foods may worsen diverticular bleeding (I told her that there is no evidence for that). So her food choices are very limited and what she is eating is very bland. She looked at Ensure juice but is worried about sugar content although she has no history of diabetes.       PROBLEM LIST  Patient Active Problem List    Diagnosis Date Noted    Chronic bilateral low back pain with bilateral sciatica 01/23/2024     Priority: Medium    Lower GI bleed 08/12/2023     Priority: Medium    Chronic pain of right elbow 07/11/2023     Priority: Medium    Right wrist pain 07/11/2023     Priority: Medium    Lab test negative for COVID-19 virus 11/18/2021     Priority: Medium    Diverticulosis of large intestine      Priority: Medium    Acute lower GI bleeding 01/21/2021     Priority: Medium    Diverticular hemorrhage 01/20/2021     Priority: Medium    Gastrointestinal hemorrhage, unspecified gastrointestinal hemorrhage type 01/19/2021     Priority: Medium    GI bleed 03/13/2017     Priority: Medium    SNHL (sensorineural hearing loss) 11/29/2012     Priority: Medium       PERTINENT PAST MEDICAL HISTORY:  Past Medical History:   Diagnosis Date    Anemia     CKD (chronic kidney disease) stage 3, GFR 30-59 ml/min (H)     Diverticulosis of large intestine     Osteoarthritis      Osteoporosis     Rheumatoid arthritis (H)     Sensorineural hearing loss        PREVIOUS SURGERIES:  Past Surgical History:   Procedure Laterality Date    CAPSULE/PILL CAM ENDOSCOPY N/A 11/18/2021    Procedure: IMAGING PROCEDURE, GI TRACT, INTRALUMINAL, VIA CAPSULE;  Surgeon: Deric Rodriguez MD;  Location: [facility]    CAPSULE/PILL CAM ENDOSCOPY N/A 2/14/2023    Procedure: IMAGING PROCEDURE, GI TRACT, INTRALUMINAL, VIA CAPSULE;  Surgeon: Jaime Mesa MD;  Location: [facility]    COLONOSCOPY N/A 03/14/2017    Procedure: COMBINED COLONOSCOPY, SINGLE OR MULTIPLE BIOPSY/POLYPECTOMY BY BIOPSY;  Surgeon: Spencer Downey MD;  Location: [facility]    COLONOSCOPY N/A 9/22/2022    Procedure: COLONOSCOPY, FLEXIBLE, WITH LESION REMOVAL USING SNARE;  Surgeon: Kirby Arthur MD;  Location:  GI    COLONOSCOPY N/A 1/13/2023    Procedure: COLONOSCOPY;  Surgeon: Spencer Downey MD;  Location: [facility]    COLONOSCOPY N/A 8/14/2023    Procedure: Colonoscopy;  Surgeon: Spencer Downey MD;  Location: [facility]    ENTEROSCOPY SMALL BOWEL N/A 11/20/2021    Procedure: ENTEROSCOPY, colonoscopy with endoscopic mucosal resection and tattoo placement;  Surgeon: Kirby Arthur MD;  Location: [facility]    ESOPHAGOSCOPY, GASTROSCOPY, DUODENOSCOPY (EGD), COMBINED N/A 2/6/2023    Procedure: ESOPHAGOGASTRODUODENOSCOPY (EGD);  Surgeon: Spencer Downey MD;  Location: [facility]    IR VISCERAL EMBOLIZATION  01/22/2021    ORTHOPEDIC SURGERY Left     hip replacment    SIGMOIDOSCOPY FLEXIBLE N/A 01/23/2021    Procedure: SIGMOIDOSCOPY, FLEXIBLE;  Surgeon: Jaime Steinberg MD;  Location:  GI       ALLERGIES:   Allergies   Allergen Reactions    Bee Venom Anaphylaxis    Shrimp Anaphylaxis    Evoxac [Cevimeline] Unknown    Prevnar Swelling     Other reaction(s): Edema    Prevnar [Pneumococcal 13-Linda Conj Vacc] Unknown       PERTINENT MEDICATIONS:  Current Outpatient Medications   Medication    acetaminophen (TYLENOL) 650 MG CR tablet    calcium  "carbonate-vitamin D (CALTRATE) 600-10 MG-MCG per tablet    famotidine (PEPCID) 20 MG tablet    folic acid (FOLVITE) 1 MG tablet    gabapentin (NEURONTIN) 300 MG capsule    hydroxychloroquine (PLAQUENIL) 200 MG tablet    lifitegrast (XIIDRA) 5 % opthalmic solution    Multiple Vitamins-Minerals (OCUVITE PRESERVISION PO)    pilocarpine (SALAGEN) 5 MG tablet    clobetasol (TEMOVATE) 0.05 % external ointment    omeprazole (PRILOSEC) 40 MG DR capsule     No current facility-administered medications for this visit.       SOCIAL HISTORY:  Social History     Socioeconomic History    Marital status:      Spouse name: Not on file    Number of children: Not on file    Years of education: Not on file    Highest education level: Not on file   Occupational History    Not on file   Tobacco Use    Smoking status: Former    Smokeless tobacco: Never   Vaping Use    Vaping Use: Never used   Substance and Sexual Activity    Alcohol use: No    Drug use: No    Sexual activity: Not on file   Other Topics Concern    Not on file   Social History Narrative    - Retired (formerly employed as  at [employer])    - 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    Past/family/social history reviewed and no changes    PHYSICAL EXAMINATION:  Vitals /67   Pulse 74   Ht 1.626 m (5' 4\")   Wt 40.8 kg (90 lb)   SpO2 95%   BMI 15.45 kg/m   "   Wt   Wt Readings from Last 2 Encounters:   01/24/24 40.8 kg (90 lb)   01/12/24 42.6 kg (94 lb)      Gen: Pt sitting up in NAD, interactive and cooperative on exam  Eyes: sclerae anicteric, no injection  Cardiac: RRR, nl S1, S2, no peripheral edema  Resp: Clear on anterior exam  GI: Normoactive BS, abd soft,, nontender  Skin: Warm, dry, no jaundice, nails appear healthy  Neuro: alert, oriented, answers questions appropriately      PERTINENT STUDIES:    Lab on 01/03/2022   Component Date Value Ref Range Status    Rheumatoid Factor 01/03/2022 8  <12 IU/mL Final    Sodium 01/03/2022 142  133 - 144 mmol/L Final    Potassium 01/03/2022 4.6  3.4 - 5.3 mmol/L Final    Chloride 01/03/2022 109  94 - 109 mmol/L Final    Carbon Dioxide (CO2) 01/03/2022 29  20 - 32 mmol/L Final    Anion Gap 01/03/2022 4  3 - 14 mmol/L Final    Urea Nitrogen 01/03/2022 18  7 - 30 mg/dL Final    Creatinine 01/03/2022 0.99  0.52 - 1.04 mg/dL Final    Calcium 01/03/2022 9.6  8.5 - 10.1 mg/dL Final    Glucose 01/03/2022 104* 70 - 99 mg/dL Final    Alkaline Phosphatase 01/03/2022 75  40 - 150 U/L Final    AST 01/03/2022 24  0 - 45 U/L Final    ALT 01/03/2022 24  0 - 50 U/L Final    Protein Total 01/03/2022 7.5  6.8 - 8.8 g/dL Final    Albumin 01/03/2022 3.9  3.4 - 5.0 g/dL Final    Bilirubin Total 01/03/2022 0.4  0.2 - 1.3 mg/dL Final    GFR Estimate 01/03/2022 58* >60 mL/min/1.73m2 Final    CRP Inflammation 01/03/2022 <2.9  0.0 - 8.0 mg/L Final    Cyclic Citrullinated Peptide Antib* 01/03/2022 3.6  <7.0 U/mL Final    Erythrocyte Sedimentation Rate 01/03/2022 39* 0 - 30 mm/hr Final    WBC Count 01/03/2022 3.9* 4.0 - 11.0 10e3/uL Final    RBC Count 01/03/2022 3.27* 3.80 - 5.20 10e6/uL Final    Hemoglobin 01/03/2022 10.5* 11.7 - 15.7 g/dL Final    Hematocrit 01/03/2022 32.5* 35.0 - 47.0 % Final    MCV 01/03/2022 99  78 - 100 fL Final    MCH 01/03/2022 32.1  26.5 - 33.0 pg Final    MCHC 01/03/2022 32.3  31.5 - 36.5 g/dL Final    RDW 01/03/2022 13.7   10.0 - 15.0 % Final    Platelet Count 01/03/2022 226  150 - 450 10e3/uL Final    % Neutrophils 01/03/2022 55  % Final    % Lymphocytes 01/03/2022 32  % Final    % Monocytes 01/03/2022 10  % Final    % Eosinophils 01/03/2022 4  % Final    % Basophils 01/03/2022 0  % Final    Absolute Neutrophils 01/03/2022 2.2  1.6 - 8.3 10e3/uL Final    Absolute Lymphocytes 01/03/2022 1.3  0.8 - 5.3 10e3/uL Final    Absolute Monocytes 01/03/2022 0.4  0.0 - 1.3 10e3/uL Final    Absolute Eosinophils 01/03/2022 0.1  0.0 - 0.7 10e3/uL Final    Absolute Basophils 01/03/2022 0.0  0.0 - 0.2 10e3/uL Final       Video Capsule Endoscopy 2/14/23:  Findings:        The gastric passage time of the capsule enteroscope was 5-hours 1-minute.        The small bowel passage time of the capsule enteroscope was 1-hour 25-minutes.        The entire examined stomach was normal.        Multiple localized erosions with no bleeding were found in the duodenum        (probable first portion of the duodenum).        A single small angioectasia without bleeding was seen in the small bowel        (probable duodenum).        Lymphangiectasia was found in the small bowel (mid small bowel). One of        these was peduncluated and polypoid, not bleeding, benign appearing.        A single spot with no bleeding was found in the colon (probable cecum).                                                                Impression:              - Normal stomach.   - Duodenal erosion.   - A single angioectasia without bleeding in the duodenum.   - Small bowel lymphangiectasia.   - A single mucosal spot with no bleeding in the colon.   - No bleeding seen during exam.   - The capsule entered the colon.       EGD 02/06/23  Impression:              - Normal esophagus.   - Normal stomach.   - Duodenal erosion without bleeding.   - No specimens collected.       Colonoscopy 01/13/23  Findings:        Residual specks of blood throughout (exam to 30 cm into the TI). No         inflammation.        Residual small amounts of blood mixed with prep liquid. Extensive        diverticulosis throughout the colon, left > right. Diverticuli were        carefully examined, but no active bleeding was seen. Site of mucosectomy        at the IC valve was unremarkable.      Impression:              - No specimens collected.       Colonoscopy 8/12/23  Findings:       Many small-mouthed diverticula were found in the entire colon. Most        pronounced diverticulosis is in the sigmoid colon. Residual blood was        seen throughout the colon, somewhat more on the left side. No active        bleeding was noted.        The terminal ileum appeared normal. Slight tinging with blood, likely        washed up or brought in by the scope.        Mild internal hemorrhoids were noted, not a source of bleeding.                                                                                    Impression:              - Diverticulosis in the entire examined colon.   - The examined portion of the ileum was normal.   - No specimens collected.         Attestation signed by Altagracia Martinez MD at 2/5/2024  3:41 PM:    Physician Attestation  I, Altagracia Martinez MD, saw this patient and agree with the findings and plan of care as documented in the note.      Items personally reviewed/procedural attestation: vitals.      I spent 20 minutes with the patient, of which > 50% was spent face-to-face with the patient in education, counseling, and addressing questions regarding the above diagnoses.   An additional 40 minutes was spent on the date of the encounter doing chart review (notes, labs, imaging reports, endoscopic and pathology reports, clinical status events, medications, etc)  documentation, care coodination, and arranging of care plan with the fellow.           Again, thank you for allowing me to participate in the care of your patient.      Sincerely,    Altagracia Martinez MD

## 2024-01-24 NOTE — PROGRESS NOTES
GI CLINIC VISIT    Follow up on GI bleeding and weight loss    ASSESSMENT/PLAN:  #Gastric AVM , small bowel AVM, Cecal polyp, duodenal erosions  #Diverticular hemorrhage  #Hemorrhoids, unspecified hemorrhoid type  #Drug-induced constipation  #Dyspepsia  #Weight loss with BMI 15.5    We spent most of the visit discussing the etiology of her weight and the strategies to help her gain weight.  She was concerned that she may have an underlying malabsorption disorders and showed us a print out from [facility] on malabsorption.  We explained to her that in the absence of diarrhea, it is unlikely that her weight loss is due to malabsorption.  Instead, we think her weight loss is likely due to limited oral intake and food restriction due to her fear of certain foods that could trigger GI bleeding (she reports being told that spicy or fatty foods can trigger diverticular bleeding).  We emphasized several times that she needs to liberate her diet as there is no evidence for these foods causing diverticular bleeding.  We will also refer her to our GI dietitian to discuss further strategies for weight gain.      Plan:  -- Need to liberate diet. There is no evidence of spicy or fatty food causing diverticular bleeding.   --Avoid diary products.   --Start to take Ensure Clear or Boost Breeze. Start with one bottle and can increase to 3 times daily.  --Consider different protein bars to find one that is more appetizing to you.   -- Refer to our GI dietician Iesha Hu for weight loss.         Patient discussed and seen with Gastroenterology staff Dr. Martinez, who is in agreement with the above.      Chelsi Sargent MD, PhD  Gastroenterology Fellow  Division of Gastroenterology, Hepatology and Nutrition  [facility]    ---------------------------------------------------------------------------------------------------  HPI:    Ms. Olson is an 83 year old female with RA (stopped methotrexate March 2023, now on  "hydroxycholoroquine) and secondary Sjogren's, lumbar degenerative disc disease, chronic lumbago without radiculopathy, more recent right shoulder pain, OA, osteoporosis, and recurrent GI bleeding (AVM, possible diverticular, polyp-related --> see below and hemorrhoid) who comes for follow up on GI bleeding and weight loss. She has previously followed with [facility] for her GI care (since 2017 per her report) and has undergone endoscopic exams at [facility], [facility], and [facility] since 2010. She has been seen by multiple providers with our group including Belen Santamaria and Dr. Gatica.    Dating back to at least 2010 Ms. Olson has experienced recurrent overt GI bleeding with anemia.  She underwent inpatient colonoscopic exams with both diverticulosis and hemorrhoids on these exams. Diverticular bleeding had been felt to be a contributor given her clinical presentation, though no active bleeding lesion was ever identified.      She had EGD and colonoscopy 5/26/21 at [facility] for anemia. EGD revealed a non-bleeding gastric AVM which was treated with APC. Colonoscopy demonstrated diverticulosis and small hemorrhoids. Ms. Olson was referred to  given AVM and anemia, but prior to the appointment was admitted to [facility] with hematochezia (per patient this was \"a bit darker\" than her prior bleeding episodes). A CTA did not demonstrate any active extravasation. Thus a capsule was done with bleeding seen in the distal ileum to cecum on prelim capsule read. Colonoscopy with ileal intubation  (20 cm beyond the IC valve) was done thereafter with no active bleeding found, but a very large 30 mm polyp was found on the IC valve and removed via EMR. This was felt to be a likely source of bleeding. No other findings were seen in the area of bleeding noted on capsule endoscopy.      She then had recurrent bleeding in January. She underwent colonoscopy 1/13/23 which " showed no inflammation but residual specks of blood throughout bowel (to 30 cm to TI), extensive diverticulosis with no active bleeding. EGD was subsequently completed 2/6/23 which showed duodenal erosion without bleeding, normal stomach and esophagus. VCE the following week 2/14/23 showed multiple localized erosions with no bleeding in the duodenum (likely first portion), single small angioectasia without bleeding was seen in the small bowel (probable duodenum), and lymphangiectasia in the small bowel (mid small bowel), one of these was peduncluated and polypoid, not bleeding, benign appearing.  A single spot with no bleeding was found in the colon (probable cecum). She completed 12 week course of omeprazole (while off of methotrexate), starting March for dyspepsia and finding of duodenal erosions and reported dyspepsia.    She again had several days of hematochezia that prompted hospitalization in August. She was hospitalized 8/12/23-8/14/23 at [facility], Hgb with mild decrease to 9's, she remained hemodynamically stable. She underwent colonoscopy which showed diverticulosis throughout the colon, no active bleeding noted. Examined ileum was normal. Mild internal hemorrhoids felt not to be source of bleed. Per inpatient assessment, diverticular bleeding most likely reason for repeated episodes of hematochezia.     She has been evaluated by both her PCP and hematology for anemia, which has been attributed to GI blood loss, anemia of chronic disease, and medication effect. .In the setting of GI bleeding she was given IV iron infusions which she has since completed, has taken PO iron supplementation in the past.    Seen by GI Dr. Donald Gatica in 8/2023 and felt that the most recent bleeding was lower GI due to diverticular bleeding or rectal outlet bleeding. Also seen by Belen ASHRAF in 11/2023 with what describes as minimal rectal outlet bleeding.  Was recommended MiraLAX for constipation and omeprazole for  dyspepsia.    -------  Today, patient reports no recurrent bleeding since August. Her prime concern for today's visit is ongoing weight loss. She was told by her dietician to eat more butter and ice cream. Unfortunately she realized that she has lactose intolerance and eating the diary products actually made her lose more weight. In terms of her diet, she was told previously that fatty or spicy foods may worsen diverticular bleeding (I told her that there is no evidence for that). So her food choices are very limited and what she is eating is very bland. She looked at Ensure juice but is worried about sugar content although she has no history of diabetes.       PROBLEM LIST  Patient Active Problem List    Diagnosis Date Noted    Chronic bilateral low back pain with bilateral sciatica 01/23/2024     Priority: Medium    Lower GI bleed 08/12/2023     Priority: Medium    Chronic pain of right elbow 07/11/2023     Priority: Medium    Right wrist pain 07/11/2023     Priority: Medium    Lab test negative for COVID-19 virus 11/18/2021     Priority: Medium    Diverticulosis of large intestine      Priority: Medium    Acute lower GI bleeding 01/21/2021     Priority: Medium    Diverticular hemorrhage 01/20/2021     Priority: Medium    Gastrointestinal hemorrhage, unspecified gastrointestinal hemorrhage type 01/19/2021     Priority: Medium    GI bleed 03/13/2017     Priority: Medium    SNHL (sensorineural hearing loss) 11/29/2012     Priority: Medium       PERTINENT PAST MEDICAL HISTORY:  Past Medical History:   Diagnosis Date    Anemia     CKD (chronic kidney disease) stage 3, GFR 30-59 ml/min (H)     Diverticulosis of large intestine     Osteoarthritis     Osteoporosis     Rheumatoid arthritis (H)     Sensorineural hearing loss        PREVIOUS SURGERIES:  Past Surgical History:   Procedure Laterality Date    CAPSULE/PILL CAM ENDOSCOPY N/A 11/18/2021    Procedure: IMAGING PROCEDURE, GI TRACT, INTRALUMINAL, VIA CAPSULE;   Surgeon: Deric Rodriguez MD;  Location: [facility]    CAPSULE/PILL CAM ENDOSCOPY N/A 2/14/2023    Procedure: IMAGING PROCEDURE, GI TRACT, INTRALUMINAL, VIA CAPSULE;  Surgeon: Jaime Mesa MD;  Location: [facility]    COLONOSCOPY N/A 03/14/2017    Procedure: COMBINED COLONOSCOPY, SINGLE OR MULTIPLE BIOPSY/POLYPECTOMY BY BIOPSY;  Surgeon: Spencer Downey MD;  Location: [facility]    COLONOSCOPY N/A 9/22/2022    Procedure: COLONOSCOPY, FLEXIBLE, WITH LESION REMOVAL USING SNARE;  Surgeon: Kirby Arthur MD;  Location:  GI    COLONOSCOPY N/A 1/13/2023    Procedure: COLONOSCOPY;  Surgeon: Spencer Downey MD;  Location: [facility]    COLONOSCOPY N/A 8/14/2023    Procedure: Colonoscopy;  Surgeon: Spencer Downey MD;  Location: [facility]    ENTEROSCOPY SMALL BOWEL N/A 11/20/2021    Procedure: ENTEROSCOPY, colonoscopy with endoscopic mucosal resection and tattoo placement;  Surgeon: Kirby Arthur MD;  Location: [facility]    ESOPHAGOSCOPY, GASTROSCOPY, DUODENOSCOPY (EGD), COMBINED N/A 2/6/2023    Procedure: ESOPHAGOGASTRODUODENOSCOPY (EGD);  Surgeon: Spencer Downey MD;  Location: [facility]    IR VISCERAL EMBOLIZATION  01/22/2021    ORTHOPEDIC SURGERY Left     hip replacment    SIGMOIDOSCOPY FLEXIBLE N/A 01/23/2021    Procedure: SIGMOIDOSCOPY, FLEXIBLE;  Surgeon: Jaime Steinberg MD;  Location:  GI       ALLERGIES:   Allergies   Allergen Reactions    Bee Venom Anaphylaxis    Shrimp Anaphylaxis    Evoxac [Cevimeline] Unknown    Prevnar Swelling     Other reaction(s): Edema    Prevnar [Pneumococcal 13-Linda Conj Vacc] Unknown       PERTINENT MEDICATIONS:  Current Outpatient Medications   Medication    acetaminophen (TYLENOL) 650 MG CR tablet    calcium carbonate-vitamin D (CALTRATE) 600-10 MG-MCG per tablet    famotidine (PEPCID) 20 MG tablet    folic acid (FOLVITE) 1 MG tablet    gabapentin (NEURONTIN) 300 MG capsule    hydroxychloroquine (PLAQUENIL) 200 MG tablet    lifitegrast (XIIDRA) 5 % opthalmic solution     "Multiple Vitamins-Minerals (OCUVITE PRESERVISION PO)    pilocarpine (SALAGEN) 5 MG tablet    clobetasol (TEMOVATE) 0.05 % external ointment    omeprazole (PRILOSEC) 40 MG DR capsule     No current facility-administered medications for this visit.       SOCIAL HISTORY:  Social History     Socioeconomic History    Marital status:      Spouse name: Not on file    Number of children: Not on file    Years of education: Not on file    Highest education level: Not on file   Occupational History    Not on file   Tobacco Use    Smoking status: Former    Smokeless tobacco: Never   Vaping Use    Vaping Use: Never used   Substance and Sexual Activity    Alcohol use: No    Drug use: No    Sexual activity: Not on file   Other Topics Concern    Not on file   Social History Narrative    - Retired (formerly employed as  at [employer])    - 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    Past/family/social history reviewed and no changes    PHYSICAL EXAMINATION:  Vitals /67   Pulse 74   Ht 1.626 m (5' 4\")   Wt 40.8 kg (90 lb)   SpO2 95%   BMI 15.45 kg/m     Wt   Wt Readings from Last 2 Encounters:   01/24/24 40.8 kg (90 lb)   01/12/24 42.6 kg (94 lb)      Gen: Pt sitting up in NAD, interactive and cooperative on exam  Eyes: sclerae anicteric, no injection  Cardiac: RRR, nl S1, S2, no peripheral edema  Resp: Clear on " anterior exam  GI: Normoactive BS, abd soft,, nontender  Skin: Warm, dry, no jaundice, nails appear healthy  Neuro: alert, oriented, answers questions appropriately      PERTINENT STUDIES:    Lab on 01/03/2022   Component Date Value Ref Range Status    Rheumatoid Factor 01/03/2022 8  <12 IU/mL Final    Sodium 01/03/2022 142  133 - 144 mmol/L Final    Potassium 01/03/2022 4.6  3.4 - 5.3 mmol/L Final    Chloride 01/03/2022 109  94 - 109 mmol/L Final    Carbon Dioxide (CO2) 01/03/2022 29  20 - 32 mmol/L Final    Anion Gap 01/03/2022 4  3 - 14 mmol/L Final    Urea Nitrogen 01/03/2022 18  7 - 30 mg/dL Final    Creatinine 01/03/2022 0.99  0.52 - 1.04 mg/dL Final    Calcium 01/03/2022 9.6  8.5 - 10.1 mg/dL Final    Glucose 01/03/2022 104* 70 - 99 mg/dL Final    Alkaline Phosphatase 01/03/2022 75  40 - 150 U/L Final    AST 01/03/2022 24  0 - 45 U/L Final    ALT 01/03/2022 24  0 - 50 U/L Final    Protein Total 01/03/2022 7.5  6.8 - 8.8 g/dL Final    Albumin 01/03/2022 3.9  3.4 - 5.0 g/dL Final    Bilirubin Total 01/03/2022 0.4  0.2 - 1.3 mg/dL Final    GFR Estimate 01/03/2022 58* >60 mL/min/1.73m2 Final    CRP Inflammation 01/03/2022 <2.9  0.0 - 8.0 mg/L Final    Cyclic Citrullinated Peptide Antib* 01/03/2022 3.6  <7.0 U/mL Final    Erythrocyte Sedimentation Rate 01/03/2022 39* 0 - 30 mm/hr Final    WBC Count 01/03/2022 3.9* 4.0 - 11.0 10e3/uL Final    RBC Count 01/03/2022 3.27* 3.80 - 5.20 10e6/uL Final    Hemoglobin 01/03/2022 10.5* 11.7 - 15.7 g/dL Final    Hematocrit 01/03/2022 32.5* 35.0 - 47.0 % Final    MCV 01/03/2022 99  78 - 100 fL Final    MCH 01/03/2022 32.1  26.5 - 33.0 pg Final    MCHC 01/03/2022 32.3  31.5 - 36.5 g/dL Final    RDW 01/03/2022 13.7  10.0 - 15.0 % Final    Platelet Count 01/03/2022 226  150 - 450 10e3/uL Final    % Neutrophils 01/03/2022 55  % Final    % Lymphocytes 01/03/2022 32  % Final    % Monocytes 01/03/2022 10  % Final    % Eosinophils 01/03/2022 4  % Final    % Basophils 01/03/2022 0  %  Final    Absolute Neutrophils 01/03/2022 2.2  1.6 - 8.3 10e3/uL Final    Absolute Lymphocytes 01/03/2022 1.3  0.8 - 5.3 10e3/uL Final    Absolute Monocytes 01/03/2022 0.4  0.0 - 1.3 10e3/uL Final    Absolute Eosinophils 01/03/2022 0.1  0.0 - 0.7 10e3/uL Final    Absolute Basophils 01/03/2022 0.0  0.0 - 0.2 10e3/uL Final       Video Capsule Endoscopy 2/14/23:  Findings:        The gastric passage time of the capsule enteroscope was 5-hours 1-minute.        The small bowel passage time of the capsule enteroscope was 1-hour 25-minutes.        The entire examined stomach was normal.        Multiple localized erosions with no bleeding were found in the duodenum        (probable first portion of the duodenum).        A single small angioectasia without bleeding was seen in the small bowel        (probable duodenum).        Lymphangiectasia was found in the small bowel (mid small bowel). One of        these was peduncluated and polypoid, not bleeding, benign appearing.        A single spot with no bleeding was found in the colon (probable cecum).                                                                Impression:              - Normal stomach.   - Duodenal erosion.   - A single angioectasia without bleeding in the duodenum.   - Small bowel lymphangiectasia.   - A single mucosal spot with no bleeding in the colon.   - No bleeding seen during exam.   - The capsule entered the colon.       EGD 02/06/23  Impression:              - Normal esophagus.   - Normal stomach.   - Duodenal erosion without bleeding.   - No specimens collected.       Colonoscopy 01/13/23  Findings:        Residual specks of blood throughout (exam to 30 cm into the TI). No        inflammation.        Residual small amounts of blood mixed with prep liquid. Extensive        diverticulosis throughout the colon, left > right. Diverticuli were        carefully examined, but no active bleeding was seen. Site of mucosectomy        at the IC valve was  unremarkable.      Impression:              - No specimens collected.       Colonoscopy 8/12/23  Findings:       Many small-mouthed diverticula were found in the entire colon. Most        pronounced diverticulosis is in the sigmoid colon. Residual blood was        seen throughout the colon, somewhat more on the left side. No active        bleeding was noted.        The terminal ileum appeared normal. Slight tinging with blood, likely        washed up or brought in by the scope.        Mild internal hemorrhoids were noted, not a source of bleeding.                                                                                    Impression:              - Diverticulosis in the entire examined colon.   - The examined portion of the ileum was normal.   - No specimens collected.

## 2024-01-24 NOTE — PATIENT INSTRUCTIONS
You can liberate your diet. There is no evidence of spicy or fatty food causing diverticular bleeding.   You should avoid diary products.   Start to take Ensure Clear or Boost Breeze. Start with one bottle and can increase to 3 times daily.  Consider different protein bars to find one that is more appetizing to you.   Refer to our GI dietician Iesha Hu for weight loss.

## 2024-01-30 ENCOUNTER — TRANSFERRED RECORDS (OUTPATIENT)
Dept: HEALTH INFORMATION MANAGEMENT | Facility: CLINIC | Age: 84
End: 2024-01-30

## 2024-02-02 ENCOUNTER — TELEPHONE (OUTPATIENT)
Dept: GASTROENTEROLOGY | Facility: CLINIC | Age: 84
End: 2024-02-02
Payer: COMMERCIAL

## 2024-02-02 NOTE — TELEPHONE ENCOUNTER
Left Voicemail (2nd Attempt) for the patient to call back and schedule the following:    Appointment type: return GI  Provider: jack bass  Return date:  next available  Specialty phone number: [phone]  Additional appointment(s) needed:   Additonal Notes:

## 2024-02-05 ENCOUNTER — TRANSFERRED RECORDS (OUTPATIENT)
Dept: HEALTH INFORMATION MANAGEMENT | Facility: CLINIC | Age: 84
End: 2024-02-05

## 2024-02-06 ENCOUNTER — MEDICAL CORRESPONDENCE (OUTPATIENT)
Dept: HEALTH INFORMATION MANAGEMENT | Facility: CLINIC | Age: 84
End: 2024-02-06

## 2024-02-06 ENCOUNTER — THERAPY VISIT (OUTPATIENT)
Dept: PHYSICAL THERAPY | Facility: CLINIC | Age: 84
End: 2024-02-06
Payer: COMMERCIAL

## 2024-02-06 DIAGNOSIS — M54.41 CHRONIC BILATERAL LOW BACK PAIN WITH BILATERAL SCIATICA: Primary | ICD-10-CM

## 2024-02-06 DIAGNOSIS — G89.29 CHRONIC BILATERAL LOW BACK PAIN WITH BILATERAL SCIATICA: Primary | ICD-10-CM

## 2024-02-06 DIAGNOSIS — M54.42 CHRONIC BILATERAL LOW BACK PAIN WITH BILATERAL SCIATICA: Primary | ICD-10-CM

## 2024-02-06 PROCEDURE — 97112 NEUROMUSCULAR REEDUCATION: CPT | Mod: GP | Performed by: PHYSICAL THERAPIST

## 2024-02-06 PROCEDURE — 97140 MANUAL THERAPY 1/> REGIONS: CPT | Mod: GP | Performed by: PHYSICAL THERAPIST

## 2024-02-06 PROCEDURE — 97110 THERAPEUTIC EXERCISES: CPT | Mod: GP | Performed by: PHYSICAL THERAPIST

## 2024-02-08 ENCOUNTER — TRANSCRIBE ORDERS (OUTPATIENT)
Dept: OTHER | Age: 84
End: 2024-02-08

## 2024-02-08 DIAGNOSIS — I83.91 VARICOSE VEINS OF RIGHT CALF: Primary | ICD-10-CM

## 2024-02-09 ENCOUNTER — TELEPHONE (OUTPATIENT)
Dept: OTHER | Facility: CLINIC | Age: 84
End: 2024-02-09
Payer: COMMERCIAL

## 2024-02-09 NOTE — TELEPHONE ENCOUNTER
[facility]    Who is the name of the provider?:  [facility] NURSE   What is the location you see this provider at/preferred location?: [place]  Person calling / Facility: Patrick Olson  Phone number:  [phone] (home)  Nurse call back needed:  ?     Reason for call:  FYI referral in active requests for [facility].    Patient describes low circulation in LE, cold feet, in addition right ankle discoloration, (purple spots). Believes that she should be seen by vascular.    Pharmacy location:  [id number] [address] Imaging: n/a   Can we leave a detailed message on this number?  YES     2/9/2024, 9:17 AM

## 2024-02-09 NOTE — TELEPHONE ENCOUNTER
Patient is scheduled for consult with Dr Brenner. Patient aware she does not need to be seen at both [facility].

## 2024-02-09 NOTE — TELEPHONE ENCOUNTER
Referred to [facility] by Essence Tamayo MD for Varicose veins of RLE.    Patient states she also has poor lower extremity circulation, cold feet, skin discoloration at right ankle.    Routing to scheduling to coordinate the following:    NEW VASCULAR PATIENT consult with Vascular medicine  Please schedule this at next available.    Please explain to patient that she was referred to [facility] but that if she feels her symptoms involve more than varicose veins, she would be well served by seeing Vascular Medicine here at [facility].    She does not need to be seen at both [facility] and [facility].    Appt note:    Referred by Dr. Tamayo for Varicose veins of RLE, additional symptoms of cold feet, skin discoloration.  Feels she has poor LE circulation.

## 2024-02-16 ENCOUNTER — THERAPY VISIT (OUTPATIENT)
Dept: PHYSICAL THERAPY | Facility: CLINIC | Age: 84
End: 2024-02-16
Payer: COMMERCIAL

## 2024-02-16 DIAGNOSIS — G89.29 CHRONIC BILATERAL LOW BACK PAIN WITH BILATERAL SCIATICA: Primary | ICD-10-CM

## 2024-02-16 DIAGNOSIS — M54.42 CHRONIC BILATERAL LOW BACK PAIN WITH BILATERAL SCIATICA: Primary | ICD-10-CM

## 2024-02-16 DIAGNOSIS — M54.41 CHRONIC BILATERAL LOW BACK PAIN WITH BILATERAL SCIATICA: Primary | ICD-10-CM

## 2024-02-16 PROCEDURE — 97140 MANUAL THERAPY 1/> REGIONS: CPT | Mod: GP | Performed by: PHYSICAL THERAPIST

## 2024-02-16 PROCEDURE — 97110 THERAPEUTIC EXERCISES: CPT | Mod: GP | Performed by: PHYSICAL THERAPIST

## 2024-02-16 PROCEDURE — 97112 NEUROMUSCULAR REEDUCATION: CPT | Mod: GP | Performed by: PHYSICAL THERAPIST

## 2024-02-22 ENCOUNTER — TELEPHONE (OUTPATIENT)
Dept: OTHER | Facility: CLINIC | Age: 84
End: 2024-02-22

## 2024-02-22 ENCOUNTER — OFFICE VISIT (OUTPATIENT)
Dept: OTHER | Facility: CLINIC | Age: 84
End: 2024-02-22
Attending: INTERNAL MEDICINE
Payer: COMMERCIAL

## 2024-02-22 VITALS
DIASTOLIC BLOOD PRESSURE: 75 MMHG | HEART RATE: 71 BPM | OXYGEN SATURATION: 100 % | SYSTOLIC BLOOD PRESSURE: 125 MMHG | WEIGHT: 89 LBS | BODY MASS INDEX: 15.28 KG/M2

## 2024-02-22 DIAGNOSIS — I87.2 VENOUS STASIS DERMATITIS OF BOTH LOWER EXTREMITIES: ICD-10-CM

## 2024-02-22 DIAGNOSIS — M05.742 RHEUMATOID ARTHRITIS INVOLVING BOTH HANDS WITH POSITIVE RHEUMATOID FACTOR (H): ICD-10-CM

## 2024-02-22 DIAGNOSIS — I83.893 VARICOSE VEINS OF BILATERAL LOWER EXTREMITIES WITH OTHER COMPLICATIONS: Primary | ICD-10-CM

## 2024-02-22 DIAGNOSIS — M05.741 RHEUMATOID ARTHRITIS INVOLVING BOTH HANDS WITH POSITIVE RHEUMATOID FACTOR (H): ICD-10-CM

## 2024-02-22 PROCEDURE — G0463 HOSPITAL OUTPT CLINIC VISIT: HCPCS | Performed by: INTERNAL MEDICINE

## 2024-02-22 PROCEDURE — 99205 OFFICE O/P NEW HI 60 MIN: CPT | Performed by: INTERNAL MEDICINE

## 2024-02-22 PROCEDURE — G2211 COMPLEX E/M VISIT ADD ON: HCPCS | Performed by: INTERNAL MEDICINE

## 2024-02-22 NOTE — PROGRESS NOTES
[facility] INITIAL VASCULAR MEDICINE CONSULT    ( New Patient Visit)     PRIMARY HEALTH CARE PROVIDER:  Essence Tamayo MD      REFERRING HEALTH CARE PROVIDER;  Essence Tamayo MD      REASON FOR CONSULT: Evaluation and management of bilateral lower extremity varicose veins right more than left side in a very pleasant 83-year-old female with history of rheumatoid arthritis, CKD, diverticulosis of large intestine, osteoporosis etc.      HPI: Patrick Olson is a 83 year old very pleasant female with past medical history of rheumatoid arthritis, osteo arthritis, osteoporosis, diverticulosis of large intestine, CKD stage III here for evaluation and management of bilateral lower extremity varicose veins right more than left side with the discoloration of the legs in the distal portion.  She has no previous history of a DVT.  She is not using any compression stockings.  No previous leg surgeries or intervention for varicose veins etc..    She is new to this clinic reviewed available records in the epic and updated chart      PAST MEDICAL HISTORY  Past Medical History:   Diagnosis Date    Anemia     CKD (chronic kidney disease) stage 3, GFR 30-59 ml/min (H)     Diverticulosis of large intestine     Osteoarthritis     Osteoporosis     Rheumatoid arthritis (H)     Sensorineural hearing loss     Varicose veins of bilateral lower extremities with other complications        CURRENT MEDICATIONS  acetaminophen (TYLENOL) 650 MG CR tablet, Take 650 mg by mouth every 8 hours as needed for fever or pain Taking 2 - 650 mg tablets every 8 hrs.  calcium carbonate-vitamin D (CALTRATE) 600-10 MG-MCG per tablet, Take 1 tablet by mouth daily  clobetasol (TEMOVATE) 0.05 % external ointment, Apply topically 2 times daily To right ankle as needed  famotidine (PEPCID) 20 MG tablet, Take 20 mg by mouth At Bedtime  folic acid (FOLVITE) 1 MG tablet, TAKE 1 TABLET BY MOUTH EVERY DAY  gabapentin (NEURONTIN) 300 MG  capsule, Take 1 capsule (300 mg) by mouth 3 times daily Take 300mg in morning, 300mg in afternoon and 300mg  hydroxychloroquine (PLAQUENIL) 200 MG tablet, Take 1 tablet (200 mg) by mouth daily  lifitegrast (XIIDRA) 5 % opthalmic solution, 1 drop 2 times daily  Multiple Vitamins-Minerals (OCUVITE PRESERVISION PO), Take 1 tablet by mouth 2 times daily  pilocarpine (SALAGEN) 5 MG tablet, Take 1 tablet (5 mg) by mouth daily  omeprazole (PRILOSEC) 40 MG DR capsule, Take 1 capsule (40 mg) by mouth daily (Patient not taking: Reported on 2/22/2024)    No current facility-administered medications on file prior to visit.      PAST SURGICAL HISTORY:  Past Surgical History:   Procedure Laterality Date    CAPSULE/PILL CAM ENDOSCOPY N/A 11/18/2021    Procedure: IMAGING PROCEDURE, GI TRACT, INTRALUMINAL, VIA CAPSULE;  Surgeon: Deric Rodriguez MD;  [facility]    CAPSULE/PILL CAM ENDOSCOPY N/A 2/14/2023    Procedure: IMAGING PROCEDURE, GI TRACT, INTRALUMINAL, VIA CAPSULE;  Surgeon: Jaime Mesa MD;  [facility]    COLONOSCOPY N/A 03/14/2017    Procedure: COMBINED COLONOSCOPY, SINGLE OR MULTIPLE BIOPSY/POLYPECTOMY BY BIOPSY;  Surgeon: Spencer Downey MD;  [facility]    COLONOSCOPY N/A 9/22/2022    Procedure: COLONOSCOPY, FLEXIBLE, WITH LESION REMOVAL USING SNARE;  Surgeon: Kirby Arthur MD;  Location:  GI    COLONOSCOPY N/A 1/13/2023    Procedure: COLONOSCOPY;  Surgeon: Spencer Downey MD;  Location: [facility]    COLONOSCOPY N/A 8/14/2023    Procedure: Colonoscopy;  Surgeon: Spencer Downey MD;  Location: [facility]    ENTEROSCOPY SMALL BOWEL N/A 11/20/2021    Procedure: ENTEROSCOPY, colonoscopy with endoscopic mucosal resection and tattoo placement;  Surgeon: Kirby Arthur MD;  [facility]    ESOPHAGOSCOPY, GASTROSCOPY, DUODENOSCOPY (EGD), COMBINED N/A 2/6/2023    Procedure: ESOPHAGOGASTRODUODENOSCOPY (EGD);  Surgeon: Spencer Downey MD;  Location:  GI    IR VISCERAL EMBOLIZATION   01/22/2021    ORTHOPEDIC SURGERY Left     hip replacment    SIGMOIDOSCOPY FLEXIBLE N/A 01/23/2021    Procedure: SIGMOIDOSCOPY, FLEXIBLE;  Surgeon: Jaime Steinberg MD;  Location:  GI       ALLERGIES     Allergies   Allergen Reactions    Bee Venom Anaphylaxis    Shrimp Anaphylaxis    Evoxac [Cevimeline] Unknown    Prevnar Swelling     Other reaction(s): Edema    Prevnar [Pneumococcal 13-Linda Conj Vacc] Unknown       FAMILY HISTORY  History reviewed. No pertinent family history.    VASCULAR FAMILY HISTORY  1st order relative with atherosclerotic PAD: No  1st order relative with AAA: No  Family history of Familial Hyperlipidemia No  Family History of Hypercoagulable state:No    VASCULAR RISK FACTORS  1. Diabetes:No   2. Smoking: quit smoking some time ago.  3. HTN: controlled  4.Hyperlipidemia: No      SOCIAL HISTORY  Social History     Socioeconomic History    Marital status:      Spouse name: Not on file    Number of children: Not on file    Years of education: Not on file    Highest education level: Not on file   Occupational History    Not on file   Tobacco Use    Smoking status: Former    Smokeless tobacco: Never   Vaping Use    Vaping Use: Never used   Substance and Sexual Activity    Alcohol use: No    Drug use: No    Sexual activity: Not on file   Other Topics Concern    Not on file   Social History Narrative    - Retired (formerly employed as  at [employer])    - 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  Normal energy.  No fever or chills  Eyes: Negative for vision changes or eye problems  ENT: No problems with ears, nose or throat.  No difficulty swallowing.  Resp: No coughing, wheezing or shortness of breath  CV: No chest pains or palpitations  GI: No nausea, vomiting,  heartburn, abdominal pain, diarrhea, constipation or change in bowel habits  : No urinary frequency or dysuria, bladder or kidney problems  Musculoskeletal: No significant muscle or joint pains  Neurologic: No headaches, numbness, tingling, weakness, problems with balance or coordination  Psychiatric: No problems with anxiety, depression or mental health  Heme/immune/allergy: No history of bleeding or clotting problems or anemia.  No allergies or immune system problems  Endocrine: No history of thyroid disease, diabetes or other endocrine disorders  Skin: No rashes,worrisome lesions or skin problems  Vascular: Bilateral lower extremity varicose veins right more than left side  No history of a DVT  No leg swelling  No previous intervention for varicose veins  No previous imaging studies  EXAM:  /75 (BP Location: Left arm, Patient Position: Chair, Cuff Size: Adult Regular)   Pulse 71   Wt 89 lb (40.4 kg)   SpO2 100%   BMI 15.28 kg/m    In general, the patient is a pleasant female in no apparent distress.    HEENT: NC/AT.  PERRLA.  EOMI.  Sclerae white, not injected.  Nares clear.  Pharynx without erythema or exudate.  Dentition intact.    Neck: No adenopathy.  No thyromegaly. Carotids +2/2 bilaterally without bruits.  No jugular venous distension.   Heart: RRR. Normal S1, S2 splits physiologically. No murmur, rub, click, or gallop. The PMI is in the 5th ICS in the midclavicular line. There is no heave.    Lungs: CTA.  No ronchi, wheezes, rales.  No dullness to percussion.   Abdomen: Soft, nontender, nondistended. No  organomegaly. No AAA.  No bruits.   Extremities: No clubbing, cyanosis, or edema.  No wounds.   She has a good palpable DP PT pulses and dopplerable bi-/triphasic pulses  Bilateral lower extremity varicose veins right worse than left side with CEAP 4 CVI  Venous stasis changes noted right more than left leg  No foot ulcers or leg ulcers      Labs:    LIVER ENZYME RESULTS:  Lab Results   Component Value Date    AST 26 11/10/2023    AST 10 03/13/2017    ALT 12 11/10/2023    ALT 12 03/13/2017       CBC RESULTS:  Lab Results   Component Value Date    WBC 3.1 (L) 11/10/2023    WBC 5.0 01/20/2021    RBC 3.68 (L) 11/10/2023    RBC 2.51 (L) 01/20/2021    HGB 11.4 (L) 11/10/2023    HGB 8.0 (L) 01/25/2021    HCT 36.1 11/10/2023    HCT 25.2 (L) 01/20/2021    MCV 98 11/10/2023     01/20/2021    MCH 31.0 11/10/2023    MCH 33.1 (H) 01/20/2021    MCHC 31.6 11/10/2023    MCHC 32.9 01/20/2021    RDW 13.1 11/10/2023    RDW 15.9 (H) 01/20/2021     11/10/2023     01/20/2021       BMP RESULTS:  Lab Results   Component Value Date     11/10/2023     01/23/2021    POTASSIUM 4.9 11/10/2023    POTASSIUM 4.7 09/29/2022    POTASSIUM 3.6 01/23/2021    CHLORIDE 105 11/10/2023    CHLORIDE 104 10/24/2022    CHLORIDE 114 (H) 01/23/2021    CO2 28 11/10/2023    CO2 29 09/29/2022    CO2 26 01/23/2021    ANIONGAP 9 11/10/2023    ANIONGAP 3 09/29/2022    ANIONGAP 3 01/23/2021     (H) 11/10/2023     (H) 09/29/2022     (H) 01/23/2021    BUN 22.7 11/10/2023    BUN 21 09/29/2022    BUN 6 (L) 01/23/2021    CR 1.22 (H) 11/10/2023    CR 0.75 01/23/2021    GFRESTIMATED 45 (L) 11/10/2023    GFRESTIMATED 76 01/23/2021    GFRESTBLACK 88 01/23/2021    EJ 10.0 11/10/2023    EJ 8.0 (L) 01/23/2021          THYROID RESULTS:  Lab Results   Component Value Date    TSH 1.13 03/27/2023       Procedures:         Assessment and Plan:     1. Varicose veins of bilateral lower extremities with other complications RT >left  CEAP 4 CVI  - US Venous Competency Bilateral; Future  - Compression Sleeve/Stocking Order for DME - ONLY FOR DME    2. Rheumatoid arthritis involving both hands with positive rheumatoid factor (H)    This is a very pleasant 83-year-old female looks much younger than stated age dealing with varicose veins in both lower extremities and also venous stasis dermatitis she was prescribed Temovate by dermatologist using but not much improvement.  Her legs feels heavy she also has a history of rheumatoid arthritis currently following up with rheumatologist.  No history of a DVT.  No previous intervention for varicose veins.  I have reviewed varicose veins information with the patient education sheet given  She will benefit with venous competency studies which were ordered  She will benefit with ideally compression stockings 20 to 30 mmHg but given age and also rheumatoid arthritis she will not be able to put them on will give instead 15 to 20 mmHg compression stockings thigh-high if she tolerates and handles well then later we can prescribe 20 to 30 mmHg  Elevate the legs when able  Suggested vein formula 1000 for the first month take 1 pill twice a day then followed by 1 pill daily  Follow-up with me 1 to 2 weeks after completion of the imaging studies  For all other issues follow-up with primary care physician or rheumatologist etc.         60 minutes spent on the date of the encounter doing chart review, history and exam, documentation, and further activities as noted above.  She is new to this clinic reviewed available records in the epic and updated chart    The longitudinal care of plan for the above diagnoses was addressed during this visit. Due to added complexity of care, we will continue to supprt Patrick Olson and the subsequent management of this/these conditions and with ongoing continuity of care for this/these conditions.      Thank you for the consultation  Copy of this note to primary care physician  This  note was dictated by utilizing dragon software      Mari Brenner MD,JERMAINE,FSVM,FNLA, FACP  Vascular Medicine  Clinical Hypertension Specialist   Clinical Lipidologist

## 2024-02-22 NOTE — TELEPHONE ENCOUNTER
Follow-up to 02/22/24    Bilateral venous competency ultrasound  Follow up TWO weeks later - In clinic.

## 2024-02-22 NOTE — PATIENT INSTRUCTIONS
"Please wear compression stockings thigh-high 15 to 20 mmHg during the daytime and elevate the legs when able prescription and information given    Please take vein formula 1000 for the first month 1 pill twice a day then followed by 1 pill daily information pamphlet given to you    You may use coupon code \" [id number]\"    Go for venous competency studies our staff will call and schedule the test    Follow-up with me 2 weeks after the venous competency studies ,  office visit      "

## 2024-02-22 NOTE — PROGRESS NOTES
[facility]        Patient is here for a  follow up.    Pt is currently taking no meds that would impact our treatment plan.    /75 (BP Location: Left arm, Patient Position: Chair, Cuff Size: Adult Regular)   Pulse 71   Wt 89 lb (40.4 kg)   SpO2 100%   BMI 15.28 kg/m      The provider has been notified that the patient has no concerns.     Questions patient would like addressed today are: N/A.    Refills are needed: N/A    Has homecare services and agency name:  Maddie Falcon MA

## 2024-02-27 ENCOUNTER — ANCILLARY PROCEDURE (OUTPATIENT)
Dept: ULTRASOUND IMAGING | Facility: CLINIC | Age: 84
End: 2024-02-27
Attending: INTERNAL MEDICINE
Payer: COMMERCIAL

## 2024-02-27 DIAGNOSIS — I83.893 VARICOSE VEINS OF BILATERAL LOWER EXTREMITIES WITH OTHER COMPLICATIONS: ICD-10-CM

## 2024-02-27 PROCEDURE — 93970 EXTREMITY STUDY: CPT | Performed by: SURGERY

## 2024-02-28 ENCOUNTER — THERAPY VISIT (OUTPATIENT)
Dept: PHYSICAL THERAPY | Facility: CLINIC | Age: 84
End: 2024-02-28
Payer: COMMERCIAL

## 2024-02-28 DIAGNOSIS — M54.42 CHRONIC BILATERAL LOW BACK PAIN WITH BILATERAL SCIATICA: Primary | ICD-10-CM

## 2024-02-28 DIAGNOSIS — G89.29 CHRONIC BILATERAL LOW BACK PAIN WITH BILATERAL SCIATICA: Primary | ICD-10-CM

## 2024-02-28 DIAGNOSIS — M54.41 CHRONIC BILATERAL LOW BACK PAIN WITH BILATERAL SCIATICA: Primary | ICD-10-CM

## 2024-02-28 PROCEDURE — 97140 MANUAL THERAPY 1/> REGIONS: CPT | Mod: GP | Performed by: PHYSICAL THERAPIST

## 2024-02-28 PROCEDURE — 97112 NEUROMUSCULAR REEDUCATION: CPT | Mod: GP | Performed by: PHYSICAL THERAPIST

## 2024-02-28 PROCEDURE — 97110 THERAPEUTIC EXERCISES: CPT | Mod: GP | Performed by: PHYSICAL THERAPIST

## 2024-03-05 ENCOUNTER — THERAPY VISIT (OUTPATIENT)
Dept: PHYSICAL THERAPY | Facility: CLINIC | Age: 84
End: 2024-03-05
Payer: COMMERCIAL

## 2024-03-05 DIAGNOSIS — M54.41 CHRONIC BILATERAL LOW BACK PAIN WITH BILATERAL SCIATICA: Primary | ICD-10-CM

## 2024-03-05 DIAGNOSIS — G89.29 CHRONIC BILATERAL LOW BACK PAIN WITH BILATERAL SCIATICA: Primary | ICD-10-CM

## 2024-03-05 DIAGNOSIS — M54.42 CHRONIC BILATERAL LOW BACK PAIN WITH BILATERAL SCIATICA: Primary | ICD-10-CM

## 2024-03-05 PROCEDURE — 97112 NEUROMUSCULAR REEDUCATION: CPT | Mod: GP | Performed by: PHYSICAL THERAPIST

## 2024-03-05 PROCEDURE — 97140 MANUAL THERAPY 1/> REGIONS: CPT | Mod: GP | Performed by: PHYSICAL THERAPIST

## 2024-03-05 PROCEDURE — 97110 THERAPEUTIC EXERCISES: CPT | Mod: GP | Performed by: PHYSICAL THERAPIST

## 2024-03-12 ENCOUNTER — OFFICE VISIT (OUTPATIENT)
Dept: OTHER | Facility: CLINIC | Age: 84
End: 2024-03-12
Attending: INTERNAL MEDICINE
Payer: COMMERCIAL

## 2024-03-12 VITALS
BODY MASS INDEX: 15.48 KG/M2 | DIASTOLIC BLOOD PRESSURE: 69 MMHG | WEIGHT: 90.2 LBS | OXYGEN SATURATION: 100 % | HEART RATE: 75 BPM | SYSTOLIC BLOOD PRESSURE: 119 MMHG

## 2024-03-12 DIAGNOSIS — I87.2 VENOUS STASIS DERMATITIS OF BOTH LOWER EXTREMITIES: ICD-10-CM

## 2024-03-12 DIAGNOSIS — M05.742 RHEUMATOID ARTHRITIS INVOLVING BOTH HANDS WITH POSITIVE RHEUMATOID FACTOR (H): ICD-10-CM

## 2024-03-12 DIAGNOSIS — M05.741 RHEUMATOID ARTHRITIS INVOLVING BOTH HANDS WITH POSITIVE RHEUMATOID FACTOR (H): ICD-10-CM

## 2024-03-12 DIAGNOSIS — I83.893 VARICOSE VEINS OF BILATERAL LOWER EXTREMITIES WITH OTHER COMPLICATIONS: Primary | ICD-10-CM

## 2024-03-12 PROCEDURE — G0463 HOSPITAL OUTPT CLINIC VISIT: HCPCS | Performed by: INTERNAL MEDICINE

## 2024-03-12 PROCEDURE — G2211 COMPLEX E/M VISIT ADD ON: HCPCS | Performed by: INTERNAL MEDICINE

## 2024-03-12 PROCEDURE — 99215 OFFICE O/P EST HI 40 MIN: CPT | Performed by: INTERNAL MEDICINE

## 2024-03-12 NOTE — PATIENT INSTRUCTIONS
Use compression stockings, take vein formula as advised     Referral to see Vein surgeon     Follow up in 6 months

## 2024-03-12 NOTE — PROGRESS NOTES
[facility] INITIAL VASCULAR MEDICINE         PRIMARY HEALTH CARE PROVIDER:  Essence Tamayo MD        Follow up visit  Multiple concerns   Review of venous comp studies   She was initially seen few weeks ago for Evaluation and management of bilateral lower extremity varicose veins right more than left side in a very pleasant 83-year-old female with history of rheumatoid arthritis, CKD, diverticulosis of large intestine, osteoporosis etc.      HPI: Patrick Olson is a 83 year old very pleasant female with past medical history of rheumatoid arthritis, osteo arthritis, osteoporosis, diverticulosis of large intestine, CKD stage III here for evaluation and management of bilateral lower extremity varicose veins right more than left side with the discoloration of the legs in the distal portion.  She has no previous history of a DVT.  She is not using any compression stockings.  No previous leg surgeries or intervention for varicose veins etc..    Reviewed venous comp results       PAST MEDICAL HISTORY  Past Medical History:   Diagnosis Date    Anemia     CKD (chronic kidney disease) stage 3, GFR 30-59 ml/min (H)     Diverticulosis of large intestine     Osteoarthritis     Osteoporosis     Rheumatoid arthritis (H)     Sensorineural hearing loss     Varicose veins of bilateral lower extremities with other complications        CURRENT MEDICATIONS  acetaminophen (TYLENOL) 650 MG CR tablet, Take 650 mg by mouth every 8 hours as needed for fever or pain Taking 2 - 650 mg tablets every 8 hrs.  calcium carbonate-vitamin D (CALTRATE) 600-10 MG-MCG per tablet, Take 1 tablet by mouth daily  clobetasol (TEMOVATE) 0.05 % external ointment, Apply topically 2 times daily To right ankle as needed  famotidine (PEPCID) 20 MG tablet, Take 20 mg by mouth At Bedtime  folic acid (FOLVITE) 1 MG tablet, TAKE 1 TABLET BY MOUTH EVERY DAY  gabapentin (NEURONTIN) 300 MG capsule, Take 1 capsule (300 mg) by mouth 3 times  daily Take 300mg in morning, 300mg in afternoon and 300mg  hydroxychloroquine (PLAQUENIL) 200 MG tablet, Take 1 tablet (200 mg) by mouth daily  lifitegrast (XIIDRA) 5 % opthalmic solution, 1 drop 2 times daily  Multiple Vitamins-Minerals (OCUVITE PRESERVISION PO), Take 1 tablet by mouth 2 times daily  pilocarpine (SALAGEN) 5 MG tablet, Take 1 tablet (5 mg) by mouth daily  omeprazole (PRILOSEC) 40 MG DR capsule, Take 1 capsule (40 mg) by mouth daily (Patient not taking: Reported on 2/22/2024)    No current facility-administered medications on file prior to visit.      PAST SURGICAL HISTORY:  Past Surgical History:   Procedure Laterality Date    CAPSULE/PILL CAM ENDOSCOPY N/A 11/18/2021    Procedure: IMAGING PROCEDURE, GI TRACT, INTRALUMINAL, VIA CAPSULE;  Surgeon: Deric Rodriguez MD;  Location: [facility]    CAPSULE/PILL CAM ENDOSCOPY N/A 2/14/2023    Procedure: IMAGING PROCEDURE, GI TRACT, INTRALUMINAL, VIA CAPSULE;  Surgeon: Jaime Mesa MD;  Location: [facility]    COLONOSCOPY N/A 03/14/2017    Procedure: COMBINED COLONOSCOPY, SINGLE OR MULTIPLE BIOPSY/POLYPECTOMY BY BIOPSY;  Surgeon: Spencer Downey MD;  Location: [facility]    COLONOSCOPY N/A 9/22/2022    Procedure: COLONOSCOPY, FLEXIBLE, WITH LESION REMOVAL USING SNARE;  Surgeon: Kirby Arthur MD;  Location:  GI    COLONOSCOPY N/A 1/13/2023    Procedure: COLONOSCOPY;  Surgeon: Spencer Downey MD;  Location: [facility] OR    COLONOSCOPY N/A 8/14/2023    Procedure: Colonoscopy;  Surgeon: Spencer Downey MD;  Location: [facility]    ENTEROSCOPY SMALL BOWEL N/A 11/20/2021    Procedure: ENTEROSCOPY, colonoscopy with endoscopic mucosal resection and tattoo placement;  Surgeon: Kirby Arthur MD;  Location: [facility]    ESOPHAGOSCOPY, GASTROSCOPY, DUODENOSCOPY (EGD), COMBINED N/A 2/6/2023    Procedure: ESOPHAGOGASTRODUODENOSCOPY (EGD);  Surgeon: Spencer Downey MD;  Location: [facility]    IR VISCERAL EMBOLIZATION  01/22/2021    ORTHOPEDIC SURGERY Left     hip  replacment    SIGMOIDOSCOPY FLEXIBLE N/A 01/23/2021    Procedure: SIGMOIDOSCOPY, FLEXIBLE;  Surgeon: Jaime Steinberg MD;  Location:  GI       ALLERGIES     Allergies   Allergen Reactions    Bee Venom Anaphylaxis    Shrimp Anaphylaxis    Evoxac [Cevimeline] Unknown    Prevnar Swelling     Other reaction(s): Edema    Prevnar [Pneumococcal 13-Linda Conj Vacc] Unknown       FAMILY HISTORY  No family history on file.    VASCULAR FAMILY HISTORY  1st order relative with atherosclerotic PAD: No  1st order relative with AAA: No  Family history of Familial Hyperlipidemia No  Family History of Hypercoagulable state:No    VASCULAR RISK FACTORS  1. Diabetes:No   2. Smoking: quit smoking some time ago.  3. HTN: controlled  4.Hyperlipidemia: No      SOCIAL HISTORY  Social History     Socioeconomic History    Marital status:      Spouse name: Not on file    Number of children: Not on file    Years of education: Not on file    Highest education level: Not on file   Occupational History    Not on file   Tobacco Use    Smoking status: Former    Smokeless tobacco: Never   Vaping Use    Vaping Use: Never used   Substance and Sexual Activity    Alcohol use: No    Drug use: No    Sexual activity: Not on file   Other Topics Concern    Not on file   Social History Narrative    - Retired (formerly employed as  at [employer])    - 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  Normal energy.  No fever or chills  Eyes: Negative for vision changes or eye problems  ENT: No problems with ears, nose or throat.  No difficulty swallowing.  Resp: No coughing, wheezing or shortness of breath  CV: No chest pains or palpitations  GI: No nausea, vomiting,  heartburn, abdominal pain, diarrhea, constipation or change in bowel habits  : No urinary frequency or dysuria, bladder or kidney problems  Musculoskeletal: No significant muscle or joint pains  Neurologic: No headaches, numbness, tingling, weakness, problems with balance or coordination  Psychiatric: No problems with anxiety, depression or mental health  Heme/immune/allergy: No history of bleeding or clotting problems or anemia.  No allergies or immune system problems  Endocrine: No history of thyroid disease, diabetes or other endocrine disorders  Skin: No rashes,worrisome lesions or skin problems  Vascular: Bilateral lower extremity varicose veins right more than left side  No history of a DVT  No leg swelling  No previous intervention for varicose veins  No previous imaging studies  EXAM:  /69 (BP Location: Right arm, Patient Position: Chair, Cuff Size: Adult Regular)   Pulse 75   Wt 90 lb 3.2 oz (40.9 kg)   SpO2 100%   BMI 15.48 kg/m    In general, the patient is a pleasant female in no apparent distress.    HEENT: NC/AT.  PERRLA.  EOMI.  Sclerae white, not injected.  Nares clear.  Pharynx without erythema or exudate.  Dentition intact.    Neck: No adenopathy.  No thyromegaly. Carotids +2/2 bilaterally without bruits.  No jugular venous distension.   Heart: RRR. Normal S1, S2 splits physiologically. No murmur, rub, click, or gallop. The PMI is in the 5th ICS in the midclavicular line. There is no heave.    Lungs: CTA.  No ronchi, wheezes, rales.  No dullness to percussion.   Abdomen: Soft, nontender, nondistended. No organomegaly. No AAA.  No bruits.   Extremities: No  clubbing, cyanosis, or edema.  No wounds.   She has a good palpable DP PT pulses and dopplerable bi-/triphasic pulses  Bilateral lower extremity varicose veins right worse than left side with CEAP 4 CVI  Venous stasis changes noted right more than left leg  No foot ulcers or leg ulcers      Labs:    LIVER ENZYME RESULTS:  Lab Results   Component Value Date    AST 26 11/10/2023    AST 10 03/13/2017    ALT 12 11/10/2023    ALT 12 03/13/2017       CBC RESULTS:  Lab Results   Component Value Date    WBC 3.1 (L) 11/10/2023    WBC 5.0 01/20/2021    RBC 3.68 (L) 11/10/2023    RBC 2.51 (L) 01/20/2021    HGB 11.4 (L) 11/10/2023    HGB 8.0 (L) 01/25/2021    HCT 36.1 11/10/2023    HCT 25.2 (L) 01/20/2021    MCV 98 11/10/2023     01/20/2021    MCH 31.0 11/10/2023    MCH 33.1 (H) 01/20/2021    MCHC 31.6 11/10/2023    MCHC 32.9 01/20/2021    RDW 13.1 11/10/2023    RDW 15.9 (H) 01/20/2021     11/10/2023     01/20/2021       BMP RESULTS:  Lab Results   Component Value Date     11/10/2023     01/23/2021    POTASSIUM 4.9 11/10/2023    POTASSIUM 4.7 09/29/2022    POTASSIUM 3.6 01/23/2021    CHLORIDE 105 11/10/2023    CHLORIDE 104 10/24/2022    CHLORIDE 114 (H) 01/23/2021    CO2 28 11/10/2023    CO2 29 09/29/2022    CO2 26 01/23/2021    ANIONGAP 9 11/10/2023    ANIONGAP 3 09/29/2022    ANIONGAP 3 01/23/2021     (H) 11/10/2023     (H) 09/29/2022     (H) 01/23/2021    BUN 22.7 11/10/2023    BUN 21 09/29/2022    BUN 6 (L) 01/23/2021    CR 1.22 (H) 11/10/2023    CR 0.75 01/23/2021    GFRESTIMATED 45 (L) 11/10/2023    GFRESTIMATED 76 01/23/2021    GFRESTBLACK 88 01/23/2021    EJ 10.0 11/10/2023    EJ 8.0 (L) 01/23/2021          THYROID RESULTS:  Lab Results   Component Value Date    TSH 1.13 03/27/2023       Procedures:         Assessment and Plan:     1. 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 Rheumatoid arthritis involving both  hands with positive rheumatoid factor (H)    This is a very pleasant 83-year-old female looks much younger than stated age dealing with varicose veins in both lower extremities and also venous stasis dermatitis she was prescribed Temovate by dermatologist using but not much improvement.  Her legs feels heavy she also has a history of rheumatoid arthritis currently following up with rheumatologist.  No history of a DVT.  No previous intervention for varicose veins.  I have reviewed varicose veins information with the patient education sheet given  She will benefit with venous competency studies which were ordered  She will benefit with ideally compression stockings 20 to 30 mmHg but given age and also rheumatoid arthritis she will not be able to put them on will give instead 15 to 20 mmHg compression stockings thigh-high if she tolerates and handles well then later we can prescribe 20 to 30 mmHg  Elevate the legs when able  Take vein formula 1000 for the first month take 1 pill twice a day then followed by 1 pill daily  Arrange referral to see vein surgeon for options of ablation   For all other issues follow-up with primary care physician or rheumatologist etc.     RTC 6 months       40 minutes spent on the date of the encounter doing chart review, history and exam, documentation, and further activities as noted above.  She is new to this clinic reviewed available records in the epic and updated chart    The longitudinal care of plan for the above diagnoses was addressed during this visit. Due to added complexity of care, we will continue to supprt Patrick Olson and the subsequent management of this/these conditions and with ongoing continuity of care for this/these conditions.      Copy of this note to primary care physician  This note was dictated by utilizing dragon software      Mari Brenner MD,FAGRETA,FSVM,FNLA, FACP  Vascular Medicine  Clinical Hypertension Specialist   Clinical Lipidologist

## 2024-03-12 NOTE — PROGRESS NOTES
[facility]        Patient is here for a  follow up.    Pt is currently taking no meds that would impact our treatment plan.    /69 (BP Location: Right arm, Patient Position: Chair, Cuff Size: Adult Regular)   Pulse 75   Wt 90 lb 3.2 oz (40.9 kg)   SpO2 100%   BMI 15.48 kg/m      The provider has been notified that the patient has no concerns.     Questions patient would like addressed today are: N/A.    Refills are needed: N/A    Has homecare services and agency name:  Maddie Falcon MA

## 2024-03-13 ENCOUNTER — THERAPY VISIT (OUTPATIENT)
Dept: PHYSICAL THERAPY | Facility: CLINIC | Age: 84
End: 2024-03-13
Payer: COMMERCIAL

## 2024-03-13 DIAGNOSIS — G89.29 CHRONIC BILATERAL LOW BACK PAIN WITH BILATERAL SCIATICA: Primary | ICD-10-CM

## 2024-03-13 DIAGNOSIS — M54.41 CHRONIC BILATERAL LOW BACK PAIN WITH BILATERAL SCIATICA: Primary | ICD-10-CM

## 2024-03-13 DIAGNOSIS — M54.42 CHRONIC BILATERAL LOW BACK PAIN WITH BILATERAL SCIATICA: Primary | ICD-10-CM

## 2024-03-13 PROCEDURE — 97112 NEUROMUSCULAR REEDUCATION: CPT | Mod: GP | Performed by: PHYSICAL THERAPIST

## 2024-03-13 PROCEDURE — 97140 MANUAL THERAPY 1/> REGIONS: CPT | Mod: GP | Performed by: PHYSICAL THERAPIST

## 2024-03-13 PROCEDURE — 97110 THERAPEUTIC EXERCISES: CPT | Mod: GP | Performed by: PHYSICAL THERAPIST

## 2024-03-15 NOTE — PROGRESS NOTES
PROGRESS NOTE:     03/13/24 0500   Appointment Info   Signing clinician's name / credentials [email or web address]   Total/Authorized Visits up to 12 visits   Visits Used 6   Medical Diagnosis LBP/facet arthritis of lumbar region   PT Tx Diagnosis LBP/facet arthritis of lumbar region   Quick Adds Certification   Progress Note/Certification   Start of Care Date 01/23/24   Onset of illness/injury or Date of Surgery 01/11/24  (MD visit)   Therapy Frequency 1x/week   Predicted Duration 12 weeks   Certification date from 01/23/24   Certification date to 04/16/24   Progress Note Due Date 03/13/24   Progress Note Completed Date 01/23/24   PT Goal 1   Goal Identifier ambulation   Goal Description Patient will be able to walk 1-2 miles with PL 0-2/10   Rationale to maximize safety and independence within the community;to maximize safety and independence within the home;to maximize safety and independence with performance of ADLs and functional tasks   Goal Progress Able to walk 1 mile 4-5/10   Target Date 04/16/24   Subjective Report   Subjective Report PL 3-4/10 reported. Today is a good day, overall, she experiences good days and bad days, an she does not know what causes a bad day. Her sister will be coming home from Rehab this weekend. Patient reports that she has decided to puase PT at this time and will call the clinic as any future needs arise.   Objective Measures   Objective Measures Objective Measure 1;Objective Measure 2   Objective Measure 1   Objective Measure Ambulating with R trunk lateral deviation/lean due to scoliosis(R lumbar convex/L thoracic convex curve)   Objective Measure 2   Objective Measure AROM of lumbar spine: flexion min loss and extension mod-max loss   Details R lumbar parspinal muscles with more tension/tenderness than L today in clinic. R lumbar convex scoliosis curvature visible and palpable.   Treatment Interventions (PT)   Interventions Therapeutic Procedure/Exercise;Manual  "Therapy;Neuromuscular Re-education   Therapeutic Procedure/Exercise   Therapeutic Procedures: strength, endurance, ROM, flexibility minutes (28042) 14   Therapeutic Procedures Ther Proc 2;Ther Proc 3;Ther Proc 4;Ther Proc 5;Ther Proc 6;Ther Proc 7;Ther Proc 8   Ther Proc 1 Pt education regarding PT findings and recommendations as well as rational for HEP selection, discussed POC, loaded PTRx, reviewed PDF copy with pt-nt   Ther Proc 2 prone lying: during manual work   Ther Proc 3 LULÚ's following manual work x2'   Ther Proc 3 - Details feels good after manual work   Ther Proc 4 L QL stretching   Ther Proc 4 - Details vc/mc for proper performance 3x30\"   Ther Proc 5 Reviewed seated rep lumbar flexion 2x10   Ther Proc 5 - Details nt   Ther Proc 6 Reviewed EIS(sagging hips into counter) as a form of B hip flexor stretching to be performed slowly x5-10 or holding each for 10-15 seconds   Ther Proc 7 NuStep x6' at level 3 work load   Ther Proc 7 - Details warm-up   Ther Proc 8 Leg press: DL with 2.5 plates   Ther Proc 8 - Details 2x10, assist from PT on/off leg press   Skilled Intervention Review HEP, warm-up activity, strengthening with leg press   Patient Response/Progress Patient demonstrates good understanding of HEP-able to demonstrate with good form   Neuromuscular Re-education   Neuromuscular re-ed of mvmt, balance, coord, kinesthetic sense, posture, proprioception minutes (09233) 10   Neuromuscular Re-education Neuro Re-ed 2;Neuro Re-ed 3;Neuro Re-ed 4;Neuro Re-ed 5   Neuro Re-ed 1 Seated ther ball: R/L lateral pelvic tilting, A/P pelvic tilting, CW/CCW pelvic circles, gentle bouncing and alt hip flexion and alt knee extension   Neuro Re-ed 1 - Details nt   Neuro Re-ed 2 Gait drills: Side stepping 4x25' without resistance, marching with accentuated arm swinging, ambulating backwards with HHA   Neuro Re-ed 3 SLS with support 3x20-30\" each LE   Neuro Re-ed 3 - Details vc for proper performance   Neuro Re-ed 4 Pull " downs with abdominal bracing   Neuro Re-ed 4 - Details using RTB x15, vc   Neuro Re-ed 5 ball bounce/chest passing and catching, ball bouncing while standing stationary   Neuro Re-ed 5 - Details Pt with visibly unsteady reaction when catching ball   Skilled Intervention Core strengthening, balance   Patient Response/Progress Ball bounce/chest passing and catching challenging for pt, but without LOB   Neuro Re-ed 2 - Details pt instructed to perform every other day and not every day   Manual Therapy   Manual Therapy: Mobilization, MFR, MLD, friction massage minutes (95505) 15   Manual Therapy Manual Therapy 2   Manual Therapy 1 Prone: P/A lumbar and sacrum mobs, grade III-IV   Manual Therapy 2 Prone: STM to L>R lumbar/QL areas   Skilled Intervention Decrease pain, increased lumbar segmental mobility, increase ROM   Patient Response/Progress Decrease pain   Education   Learner/Method Patient;Listening;Reading;Demonstration;Pictures/Video;No Barriers to Learning   Plan   Home program See PTRx   Comments   Comments Pt will call the clinic if any future need arises   Total Session Time   Timed Code Treatment Minutes 39   Total Treatment Time (sum of timed and untimed services) 39       PLAN  Continue therapy per current plan of care.  Patient passing PT at this time and will call clinic if any future need arises.    Beginning/End Dates of Progress Note Reporting Period:  01/23/24 to 03/13/2024    Referring Provider:  Sintia Pelayo

## 2024-04-04 ENCOUNTER — OFFICE VISIT (OUTPATIENT)
Dept: VASCULAR SURGERY | Facility: CLINIC | Age: 84
End: 2024-04-04
Payer: COMMERCIAL

## 2024-04-04 DIAGNOSIS — I83.893 VARICOSE VEINS OF BILATERAL LOWER EXTREMITIES WITH OTHER COMPLICATIONS: Primary | ICD-10-CM

## 2024-04-04 PROCEDURE — 99204 OFFICE O/P NEW MOD 45 MIN: CPT | Performed by: SURGERY

## 2024-04-04 NOTE — PROGRESS NOTES
I had the pleasure of seeing Patrick Olson in the vein clinic today.  This is an referral from Dr. Brenner for right lower extremity varicose veins.  Patient tells me that she has had varicose veins for a very long time and was concerned about them.  But on detailed questioning it does not appear that the varicose veins themselves cause any discomfort.  She has also expressed concern about the discoloration in her lateral and medial malleolus.    He never had deep venous thrombosis or phlebitis.    Her 1 sister had complications of peripheral arterial disease and she succumbed to those and patient has also expressed a concern about whether or not she has peripheral arterial disease.    On examination she has prominent varicose veins of the right lower extremity and discoloration due to multiple reticular vein clusters in the right medial and lateral ankle areas.  On my examination she also does not have any evidence of arterial insufficiency.  Dorsalis pedis pulses are 1+ palpable and posterior tibial pulses are 2+ palpable bilaterally.    She also had multiple other questions.  I have reassured her that the dark spots on her skin in the abdomen are not due to venous insufficiency of the right lower extremity.  I also reassured her that with palpable pulses and no symptoms of arterial insufficiency it is very unlikely that she has peripheral arterial disease.  I have advised her to wear compression stockings.    I did review the sonography and notable incompetence of the right great saphenous vein giving rise to the varicose veins in the right lower extremity.  Again these are asymptomatic and do not warrant treatment other than just external compression.    She had about a page and a half of listed questions which were answered individually to her satisfaction.    Total time spent was 45 minutes.

## 2024-04-04 NOTE — NURSING NOTE
Patient Reported symptoms:    Right leg   Heaviness None of the time   Achiness Some of the time   Swelling None of the time   Throbbing None of the time   Itching None of the time   Appearance Very noticeable   Impact on work/activities Symptoms but full able to participate    Left Leg   Heaviness None of the time   Achiness Some of the time   Swelling None of the time   Throbbing None of the time   Itching None of the time   Appearance Very noticeable   Impact on work/activities Symptoms but full able to participate

## 2024-04-04 NOTE — LETTER
4/4/2024         RE: Patrick Olson  [address] [facility] [id number]        Dear Colleague,    Thank you for referring your patient, Patrick Olson, to the [facility] [place]. Please see a copy of my visit note below.    I had the pleasure of seeing Patrick Olson in the vein clinic today.  This is an referral from Dr. Brenner for right lower extremity varicose veins.  Patient tells me that she has had varicose veins for a very long time and was concerned about them.  But on detailed questioning it does not appear that the varicose veins themselves cause any discomfort.  She has also expressed concern about the discoloration in her lateral and medial malleolus.    He never had deep venous thrombosis or phlebitis.    Her 1 sister had complications of peripheral arterial disease and she succumbed to those and patient has also expressed a concern about whether or not she has peripheral arterial disease.    On examination she has prominent varicose veins of the right lower extremity and discoloration due to multiple reticular vein clusters in the right medial and lateral ankle areas.  On my examination she also does not have any evidence of arterial insufficiency.  Dorsalis pedis pulses are 1+ palpable and posterior tibial pulses are 2+ palpable bilaterally.    She also had multiple other questions.  I have reassured her that the dark spots on her skin in the abdomen are not due to venous insufficiency of the right lower extremity.  I also reassured her that with palpable pulses and no symptoms of arterial insufficiency it is very unlikely that she has peripheral arterial disease.  I have advised her to wear compression stockings.    I did review the sonography and notable incompetence of the right great saphenous vein giving rise to the varicose veins in the right lower extremity.  Again these are asymptomatic and do not warrant treatment other than just external compression.    She had  about a page and a half of listed questions which were answered individually to her satisfaction.    Total time spent was 45 minutes.      Again, thank you for allowing me to participate in the care of your patient.        Sincerely,        Fei Rodarte MD

## 2024-04-12 ENCOUNTER — OFFICE VISIT (OUTPATIENT)
Dept: DERMATOLOGY | Facility: CLINIC | Age: 84
End: 2024-04-12
Payer: COMMERCIAL

## 2024-04-12 DIAGNOSIS — R21 RASH: Primary | ICD-10-CM

## 2024-04-12 DIAGNOSIS — L82.1 SEBORRHEIC KERATOSIS: ICD-10-CM

## 2024-04-12 DIAGNOSIS — I87.2 STASIS DERMATITIS: ICD-10-CM

## 2024-04-12 PROCEDURE — 99213 OFFICE O/P EST LOW 20 MIN: CPT | Performed by: DERMATOLOGY

## 2024-04-12 ASSESSMENT — PAIN SCALES - GENERAL: PAINLEVEL: NO PAIN (0)

## 2024-04-12 NOTE — LETTER
"4/12/2024       RE: Patrick Olson  [address]  [facility] [address]     Dear Colleague,    Thank you for referring your patient, Patrick Olson, to the [facility] DERMATOLOGY CLINIC [place] at [facility]. Please see a copy of my visit note below.    [facility] Dermatology Note  Encounter Date: Apr 12, 2024  Office Visit     Dermatology Problem List:    Last skin check 01/05/24  # Pertinent PMH: Rheumatoid arthritis and Sjogren's: methotrexate 7.5 mg weekly  - prior: hydroxychloroquine 200 mg daily  1. Xerosis cutis, well-controlled  - Using \"sensitive skin\" products, avoiding irritants, moisturizing regimen  - avoiding retinoid application to affected areas  - hydrocortisone ointment BID PRN to affected areas until resolved  2. Seb Derm, well-controlled  - currently head and shoulders with intermittent ketoconazole shampoo weekly  - Previous: fluocinolone oil for scalp  (too messy)  3. Non scarring hair loss  - Future considerations: Rogaine  4. SK, left axilla, s/p shave bx 3/21/2023   5. Rash, right ankle and dorsal foot - suspect LSC/stasis changes  - Current tx: clobetasol 0.05% ointment BID prn       ____________________________________________    Assessment & Plan:     # Rash, right ankle and dorsal foot, suspect LSC/stasis dermatitis. Improved from prior visit.   - Advised daily moisturization with bland emollient.   - continue clobetasol ointment BID prn when red/itchy/scaly  - Recommended that she continue use of compression stockings   - Will continue following up with Vascular surgery     # SKs.  - Discussed the natural history and benign nature of this lesion. Reassurance provided that no additional treatment is necessary.       Procedures Performed:   None    Follow-up: pin 1/2025 for FBSE, sooner if concerns.     Staff and Scribe:     Scribe Disclosure:   IALVIN, am serving as a scribe; to document " services personally performed by Rubina Grey MD -based on data collection and the provider's statements to me.     Provider Disclosure:   The documentation recorded by the scribe accurately reflects the services I personally performed and the decisions made by me.    Rubina Grey MD    Department of Dermatology  [facility]: Phone: [phone], Fax: [phone]  4/19/2024     ____________________________________________    CC: Derm Problem (Patrick presents today for a recheck of her right ankle and foot. She notes that rash appears to be persistent. She had a recent appointment with vascular surgery.//She also has dark spots on her abdomen that she would like checked today.)    HPI:  Ms. Patrick Olson is a(n) 83 year old female who presents today as a return patient for rash follow up.     She notes that she seen vascular surgery. She has been using clobetasol cream and compression stockings. It's improved.    Patient is otherwise feeling well, without additional skin concerns.     Labs Reviewed:  N/A    Physical Exam:  Vitals: There were no vitals taken for this visit.  SKIN: Focused examination of stomach and R lower leg was performed.  - -waxy, stuck on tan/brown papules and patches on the abdomen  - R lower foot with dilated varicosities and hyperpigmented papules Improved from prior   - No other lesions of concern on areas examined.     Medications:  Current Outpatient Medications   Medication Sig Dispense Refill    acetaminophen (TYLENOL) 650 MG CR tablet Take 650 mg by mouth every 8 hours as needed for fever or pain Taking 2 - 650 mg tablets every 8 hrs.      calcium carbonate-vitamin D (CALTRATE) 600-10 MG-MCG per tablet Take 1 tablet by mouth daily      clobetasol (TEMOVATE) 0.05 % external ointment Apply topically 2 times daily To right ankle as needed 60 g 3    famotidine (PEPCID) 20 MG tablet Take 20 mg  by mouth At Bedtime      folic acid (FOLVITE) 1 MG tablet TAKE 1 TABLET BY MOUTH EVERY DAY 90 tablet 3    gabapentin (NEURONTIN) 300 MG capsule Take 1 capsule (300 mg) by mouth 3 times daily Take 300mg in morning, 300mg in afternoon and 300mg 90 capsule 1    hydroxychloroquine (PLAQUENIL) 200 MG tablet Take 1 tablet (200 mg) by mouth daily 90 tablet 1    lifitegrast (XIIDRA) 5 % opthalmic solution 1 drop 2 times daily      Multiple Vitamins-Minerals (OCUVITE PRESERVISION PO) Take 1 tablet by mouth 2 times daily      omeprazole (PRILOSEC) 40 MG DR capsule Take 1 capsule (40 mg) by mouth daily 90 capsule 3    pilocarpine (SALAGEN) 5 MG tablet Take 1 tablet (5 mg) by mouth daily 90 tablet 1     No current facility-administered medications for this visit.      Past Medical History:   Patient Active Problem List   Diagnosis    SNHL (sensorineural hearing loss)    GI bleed    Gastrointestinal hemorrhage, unspecified gastrointestinal hemorrhage type    Diverticular hemorrhage    Acute lower GI bleeding    Diverticulosis of large intestine    Lab test negative for COVID-19 virus    Chronic pain of right elbow    Right wrist pain    Lower GI bleed    Chronic bilateral low back pain with bilateral sciatica     Past Medical History:   Diagnosis Date    Anemia     CKD (chronic kidney disease) stage 3, GFR 30-59 ml/min (H)     Diverticulosis of large intestine     Osteoarthritis     Osteoporosis     Rheumatoid arthritis (H)     Sensorineural hearing loss     Varicose veins of bilateral lower extremities with other complications        CC Essence Tamayo MD  [address] [address] on close of this encounter.

## 2024-04-12 NOTE — NURSING NOTE
Dermatology Rooming Note    Patrick Olson's goals for this visit include:   Chief Complaint   Patient presents with    Derm Problem     Patrick presents today for a recheck of her right ankle and foot. She notes that rash appears to be persistent. She had a recent appointment with vascular surgery.    She also has dark spots on her abdomen that she would like checked today.      Kian Brito, CMA

## 2024-04-12 NOTE — PROGRESS NOTES
"[facility] Dermatology Note  Encounter Date: Apr 12, 2024  Office Visit     Dermatology Problem List:    Last skin check 01/05/24  # Pertinent PMH: Rheumatoid arthritis and Sjogren's: methotrexate 7.5 mg weekly  - prior: hydroxychloroquine 200 mg daily  1. Xerosis cutis, well-controlled  - Using \"sensitive skin\" products, avoiding irritants, moisturizing regimen  - avoiding retinoid application to affected areas  - hydrocortisone ointment BID PRN to affected areas until resolved  2. Seb Derm, well-controlled  - currently head and shoulders with intermittent ketoconazole shampoo weekly  - Previous: fluocinolone oil for scalp  (too messy)  3. Non scarring hair loss  - Future considerations: Rogaine  4. SK, left axilla, s/p shave bx 3/21/2023   5. Rash, right ankle and dorsal foot - suspect LSC/stasis changes  - Current tx: clobetasol 0.05% ointment BID prn       ____________________________________________    Assessment & Plan:     # Rash, right ankle and dorsal foot, suspect LSC/stasis dermatitis. Improved from prior visit.   - Advised daily moisturization with bland emollient.   - continue clobetasol ointment BID prn when red/itchy/scaly  - Recommended that she continue use of compression stockings   - Will continue following up with Vascular surgery     # SKs.  - Discussed the natural history and benign nature of this lesion. Reassurance provided that no additional treatment is necessary.       Procedures Performed:   None    Follow-up: pin 1/2025 for FBSE, sooner if concerns.     Staff and Scribe:     Scribe Disclosure:   I, ALVIN DEVRIES, am serving as a scribe; to document services personally performed by Rubina Grey MD -based on data collection and the provider's statements to me.     Provider Disclosure:   The documentation recorded by the scribe accurately reflects the services I personally performed and the decisions made by me.    Rubina Grey MD  Assistant " Professor  Department of Dermatology  [facility]: Phone: [phone], Fax: [phone]  4/19/2024     ____________________________________________    CC: Derm Problem (Patrick presents today for a recheck of her right ankle and foot. She notes that rash appears to be persistent. She had a recent appointment with vascular surgery.//She also has dark spots on her abdomen that she would like checked today.)    HPI:  Ms. Patrick Olson is a(n) 83 year old female who presents today as a return patient for rash follow up.     She notes that she seen vascular surgery. She has been using clobetasol cream and compression stockings. It's improved.    Patient is otherwise feeling well, without additional skin concerns.     Labs Reviewed:  N/A    Physical Exam:  Vitals: There were no vitals taken for this visit.  SKIN: Focused examination of stomach and R lower leg was performed.  - -waxy, stuck on tan/brown papules and patches on the abdomen  - R lower foot with dilated varicosities and hyperpigmented papules Improved from prior   - No other lesions of concern on areas examined.     Medications:  Current Outpatient Medications   Medication Sig Dispense Refill    acetaminophen (TYLENOL) 650 MG CR tablet Take 650 mg by mouth every 8 hours as needed for fever or pain Taking 2 - 650 mg tablets every 8 hrs.      calcium carbonate-vitamin D (CALTRATE) 600-10 MG-MCG per tablet Take 1 tablet by mouth daily      clobetasol (TEMOVATE) 0.05 % external ointment Apply topically 2 times daily To right ankle as needed 60 g 3    famotidine (PEPCID) 20 MG tablet Take 20 mg by mouth At Bedtime      folic acid (FOLVITE) 1 MG tablet TAKE 1 TABLET BY MOUTH EVERY DAY 90 tablet 3    gabapentin (NEURONTIN) 300 MG capsule Take 1 capsule (300 mg) by mouth 3 times daily Take 300mg in morning, 300mg in afternoon and 300mg 90 capsule 1    hydroxychloroquine (PLAQUENIL) 200 MG tablet  Take 1 tablet (200 mg) by mouth daily 90 tablet 1    lifitegrast (XIIDRA) 5 % opthalmic solution 1 drop 2 times daily      Multiple Vitamins-Minerals (OCUVITE PRESERVISION PO) Take 1 tablet by mouth 2 times daily      omeprazole (PRILOSEC) 40 MG DR capsule Take 1 capsule (40 mg) by mouth daily 90 capsule 3    pilocarpine (SALAGEN) 5 MG tablet Take 1 tablet (5 mg) by mouth daily 90 tablet 1     No current facility-administered medications for this visit.      Past Medical History:   Patient Active Problem List   Diagnosis    SNHL (sensorineural hearing loss)    GI bleed    Gastrointestinal hemorrhage, unspecified gastrointestinal hemorrhage type    Diverticular hemorrhage    Acute lower GI bleeding    Diverticulosis of large intestine    Lab test negative for COVID-19 virus    Chronic pain of right elbow    Right wrist pain    Lower GI bleed    Chronic bilateral low back pain with bilateral sciatica     Past Medical History:   Diagnosis Date    Anemia     CKD (chronic kidney disease) stage 3, GFR 30-59 ml/min (H)     Diverticulosis of large intestine     Osteoarthritis     Osteoporosis     Rheumatoid arthritis (H)     Sensorineural hearing loss     Varicose veins of bilateral lower extremities with other complications        CC Essence Tamayo MD  [address] [place] [id number] on close of this encounter.

## 2024-04-23 ENCOUNTER — TELEPHONE (OUTPATIENT)
Dept: DERMATOLOGY | Facility: CLINIC | Age: 84
End: 2024-04-23
Payer: COMMERCIAL

## 2024-04-23 NOTE — TELEPHONE ENCOUNTER
Left Voicemail (1st Attempt) and Sent Mychart (1st Attempt) for the patient to call back and schedule the following:    Appointment type: follow up  Provider: Matilda  Return date: 01/2025  Specialty phone number: [phone]  Additional appointment(s) needed: na  Additonal Notes: na

## 2024-04-25 ENCOUNTER — TELEPHONE (OUTPATIENT)
Dept: DERMATOLOGY | Facility: CLINIC | Age: 84
End: 2024-04-25
Payer: COMMERCIAL

## 2024-04-25 NOTE — TELEPHONE ENCOUNTER
Left Voicemail (2nd Attempt) for the patient to call back and schedule the following:    Appointment type:   Provider: Matilda  Return date: 01/2025  Specialty phone number: [phone]  Additional appointment(s) needed: na  Additonal Notes: na

## 2024-05-02 ENCOUNTER — LAB (OUTPATIENT)
Dept: LAB | Facility: CLINIC | Age: 84
End: 2024-05-02
Payer: COMMERCIAL

## 2024-05-02 DIAGNOSIS — D50.9 IRON DEFICIENCY ANEMIA, UNSPECIFIED IRON DEFICIENCY ANEMIA TYPE: ICD-10-CM

## 2024-05-02 DIAGNOSIS — N18.31 STAGE 3A CHRONIC KIDNEY DISEASE (H): ICD-10-CM

## 2024-05-02 DIAGNOSIS — E55.9 VITAMIN D DEFICIENCY, UNSPECIFIED: ICD-10-CM

## 2024-05-02 LAB — HGB BLD-MCNC: 11.1 G/DL (ref 11.7–15.7)

## 2024-05-02 PROCEDURE — 82306 VITAMIN D 25 HYDROXY: CPT

## 2024-05-02 PROCEDURE — 83540 ASSAY OF IRON: CPT

## 2024-05-02 PROCEDURE — 80069 RENAL FUNCTION PANEL: CPT

## 2024-05-02 PROCEDURE — 82570 ASSAY OF URINE CREATININE: CPT

## 2024-05-02 PROCEDURE — 36415 COLL VENOUS BLD VENIPUNCTURE: CPT

## 2024-05-02 PROCEDURE — 82728 ASSAY OF FERRITIN: CPT

## 2024-05-02 PROCEDURE — 82043 UR ALBUMIN QUANTITATIVE: CPT

## 2024-05-02 PROCEDURE — 85018 HEMOGLOBIN: CPT

## 2024-05-02 PROCEDURE — 83970 ASSAY OF PARATHORMONE: CPT

## 2024-05-02 PROCEDURE — 83550 IRON BINDING TEST: CPT

## 2024-05-03 ENCOUNTER — VIRTUAL VISIT (OUTPATIENT)
Dept: NEPHROLOGY | Facility: CLINIC | Age: 84
End: 2024-05-03
Payer: COMMERCIAL

## 2024-05-03 VITALS — WEIGHT: 88.18 LBS | BODY MASS INDEX: 15.14 KG/M2

## 2024-05-03 DIAGNOSIS — N18.31 STAGE 3A CHRONIC KIDNEY DISEASE (H): Primary | ICD-10-CM

## 2024-05-03 LAB
ALBUMIN SERPL BCG-MCNC: 4.5 G/DL (ref 3.5–5.2)
ANION GAP SERPL CALCULATED.3IONS-SCNC: 10 MMOL/L (ref 7–15)
BUN SERPL-MCNC: 27.6 MG/DL (ref 8–23)
CALCIUM SERPL-MCNC: 9.8 MG/DL (ref 8.8–10.2)
CHLORIDE SERPL-SCNC: 103 MMOL/L (ref 98–107)
CREAT SERPL-MCNC: 1.15 MG/DL (ref 0.51–0.95)
CREAT UR-MCNC: 67.9 MG/DL
DEPRECATED HCO3 PLAS-SCNC: 27 MMOL/L (ref 22–29)
EGFRCR SERPLBLD CKD-EPI 2021: 48 ML/MIN/1.73M2
FERRITIN SERPL-MCNC: 69 NG/ML (ref 11–328)
GLUCOSE SERPL-MCNC: 200 MG/DL (ref 70–99)
IRON BINDING CAPACITY (ROCHE): 267 UG/DL (ref 240–430)
IRON SATN MFR SERPL: 21 % (ref 15–46)
IRON SERPL-MCNC: 57 UG/DL (ref 37–145)
MICROALBUMIN UR-MCNC: <12 MG/L
MICROALBUMIN/CREAT UR: NORMAL MG/G{CREAT}
PHOSPHATE SERPL-MCNC: 4.2 MG/DL (ref 2.5–4.5)
POTASSIUM SERPL-SCNC: 5.9 MMOL/L (ref 3.4–5.3)
SODIUM SERPL-SCNC: 140 MMOL/L (ref 135–145)
VIT D+METAB SERPL-MCNC: 76 NG/ML (ref 20–50)

## 2024-05-03 ASSESSMENT — PAIN SCALES - GENERAL: PAINLEVEL: NO PAIN (0)

## 2024-05-03 NOTE — LETTER
5/3/2024       RE: Patrick Olson  [address] [facility] [address]     Dear Colleague,    Thank you for referring your patient, Patrick Olson, to the [facility] NEPHROLOGY CLINIC [place] at [facility]. Please see a copy of my visit note below.    Virtual Visit Details    Talked with patient. Had labs drawn at Outside lab yesterday and results not available. Patient agreeable to rescheduling  Francine Luther CNP      Again, thank you for allowing me to participate in the care of your patient.      Sincerely,    Francine Luther, NP

## 2024-05-03 NOTE — LETTER
"1/5/2024       RE: Patrick Olson  [address]  [facility] [address]     Dear Colleague,    Thank you for referring your patient, Patrick Olson, to the [facility] DERMATOLOGY CLINIC [place] at [facility]. Please see a copy of my visit note below.    [facility] Dermatology Note  Encounter Date: Jan 5, 2024  Office Visit     Dermatology Problem List:  Last skin check 01/05/24  # Pertinent PMH: Rheumatoid arthritis and Sjogren's: methotrexate 7.5 mg weekly  - prior: hydroxychloroquine 200 mg daily  1. Xerosis cutis, well-controlled  - Using \"sensitive skin\" products, avoiding irritants, moisturizing regimen  - avoiding retinoid application to affected areas  - hydrocortisone ointment BID PRN to affected areas until resolved  2. Seb Derm, well-controlled  - currently head and shoulders with intermittent ketoconazole shampoo weekly  - Previous: fluocinolone oil for scalp  (too messy)  3. Non scarring hair loss  - Future considerations: Rogaine  4. SK, left axilla, s/p shave bx 3/21/2023   5. Rash, right ankle and dorsal foot  - Current tx: clobetasol 0.05% ointment BID prn      ____________________________________________    Assessment & Plan:      # Rash, right ankle and dorsal foot, unclear etiology. Consider LSC with underlying varicose veins/stasis changes. Also consider lichen planus, though view as less likely.  - Start clobetasol 0.05% ointment BID prn.  - Photo obtained today.     # Non-scarring hair loss. Chronic, active.  Ddx androgenetic +/- telogen.   - Recommended Rogaine 5% foam  *ferritin recently 42 (10/04/23, vitamin D 89 (08/21/23), TSH 1.13 (03/27/23)     # Multiple benign nevi.   - Monitor for ABCDEs of melanoma   - Continue sun protection - recommend SPF 30 or higher with frequent application   - Return sooner if noticing changing or symptomatic lesions    # Benign lesions - SKs, cherry angiomas, lentigenes.  - " Left voicemail, this was supposed to be a trigger follow-up telephone appointment.  Unfortunately she is not answering her phone.  If she still experiencing trigger like symptoms she instructed call our office I will be happy to see her back in person.  If she is doing better in regards to locking, pain on the palmar aspect of the hand and we will see as needed.   No treatment required      Procedures Performed:   None.    Follow-up: 3- 6 month(s) in-person, or earlier for new or changing lesions    Staff and Scribe:     Scribe Disclosure:   I, SHERLYN HENRY, am serving as a scribe; to document services personally performed by Rubina Grey MD -based on data collection and the provider's statements to me.    Provider Disclosure:   The documentation recorded by the scribe accurately reflects the services I personally performed and the decisions made by me.    Rubina Grey MD    Department of Dermatology  [facility]: Phone: [phone], Fax: [phone]  1/11/2024       ____________________________________________    CC: Skin Check (Here today for a skin check. Rash on right ankle )    HPI:  Ms. Patrick Olson is a(n) 83 year old female who presents today as a return patient for FBSE.    She presents with a rash on her right ankle. She reports has been there for about 3 years. Denies pain. Because of the rash, she can't wear sandals.    Patient is otherwise feeling well, without additional skin concerns.    Labs Reviewed:  Pathology 03/21/23:  A(1). Left axilla:  - Seborrheic keratosis    Physical Exam:  Vitals: There were no vitals taken for this visit.  SKIN: Total skin excluding the undergarment areas was performed. The exam included the head/face, neck, both arms, chest, back, abdomen, both legs, digits and/or nails.   - Right ankle and dorsal foot, several hyperpigmented somewhat purplish macules and papules with overlying faint scale.  - There are dome shaped bright red papules on the trunk and extremities .   - Multiple regular brown pigmented macules and papules are identified on the trunk and extremities. .   - Scattered brown macules on sun exposed areas.  - Waxy stuck on papules and plaques on trunk and extremities.  - decreased hair density on scalp, no  erythema or scale    - No other lesions of concern on areas examined.     Medications:  Current Outpatient Medications   Medication    acetaminophen (TYLENOL) 650 MG CR tablet    calcium carbonate-vitamin D (CALTRATE) 600-10 MG-MCG per tablet    famotidine (PEPCID) 20 MG tablet    folic acid (FOLVITE) 1 MG tablet    gabapentin (NEURONTIN) 300 MG capsule    hydroxychloroquine (PLAQUENIL) 200 MG tablet    lifitegrast (XIIDRA) 5 % opthalmic solution    Multiple Vitamins-Minerals (OCUVITE PRESERVISION PO)    omeprazole (PRILOSEC) 40 MG DR capsule    pilocarpine (SALAGEN) 5 MG tablet     No current facility-administered medications for this visit.      Past Medical History:   Patient Active Problem List   Diagnosis    SNHL (sensorineural hearing loss)    GI bleed    Gastrointestinal hemorrhage, unspecified gastrointestinal hemorrhage type    Diverticular hemorrhage    Acute lower GI bleeding    Diverticulosis of large intestine    Lab test negative for COVID-19 virus    Chronic pain of right elbow    Right wrist pain    Lower GI bleed     Past Medical History:   Diagnosis Date    Anemia     CKD (chronic kidney disease) stage 3, GFR 30-59 ml/min (H)     Diverticulosis of large intestine     Osteoarthritis     Osteoporosis     Rheumatoid arthritis (H)     Sensorineural hearing loss         CC Referred Self, MD  No address on file on close of this encounter.

## 2024-05-03 NOTE — NURSING NOTE
Is the patient currently in the state of [place]? YES    Visit mode:VIDEO    If the visit is dropped, the patient can be reconnected by: VIDEO VISIT: Text to cell phone:   Telephone Information:   Mobile [phone]       Will anyone else be joining the visit? NO  (If patient encounters technical issues they should call [phone]    How would you like to obtain your AVS? MyChart    Are changes needed to the allergy or medication list? No    Are refills needed on medications prescribed by this physician? NO    Reason for visit: Video Visit (Recheck)    Elvi CHARLES

## 2024-05-03 NOTE — PROGRESS NOTES
Virtual Visit Details    Talked with patient. Had labs drawn at Outside lab yesterday and results not available. Patient agreeable to rescheduling  Francine Luther CNP

## 2024-05-04 LAB — PTH-INTACT SERPL-MCNC: 41 PG/ML (ref 15–65)

## 2024-05-06 ENCOUNTER — PATIENT OUTREACH (OUTPATIENT)
Dept: NEPHROLOGY | Facility: CLINIC | Age: 84
End: 2024-05-06
Payer: COMMERCIAL

## 2024-05-06 DIAGNOSIS — E87.5 HYPERKALEMIA: Primary | ICD-10-CM

## 2024-05-07 NOTE — PROGRESS NOTES
Francine Bruner, Elisabet Nassar RN Hi Becky, I don't believe this K of 5.9. Rather than treating it I would like to just repeat the renal panel.  Thanks  Selene

## 2024-05-08 ENCOUNTER — LAB (OUTPATIENT)
Dept: LAB | Facility: CLINIC | Age: 84
End: 2024-05-08
Payer: COMMERCIAL

## 2024-05-08 DIAGNOSIS — E87.5 HYPERKALEMIA: ICD-10-CM

## 2024-05-08 LAB
ANION GAP SERPL CALCULATED.3IONS-SCNC: 8 MMOL/L (ref 7–15)
BUN SERPL-MCNC: 27.8 MG/DL (ref 8–23)
CALCIUM SERPL-MCNC: 9.8 MG/DL (ref 8.8–10.2)
CHLORIDE SERPL-SCNC: 105 MMOL/L (ref 98–107)
CREAT SERPL-MCNC: 1.14 MG/DL (ref 0.51–0.95)
DEPRECATED HCO3 PLAS-SCNC: 28 MMOL/L (ref 22–29)
EGFRCR SERPLBLD CKD-EPI 2021: 48 ML/MIN/1.73M2
GLUCOSE SERPL-MCNC: 112 MG/DL (ref 70–99)
POTASSIUM SERPL-SCNC: 5.2 MMOL/L (ref 3.4–5.3)
SODIUM SERPL-SCNC: 141 MMOL/L (ref 135–145)

## 2024-05-08 PROCEDURE — 80048 BASIC METABOLIC PNL TOTAL CA: CPT | Performed by: PATHOLOGY

## 2024-05-08 PROCEDURE — 36415 COLL VENOUS BLD VENIPUNCTURE: CPT | Performed by: PATHOLOGY

## 2024-05-16 ENCOUNTER — MYC MEDICAL ADVICE (OUTPATIENT)
Dept: NEPHROLOGY | Facility: CLINIC | Age: 84
End: 2024-05-16
Payer: COMMERCIAL

## 2024-05-16 ENCOUNTER — TELEPHONE (OUTPATIENT)
Dept: NEPHROLOGY | Facility: CLINIC | Age: 84
End: 2024-05-16
Payer: COMMERCIAL

## 2024-05-16 DIAGNOSIS — N18.31 STAGE 3A CHRONIC KIDNEY DISEASE (H): Primary | ICD-10-CM

## 2024-05-16 NOTE — TELEPHONE ENCOUNTER
Left Voicemail (1st Attempt) and Sent Mychart (1st Attempt) for the patient to schedule:    Appointment type: Lab  Requested by: Selene Bruner  Return date: Approx. 1-week to several days prior to appointment already scheduled on 6/21  Phone number: [phone]

## 2024-05-21 ENCOUNTER — THERAPY VISIT (OUTPATIENT)
Dept: PHYSICAL THERAPY | Facility: CLINIC | Age: 84
End: 2024-05-21
Payer: COMMERCIAL

## 2024-05-21 DIAGNOSIS — M54.41 CHRONIC BILATERAL LOW BACK PAIN WITH BILATERAL SCIATICA: Primary | ICD-10-CM

## 2024-05-21 DIAGNOSIS — G89.29 CHRONIC BILATERAL LOW BACK PAIN WITH BILATERAL SCIATICA: Primary | ICD-10-CM

## 2024-05-21 DIAGNOSIS — M54.42 CHRONIC BILATERAL LOW BACK PAIN WITH BILATERAL SCIATICA: Primary | ICD-10-CM

## 2024-05-21 PROCEDURE — 97110 THERAPEUTIC EXERCISES: CPT | Mod: GP | Performed by: PHYSICAL THERAPIST

## 2024-05-21 PROCEDURE — 97140 MANUAL THERAPY 1/> REGIONS: CPT | Mod: GP | Performed by: PHYSICAL THERAPIST

## 2024-05-22 NOTE — PROGRESS NOTES
ESTABLISHED PATIENT FOLLOW-UP:  Pain of the Lower Back       HISTORY OF PRESENT ILLNESS  Ms. Olson is a pleasant 83 year old year old female who presents to clinic today for follow-up of low back pain.    Date of injury: Chronic  Date last seen: 4/6/2023  Following Therapeutic Plan: doing the PT as recommend, met with Pain Management at [facility], acupuncture started and helped a little,    Pain: 4/10  Function: walk increases pain  Interval History: was seen at Pain Management with Dr. Berry who recommended acupuncture has started accupunture with minimal improvement from winter to spring at [facility].  Also seen at [facility] for an 2nd opinion. Saw Dr. Pelayo on January 11, 2024 who recommended considering RFA.  Started with medial branch blocks on January 30, 2024.  50 % relief.  She was informed shortly thereafter that because her relief did not reach or exceed 75%, that she would not be candidate for radiofrequency ablation based on insurance coverage.    She also notes that she has been following up with physical therapy at the [facility] center with Genie.  They have been working on core strengthening.  She notes that it is mildly beneficial as well.    She is here today wondering about next nonsurgical steps that may be of benefit to her.    Additional medical/Social/Surgical histories reviewed in [facility] and updated as appropriate.    REVIEW OF SYSTEMS (5/22/2024)  CONSTITUTIONAL: Denies fever and weight loss  GASTROINTESTINAL: Denies abdominal pain, nausea, vomiting  MUSCULOSKELETAL: See HPI  SKIN: Denies any recent rash or lesion  NEUROLOGICAL: Denies numbness or focal weakness     PHYSICAL EXAM  There were no vitals taken for this visit.    General  - normal appearance, in no obvious distress  CV  - normal peripheral perfusion  Pulm  - normal respiratory pattern, non-labored  Musculoskeletal - lumbar spine  - stance: slow to rise and sit  - inspection: normal bone and joint alignment, no  obvious scoliosis  - palpation: Bilateral lumbar paravertebral spasm L3-L5  - ROM: pain with bilateral rotation, ++ flexion past 30 deg  - strength: lower extremities 5/5 in all planes  - special tests:  (-) slump test  Neuro  - patellar and Achilles DTRs 2+ bilaterally, no sensory or motor deficit, grossly normal coordination, normal muscle tone  Skin  - no ecchymosis, erythema, warmth, or induration, no obvious rash  Psych  - interactive, appropriate, normal mood and affect      IMAGING :   MRI OF THE LUMBAR SPINE WITHOUT CONTRAST   3/9/2023 11:50 AM      COMPARISON: Lumbar spine MRI 07/12/2018     HISTORY: Lumbar radiculopathy, acute. Lumbar degenerative disc  disease.     TECHNIQUE: Multiplanar, multisequence MRI images of the lumbar spine  were acquired without IV contrast.     FINDINGS: There are five lumbar-type vertebrae for the purposes of  this dictation. The conus ends at the distal L1. 2 mm retrolisthesis  of L3 on L4. Vertebral body heights are maintained. Nonpathologic  marrow signal. Mild Modic type I changes from L2-L3 to L4-L5.  Vertebral body heights are maintained. Nonpathologic marrow signal.  Mild symmetric atrophy of the posterior paraspinal musculature. Normal  diameter of the descending aorta. Redemonstration of a large  gallstone. The patient's known bilateral renal cysts are better  visualized on the abdomen and pelvis CT 11/16/2021.     T12-L1: Normal disc height and signal.  No herniation. Mild bilateral  facet arthropathy.  No spinal canal or neural foraminal stenosis.     L1-L2: Normal disc height and signal. Small broad-based disc bulge.  Mild bilateral facet arthropathy.  No spinal canal or neural foraminal  stenosis.     L2-L3: Moderate vertebral disc height loss. Loss of the normal T2  signal within the disc. Broad-based disc bulge with superimposed small  central disc protrusion. Mild bilateral facet arthropathy. No spinal  canal narrowing. No right neuroforaminal narrowing. Mild  left  neuroforaminal narrowing.     L3-L4: Moderate intervertebral disc height loss. Loss of the normal T2  signal within the dens. Broad-based disc bulge with superimposed left  foraminal disc protrusion, decreased since the prior exam. Moderate  bilateral facet arthropathy. Mild spinal canal narrowing. Mild  narrowing of the left lateral recess. No right neuroforaminal  narrowing. Mild left neural foraminal narrowing.     L4-L5: Mild intervertebral disc height loss. Loss of the normal T2  signal within the disc. Broad-based disc bulge with superimposed right  foraminal disc protrusion. Moderate bilateral facet arthropathy. Mild  narrowing of the right lateral recess. No spinal canal narrowing.  Severe right neuroforaminal narrowing. No left neuroforaminal  narrowing.     L5-S1: Mild intervertebral disc height loss. Loss of the normal T2  signal within the disc. Broad-based disc bulge with superimposed small  central disc protrusion. Mild bilateral facet arthropathy. Mild  narrowing of the lateral recesses. No spinal canal narrowing. No right  neural foraminal narrowing. No left neural foraminal narrowing.                                                                      IMPRESSION:  1.  Compared to prior lumbar spine MRI 07/12/2018, interval decrease  in the previously noted large left foraminal disc protrusion at L3-L4.  2.  Otherwise stable to interval worsening of the moderate multilevel  degenerative changes of the lumbar spine.  3.  Mild spinal canal narrowing and mild narrowing of the left lateral  recess at L3-L4.  4.  Mild narrowing of the right lateral recess at L4-L5.  5.  Mild narrowing of the lateral recesses at L5-S1.  6.  Severe right neural foraminal narrowing at L4-L5.  7.  Mild left neuroforaminal narrowing at L2-L3 and L3-L4.  8.  Mild Modic type I changes at from L2-L3 to L4-L5.      JEANNE RIBERA MD .    Medical records reviewed: 7 orthopedics Dr. Sintia Pelayo MD from visits  January 11, 2024 as well as January 30, 2024 for medial branch block procedure.    ASSESSMENT & PLAN  Ms. Olson is a 83 year old year old female who presents to clinic today for follow-up regarding chronic low back pain secondary to facet arthritis as well as moderate spinal stenosis at the L3-L4 region.    Patrick achieved 50% benefit with medial branch blocks at the L3-L5 region at [facility].  Unfortunately this was not deemed satisfactory to pursue RFA.  Additionally she had an additional epidural steroid injection performed as well in February 2024 (we do not have record of this in the chart).  This recent LEE ANN did not provide any, 0% relief.    Diagnosis:  Lumbar stenosis  Lumbar facet arthropathy  Chronic low back pain without radiculitis    Treatment options as well as her current program was reviewed.  I do think it is still beneficial for her to continue her final 2 PT visits through completion.  We discussed possible benefits of adding a TENS unit to her regimen and she can have Genie in [place] to teach her how to use this.  She will pick this up from her [employer] supply.      Additionally we discussed that acupuncture provided some modest benefit and she can continue to do this intermittently.  Lastly we discussed additional consultation with one of our interventional spine specialist, and to determine whether there may be some option to pursue radiofrequency ablation or other procedure that could benefit her facet arthritis.  Can possibly consider facet steroid injections if RFA is not covered.  I trust the advice of Dr. Donald Coombs for additional recommendations and an order was provided for her to see him.    It was a pleasure seeing Patrick.    48 minutes on date of the encounter doing chart review, history and examination, independent imaging review, documentation, and additional activities noted above.      Mukesh Lantigua DO, CAQSM  Primary Care Sports Medicine

## 2024-05-23 NOTE — PROGRESS NOTES
[facility]                                                                                   OUTPATIENT PHYSICAL THERAPY    PLAN OF TREATMENT FOR OUTPATIENT REHABILITATION   Patient's Last Name, First Name, Patrick Sr YOB: 1940   Provider's Name   [facility]   Medical Record No.  [id number]     Onset Date: 24 (MD visit)  Start of Care Date: 24     Medical Diagnosis:  LBP/facet arthritis of lumbar region      PT Treatment Diagnosis:  LBP/facet arthritis of lumbar region Plan of Treatment  Frequency/Duration: 1x/week every other week/ 12 weeks    Certification date from 24 to 07/10/24         See note for plan of treatment details and functional goals        24 0500   Appointment Info   Signing clinician's name / credentials [email or web address]   Total/Authorized Visits up to 12 visits   Visits Used 7   Medical Diagnosis LBP/facet arthritis of lumbar region   PT Tx Diagnosis LBP/facet arthritis of lumbar region   Quick Adds Certification   Progress Note/Certification   Start of Care Date 24   Onset of illness/injury or Date of Surgery 24  (MD visit)   Therapy Frequency 1x/week every other week   Predicted Duration 12 weeks   Certification date from 24   Certification date to 07/10/24   Progress Note Due Date 24   Progress Note Completed Date 24   PT Goal 1   Goal Identifier ambulation   Goal Description Patient will be able to walk 1-2 miles with PL 0-2/10   Rationale to maximize safety and independence within the community;to maximize safety and independence within the home;to maximize safety and independence with performance of ADLs and functional tasks   Goal Progress Able to walk 1/2-1 mile up to 6-7/10   Subjective Report   Subjective Report Patient retunrs to clinic after a 2+ month absence. She reports that her sister never made it home and actually  one week  "after pt's last PT visit(3/13/2024). Current PL 5/10 in [place] and overall, LBP has gotten worse since last in clinic. Symptoms located B at L-S area and hip pain varies from R to L. Able to walk 1/2 to 1 mile with PL up to 6-7/10.   Objective Measures   Objective Measures Objective Measure 1;Objective Measure 2;Objective Measure 3;Objective Measure 4   Objective Measure 1   Objective Measure Ambulating with R trunk lateral deviation/lean due to scoliosis(R lumbar convex/L thoracic convex curve)   Objective Measure 2   Objective Measure AROM of lumbar spine: flexion no loss and extension mod loss   Details R lumbar parspinal muscles with more tension/tenderness than L today in clinic. R lumbar convex scoliosis curvature remains visible and palpable.   Objective Measure 3   Objective Measure MMT of lumbar myotomes: B hip flexor 4+/5 and quads 4+/5. Upper abdominal strength  3/5 without increased LBP.   Details Pt remains unable to SLS on either LE   Objective Measure 4   Details FIFI unchanged from initial eval at 33.33%   Treatment Interventions (PT)   Interventions Therapeutic Procedure/Exercise;Manual Therapy;Neuromuscular Re-education   Therapeutic Procedure/Exercise   Therapeutic Procedures: strength, endurance, ROM, flexibility minutes (44373) 24   Therapeutic Procedures Ther Proc 2;Ther Proc 3;Ther Proc 4;Ther Proc 5;Ther Proc 6;Ther Proc 7;Ther Proc 8   Ther Proc 1 Pt education regarding PT findings and recommendations as well as rational for HEP selection, discussed POC, loaded PTRx, reviewed PDF copy with pt-nt   Ther Proc 2 prone lying: during manual work   Ther Proc 3 LULÚ's following manual work x2'   Ther Proc 3 - Details feels good after manual work   Ther Proc 4 L QL stretching   Ther Proc 4 - Details vc/mc for proper performance 3x30\"   Ther Proc 5 Reviewed seated rep lumbar flexion 2x10   Ther Proc 5 - Details nt   Ther Proc 6 Reviewed EIS(sagging hips into counter) as a form of B hip flexor stretching to " "be performed slowly x5-10 or holding each for 10-15 seconds   Ther Proc 6 - Details reviewed as part of HEP   Ther Proc 7 NuStep x6' at level 3 work load   Ther Proc 7 - Details nt, resume next   Ther Proc 8 Leg press: DL with 2.5 plates   Ther Proc 8 - Details 2x10, assist from PT on/off leg press   Skilled Intervention Progression/instruction of HEP for further strengthening   Patient Response/Progress New exercise challenging, pt likes   PTRx Ther Proc 1 Quadratus Lumborum Stretch   PTRx Ther Proc 1 - Details No Notes   PTRx Ther Proc 2 Sitting Flexion   PTRx Ther Proc 2 - Details No Notes   PTRx Ther Proc 3 Standing Extension at Counter Supported   PTRx Ther Proc 3 - Details No Notes   PTRx Ther Proc 4 Shoulder Theraband Low Row/Pulldown   PTRx Ther Proc 4 - Details No Notes   PTRx Ther Proc 5 Bridging #1   PTRx Ther Proc 5 - Details instructed for HEP 2x10, vc to slow down, perform with steady pace   PTRx Ther Proc 6 Hip Flexion Straight Leg Raise   PTRx Ther Proc 6 - Details Instructed for HEP -AG 2x10 each Leg, L LE more fatiguing   PTRx Ther Proc 7 Partial Sit-up   PTRx Ther Proc 7 - Details Instructed for HEP 2x10, vc   Ther Proc 1 - Details Reviewed PDF copy of new exercises   Neuromuscular Re-education   Neuromuscular Re-education Neuro Re-ed 2;Neuro Re-ed 3;Neuro Re-ed 4;Neuro Re-ed 5   Neuro Re-ed 1 Seated ther ball: R/L lateral pelvic tilting, A/P pelvic tilting, CW/CCW pelvic circles, gentle bouncing and alt hip flexion and alt knee extension   Neuro Re-ed 1 - Details nt   Neuro Re-ed 2 Gait drills: Side stepping 4x25' without resistance, marching with accentuated arm swinging, ambulating backwards with HHA   Neuro Re-ed 2 - Details nt, resume next   Neuro Re-ed 3 SLS with support 3x20-30\" each LE   Neuro Re-ed 3 - Details vc for proper performance   Neuro Re-ed 4 Pull downs with abdominal bracing   Neuro Re-ed 4 - Details using RTB x15, vc   Neuro Re-ed 5 ball bounce/chest passing and catching, ball " bouncing while standing stationary   Neuro Re-ed 5 - Details nt   PTRx Neuro Re-ed 1 Sidestep   PTRx Neuro Re-ed 1 - Details No Notes   PTRx Neuro Re-ed 2 Balance Single Leg Stance Supported and Unsupported   PTRx Neuro Re-ed 2 - Details No Notes   Manual Therapy   Manual Therapy: Mobilization, MFR, MLD, friction massage minutes ([id number]) 14   Manual Therapy Manual Therapy 2   Manual Therapy 1 Prone: P/A lumbar and sacrum mobs, grade III-IV   Manual Therapy 2 Prone: STM to L>R lumbar/QL areas   Skilled Intervention Decrease pain, increased lumbar segmental mobility, increase ROM   Patient Response/Progress Decrease pain   Education   Learner/Method Patient;Listening;Reading;Demonstration;Pictures/Video;No Barriers to Learning   Plan   Home program See PTRx   Updates to plan of care Updated PTRx with bridging, partial sit-ups, and   Total Session Time   Timed Code Treatment Minutes 38   Total Treatment Time (sum of timed and untimed services) 38       Genie Villa, PT                         I CERTIFY THE NEED FOR THESE SERVICES FURNISHED UNDER        THIS PLAN OF TREATMENT AND WHILE UNDER MY CARE     (Physician attestation of this document indicates review and certification of the therapy plan).              Referring Provider:  Sintia Pelayo    Initial Assessment  See Epic Evaluation- Start of Care Date: 01/23/24

## 2024-05-28 ENCOUNTER — THERAPY VISIT (OUTPATIENT)
Dept: PHYSICAL THERAPY | Facility: CLINIC | Age: 84
End: 2024-05-28
Payer: COMMERCIAL

## 2024-05-28 DIAGNOSIS — M54.42 CHRONIC BILATERAL LOW BACK PAIN WITH BILATERAL SCIATICA: Primary | ICD-10-CM

## 2024-05-28 DIAGNOSIS — G89.29 CHRONIC BILATERAL LOW BACK PAIN WITH BILATERAL SCIATICA: Primary | ICD-10-CM

## 2024-05-28 DIAGNOSIS — M54.41 CHRONIC BILATERAL LOW BACK PAIN WITH BILATERAL SCIATICA: Primary | ICD-10-CM

## 2024-05-28 PROCEDURE — 97110 THERAPEUTIC EXERCISES: CPT | Mod: GP | Performed by: PHYSICAL THERAPIST

## 2024-05-28 PROCEDURE — 97140 MANUAL THERAPY 1/> REGIONS: CPT | Mod: GP | Performed by: PHYSICAL THERAPIST

## 2024-05-28 PROCEDURE — 97112 NEUROMUSCULAR REEDUCATION: CPT | Mod: GP | Performed by: PHYSICAL THERAPIST

## 2024-06-02 ENCOUNTER — HEALTH MAINTENANCE LETTER (OUTPATIENT)
Age: 84
End: 2024-06-02

## 2024-06-04 ENCOUNTER — THERAPY VISIT (OUTPATIENT)
Dept: PHYSICAL THERAPY | Facility: CLINIC | Age: 84
End: 2024-06-04
Payer: COMMERCIAL

## 2024-06-04 DIAGNOSIS — M54.41 CHRONIC BILATERAL LOW BACK PAIN WITH BILATERAL SCIATICA: Primary | ICD-10-CM

## 2024-06-04 DIAGNOSIS — G89.29 CHRONIC BILATERAL LOW BACK PAIN WITH BILATERAL SCIATICA: Primary | ICD-10-CM

## 2024-06-04 DIAGNOSIS — M54.42 CHRONIC BILATERAL LOW BACK PAIN WITH BILATERAL SCIATICA: Primary | ICD-10-CM

## 2024-06-04 PROCEDURE — 97112 NEUROMUSCULAR REEDUCATION: CPT | Mod: GP | Performed by: PHYSICAL THERAPIST

## 2024-06-04 PROCEDURE — 97140 MANUAL THERAPY 1/> REGIONS: CPT | Mod: GP | Performed by: PHYSICAL THERAPIST

## 2024-06-04 PROCEDURE — 97110 THERAPEUTIC EXERCISES: CPT | Mod: GP | Performed by: PHYSICAL THERAPIST

## 2024-06-06 ENCOUNTER — OFFICE VISIT (OUTPATIENT)
Dept: ORTHOPEDICS | Facility: CLINIC | Age: 84
End: 2024-06-06
Payer: COMMERCIAL

## 2024-06-06 DIAGNOSIS — G89.29 CHRONIC BILATERAL LOW BACK PAIN WITHOUT SCIATICA: Primary | ICD-10-CM

## 2024-06-06 DIAGNOSIS — M54.50 CHRONIC BILATERAL LOW BACK PAIN WITHOUT SCIATICA: Primary | ICD-10-CM

## 2024-06-06 DIAGNOSIS — M48.061 LUMBAR STENOSIS WITHOUT NEUROGENIC CLAUDICATION: ICD-10-CM

## 2024-06-06 DIAGNOSIS — M47.816 LUMBAR FACET ARTHROPATHY: ICD-10-CM

## 2024-06-06 PROCEDURE — 99214 OFFICE O/P EST MOD 30 MIN: CPT | Performed by: FAMILY MEDICINE

## 2024-06-06 NOTE — PATIENT INSTRUCTIONS
Dr. Donald Coombs MD  Interventional Spine  Musculoskeletal Medicine Physical Medicine and Rehabilitation        [facility]  [place]  [address]   [place] [address]  Ph: [phone]  Fax: [phone] [place] [facility]  [address]  [place] [address]  Ph: [phone]  Fax: [phone]   [place]  [address]   [place] [address]  Ph: [phone]  Fax: [phone] [place]  [address]  [place] [address]   Ph: [phone]    [place]  [address]  [place] [address]   Ph: [phone]  [place]  [address]  [place] [address]   Ph: [phone]      [place] [id number]  [place] [address]  Ph: [phone]  [place]  [address]   [place] [address]  Ph: [phone]  Fax: [phone]

## 2024-06-06 NOTE — LETTER
6/6/2024      Patrick Olson  [address] [employer] [employer] [place]  [facility] [address]      Dear Colleague,    Thank you for referring your patient, Patrick Olson, to the [facility] SP[facility]TS [facility]IC[facility]IN[facility] [place]. Please see a copy of my visit note below.    ESTABLISHED PATIENT FOLLOW-UP:  Pain of the Lower Back       HISTORY OF PRESENT ILLNESS  Ms. Olson is a pleasant 83 year old year old female who presents to clinic today for follow-up of low back pain.    Date of injury: Chronic  Date last seen: 4/6/2023  Following Therapeutic Plan: doing the PT as recommend, met with Pain Management at [facility], acupuncture started and helped a little,    Pain: 4/10  Function: walk increases pain  Interval History: was seen at Pain Management with Dr. Berry who recommended acupuncture has started accupunture with minimal improvement from winter to spring at [facility].  Also seen at [facility] for an 2nd opinion. Saw Dr. Pelayo on January 11, 2024 who recommended considering RFA.  Started with medial branch blocks on January 30, 2024.  50 % relief.  She was informed shortly thereafter that because her relief did not reach or exceed 75%, that she would not be candidate for radiofrequency ablation based on insurance coverage.    She also notes that she has been following up with physical therapy at the [facility] with Genie.  They have been working on core strengthening.  She notes that it is mildly beneficial as well.    She is here today wondering about next nonsurgical steps that may be of benefit to her.    Additional medical/Social/Surgical histories reviewed in [facility] and updated as appropriate.    REVIEW OF SYSTEMS (5/22/2024)  CONSTITUTIONAL: Denies fever and weight loss  GASTROINTESTINAL: Denies abdominal pain, nausea, vomiting  MUSCULOSKELETAL: See HPI  SKIN: Denies any recent rash or lesion  NEUROLOGICAL: Denies numbness or focal weakness     PHYSICAL EXAM  There were no vitals taken for this  visit.    General  - normal appearance, in no obvious distress  CV  - normal peripheral perfusion  Pulm  - normal respiratory pattern, non-labored  Musculoskeletal - lumbar spine  - stance: slow to rise and sit  - inspection: normal bone and joint alignment, no obvious scoliosis  - palpation: Bilateral lumbar paravertebral spasm L3-L5  - ROM: pain with bilateral rotation, ++ flexion past 30 deg  - strength: lower extremities 5/5 in all planes  - special tests:  (-) slump test  Neuro  - patellar and Achilles DTRs 2+ bilaterally, no sensory or motor deficit, grossly normal coordination, normal muscle tone  Skin  - no ecchymosis, erythema, warmth, or induration, no obvious rash  Psych  - interactive, appropriate, normal mood and affect      IMAGING :   MRI OF THE LUMBAR SPINE WITHOUT CONTRAST   3/9/2023 11:50 AM      COMPARISON: Lumbar spine MRI 07/12/2018     HISTORY: Lumbar radiculopathy, acute. Lumbar degenerative disc  disease.     TECHNIQUE: Multiplanar, multisequence MRI images of the lumbar spine  were acquired without IV contrast.     FINDINGS: There are five lumbar-type vertebrae for the purposes of  this dictation. The conus ends at the distal L1. 2 mm retrolisthesis  of L3 on L4. Vertebral body heights are maintained. Nonpathologic  marrow signal. Mild Modic type I changes from L2-L3 to L4-L5.  Vertebral body heights are maintained. Nonpathologic marrow signal.  Mild symmetric atrophy of the posterior paraspinal musculature. Normal  diameter of the descending aorta. Redemonstration of a large  gallstone. The patient's known bilateral renal cysts are better  visualized on the abdomen and pelvis CT 11/16/2021.     T12-L1: Normal disc height and signal.  No herniation. Mild bilateral  facet arthropathy.  No spinal canal or neural foraminal stenosis.     L1-L2: Normal disc height and signal. Small broad-based disc bulge.  Mild bilateral facet arthropathy.  No spinal canal or neural foraminal  stenosis.      L2-L3: Moderate vertebral disc height loss. Loss of the normal T2  signal within the disc. Broad-based disc bulge with superimposed small  central disc protrusion. Mild bilateral facet arthropathy. No spinal  canal narrowing. No right neuroforaminal narrowing. Mild left  neuroforaminal narrowing.     L3-L4: Moderate intervertebral disc height loss. Loss of the normal T2  signal within the dens. Broad-based disc bulge with superimposed left  foraminal disc protrusion, decreased since the prior exam. Moderate  bilateral facet arthropathy. Mild spinal canal narrowing. Mild  narrowing of the left lateral recess. No right neuroforaminal  narrowing. Mild left neural foraminal narrowing.     L4-L5: Mild intervertebral disc height loss. Loss of the normal T2  signal within the disc. Broad-based disc bulge with superimposed right  foraminal disc protrusion. Moderate bilateral facet arthropathy. Mild  narrowing of the right lateral recess. No spinal canal narrowing.  Severe right neuroforaminal narrowing. No left neuroforaminal  narrowing.     L5-S1: Mild intervertebral disc height loss. Loss of the normal T2  signal within the disc. Broad-based disc bulge with superimposed small  central disc protrusion. Mild bilateral facet arthropathy. Mild  narrowing of the lateral recesses. No spinal canal narrowing. No right  neural foraminal narrowing. No left neural foraminal narrowing.                                                                      IMPRESSION:  1.  Compared to prior lumbar spine MRI 07/12/2018, interval decrease  in the previously noted large left foraminal disc protrusion at L3-L4.  2.  Otherwise stable to interval worsening of the moderate multilevel  degenerative changes of the lumbar spine.  3.  Mild spinal canal narrowing and mild narrowing of the left lateral  recess at L3-L4.  4.  Mild narrowing of the right lateral recess at L4-L5.  5.  Mild narrowing of the lateral recesses at L5-S1.  6.  Severe right  neural foraminal narrowing at L4-L5.  7.  Mild left neuroforaminal narrowing at L2-L3 and L3-L4.  8.  Mild Modic type I changes at from L2-L3 to L4-L5.      JEANNE RIBERA MD .    Medical records reviewed: [address] Dr. Sintia Pelayo MD from visits January 11, 2024 as well as January 30, 2024 for medial branch block procedure.    ASSESSMENT & PLAN  Ms. Olson is a 83 year old year old female who presents to clinic today for follow-up regarding chronic low back pain secondary to facet arthritis as well as moderate spinal stenosis at the L3-L4 region.    Patrick achieved 50% benefit with medial branch blocks at the L3-L5 region at [facility].  Unfortunately this was not deemed satisfactory to pursue RFA.  Additionally she had an additional epidural steroid injection performed as well in February 2024 (we do not have record of this in the chart).  This recent LEE ANN did not provide any, 0% relief.    Diagnosis:  Lumbar stenosis  Lumbar facet arthropathy  Chronic low back pain without radiculitis    Treatment options as well as her current program was reviewed.  I do think it is still beneficial for her to continue her final 2 PT visits through completion.  We discussed possible benefits of adding a TENS unit to her regimen and she can have Genie in [place] to teach her how to use this.  She will pick this up from her [employer] supply.      Additionally we discussed that acupuncture provided some modest benefit and she can continue to do this intermittently.  Lastly we discussed additional consultation with one of our interventional spine specialist, and to determine whether there may be some option to pursue radiofrequency ablation or other procedure that could benefit her facet arthritis.  Can possibly consider facet steroid injections if RFA is not covered.  I trust the advice of Dr. Donald Coombs for additional recommendations and an order was provided for her to see him.    It was a  pleasure seeing Patrick.    48 minutes on date of the encounter doing chart review, history and examination, independent imaging review, documentation, and additional activities noted above.      Mukesh Lantigua DO, [facility]  Primary Care Sports Medicine         Again, thank you for allowing me to participate in the care of your patient.        Sincerely,        Mukesh Lantigua DO

## 2024-06-11 ENCOUNTER — THERAPY VISIT (OUTPATIENT)
Dept: PHYSICAL THERAPY | Facility: CLINIC | Age: 84
End: 2024-06-11
Payer: COMMERCIAL

## 2024-06-11 DIAGNOSIS — M54.42 CHRONIC BILATERAL LOW BACK PAIN WITH BILATERAL SCIATICA: Primary | ICD-10-CM

## 2024-06-11 DIAGNOSIS — G89.29 CHRONIC BILATERAL LOW BACK PAIN WITH BILATERAL SCIATICA: Primary | ICD-10-CM

## 2024-06-11 DIAGNOSIS — M54.41 CHRONIC BILATERAL LOW BACK PAIN WITH BILATERAL SCIATICA: Primary | ICD-10-CM

## 2024-06-11 PROCEDURE — 97110 THERAPEUTIC EXERCISES: CPT | Mod: GP

## 2024-06-13 ENCOUNTER — LAB (OUTPATIENT)
Dept: LAB | Facility: CLINIC | Age: 84
End: 2024-06-13
Payer: COMMERCIAL

## 2024-06-13 DIAGNOSIS — N18.31 STAGE 3A CHRONIC KIDNEY DISEASE (H): ICD-10-CM

## 2024-06-13 LAB
ALBUMIN MFR UR ELPH: 9.3 MG/DL
ALBUMIN SERPL BCG-MCNC: 4.4 G/DL (ref 3.5–5.2)
ANION GAP SERPL CALCULATED.3IONS-SCNC: 8 MMOL/L (ref 7–15)
BUN SERPL-MCNC: 19.4 MG/DL (ref 8–23)
CALCIUM SERPL-MCNC: 9.8 MG/DL (ref 8.8–10.2)
CHLORIDE SERPL-SCNC: 105 MMOL/L (ref 98–107)
CREAT SERPL-MCNC: 0.99 MG/DL (ref 0.51–0.95)
CREAT UR-MCNC: 50 MG/DL
DEPRECATED HCO3 PLAS-SCNC: 28 MMOL/L (ref 22–29)
EGFRCR SERPLBLD CKD-EPI 2021: 57 ML/MIN/1.73M2
ERYTHROCYTE [DISTWIDTH] IN BLOOD BY AUTOMATED COUNT: 14.4 % (ref 10–15)
GLUCOSE SERPL-MCNC: 97 MG/DL (ref 70–99)
HCT VFR BLD AUTO: 33.6 % (ref 35–47)
HGB BLD-MCNC: 11 G/DL (ref 11.7–15.7)
MCH RBC QN AUTO: 32.2 PG (ref 26.5–33)
MCHC RBC AUTO-ENTMCNC: 32.7 G/DL (ref 31.5–36.5)
MCV RBC AUTO: 98 FL (ref 78–100)
PHOSPHATE SERPL-MCNC: 3.5 MG/DL (ref 2.5–4.5)
PLATELET # BLD AUTO: 188 10E3/UL (ref 150–450)
POTASSIUM SERPL-SCNC: 5.5 MMOL/L (ref 3.4–5.3)
PROT/CREAT 24H UR: 0.19 MG/MG CR (ref 0–0.2)
RBC # BLD AUTO: 3.42 10E6/UL (ref 3.8–5.2)
SODIUM SERPL-SCNC: 141 MMOL/L (ref 135–145)
WBC # BLD AUTO: 4.6 10E3/UL (ref 4–11)

## 2024-06-13 PROCEDURE — 36415 COLL VENOUS BLD VENIPUNCTURE: CPT | Performed by: PATHOLOGY

## 2024-06-13 PROCEDURE — 85027 COMPLETE CBC AUTOMATED: CPT | Performed by: PATHOLOGY

## 2024-06-13 PROCEDURE — 84156 ASSAY OF PROTEIN URINE: CPT | Performed by: PATHOLOGY

## 2024-06-13 PROCEDURE — 80069 RENAL FUNCTION PANEL: CPT | Performed by: PATHOLOGY

## 2024-06-20 ENCOUNTER — THERAPY VISIT (OUTPATIENT)
Dept: PHYSICAL THERAPY | Facility: CLINIC | Age: 84
End: 2024-06-20
Payer: COMMERCIAL

## 2024-06-20 DIAGNOSIS — M54.42 CHRONIC BILATERAL LOW BACK PAIN WITH BILATERAL SCIATICA: Primary | ICD-10-CM

## 2024-06-20 DIAGNOSIS — M54.41 CHRONIC BILATERAL LOW BACK PAIN WITH BILATERAL SCIATICA: Primary | ICD-10-CM

## 2024-06-20 DIAGNOSIS — G89.29 CHRONIC BILATERAL LOW BACK PAIN WITHOUT SCIATICA: ICD-10-CM

## 2024-06-20 DIAGNOSIS — M54.50 CHRONIC BILATERAL LOW BACK PAIN WITHOUT SCIATICA: ICD-10-CM

## 2024-06-20 DIAGNOSIS — G89.29 CHRONIC BILATERAL LOW BACK PAIN WITH BILATERAL SCIATICA: Primary | ICD-10-CM

## 2024-06-20 PROCEDURE — 97110 THERAPEUTIC EXERCISES: CPT | Mod: GP | Performed by: PHYSICAL THERAPIST

## 2024-06-20 PROCEDURE — 97112 NEUROMUSCULAR REEDUCATION: CPT | Mod: GP | Performed by: PHYSICAL THERAPIST

## 2024-06-20 PROCEDURE — 97140 MANUAL THERAPY 1/> REGIONS: CPT | Mod: GP | Performed by: PHYSICAL THERAPIST

## 2024-06-21 ENCOUNTER — VIRTUAL VISIT (OUTPATIENT)
Dept: NEPHROLOGY | Facility: CLINIC | Age: 84
End: 2024-06-21
Payer: COMMERCIAL

## 2024-06-21 DIAGNOSIS — N18.31 STAGE 3A CHRONIC KIDNEY DISEASE (H): Primary | ICD-10-CM

## 2024-06-21 PROCEDURE — 99214 OFFICE O/P EST MOD 30 MIN: CPT | Mod: 95

## 2024-06-21 ASSESSMENT — PAIN SCALES - GENERAL: PAINLEVEL: MILD PAIN (3)

## 2024-06-21 NOTE — NURSING NOTE
Is the patient currently in the state of [place]? YES    Visit mode:VIDEO    If the visit is dropped, the patient can be reconnected by: VIDEO VISIT: Text to cell phone:   Telephone Information:   Mobile [phone]       Will anyone else be joining the visit? NO  (If patient encounters technical issues they should call [phone])    How would you like to obtain your AVS? MyChart    Are changes needed to the allergy or medication list? Yes not taking salagen and Pt stated no changes to allergies    Are refills needed on medications prescribed by this physician? NO    Reason for visit: RECHECK    [facility]

## 2024-06-21 NOTE — LETTER
"6/21/2024       RE: Patrick Olson  [address] [address]     Dear Colleague,    Thank you for referring your patient, Patrick Olson, to the [facility] NEPHROLOGY CLINIC [place] at [facility]. Please see a copy of my visit note below.    Virtual Visit Details    Type of service:  Video Visit   Video Start Time:  1400  Video End Time: 1420    Originating Location (pt. Location): Home    Distant Location (provider location):  On-site  Platform used for Video Visit: [facility]    Nephrology Video Visit 6/21/24    Assessment and Plan:    CKD3a - Stable. Creat 0.9, eGFR 57 ml/mn. 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 Recurrent GIB - Hgb 11.0   - Had capsule endoscopy 2/23 EGD 2/23, colonoscopy 8/23   - Capsule endoscopy found \"A single angioectasia without bleeding in the                          duodenum\"   - The EGD findings were duodenal erosion w/o bleeding   - The Colonoscopy findings were widespread diverticulosis throughout the entire colon w/o active bleeding. There was residual blood in the colon   - Had GI follow up 1/24    3. CV - Home readings ~100/69.      4. RA - Off Methotrexate and now on Plaquenil. Using Pilocarpine for dry mouth   - Had Rheum follow up 1/12/24 and Plaquenil continued.     5. Electrolytes - Mild hyperkalemia with K 5.5 Na 141    - Notes phlebotomist had difficulty with venipuncture with this lab draw   - Reviewed K foods    6. Acid base - No acute concerns. Bicarb 28    7. BMD - Ca 9.8 Phos 3.5 Albumin 4.4   Vit D 76 PTH 41 ( 5/24)   Discontinued Vit D 9/23   Remains on Ca/D one per day    8. Disposition - RTC 6 months for follow up w/labs prior     Assessment and plan was " discussed with patient and she voiced her understanding and agreement.    Reason for Visit:  CKD3a    HPI:  Ms Beti is an 84 yo female with RA, Recurrent GIB, CKD3 present today for routine CKD follow up   Lasts seen in clinic by me on 11/17/23 .   Baseline creat 1.1-1.2    ROS:   A comprehensive review of systems was obtained and negative, except as noted in the HPI or PMH.  Denies NSAIDs  Home blood pressures 100/69 range  No black or bloody stools  Participating in regular exercise ( walking and exercise class)  No edema    Chronic Health Problems:    RA ( previously on Methotrexate)   Recurrent GIB  CKD3   Anemia  Diverticulosis    Family Hx:   No family history on file.    Personal Hx:   , retired [employer] , NS, ETOH none    Allergies:  Allergies   Allergen Reactions    Bee Venom Anaphylaxis    Shrimp Anaphylaxis    Evoxac [Cevimeline] Unknown    Prevnar Swelling     Other reaction(s): Edema    Prevnar [Pneumococcal 13-Linda Conj Vacc] Unknown       Medications:  Current Outpatient Medications   Medication Sig Dispense Refill    acetaminophen (TYLENOL) 650 MG CR tablet Take 650 mg by mouth every 8 hours as needed for fever or pain Taking 2 - 650 mg tablets every 8 hrs.      calcium carbonate-vitamin D (CALTRATE) 600-10 MG-MCG per tablet Take 1 tablet by mouth daily      clobetasol (TEMOVATE) 0.05 % external ointment Apply topically 2 times daily To right ankle as needed 60 g 3    famotidine (PEPCID) 20 MG tablet Take 20 mg by mouth at bedtime      folic acid (FOLVITE) 1 MG tablet TAKE 1 TABLET BY MOUTH EVERY DAY 90 tablet 3    gabapentin (NEURONTIN) 300 MG capsule Take 1 capsule (300 mg) by mouth 3 times daily Take 300mg in morning, 300mg in afternoon and 300mg 90 capsule 1    hydroxychloroquine (PLAQUENIL) 200 MG tablet Take 1 tablet (200 mg) by mouth daily 90 tablet 1    lifitegrast (XIIDRA) 5 % opthalmic solution 1 drop 2 times daily      Multiple Vitamins-Minerals (OCUVITE PRESERVISION PO) Take  1 tablet by mouth 2 times daily      omeprazole (PRILOSEC) 40 MG DR capsule Take 1 capsule (40 mg) by mouth daily 90 capsule 3    pilocarpine (SALAGEN) 5 MG tablet Take 1 tablet (5 mg) by mouth daily (Patient not taking: Reported on 6/21/2024) 90 tablet 1     No current facility-administered medications for this visit.      Vitals:  There were no vitals taken for this visit.    Exam:  GENERAL APPEARANCE: alert and no distress  RESP: Breathing is non labored. Speaking in full sentences  NEURO: mentation intact and speech normal  PSYCH: affect normal/bright    LABS:   CMP  Recent Labs   Lab Test 06/13/24  0928 05/08/24  1135 05/02/24  1415 11/10/23  0847 10/01/21  0927 01/23/21  0902 01/20/21  0851 01/19/21 2001 03/13/17  1042    141 140 142   < > 143 142 142 144   POTASSIUM 5.5* 5.2 5.9* 4.9   < > 3.6 4.7 4.0 3.7   CHLORIDE 105 105 103 105   < > 114* 110* 108 107   CO2 28 28 27 28   < > 26 28 30 28   ANIONGAP 8 8 10 9   < > 3 4 3 9   GLC 97 112* 200* 115*   < > 157* 95 121* 98   BUN 19.4 27.8* 27.6* 22.7   < > 6* 17 24 12   CR 0.99* 1.14* 1.15* 1.22*   < > 0.75 0.82 0.94 0.78   GFRESTIMATED 57* 48* 48* 45*   < > 76 69 58* 73   GFRESTBLACK  --   --   --   --   --  88 80 67 88   EJ 9.8 9.8 9.8 10.0   < > 8.0* 8.8 9.4 9.0    < > = values in this interval not displayed.     Recent Labs   Lab Test 11/10/23  0847 10/04/23  1320 08/21/23  1014 08/13/23  0619 08/12/23  1147 05/30/23  1024 05/11/23  0949 11/10/22  1000 08/26/22  1003   BILITOTAL  --   --   --  0.4 0.3  --  0.3  --  0.5   ALKPHOS  --   --   --  67 76  --  86  --  79   ALT 12 15 17 11 13   < > 24   < > 22   AST 26 27 33 27 27   < > 32   < > 22    < > = values in this interval not displayed.     CBC  Recent Labs   Lab Test 06/13/24  0928 05/02/24  1415 11/10/23  0847 10/04/23  1320 09/01/23  1224   HGB 11.0* 11.1* 11.4* 10.7* 8.6*   WBC 4.6  --  3.1* 4.0 3.3*   RBC 3.42*  --  3.68* 3.27* 2.66*   HCT 33.6*  --  36.1 33.2* 26.8*   MCV 98  --  98 102* 101*    MCH 32.2  --  31.0 32.7 32.3   MCHC 32.7  --  31.6 32.2 32.1   RDW 14.4  --  13.1 13.7 15.5*     --  233 241 228     URINE STUDIES  Recent Labs   Lab Test 09/01/23  1229 08/21/23  1019 08/13/23  1017 02/07/23  1000   COLOR Light Yellow Yellow Straw Yellow   APPEARANCE Clear Clear Clear Clear   URINEGLC Negative Negative Negative Negative   URINEBILI Negative Negative Negative Negative   URINEKETONE Negative Trace* Negative Negative   SG 1.009 1.015 1.009 1.015   UBLD Negative Trace* Negative Small*   URINEPH 7.0 6.5 5.0 6.0   PROTEIN Negative Negative Negative Negative   UROBILINOGEN  --  0.2  --  0.2   NITRITE Negative Negative Negative Negative   LEUKEST Negative Small* Negative Negative   RBCU 1 0-2 <1 0-2   WBCU 2 5-10* 1 0-5     Recent Labs   Lab Test 03/10/22  1420   UTPG 0.27*     PTH  Recent Labs   Lab Test 05/02/24  1415 08/21/23  1014 02/07/23  1000   PTHI 41 38 43     IRON STUDIES  Recent Labs   Lab Test 05/02/24  1415 10/04/23  1320 05/11/23  0949   IRON 57 45 61    290 269   IRONSAT 21 16 23   ANMOL 69 42 95       Francine Bruner, NP

## 2024-06-21 NOTE — PROGRESS NOTES
Virtual Visit Details    Type of service:  Video Visit   Video Start Time:  1400  Video End Time: 1420    Originating Location (pt. Location): Home    Distant Location (provider location):  On-site  Platform used for Video Visit: [employer]

## 2024-06-23 NOTE — PROGRESS NOTES
"Nephrology Video Visit 6/21/24    Assessment and Plan:    CKD3a - Stable. Creat 0.9, eGFR 57 ml/mn. 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 Recurrent GIB - Hgb 11.0   - Had capsule endoscopy 2/23 EGD 2/23, colonoscopy 8/23   - Capsule endoscopy found \"A single angioectasia without bleeding in the                          duodenum\"   - The EGD findings were duodenal erosion w/o bleeding   - The Colonoscopy findings were widespread diverticulosis throughout the entire colon w/o active bleeding. There was residual blood in the colon   - Had GI follow up 1/24    3. CV - Home readings ~100/69.      4. RA - Off Methotrexate and now on Plaquenil. Using Pilocarpine for dry mouth   - Had Rheum follow up 1/12/24 and Plaquenil continued.     5. Electrolytes - Mild hyperkalemia with K 5.5 Na 141    - Notes phlebotomist had difficulty with venipuncture with this lab draw   - Reviewed K foods    6. Acid base - No acute concerns. Bicarb 28    7. BMD - Ca 9.8 Phos 3.5 Albumin 4.4   Vit D 76 PTH 41 ( 5/24)   Discontinued Vit D 9/23   Remains on Ca/D one per day    8. Disposition - RTC 6 months for follow up w/labs prior     Assessment and plan was discussed with patient and she voiced her understanding and agreement.    Reason for Visit:  CKD3a    HPI:  Ms Bang is an 84 yo female with RA, Recurrent GIB, CKD3 present today for routine CKD follow up   Lasts seen in clinic by me on 11/17/23 .   Baseline creat 1.1-1.2    ROS:   A comprehensive review of systems was obtained and negative, except as noted in the HPI or PMH.  Denies NSAIDs  Home blood pressures 100/69 range  No black or bloody stools  Participating in regular exercise ( walking and exercise class)  No edema    Chronic Health Problems:    RA ( " previously on Methotrexate)   Recurrent GIB  CKD3   Anemia  Diverticulosis    Family Hx:   No family history on file.    Personal Hx:   , retired [employer] , NS, ETOH none    Allergies:  Allergies   Allergen Reactions    Bee Venom Anaphylaxis    Shrimp Anaphylaxis    Evoxac [Cevimeline] Unknown    Prevnar Swelling     Other reaction(s): Edema    Prevnar [Pneumococcal 13-Linda Conj Vacc] Unknown       Medications:  Current Outpatient Medications   Medication Sig Dispense Refill    acetaminophen (TYLENOL) 650 MG CR tablet Take 650 mg by mouth every 8 hours as needed for fever or pain Taking 2 - 650 mg tablets every 8 hrs.      calcium carbonate-vitamin D (CALTRATE) 600-10 MG-MCG per tablet Take 1 tablet by mouth daily      clobetasol (TEMOVATE) 0.05 % external ointment Apply topically 2 times daily To right ankle as needed 60 g 3    famotidine (PEPCID) 20 MG tablet Take 20 mg by mouth at bedtime      folic acid (FOLVITE) 1 MG tablet TAKE 1 TABLET BY MOUTH EVERY DAY 90 tablet 3    gabapentin (NEURONTIN) 300 MG capsule Take 1 capsule (300 mg) by mouth 3 times daily Take 300mg in morning, 300mg in afternoon and 300mg 90 capsule 1    hydroxychloroquine (PLAQUENIL) 200 MG tablet Take 1 tablet (200 mg) by mouth daily 90 tablet 1    lifitegrast (XIIDRA) 5 % opthalmic solution 1 drop 2 times daily      Multiple Vitamins-Minerals (OCUVITE PRESERVISION PO) Take 1 tablet by mouth 2 times daily      omeprazole (PRILOSEC) 40 MG DR capsule Take 1 capsule (40 mg) by mouth daily 90 capsule 3    pilocarpine (SALAGEN) 5 MG tablet Take 1 tablet (5 mg) by mouth daily (Patient not taking: Reported on 6/21/2024) 90 tablet 1     No current facility-administered medications for this visit.      Vitals:  There were no vitals taken for this visit.    Exam:  GENERAL APPEARANCE: alert and no distress  RESP: Breathing is non labored. Speaking in full sentences  NEURO: mentation intact and speech normal  PSYCH: affect  normal/bright    LABS:   CMP  Recent Labs   Lab Test 06/13/24  0928 05/08/24  1135 05/02/24  1415 11/10/23  0847 10/01/21  0927 01/23/21  0902 01/20/21  0851 01/19/21 2001 03/13/17  1042    141 140 142   < > 143 142 142 144   POTASSIUM 5.5* 5.2 5.9* 4.9   < > 3.6 4.7 4.0 3.7   CHLORIDE 105 105 103 105   < > 114* 110* 108 107   CO2 28 28 27 28   < > 26 28 30 28   ANIONGAP 8 8 10 9   < > 3 4 3 9   GLC 97 112* 200* 115*   < > 157* 95 121* 98   BUN 19.4 27.8* 27.6* 22.7   < > 6* 17 24 12   CR 0.99* 1.14* 1.15* 1.22*   < > 0.75 0.82 0.94 0.78   GFRESTIMATED 57* 48* 48* 45*   < > 76 69 58* 73   GFRESTBLACK  --   --   --   --   --  88 80 67 88   EJ 9.8 9.8 9.8 10.0   < > 8.0* 8.8 9.4 9.0    < > = values in this interval not displayed.     Recent Labs   Lab Test 11/10/23  0847 10/04/23  1320 08/21/23  1014 08/13/23  0619 08/12/23  1147 05/30/23  1024 05/11/23  0949 11/10/22  1000 08/26/22  1003   BILITOTAL  --   --   --  0.4 0.3  --  0.3  --  0.5   ALKPHOS  --   --   --  67 76  --  86  --  79   ALT 12 15 17 11 13   < > 24   < > 22   AST 26 27 33 27 27   < > 32   < > 22    < > = values in this interval not displayed.     CBC  Recent Labs   Lab Test 06/13/24 0928 05/02/24  1415 11/10/23  0847 10/04/23  1320 09/01/23  1224   HGB 11.0* 11.1* 11.4* 10.7* 8.6*   WBC 4.6  --  3.1* 4.0 3.3*   RBC 3.42*  --  3.68* 3.27* 2.66*   HCT 33.6*  --  36.1 33.2* 26.8*   MCV 98  --  98 102* 101*   MCH 32.2  --  31.0 32.7 32.3   MCHC 32.7  --  31.6 32.2 32.1   RDW 14.4  --  13.1 13.7 15.5*     --  233 241 228     URINE STUDIES  Recent Labs   Lab Test 09/01/23  1229 08/21/23  1019 08/13/23  1017 02/07/23  1000   COLOR Light Yellow Yellow Straw Yellow   APPEARANCE Clear Clear Clear Clear   URINEGLC Negative Negative Negative Negative   URINEBILI Negative Negative Negative Negative   URINEKETONE Negative Trace* Negative Negative   SG 1.009 1.015 1.009 1.015   UBLD Negative Trace* Negative Small*   URINEPH 7.0 6.5 5.0 6.0   PROTEIN  Negative Negative Negative Negative   UROBILINOGEN  --  0.2  --  0.2   NITRITE Negative Negative Negative Negative   LEUKEST Negative Small* Negative Negative   RBCU 1 0-2 <1 0-2   WBCU 2 5-10* 1 0-5     Recent Labs   Lab Test 03/10/22  1420   UTPG 0.27*     PTH  Recent Labs   Lab Test 05/02/24  1415 08/21/23  1014 02/07/23  1000   PTHI 41 38 43     IRON STUDIES  Recent Labs   Lab Test 05/02/24  1415 10/04/23  1320 05/11/23  0949   IRON 57 45 61    290 269   IRONSAT 21 16 23   ANMOL 69 42 95       Francine Bruner, NP

## 2024-06-24 ENCOUNTER — TELEPHONE (OUTPATIENT)
Dept: NEPHROLOGY | Facility: CLINIC | Age: 84
End: 2024-06-24
Payer: COMMERCIAL

## 2024-06-24 NOTE — TELEPHONE ENCOUNTER
[facility]21.24 with Francine Bruner// 6.24.24 KET

## 2024-07-15 ENCOUNTER — HOSPITAL ENCOUNTER (INPATIENT)
Facility: CLINIC | Age: 84
LOS: 5 days | Discharge: HOME | DRG: 377 | End: 2024-07-21
Attending: EMERGENCY MEDICINE | Admitting: STUDENT IN AN ORGANIZED HEALTH CARE EDUCATION/TRAINING PROGRAM
Payer: COMMERCIAL

## 2024-07-15 DIAGNOSIS — D64.9 ANEMIA, UNSPECIFIED TYPE: ICD-10-CM

## 2024-07-15 DIAGNOSIS — G89.29 CHRONIC PAIN OF RIGHT ELBOW: Primary | ICD-10-CM

## 2024-07-15 DIAGNOSIS — M25.521 CHRONIC PAIN OF RIGHT ELBOW: Primary | ICD-10-CM

## 2024-07-15 DIAGNOSIS — K92.2 LOWER GI BLEEDING: ICD-10-CM

## 2024-07-15 LAB
ABO/RH(D): NORMAL
ALBUMIN SERPL BCG-MCNC: 4.5 G/DL (ref 3.5–5.2)
ALP SERPL-CCNC: 70 U/L (ref 40–150)
ALT SERPL W P-5'-P-CCNC: 23 U/L (ref 0–50)
ANION GAP SERPL CALCULATED.3IONS-SCNC: 11 MMOL/L (ref 7–15)
ANTIBODY SCREEN: NEGATIVE
AST SERPL W P-5'-P-CCNC: 31 U/L (ref 0–45)
BASOPHILS # BLD AUTO: 0 10E3/UL (ref 0–0.2)
BASOPHILS NFR BLD AUTO: 1 %
BILIRUB SERPL-MCNC: 0.4 MG/DL
BUN SERPL-MCNC: 25.8 MG/DL (ref 8–23)
CALCIUM SERPL-MCNC: 9.4 MG/DL (ref 8.8–10.2)
CHLORIDE SERPL-SCNC: 103 MMOL/L (ref 98–107)
CREAT SERPL-MCNC: 0.96 MG/DL (ref 0.51–0.95)
EGFRCR SERPLBLD CKD-EPI 2021: 59 ML/MIN/1.73M2
EOSINOPHIL # BLD AUTO: 0.1 10E3/UL (ref 0–0.7)
EOSINOPHIL NFR BLD AUTO: 3 %
ERYTHROCYTE [DISTWIDTH] IN BLOOD BY AUTOMATED COUNT: 13.8 % (ref 10–15)
GLUCOSE SERPL-MCNC: 186 MG/DL (ref 70–99)
HBA1C MFR BLD: 5.7 %
HCO3 SERPL-SCNC: 26 MMOL/L (ref 22–29)
HCT VFR BLD AUTO: 32.7 % (ref 35–47)
HGB BLD-MCNC: 10.6 G/DL (ref 11.7–15.7)
HOLD SPECIMEN: NORMAL
HOLD SPECIMEN: NORMAL
IMM GRANULOCYTES # BLD: 0 10E3/UL
IMM GRANULOCYTES NFR BLD: 0 %
LYMPHOCYTES # BLD AUTO: 1.9 10E3/UL (ref 0.8–5.3)
LYMPHOCYTES NFR BLD AUTO: 35 %
MCH RBC QN AUTO: 32.4 PG (ref 26.5–33)
MCHC RBC AUTO-ENTMCNC: 32.4 G/DL (ref 31.5–36.5)
MCV RBC AUTO: 100 FL (ref 78–100)
MONOCYTES # BLD AUTO: 0.5 10E3/UL (ref 0–1.3)
MONOCYTES NFR BLD AUTO: 8 %
NEUTROPHILS # BLD AUTO: 2.9 10E3/UL (ref 1.6–8.3)
NEUTROPHILS NFR BLD AUTO: 54 %
NRBC # BLD AUTO: 0 10E3/UL
NRBC BLD AUTO-RTO: 0 /100
PLATELET # BLD AUTO: 224 10E3/UL (ref 150–450)
POTASSIUM SERPL-SCNC: 4.3 MMOL/L (ref 3.4–5.3)
PROT SERPL-MCNC: 7.1 G/DL (ref 6.4–8.3)
RBC # BLD AUTO: 3.27 10E6/UL (ref 3.8–5.2)
SODIUM SERPL-SCNC: 140 MMOL/L (ref 135–145)
SPECIMEN EXPIRATION DATE: NORMAL
WBC # BLD AUTO: 5.4 10E3/UL (ref 4–11)

## 2024-07-15 PROCEDURE — 80053 COMPREHEN METABOLIC PANEL: CPT | Performed by: EMERGENCY MEDICINE

## 2024-07-15 PROCEDURE — 96361 HYDRATE IV INFUSION ADD-ON: CPT

## 2024-07-15 PROCEDURE — 85025 COMPLETE CBC W/AUTO DIFF WBC: CPT | Performed by: EMERGENCY MEDICINE

## 2024-07-15 PROCEDURE — G0378 HOSPITAL OBSERVATION PER HR: HCPCS

## 2024-07-15 PROCEDURE — 83036 HEMOGLOBIN GLYCOSYLATED A1C: CPT | Performed by: STUDENT IN AN ORGANIZED HEALTH CARE EDUCATION/TRAINING PROGRAM

## 2024-07-15 PROCEDURE — 86923 COMPATIBILITY TEST ELECTRIC: CPT

## 2024-07-15 PROCEDURE — 99285 EMERGENCY DEPT VISIT HI MDM: CPT

## 2024-07-15 PROCEDURE — 258N000003 HC RX IP 258 OP 636: Performed by: EMERGENCY MEDICINE

## 2024-07-15 PROCEDURE — 258N000003 HC RX IP 258 OP 636: Performed by: STUDENT IN AN ORGANIZED HEALTH CARE EDUCATION/TRAINING PROGRAM

## 2024-07-15 PROCEDURE — 36415 COLL VENOUS BLD VENIPUNCTURE: CPT | Performed by: EMERGENCY MEDICINE

## 2024-07-15 PROCEDURE — 99223 1ST HOSP IP/OBS HIGH 75: CPT | Performed by: STUDENT IN AN ORGANIZED HEALTH CARE EDUCATION/TRAINING PROGRAM

## 2024-07-15 PROCEDURE — 86900 BLOOD TYPING SEROLOGIC ABO: CPT | Performed by: EMERGENCY MEDICINE

## 2024-07-15 PROCEDURE — 96374 THER/PROPH/DIAG INJ IV PUSH: CPT

## 2024-07-15 PROCEDURE — 250N000013 HC RX MED GY IP 250 OP 250 PS 637: Performed by: STUDENT IN AN ORGANIZED HEALTH CARE EDUCATION/TRAINING PROGRAM

## 2024-07-15 PROCEDURE — 250N000011 HC RX IP 250 OP 636: Performed by: STUDENT IN AN ORGANIZED HEALTH CARE EDUCATION/TRAINING PROGRAM

## 2024-07-15 RX ORDER — HYDRALAZINE HYDROCHLORIDE 10 MG/1
10 TABLET, FILM COATED ORAL EVERY 4 HOURS PRN
Status: DISCONTINUED | OUTPATIENT
Start: 2024-07-15 | End: 2024-07-21 | Stop reason: HOSPADM

## 2024-07-15 RX ORDER — SODIUM CHLORIDE 9 MG/ML
INJECTION, SOLUTION INTRAVENOUS CONTINUOUS
Status: DISCONTINUED | OUTPATIENT
Start: 2024-07-15 | End: 2024-07-15

## 2024-07-15 RX ORDER — HYDRALAZINE HYDROCHLORIDE 20 MG/ML
10 INJECTION INTRAMUSCULAR; INTRAVENOUS EVERY 4 HOURS PRN
Status: DISCONTINUED | OUTPATIENT
Start: 2024-07-15 | End: 2024-07-21 | Stop reason: HOSPADM

## 2024-07-15 RX ORDER — ONDANSETRON 4 MG/1
4 TABLET, ORALLY DISINTEGRATING ORAL EVERY 6 HOURS PRN
Status: DISCONTINUED | OUTPATIENT
Start: 2024-07-15 | End: 2024-07-21 | Stop reason: HOSPADM

## 2024-07-15 RX ORDER — ACETAMINOPHEN 325 MG/1
650 TABLET ORAL EVERY 6 HOURS
Status: DISCONTINUED | OUTPATIENT
Start: 2024-07-15 | End: 2024-07-21 | Stop reason: HOSPADM

## 2024-07-15 RX ORDER — GABAPENTIN 300 MG/1
600 CAPSULE ORAL AT BEDTIME
COMMUNITY

## 2024-07-15 RX ORDER — AMOXICILLIN 250 MG
1 CAPSULE ORAL 2 TIMES DAILY PRN
Status: DISCONTINUED | OUTPATIENT
Start: 2024-07-15 | End: 2024-07-21 | Stop reason: HOSPADM

## 2024-07-15 RX ORDER — FLUORIDE TOOTHPASTE
TOOTHPASTE DENTAL 4 TIMES DAILY PRN
COMMUNITY

## 2024-07-15 RX ORDER — SODIUM CHLORIDE 9 MG/ML
INJECTION, SOLUTION INTRAVENOUS CONTINUOUS
Status: DISCONTINUED | OUTPATIENT
Start: 2024-07-15 | End: 2024-07-18

## 2024-07-15 RX ORDER — CARBOXYMETHYLCELLULOSE SODIUM 5 MG/ML
1 SOLUTION/ DROPS OPHTHALMIC 3 TIMES DAILY PRN
COMMUNITY

## 2024-07-15 RX ORDER — ONDANSETRON 2 MG/ML
4 INJECTION INTRAMUSCULAR; INTRAVENOUS EVERY 6 HOURS PRN
Status: DISCONTINUED | OUTPATIENT
Start: 2024-07-15 | End: 2024-07-21 | Stop reason: HOSPADM

## 2024-07-15 RX ORDER — GABAPENTIN 300 MG/1
600 CAPSULE ORAL ONCE
Status: COMPLETED | OUTPATIENT
Start: 2024-07-15 | End: 2024-07-15

## 2024-07-15 RX ORDER — EPINEPHRINE 0.3 MG/.3ML
0.3 INJECTION SUBCUTANEOUS PRN
COMMUNITY
Start: 2024-01-08

## 2024-07-15 RX ORDER — AMOXICILLIN 250 MG
2 CAPSULE ORAL 2 TIMES DAILY PRN
Status: DISCONTINUED | OUTPATIENT
Start: 2024-07-15 | End: 2024-07-21 | Stop reason: HOSPADM

## 2024-07-15 RX ADMIN — ACETAMINOPHEN 650 MG: 325 TABLET, FILM COATED ORAL at 21:44

## 2024-07-15 RX ADMIN — SODIUM CHLORIDE: 9 INJECTION, SOLUTION INTRAVENOUS at 19:48

## 2024-07-15 RX ADMIN — SODIUM CHLORIDE: 9 INJECTION, SOLUTION INTRAVENOUS at 22:53

## 2024-07-15 RX ADMIN — GABAPENTIN 600 MG: 300 CAPSULE ORAL at 21:44

## 2024-07-15 RX ADMIN — PANTOPRAZOLE SODIUM 40 MG: 40 INJECTION, POWDER, FOR SOLUTION INTRAVENOUS at 21:42

## 2024-07-15 ASSESSMENT — ACTIVITIES OF DAILY LIVING (ADL)
ADLS_ACUITY_SCORE: 37
ADLS_ACUITY_SCORE: 35
ADLS_ACUITY_SCORE: 37

## 2024-07-15 ASSESSMENT — COLUMBIA-SUICIDE SEVERITY RATING SCALE - C-SSRS
6. HAVE YOU EVER DONE ANYTHING, STARTED TO DO ANYTHING, OR PREPARED TO DO ANYTHING TO END YOUR LIFE?: NO
2. HAVE YOU ACTUALLY HAD ANY THOUGHTS OF KILLING YOURSELF IN THE PAST MONTH?: NO
1. IN THE PAST MONTH, HAVE YOU WISHED YOU WERE DEAD OR WISHED YOU COULD GO TO SLEEP AND NOT WAKE UP?: NO

## 2024-07-15 NOTE — ED TRIAGE NOTES
"Reports GI bleeding, \"This happened before and there's nothing they can do about it\" She has been f/u with GI        "

## 2024-07-16 ENCOUNTER — APPOINTMENT (OUTPATIENT)
Dept: CT IMAGING | Facility: CLINIC | Age: 84
DRG: 377 | End: 2024-07-16
Payer: COMMERCIAL

## 2024-07-16 LAB
ALBUMIN SERPL BCG-MCNC: 3.5 G/DL (ref 3.5–5.2)
ALP SERPL-CCNC: 56 U/L (ref 40–150)
ALT SERPL W P-5'-P-CCNC: 16 U/L (ref 0–50)
ANION GAP SERPL CALCULATED.3IONS-SCNC: 5 MMOL/L (ref 7–15)
ANION GAP SERPL CALCULATED.3IONS-SCNC: 9 MMOL/L (ref 7–15)
AST SERPL W P-5'-P-CCNC: 23 U/L (ref 0–45)
BILIRUB SERPL-MCNC: 0.5 MG/DL
BLD PROD TYP BPU: NORMAL
BLOOD COMPONENT TYPE: NORMAL
BUN SERPL-MCNC: 19.3 MG/DL (ref 8–23)
BUN SERPL-MCNC: 21 MG/DL (ref 8–23)
CA-I BLD-MCNC: 4.6 MG/DL (ref 4.4–5.2)
CALCIUM SERPL-MCNC: 8.2 MG/DL (ref 8.8–10.4)
CALCIUM SERPL-MCNC: 8.5 MG/DL (ref 8.8–10.2)
CHLORIDE SERPL-SCNC: 110 MMOL/L (ref 98–107)
CHLORIDE SERPL-SCNC: 110 MMOL/L (ref 98–107)
CODING SYSTEM: NORMAL
CREAT SERPL-MCNC: 0.94 MG/DL (ref 0.51–0.95)
CREAT SERPL-MCNC: 1.01 MG/DL (ref 0.51–0.95)
CROSSMATCH: NORMAL
EGFRCR SERPLBLD CKD-EPI 2021: 56 ML/MIN/1.73M2
EGFRCR SERPLBLD CKD-EPI 2021: 61 ML/MIN/1.73M2
ERYTHROCYTE [DISTWIDTH] IN BLOOD BY AUTOMATED COUNT: 13.8 % (ref 10–15)
GLUCOSE BLDC GLUCOMTR-MCNC: 130 MG/DL (ref 70–99)
GLUCOSE SERPL-MCNC: 153 MG/DL (ref 70–99)
GLUCOSE SERPL-MCNC: 95 MG/DL (ref 70–99)
HCO3 SERPL-SCNC: 20 MMOL/L (ref 22–29)
HCO3 SERPL-SCNC: 27 MMOL/L (ref 22–29)
HCT VFR BLD AUTO: 23.2 % (ref 35–47)
HGB BLD-MCNC: 6.8 G/DL (ref 11.7–15.7)
HGB BLD-MCNC: 7.7 G/DL (ref 11.7–15.7)
HGB BLD-MCNC: 7.7 G/DL (ref 11.7–15.7)
HGB BLD-MCNC: 7.8 G/DL (ref 11.7–15.7)
HGB BLD-MCNC: 8.2 G/DL (ref 11.7–15.7)
HGB BLD-MCNC: 8.4 G/DL (ref 11.7–15.7)
HGB BLD-MCNC: 9.4 G/DL (ref 11.7–15.7)
INR PPP: 1.14 (ref 0.85–1.15)
ISSUE DATE AND TIME: NORMAL
LACTATE SERPL-SCNC: 2.7 MMOL/L (ref 0.7–2)
MAGNESIUM SERPL-MCNC: 2.1 MG/DL (ref 1.7–2.3)
MCH RBC QN AUTO: 33.2 PG (ref 26.5–33)
MCHC RBC AUTO-ENTMCNC: 33.2 G/DL (ref 31.5–36.5)
MCV RBC AUTO: 100 FL (ref 78–100)
PLATELET # BLD AUTO: 161 10E3/UL (ref 150–450)
POTASSIUM SERPL-SCNC: 4.7 MMOL/L (ref 3.4–5.3)
POTASSIUM SERPL-SCNC: 5 MMOL/L (ref 3.4–5.3)
PROT SERPL-MCNC: 5.3 G/DL (ref 6.4–8.3)
RBC # BLD AUTO: 2.32 10E6/UL (ref 3.8–5.2)
SODIUM SERPL-SCNC: 139 MMOL/L (ref 135–145)
SODIUM SERPL-SCNC: 142 MMOL/L (ref 135–145)
UNIT ABO/RH: NORMAL
UNIT NUMBER: NORMAL
UNIT STATUS: NORMAL
UNIT TYPE ISBT: 5100
UNIT TYPE ISBT: 6200
UNIT TYPE ISBT: 9500
WBC # BLD AUTO: 4.3 10E3/UL (ref 4–11)

## 2024-07-16 PROCEDURE — 250N000009 HC RX 250: Performed by: HOSPITALIST

## 2024-07-16 PROCEDURE — 36415 COLL VENOUS BLD VENIPUNCTURE: CPT | Performed by: PHYSICIAN ASSISTANT

## 2024-07-16 PROCEDURE — 80053 COMPREHEN METABOLIC PANEL: CPT | Performed by: STUDENT IN AN ORGANIZED HEALTH CARE EDUCATION/TRAINING PROGRAM

## 2024-07-16 PROCEDURE — 258N000003 HC RX IP 258 OP 636: Performed by: STUDENT IN AN ORGANIZED HEALTH CARE EDUCATION/TRAINING PROGRAM

## 2024-07-16 PROCEDURE — 83735 ASSAY OF MAGNESIUM: CPT | Performed by: NURSE PRACTITIONER

## 2024-07-16 PROCEDURE — G0378 HOSPITAL OBSERVATION PER HR: HCPCS

## 2024-07-16 PROCEDURE — 99233 SBSQ HOSP IP/OBS HIGH 50: CPT | Mod: 25

## 2024-07-16 PROCEDURE — 258N000003 HC RX IP 258 OP 636

## 2024-07-16 PROCEDURE — 93005 ELECTROCARDIOGRAM TRACING: CPT

## 2024-07-16 PROCEDURE — 74174 CTA ABD&PLVS W/CONTRAST: CPT

## 2024-07-16 PROCEDURE — 250N000013 HC RX MED GY IP 250 OP 250 PS 637

## 2024-07-16 PROCEDURE — 250N000011 HC RX IP 250 OP 636: Performed by: STUDENT IN AN ORGANIZED HEALTH CARE EDUCATION/TRAINING PROGRAM

## 2024-07-16 PROCEDURE — 85018 HEMOGLOBIN: CPT | Performed by: PHYSICIAN ASSISTANT

## 2024-07-16 PROCEDURE — 99207 PR APP CREDIT; MD BILLING SHARED VISIT: CPT | Performed by: HOSPITALIST

## 2024-07-16 PROCEDURE — 85027 COMPLETE CBC AUTOMATED: CPT | Performed by: STUDENT IN AN ORGANIZED HEALTH CARE EDUCATION/TRAINING PROGRAM

## 2024-07-16 PROCEDURE — 93010 ELECTROCARDIOGRAM REPORT: CPT | Performed by: INTERNAL MEDICINE

## 2024-07-16 PROCEDURE — 250N000013 HC RX MED GY IP 250 OP 250 PS 637: Performed by: STUDENT IN AN ORGANIZED HEALTH CARE EDUCATION/TRAINING PROGRAM

## 2024-07-16 PROCEDURE — 99223 1ST HOSP IP/OBS HIGH 75: CPT | Mod: FS | Performed by: INTERNAL MEDICINE

## 2024-07-16 PROCEDURE — 36415 COLL VENOUS BLD VENIPUNCTURE: CPT

## 2024-07-16 PROCEDURE — 36430 TRANSFUSION BLD/BLD COMPNT: CPT

## 2024-07-16 PROCEDURE — 96376 TX/PRO/DX INJ SAME DRUG ADON: CPT

## 2024-07-16 PROCEDURE — P9016 RBC LEUKOCYTES REDUCED: HCPCS

## 2024-07-16 PROCEDURE — 120N000013 HC R&B IMCU

## 2024-07-16 PROCEDURE — 99207 PR APP CREDIT; MD BILLING SHARED VISIT: CPT

## 2024-07-16 PROCEDURE — 82330 ASSAY OF CALCIUM: CPT | Performed by: NURSE PRACTITIONER

## 2024-07-16 PROCEDURE — 85018 HEMOGLOBIN: CPT | Performed by: NURSE PRACTITIONER

## 2024-07-16 PROCEDURE — 85018 HEMOGLOBIN: CPT

## 2024-07-16 PROCEDURE — 83605 ASSAY OF LACTIC ACID: CPT | Performed by: NURSE PRACTITIONER

## 2024-07-16 PROCEDURE — 250N000011 HC RX IP 250 OP 636: Performed by: HOSPITALIST

## 2024-07-16 PROCEDURE — 82310 ASSAY OF CALCIUM: CPT | Performed by: NURSE PRACTITIONER

## 2024-07-16 PROCEDURE — 96361 HYDRATE IV INFUSION ADD-ON: CPT

## 2024-07-16 PROCEDURE — 99418 PROLNG IP/OBS E/M EA 15 MIN: CPT | Mod: FS | Performed by: INTERNAL MEDICINE

## 2024-07-16 PROCEDURE — 99291 CRITICAL CARE FIRST HOUR: CPT | Mod: 25 | Performed by: NURSE PRACTITIONER

## 2024-07-16 PROCEDURE — 85610 PROTHROMBIN TIME: CPT | Performed by: PHYSICIAN ASSISTANT

## 2024-07-16 PROCEDURE — 36415 COLL VENOUS BLD VENIPUNCTURE: CPT | Performed by: STUDENT IN AN ORGANIZED HEALTH CARE EDUCATION/TRAINING PROGRAM

## 2024-07-16 RX ORDER — IOPAMIDOL 755 MG/ML
53 INJECTION, SOLUTION INTRAVASCULAR ONCE
Status: COMPLETED | OUTPATIENT
Start: 2024-07-16 | End: 2024-07-16

## 2024-07-16 RX ORDER — LIDOCAINE 40 MG/G
CREAM TOPICAL
Status: DISCONTINUED | OUTPATIENT
Start: 2024-07-16 | End: 2024-07-21 | Stop reason: HOSPADM

## 2024-07-16 RX ORDER — HYDROXYCHLOROQUINE SULFATE 200 MG/1
200 TABLET, FILM COATED ORAL DAILY
Status: DISCONTINUED | OUTPATIENT
Start: 2024-07-16 | End: 2024-07-21 | Stop reason: HOSPADM

## 2024-07-16 RX ORDER — GABAPENTIN 300 MG/1
300 CAPSULE ORAL 2 TIMES DAILY PRN
Status: DISCONTINUED | OUTPATIENT
Start: 2024-07-16 | End: 2024-07-21 | Stop reason: HOSPADM

## 2024-07-16 RX ORDER — GABAPENTIN 300 MG/1
600 CAPSULE ORAL AT BEDTIME
Status: DISCONTINUED | OUTPATIENT
Start: 2024-07-16 | End: 2024-07-21 | Stop reason: HOSPADM

## 2024-07-16 RX ORDER — IOPAMIDOL 755 MG/ML
72 INJECTION, SOLUTION INTRAVASCULAR ONCE
Status: COMPLETED | OUTPATIENT
Start: 2024-07-16 | End: 2024-07-16

## 2024-07-16 RX ADMIN — ACETAMINOPHEN 650 MG: 325 TABLET, FILM COATED ORAL at 20:11

## 2024-07-16 RX ADMIN — ONDANSETRON 4 MG: 2 INJECTION INTRAMUSCULAR; INTRAVENOUS at 21:13

## 2024-07-16 RX ADMIN — SODIUM CHLORIDE 80 ML: 9 INJECTION, SOLUTION INTRAVENOUS at 18:36

## 2024-07-16 RX ADMIN — SODIUM CHLORIDE: 9 INJECTION, SOLUTION INTRAVENOUS at 20:17

## 2024-07-16 RX ADMIN — PANTOPRAZOLE SODIUM 40 MG: 40 INJECTION, POWDER, FOR SOLUTION INTRAVENOUS at 20:11

## 2024-07-16 RX ADMIN — ACETAMINOPHEN 650 MG: 325 TABLET, FILM COATED ORAL at 09:06

## 2024-07-16 RX ADMIN — IOPAMIDOL 53 ML: 755 INJECTION, SOLUTION INTRAVENOUS at 18:36

## 2024-07-16 RX ADMIN — SODIUM CHLORIDE, POTASSIUM CHLORIDE, SODIUM LACTATE AND CALCIUM CHLORIDE 1000 ML: 600; 310; 30; 20 INJECTION, SOLUTION INTRAVENOUS at 23:31

## 2024-07-16 RX ADMIN — SODIUM CHLORIDE 500 ML: 9 INJECTION, SOLUTION INTRAVENOUS at 10:30

## 2024-07-16 RX ADMIN — PANTOPRAZOLE SODIUM 40 MG: 40 INJECTION, POWDER, FOR SOLUTION INTRAVENOUS at 09:06

## 2024-07-16 RX ADMIN — HYDROXYCHLOROQUINE SULFATE 200 MG: 200 TABLET ORAL at 14:09

## 2024-07-16 RX ADMIN — GABAPENTIN 600 MG: 300 CAPSULE ORAL at 21:07

## 2024-07-16 ASSESSMENT — ACTIVITIES OF DAILY LIVING (ADL)
ADLS_ACUITY_SCORE: 39
ADLS_ACUITY_SCORE: 39
ADLS_ACUITY_SCORE: 38
ADLS_ACUITY_SCORE: 39
ADLS_ACUITY_SCORE: 38
ADLS_ACUITY_SCORE: 35
ADLS_ACUITY_SCORE: 38
ADLS_ACUITY_SCORE: 39
ADLS_ACUITY_SCORE: 38
ADLS_ACUITY_SCORE: 39
ADLS_ACUITY_SCORE: 38

## 2024-07-16 NOTE — CONSULTS
Gastroenterology Consultation      Date of Admission:  7/15/2024  Reason for Admission: GI bleed   Date of Consult  7/16/2024   Requesting Physician:  Kike Bailey MD           ASSESSMENT AND RECOMMENDATIONS:   Assessment:  83 year old female with a history of RA (stopped methotrexate 3/2023, now on hydroxycholoroquine), and secondary Sjogren's, lumbar degenerative disc disease, chronic lumbago without radiculopathy, osteoporosis, recurrent GI bleeding (AVMs, possible diverticular, polyp-related, hemorrhoids) who presents to [facility] on 7/15/2024 with BRBPR.     # Lower GI bleed, likely diverticular bleed   # Acute blood loss anemia  # hx of gastric AVM, small bowel AVM, cecal polpy, duodenal erosions  # Hemorrhoids  Patient presents with hematochezia/BRBPR similar to prior episodes of GI bleeding. Patient is not on blood thinners and does not take NSAIDs. See HPI for full GI bleed hx. Admit hgb 10.6 >7.7. Given 1 unit PRBC 7/16. BP initially normal, dropped to 90s/40s and patient had symptomatic orthostatic hypotension. Etiology likely diverticular bleed that has since resolved.   Last significant GIB for which she was hospitalized for was in 8/2023. She underwent colonoscopy which showed diverticulosis throughout the colon, no active bleeding noted. Examined ileum was normal. Mild internal hemorrhoids felt not to be source of bleed. Presentation felt overall consistent with diverticular bleed.     - No plans for colonoscopy given low likelihood of finding an interveneable lesion as bleeding has stopped.   - Monitor for s/sx of GIB, appreciate nursing documentation of stool output including color and consistency.   - If patient has recurrence of BRBPR, recommend obtaining CTA A/P to attempt to localize the bleed. Then consult IR for possible embolization vs GI for endoscopic intervention.   - Agree with transfusing 1 unit of blood.  - ADAT  - Monitor hgb, transfuse for hgb <7  - Continue IVF per primary team  -  "Avoid NSAIDs    # Constipation   PTA on miralax   - Continue on discharge     # Dyspepsia  PTA on omeprazole   - Currently on IV PPI     # Weight loss  Patient reports ongoing weight loss. From previous notes, weight loss likely due to limited oral intake and food restriction due to fear of certain foods triggering GI bleeding. She had several questions regarding seeds and nuts this admission, reassured patient it is ok to continue to eat those.   - RD consulted   - Follow up outpatient with Iesha Hu, GI dietitian     Thank you for involving us in this patient's care. Please do not hesitate to contact the GI service with any questions or concerns.     Pt seen and care plan discussed with Dr. Hogan, GI staff physician, and Dr. Craig, primary team.      Overall time spent on the date of this encounter preparing to see the patient (including chart review of available notes, clinical status events, imaging and labs); obtaining and/or reviewing separately obtained history ; ordering medications, tests or procedures; communicating with other health care professionals; and documenting the above clinical information in the electronic medical record was 90 minutes.    Evelia Maddox PA-C  GI Service  [facility]  Text Page (Monday-Friday, 8am-4pm)    To page the On-Call [facility] GI provider 24 hours a day, please click [facility] and select GASTROENTEROLOGY MEDICINE ADULT / [facility] in the drop-down menu.   -------------------------------------------------------------------------------------------------------------------       Reason for Consultation:   We were asked by Kike Bailey MD of medicine to evaluate this patient with \"Acute anemia, GI bleed with history of diverticular bleed\".     History is obtained from the patient and the medical record.            History of Present Illness:     Patrick Olson is a 83 year old female with a PMH significant for RA (stopped methotrexate " "3/2023, now on hydroxycholoroquine), and secondary Sjogren's, lumbar degenerative disc disease, chronic lumbago without radiculopathy, osteoporosis, recurrent GI bleeding (AVMs, possible diverticular, polyp-related, hemorrhoids) who presents to [facility] on 7/15/2024 with BRBPR.     She was previously followed by [facility] since 2017 and has undergone [facility] exams at [facility], [facility], [facility] since 2010. She then transitioned her care to [facility] and seen multiple providers. GI bleeding dates back to at least 2010. She underwent inpatient colonoscopies that showed both diverticulosis and hemorrhoids. Diverticular bleeding has been felt to be etiology of bleeds given her clinical presentation however no active bleeding lesion had ever been identified.     She had EGD and colonoscopy 5/26/21 at [facility] for anemia. EGD revealed a non-bleeding gastric AVM which was treated with APC. Colonoscopy demonstrated diverticulosis and small hemorrhoids. She was referred to  given AVM and anemia, but prior to the appointment was admitted to [facility] with hematochezia (per patient this was \"a bit darker\" than her prior bleeding episodes). A CTA did not demonstrate any active extravasation. Thus a capsule was done with bleeding seen in the distal ileum to cecum on prelim capsule read. Colonoscopy with ileal intubation  (20 cm beyond the IC valve) was done thereafter with no active bleeding found, but a very large 30 mm polyp was found on the IC valve and removed via EMR. This was felt to be a likely source of bleeding. No other findings were seen in the area of bleeding noted on capsule endoscopy.       She then had recurrent bleeding in January 2023. She underwent colonoscopy 1/13/23 which showed no inflammation but residual specks of blood throughout bowel (to 30 cm to TI), extensive diverticulosis with no active bleeding. EGD was subsequently completed 2/6/23 which showed duodenal erosion without bleeding, normal stomach and esophagus. " VCE the following week 2/14/23 showed multiple localized erosions with no bleeding in the duodenum (likely first portion), single small angioectasia without bleeding was seen in the small bowel (probable duodenum), and lymphangiectasia in the small bowel (mid small bowel), one of these was peduncluated and polypoid, not bleeding, benign appearing.  A single spot with no bleeding was found in the colon (probable cecum). She completed 12 week course of omeprazole (while off of methotrexate), starting March for dyspepsia and finding of duodenal erosions and reported dyspepsia.     She again had several days of hematochezia that prompted hospitalization in August. She was hospitalized 8/12/23-8/14/23 at [facility], Hgb with mild decrease to 9's, she remained hemodynamically stable. She underwent colonoscopy which showed diverticulosis throughout the colon, no active bleeding noted. Examined ileum was normal. Mild internal hemorrhoids felt not to be source of bleed. Per inpatient assessment, diverticular bleeding most likely reason for repeated episodes of hematochezia.      She has been evaluated by both her PCP and hematology for anemia, which has been attributed to GI blood loss, anemia of chronic disease, and medication effect. In the setting of GI bleeding she was given IV iron infusions which she has since completed, has taken PO iron supplementation in the past.     Seen by GI Dr. Donald Gatica in 8/2023 and felt that the most recent bleeding was lower GI due to diverticular bleeding or rectal outlet bleeding. Also seen by Belen ASHRAF in 11/2023 with what describes as minimal rectal outlet bleeding.  Was recommended MiraLAX for constipation and omeprazole for dyspepsia.    She now presents with 5 episodes of BRBPR. She states yesterday around 3pm she felt an urgency to defecate and had a large amount of bright red blood, mixed with small amount of brown loose stool. She went to lay down but 10 minutes later she had  another urgency and again passed BRBPR. She came to the ED where she had 3 more episodes. She became light headed by the end of the last one. This was around 7pm. She denies abdominal pain, nausea, vomiting. She had previously been in her usual state of health.     In the ED, she was afebrile, normotensive, mildly tachycardic, not hypoxic. Lab eval showed WBC 5.4, hgb 10.6, plts 224, normal LFTs, K 4.3, Cr 0.96. No imaging obtained. She was given tylenol, IV PPI, IVF and admitted to medicine.     Currently, the patient reports that she feels ok. She has not had any further stools or BRBPR since 7pm last night. She denies abdominal pain, dizziness, lightheadedness. She does not want another colonoscopy.       Previous Procedures:  Colonoscopy 8/12/2023 for hematochezia with Dr. Downey   Impression:            - Diverticulosis in the entire examined colon.                          - The examined portion of the ileum was normal.                          - No specimens collected.      VCE on 2/16/2023  Impression:            - Normal stomach.                          - Duodenal erosion.                          - A single angioectasia without bleeding in the                          duodenum.                          - Small bowel lymphangiectasia.                          - A single mucosal spot with no bleeding in the colon.                          - No bleeding seen during exam.                          - The capsule entered the colon.     EGD on 2/6/23 for hematochezia with Dr. downey  Impression:            - Normal esophagus.                          - Normal stomach.                          - Duodenal erosion without bleeding.                          - No specimens collected.     Colonoscopy 1/13/2023   Findings:       Residual specks of blood throughout (exam to 30 cm into the TI). No        inflammation.        Residual small amounts of blood mixed with prep liquid. Extensive        diverticulosis  throughout the colon, left > right. Diverticuli were        carefully examined, but no active bleeding was seen. Site of mucosectomy        at the IC valve was unremarkable.                                                                                    Impression:            - No specimens collected.           Past Medical History:   Reviewed and edited as appropriate  Past Medical History:   Diagnosis Date    Anemia     CKD (chronic kidney disease) stage 3, GFR 30-59 ml/min (H)     Diverticulosis of large intestine     Osteoarthritis     Osteoporosis     Rheumatoid arthritis (H)     Sensorineural hearing loss     Varicose veins of bilateral lower extremities with other complications             Past Surgical History:   Reviewed and edited as appropriate   Past Surgical History:   Procedure Laterality Date    CAPSULE/PILL CAM ENDOSCOPY N/A 11/18/2021    Procedure: IMAGING PROCEDURE, GI TRACT, INTRALUMINAL, VIA CAPSULE;  Surgeon: Deric Rodriguez MD;  Location: [facility] GI    CAPSULE/PILL CAM ENDOSCOPY N/A 2/14/2023    Procedure: IMAGING PROCEDURE, GI TRACT, INTRALUMINAL, VIA CAPSULE;  Surgeon: Jaime Mesa MD;  Location: [facility]    COLONOSCOPY N/A 03/14/2017    Procedure: COMBINED COLONOSCOPY, SINGLE OR MULTIPLE BIOPSY/POLYPECTOMY BY BIOPSY;  Surgeon: Spencer Downey MD;  Location: [facility]    COLONOSCOPY N/A 9/22/2022    Procedure: COLONOSCOPY, FLEXIBLE, WITH LESION REMOVAL USING SNARE;  Surgeon: Kirby Arthur MD;  Location:  GI    COLONOSCOPY N/A 1/13/2023    Procedure: COLONOSCOPY;  Surgeon: Spencer Downey MD;  Location: [facility] OR    COLONOSCOPY N/A 8/14/2023    Procedure: Colonoscopy;  Surgeon: Spencer Downey MD;  Location: [facility]    ENTEROSCOPY SMALL BOWEL N/A 11/20/2021    Procedure: ENTEROSCOPY, colonoscopy with endoscopic mucosal resection and tattoo placement;  Surgeon: Kirby Arthur MD;  Location: [facility] OR    ESOPHAGOSCOPY, GASTROSCOPY, DUODENOSCOPY (EGD), COMBINED N/A 2/6/2023     Procedure: ESOPHAGOGASTRODUODENOSCOPY (EGD);  Surgeon: Spencer Downey MD;  Location:  GI    IR VISCERAL EMBOLIZATION  01/22/2021    ORTHOPEDIC SURGERY Left     hip replacment    SIGMOIDOSCOPY FLEXIBLE N/A 01/23/2021    Procedure: SIGMOIDOSCOPY, FLEXIBLE;  Surgeon: Jaime Steinberg MD;  Location:  GI              Social History:   Reviewed and edited as appropriate  Social History     Socioeconomic History    Marital status:      Spouse name: Not on file    Number of children: Not on file    Years of education: Not on file    Highest education level: Not on file   Occupational History    Not on file   Tobacco Use    Smoking status: Former    Smokeless tobacco: Never   Vaping Use    Vaping status: Never Used   Substance and Sexual Activity    Alcohol use: No    Drug use: No    Sexual activity: Not on file   Other Topics Concern    Not on file   Social History Narrative    - Retired (formerly employed as  at [employer])    - 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[employer])     Social Determinants of Health     Financial Resource Strain: Low Risk  (1/10/2020)    Received from [facility]    Overall Financial Resource Strain (CARDIA)     Difficulty of Paying Living Expenses: Not very hard   Food Insecurity: No Food Insecurity (1/10/2020)    Received from [facility]    Hunger Vital Sign     Worried About Running Out of Food in the Last Year: Never true     Ran Out of Food in the Last Year: Never true   Transportation Needs: No Transportation Needs (1/10/2020)    Received from [facility]    PRAPARE - Transportation     Lack of Transportation (Medical): No     Lack of Transportation (Non-Medical): No   Physical Activity: Sufficiently Active (1/10/2020)    Received from [facility]    Exercise Vital Sign     Days of Exercise per Week: 2 days     Minutes of Exercise per Session: 90 min   Stress: No Stress Concern Present (1/10/2020)     Received from [facility]    [place] [employer] Occupational [employer] - Occupational Stress Questionnaire     Feeling of Stress : Only a little   Social Connections: Unknown (1/10/2020)    Received from [facility]    Social Connection and Isolation Panel [NHANES]     Frequency of Communication with Friends and Family: More than three times a week     Frequency of Social Gatherings with Friends and Family: Once a week     Attends Religion Services: Never     Active Member of Clubs or Organizations: Not on file     Attends Club or Organization Meetings: Not on file     Marital Status:    Interpersonal Safety: Not At Risk (10/12/2021)    Humiliation, Afraid, Rape, and Kick questionnaire     Fear of Current or Ex-Partner: No     Emotionally Abused: No     Physically Abused: No     Sexually Abused: No   Housing Stability: Not on file              Family History:   Patient's family history is reviewed today and is non-contributory              Allergies:   Reviewed and edited as appropriate     Allergies   Allergen Reactions    Bee Venom Anaphylaxis    Shrimp Anaphylaxis    Evoxac [Cevimeline] Unknown    Prevnar Swelling     Other reaction(s): Edema    Prevnar [Pneumococcal 13-Linda Conj Vacc] Unknown            Medications:     Medications Prior to Admission   Medication Sig Dispense Refill Last Dose    acetaminophen (TYLENOL) 650 MG CR tablet Take 1,300 mg by mouth 2 times daily   7/15/2024 at am    artificial saliva (BIOTENE DRY MOUTHWASH) LIQD liquid Swish and spit in mouth 4 times daily as needed for dry mouth    at prn    calcium carbonate-vitamin D (CALTRATE) 600-10 MG-MCG per tablet Take 1 tablet by mouth daily   7/15/2024    carboxymethylcellulose PF (REFRESH PLUS) 0.5 % ophthalmic solution Place 1 drop into both eyes 3 times daily as needed for dry eyes    at prn    clobetasol (TEMOVATE) 0.05 % external ointment Apply topically 2 times daily To right ankle as needed 60 g 3  at prn    EPINEPHrine (ANY  BX GENERIC EQUIV) 0.3 MG/0.3ML injection 2-pack Inject 0.3 mg into the muscle as needed for anaphylaxis    at prn    famotidine (PEPCID) 20 MG tablet Take 20 mg by mouth 2 times daily as needed    at prn    gabapentin (NEURONTIN) 300 MG capsule Take 600 mg by mouth at bedtime   7/14/2024    gabapentin (NEURONTIN) 300 MG capsule Take 1 capsule (300 mg) by mouth 3 times daily Take 300mg in morning, 300mg in afternoon and 300mg (Patient taking differently: Take 300 mg by mouth 2 times daily as needed for neuropathic pain) 90 capsule 1  at prn    hydroxychloroquine (PLAQUENIL) 200 MG tablet Take 1 tablet (200 mg) by mouth daily 90 tablet 1 7/15/2024 at am    lifitegrast (XIIDRA) 5 % opthalmic solution Place 1 drop into both eyes 2 times daily   7/15/2024 at am x1    Multiple Vitamins-Minerals (OCUVITE PRESERVISION PO) Take 1 tablet by mouth 2 times daily   7/15/2024 at am x1             Review of Systems:     A complete review of systems was performed and is negative except as noted in the HPI             Physical Exam:   Temp: 98.2  F (36.8  C) Temp src: Oral BP: 97/44 Pulse: 73   Resp: 18 SpO2: 98 % O2 Device: None (Room air)    Wt:   Wt Readings from Last 2 Encounters:   07/15/24 38.9 kg (85 lb 11.2 oz)   05/03/24 40 kg (88 lb 2.9 oz)        General: 83 year old female in NAD.  Answers appropriately.    HEENT: Head is AT/NC. Sclera anicteric. No conjunctival injection.  Oropharynx is clear, moist  Neck: Supple  Lungs: Non labored breathing  Heart: Regular rate   Abdomen: Soft, non-tender, non-distended.  No rebound or peritoneal signs  Rectal: no external hemorrhoids, no apparent fissure, MARYA without obvious mass, scant brown stool ( present in room)  Extremities: No pedal edema.  Skin: No jaundice or rash  Neurologic: Grossly non-focal            Data:   Labs and imaging below were independently reviewed and interpreted    LAB WORK:    BMP  Recent Labs   Lab 07/16/24  0704 07/15/24  1817    140    POTASSIUM 4.7 4.3   CHLORIDE 110* 103   EJ 8.5* 9.4   CO2 27 26   BUN 21.0 25.8*   CR 1.01* 0.96*   GLC 95 186*     CBC  Recent Labs   Lab 07/16/24  0704 07/15/24  1817   WBC 4.3 5.4   RBC 2.32* 3.27*   HGB 7.7* 10.6*   HCT 23.2* 32.7*    100   MCH 33.2* 32.4   MCHC 33.2 32.4   RDW 13.8 13.8    224     INRNo lab results found in last 7 days.  LFTs  Recent Labs   Lab 07/16/24  0704 07/15/24  1817   ALKPHOS 56 70   AST 23 31   ALT 16 23   BILITOTAL 0.5 0.4   PROTTOTAL 5.3* 7.1   ALBUMIN 3.5 4.5      PANCNo lab results found in last 7 days.  CULTURES:   7-Day Micro Results       No results found for the last 168 hours.            IMAGING:  None      =======================================================================

## 2024-07-16 NOTE — PROGRESS NOTES
PRIMARY Concern: Admitted stools  SAFETY RISK Concerns (fall risk, behaviors, etc.): Yes fall    Isolation/Type: None   Tests/Procedures for NEXT shift: Echo  Consults? (Pending/following, signed-off?) None   Where is patient from? (Home, TCU, etc.): Home  Other Important info for NEXT shift: patient orthostatic was positive, blood pressure drops significantly when sitting and standing. MD notified. Commode at bedside.  Anticipated DC date & active delays: TBD. Pending clinical improvement     SUMMARY NOTE:               Orientation/Cognitive: AXO4  Observation Goals (Met/ Not Met): OBS  Mobility Level/Assist Equipment: A1/w gb  Antibiotics & Plan (IV/po, length of tx left):   Pain Management: Denies pain  Tele/VS/O2: VSS on RA, except ortho positive   ABNL Lab/BG: see Lab  Diet: NPO  Bowel/Bladder: Cont B/B  Skin Concerns: None   Drains/Devices: PIV infusing NS @ 75  Patient Stated Goal for Today: Sleep

## 2024-07-16 NOTE — ED PROVIDER NOTES
"  Emergency Department Note      History of Present Illness     Chief Complaint   Blood in stool      HPI   Patrick Olson is a 83 year old female with a history of diverticulosis, stage 3 CKD, and anemia who presents to the ED for the evaluation of bloody stool. The patient reports that this afternoon, she went to the bathroom and found blood in the toilet a long with a little bit of stool. She experienced this again once at home and 3 times since checking in to ED, with no stool and just blood. Her  describes the first episode as watery blood and the second as \"beet red.\" The patient found clots while wiping. She also reports nausea that began right before coming in, as well as lower abdominal cramping. She took Miralax this morning, had a light lunch, and then ate a smoothie that she believes contributed to the nausea. Her last instance of GI bleeding was in August of 2023 and was not this bad. She denies hematuria.    Independent Historian    as detailed above.    Review of External Notes   I reviewed a virtual visit from 06/21/2024    2. Recurrent GIB - Hgb 11.0   - Had capsule endoscopy 2/23 EGD 2/23, colonoscopy 8/23   - Capsule endoscopy found \"A single angioectasia without bleeding in the                          duodenum\"   - The EGD findings were duodenal erosion w/o bleeding   - The Colonoscopy findings were widespread diverticulosis throughout the entire colon w/o active bleeding. There was residual blood in the colon   - Had GI follow up 1/24    Past Medical History     Medical History and Problem List   Anemia  CDK stage 3  Diverticulosis of large intestine  Osteoarthritis  Osteoporosis  Rheumatoid arthritis  Sensorineural hearing loss  Varicose veins    Medications   Gabapentin  Hydroxychloroquine  Omeprazole  Famotidine    Surgical History   L hip replacement  Visceral embolization    Physical Exam     Patient Vitals for the past 24 hrs:   BP Temp Temp src Pulse Resp SpO2   07/15/24 " 1919 106/56 -- -- 94 -- 98 %   07/15/24 1815 139/66 98  F (36.7  C) Temporal 101 18 100 %     Physical Exam  Nursing note and vitals reviewed.    Constitutional:  Appears comfortable.    HENT:                Nose normal.  No discharge.      Oral mucosa is moist.  Eyes:    Conjunctivae are slightly pale.  Pupils are equal.  Cardiovascular:  Normal rate, regular rhythm with normal S1 and S2.  Capillary refill is normal less than 2 seconds  Pulmonary:  Effort normal and breath sounds clear to auscultation bilaterally.    GI:    Soft. No distension and no mass. No tenderness.   Musculoskeletal:  Normal range of motion. No extremity deformity.     No edema and no tenderness.    Neurological:   Alert and oriented. No focal weakness.  Skin:    Skin is warm and dry. No rash noted.   Psychiatric:   Behavior is normal. Appropriate mood and affect.     Judgment and thought content normal.       Diagnostics     Lab Results   Labs Ordered and Resulted from Time of ED Arrival to Time of ED Departure   COMPREHENSIVE METABOLIC PANEL - Abnormal       Result Value    Sodium 140      Potassium 4.3      Carbon Dioxide (CO2) 26      Anion Gap 11      Urea Nitrogen 25.8 (*)     Creatinine 0.96 (*)     GFR Estimate 59 (*)     Calcium 9.4      Chloride 103      Glucose 186 (*)     Alkaline Phosphatase 70      AST 31      ALT 23      Protein Total 7.1      Albumin 4.5      Bilirubin Total 0.4     CBC WITH PLATELETS AND DIFFERENTIAL - Abnormal    WBC Count 5.4      RBC Count 3.27 (*)     Hemoglobin 10.6 (*)     Hematocrit 32.7 (*)           MCH 32.4      MCHC 32.4      RDW 13.8      Platelet Count 224      % Neutrophils 54      % Lymphocytes 35      % Monocytes 8      % Eosinophils 3      % Basophils 1      % Immature Granulocytes 0      NRBCs per 100 WBC 0      Absolute Neutrophils 2.9      Absolute Lymphocytes 1.9      Absolute Monocytes 0.5      Absolute Eosinophils 0.1      Absolute Basophils 0.0      Absolute Immature  Granulocytes 0.0      Absolute NRBCs 0.0     HEMOGLOBIN A1C   TYPE AND SCREEN, ADULT    ABO/RH(D) O POS      SPECIMEN EXPIRATION DATE [id number]     ABO/RH TYPE AND SCREEN       Imaging   Echocardiogram Complete    (Results Pending)     Independent Interpretation   None    ED Course      Medications Administered   Medications   pantoprazole (PROTONIX) IV push injection 40 mg (has no administration in time range)   acetaminophen (TYLENOL) tablet 650 mg (has no administration in time range)   gabapentin (NEURONTIN) capsule 600 mg (has no administration in time range)       Procedures   Procedures     Discussion of Management   Admitting Hospitalist, Dr. Bailey    ED Course   ED Course as of 07/15/24 2012   Mon Jul 15, 2024   1930 I obtained history and examined the patient as noted above.     1943 I spoke to Dr. Bailey from the hospitalist service regarding admission.       Optional/Additional Documentation  None    Medical Decision Making / Diagnosis     CMS Diagnoses: None    MIPS       None    MDM   Patrick Olson is a 83 year old female with a history of recurrent GI bleeds but it has been since last August since she had her last 1.  This 1 now she has had 5 bloody stools, 2 at home and 3 in the waiting room.  Minimal stool with this.  She is not having a lot of pain just some lower abdominal cramping.  Her hemoglobin is 10.6, she is not tachycardic, blood pressure is normal.  She has a history of diverticular the and I suspect this may be a diverticular bleed.  She uses [facility].  This patient with her age and the fact that she has had 5 stools now with just blood, I think she needs observation overnight with serial hemoglobins and GI consultation.  She had some nausea earlier but none now and has not had any vomiting.  Could consider a proton pump inhibitor while we are awaiting workup.  I talked with Dr. Bailey who will be admitting the patient and getting GI consultation.  They may want to do a  scope exam in the morning.    Disposition   The patient was admitted to the hospital.     Diagnosis     ICD-10-CM    1. Lower GI bleeding  K92.2       2. Anemia, unspecified type  D64.9            Scribe Disclosure:  IChyna, am serving as a scribe at 7:40 PM on 7/15/2024 to document services personally performed by Margarita Faye MD based on my observations and the provider's statements to me.        Margarita Faye MD  07/15/24 2012

## 2024-07-16 NOTE — PHARMACY-ADMISSION MEDICATION HISTORY
Pharmacist Admission Medication History    Admission medication history is complete. The information provided in this note is only as accurate as the sources available at the time of the update.    Information Source(s): Patient and CareEverywhere/SureScripts via in-person    Pertinent Information:   -Patrick notes that she uses gabapentin 600 mg at bedtime and notes that her doses during the day are prescribed morning and afternoon but she only takes them as needed  -she does not have Xiidra eye drops with her but spouse could bring in if needed    Changes made to PTA medication list:  Added: None  Deleted: None  Changed: None    Allergies reviewed with patient and updates made in EHR: yes    Medication History Completed By: Farheen Cody RP 7/15/2024 8:04 PM    PTA Med List   Medication Sig Last Dose    acetaminophen (TYLENOL) 650 MG CR tablet Take 1,300 mg by mouth 2 times daily 7/15/2024 at am    artificial saliva (BIOTENE DRY MOUTHWASH) LIQD liquid Swish and spit in mouth 4 times daily as needed for dry mouth  at prn    calcium carbonate-vitamin D (CALTRATE) 600-10 MG-MCG per tablet Take 1 tablet by mouth daily 7/15/2024    carboxymethylcellulose PF (REFRESH PLUS) 0.5 % ophthalmic solution Place 1 drop into both eyes 3 times daily as needed for dry eyes  at prn    clobetasol (TEMOVATE) 0.05 % external ointment Apply topically 2 times daily To right ankle as needed  at prn    EPINEPHrine (ANY BX GENERIC EQUIV) 0.3 MG/0.3ML injection 2-pack Inject 0.3 mg into the muscle as needed for anaphylaxis  at prn    famotidine (PEPCID) 20 MG tablet Take 20 mg by mouth 2 times daily as needed  at prn    gabapentin (NEURONTIN) 300 MG capsule Take 600 mg by mouth at bedtime 7/14/2024    gabapentin (NEURONTIN) 300 MG capsule Take 1 capsule (300 mg) by mouth 3 times daily Take 300mg in morning, 300mg in afternoon and 300mg (Patient taking differently: Take 300 mg by mouth 2 times daily as needed for neuropathic pain)  at prn     hydroxychloroquine (PLAQUENIL) 200 MG tablet Take 1 tablet (200 mg) by mouth daily 7/15/2024 at am    lifitegrast (XIIDRA) 5 % opthalmic solution Place 1 drop into both eyes 2 times daily 7/15/2024 at am x1    Multiple Vitamins-Minerals (OCUVITE PRESERVISION PO) Take 1 tablet by mouth 2 times daily 7/15/2024 at am x1

## 2024-07-16 NOTE — PROGRESS NOTES
"[facility]    Medicine Progress Note - Hospitalist Service    Date of Admission:  7/15/2024    Assessment & Plan   Patrick Olson is an 83 year old female with a past medical history of duodenal ulcers, CKD3, RA and chronic anemia with recurrent GI bleed. She presents on 7/15 with hematochezia.  Prior to coming to the hospital, she had two stools with a large quantity of bright red blood along with abdominal cramping and lightheadedness, without syncope. This ultimately prompted her to come to the ED. While she was waiting to be seen, she had three more bloody bowel movements for a total of 5 all together. She is not on any blood thinners or NSAIDS and takes famotidine as needed for PUD.  Of note, she has had prior episode with GI bleed, the most recent one being in 8/2023.   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 The Colonoscopy findings showed widespread diverticulosis throughout the entire colon w/o active bleeding.  In addition to the aforementioned, she reports that she has been losing about a pound every few months for the past several years despite seeing a nutritionist.    Symptomatic Recurrent Lower GI bleed w/ PreSyncope  Hx of GI bleeds in the setting of Duodenal ulcers and diverticulosis  Acute on Chronic anemia     *Hemoglobin 7/15: 10.6-->7.7-->6.8  -Continuous telemetry monitoring  -Q6h hemoglobin checks  -[facility] consult, appreciate recommendations:   -Conditional PRBCs transfusion for Hemoglobin less than 7 or if symptomatic.  -Pantoprazole q12   -Orthostatics  -TTE  -Transfer to [facility] [place] [facility]    Malnutrition  --Nutrition consult     Ckd3a  GFR at baseline. 50s  -Creatinine 7/15: 0.96-->1.01  -Renal diet when able to advance from clear liquid  -Limit nephrotoxins   " "  Hyperglycemia  *A1c-5.7     RA  Chronic back pain  Dry mouth  -Resume gabapentin  -PRN Tylenol  -Resume vitamin D at discharge  -Resume plaquenil     Observation Goals: -diagnostic tests and consults completed and resulted, -vital signs normal or at patient baseline, -returns to baseline functional status, -safe disposition plan has been identified, Nurse to notify provider when observation goals have been met and patient is ready for discharge.  Diet: Advance Diet as Tolerated: Clear Liquid Diet    DVT Prophylaxis: Pneumatic Compression Devices  Parra Catheter: Not present  Lines: None     Cardiac Monitoring: None  Code Status: No CPR- Do NOT Intubate      Clinically Significant Risk Factors Present on Admission                     # Anemia: based on hgb <11  # Anemia: based on hgb <11     # Cachexia: Estimated body mass index is 14.94 kg/m  as calculated from the following:    Height as of this encounter: 1.613 m (5' 3.5\").    Weight as of this encounter: 38.9 kg (85 lb 11.2 oz).            Disposition Plan     Medically Ready for Discharge: Anticipated Tomorrow  The patient's care was discussed with the Attending Physician, Dr. Beach .    Rickie Craig NP  Hospitalist Service  [facility]  Securely message with [employer] (more info)  Text page via [facility] Paging/Directory   ___________________________________________  Interval History   I personally met with and examined the patient on 7/16/2024. She reports that she has not had a bowel movement since admission. However, her hemoglobin continues to downtrend and orthostatics are positive. A 500 ml IV fluid bolus with given in the AM 7/16/2024  and she ultimately required a blood transfusion for a hemoglobin of 6.8. At current, she denies the presence of any chest pain, shortness of breath, abdominal pain/cramping, nausea or vomiting. Plan to trend hemoglobin q6h with conditional order to transfuse if less than 7. She was also " seen by [facility] GI who recommend against additional colonoscopy or EGD at this time. She has been approved for inpatient status.    Addendum 1730: I was informed by the bedside nurse that the patient seemed to be bleeding again. [facility] was contacted and recommended a CTA of the abdomen and pelvis. Will proceed with this and transfer to [facility] for higher level of care.        Physical Exam   Vital Signs: Temp: 98  F (36.7  C) Temp src: Oral BP: 99/40 Pulse: 79   Resp: 18 SpO2: 98 % O2 Device: None (Room air)    Weight: 85 lbs 11.2 oz  Physical Exam  Vitals and nursing note reviewed.   Cardiovascular:      Rate and Rhythm: Normal rate and regular rhythm.      Pulses: Normal pulses.      Heart sounds: Normal heart sounds.   Pulmonary:      Effort: Pulmonary effort is normal.      Breath sounds: Normal breath sounds and air entry.   Abdominal:      General: Bowel sounds are normal.      Palpations: Abdomen is soft.      Tenderness: There is no abdominal tenderness.   Skin:     General: Skin is warm and dry.   Neurological:      General: No focal deficit present.      Mental Status: She is alert.   Psychiatric:         Attention and Perception: Attention normal.         Mood and Affect: Mood normal.         Speech: Speech normal.         Behavior: Behavior normal. Behavior is cooperative.         Thought Content: Thought content normal.         Cognition and Memory: Cognition normal.         Judgment: Judgment normal.     Medical Decision Making   60 MINUTES SPENT BY ME on the date of service doing chart review, history, exam, documentation & further activities per the note.      Data     I have personally reviewed the following data over the past 24 hrs:    4.3  \   6.8 (LL)   / 161     142 110 (H) 21.0 /  95   4.7 27 1.01 (H) \     ALT: 16 AST: 23 AP: 56 TBILI: 0.5   ALB: 3.5 TOT PROTEIN: 5.3 (L) LIPASE: N/A     TSH: N/A T4: N/A A1C: 5.7 (H)     INR:  1.14 PTT:  N/A   D-dimer:  N/A Fibrinogen:  N/A        Imaging results reviewed over the past 24 hrs:   No results found for this or any previous visit (from the past 24 hour(s)).

## 2024-07-16 NOTE — CODE/RAPID RESPONSE
[facility]    RRT Note  7/16/2024   Time Called: 952    RRT called for: Syncope on commode    HPI:    Patrick Olson is a 83 year old female w/ PMH of duodenal ulcers, CKD3, RA and chronic anemia with recurrent GI bleed  who was admitted on 7/15/2024 for hematochezia .    Patient been transfused 1 unit RBCs earlier in the day for hemoglobin of 6.8 decreased from 10.6 at admission.  Patient was up with assistive 1-2 staff members to the bedside commode, had a large hematochezia of stool (400 mL) and then had a syncope spell.  Assistance was initially called as a CODE BLUE, but there was no loss of pulse and CODE BLUE was canceled, later activated as RRT.    On my arrival patient is transitioning back into bed.  She is weak appearing clammy but awake able to follow commands.  She denies any chest pain reports feeling breathless and nauseated.  Lips are pale.  Heart rate 78-80, /50, 99% on room air.    Assessment & Plan   Syncope in the setting of ongoing GI bleeding with hematochezia suspected etiology is diverticular  Hypovolemic/hemorrhagic shock    INTERVENTIONS:  -stat EKG--> see below  -stat gluc 30 mg/dL  -stat hgb, BMP, ical, Mg,   -place 2nd peripheral large bore IV   -In light of large volume blood loss and commode and syncope transfuse RBC   -Once RBCs are pending will obtain CT angio abdomen is already ordered  - Pending CT results IR has already been contacted in the event that visceral angiogram is needed  -As per GI note colonoscopy is felt to be unlikely to find source of bleeding  -q4-hour hemoglobins are already ordered    Working diagnosis: Syncope secondary to active hematochezia likely hypovolemic/hemorrhagic shock    At the end of the RRT RBCs have been started BP is 119/56, heart rate 103    disposition arrangements already have been made for [facility] transfer    Discussed with and defer further cares to hospitalist MEGHAN Craig  Code Status: No CPR- Do NOT  Intubate    Physical Exam   Vital Signs with Ranges:  Temp:  [97.8  F (36.6  C)-98.3  F (36.8  C)] 97.9  F (36.6  C)  Pulse:  [69-96] 85  Resp:  [15-18] 18  BP: ()/(37-63) 108/50  SpO2:  [95 %-100 %] 100 %  No intake/output data recorded.    Constitutional: vs as above and/or per EMR  General:  adult pt lying transferring back to bed acutely ill-appearing, clammy skin   GCS:   Motor 6=Obeys commands   Verbal 5=Oriented   Eye Opening 4=Spontaneous   Total: 15     Neuro: +follows commands wiggle toes, face symmetric, tongue midline, speech fluent  Head, ENT & mouth: NC/AT,  mouth moist oral mucosa  Neck: supple  CV S1S2 no murmurs  resp: CTAB upper lobes  gi:normoactive bowel sounds, soft, nontender except in the epigastrium, nondisteded  Ext: no edema or mottling  Skin: no rashes on exposed skin  Musculoskeletal no bony joint deformities      Data     EKG:  Interpreted by PATRICIA Shepard CNP  Time reviewed: 6:05 PM  Symptoms at  of EKG: Syncope  Rhythm: normal sinus   Rate: 79  Axis: Normal  Ectopy: none  Conduction: normal  ST Segments/ T Waves: No acute ischemic changes  Q Waves: none  Comparison to prior: Unchanged from December 2016    Clinical Impression: normal EKG      IMAGING: (X-ray/CT/MRI)   Recent Results (from the past 24 hour(s))   CTA Abdomen Pelvis with Contrast    Narrative    EXAM: CTA ABDOMEN PELVIS WITH CONTRAST  LOCATION: [facility]  DATE: 7/16/2024    INDICATION: Potential diverticular bleed.  COMPARISON: CTA abdomen and pelvis 11/16/2021.  TECHNIQUE: CT angiogram abdomen pelvis during arterial phase of injection of IV contrast. 2D and 3D MIP reconstructions were performed by the CT technologist. Dose reduction techniques were used.  CONTRAST: 53 mL Isovue-370.    FINDINGS:  ANGIOGRAM ABDOMEN/PELVIS: Mild-to-moderate atherosclerosis. Nonaneurysmal abdominal aorta. The celiac, superior mesenteric, bilateral renal, inferior mesenteric, and biiliac vessels are  patent.    LOWER CHEST: Unremarkable.    HEPATOBILIARY: Similar right hepatic lobe hypodensities. Cholelithiasis and distended gallbladder without pericholecystic inflammation.    PANCREAS: Pancreatic body 0.6 cm cystic lesion (8/40).    SPLEEN: Normal.    ADRENAL GLANDS: Similar mild left adrenal gland thickening.    KIDNEYS/BLADDER: Bilateral renal cysts; no follow-up necessary.    BOWEL: No bowel obstruction. Appendix is normal. Fluid-filled rectum. No convincing acute gastrointestinal bleed. Wall thickening and apparent mural hyperenhancement involving the ascending to descending colon. High-density fluid within the mid   descending colon (6/85).    LYMPH NODES: Normal.    PELVIC ORGANS: No pelvic mass. Tiny fundal fibroid.    MUSCULOSKELETAL: Left hip arthroplasty. Degenerative changes of the spine. No aggressive osseous lesion.      Impression    IMPRESSION:  1.  No convincing active arterial GI bleed.  2.  Pancolitis that could be infectious or inflammatory.  3.  High-density fluid within the descending colon is nonspecific, although could reflect blood products.  4.  Cholelithiasis and gallbladder distention, possibly sequelae of NPO status. If there is concern for cholecystitis, ultrasound could be performed.       CBC with Diff:  Recent Labs   Lab Test 07/16/24  1805 07/16/24  1128 07/16/24  1002 07/16/24  0704   WBC  --   --   --  4.3   HGB 8.2*   < > 7.7* 7.7*   MCV  --   --   --  100   PLT  --   --   --  161   INR  --   --  1.14  --     < > = values in this interval not displayed.        Lactic Acid:    2.7    Comprehensive Metabolic Panel:  Recent Labs   Lab 07/16/24  1805 07/16/24  1754 07/16/24  0704     --  142   POTASSIUM 5.0  --  4.7   CHLORIDE 110*  --  110*   CO2 20*  --  27   ANIONGAP 9  --  5*   *   < > 95   BUN 19.3  --  21.0   CR 0.94  --  1.01*   GFRESTIMATED 61  --  56*   EJ 8.2*  --  8.5*   MAG 2.1  --   --    PROTTOTAL  --   --  5.3*   ALBUMIN  --   --  3.5   BILITOTAL  --    --  0.5   ALKPHOS  --   --  56   AST  --   --  23   ALT  --   --  16    < > = values in this interval not displayed.       INR:    Recent Labs   Lab Test 07/16/24  1002   INR 1.14       Time Spent on this Encounter   I spent 30 minutes of critical care time on the unit/floor managing the care of Patrick Olson. Upon evaluation, this patient had a high probability of imminent or life-threatening deterioration due to syncope, ongoing GI bleeding/suspected hypovolemic/hemorrhagic shock, which required my direct attention, intervention, and personal management including review of EKG, transfusion of RBCs 100% of my time was spent at the bedside counseling the patient and/or coordinating care regarding services listed in this note.    PATRICIA Shepard [facility]  Hospitalist Service  [facility]  Securely message with [employer] (more info)  Text page via [facility] Paging/Directory

## 2024-07-16 NOTE — PROGRESS NOTES
Shift Summary [id number]  Admitting Diagnosis: Lower GI bleeding   Anemia, unspecified type   Vitals:RA,  Pain:Pt complaining abdominal discomfort,NO PRN given during this shift  A&Ox4  Voiding:Continent  Mobility:SBA  Tele:N/A  CMS:Intact  Lung Sounds:Clear  GI:Continent, GI Bleed, complaining abdominal discomfort  Dressing:N/A  Diet: NPO for Medical/Clinical Reasons Except for: Meds     Pt heavily bleeding, and was symptomatic, 1 unit of Blood transfused. Pt B/P was better after the bedtime. NA called for help after she was using bedside commode and had loss of conciousness and called RRT. Pt now transferred to [facility].

## 2024-07-16 NOTE — ED NOTES
"[facility]  ED Nurse Handoff Report    ED Chief complaint: No chief complaint on file.      ED Diagnosis:   Final diagnoses:   Lower GI bleeding   Anemia, unspecified type       Code Status: DNR / DNI    Allergies:   Allergies   Allergen Reactions    Bee Venom Anaphylaxis    Shrimp Anaphylaxis    Evoxac [Cevimeline] Unknown    Prevnar Swelling     Other reaction(s): Edema    Prevnar [Pneumococcal 13-Linda Conj Vacc] Unknown       Patient Story: Patrick Olson is a 83 year old female with a history of diverticulosis, stage 3 CKD, and anemia who presents to the ED for the evaluation of bloody stool. The patient reports that this afternoon, she went to the bathroom and found blood in the toilet a long with a little bit of stool. She experienced this again once at home and 3 times since checking in to ED, with no stool and just blood. Her  describes the first episode as watery blood and the second as \"beet red.\" The patient found clots while wiping. She also reports nausea that began right before coming in, as well as lower abdominal cramping. She took Miralax this morning, had a light lunch, and then ate a smoothie that she believes contributed to the nausea. Her last instance of GI bleeding was in August of 2023 and was not this bad. She denies hematuria   Focused Assessment:    Labs Ordered and Resulted from Time of ED Arrival to Time of ED Departure   COMPREHENSIVE METABOLIC PANEL - Abnormal       Result Value    Sodium 140      Potassium 4.3      Carbon Dioxide (CO2) 26      Anion Gap 11      Urea Nitrogen 25.8 (*)     Creatinine 0.96 (*)     GFR Estimate 59 (*)     Calcium 9.4      Chloride 103      Glucose 186 (*)     Alkaline Phosphatase 70      AST 31      ALT 23      Protein Total 7.1      Albumin 4.5      Bilirubin Total 0.4     CBC WITH PLATELETS AND DIFFERENTIAL - Abnormal    WBC Count 5.4      RBC Count 3.27 (*)     Hemoglobin 10.6 (*)     Hematocrit 32.7 (*)           MCH 32.4  "     MCHC 32.4      RDW 13.8      Platelet Count 224      % Neutrophils 54      % Lymphocytes 35      % Monocytes 8      % Eosinophils 3      % Basophils 1      % Immature Granulocytes 0      NRBCs per 100 WBC 0      Absolute Neutrophils 2.9      Absolute Lymphocytes 1.9      Absolute Monocytes 0.5      Absolute Eosinophils 0.1      Absolute Basophils 0.0      Absolute Immature Granulocytes 0.0      Absolute NRBCs 0.0     TYPE AND SCREEN, ADULT    SPECIMEN EXPIRATION DATE [id number]     ABO/RH TYPE AND SCREEN     No orders to display     Medications   sodium chloride 0.9 % infusion ( Intravenous $New Bag 7/15/24 1948)         To be done/followed up on inpatient unit:  GI CONSULT    Does this patient have any cognitive concerns?:  none    Activity level - Baseline/Home:  Stand with Assist  Activity Level - Current:   Stand with Assist    Patient's Preferred language: English   Needed?: No    Isolation: None  Infection: Not Applicable  Patient tested for COVID 19 prior to admission: YES  Bariatric?: No    Vital Signs:   Vitals:    07/15/24 1815 07/15/24 1919   BP: 139/66 106/56   Pulse: 101 94   Resp: 18    Temp: 98  F (36.7  C)    TempSrc: Temporal    SpO2: 100% 98%       Cardiac Rhythm:     Was the PSS-3 completed:   Yes  What interventions are required if any?               Family Comments:   Observation Brochure and Video    Patient informed of observation status based on provider's order.  Observation brochure was given and the video watched. Patient/Family stated understanding. Questions answered.  Asiya Mosqueda RN     For the majority of the shift this patient's behavior was Green.   Behavioral interventions performed were none.    ED NURSE PHONE NUMBER: [id number]

## 2024-07-16 NOTE — CONSULTS
"SW: Consult in for \"UR review for inpatient.\" Care management team does not send to UR for review, this is charge RN on the short stay/observation floor. Patient is now inpatient also.    HEIDI Dowell  Social Work  [facility]    "

## 2024-07-16 NOTE — PROGRESS NOTES
RECEIVING UNIT ED HANDOFF REVIEW    ED Nurse Handoff Report was reviewed by: Rosa Bryant RN on July 15, 2024 at 10:14 PM

## 2024-07-16 NOTE — H&P
"Assessment / Plan    83F hx of GI bleeding in the setting of duodenal ulcers, CKD3, RA, anemia presenting w/ recurrent lower GI bleed    Symptomatic Recurrent Lower GI bleed w/ PreSyncope  Hx of GI bleeds in the setting of Duodenal ulcers and diverticulosis  Chronic anemia, stable  ``Hx: Patient presenting w/ 5 episodes of bright red rectal bleeding, associated w/ lower abdominal cramping w/ lightheadedness (denies syncope). No blood thinners or NSAIDS. Takes famotidine PRN BID for PUD. Last GI bleeding episode was 8/2023 . Denies CP/SOB/Palpitations. No nausea/vomiting. 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 EGD findings were duodenal erosion w/o bleeding. The Colonoscopy findings were widespread diverticulosis throughout the entire colon w/o active bleeding. Endorses unintentional weightloss of 1# every few months for years despite follow-up w/ nutritionist.   ``Vitals/Exam: Vitally stable, appears malnourished   ``Labs: Hb at baseline 10.6  ``Imaging: None  ``ER Course: Maintenance fluids  --NPO IVF  --Pantoprazole q12  --TTE  --GI consult  --cbc q12  --orthostatics    Malnutrition  --Nutrition consult    Ckd3a  ``GFR at baseline. 50s  --trend creatinine  --renal diet once npo compleleted  --limit nephrotoxins    Hyperglycemia  --A1c    RA  Chronic back pain  Dry mouth  --resume pilocarpine once dose confirmed  --resume gabapentin once confirmed  --standing tylenol  --resume vitamin D on discharge  --resume plaquenil once confirmed    Supportive Care  DVT ppx: SCDs  Diet: NPO    Pain Control: tylenol  Upper GI ppx: zofran  Lower GI ppx: senna  Lines/Catheters: IV  TTE/Dopplers: none  Contact/Seizure/Fall/Delerium Precautions: fall  PT/OT/Social/Wound/Palliative Consults: None  Code Status: DNR DNI    History of Present Illness  Patient presenting w/ 5 episodes of bright red " "rectal bleeding, associated w/ lower abdominal cramping w/ lightheadedness (denies syncope). No bloodthinners or NSAIDS. Takes famotidine PRN BID for PUD. Last GI bleeding episode was 8/2023 . Denies CP/SOB/Palpitations. No nausea/vomiting. 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 EGD findings were duodenal erosion w/o bleeding. The Colonoscopy findings were widespread diverticulosis throughout the entire colon w/o active bleeding. Endorses unintentional weightloss of 1# every few months for years despite follow-up w/ nutritionist.     ROS: 10 point ROS neg other than the symptoms noted above in the HPI.     Objective History  /56   Pulse 94   Temp 98  F (36.7  C) (Temporal)   Resp 18   SpO2 98%     Constitutional: Alert and oriented to person, place and time; no apparent distress. Appears underweight  HEENT: Normocephalic, no scleral icterus  Abdomen: soft, no distention/tenderness/guarding  Lungs: lungs clear to auscultation bilaterally  Heart: Regular s1s2, no evidence of murmurs  Neuro:No focal strength/sensation deficits  Skin/Extremities: No obvious signs of skin breakdown, no LE edema  Psychiatric: appropriate affect, insight and judgment  Back: No midline tenderness, no CVA tenderness    I have personally spent 84 minutes total time today in preparing to see the patient (eg, review of tests), obtaining and/or reviewing separately obtained history, performing a medically appropriate examination and/or evaluation, counseling and educating the patient/family/caregiver, ordering medications, tests, or procedures, referring and communicating with other health care professionals, documenting clinical information in the electronic or other health record, independently interpreting results and communicating results to the patient/ family/caregiver and care " coordination.

## 2024-07-16 NOTE — PROGRESS NOTES
[facility]  Consult request received/chart reviewed.    This patient has been followed at the  for h/o GI bleeding, weight loss, dyspepsia - most recently in clinic 2/5/24 with procedures in 2023.      Please contact  of [facility] for consultation.    Ken Cash DO   [facility]  Office [phone]

## 2024-07-16 NOTE — PLAN OF CARE
Goal Outcome Evaluation:        Observation goals  PRIOR TO DISCHARGE        Comments: -diagnostic tests and consults completed and resulted  -vital signs normal or at patient baseline  -returns to baseline functional status  -safe disposition plan has been identified  Nurse to notify provider when observation goals have been met and patient is ready for discharge.

## 2024-07-16 NOTE — PROGRESS NOTES
MD Notification    Notified Person: MD    Notified Person Name:Rodrigo Davis    Notification Date/Time: 2311    Notification Interaction: Vocera    Purpose of Notification: Patient orthostatic is positive. Bp is very low upon standing and sitting. IV infusing NS at 75ml/hr. Any interventions?      Orders Received: No intervention     Comments:

## 2024-07-17 ENCOUNTER — APPOINTMENT (OUTPATIENT)
Dept: CARDIOLOGY | Facility: CLINIC | Age: 84
DRG: 377 | End: 2024-07-17
Attending: STUDENT IN AN ORGANIZED HEALTH CARE EDUCATION/TRAINING PROGRAM
Payer: COMMERCIAL

## 2024-07-17 ENCOUNTER — APPOINTMENT (OUTPATIENT)
Dept: NUCLEAR MEDICINE | Facility: CLINIC | Age: 84
DRG: 377 | End: 2024-07-17
Payer: COMMERCIAL

## 2024-07-17 LAB
ANION GAP SERPL CALCULATED.3IONS-SCNC: 7 MMOL/L (ref 7–15)
ATRIAL RATE - MUSE: 79 BPM
BLD PROD TYP BPU: NORMAL
BLOOD COMPONENT TYPE: NORMAL
BUN SERPL-MCNC: 18 MG/DL (ref 8–23)
CALCIUM SERPL-MCNC: 8.1 MG/DL (ref 8.8–10.4)
CHLORIDE SERPL-SCNC: 112 MMOL/L (ref 98–107)
CODING SYSTEM: NORMAL
CREAT SERPL-MCNC: 1 MG/DL (ref 0.51–0.95)
CROSSMATCH: NORMAL
CROSSMATCH: NORMAL
DIASTOLIC BLOOD PRESSURE - MUSE: NORMAL MMHG
EGFRCR SERPLBLD CKD-EPI 2021: 56 ML/MIN/1.73M2
FLEXIBLE SIGMOIDOSCOPY: NORMAL
GLUCOSE SERPL-MCNC: 94 MG/DL (ref 70–99)
HCO3 SERPL-SCNC: 23 MMOL/L (ref 22–29)
HGB BLD-MCNC: 7 G/DL (ref 11.7–15.7)
HGB BLD-MCNC: 7.9 G/DL (ref 11.7–15.7)
HGB BLD-MCNC: 8.3 G/DL (ref 11.7–15.7)
HGB BLD-MCNC: 9.6 G/DL (ref 11.7–15.7)
INTERPRETATION ECG - MUSE: NORMAL
ISSUE DATE AND TIME: NORMAL
LACTATE SERPL-SCNC: 1 MMOL/L (ref 0.7–2)
LVEF ECHO: NORMAL
P AXIS - MUSE: 81 DEGREES
POTASSIUM SERPL-SCNC: 4.1 MMOL/L (ref 3.4–5.3)
PR INTERVAL - MUSE: 174 MS
QRS DURATION - MUSE: 80 MS
QT - MUSE: 398 MS
QTC - MUSE: 456 MS
R AXIS - MUSE: 84 DEGREES
SODIUM SERPL-SCNC: 142 MMOL/L (ref 135–145)
SYSTOLIC BLOOD PRESSURE - MUSE: NORMAL MMHG
T AXIS - MUSE: 76 DEGREES
UNIT ABO/RH: NORMAL
UNIT NUMBER: NORMAL
UNIT STATUS: NORMAL
UNIT TYPE ISBT: 5100
UNIT TYPE ISBT: 5100
UNIT TYPE ISBT: 6200
VENTRICULAR RATE- MUSE: 79 BPM

## 2024-07-17 PROCEDURE — 99153 MOD SED SAME PHYS/QHP EA: CPT | Performed by: INTERNAL MEDICINE

## 2024-07-17 PROCEDURE — 93306 TTE W/DOPPLER COMPLETE: CPT | Mod: 26 | Performed by: INTERNAL MEDICINE

## 2024-07-17 PROCEDURE — 93306 TTE W/DOPPLER COMPLETE: CPT

## 2024-07-17 PROCEDURE — 36415 COLL VENOUS BLD VENIPUNCTURE: CPT

## 2024-07-17 PROCEDURE — 250N000013 HC RX MED GY IP 250 OP 250 PS 637: Performed by: PHYSICIAN ASSISTANT

## 2024-07-17 PROCEDURE — A9560 TC99M LABELED RBC: HCPCS | Performed by: HOSPITALIST

## 2024-07-17 PROCEDURE — 45330 DIAGNOSTIC SIGMOIDOSCOPY: CPT | Performed by: INTERNAL MEDICINE

## 2024-07-17 PROCEDURE — P9035 PLATELET PHERES LEUKOREDUCED: HCPCS

## 2024-07-17 PROCEDURE — 250N000013 HC RX MED GY IP 250 OP 250 PS 637

## 2024-07-17 PROCEDURE — 85018 HEMOGLOBIN: CPT

## 2024-07-17 PROCEDURE — 250N000011 HC RX IP 250 OP 636

## 2024-07-17 PROCEDURE — 250N000013 HC RX MED GY IP 250 OP 250 PS 637: Performed by: STUDENT IN AN ORGANIZED HEALTH CARE EDUCATION/TRAINING PROGRAM

## 2024-07-17 PROCEDURE — 83605 ASSAY OF LACTIC ACID: CPT | Performed by: HOSPITALIST

## 2024-07-17 PROCEDURE — 99232 SBSQ HOSP IP/OBS MODERATE 35: CPT | Performed by: INTERNAL MEDICINE

## 2024-07-17 PROCEDURE — P9016 RBC LEUKOCYTES REDUCED: HCPCS

## 2024-07-17 PROCEDURE — 85018 HEMOGLOBIN: CPT | Performed by: HOSPITALIST

## 2024-07-17 PROCEDURE — 36415 COLL VENOUS BLD VENIPUNCTURE: CPT | Performed by: HOSPITALIST

## 2024-07-17 PROCEDURE — 0DJD8ZZ INSPECTION OF LOWER INTESTINAL TRACT, VIA NATURAL OR ARTIFICIAL OPENING ENDOSCOPIC: ICD-10-PCS | Performed by: INTERNAL MEDICINE

## 2024-07-17 PROCEDURE — P9059 PLASMA, FRZ BETWEEN 8-24HOUR: HCPCS

## 2024-07-17 PROCEDURE — 99233 SBSQ HOSP IP/OBS HIGH 50: CPT | Performed by: HOSPITALIST

## 2024-07-17 PROCEDURE — 120N000013 HC R&B IMCU

## 2024-07-17 PROCEDURE — 258N000003 HC RX IP 258 OP 636: Performed by: STUDENT IN AN ORGANIZED HEALTH CARE EDUCATION/TRAINING PROGRAM

## 2024-07-17 PROCEDURE — 343N000001 HC RX 343: Performed by: HOSPITALIST

## 2024-07-17 PROCEDURE — 78278 ACUTE GI BLOOD LOSS IMAGING: CPT

## 2024-07-17 PROCEDURE — 250N000011 HC RX IP 250 OP 636: Performed by: STUDENT IN AN ORGANIZED HEALTH CARE EDUCATION/TRAINING PROGRAM

## 2024-07-17 PROCEDURE — 80048 BASIC METABOLIC PNL TOTAL CA: CPT | Performed by: HOSPITALIST

## 2024-07-17 PROCEDURE — 250N000011 HC RX IP 250 OP 636: Performed by: INTERNAL MEDICINE

## 2024-07-17 PROCEDURE — G0500 MOD SEDAT ENDO SERVICE >5YRS: HCPCS | Performed by: INTERNAL MEDICINE

## 2024-07-17 RX ORDER — CALCIUM GLUCONATE 20 MG/ML
2 INJECTION, SOLUTION INTRAVENOUS ONCE
Status: COMPLETED | OUTPATIENT
Start: 2024-07-17 | End: 2024-07-17

## 2024-07-17 RX ORDER — FENTANYL CITRATE 50 UG/ML
INJECTION, SOLUTION INTRAMUSCULAR; INTRAVENOUS PRN
Status: DISCONTINUED | OUTPATIENT
Start: 2024-07-17 | End: 2024-07-17 | Stop reason: HOSPADM

## 2024-07-17 RX ADMIN — ACETAMINOPHEN 650 MG: 325 TABLET, FILM COATED ORAL at 11:26

## 2024-07-17 RX ADMIN — CALCIUM GLUCONATE 2 G: 20 INJECTION, SOLUTION INTRAVENOUS at 04:11

## 2024-07-17 RX ADMIN — ACETAMINOPHEN 650 MG: 325 TABLET, FILM COATED ORAL at 21:18

## 2024-07-17 RX ADMIN — SODIUM CHLORIDE: 9 INJECTION, SOLUTION INTRAVENOUS at 21:29

## 2024-07-17 RX ADMIN — PANTOPRAZOLE SODIUM 40 MG: 40 INJECTION, POWDER, FOR SOLUTION INTRAVENOUS at 11:26

## 2024-07-17 RX ADMIN — SODIUM PHOSPHATE, DIBASIC AND SODIUM PHOSPHATE, MONOBASIC 1 ENEMA: 7; 19 ENEMA RECTAL at 09:39

## 2024-07-17 RX ADMIN — PANTOPRAZOLE SODIUM 40 MG: 40 INJECTION, POWDER, FOR SOLUTION INTRAVENOUS at 21:18

## 2024-07-17 RX ADMIN — HYDROXYCHLOROQUINE SULFATE 200 MG: 200 TABLET ORAL at 11:26

## 2024-07-17 RX ADMIN — SODIUM CHLORIDE: 9 INJECTION, SOLUTION INTRAVENOUS at 11:25

## 2024-07-17 RX ADMIN — GABAPENTIN 600 MG: 300 CAPSULE ORAL at 21:18

## 2024-07-17 RX ADMIN — Medication 23.1 MILLICURIE: at 08:31

## 2024-07-17 ASSESSMENT — ACTIVITIES OF DAILY LIVING (ADL)
ADLS_ACUITY_SCORE: 43
ADLS_ACUITY_SCORE: 39
ADLS_ACUITY_SCORE: 39
ADLS_ACUITY_SCORE: 43
ADLS_ACUITY_SCORE: 39
ADLS_ACUITY_SCORE: 43
ADLS_ACUITY_SCORE: 39

## 2024-07-17 NOTE — PROGRESS NOTES
Notified by RN of hemoglobin down to 7.0 from 7.8 and after 1 unit PRBC, 2 units FFP, and 1 unit platelets. Given continued blood loss and tenuous hemodynamic stability, transfuse 2 units PRBC and contact GI for possible expedited scope.    Plan:  - 2g Calcium gluconate  - 2 units PRBC  - Page [facility] for recommendations    I placed an order for NM scan to try and identify source of bleeding if it was too slow for CTA to detect.    PATRICIA Marti, CNP  Hospitalist - [facility]  Text me on the [employer] trev for a textback

## 2024-07-17 NOTE — PROGRESS NOTES
[facility]  Hospitalist Progress Note   07/17/2024          Assessment and Plan:       Patrick Olson is an 83 year old female with a past medical history of duodenal ulcers, CKD3, RA and chronic anemia with recurrent GI bleed.  On 7/15/2024 with hematochezia.    Recurrent lower GI bleed.  Severe symptomatic anemia status post blood and blood product transfusion.  History of GI bleed in the setting of duodenal ulcers and diverticulosis.  Presented with 5 episodes of bright red bleeding, associated lower abdominal cramping and lightheadedness.  --- In ED at the time of initial evaluation tachycardic, hemoglobin 10.6.  --Has had drop in hemoglobin to 6.8, received 1 unit of PRBC on 7/16 am.  Bleeding stopped spontaneously and RRT called 7/16 p.m., transferred to [facility].  Patient received total 5 units of PRBC, 1 unit platelets, 2 units plasma.  CT.  No convincing active arterial GI bleed, pancolitis that could be infectious or inflammatory, findings suggestive of blood in descending colon.  Tagged red blood cell scan with no evidence of active gastrointestinal bleeding.  Status post flexible sigmoidoscopy 7/17 with severe diverticulosis in the rectum, rectosigmoid colon, sigmoid colon, descending colon and transverse colon with no specific source of bleeding identified though blood was found in the entire examined colon.  Nonbleeding hemorrhoids were present.  -- Continue monitoring under [facility].  Monitor hemoglobin level every 6 hours or earlier if symptomatic.  [facility] gastroenterology following, appreciate comanagement.  Colorectal surgery consult requested.  Appreciate comanagement.  IV PPI to be continued.  Continue IV fluids at 75 cc/h  Conditional order to transfuse for hemoglobin less than 7 or symptomatic.  Closely monitor blood pressures, if any hemodynamic instability transferred to ICU for pressor support.  Full code at this time.  NSAIDs, blood thinners.  Continue telemetry  monitoring.  Echocardiogram pending.    Severe malnutrition in the setting of chronic illness.  Unintentional weight loss.  Family reporting unintentional weight loss over the last few months.  Defer weight loss workup to PCP.  Nutrition following, supplements once on orals     CKD stage IIIa.  Baseline creatinine in the 0.9-1.0 range.  Renal function within baseline.  Monitor renal function closely.      Prediabetes with a hemoglobin A1c of 5.7.  Monitor blood sugars periodically.    Rheumatoid arthritis.  Chronic back pain  History of osteoporosis.  Continue PTA gabapentin.  Continue PTA Plaquenil.  As needed Tylenol, IV/p.o. as needed pain meds.    Physical deconditioning from medical illness, senile frailty.  Rehab evaluation once stable.  Fall precautions    Orders Placed This Encounter      Clear Liquid Diet      DVT Prophylaxis:   Code Status: No CPR- Do NOT Intubate  Disposition: Expected discharge in 2 days pending clinical improvement.    Medically Ready for Discharge: Anticipated in 2-4 Days    Discussed with patient, bedside RN  Updated patient's  at the bedside.  >51 minutes spent by me on the date of service doing chart review, history, exam, documentation & further activities per the note.      Jhon Beach MD        Interval History:        Patient lying in bed.  Had just returned back from nuclear medicine, endoscopy.  Denies any chest pain or palpitations at this time.  Denies any nausea or vomiting.  Denies any new tingling or numbness.  Reports diffuse abdominal pain.  Afebrile.  Continues to have blood in the stool.  Had RRT's overnight, received blood blood product transfusion.  See RRT note for details.         Physical Exam:        Physical Exam   Temp:  [96.4  F (35.8  C)-98.8  F (37.1  C)] 98  F (36.7  C)  Pulse:  [] 79  Resp:  [10-22] 18  BP: ()/(43-92) 105/58  SpO2:  [93 %-100 %] 97 %    Intake/Output Summary (Last 24 hours) at 7/17/2024 1620  Last data filed at  7/17/2024 1223  Gross per 24 hour   Intake 2093 ml   Output 2500 ml   Net -407 ml       Admission Weight: 38.9 kg (85 lb 11.2 oz)  Current Weight: 38.9 kg (85 lb 11.2 oz)    PHYSICAL EXAM  GENERAL: Patient is in no distress.  Alert, oriented x 3.  HEENT: Oropharynx pink, moist.   HEART: Regular rate and rhythm. S1S2. No murmurs  LUNGS: Clear to auscultation bilaterally. No expiratory wheeze.  Respirations unlabored  ABDOMEN: Soft, diffuse tenderness, bowel sounds heard.  NEURO: Moving all extremities.  EXTREMITIES: No pedal edema.   SKIN: Warm, dry. No rash  PSYCHIATRY Cooperative       Medications:        Current Facility-Administered Medications   Medication Dose Route Frequency Provider Last Rate Last Admin    acetaminophen (TYLENOL) tablet 650 mg  650 mg Oral Q6H Kike Bailey MD   650 mg at 07/17/24 1126    gabapentin (NEURONTIN) capsule 600 mg  600 mg Oral At Bedtime Rickie Craig NP   600 mg at 07/16/24 2107    hydroxychloroquine (PLAQUENIL) tablet 200 mg  200 mg Oral Daily Rickie Craig NP   200 mg at 07/17/24 1126    lifitegrast (XIIDRA) 5 % opthalmic solution 1 drop  1 drop Both Eyes BID Rickie Craig NP        pantoprazole (PROTONIX) IV push injection 40 mg  40 mg Intravenous Q12H Kike Bailey MD   40 mg at 07/17/24 1126    sodium chloride (PF) 0.9% PF flush 3 mL  3 mL Intracatheter Q8H Rickie Craig NP   3 mL at 07/17/24 0939     Current Facility-Administered Medications   Medication Dose Route Frequency Provider Last Rate Last Admin    gabapentin (NEURONTIN) capsule 300 mg  300 mg Oral BID PRN Rickie Craig NP        hydrALAZINE (APRESOLINE) tablet 10 mg  10 mg Oral Q4H PRN Kike Bailey MD        Or    hydrALAZINE (APRESOLINE) injection 10 mg  10 mg Intravenous Q4H PRN Kike Bailey MD        lidocaine (LMX4) cream   Topical Q1H PRN Rickie Craig NP        lidocaine 1 % 0.1-1 mL  0.1-1 mL  Other Q1H PRN Rickie Carig NP        ondansetron (ZOFRAN ODT) ODT tab 4 mg  4 mg Oral Q6H PRN Kike Bailey MD        Or    ondansetron (ZOFRAN) injection 4 mg  4 mg Intravenous Q6H PRN Kike Bailey MD   4 mg at 07/16/24 2113    senna-docusate (SENOKOT-S/PERICOLACE) 8.6-50 MG per tablet 1 tablet  1 tablet Oral BID PRN Kike Bailey MD        Or    senna-docusate (SENOKOT-S/PERICOLACE) 8.6-50 MG per tablet 2 tablet  2 tablet Oral BID PRN Kike Bailey MD        sodium chloride (PF) 0.9% PF flush 3 mL  3 mL Intracatheter q1 min prn Rickie Craig NP                Data:      All new lab and imaging data was reviewed.

## 2024-07-17 NOTE — PROGRESS NOTES
Brief GI note:     CTA results reviewed. No clear arterial bleed.     Evidence of pancolitis noted.     Please make NPO. Tentative plan for flex sig versus colonoscopy. Day GI time will coordinate timing.     -Dr. Lindo

## 2024-07-17 NOTE — CONSULTS
[facility]  Colon and Rectal Surgery Consult Note  Name: Patrick Olson    MRN: [id number]  YOB: 1940    Age: 83 year old  Date of admission: 7/15/2024  Primary care provider: Essence Tamayo     Requesting Physician:  Evelia Maddox PA-C  Reason for consult:  GI bleeding           History of Present Illness:   Patrick Olson is a 83 year old female with a history of rheumatoid arthritis on hydroxychloroquine, Sjogren's, CKD 3, degenerative disc disease, osteoporosis, and recurrent GI bleeding, seen at the request of Evelia Maddox PA-C, who presented on 7/15 with hematochezia.  She had 5 episodes of bright red rectal bleeding similar to previous episodes and associated with lower abdominal cramping and lightheadedness.  Per ED note the first episode was some blood as well as stool, and the rest were just blood and some clots with wiping without any stool.  Vitals in the ED were notable for mild tachycardia initially otherwise normal.  Hgb at presentation was 10.6, other labs were relatively unremarkable.  She was admitted for further evaluation and management. Hgb dropped to 7.7 and she got 1U PRBCs 7/16.  Bleeding stopped spontaneously and was felt to be diverticular.  She had recurrent rectal bleeding yesterday afternoon and as well as symptomatic hypotension so an RRT was called and she was transferred to [facility] [place].  She got a total of 5 units PRBC, 1 unit plts, 2 units plasma. CTA A/P showed no convincing active arterial GI bleed, pancolitis that could be infectious or inflammatory, findings suggestive of blood in descending colon. Tagged RBC scan with no evidence of active gastrointestinal bleeding. S/p flex sig 7/17 with severe diverticulosis in the rectum, recto-sigmoid colon, sigmoid colon, descending colon, and transverse colon with no specific source of bleeding identified though blood was found in the entire examined colon. Mucosa appeared normal throughout with no evidence of  "colitis. Non bleeding hemorrhoids were present.     Patrick reports feeling fine this afternoon.  She has not had any further active bleeding today.  She says last night the blood was bright red with clots.  She is reporting epigastric abdominal discomfort, denies overt pain.  No rectal pain.  Hgb is stable at 8.3 this afternoon.     Per chart review her last episode of GI bleeding which required hospitalization was in 08/2023.  Previous workup for GI bleeding included capsule endoscopy 02/2023, EGD 02/2023, and colonoscopy 08/2023. Capsule endoscopy found \"A single angioectasia without bleeding in the duodenum, small bowel lymphangiectasia\". The EGD findings were duodenal erosion w/o bleeding.The colonoscopy findings were widespread diverticulosis throughout the entire colon w/o active bleeding. There was residual blood in the colon. Bleeding was suspected to be diverticular at that time.       Colonoscopy History:  As above    Past abdominal surgery: None            Past Medical History:     Past Medical History:   Diagnosis Date    Anemia     CKD (chronic kidney disease) stage 3, GFR 30-59 ml/min (H)     Diverticulosis of large intestine     Osteoarthritis     Osteoporosis     Rheumatoid arthritis (H)     Sensorineural hearing loss     Varicose veins of bilateral lower extremities with other complications              Past Surgical History:     Past Surgical History:   Procedure Laterality Date    CAPSULE/PILL CAM ENDOSCOPY N/A 11/18/2021    Procedure: IMAGING PROCEDURE, GI TRACT, INTRALUMINAL, VIA CAPSULE;  Surgeon: Deric Rodriguez MD;  Location: [facility]    CAPSULE/PILL CAM ENDOSCOPY N/A 2/14/2023    Procedure: IMAGING PROCEDURE, GI TRACT, INTRALUMINAL, VIA CAPSULE;  Surgeon: Jaime Mesa MD;  Location: [facility]    COLONOSCOPY N/A 03/14/2017    Procedure: COMBINED COLONOSCOPY, SINGLE OR MULTIPLE BIOPSY/POLYPECTOMY BY BIOPSY;  Surgeon: Spencer Downey MD;  Location: [facility]    COLONOSCOPY N/A 9/22/2022    " Procedure: COLONOSCOPY, FLEXIBLE, WITH LESION REMOVAL USING SNARE;  Surgeon: Kirby Arthur MD;  Location:  GI    COLONOSCOPY N/A 1/13/2023    Procedure: COLONOSCOPY;  Surgeon: Spencer Downey MD;  [place] [facility]    COLONOSCOPY N/A 8/14/2023    Procedure: Colonoscopy;  Surgeon: Spencer Downey MD;  Location:  GI    ENTEROSCOPY SMALL BOWEL N/A 11/20/2021    Procedure: ENTEROSCOPY, colonoscopy with endoscopic mucosal resection and tattoo placement;  Surgeon: Kirby Arthur MD;  Location:  OR    ESOPHAGOSCOPY, GASTROSCOPY, DUODENOSCOPY (EGD), COMBINED N/A 2/6/2023    Procedure: ESOPHAGOGASTRODUODENOSCOPY (EGD);  Surgeon: Spencer Downey MD;  Location:  GI    IR VISCERAL EMBOLIZATION  01/22/2021    ORTHOPEDIC SURGERY Left     hip replacment    SIGMOIDOSCOPY FLEXIBLE N/A 01/23/2021    Procedure: SIGMOIDOSCOPY, FLEXIBLE;  Surgeon: Jaime Steinberg MD;  Location: [facility]    SIGMOIDOSCOPY FLEXIBLE N/A 7/17/2024    Procedure: Sigmoidoscopy flexible;  Surgeon: Ritika Woodard MD;  Location: [facility]               Social History:     Social History     Tobacco Use    Smoking status: Former    Smokeless tobacco: Never   Substance Use Topics    Alcohol use: No             Family History:   No family history on file.          Allergies:     Allergies   Allergen Reactions    Bee Venom Anaphylaxis    Shrimp Anaphylaxis    Evoxac [Cevimeline] Unknown    Prevnar Swelling     Other reaction(s): Edema    Prevnar [Pneumococcal 13-Linda Conj Vacc] Unknown             Medications:     Current Facility-Administered Medications   Medication Dose Route Frequency Provider Last Rate Last Admin    acetaminophen (TYLENOL) tablet 650 mg  650 mg Oral Q6H Kike Bailey MD   650 mg at 07/17/24 1126    gabapentin (NEURONTIN) capsule 600 mg  600 mg Oral At Bedtime Rickie Craig NP   600 mg at 07/16/24 2107    hydroxychloroquine (PLAQUENIL) tablet 200 mg  200 mg Oral Daily Rickie Craig NP   200  "mg at 07/17/24 1126    lifitegrast (XIIDRA) 5 % opthalmic solution 1 drop  1 drop Both Eyes BID Rickie Craig, KARENA        pantoprazole (PROTONIX) IV push injection 40 mg  40 mg Intravenous Q12H Kike Bailey MD   40 mg at 07/17/24 1126    sodium chloride (PF) 0.9% PF flush 3 mL  3 mL Intracatheter Q8H Rickie Craig, NP   3 mL at 07/17/24 0939             Review of Systems:   A comprehensive greater than 10 system review of systems was carried out.  Pertinent positives and negatives are noted above.  Otherwise negative for contributory info.            Physical Exam:     Blood pressure 99/48, pulse 76, temperature 98.3  F (36.8  C), resp. rate 16, height 1.613 m (5' 3.5\"), weight 38.9 kg (85 lb 11.2 oz), SpO2 96%, not currently breastfeeding.    Intake/Output Summary (Last 24 hours) at 7/17/2024 1242  Last data filed at 7/17/2024 1223  Gross per 24 hour   Intake 2433 ml   Output 2500 ml   Net -67 ml     Exam:  General - Awake alert and oriented, appears stated age  Pulm - Non-labored breathing with normal respiratory effort  CVS - reg rate and rhythm, no peripheral edema  Abd - soft, non-tender, non-distended.  No guarding, rigidity or peritoneal signs.   Neuro - CN II-XII grossly intact  Musculoskeletal - extremities with no clubbing, cyanosis or edema  Psych - responsive, alert, cooperative; oriented x3; appropriate mood and affect  External/skin - inspection reveals no rashes, lesions or ulcers, normal coloring             Data Reviewed:     Results for orders placed or performed during the hospital encounter of 07/15/24   CTA Abdomen Pelvis with Contrast    Narrative    EXAM: CTA ABDOMEN PELVIS WITH CONTRAST  LOCATION: [facility]  DATE: 7/16/2024    INDICATION: Potential diverticular bleed.  COMPARISON: CTA abdomen and pelvis 11/16/2021.  TECHNIQUE: CT angiogram abdomen pelvis during arterial phase of injection of IV contrast. 2D and 3D MIP reconstructions " were performed by the CT technologist. Dose reduction techniques were used.  CONTRAST: 53 mL Isovue-370.    FINDINGS:  ANGIOGRAM ABDOMEN/PELVIS: Mild-to-moderate atherosclerosis. Nonaneurysmal abdominal aorta. The celiac, superior mesenteric, bilateral renal, inferior mesenteric, and biiliac vessels are patent.    LOWER CHEST: Unremarkable.    HEPATOBILIARY: Similar right hepatic lobe hypodensities. Cholelithiasis and distended gallbladder without pericholecystic inflammation.    PANCREAS: Pancreatic body 0.6 cm cystic lesion (8/40).    SPLEEN: Normal.    ADRENAL GLANDS: Similar mild left adrenal gland thickening.    KIDNEYS/BLADDER: Bilateral renal cysts; no follow-up necessary.    BOWEL: No bowel obstruction. Appendix is normal. Fluid-filled rectum. No convincing acute gastrointestinal bleed. Wall thickening and apparent mural hyperenhancement involving the ascending to descending colon. High-density fluid within the mid   descending colon (6/85).    LYMPH NODES: Normal.    PELVIC ORGANS: No pelvic mass. Tiny fundal fibroid.    MUSCULOSKELETAL: Left hip arthroplasty. Degenerative changes of the spine. No aggressive osseous lesion.      Impression    IMPRESSION:  1.  No convincing active arterial GI bleed.  2.  Pancolitis that could be infectious or inflammatory.  3.  High-density (nonenhancing) debris within the descending colon is nonspecific, although could reflect blood products.  4.  Cholelithiasis and gallbladder distention, possibly sequelae of NPO status. If there is concern for cholecystitis, ultrasound could be performed.  5.  Pancreatic body subcentimeter cystic lesion. Follow-up guidelines below.    REFERENCE:  Revisions of international consensus [place] guidelines for the management of IPMN of the pancreas. Pancreatology 2017;17(5):738-753.    Largest cyst less than 10 mm: CT or MRI/MRCP in 6 months and then every 2 years if no change.     NM GI Bleed    Narrative    EXAM: NM GI BLEED  LOCATION: [facility]  DATE: 7/17/2024    INDICATION: GI bleeding not localized on CTA  COMPARISON: CTA abdomen pelvis 07/16/2024.  TECHNIQUE: 23.1 mCi technetium-99m, in vitro tagged RBCs, IV. Dynamic anterior planar imaging of the abdomen for 60 minutes.    FINDINGS: Physiologic radiotracer uptake without evidence of gastrointestinal hemorrhage.      Impression    IMPRESSION:    No scintigraphic evidence of active gastrointestinal bleeding.       Recent Labs   Lab 07/17/24  1404 07/17/24  0630 07/17/24  0353 07/16/24  1002 07/16/24  0704 07/15/24  1817   WBC  --   --   --   --  4.3 5.4   HGB 8.3* 7.9* 7.0*   < > 7.7* 10.6*   HCT  --   --   --   --  23.2* 32.7*   MCV  --   --   --   --  100 100   PLT  --   --   --   --  161 224    < > = values in this interval not displayed.     Recent Labs   Lab 07/17/24  0353 07/16/24  1805 07/16/24  1754 07/16/24  0704 07/15/24  1817    139  --  142 140   POTASSIUM 4.1 5.0  --  4.7 4.3   CHLORIDE 112* 110*  --  110* 103   CO2 23 20*  --  27 26   ANIONGAP 7 9  --  5* 11   GLC 94 153* 130* 95 186*   BUN 18.0 19.3  --  21.0 25.8*   CR 1.00* 0.94  --  1.01* 0.96*   GFRESTIMATED 56* 61  --  56* 59*   EJ 8.1* 8.2*  --  8.5* 9.4   MAG  --  2.1  --   --   --    PROTTOTAL  --   --   --  5.3* 7.1   ALBUMIN  --   --   --  3.5 4.5   BILITOTAL  --   --   --  0.5 0.4   ALKPHOS  --   --   --  56 70   AST  --   --   --  23 31   ALT  --   --   --  16 23         Assessment and Plan:   Patrick Olson is a 83 year old female with a history of rheumatoid arthritis, Sjogren's, CKD, other comorbidities, admitted with recurrent GI bleeding.  She was admitted for the same in 08/2023, no obvious source was identified at that time however workup revealed small bowel AVMs, duodenal ulceration, and diverticulosis, and the bleed was ultimately suspected to be diverticular.  This admission she has required multiple transfusions and had symptomatic hypotension secondary to the bleeding.   Based on her description of the bleeding there is suspicion that her current bleeding is also diverticular, however again so far no definitive source of bleeding has been identified.  CTA and nuclear medicine scans have not showed any active bleeding.  Flex sig today showed blood throughout the entire colon and pandiverticulosis without any active diverticular bleeding seen.  We have been consulted for consideration of colectomy/sigmoidectomy.     At this time we would not recommend a colectomy as it is unclear if the colon is the source of the bleeding, and if it is, which part of the colon to target/remove given pandiverticulosis on endoscopy and pancolitis on CT. At this time would recommend further evaluation of the potential upper GI sources of bleeding that were identified a year ago with EGD and/or capsule endoscopy if GI is agreeable, prior to consideration of colectomy.  I did discuss briefly with Patrick and her  what surgery would look like and potential risks and benefits. Surgery would entail a lap vs open subtotal colectomy with end ileostomy which would almost certainly be permanent. With her age and comorbidities overall this would be a riskier surgery. Patrick stated at this time she would not want to go through a surgery and recovery, especially if there is a chance that it may not definitively address her recurrent bleeding. She also does not want a stoma. For now will defer to GI regarding additional workup. If this is diverticular it may just stop on its own, could consider just continuing to monitor. No plans for surgery at this time. Dr. Thompson will see the patient tomorrow morning to discuss further.    Discussed with Dr. Thompson.     Patient specific identified risk factors considered as part of today s evaluation include: RA on hydroxychloroquine, CKD, recurrent GI bleeding, BMI 14.9     Additional history obtained from patient, chart review.  Time spent on consultation: 50 minutes, greater  than 50% spent on counseling and/or coordination of care.      Shawn Torres PA-C  Colorectal Physician Assistant    [facility]  [address] [address]  T: [phone]  F: [phone]

## 2024-07-17 NOTE — PROGRESS NOTES
Status: Patient admitted on 7/15 with hematochezia   Vitals: VSS  Tele: Sinus rhythm with 1st degree AV block  Neuros: A&Ox4, generalized weakness  IV: PIV infusing NS at 75mL/hr  Labs/Electrolytes: Last Hgb 8.3  Resp/trach: Lung sounds clear throughout  Diet: Clears  Bowel status: Last BM 7/16, BS+  : Patient needed straight cath overnight. Bladder scanned patient for 751mL, patient voided 650mL on commode, voiding spontaneously   Skin: Generalized bruising throughout  Pain: Patient c/o back pain relieved with Tylenol scheduled and Aqua-K heat pad  Activity: Up with A1 and gaitbelt  Social: Cooperative with cares,  Arnaud at bedside supportive with cares  Plan: Discharge home when medically stable, continue to monitor and follow POC

## 2024-07-17 NOTE — SIGNIFICANT EVENT
Significant Event Note    Time of event: 11:32 PM July 16, 2024    Description of event:  SOB, dizziness, and abdominal cramping  Rectal bleeding  SBP 80s    Plan:  GI updated  Asked that rapid be called  Immediate 1u transfusion  NPO for colonoscopy  Q1 vitals  Holding off on repeat imaging as CTA was done prior in the day    Kike Bailey MD

## 2024-07-17 NOTE — PROGRESS NOTES
Dr. Lindo paged for an update regarding hemoglobin and blood pressures despite blood product infusion per house NP. Writer instructed that day GI team will follow up and continue current plan per house NP.

## 2024-07-17 NOTE — CODE/RAPID RESPONSE
[facility]  House MEGHAN RRT Note  7/16/2024   Time called: 2331  RRT called for: Hypotension in the setting of massive blood loss  Code status: No CPR- Do NOT Intubate    Assessment & Plan   Patrick Olson is a 83 year old female w/ PMH of duodenal ulcers, CKD3, RA and chronic anemia with recurrent GI bleed  who was admitted on 7/15/2024 for hematochezia .     I was paged to the bedside to evaluate Ms. Patrick Olson for an acute episode of hypotension in the setting of GI bleeding.    Upon my arrival the patient was supine in bed and in no acute distress. Patient was A/Ox4 and very pleasant, joking with staff. Patient endorsed dizziness and light-headedness when she sits up but symptoms are mild while lying flat. Patient's lung sounds were clear, mild systolic murmur which would be new for her (re-assess once euvolemic). Mild tenderness to BUQ abdomen. Patient denied numbness, tingling, chest pain, or dyspnea but she did endorse mild shortness-of-breath. Patient was warm and dry in all extremities with 2+ pulses.     Diagnosis:  -- GI bleeding with hematochezia  -- Symptomatic blood loss anemia  -- Hypovolemic/hemorrhagic shock  Patient had 2 units PRBC previously today for ongoing bleeding and hemoglobin of 6.8. After finishing 2 units PRBC patient's hemoglobin is now down to 7.8. RN paged cross-cover who ordered an additional unit of PRBC but patient now also hypotensive so RRT was paged. Add LR bolus now, 1 unit platelets, and 1 unit plasma. Blood pressure was stable during RRT (101/56 mmHg) but monitor closely. Plan for scope tomorrow with GI team, flexible sigmoidoscopy versus colonoscopy. Patient has been NPO since arrival to the hospital.    Plan:  -- 1 unit PRBC as ordered by cross-cover physician  -- Start LR bolus 1,000 ml until blood started, pause during transfusion and then restart after blood is finished  -- Patient has received 3 units of PRBC at this point so will add 1 unit  plasma and 1 unit platelets for thorough repletion  -- Recheck hemoglobin Q4h    At the conclusion of this RRT patient was hemodynamically stable and will remain on current unit.    Her history is significant for:  Past Medical History:   Diagnosis Date    Anemia     CKD (chronic kidney disease) stage 3, GFR 30-59 ml/min (H)     Diverticulosis of large intestine     Osteoarthritis     Osteoporosis     Rheumatoid arthritis (H)     Sensorineural hearing loss     Varicose veins of bilateral lower extremities with other complications      Past Surgical History:   Procedure Laterality Date    CAPSULE/PILL CAM ENDOSCOPY N/A 11/18/2021    Procedure: IMAGING PROCEDURE, GI TRACT, INTRALUMINAL, VIA CAPSULE;  Surgeon: Deric Rodriguez MD;  Location: [facility]    CAPSULE/PILL CAM ENDOSCOPY N/A 2/14/2023    Procedure: IMAGING PROCEDURE, GI TRACT, INTRALUMINAL, VIA CAPSULE;  Surgeon: Jaime Mesa MD;  Location: [facility]    COLONOSCOPY N/A 03/14/2017    Procedure: COMBINED COLONOSCOPY, SINGLE OR MULTIPLE BIOPSY/POLYPECTOMY BY BIOPSY;  Surgeon: Spencer Downey MD;  Location: [facility]    COLONOSCOPY N/A 9/22/2022    Procedure: COLONOSCOPY, FLEXIBLE, WITH LESION REMOVAL USING SNARE;  Surgeon: Kirby Arthur MD;  Location: [facility]    COLONOSCOPY N/A 1/13/2023    Procedure: COLONOSCOPY;  Surgeon: Spencer Downey MD;  Location: [facility]    COLONOSCOPY N/A 8/14/2023    Procedure: Colonoscopy;  Surgeon: Spencer Downey MD;  Location: [facility]    ENTEROSCOPY SMALL BOWEL N/A 11/20/2021    Procedure: ENTEROSCOPY, colonoscopy with endoscopic mucosal resection and tattoo placement;  Surgeon: Kirby Arthur MD;  Location: [facility]    ESOPHAGOSCOPY, GASTROSCOPY, DUODENOSCOPY (EGD), COMBINED N/A 2/6/2023    Procedure: ESOPHAGOGASTRODUODENOSCOPY (EGD);  Surgeon: Spencer Downey MD;  Location: [facility]    IR VISCERAL EMBOLIZATION  01/22/2021    ORTHOPEDIC SURGERY Left     hip replacment    SIGMOIDOSCOPY FLEXIBLE N/A 01/23/2021    Procedure:  SIGMOIDOSCOPY, FLEXIBLE;  Surgeon: Jaime Steinbegr MD;  Location:  GI       Review of Systems   The 10 point Review of Systems is negative other than noted in the HPI or here.     Allergies   Allergies   Allergen Reactions    Bee Venom Anaphylaxis    Shrimp Anaphylaxis    Evoxac [Cevimeline] Unknown    Prevnar Swelling     Other reaction(s): Edema    Prevnar [Pneumococcal 13-Linda Conj Vacc] Unknown       Physical Exam   Physical Exam  Constitutional:       General: She is not in acute distress.     Appearance: She is not ill-appearing.   HENT:      Head: Normocephalic.      Nose: Nose normal.      Mouth/Throat:      Mouth: Mucous membranes are moist.   Eyes:      Pupils: Pupils are equal, round, and reactive to light.   Cardiovascular:      Rate and Rhythm: Normal rate and regular rhythm.   Pulmonary:      Effort: Pulmonary effort is normal.      Breath sounds: Normal breath sounds.   Abdominal:      General: Abdomen is flat.   Musculoskeletal:      Right lower leg: No edema.      Left lower leg: No edema.   Skin:     General: Skin is warm and dry.      Capillary Refill: Capillary refill takes less than 2 seconds.   Neurological:      General: No focal deficit present.      Mental Status: She is alert and oriented to person, place, and time.   Psychiatric:         Mood and Affect: Mood normal.         Vital Signs with Ranges:  Temp:  [97.8  F (36.6  C)-98.7  F (37.1  C)] 98.1  F (36.7  C)  Pulse:  [] 93  Resp:  [15-18] 18  BP: ()/(37-64) 111/64  SpO2:  [97 %-100 %] 100 %  I/O last 3 completed shifts:  In: 640   Out: 1300 [Stool:1300]    Data   Results for orders placed or performed during the hospital encounter of 07/15/24 (from the past 24 hour(s))   Comprehensive metabolic panel   Result Value Ref Range    Sodium 142 135 - 145 mmol/L    Potassium 4.7 3.4 - 5.3 mmol/L    Carbon Dioxide (CO2) 27 22 - 29 mmol/L    Anion Gap 5 (L) 7 - 15 mmol/L    Urea Nitrogen 21.0 8.0 - 23.0 mg/dL    Creatinine  1.01 (H) 0.51 - 0.95 mg/dL    GFR Estimate 56 (L) >60 mL/min/1.73m2    Calcium 8.5 (L) 8.8 - 10.2 mg/dL    Chloride 110 (H) 98 - 107 mmol/L    Glucose 95 70 - 99 mg/dL    Alkaline Phosphatase 56 40 - 150 U/L    AST 23 0 - 45 U/L    ALT 16 0 - 50 U/L    Protein Total 5.3 (L) 6.4 - 8.3 g/dL    Albumin 3.5 3.5 - 5.2 g/dL    Bilirubin Total 0.5 <=1.2 mg/dL   CBC with platelets   Result Value Ref Range    WBC Count 4.3 4.0 - 11.0 10e3/uL    RBC Count 2.32 (L) 3.80 - 5.20 10e6/uL    Hemoglobin 7.7 (L) 11.7 - 15.7 g/dL    Hematocrit 23.2 (L) 35.0 - 47.0 %     78 - 100 fL    MCH 33.2 (H) 26.5 - 33.0 pg    MCHC 33.2 31.5 - 36.5 g/dL    RDW 13.8 10.0 - 15.0 %    Platelet Count 161 150 - 450 10e3/uL   Hemoglobin   Result Value Ref Range    Hemoglobin 7.7 (L) 11.7 - 15.7 g/dL   INR   Result Value Ref Range    INR 1.14 0.85 - 1.15   Hemoglobin (Every 6 Hrs)   Result Value Ref Range    Hemoglobin 6.8 (LL) 11.7 - 15.7 g/dL   Prepare red blood cells (unit)   Result Value Ref Range    Blood Component Type Red Blood Cells     Product Code B0243T50     Unit Status Transfused     Unit Number [id number]     CROSSMATCH Compatible     CODING SYSTEM [id number]     ISSUE DATE AND TIME [id number]     UNIT ABO/RH O-     UNIT TYPE ISBT 9500    Hemoglobin   Result Value Ref Range    Hemoglobin 8.4 (L) 11.7 - 15.7 g/dL   CONDITIONAL Prepare red blood cells (unit)   Result Value Ref Range    Blood Component Type Red Blood Cells     Product Code M3638C94     Unit Status Transfused     Unit Number [id number]     CROSSMATCH Compatible     CODING SYSTEM [id number]     ISSUE DATE AND TIME [id number]     UNIT ABO/RH O+     UNIT TYPE ISBT 5100    Glucose by meter   Result Value Ref Range    GLUCOSE BY METER POCT 130 (H) 70 - 99 mg/dL   CONDITIONAL Prepare red blood cells (unit)   Result Value Ref Range    Blood Component Type Red Blood Cells     Product Code K3309Y98     Unit Status Transfused     Unit Number [id number]     CROSSMATCH  Compatible     CODING SYSTEM [id number]     ISSUE DATE AND TIME [id number]     UNIT ABO/RH O+     UNIT TYPE ISBT 5100    EKG 12-lead, tracing only   Result Value Ref Range    Systolic Blood Pressure  mmHg    Diastolic Blood Pressure  mmHg    Ventricular Rate 79 BPM    Atrial Rate 79 BPM    DE Interval 174 ms    QRS Duration 80 ms     ms    QTc 456 ms    P Axis 81 degrees    R AXIS 84 degrees    T Axis 76 degrees    Interpretation ECG       Sinus rhythm  Normal ECG  No previous ECGs available     Hemoglobin   Result Value Ref Range    Hemoglobin 8.2 (L) 11.7 - 15.7 g/dL   Lactic acid whole blood   Result Value Ref Range    Lactic Acid 2.7 (H) 0.7 - 2.0 mmol/L   Basic metabolic panel   Result Value Ref Range    Sodium 139 135 - 145 mmol/L    Potassium 5.0 3.4 - 5.3 mmol/L    Chloride 110 (H) 98 - 107 mmol/L    Carbon Dioxide (CO2) 20 (L) 22 - 29 mmol/L    Anion Gap 9 7 - 15 mmol/L    Urea Nitrogen 19.3 8.0 - 23.0 mg/dL    Creatinine 0.94 0.51 - 0.95 mg/dL    GFR Estimate 61 >60 mL/min/1.73m2    Calcium 8.2 (L) 8.8 - 10.4 mg/dL    Glucose 153 (H) 70 - 99 mg/dL   Ionized Calcium   Result Value Ref Range    Calcium Ionized Whole Blood 4.6 4.4 - 5.2 mg/dL   Magnesium   Result Value Ref Range    Magnesium 2.1 1.7 - 2.3 mg/dL   CTA Abdomen Pelvis with Contrast    Narrative    EXAM: CTA ABDOMEN PELVIS WITH CONTRAST  LOCATION: [facility]  DATE: 7/16/2024    INDICATION: Potential diverticular bleed.  COMPARISON: CTA abdomen and pelvis 11/16/2021.  TECHNIQUE: CT angiogram abdomen pelvis during arterial phase of injection of IV contrast. 2D and 3D MIP reconstructions were performed by the CT technologist. Dose reduction techniques were used.  CONTRAST: 53 mL Isovue-370.    FINDINGS:  ANGIOGRAM ABDOMEN/PELVIS: Mild-to-moderate atherosclerosis. Nonaneurysmal abdominal aorta. The celiac, superior mesenteric, bilateral renal, inferior mesenteric, and biiliac vessels are patent.    LOWER CHEST:  Unremarkable.    HEPATOBILIARY: Similar right hepatic lobe hypodensities. Cholelithiasis and distended gallbladder without pericholecystic inflammation.    PANCREAS: Pancreatic body 0.6 cm cystic lesion (8/40).    SPLEEN: Normal.    ADRENAL GLANDS: Similar mild left adrenal gland thickening.    KIDNEYS/BLADDER: Bilateral renal cysts; no follow-up necessary.    BOWEL: No bowel obstruction. Appendix is normal. Fluid-filled rectum. No convincing acute gastrointestinal bleed. Wall thickening and apparent mural hyperenhancement involving the ascending to descending colon. High-density fluid within the mid   descending colon (6/85).    LYMPH NODES: Normal.    PELVIC ORGANS: No pelvic mass. Tiny fundal fibroid.    MUSCULOSKELETAL: Left hip arthroplasty. Degenerative changes of the spine. No aggressive osseous lesion.      Impression    IMPRESSION:  1.  No convincing active arterial GI bleed.  2.  Pancolitis that could be infectious or inflammatory.  3.  High-density (nonenhancing) debris within the descending colon is nonspecific, although could reflect blood products.  4.  Cholelithiasis and gallbladder distention, possibly sequelae of NPO status. If there is concern for cholecystitis, ultrasound could be performed.  5.  Pancreatic body subcentimeter cystic lesion. Follow-up guidelines below.    REFERENCE:  Revisions of international consensus [place] guidelines for the management of IPMN of the pancreas. Pancreatology 2017;17(5):738-753.    Largest cyst less than 10 mm: CT or MRI/MRCP in 6 months and then every 2 years if no change.     Hemoglobin (Every 4 Hrs)   Result Value Ref Range    Hemoglobin 9.4 (L) 11.7 - 15.7 g/dL   Hemoglobin   Result Value Ref Range    Hemoglobin 7.8 (L) 11.7 - 15.7 g/dL     COVID-19 PCR Results           No data to display              COVID-19 Antibody Results, Testing for Immunity           No data to display                Time Spent on this Encounter   I spent 45 minutes of critical care  time on the unit/floor managing the care of Patrick Olson. Upon evaluation, this patient had a high probability of imminent or life-threatening deterioration due to hemorrhagic shock and GI bleeding, which required my direct attention, intervention, and personal management. 100% of my time was spent at the bedside counseling the patient and/or coordinating care regarding services listed in this note.    PATRICIA Marti, CNP  Hospitalist - House [facility]  Text me on the [employer] trev for a textback  Text page with web based paging for a callback

## 2024-07-17 NOTE — CONSULTS
"CLINICAL NUTRITION SERVICES  -  ASSESSMENT NOTE    Recommendations Ordered by Registered Dietitian (RD):   - Entered the following into discharge instructions (And discussed during visit):    Aim for 2816-5918 calories day for weight gain    Aim for at least 45 grams protein per day    Consider adding foods like egg salad, chicken salad, tuna salad (made with martinez) for meals or easy snacks.    Continue Greek yogurt and fruit smoothies   Add peanut butter to toast, crackers, apples for a snack   Add avocado to meals and snacks   Have cheese with crackers or fruit, snack on string cheese   Consider protein shakes/smoothies up to 1x daily (many pre-made shakes are lactose-free).    Future/Additional Recommendations:   - Agree with outpatient follow up with RD in GI clinic. Pt receptive.    Malnutrition:   % Weight Loss:  Weight loss does not meet criteria for malnutrition   % Intake:  <75% for >/= 3 months (moderate malnutrition)  Subcutaneous Fat Loss:  Upper arm region severe depletion and Thoracic region moderate-severe depletion  Muscle Loss:  Temporal region moderate depletion, Clavicle bone region moderate-severe depletion, Acromion bone region severe depletion, and Dorsal hand region severe depletion  Fluid Retention:  None noted    Malnutrition Diagnosis: Severe malnutrition  In Context of:  Chronic illness or disease     REASON FOR ASSESSMENT  Patrick Olson is a 83 year old female seen by Registered Dietitian for Provider Order - \"malnutrition\"     NUTRITION HISTORY  - Information obtained from chart and visit with patient.   - H/o diverticulosis, CKD3, anemia. Admitted with GIB. Also reported nausea prior to admission. Per H&P, \"Endorses unintentional weightloss of 1# every few months for years despite follow-up w/ nutritionist\". GI indicates that ongoing weight loss relates to limited oral intake and restrictions due to fear of causing GIB.     - Patient seen in room. She provides several barriers to " "adequate PO: hx of GI bleeds and not wanting to exacerbate symptoms, kidney disease, and an acute high potassium lab. She worries now about eating high potassium foods like banana, avocado..   - Recently started blending berries with yogurt to make a smoothie, and worries the seeds triggered a GIB.   - Has been told to avoid protein powders / protein bars due to kidney disease.   - Has also developed a lactose intolerance over the past year (she can take lactase, but tries to be cautious).     - She loves cheese, peanut butter, avocado on bread, bread with butter and jam  - Tries to eat small/frequent meals and snacks, notes that when she eats a snack she can't eat as much at her mealtime.   - Allergy to Shrimp     - Endorses loss of muscle and would like to start working with a . Deferred to care team/PCP.     CURRENT NUTRITION ORDERS  Diet Order:     NPO     Current Intake/Tolerance:  Pt has been restricted to NPO/CLD since admission on 7/15. She did eat at home the day of admission.     Taking CLD this afternoon prior to visit. Pt reports wanting to be cautious to avoid another GIB.     NUTRITION FOCUSED PHYSICAL ASSESSMENT FOR DIAGNOSING MALNUTRITION)  Yes          Observed:    Muscle wasting (refer to documentation in Malnutrition section) and Subcutaneous fat loss (refer to documentation in Malnutrition section)    Obtained from Chart/Interdisciplinary Team:  7/16 - transitioned from observation status to inpatient   7/16 - RRT called for syncope   7/16 - RRT for hypotension in setting of massive blood loss   7/17 - Flex sig with severe diverticulosis in the rectum, recto-sigmoid colon, sigmoid colon, descending colon, and transverse colon with no specific source of bleeding identified though blood was found in the entire examined colon.     ANTHROPOMETRICS  Height: 5' 3.5\"  Weight: 85 lbs 11.2 oz (38.9 kg)   Body mass index is 14.94 kg/m .  Weight Status:  Underweight BMI <18.5  IBW: 53.4 kg "   % IBW: 73%  Weight History: 8.7% loss since January 2024, 3% in 2 months.   Wt Readings from Last 10 Encounters:   07/15/24 38.9 kg (85 lb 11.2 oz)   05/03/24 40 kg (88 lb 2.9 oz)   03/12/24 40.9 kg (90 lb 3.2 oz)   02/22/24 40.4 kg (89 lb)   01/24/24 40.8 kg (90 lb)   01/12/24 42.6 kg (94 lb)   11/09/23 42.6 kg (94 lb)   10/19/23 43 kg (94 lb 12.8 oz)   09/01/23 41.7 kg (91 lb 14.4 oz)   06/29/23 42.4 kg (93 lb 6.4 oz)     LABS  Labs reviewed    MEDICATIONS  Medications reviewed  NaCl IVF @ 75 mL/hr     ASSESSED NUTRITION NEEDS PER APPROVED PRACTICE GUIDELINES:  Dosing Weight 39 kg   Estimated Energy Needs: 6107-8606 kcals (35-40 Kcal/Kg)  Justification: Low end for maintenance, high end for repletion   Estimated Protein Needs: 47-58 grams protein (1.2-1.5 g pro/Kg)  Justification: Repletion, regard to kidney function  Estimated Fluid Needs: 1 mL/kcal  Justification: maintenance    MALNUTRITION:  % Weight Loss:  Weight loss does not meet criteria for malnutrition   % Intake:  <75% for >/= 3 months (moderate malnutrition)  Subcutaneous Fat Loss:  Upper arm region severe depletion and Thoracic region moderate-severe depletion  Muscle Loss:  Temporal region moderate depletion, Clavicle bone region moderate-severe depletion, Acromion bone region severe depletion, and Dorsal hand region severe depletion  Fluid Retention:  None noted    Malnutrition Diagnosis: Severe malnutrition  In Context of:  Chronic illness or disease    NUTRITION DIAGNOSIS:  Inadequate oral intake related to altered GI function with GIB as evidenced by negligible PO since arrival 2 days ago.     NUTRITION INTERVENTIONS  Recommendations / Nutrition Prescription  - ADAT, consider Ensure trial once able.     - Entered the following into discharge instructions (And discussed during visit):    Aim for 7025-5525 calories day for weight gain    Aim for at least 45 grams protein per day    Consider adding foods like egg salad, chicken salad, tuna salad  (made with [employer]) for meals or easy snacks.    Continue Greek yogurt and fruit smoothies   Add peanut butter to toast, crackers, apples for a snack   Add avocado to meals and snacks   Have cheese with crackers or fruit, snack on string cheese   Consider protein shakes/smoothies up to 1x daily (many pre-made shakes are lactose-free).     Implementation  Nutrition education: Provided education on see details above.     Nutrition Goals  Diet >CLD in the next 24 hours.     MONITORING AND EVALUATION:  Progress towards goals will be monitored and evaluated per protocol and Practice Guidelines    Joi Mckee RD, LD  Pager: [phone]

## 2024-07-17 NOTE — PROGRESS NOTES
GASTROENTEROLOGY PROGRESS NOTE    Date of Admission: 7/15/2024  Reason for Admission: GI bleed       ASSESSMENT:  83 year old female with a history of RA (stopped methotrexate 3/2023, now on hydroxycholoroquine), and secondary Sjogren's, lumbar degenerative disc disease, chronic lumbago without radiculopathy, osteoporosis, recurrent GI bleeding (AVMs, possible diverticular, polyp-related, hemorrhoids) who presents to [facility] on 7/15/2024 with BRBPR.      # Lower GI bleed, likely diverticular bleed   # Acute blood loss anemia  # Severe diverticulosis   # hx of gastric AVM, small bowel AVM, cecal polpy, duodenal erosions  # Hemorrhoids  Patient presents with hematochezia/BRBPR similar to prior episodes of GI bleeding. Patient is not on blood thinners and does not take NSAIDs. Last significant GIB for which she was hospitalized for was in 8/2023. She underwent colonoscopy which showed diverticulosis throughout the colon, no active bleeding noted. Examined ileum was normal. Mild internal hemorrhoids felt not to be source of bleed. Presentation felt overall consistent with diverticular bleed. See HPI for full GI bleed hx.   Admit hgb 10.6 >7.7. Given 1 unit PRBC 7/16. BP initially normal, dropped to 90s/40s and patient had symptomatic orthostatic hypotension. Bleeding spontaneously resolved, etiology felt to be diverticular.      7/16 recurrent rectal bleeding in the afternoon, [place] [facility] [place]fused total of 5 units PRBC, 1 unit plts, 2 units plasma. CTA A/P showed no convincing active arterial GI bleed, pancolitis that could be infectious or inflammatory, findings suggestive of blood in descending colon. Tagged RBC scan with no evidence of active gastrointestinal bleeding. S/p flex sig 7/17 with severe diverticulosis in the rectum, recto-sigmoid colon, sigmoid colon, descending colon, and transverse colon with no specific source of bleeding identified though blood was found in the entire examined colon. Mucosa  appeared normal throughout with no evidence of colitis. Non bleeding hemorrhoids were present.      - Monitor for s/sx of GIB, appreciate nursing documentation of stool output including color and consistency.   - CRS consult for consideration of sigmoidectomy/colectomy given severe diverticulosis, recurrent admissions for GIB with no identifiable source despite multiple endoscopic procedures, and presumed etiology of GIB to be diverticular.    - If patient has recurrence of BRBPR with associate hemodynamic instability, recommend obtaining CTA A/P to attempt to localize the bleed. Then consult IR for possible embolization    - CLD  - Monitor hgb, transfuse for hgb <7  - Continue IVF per primary team  - Avoid NSAIDs     # Pancreatic lesion  Incidental finding of pancreatic body 0.6cm cystic lesion.   - Outpatient CT or MRI/MRCP in 6 months per guidelines     # Constipation   PTA on miralax   - Continue on discharge      # Dyspepsia  PTA on omeprazole   - Currently on IV PPI      # Weight loss  Patient reports ongoing weight loss. From previous notes, weight loss likely due to limited oral intake and food restriction due to fear of certain foods triggering GI bleeding. She had several questions regarding seeds and nuts this admission, reassured patient it is ok to continue to eat those.   - RD consulted   - Follow up outpatient with Iesha Hu, GI dietitian     Thank you for involving us in this patient's care. Please do not hesitate to contact the GI service with any questions or concerns.     Pt care plan discussed with Dr. Woodard, GI staff physician, and Dr. Beach, primary team.    Overall time spent on the date of this encounter preparing to see the patient (including chart review of available notes, clinical status events, imaging and labs); obtaining and/or reviewing separately obtained history; ordering medications, tests or procedures; communicating with other health care professionals; and documenting the  above clinical information in the electronic medical record was 45 minutes.    Evelia Maddox PA-C  GI Service  [facility]  Text Page (Monday-Friday, 8am-4pm)    To page the On-Call [facility] GI provider 24 hours a day, please click [facility]COM and select GASTROENTEROLOGY MEDICINE ADULT / [facility] in the drop-down menu.   _______________________________________________________________      Subjective: Nursing notes and 24hr events reviewed. 2 RRT called yesterday/overnight for recurrent bleeding. Given 4 units PRBC, 2 units plasma, 1 unit plts. CTA negative. Tagged RBC negative.    ROS:   4 pt ROS negative unless noted in subjective.     Medications:  Current Facility-Administered Medications   Medication Dose Route Frequency Provider Last Rate Last Admin    acetaminophen (TYLENOL) tablet 650 mg  650 mg Oral Q6H Kike Bailey MD   650 mg at 07/16/24 2011    gabapentin (NEURONTIN) capsule 300 mg  300 mg Oral BID PRN Rickie Craig NP        gabapentin (NEURONTIN) capsule 600 mg  600 mg Oral At Bedtime Rickie Craig NP   600 mg at 07/16/24 2107    hydrALAZINE (APRESOLINE) tablet 10 mg  10 mg Oral Q4H PRN Kike Bailey MD        Or    hydrALAZINE (APRESOLINE) injection 10 mg  10 mg Intravenous Q4H PRN Kike Bailey MD        hydroxychloroquine (PLAQUENIL) tablet 200 mg  200 mg Oral Daily Rickie Craig NP   200 mg at 07/16/24 1409    lidocaine (LMX4) cream   Topical Q1H PRN Rickie Craig NP        lidocaine 1 % 0.1-1 mL  0.1-1 mL Other Q1H PRN Rickie Craig NP        lifitegrast (XIIDRA) 5 % opthalmic solution 1 drop  1 drop Both Eyes BID Rickie Craig NP        ondansetron (ZOFRAN ODT) ODT tab 4 mg  4 mg Oral Q6H PRN Kike Bailey MD        Or    ondansetron (ZOFRAN) injection 4 mg  4 mg Intravenous Q6H PRN Kike Bailey MD   4 mg at 07/16/24 2113    pantoprazole (PROTONIX) IV push  "injection 40 mg  40 mg Intravenous Q12H Kike Bailey MD   40 mg at 07/16/24 2011    senna-docusate (SENOKOT-S/PERICOLACE) 8.6-50 MG per tablet 1 tablet  1 tablet Oral BID PRN Kike Bailey MD        Or    senna-docusate (SENOKOT-S/PERICOLACE) 8.6-50 MG per tablet 2 tablet  2 tablet Oral BID PRN Kike Bailey MD        sodium chloride (PF) 0.9% PF flush 3 mL  3 mL Intracatheter Q8H Rickie Craig, NP        sodium chloride (PF) 0.9% PF flush 3 mL  3 mL Intracatheter q1 min prn Rickie Craig, NP        sodium chloride 0.9 % infusion   Intravenous Continuous Kike Bailey MD   Paused at 07/16/24 9875       Objective:  Blood pressure 105/45, pulse 80, temperature 98.7  F (37.1  C), temperature source Oral, resp. rate 16, height 1.613 m (5' 3.5\"), weight 38.9 kg (85 lb 11.2 oz), SpO2 100%, not currently breastfeeding.  Gen: Lying in bed. Appears comfortable  HEENT: NCAT. Conjunctiva clear. Sclera anicteric    CV: Regular rate  Resp: Non-labored breathing  Abd: Soft, non distended   Skin:  No jaundice  Ext: warm, well perfused    Neuro: grossly normal  Mental status/Psych: A&O. Asks/answers questions appropriately     PROCEDURES:  Flex sig on 7/17/2024 for hematochezia with Dr. Woodard  Findings:       The perianal and digital rectal examinations were normal.        Multiple small and large-mouthed diverticula were found in the rectum,        recto-sigmoid colon, sigmoid colon, descending colon and transverse        colon. Suspect diverticular bleeding but no specific source identified        today.        Red blood and blood clots were found in the entire examined colon,        without a specific source identified.        The exam was otherwise without abnormality. Specifically, the colonic        mucosa appeared normal, without evidence of colitis, erythema or        ulcerations.        Non-bleeding internal hemorrhoids were found during retroflexion. The        hemorrhoids " were moderate.                                                                                    Impression:               - Severe diverticulosis in the rectum, in the                             recto-sigmoid colon, in the sigmoid colon, in the                             descending colon and in the transverse colon.                             Suspect diverticular bleeding but no specific                             source identified on today's exam.                             - Blood in the entire examined colon.                             - Normal colon mucosa throughout the exam. No                             evidence of colitis.                             - Non-bleeding internal hemorrhoids.                             - No specimens collected.     LABS:  BMP  Recent Labs   Lab 07/17/24  0353 07/16/24  1805 07/16/24  1754 07/16/24  0704 07/15/24  1817    139  --  142 140   POTASSIUM 4.1 5.0  --  4.7 4.3   CHLORIDE 112* 110*  --  110* 103   EJ 8.1* 8.2*  --  8.5* 9.4   CO2 23 20*  --  27 26   BUN 18.0 19.3  --  21.0 25.8*   CR 1.00* 0.94  --  1.01* 0.96*   GLC 94 153* 130* 95 186*     CBC  Recent Labs   Lab 07/17/24  0630 07/16/24  1002 07/16/24  0704 07/15/24  1817   WBC  --   --  4.3 5.4   RBC  --   --  2.32* 3.27*   HGB 7.9*   < > 7.7* 10.6*   HCT  --   --  23.2* 32.7*   MCV  --   --  100 100   MCH  --   --  33.2* 32.4   MCHC  --   --  33.2 32.4   RDW  --   --  13.8 13.8   PLT  --   --  161 224    < > = values in this interval not displayed.     INR  Recent Labs   Lab 07/16/24  1002   INR 1.14     LFTs  Recent Labs   Lab 07/16/24  0704 07/15/24  1817   ALKPHOS 56 70   AST 23 31   ALT 16 23   BILITOTAL 0.5 0.4   PROTTOTAL 5.3* 7.1   ALBUMIN 3.5 4.5      PANCNo lab results found in last 7 days.  CULTURES:   7-Day Micro Results       No results found for the last 168 hours.          IMAGING:  Reviewed

## 2024-07-17 NOTE — DISCHARGE INSTRUCTIONS
NUTRITION DISCHARGE INSTRUCTIONS   Aim for 3481-7259 calories day for weight gain    Aim for at least 45 grams protein per day    Consider adding foods like egg salad, chicken salad, tuna salad (made with martinez) for meals or easy snacks.    Continue Greek yogurt and fruit smoothies   Add peanut butter to toast, crackers, apples for a snack   Add avocado to meals and snacks   Have cheese with crackers or fruit, snack on string cheese   Consider protein shakes/smoothies up to 1x daily (many pre-made shakes are lactose-free).       Follow-up Instructions  Please call your primary care provider (Lauryn Tamayo MD) office at [phone] to schedule your follow-up appointment.

## 2024-07-17 NOTE — PLAN OF CARE
Goal Outcome Evaluation:      Plan of Care Reviewed With: patient, spouse      Orientations: A/O x 4  Vitals/Pain: BP has been soft although 1 liter bolus  Tele: SR  Lines/Drains: Two PIV flushing and infusing.  Skin/Wounds: Skin intact.  GI/: GIB  Labs: Abnormal/Trends, Electrolyte Replacement- Hgb is being monitored often due to the GIB.  Ambulation/Assist: Patient has been repositioned.  Sleep Quality: Slept on and off.  Plan: [facility] is coming to see patient and implement procedure to stop the bleeding.

## 2024-07-17 NOTE — CONSULTS
IR consult request for evaluation of angiogram with possible embolization.     Patrick Olson is an 83 year old female with a history of RA (stopped methotrexate 3/2023, now on hydroxycholoroquine), and secondary Sjogren's, lumbar degenerative disc disease, chronic lumbago without radiculopathy, osteoporosis, recurrent GI bleeding (AVMs, possible diverticular, polyp-related, hemorrhoids) who presents to [facility] on 7/15/2024 with BRBPR .     CTA was done 7/16/24 which did not show any active arterial bleeding.   Nuc Med GI Bleed was done today which also did not show any site of active extravastation.     IR has no target for an embolization and can not offer an angiogram at this time.     Reviewed imaging and d/w IR Dr Shearer.    IR will sign off at this time. Please contact IR if noted changes or questions.     Total time. 20 minutes    Thanks, Izzy [facility] Interventional Radiology CNP ([phone]) (phone [phone])

## 2024-07-17 NOTE — PROGRESS NOTES
MD Notification    Notified Person: MD    Notified Person Name:Rafa KARENA Damian    Notification Date/Time:07/16/2024    Notification Interaction:Vocera    Purpose of Notification:Pt is heavily bleeding    Orders Received:STAT hemoglobin    Comments: GI consult order will place

## 2024-07-17 NOTE — PROGRESS NOTES
I took over from Flyer # 3, REA Abrams.   Patient is in nuclear medicine.  Hypotensive, MAP 61, on RA please see VS in the chart.  1 unit X pBRC transfusion is in progress.   Patient is in no acute distress while laying down; symptomatic if getting up.   Patient will transported to Endoscopy as soon as imaging has been completed.     9:30 Patient was transported to Endoscopy. Raine LUCIA is the receiving nurse. Bed side handoff was completed.  Blood transfusion was in progress at the time of handoff; no signs of transfusion reaction had been noted.

## 2024-07-18 LAB
ALBUMIN SERPL BCG-MCNC: 3.3 G/DL (ref 3.5–5.2)
ALP SERPL-CCNC: 55 U/L (ref 40–150)
ALT SERPL W P-5'-P-CCNC: 15 U/L (ref 0–50)
ANION GAP SERPL CALCULATED.3IONS-SCNC: 15 MMOL/L (ref 7–15)
AST SERPL W P-5'-P-CCNC: 30 U/L (ref 0–45)
BILIRUB SERPL-MCNC: 0.6 MG/DL
BUN SERPL-MCNC: 17.4 MG/DL (ref 8–23)
CALCIUM SERPL-MCNC: 8.8 MG/DL (ref 8.8–10.4)
CHLORIDE SERPL-SCNC: 108 MMOL/L (ref 98–107)
CREAT SERPL-MCNC: 0.99 MG/DL (ref 0.51–0.95)
EGFRCR SERPLBLD CKD-EPI 2021: 57 ML/MIN/1.73M2
ERYTHROCYTE [DISTWIDTH] IN BLOOD BY AUTOMATED COUNT: 16.3 % (ref 10–15)
GLUCOSE BLDC GLUCOMTR-MCNC: 112 MG/DL (ref 70–99)
GLUCOSE BLDC GLUCOMTR-MCNC: 121 MG/DL (ref 70–99)
GLUCOSE BLDC GLUCOMTR-MCNC: 142 MG/DL (ref 70–99)
GLUCOSE BLDC GLUCOMTR-MCNC: 172 MG/DL (ref 70–99)
GLUCOSE BLDC GLUCOMTR-MCNC: 92 MG/DL (ref 70–99)
GLUCOSE SERPL-MCNC: 47 MG/DL (ref 70–99)
HCO3 SERPL-SCNC: 17 MMOL/L (ref 22–29)
HCT VFR BLD AUTO: 32.1 % (ref 35–47)
HGB BLD-MCNC: 10.5 G/DL (ref 11.7–15.7)
HGB BLD-MCNC: 8.8 G/DL (ref 11.7–15.7)
HGB BLD-MCNC: 9 G/DL (ref 11.7–15.7)
HGB BLD-MCNC: 9.4 G/DL (ref 11.7–15.7)
MCH RBC QN AUTO: 30.7 PG (ref 26.5–33)
MCHC RBC AUTO-ENTMCNC: 32.7 G/DL (ref 31.5–36.5)
MCV RBC AUTO: 94 FL (ref 78–100)
PLATELET # BLD AUTO: 161 10E3/UL (ref 150–450)
POTASSIUM SERPL-SCNC: 3.9 MMOL/L (ref 3.4–5.3)
PROT SERPL-MCNC: 5.2 G/DL (ref 6.4–8.3)
RBC # BLD AUTO: 3.42 10E6/UL (ref 3.8–5.2)
SODIUM SERPL-SCNC: 140 MMOL/L (ref 135–145)
WBC # BLD AUTO: 7.5 10E3/UL (ref 4–11)

## 2024-07-18 PROCEDURE — 250N000013 HC RX MED GY IP 250 OP 250 PS 637: Performed by: STUDENT IN AN ORGANIZED HEALTH CARE EDUCATION/TRAINING PROGRAM

## 2024-07-18 PROCEDURE — 250N000013 HC RX MED GY IP 250 OP 250 PS 637

## 2024-07-18 PROCEDURE — 85018 HEMOGLOBIN: CPT | Performed by: HOSPITALIST

## 2024-07-18 PROCEDURE — 36415 COLL VENOUS BLD VENIPUNCTURE: CPT | Performed by: HOSPITALIST

## 2024-07-18 PROCEDURE — 80053 COMPREHEN METABOLIC PANEL: CPT | Performed by: HOSPITALIST

## 2024-07-18 PROCEDURE — 120N000013 HC R&B IMCU

## 2024-07-18 PROCEDURE — 250N000011 HC RX IP 250 OP 636: Performed by: STUDENT IN AN ORGANIZED HEALTH CARE EDUCATION/TRAINING PROGRAM

## 2024-07-18 PROCEDURE — 99232 SBSQ HOSP IP/OBS MODERATE 35: CPT | Performed by: HOSPITALIST

## 2024-07-18 PROCEDURE — 99232 SBSQ HOSP IP/OBS MODERATE 35: CPT | Performed by: INTERNAL MEDICINE

## 2024-07-18 PROCEDURE — 258N000003 HC RX IP 258 OP 636: Performed by: STUDENT IN AN ORGANIZED HEALTH CARE EDUCATION/TRAINING PROGRAM

## 2024-07-18 RX ORDER — NICOTINE POLACRILEX 4 MG
15-30 LOZENGE BUCCAL
Status: DISCONTINUED | OUTPATIENT
Start: 2024-07-18 | End: 2024-07-21 | Stop reason: HOSPADM

## 2024-07-18 RX ORDER — DEXTROSE MONOHYDRATE 25 G/50ML
25-50 INJECTION, SOLUTION INTRAVENOUS
Status: DISCONTINUED | OUTPATIENT
Start: 2024-07-18 | End: 2024-07-21 | Stop reason: HOSPADM

## 2024-07-18 RX ADMIN — ACETAMINOPHEN 650 MG: 325 TABLET, FILM COATED ORAL at 21:18

## 2024-07-18 RX ADMIN — ONDANSETRON 4 MG: 2 INJECTION INTRAMUSCULAR; INTRAVENOUS at 06:00

## 2024-07-18 RX ADMIN — PANTOPRAZOLE SODIUM 40 MG: 40 INJECTION, POWDER, FOR SOLUTION INTRAVENOUS at 21:18

## 2024-07-18 RX ADMIN — ACETAMINOPHEN 650 MG: 325 TABLET, FILM COATED ORAL at 06:10

## 2024-07-18 RX ADMIN — ACETAMINOPHEN 650 MG: 325 TABLET, FILM COATED ORAL at 14:34

## 2024-07-18 RX ADMIN — HYDROXYCHLOROQUINE SULFATE 200 MG: 200 TABLET ORAL at 08:26

## 2024-07-18 RX ADMIN — PANTOPRAZOLE SODIUM 40 MG: 40 INJECTION, POWDER, FOR SOLUTION INTRAVENOUS at 08:26

## 2024-07-18 RX ADMIN — SODIUM CHLORIDE: 9 INJECTION, SOLUTION INTRAVENOUS at 09:29

## 2024-07-18 RX ADMIN — GABAPENTIN 600 MG: 300 CAPSULE ORAL at 21:17

## 2024-07-18 ASSESSMENT — ACTIVITIES OF DAILY LIVING (ADL)
ADLS_ACUITY_SCORE: 39
ADLS_ACUITY_SCORE: 43
ADLS_ACUITY_SCORE: 39
ADLS_ACUITY_SCORE: 43
ADLS_ACUITY_SCORE: 39
ADLS_ACUITY_SCORE: 39
ADLS_ACUITY_SCORE: 43
ADLS_ACUITY_SCORE: 39
ADLS_ACUITY_SCORE: 43
ADLS_ACUITY_SCORE: 39
ADLS_ACUITY_SCORE: 39
ADLS_ACUITY_SCORE: 43
ADLS_ACUITY_SCORE: 43
ADLS_ACUITY_SCORE: 39
ADLS_ACUITY_SCORE: 43
ADLS_ACUITY_SCORE: 39

## 2024-07-18 NOTE — PROGRESS NOTES
GASTROENTEROLOGY PROGRESS NOTE    Date of Admission: 7/15/2024  Reason for Admission: GIB       ASSESSMENT:  83 year old female with a history of RA (stopped methotrexate 3/2023, now on hydroxycholoroquine), and secondary Sjogren's, lumbar degenerative disc disease, chronic lumbago without radiculopathy, osteoporosis, recurrent GI bleeding (AVMs, possible diverticular, polyp-related, hemorrhoids) who presents to [facility] on 7/15/2024 with BRBPR.      # Lower GI bleed, likely diverticular bleed   # Acute blood loss anemia  # Severe diverticulosis   # hx of gastric AVM, small bowel AVM, cecal polpy, duodenal erosions  # Hemorrhoids  Patient presents with hematochezia/BRBPR similar to prior episodes of GI bleeding. Patient is not on blood thinners and does not take NSAIDs. Last significant GIB for which she was hospitalized for was in 8/2023. She underwent colonoscopy which showed diverticulosis throughout the colon, no active bleeding noted. Examined ileum was normal. Mild internal hemorrhoids felt not to be source of bleed. Presentation felt overall consistent with diverticular bleed. See HPI for full GI bleed hx.   Admit hgb 10.6 >7.7. Given 1 unit PRBC 7/16. BP initially normal, dropped to 90s/40s and patient had symptomatic orthostatic hypotension. Bleeding spontaneously resolved, etiology felt to be diverticular.      7/16 recurrent rectal bleeding in the afternoon[place] [facility] [place] Transfused total of 5 units PRBC, 1 unit plts, 2 units plasma. CTA A/P showed no convincing active arterial GI bleed, pancolitis that could be infectious or inflammatory, findings suggestive of blood in descending colon. Tagged RBC scan with no evidence of active gastrointestinal bleeding. S/p flex sig 7/17 with severe diverticulosis in the rectum, recto-sigmoid colon, sigmoid colon, descending colon, and transverse colon with no specific source of bleeding identified though blood was found in the entire examined colon. Mucosa  appeared normal throughout with no evidence of colitis. Non bleeding hemorrhoids were present.     CRS consulted to begin discussion of possible colectomy if bleeding persists given pan-diverticulosis, per their note, patient would decline surgery in an emergency setting.    Hgb has remained stable in 9s and no further active GI bleeding at this time.     - If patient has recurrence of BRBPR with associated hemodynamic instability, recommend obtaining CTA A/P to attempt to localize the bleed with IR consult for possible angiography/embolization    - ADAT   - Monitor hgb, transfuse for hgb <7  - Monitor for s/sx of GIB, appreciate nursing documentation of stool output including color and consistency.   - Appreciate CRS consultation   - Continue IVF per primary team  - Avoid NSAIDs     # Pancreatic lesion  Incidental finding of pancreatic body 0.6cm cystic lesion.   - Outpatient CT or MRI/MRCP in 6 months per guidelines defer to PCP to order      # Constipation   PTA on miralax   - Continue on discharge      # Dyspepsia  PTA on omeprazole   - Currently on IV PPI      # Weight loss  Patient reports ongoing weight loss. From previous notes, weight loss likely due to limited oral intake and food restriction due to fear of certain foods triggering GI bleeding. She had several questions regarding seeds and nuts this admission, reassured patient it is ok to continue to eat those.   - RD consulted   - Follow up outpatient with Iesha Hu, GI dietitian     Thank you for involving us in this patient's care. Please do not hesitate to contact the GI service with any questions or concerns.     Pt care plan discussed with Dr. Rodriguez, GI staff physician, and Dr. Beach, primary team.    Overall time spent on the date of this encounter preparing to see the patient (including chart review of available notes, clinical status events, imaging and labs); obtaining and/or reviewing separately obtained history; ordering medications,  "tests or procedures; communicating with other health care professionals; and documenting the above clinical information in the electronic medical record was 45 minutes.    Evelia Maddox PA-C  GI Service  [facility]  Text Page (Monday-Friday, 8am-4pm)    To page the On-Call [facility] GI provider 24 hours a day, please click [facility]COM and select GASTROENTEROLOGY MEDICINE ADULT / [facility] in the drop-down menu.   _______________________________________________________________      Subjective: Nursing notes and 24hr events reviewed. Patient reports that she had passed \"dark sticky\" blood yesterday evening while using the commode. No further bright red blood per rectum. No abdominal pain, nausea, vomiting. Tolerating liquids but does not like the selection of clear liquid choices.     ROS:   4 pt ROS negative unless noted in subjective.     Medications:  Current Facility-Administered Medications   Medication Dose Route Frequency Provider Last Rate Last Admin    acetaminophen (TYLENOL) tablet 650 mg  650 mg Oral Q6H Kike Bailey MD   650 mg at 07/18/24 0610    gabapentin (NEURONTIN) capsule 300 mg  300 mg Oral BID PRN Rickie Craig NP        gabapentin (NEURONTIN) capsule 600 mg  600 mg Oral At Bedtime Rickie Craig NP   600 mg at 07/17/24 2118    hydrALAZINE (APRESOLINE) tablet 10 mg  10 mg Oral Q4H PRN Kike Bailey MD        Or    hydrALAZINE (APRESOLINE) injection 10 mg  10 mg Intravenous Q4H PRN Kike Bailey MD        hydroxychloroquine (PLAQUENIL) tablet 200 mg  200 mg Oral Daily Rickie Craig NP   200 mg at 07/18/24 0826    lidocaine (LMX4) cream   Topical Q1H PRN Rickie Craig NP        lidocaine 1 % 0.1-1 mL  0.1-1 mL Other Q1H PRN Rickie Craig NP        lifitegrast (XIIDRA) 5 % opthalmic solution 1 drop  1 drop Both Eyes BID Rickie Craig, NP   1 drop at 07/18/24 0826    " "ondansetron (ZOFRAN ODT) ODT tab 4 mg  4 mg Oral Q6H PRN Kike Bailey MD        Or    ondansetron (ZOFRAN) injection 4 mg  4 mg Intravenous Q6H PRN Kike Bailey MD   4 mg at 07/18/24 0600    pantoprazole (PROTONIX) IV push injection 40 mg  40 mg Intravenous Q12H Kike Bailey MD   40 mg at 07/18/24 0826    senna-docusate (SENOKOT-S/PERICOLACE) 8.6-50 MG per tablet 1 tablet  1 tablet Oral BID PRN Kike Baiely MD        Or    senna-docusate (SENOKOT-S/PERICOLACE) 8.6-50 MG per tablet 2 tablet  2 tablet Oral BID PRN Kike Bailey MD        sodium chloride (PF) 0.9% PF flush 3 mL  3 mL Intracatheter Q8H Rickie Craig, NP   3 mL at 07/17/24 1700    sodium chloride (PF) 0.9% PF flush 3 mL  3 mL Intracatheter q1 min prn Rickie Craig, NP        sodium chloride 0.9 % infusion   Intravenous Continuous Kike Bailey MD 75 mL/hr at 07/17/24 2129 New Bag at 07/17/24 2129       Objective:  Blood pressure 107/46, pulse 92, temperature 97.9  F (36.6  C), temperature source Oral, resp. rate 18, height 1.613 m (5' 3.5\"), weight 38.9 kg (85 lb 11.2 oz), SpO2 93%, not currently breastfeeding.  Gen: Lying in bed. Appears comfortable  HEENT: NCAT. Conjunctiva clear. Sclera anicteric    CV: Regular rate  Resp: Non-labored breathing  Abd: Soft, NT, ND, no guarding or rebound   Skin:  No jaundice  Ext: warm, well perfused    Mental status/Psych: A&O. Asks/answers questions appropriately       PROCEDURES:  Flex sig on 7/17/2024 for hematochezia with Dr. Woodard  Findings:       The perianal and digital rectal examinations were normal.        Multiple small and large-mouthed diverticula were found in the rectum,        recto-sigmoid colon, sigmoid colon, descending colon and transverse        colon. Suspect diverticular bleeding but no specific source identified        today.        Red blood and blood clots were found in the entire examined colon,        without a specific source " identified.        The exam was otherwise without abnormality. Specifically, the colonic        mucosa appeared normal, without evidence of colitis, erythema or        ulcerations.        Non-bleeding internal hemorrhoids were found during retroflexion. The        hemorrhoids were moderate.                                                                                    Impression:               - Severe diverticulosis in the rectum, in the                             recto-sigmoid colon, in the sigmoid colon, in the                             descending colon and in the transverse colon.                             Suspect diverticular bleeding but no specific                             source identified on today's exam.                             - Blood in the entire examined colon.                             - Normal colon mucosa throughout the exam. No                             evidence of colitis.                             - Non-bleeding internal hemorrhoids.                             - No specimens collected.     LABS:  BMP  Recent Labs   Lab 07/17/24  0353 07/16/24  1805 07/16/24  1754 07/16/24  0704 07/15/24  1817    139  --  142 140   POTASSIUM 4.1 5.0  --  4.7 4.3   CHLORIDE 112* 110*  --  110* 103   EJ 8.1* 8.2*  --  8.5* 9.4   CO2 23 20*  --  27 26   BUN 18.0 19.3  --  21.0 25.8*   CR 1.00* 0.94  --  1.01* 0.96*   GLC 94 153* 130* 95 186*     CBC  Recent Labs   Lab 07/18/24  0150 07/16/24  1002 07/16/24  0704 07/15/24  1817   WBC  --   --  4.3 5.4   RBC  --   --  2.32* 3.27*   HGB 9.4*   < > 7.7* 10.6*   HCT  --   --  23.2* 32.7*   MCV  --   --  100 100   MCH  --   --  33.2* 32.4   MCHC  --   --  33.2 32.4   RDW  --   --  13.8 13.8   PLT  --   --  161 224    < > = values in this interval not displayed.     INR  Recent Labs   Lab 07/16/24  1002   INR 1.14     LFTs  Recent Labs   Lab 07/16/24  0704 07/15/24  1817   ALKPHOS 56 70   AST 23 31   ALT 16 23   BILITOTAL 0.5 0.4   PROTTOTAL  5.3* 7.1   ALBUMIN 3.5 4.5      PANCNo lab results found in last 7 days.  CULTURES:   7-Day Micro Results       No results found for the last 168 hours.          IMAGING:  Reviewed

## 2024-07-18 NOTE — PLAN OF CARE
A&O x4, VSS w/ soft B/P's (90's/40's). Tele: SR. Chronic back pain controlled with prn tylenol and warm pad. LS: clear. BS: audible. +void, + loose maroon bm x3. Reg diet well tolerated. Up A-1 to pivot to commode/chair.

## 2024-07-18 NOTE — PROGRESS NOTES
[facility]  Hospitalist Progress Note   07/18/2024          Assessment and Plan:       Patrick Olson is an 83 year old female with a past medical history of duodenal ulcers, CKD3, RA and chronic anemia with recurrent GI bleed.  On 7/15/2024 with hematochezia.    Recurrent lower GI bleed -likely diverticular bleed.  Severe symptomatic anemia status post blood and blood product transfusion.  History of GI bleed in the setting of duodenal ulcers and diverticulosis, gastric AVM, small bowel AVM, cecal polyps, hemorrhoids.  Presented with 5 episodes of bright red bleeding, associated lower abdominal cramping and lightheadedness.  --- In ED at the time of initial evaluation tachycardic, hemoglobin 10.6.  --Has had drop in hemoglobin to 6.8, received 1 unit of PRBC on 7/16 am.    --Had spontaneous rebleeding on 7/16, RRT called and transferred to [facility].  Patient received total 5 units of PRBC, 1 unit platelets, 2 units plasma.  **CT on 7/16 -No convincing active arterial GI bleed, pancolitis that could be infectious or inflammatory, findings suggestive of blood in descending colon.  **Tagged red blood cell scan on 7/17 with no evidence of active gastrointestinal bleeding.  ** Status post flexible sigmoidoscopy 7/17 with severe diverticulosis in the rectum, rectosigmoid colon, sigmoid colon, descending colon and transverse colon with no specific source of bleeding identified though blood was found in the entire examined colon.  Nonbleeding hemorrhoids were present.    -- Continue monitoring under IMC.  [facility] gastroenterology following, If patient has recurrence of BRBPR with associated hemodynamic instability, recommend obtaining CTA A/P to attempt to localize the bleed with IR consult for possible angiography/embolization.  appreciate comanagement.  Colorectal surgery followed -surgery is last resort if endoscopy and radiologic measures to ID and control bleeding fail.  Patient reported  that she would decline surgery even in an emergency setting.  Appreciate input.  -- Continue IV PPI.  Advance diet as tolerated.  Discontinue IV fluids.  Monitor hemoglobin levels every 8 hours earlier if symptomatic.  Avoid NSAIDs. blood thinners.  Hold off anemia workup at this time as patient had already received blood transfusion.    Dizziness from symptomatic anemia.  Mild TR.  Patient denies any chest pain or palpitations.  Having dizziness from blood loss anemia.  Telemetry no acute events.  Echocardiogram with LVEF of 60 to 65%.  Right ventricular systolic function normal.  Mild tricuspid regurgitation.  Continue telemetry monitoring.  Fall precautions.  Address medical issues as above.    Lactic acidosis from blood loss anemia cleared.  Lactate 2.7 >1.0 with blood transfusion.    Hypoglycemia from poor intake.  This morning BMP with glucose of 47.  Patient has been on clear liquid diet, has not had anything to eat overnight.  Had Juice with which blood sugars improved 112.  Monitor blood sugars 4 times a day or earlier if symptomatic.  Hypoglycemia protocol in place.    Severe malnutrition in the setting of chronic illness.  Unintentional weight loss.  Family reporting unintentional weight loss over the last few months.  Defer weight loss workup to PCP.  Nutrition following, supplements once on orals     CKD stage IIIa.  Baseline creatinine in the 0.9-1.0 range.  Renal function within baseline.  Monitor renal function closely.      Prediabetes with a hemoglobin A1c of 5.7.  Monitor blood sugars periodically.    Rheumatoid arthritis.  Chronic back pain  History of osteoporosis.  Continue PTA gabapentin.  Continue PTA Plaquenil.  As needed Tylenol, IV/p.o. as needed pain meds.    Physical deconditioning from medical illness, senile frailty.  Rehab evaluation   Fall precautions    Orders Placed This Encounter      Regular Diet Adult      DVT Prophylaxis:   Code Status: No CPR- Do NOT Intubate  Disposition:  Expected discharge in 2 days pending clinical improvement.    Medically Ready for Discharge: Anticipated in 2-4 Days    Discussed with patient, bedside RN, [facility] team.  Updated patient's  at the bedside -7/18.  >51 minutes spent by me on the date of service doing chart review, history, exam, documentation & further activities per the note.      Jhon Beach MD        Interval History:        Patient lying in bed.  Denies any chest pain or palpitations at this time.  Denies any nausea or vomiting.  Denies any new tingling or numbness.  Reports diffuse abdominal pain, continues to have dark-colored blood in bowel movements.  Systolic blood pressures in the 90s.  Been holding up in 10 range.  Afebrile.  Tele monitoring sinus rhythm.  Noted blood sugars of 47 this morning.       Physical Exam:        Physical Exam   Temp:  [97.8  F (36.6  C)-98.5  F (36.9  C)] 97.9  F (36.6  C)  Pulse:  [64-94] 75  Resp:  [16-18] 18  BP: ()/(39-60) 95/41  SpO2:  [92 %-99 %] 97 %    Intake/Output Summary (Last 24 hours) at 7/17/2024 1620  Last data filed at 7/17/2024 1223  Gross per 24 hour   Intake 2093 ml   Output 2500 ml   Net -407 ml       Admission Weight: 38.9 kg (85 lb 11.2 oz)  Current Weight: 38.9 kg (85 lb 11.2 oz)    PHYSICAL EXAM  GENERAL: Patient is in no distress.  Alert, oriented x 3.  HEART: Regular rate and rhythm. S1S2. No murmurs  LUNGS: Clear to auscultation bilaterally. No expiratory wheeze.  Respirations unlabored  ABDOMEN: Soft, diffuse tenderness, bowel sounds heard.  NEURO: Moving all extremities.  EXTREMITIES: No pedal edema.   SKIN: Warm, dry. No rash  PSYCHIATRY Cooperative       Medications:        Current Facility-Administered Medications   Medication Dose Route Frequency Provider Last Rate Last Admin    acetaminophen (TYLENOL) tablet 650 mg  650 mg Oral Q6H Kike Bailey MD   650 mg at 07/18/24 1434    gabapentin (NEURONTIN) capsule 600 mg  600 mg Oral At Bedtime  Rickie Craig NP   600 mg at 07/17/24 2118    hydroxychloroquine (PLAQUENIL) tablet 200 mg  200 mg Oral Daily Rickie Craig NP   200 mg at 07/18/24 0826    lifitegrast (XIIDRA) 5 % opthalmic solution 1 drop  1 drop Both Eyes BID Rickie Craig NP   1 drop at 07/18/24 0826    pantoprazole (PROTONIX) IV push injection 40 mg  40 mg Intravenous Q12H Kike Bailey MD   40 mg at 07/18/24 0826    sodium chloride (PF) 0.9% PF flush 3 mL  3 mL Intracatheter Q8H Rickie Craig NP   3 mL at 07/18/24 0841     Current Facility-Administered Medications   Medication Dose Route Frequency Provider Last Rate Last Admin    glucose gel 15-30 g  15-30 g Oral Q15 Min PRN Jhon Beach MD        Or    dextrose 50 % injection 25-50 mL  25-50 mL Intravenous Q15 Min PRN Jhon Beach MD        Or    glucagon injection 1 mg  1 mg Subcutaneous Q15 Min PRN Jhon Beach MD        gabapentin (NEURONTIN) capsule 300 mg  300 mg Oral BID PRN Rickie Craig NP        hydrALAZINE (APRESOLINE) tablet 10 mg  10 mg Oral Q4H PRN Kike Bailey MD        Or    hydrALAZINE (APRESOLINE) injection 10 mg  10 mg Intravenous Q4H PRN Kike Bailey MD        lidocaine (LMX4) cream   Topical Q1H PRN Rickie Craig NP        lidocaine 1 % 0.1-1 mL  0.1-1 mL Other Q1H PRN Rickie Craig NP        ondansetron (ZOFRAN ODT) ODT tab 4 mg  4 mg Oral Q6H PRN Kike Bailey MD        Or    ondansetron (ZOFRAN) injection 4 mg  4 mg Intravenous Q6H PRN Kike Bailey MD   4 mg at 07/18/24 0600    senna-docusate (SENOKOT-S/PERICOLACE) 8.6-50 MG per tablet 1 tablet  1 tablet Oral BID PRN Kike Bailey MD        Or    senna-docusate (SENOKOT-S/PERICOLACE) 8.6-50 MG per tablet 2 tablet  2 tablet Oral BID PRN Kike Bailey MD        sodium chloride (PF) 0.9% PF flush 3 mL  3 mL Intracatheter q1 min prn Rickie Craig, NP                 Data:      All new lab and imaging data was reviewed.

## 2024-07-18 NOTE — PLAN OF CARE
Goal Outcome Evaluation:      Plan of Care Reviewed With: patient      Orientations: A/O x 4  Vitals/Pain: BP has been soft through the night. She has parameter to have MAP above 60. Current map: 66  Tele: SR  Lines/Drains: PIV flushing and infusing well, left one saline locked.  Skin/Wounds: Skin intact  GI/: Patient has been voiding well. At the beginning of the shift, the aide changed patient's pull up that had old blood and small gm in it. Patient has been ok the rest of the shift  Labs: Abnormal/Trends, Electrolyte Replacement- Electrolyte and Hgb check with morning labs.  Ambulation/Assist: Assist x 1 to the bedside commode  Sleep Quality: Slept well during the night.  Plan: Patient has CTA yesterday which was negative for active bleeding. IR consult is not planning to do angiogram and embolization at this time. Colorectal consult note is talking about ileostomy which will probably be permanent. Per GI note, the patient is probably having diverticular bleed and their solution will be surgery with ileostomy as end result. Per GI note, declined the surgery at this moment.

## 2024-07-18 NOTE — PROGRESS NOTES
"Colon & Rectal Surgery Progress Note             Interval History:     No events overnight. Patient denies any further bleeding. Hgb stable.                Medications:   I have reviewed this patient's current medications               Physical Exam:   Blood pressure (!) 97/39, pulse 93, temperature 97.9  F (36.6  C), temperature source Oral, resp. rate 18, height 1.613 m (5' 3.5\"), weight 38.9 kg (85 lb 11.2 oz), SpO2 94%, not currently breastfeeding.    Intake/Output Summary (Last 24 hours) at 7/18/2024 0756  Last data filed at 7/18/2024 0623  Gross per 24 hour   Intake 800 ml   Output 2050 ml   Net -1250 ml     GEN:  alert, NAD  ABD:  soft, ND, NT         Data:        Lab Results   Component Value Date     07/17/2024     01/23/2021    Lab Results   Component Value Date    CHLORIDE 112 07/17/2024    CHLORIDE 104 10/24/2022    CHLORIDE 114 01/23/2021    Lab Results   Component Value Date    BUN 18.0 07/17/2024    BUN 21 09/29/2022    BUN 6 01/23/2021      Lab Results   Component Value Date    POTASSIUM 4.1 07/17/2024    POTASSIUM 4.7 09/29/2022    POTASSIUM 3.6 01/23/2021    Lab Results   Component Value Date    CO2 23 07/17/2024    CO2 29 09/29/2022    CO2 26 01/23/2021    Lab Results   Component Value Date    CR 1.00 07/17/2024    CR 0.94 07/16/2024    CR 0.75 01/23/2021    CR 0.82 01/20/2021        Lab Results   Component Value Date    HGB 9.4 (L) 07/18/2024    HGB 9.6 (L) 07/17/2024     Lab Results   Component Value Date     07/16/2024     07/15/2024     Lab Results   Component Value Date    WBC 4.3 07/16/2024    WBC 5.4 07/15/2024            Assessment and Plan:     83 yof admitted with LGIB.  Discussed with patient that surgery is last resort if endoscopy and radiologic measures to ID and control bleeding fail.  Surgery would involve total abdominal colectomy with an end ileostomy, which could be permanent. Small chance she could have recurrent bleeding despite surgery maeve if source " "is actually small bowel.  Patient tells me she would decline surgery even in an emergency setting where this would be offered to save her life.  Given her decision to decline any surgery, CRS will follow peripherally.      Please contact us with any new questions or concerns.     Clinically Significant Risk Factors                            #Precipitous drop in Hgb/Hct: Lowest Hgb this hospitalization: 6.8 g/dL. Will continue to monitor and treat/transfuse as appropriate.     # Cachexia: Estimated body mass index is 14.94 kg/m  as calculated from the following:    Height as of this encounter: 1.613 m (5' 3.5\").    Weight as of this encounter: 38.9 kg (85 lb 11.2 oz)., PRESENT ON ADMISSION  # Severe Malnutrition: based on nutrition assessment, PRESENT ON ADMISSION            Rupal Thompson MD, FACS, FASCRS  Colorectal Surgery     [facility]  [address]  [place] [address]  T: [phone]  F: [phone]      "

## 2024-07-19 LAB
ANION GAP SERPL CALCULATED.3IONS-SCNC: 7 MMOL/L (ref 7–15)
BUN SERPL-MCNC: 15.8 MG/DL (ref 8–23)
CALCIUM SERPL-MCNC: 8.3 MG/DL (ref 8.8–10.4)
CHLORIDE SERPL-SCNC: 112 MMOL/L (ref 98–107)
CREAT SERPL-MCNC: 0.96 MG/DL (ref 0.51–0.95)
EGFRCR SERPLBLD CKD-EPI 2021: 59 ML/MIN/1.73M2
GLUCOSE BLDC GLUCOMTR-MCNC: 113 MG/DL (ref 70–99)
GLUCOSE BLDC GLUCOMTR-MCNC: 134 MG/DL (ref 70–99)
GLUCOSE BLDC GLUCOMTR-MCNC: 90 MG/DL (ref 70–99)
GLUCOSE SERPL-MCNC: 91 MG/DL (ref 70–99)
HCO3 SERPL-SCNC: 21 MMOL/L (ref 22–29)
HGB BLD-MCNC: 8.1 G/DL (ref 11.7–15.7)
HGB BLD-MCNC: 8.4 G/DL (ref 11.7–15.7)
HGB BLD-MCNC: 8.6 G/DL (ref 11.7–15.7)
POTASSIUM SERPL-SCNC: 4.2 MMOL/L (ref 3.4–5.3)
SODIUM SERPL-SCNC: 140 MMOL/L (ref 135–145)

## 2024-07-19 PROCEDURE — 250N000013 HC RX MED GY IP 250 OP 250 PS 637: Performed by: HOSPITALIST

## 2024-07-19 PROCEDURE — 36415 COLL VENOUS BLD VENIPUNCTURE: CPT | Performed by: HOSPITALIST

## 2024-07-19 PROCEDURE — 250N000011 HC RX IP 250 OP 636: Performed by: STUDENT IN AN ORGANIZED HEALTH CARE EDUCATION/TRAINING PROGRAM

## 2024-07-19 PROCEDURE — 258N000003 HC RX IP 258 OP 636: Performed by: INTERNAL MEDICINE

## 2024-07-19 PROCEDURE — 80048 BASIC METABOLIC PNL TOTAL CA: CPT | Performed by: HOSPITALIST

## 2024-07-19 PROCEDURE — 99233 SBSQ HOSP IP/OBS HIGH 50: CPT | Performed by: HOSPITALIST

## 2024-07-19 PROCEDURE — 250N000013 HC RX MED GY IP 250 OP 250 PS 637: Performed by: STUDENT IN AN ORGANIZED HEALTH CARE EDUCATION/TRAINING PROGRAM

## 2024-07-19 PROCEDURE — 99232 SBSQ HOSP IP/OBS MODERATE 35: CPT | Performed by: INTERNAL MEDICINE

## 2024-07-19 PROCEDURE — 120N000001 HC R&B MED SURG/OB

## 2024-07-19 PROCEDURE — 85018 HEMOGLOBIN: CPT | Performed by: HOSPITALIST

## 2024-07-19 PROCEDURE — 250N000013 HC RX MED GY IP 250 OP 250 PS 637

## 2024-07-19 RX ORDER — LIDOCAINE HYDROCHLORIDE 20 MG/ML
JELLY TOPICAL ONCE
Status: COMPLETED | OUTPATIENT
Start: 2024-07-19 | End: 2024-07-19

## 2024-07-19 RX ORDER — PANTOPRAZOLE SODIUM 40 MG/1
40 TABLET, DELAYED RELEASE ORAL
Status: DISCONTINUED | OUTPATIENT
Start: 2024-07-19 | End: 2024-07-21 | Stop reason: HOSPADM

## 2024-07-19 RX ORDER — METOCLOPRAMIDE HYDROCHLORIDE 5 MG/ML
5 INJECTION INTRAMUSCULAR; INTRAVENOUS ONCE
Status: COMPLETED | OUTPATIENT
Start: 2024-07-19 | End: 2024-07-19

## 2024-07-19 RX ADMIN — ACETAMINOPHEN 650 MG: 325 TABLET, FILM COATED ORAL at 13:23

## 2024-07-19 RX ADMIN — PANTOPRAZOLE SODIUM 40 MG: 40 TABLET, DELAYED RELEASE ORAL at 16:31

## 2024-07-19 RX ADMIN — HYDROXYCHLOROQUINE SULFATE 200 MG: 200 TABLET ORAL at 08:14

## 2024-07-19 RX ADMIN — SODIUM CHLORIDE, POTASSIUM CHLORIDE, SODIUM LACTATE AND CALCIUM CHLORIDE 1000 ML: 600; 310; 30; 20 INJECTION, SOLUTION INTRAVENOUS at 04:29

## 2024-07-19 RX ADMIN — ACETAMINOPHEN 650 MG: 325 TABLET, FILM COATED ORAL at 03:15

## 2024-07-19 RX ADMIN — ACETAMINOPHEN 650 MG: 325 TABLET, FILM COATED ORAL at 21:44

## 2024-07-19 RX ADMIN — PANTOPRAZOLE SODIUM 40 MG: 40 INJECTION, POWDER, FOR SOLUTION INTRAVENOUS at 08:15

## 2024-07-19 RX ADMIN — ACETAMINOPHEN 650 MG: 325 TABLET, FILM COATED ORAL at 08:14

## 2024-07-19 RX ADMIN — GABAPENTIN 600 MG: 300 CAPSULE ORAL at 21:44

## 2024-07-19 RX ADMIN — SODIUM CHLORIDE, POTASSIUM CHLORIDE, SODIUM LACTATE AND CALCIUM CHLORIDE 1000 ML: 600; 310; 30; 20 INJECTION, SOLUTION INTRAVENOUS at 03:13

## 2024-07-19 ASSESSMENT — ACTIVITIES OF DAILY LIVING (ADL)
ADLS_ACUITY_SCORE: 39
ADLS_ACUITY_SCORE: 28
ADLS_ACUITY_SCORE: 28
ADLS_ACUITY_SCORE: 39
ADLS_ACUITY_SCORE: 28
ADLS_ACUITY_SCORE: 39
ADLS_ACUITY_SCORE: 28
ADLS_ACUITY_SCORE: 39
ADLS_ACUITY_SCORE: 39
ADLS_ACUITY_SCORE: 28
CONCENTRATING,_REMEMBERING_OR_MAKING_DECISIONS_DIFFICULTY: NO
WEAR_GLASSES_OR_BLIND: YES
WALKING_OR_CLIMBING_STAIRS_DIFFICULTY: NO
ADLS_ACUITY_SCORE: 39
DRESSING/BATHING_DIFFICULTY: NO
ADLS_ACUITY_SCORE: 39
CHANGE_IN_FUNCTIONAL_STATUS_SINCE_ONSET_OF_CURRENT_ILLNESS/INJURY: NO
TOILETING_ISSUES: NO
ADLS_ACUITY_SCORE: 39
FALL_HISTORY_WITHIN_LAST_SIX_MONTHS: NO
ADLS_ACUITY_SCORE: 28
ADLS_ACUITY_SCORE: 28
ADLS_ACUITY_SCORE: 39
DOING_ERRANDS_INDEPENDENTLY_DIFFICULTY: NO
ADLS_ACUITY_SCORE: 28
ADLS_ACUITY_SCORE: 39
DIFFICULTY_EATING/SWALLOWING: NO
VISION_MANAGEMENT: GLASSES
ADLS_ACUITY_SCORE: 39
ADLS_ACUITY_SCORE: 39
ADLS_ACUITY_SCORE: 28

## 2024-07-19 NOTE — PLAN OF CARE
Vital signs stable on room air except soft BP in the 80's (MD aware). Alert and oriented. Lung sounds clear. Bowel sounds active, flatus present. Pain controlled with T pump and Tylenol. Up assist of one to bedside commode. Tolerating diet. CMS/neuros intact. Borderline urine output. Tele normal sinus rhythm. No bloody stools this shift.

## 2024-07-19 NOTE — PROGRESS NOTES
GASTROENTEROLOGY PROGRESS NOTE    Date of Admission: 7/15/2024  Reason for Admission: GIB      ASSESSMENT:  83 year old female with a history of RA (stopped methotrexate 3/2023, now on hydroxycholoroquine), and secondary Sjogren's, lumbar degenerative disc disease, chronic lumbago without radiculopathy, osteoporosis, recurrent GI bleeding (AVMs, possible diverticular, polyp-related, hemorrhoids) who presents to [facility] on 7/15/2024 with BRBPR.      # Lower GI bleed, likely diverticular bleed vs dieulafoy lesion  # Acute blood loss anemia  # Severe diverticulosis   # hx of gastric AVM, small bowel AVM, cecal polpy, duodenal erosions  # Hemorrhoids  Patient presents with hematochezia/BRBPR similar to prior episodes of GI bleeding. Patient is not on blood thinners and does not take NSAIDs. Last significant GIB for which she was hospitalized for was in 8/2023. She underwent colonoscopy which showed diverticulosis throughout the colon, no active bleeding noted. Examined ileum was normal. Mild internal hemorrhoids felt not to be source of bleed. Presentation felt overall consistent with diverticular bleed, though could be dieulafoy. See HPI for full GI bleed hx.   Admit hgb 10.6 >7.7. Given 1 unit PRBC 7/16. BP initially normal, dropped to 90s/40s and patient had symptomatic orthostatic hypotension. Bleeding spontaneously resolved, etiology felt to be diverticular.      7/16 recurrent rectal bleeding in the afternoon, transferred to [facility]. Transfused total of 5 units PRBC, 1 unit plts, 2 units plasma. CTA A/P showed no convincing active arterial GI bleed, pancolitis that could be infectious or inflammatory, findings suggestive of blood in descending colon. Tagged RBC scan with no evidence of active gastrointestinal bleeding. S/p flex sig 7/17 with severe diverticulosis in the rectum, recto-sigmoid colon, sigmoid colon, descending colon, and transverse colon with no specific source of bleeding identified though blood was  found in the entire examined colon. Mucosa appeared normal throughout with no evidence of colitis. Non bleeding hemorrhoids were present.      CRS consulted to begin discussion of possible colectomy if bleeding persists given pan-diverticulosis, per their note, patient would decline surgery in an emergency setting.     Hgb drifted down to 8s from 9s, stable currently 8.6.      - If patient has recurrence of BRBPR with associated hemodynamic instability, recommend obtaining CTA A/P to attempt to localize the bleed   - Please reach out to GI on call MD with any significant GI bleeding with hemodynamic instability. Would consider colonoscopy +/- EGD vs IR consult   - Monitor hgb, transfuse for hgb <7  - Monitor for s/sx of GIB, appreciate nursing documentation of stool output including color and consistency.   - Avoid NSAIDs     # Pancreatic lesion  Incidental finding of pancreatic body 0.6cm cystic lesion.   - Outpatient CT or MRI/MRCP in 6 months per guidelines defer to PCP to order      # Constipation   PTA on miralax   - Continue on discharge      # Dyspepsia  PTA on omeprazole   - Currently on IV PPI      # Weight loss  Patient reports ongoing weight loss. From previous notes, weight loss likely due to limited oral intake and food restriction due to fear of certain foods triggering GI bleeding. She had several questions regarding seeds and nuts this admission, reassured patient it is ok to continue to eat those.   - RD consulted   - Follow up outpatient with Iesha Hu GI dietitian     Next outpatient GI clinic appt:  - 7/23/24 with Belen Delacruz    GI will not actively round on patient over the weekend. Please reach out to on call MD with any questions or concerns.     Thank you for involving us in this patient's care. Please do not hesitate to contact the GI service with any questions or concerns.     Pt care plan discussed with Dr. Rodriguez, GI staff physician, and Dr. Beach, primary team.    Overall time  spent on the date of this encounter preparing to see the patient (including chart review of available notes, clinical status events, imaging and labs); obtaining and/or reviewing separately obtained history; ordering medications, tests or procedures; communicating with other health care professionals; and documenting the above clinical information in the electronic medical record was 40 minutes.    Evelia Maddox PA-C  GI Service  [facility]  Text Page (Monday-Friday, 8am-4pm)    To page the On-Call [facility] GI provider 24 hours a day, please click [facility] and select GASTROENTEROLOGY MEDICINE ADULT / [facility] in the drop-down menu.   _______________________________________________________________      Subjective: Nursing notes and 24hr events reviewed. Loose maroon stool x3 noted on evening RN shift (?patient denies this). No bloody stools overnight per RN note. Tolerating regular diet. Patient reports that she had a bowel movement this morning, reports passing dark blood mixed with stool. No bright red blood seen in >24 hours per patient. No abdominal pain. She reports she was woken up overnight for BP 80s/40s and given 2L IVF with improvement in blood pressures. She denies lightheadedness or dizziness.     ROS:   4 pt ROS negative unless noted in subjective.     Medications:  Current Facility-Administered Medications   Medication Dose Route Frequency Provider Last Rate Last Admin    acetaminophen (TYLENOL) tablet 650 mg  650 mg Oral Q6H Kike Bailey MD   650 mg at 07/19/24 0814    glucose gel 15-30 g  15-30 g Oral Q15 Min PRN Jhon Beach MD        Or    dextrose 50 % injection 25-50 mL  25-50 mL Intravenous Q15 Min PRN Jhon Beach MD        Or    glucagon injection 1 mg  1 mg Subcutaneous Q15 Min PRN Jhon Beach MD        gabapentin (NEURONTIN) capsule 300 mg  300 mg Oral BID PRN Rickie Craig, NP        gabapentin (NEURONTIN) capsule 600 mg  600  "mg Oral At Bedtime Rickie Craig NP   600 mg at 07/18/24 2117    hydrALAZINE (APRESOLINE) tablet 10 mg  10 mg Oral Q4H PRN Kike Bailey MD        Or    hydrALAZINE (APRESOLINE) injection 10 mg  10 mg Intravenous Q4H PRN Kike Bailey MD        hydroxychloroquine (PLAQUENIL) tablet 200 mg  200 mg Oral Daily Rickie Craig NP   200 mg at 07/19/24 0814    lidocaine (LMX4) cream   Topical Q1H PRN Rickie Craig NP        lidocaine 1 % 0.1-1 mL  0.1-1 mL Other Q1H PRN Rickie Craig NP        lifitegrast (XIIDRA) 5 % opthalmic solution 1 drop  1 drop Both Eyes BID Rickie Craig NP   1 drop at 07/18/24 0826    ondansetron (ZOFRAN ODT) ODT tab 4 mg  4 mg Oral Q6H PRN Kike Bailey MD        Or    ondansetron (ZOFRAN) injection 4 mg  4 mg Intravenous Q6H PRN Kike Bailey MD   4 mg at 07/18/24 0600    pantoprazole (PROTONIX) IV push injection 40 mg  40 mg Intravenous Q12H Kike Bailey MD   40 mg at 07/19/24 0815    senna-docusate (SENOKOT-S/PERICOLACE) 8.6-50 MG per tablet 1 tablet  1 tablet Oral BID PRN Kike Bailey MD        Or    senna-docusate (SENOKOT-S/PERICOLACE) 8.6-50 MG per tablet 2 tablet  2 tablet Oral BID PRN Kike Bailey MD        sodium chloride (PF) 0.9% PF flush 3 mL  3 mL Intracatheter Q8H Rickie Craig NP   3 mL at 07/18/24 1820    sodium chloride (PF) 0.9% PF flush 3 mL  3 mL Intracatheter q1 min prn Rickie Craig NP           Objective:  Blood pressure 102/53, pulse 75, temperature 98.4  F (36.9  C), temperature source Oral, resp. rate 18, height 1.613 m (5' 3.5\"), weight 38.9 kg (85 lb 11.2 oz), SpO2 93%, not currently breastfeeding.  Gen: Sitting up in chair. Appears  comfortable  HEENT: NCAT. Conjunctiva clear. Sclera anicteric    CV: Regular rate  Resp: Non-labored breathing  Abd: Soft, non tender, no rebound or guarding  Skin:  No jaundice  Ext: warm, well " perfused    Mental status/Psych: A&O. Asks/answers questions appropriately     PROCEDURES:  Flex sig on 7/17/2024 for hematochezia with Dr. Woodard  Findings:       The perianal and digital rectal examinations were normal.        Multiple small and large-mouthed diverticula were found in the rectum,        recto-sigmoid colon, sigmoid colon, descending colon and transverse        colon. Suspect diverticular bleeding but no specific source identified        today.        Red blood and blood clots were found in the entire examined colon,        without a specific source identified.        The exam was otherwise without abnormality. Specifically, the colonic        mucosa appeared normal, without evidence of colitis, erythema or        ulcerations.        Non-bleeding internal hemorrhoids were found during retroflexion. The        hemorrhoids were moderate.                                                                                    Impression:               - Severe diverticulosis in the rectum, in the                             recto-sigmoid colon, in the sigmoid colon, in the                             descending colon and in the transverse colon.                             Suspect diverticular bleeding but no specific                             source identified on today's exam.                             - Blood in the entire examined colon.                             - Normal colon mucosa throughout the exam. No                             evidence of colitis.                             - Non-bleeding internal hemorrhoids.                             - No specimens collected.     LABS:  [facility]  Recent Labs   Lab 07/19/24  0810 07/19/24  0627 07/18/24  2148 07/18/24  1619 07/18/24  0935 07/18/24  0828 07/17/24  0353 07/16/24  1805   NA  --  140  --   --   --  140 142 139   POTASSIUM  --  4.2  --   --   --  3.9 4.1 5.0   CHLORIDE  --  112*  --   --   --  108* 112* 110*   EJ  --  8.3*  --   --   --  8.8  8.1* 8.2*   CO2  --  21*  --   --   --  17* 23 20*   BUN  --  15.8  --   --   --  17.4 18.0 19.3   CR  --  0.96*  --   --   --  0.99* 1.00* 0.94   GLC 90 91 121* 142*   < > 47* 94 153*    < > = values in this interval not displayed.     CBC  Recent Labs   Lab 07/19/24  0627 07/18/24  1502 07/18/24  0828 07/16/24  1002 07/16/24  0704 07/15/24  1817   WBC  --   --  7.5  --  4.3 5.4   RBC  --   --  3.42*  --  2.32* 3.27*   HGB 8.6*   < > 10.5*   < > 7.7* 10.6*   HCT  --   --  32.1*  --  23.2* 32.7*   MCV  --   --  94  --  100 100   MCH  --   --  30.7  --  33.2* 32.4   MCHC  --   --  32.7  --  33.2 32.4   RDW  --   --  16.3*  --  13.8 13.8   PLT  --   --  161  --  161 224    < > = values in this interval not displayed.     INR  Recent Labs   Lab 07/16/24  1002   INR 1.14     LFTs  Recent Labs   Lab 07/18/24  0828 07/16/24  0704 07/15/24  1817   ALKPHOS 55 56 70   AST 30 23 31   ALT 15 16 23   BILITOTAL 0.6 0.5 0.4   PROTTOTAL 5.2* 5.3* 7.1   ALBUMIN 3.3* 3.5 4.5      PANCNo lab results found in last 7 days.  CULTURES:   7-Day Micro Results       No results found for the last 168 hours.          IMAGING:  Reviewed

## 2024-07-19 NOTE — PROGRESS NOTES
CLINICAL NUTRITION SERVICES BRIEF NOTE  Refer to full RD note dated 7/17 for more details and recommendations.     New Findings:   - Diet advanced to regular.   - Pt seen in room- she was excited for diet advancement as she felt very hungry. Had some eggs and Kisw[place] toast this morning - ate about 50%.   - Ordered patient a plant based protein shake @ 10 am, pt had drank about half of that as well.     Intervention:   - [employer] oral protein shake @ 10 am daily. Discussed other options for home use.   - Nutrition discharge instructions already entered on 7/17. No changes.     RD will follow.     Joi Mckee RD, LD  Pager: [phone]  Weekend Pager: [phone]

## 2024-07-19 NOTE — PLAN OF CARE
Orientation: A/Ox4    Vitals/Tele: VSS - Hypotensive at times, LSD - RA, Afebrile, SR, Orthostatic BP ordered BID    IV Access/drains: 2 PIV - SL    Diet: Regular - fair appetite    Mobility: Ax1 GB/W    GI/: Bloody stools x2 this shift, BS hypoactive, Adequate UO via toilet    Wound/Skin: WDL    Consults: GI    Discharge Plan: Pending      See Flow sheets for assessment        Goal Outcome Evaluation:

## 2024-07-19 NOTE — PROGRESS NOTES
[facility]  Hospitalist Progress Note   07/19/2024          Assessment and Plan:       Patrick Olson is an 83 year old female with a past medical history of duodenal ulcers, CKD3, RA and chronic anemia with recurrent GI bleed.  On 7/15/2024 with hematochezia.    Recurrent lower GI bleed -likely diverticular bleed.  Severe symptomatic anemia status post blood and blood product transfusion.  History of GI bleed in the setting of duodenal ulcers and diverticulosis, gastric AVM, small bowel AVM, cecal polyps, hemorrhoids.  Dyspepsia.  Presented with 5 episodes of bright red bleeding, associated lower abdominal cramping and lightheadedness.  --- In ED at the time of initial evaluation tachycardic, hemoglobin 10.6.  --Has had drop in hemoglobin to 6.8, received 1 unit of PRBC on 7/16 am.    --Had spontaneous rebleeding on 7/16, [place] [facility] [place]  Patient received total 5 units of PRBC, 1 unit platelets, 2 units plasma.  **CT on 7/16 -No convincing active arterial GI bleed, pancolitis that could be infectious or inflammatory, findings suggestive of blood in descending colon.  **Tagged red blood cell scan on 7/17 with no evidence of active gastrointestinal bleeding.  ** Status post flexible sigmoidoscopy 7/17 with severe diverticulosis in the rectum, rectosigmoid colon, sigmoid colon, descending colon and transverse colon with no specific source of bleeding identified though blood was found in the entire examined colon.  Nonbleeding hemorrhoids were present.  -- Patient continues to have dark-colored stools, no active bleeding.  Abdominal pain + reports improving.  Tolerating oral diet. Systolic blood pressures in 80s to 90s, hemoglobin in the 8 range.  --Discontinue IMC.   --[facility] gastroenterology following, If patient has recurrence of BRBPR with associated hemodynamic instability, recommend obtaining CTA A/P to attempt to localize the bleed with IR consult for possible  angiography/embolization.  appreciate comanagement.  Colorectal surgery followed -surgery is last resort if endoscopy and radiologic measures to ID and control bleeding fail.  Patient reported that she would decline surgery even in an emergency setting.  Appreciate input.  -- Switch to oral PPI.  Advance diet as tolerated.  Discontinue IV fluids.  Monitor hemoglobin levels every 12 hours earlier if symptomatic.  Will transfuse for hemoglobin less than 7 or symptomatic.  Avoid NSAIDs. blood thinners.  Hold off anemia workup at this time as patient had already received blood transfusion.    Dizziness from symptomatic anemia.  Mild TR.  Physical deconditioning from medical illness, senile frailty.  Patient denies any chest pain or palpitations.  Having dizziness from blood loss anemia.  Telemetry no acute events.  Echocardiogram with LVEF of 60 to 65%.  Right ventricular systolic function normal.  Mild tricuspid regurgitation.  Continue telemetry monitoring.  Fall precautions.  Address medical issues as above.  PT   If dizziness persist will start on low-dose midodrine 7/20 if no active bleeding.  Orthostatic vital signs    Lactic acidosis from blood loss anemia cleared.  Lactate 2.7 >1.0 with blood transfusion.    Hypoglycemia from poor intake -resolved.  Hypoglycemia on 7/18, improved with oral intake.  Monitor blood sugars PRN.  Hypoglycemia protocol in place.    Severe malnutrition in the setting of chronic illness.  Unintentional weight loss.  Family reporting unintentional weight loss over the last few months.  Defer weight loss workup to PCP.  Nutrition following, supplements once on orals     CKD stage IIIa.  Baseline creatinine in the 0.9-1.0 range.  Renal function within baseline.  Monitor renal function closely.      Prediabetes with a hemoglobin A1c of 5.7.  Monitor blood sugars periodically.    Incidental Pancreatic lesion  Incidental finding of pancreatic body 0.6cm cystic lesion.   - Outpatient CT or  MRI/MRCP in 6 months per guidelines defer to PCP to order.    Incidental cholelithiasis and gallbladder distention.  CT on admission with Cholelithiasis and gallbladder distention, possibly sequelae of NPO status.   No right upper quadrant tenderness.  Liver enzymes within normal limits.  Bilirubin within normal limits.  Consider ultrasound if symptomatic    Rheumatoid arthritis.  Chronic back pain  History of osteoporosis.  Continue PTA gabapentin.  Continue PTA Plaquenil.  As needed Tylenol, IV/p.o. as needed pain meds.    Orders Placed This Encounter      Regular Diet Adult      DVT Prophylaxis:   Code Status: No CPR- Do NOT Intubate  Disposition: Expected discharge in 1-2 days pending clinical improvement. Discontinue IMC status    Medically Ready for Discharge: Anticipated Tomorrow    Discussed with patient, bedside RN, [facility] team.  Updated patient's  at the bedside -7/19.  > 51 minutes spent by me on the date of service doing chart review, history, exam, documentation & further activities per the note.      Jhon Beach MD        Interval History:        Patient lying in bed.  Denies any chest pain or palpitations at this time.  Denies any nausea or vomiting.  Denies any new tingling or numbness.  Reports some improvement in abdominal pain, continues to have dark-colored blood in bowel movements.  Systolic blood pressures in the 90s, received 1 lt fluid bolus this morning for systolics in 80s  Afebrile.  Tele monitoring sinus rhythm.  Noted blood sugars improving         Physical Exam:        Physical Exam   Temp:  [98.4  F (36.9  C)-98.6  F (37  C)] 98.4  F (36.9  C)  Pulse:  [63-92] 75  Resp:  [14-18] 18  BP: ()/(40-53) 102/53  SpO2:  [93 %-99 %] 93 %    Intake/Output Summary (Last 24 hours) at 7/17/2024 1620  Last data filed at 7/17/2024 1223  Gross per 24 hour   Intake 2093 ml   Output 2500 ml   Net -407 ml       Admission Weight: 38.9 kg (85 lb 11.2 oz)  Current  Weight: 38.9 kg (85 lb 11.2 oz)    PHYSICAL EXAM  GENERAL: Patient is in no distress.  Alert, oriented x 3.  HEART: Regular rate and rhythm. S1S2. No murmurs  LUNGS: Clear to auscultation bilaterally. No expiratory wheeze.  Respirations unlabored  ABDOMEN: Soft, diffuse tenderness, bowel sounds heard.  NEURO: Moving all extremities.  EXTREMITIES: No pedal edema.   SKIN: Warm, dry. No rash  PSYCHIATRY Cooperative       Medications:        Current Facility-Administered Medications   Medication Dose Route Frequency Provider Last Rate Last Admin    acetaminophen (TYLENOL) tablet 650 mg  650 mg Oral Q6H Kike Bailey MD   650 mg at 07/19/24 0814    gabapentin (NEURONTIN) capsule 600 mg  600 mg Oral At Bedtime Rickie Craig NP   600 mg at 07/18/24 2117    hydroxychloroquine (PLAQUENIL) tablet 200 mg  200 mg Oral Daily Rickie Craig NP   200 mg at 07/19/24 0814    lifitegrast (XIIDRA) 5 % opthalmic solution 1 drop  1 drop Both Eyes BID Rickie Craig NP   1 drop at 07/18/24 0826    pantoprazole (PROTONIX) IV push injection 40 mg  40 mg Intravenous Q12H Kike Bailey MD   40 mg at 07/19/24 0815    sodium chloride (PF) 0.9% PF flush 3 mL  3 mL Intracatheter Q8H Rickie Craig NP   3 mL at 07/18/24 1820     Current Facility-Administered Medications   Medication Dose Route Frequency Provider Last Rate Last Admin    glucose gel 15-30 g  15-30 g Oral Q15 Min PRN Jhon Beach MD        Or    dextrose 50 % injection 25-50 mL  25-50 mL Intravenous Q15 Min PRN Jhon Beach MD        Or    glucagon injection 1 mg  1 mg Subcutaneous Q15 Min PRN Jhon Beach MD        gabapentin (NEURONTIN) capsule 300 mg  300 mg Oral BID PRN Rickie Craig NP        hydrALAZINE (APRESOLINE) tablet 10 mg  10 mg Oral Q4H PRN Kike Bailey MD        Or    hydrALAZINE (APRESOLINE) injection 10 mg  10 mg Intravenous Q4H PRN Kike Bailey MD         lidocaine (LMX4) cream   Topical Q1H PRN Rickie Craig NP        lidocaine 1 % 0.1-1 mL  0.1-1 mL Other Q1H PRN Rickie Craig NP        ondansetron (ZOFRAN ODT) ODT tab 4 mg  4 mg Oral Q6H PRN Kike Bailey MD        Or    ondansetron (ZOFRAN) injection 4 mg  4 mg Intravenous Q6H PRN Kike Bailey MD   4 mg at 07/18/24 0600    senna-docusate (SENOKOT-S/PERICOLACE) 8.6-50 MG per tablet 1 tablet  1 tablet Oral BID PRN Kike Bailey MD        Or    senna-docusate (SENOKOT-S/PERICOLACE) 8.6-50 MG per tablet 2 tablet  2 tablet Oral BID PRN Kike Bailey MD        sodium chloride (PF) 0.9% PF flush 3 mL  3 mL Intracatheter q1 min prn Rickie Craig NP                Data:      All new lab and imaging data was reviewed.

## 2024-07-20 ENCOUNTER — APPOINTMENT (OUTPATIENT)
Dept: PHYSICAL THERAPY | Facility: CLINIC | Age: 84
DRG: 377 | End: 2024-07-20
Attending: HOSPITALIST
Payer: COMMERCIAL

## 2024-07-20 LAB
ANION GAP SERPL CALCULATED.3IONS-SCNC: 4 MMOL/L (ref 7–15)
BUN SERPL-MCNC: 16.9 MG/DL (ref 8–23)
CALCIUM SERPL-MCNC: 8.2 MG/DL (ref 8.8–10.4)
CHLORIDE SERPL-SCNC: 111 MMOL/L (ref 98–107)
CREAT SERPL-MCNC: 1.01 MG/DL (ref 0.51–0.95)
EGFRCR SERPLBLD CKD-EPI 2021: 56 ML/MIN/1.73M2
GLUCOSE SERPL-MCNC: 99 MG/DL (ref 70–99)
HCO3 SERPL-SCNC: 29 MMOL/L (ref 22–29)
HGB BLD-MCNC: 7.9 G/DL (ref 11.7–15.7)
HGB BLD-MCNC: 8 G/DL (ref 11.7–15.7)
HGB BLD-MCNC: 8.1 G/DL (ref 11.7–15.7)
POTASSIUM SERPL-SCNC: 3.8 MMOL/L (ref 3.4–5.3)
SODIUM SERPL-SCNC: 144 MMOL/L (ref 135–145)

## 2024-07-20 PROCEDURE — 250N000013 HC RX MED GY IP 250 OP 250 PS 637

## 2024-07-20 PROCEDURE — 99232 SBSQ HOSP IP/OBS MODERATE 35: CPT | Performed by: HOSPITALIST

## 2024-07-20 PROCEDURE — 250N000013 HC RX MED GY IP 250 OP 250 PS 637: Performed by: HOSPITALIST

## 2024-07-20 PROCEDURE — 97116 GAIT TRAINING THERAPY: CPT | Mod: GP

## 2024-07-20 PROCEDURE — 80048 BASIC METABOLIC PNL TOTAL CA: CPT | Performed by: HOSPITALIST

## 2024-07-20 PROCEDURE — 97161 PT EVAL LOW COMPLEX 20 MIN: CPT | Mod: GP

## 2024-07-20 PROCEDURE — 36415 COLL VENOUS BLD VENIPUNCTURE: CPT | Performed by: HOSPITALIST

## 2024-07-20 PROCEDURE — 85018 HEMOGLOBIN: CPT | Performed by: HOSPITALIST

## 2024-07-20 PROCEDURE — 120N000001 HC R&B MED SURG/OB

## 2024-07-20 PROCEDURE — 250N000013 HC RX MED GY IP 250 OP 250 PS 637: Performed by: STUDENT IN AN ORGANIZED HEALTH CARE EDUCATION/TRAINING PROGRAM

## 2024-07-20 RX ADMIN — ACETAMINOPHEN 650 MG: 325 TABLET, FILM COATED ORAL at 02:10

## 2024-07-20 RX ADMIN — HYDROXYCHLOROQUINE SULFATE 200 MG: 200 TABLET ORAL at 08:34

## 2024-07-20 RX ADMIN — PANTOPRAZOLE SODIUM 40 MG: 40 TABLET, DELAYED RELEASE ORAL at 17:50

## 2024-07-20 RX ADMIN — GABAPENTIN 600 MG: 300 CAPSULE ORAL at 21:37

## 2024-07-20 RX ADMIN — PANTOPRAZOLE SODIUM 40 MG: 40 TABLET, DELAYED RELEASE ORAL at 06:33

## 2024-07-20 RX ADMIN — ACETAMINOPHEN 650 MG: 325 TABLET, FILM COATED ORAL at 21:38

## 2024-07-20 RX ADMIN — ACETAMINOPHEN 650 MG: 325 TABLET, FILM COATED ORAL at 08:34

## 2024-07-20 RX ADMIN — ACETAMINOPHEN 650 MG: 325 TABLET, FILM COATED ORAL at 14:10

## 2024-07-20 ASSESSMENT — ACTIVITIES OF DAILY LIVING (ADL)
ADLS_ACUITY_SCORE: 34
ADLS_ACUITY_SCORE: 34
ADLS_ACUITY_SCORE: 33
ADLS_ACUITY_SCORE: 28
ADLS_ACUITY_SCORE: 30
ADLS_ACUITY_SCORE: 28
ADLS_ACUITY_SCORE: 34
ADLS_ACUITY_SCORE: 34
ADLS_ACUITY_SCORE: 28
ADLS_ACUITY_SCORE: 34
ADLS_ACUITY_SCORE: 28
ADLS_ACUITY_SCORE: 34
ADLS_ACUITY_SCORE: 34
ADLS_ACUITY_SCORE: 33
ADLS_ACUITY_SCORE: 28
ADLS_ACUITY_SCORE: 34
ADLS_ACUITY_SCORE: 34
ADLS_ACUITY_SCORE: 28
ADLS_ACUITY_SCORE: 34
ADLS_ACUITY_SCORE: 30
ADLS_ACUITY_SCORE: 34

## 2024-07-20 NOTE — PROGRESS NOTES
[facility]  Hospitalist Progress Note   07/20/2024          Assessment and Plan:       Patrick Olson is an 83 year old female with a past medical history of duodenal ulcers, CKD3, RA and chronic anemia with recurrent GI bleed.  On 7/15/2024 with hematochezia.    Recurrent lower GI bleed -likely diverticular bleed.  Severe symptomatic anemia status post blood and blood product transfusion.  History of GI bleed in the setting of duodenal ulcers and diverticulosis, gastric AVM, small bowel AVM, cecal polyps, hemorrhoids.  Dyspepsia.  Presented with 5 episodes of bright red bleeding, associated lower abdominal cramping and lightheadedness.  --- In ED at the time of initial evaluation tachycardic, hemoglobin 10.6.  --Has had drop in hemoglobin to 6.8, received 1 unit of PRBC on 7/16 am.    --Had spontaneous rebleeding on 7/16, RRT called and transferred to [facility] [place].  Patient received total 5 units of PRBC, 1 unit platelets, 2 units plasma.  **CT on 7/16 -No convincing active arterial GI bleed, pancolitis that could be infectious or inflammatory, findings suggestive of blood in descending colon.  **Tagged red blood cell scan on 7/17 with no evidence of active gastrointestinal bleeding.  ** Status post flexible sigmoidoscopy 7/17 with severe diverticulosis in the rectum, rectosigmoid colon, sigmoid colon, descending colon and transverse colon with no specific source of bleeding identified though blood was found in the entire examined colon.  Nonbleeding hemorrhoids were present.    -- Patient continues to have red-colored stool this morning.  Reports abdominal pain.    Tolerating oral diet. Systolic blood pressures in 90s -100s, hemoglobin at 8.0.    --[facility] gastroenterology following, If patient has recurrence of BRBPR with associated hemodynamic instability, recommend obtaining CTA A/P to attempt to localize the bleed with IR consult for possible angiography/embolization.  appreciate  comanagement.  --Colorectal surgery followed -surgery is last resort if endoscopy and radiologic measures to ID and control bleeding fail.  Patient reported that she would decline surgery even in an emergency setting.  Appreciate input.  -- Continue oral PPI.  Advance diet as tolerated.  Monitor hemoglobin levels every 12 hours earlier if symptomatic.  Will transfuse for hemoglobin less than 7 or symptomatic.  Avoid NSAIDs. blood thinners.  Hold off anemia workup at this time as patient had already received blood transfusion.    Dizziness from symptomatic anemia.  Mild TR.  Physical deconditioning from medical illness, senile frailty.  Patient denies any chest pain or palpitations.  Having dizziness from blood loss anemia.  Telemetry no acute events.  Echocardiogram with LVEF of 60 to 65%.  Right ventricular systolic function normal.  Mild tricuspid regurgitation.  Continue telemetry monitoring.  Physical therapy evaluation.  Fall precautions.  Address medical issues as above.  Orthostatic vital signs    Lactic acidosis from blood loss anemia cleared.  Lactate 2.7 >1.0 with blood transfusion.    Hypoglycemia from poor intake -resolved.  Hypoglycemia on 7/18, improved with oral intake.  Monitor blood sugars PRN.  Hypoglycemia protocol in place.    Severe malnutrition in the setting of chronic illness.  Unintentional weight loss.  Family reporting unintentional weight loss over the last few months.  Defer weight loss workup to PCP.  Nutrition following, supplements once on orals     CKD stage IIIa.  Baseline creatinine in the 0.9-1.0 range.  Renal function within baseline.  Monitor renal function closely.      Prediabetes with a hemoglobin A1c of 5.7.  Monitor blood sugars periodically.    Incidental Pancreatic lesion  Incidental finding of pancreatic body 0.6cm cystic lesion.   - Outpatient CT or MRI/MRCP in 6 months per guidelines defer to PCP to order.    Incidental cholelithiasis and gallbladder distention.  CT on  admission with Cholelithiasis and gallbladder distention, possibly sequelae of NPO status.   No right upper quadrant tenderness.  Liver enzymes within normal limits.  Bilirubin within normal limits.  Consider ultrasound if symptomatic    Rheumatoid arthritis.  Chronic back pain  History of osteoporosis.  Continue PTA gabapentin.  Continue PTA Plaquenil.  As needed Tylenol, IV/p.o. as needed pain meds.    Orders Placed This Encounter      Regular Diet Adult      DVT Prophylaxis:   Code Status: No CPR- Do NOT Intubate  Disposition: Expected discharge in 1-2 days pending stable hemoglobin, improvement in hematochezia.    Medically Ready for Discharge: Anticipated Tomorrow    Discussed with patient, bedside RN  Updated patient's  at the bedside -7/19.  > 35 minutes spent by me on the date of service doing chart review, history, exam, documentation & further activities per the note.      Jhon Beach MD        Interval History:        Patient lying in bed.  Denies any chest pain or palpitations at this time.  Denies any nausea or vomiting.  Denies any new tingling or numbness.  Denies any dizziness.  Reports minimal ambulation.  Reports having some abdominal pain this morning.  Reports johana colored stools yesterday, this morning reports red-colored blood mixed with stools.  Systolic blood pressures in the 90s -100s  Afebrile.  Tele monitoring sinus rhythm.           Physical Exam:        Physical Exam   Temp:  [98.1  F (36.7  C)-98.8  F (37.1  C)] 98.3  F (36.8  C)  Pulse:  [57-93] 74  Resp:  [16-20] 16  BP: ()/(44-62) 119/54  SpO2:  [96 %-98 %] 97 %    Intake/Output Summary (Last 24 hours) at 7/17/2024 1620  Last data filed at 7/17/2024 1223  Gross per 24 hour   Intake 2093 ml   Output 2500 ml   Net -407 ml       Admission Weight: 38.9 kg (85 lb 11.2 oz)  Current Weight: 38.9 kg (85 lb 11.2 oz)    PHYSICAL EXAM  GENERAL: Patient is in no distress.  Alert, oriented x 3.  HEART: Regular rate and rhythm.  S1S2. No murmurs  LUNGS: Respirations unlabored  ABDOMEN: Soft, diffuse tenderness, bowel sounds heard.  NEURO: Moving all extremities.  EXTREMITIES: No pedal edema.   SKIN: Warm, dry. No rash  PSYCHIATRY Cooperative       Medications:        Current Facility-Administered Medications   Medication Dose Route Frequency Provider Last Rate Last Admin    acetaminophen (TYLENOL) tablet 650 mg  650 mg Oral Q6H Kike Bailey MD   650 mg at 07/20/24 0834    gabapentin (NEURONTIN) capsule 600 mg  600 mg Oral At Bedtime Rickie Craig NP   600 mg at 07/19/24 2144    hydroxychloroquine (PLAQUENIL) tablet 200 mg  200 mg Oral Daily Rickie Craig NP   200 mg at 07/20/24 0834    lifitegrast (XIIDRA) 5 % opthalmic solution 1 drop  1 drop Both Eyes BID Rickie Craig NP   1 drop at 07/18/24 0826    pantoprazole (PROTONIX) EC tablet 40 mg  40 mg Oral BID AC Jhon Beach MD   40 mg at 07/20/24 0633    sodium chloride (PF) 0.9% PF flush 3 mL  3 mL Intracatheter Q8H Rickie Craig NP   3 mL at 07/20/24 0834     Current Facility-Administered Medications   Medication Dose Route Frequency Provider Last Rate Last Admin    glucose gel 15-30 g  15-30 g Oral Q15 Min PRN Jhon Beach MD        Or    dextrose 50 % injection 25-50 mL  25-50 mL Intravenous Q15 Min PRN Jhon Beach MD        Or    glucagon injection 1 mg  1 mg Subcutaneous Q15 Min PRN Jhon Beach MD        gabapentin (NEURONTIN) capsule 300 mg  300 mg Oral BID PRN Rickie Craig NP        hydrALAZINE (APRESOLINE) tablet 10 mg  10 mg Oral Q4H PRN Kike Bailey MD        Or    hydrALAZINE (APRESOLINE) injection 10 mg  10 mg Intravenous Q4H PRN Kike Bailey MD        lidocaine (LMX4) cream   Topical Q1H PRN Rickie Craig NP        lidocaine 1 % 0.1-1 mL  0.1-1 mL Other Q1H PRN Rafa, Rickie Jose Martin, NP        ondansetron (ZOFRAN ODT) ODT tab 4 mg  4 mg  Oral Q6H PRN Kike Bailey MD        Or    ondansetron (ZOFRAN) injection 4 mg  4 mg Intravenous Q6H PRN Kike Bailey MD   4 mg at 07/18/24 0600    senna-docusate (SENOKOT-S/PERICOLACE) 8.6-50 MG per tablet 1 tablet  1 tablet Oral BID PRN Kike Bailey MD        Or    senna-docusate (SENOKOT-S/PERICOLACE) 8.6-50 MG per tablet 2 tablet  2 tablet Oral BID PRN Kike Bailey MD        sodium chloride (PF) 0.9% PF flush 3 mL  3 mL Intracatheter q1 min prn Rickie Craig NP                Data:      All new lab and imaging data was reviewed.

## 2024-07-20 NOTE — PLAN OF CARE
Orientation: A&Ox4  Vitals/Pain: VSS on RA. Denies pain except for abdominal discomfort before having a bowel movement.   Tele: SR  Lines/Drains: PIV x2, saline locked.   Skin/Wounds: Skin intact.  GI/: Moderate liquid maroon\red BM x1, small maroon/red smears x2. Adequate UOP. Tolerating diet.   Labs: Abnormal/Trends, Electrolyte Replacement- Last hgb 8.0, recheck ordered for this afternoon.   Ambulation/Assist: A1 with gait belt and walker. Tolerated walking in the hallway with PT.  Sleep Quality: Able to rest comfortably throughout shift.   Plan: Hgb recheck at 1500. Continue to monitor for signs of bleeding.

## 2024-07-20 NOTE — PROGRESS NOTES
07/20/24 1344   Appointment Info   Signing Clinician's Name / Credentials (PT) Haja Willis DPT   Living Environment   People in Home spouse   Current Living Arrangements house   Home Accessibility stairs to enter home   Number of Stairs, Main Entrance 2   Stair Railings, Main Entrance none   Transportation Anticipated family or friend will provide   Living Environment Comments Reports living in a house w/ spouse. 2 SANDOVAL w/o rails. Flight of stairs to basement where laundry is.   Self-Care   Usual Activity Tolerance good   Current Activity Tolerance moderate   Equipment Currently Used at Home none   Fall history within last six months no   Activity/Exercise/Self-Care Comment Reports does not use an AD at baseline. Occaisonally will use a FWW at night to the bathroom. Independent w/ all ADLs.   General Information   Onset of Illness/Injury or Date of Surgery 07/15/24   Referring Physician Jhon Beach MD   Patient/Family Therapy Goals Statement (PT) Return to home   Pertinent History of Current Problem (include personal factors and/or comorbidities that impact the POC) Patrick Olson is an 83 year old female with a past medical history of duodenal ulcers, CKD3, RA and chronic anemia with recurrent GI bleed.  On 7/15/2024 with hematochezia.   Existing Precautions/Restrictions fall   Cognition   Affect/Mental Status (Cognition) WFL   Orientation Status (Cognition) oriented x 4   Follows Commands (Cognition) WFL   Pain Assessment   Patient Currently in Pain No   Integumentary/Edema   Integumentary/Edema no deficits were identifed   Range of Motion (ROM)   ROM Comment WFLs for mobility and transfers no formal testing completed   Strength (Manual Muscle Testing)   Strength Comments Generalized deconditioning noted   Bed Mobility   Comment, (Bed Mobility) Supine to sitting EOB w/ SBA   Transfers   Comment, (Transfers) Sit to stand w/ FWW and SBA   Gait/Stairs (Locomotion)   Comment, (Gait/Stairs) 10 ft w/ FWW and  SBA   Balance   Balance Comments No overt LOB noted   Clinical Impression   Criteria for Skilled Therapeutic Intervention Yes, treatment indicated   PT Diagnosis (PT) Impaired ambulation   Influenced by the following impairments Impaired strength, balance and activity tolerance   Functional limitations due to impairments Impaired ADLs, IADLs and functional mobility   Clinical Presentation (PT Evaluation Complexity) stable   Clinical Presentation Rationale Clinical judgment   Clinical Decision Making (Complexity) low complexity   Planned Therapy Interventions (PT) balance training;bed mobility training;gait training;home exercise program;stair training;strengthening;patient/family education;transfer training;progressive activity/exercise   Risk & Benefits of therapy have been explained evaluation/treatment results reviewed;care plan/treatment goals reviewed;risks/benefits reviewed;current/potential barriers reviewed;participants voiced agreement with care plan;participants included;patient   PT Total Evaluation Time   PT Eval, Low Complexity Minutes (50411) 10   Physical Therapy Goals   PT Frequency Daily   PT Predicted Duration/Target Date for Goal Attainment 07/30/24   PT Goals Bed Mobility;Transfers;Gait;Stairs   PT: Bed Mobility Independent;Supine to/from sit   PT: Transfers Modified independent;Sit to/from stand;Assistive device   PT: Gait Modified independent;150 feet;Rolling walker   PT: Stairs Supervision/stand-by assist;2 stairs   Interventions   Interventions Quick Adds Gait Training;Therapeutic Activity   Therapeutic Activity   Therapeutic Activities: dynamic activities to improve functional performance Minutes (22555) 5   Symptoms Noted During/After Treatment None   Treatment Detail/Skilled Intervention Pt supine in bed at start of session. Agreeable to PT. Upon sitting EOB good sitting balance. Seated BP at 92/63. Upon standing BP taken at 118/57. Sit to stand x1 during session w/ FWW and SBA and x2 w/  no AD and CGA. Seated in bedside chair at end of session w/ all needs met.   Gait Training   Gait Training Minutes (78895) 23   Symptoms Noted During/After Treatment (Gait Training) fatigue   Treatment Detail/Skilled Intervention Pt ambulating ~250 ft x2 w/ FWW and SBA. No overt LOB noted. Slow dilip and downward gaze. Tall posture throughout. Pt completing x5 stairs w/ bilateral rails and CGA. Step to pattern noted. No LOB and good sequencing.   PT Discharge Planning   PT Plan Activity tolerance, trial gait w/o AD, review stairs, dynamic gait, gait posture   PT Discharge Recommendation (DC Rec) home with assist;home with outpatient physical therapy   PT Rationale for DC Rec Pt below baseline mobility but moving well w/ FWW. Currently able to ambulate far distances w/ FWW and SBA. Anticipate when medically ready Pt can DC home w/ assist from spouse as needed and OP PT.   PT Brief overview of current status SBA w/ FWW

## 2024-07-20 NOTE — PLAN OF CARE
Assessment  Principal hospital problem: Recurrent lower GI bleed  General: Alert and oriented x 4.   Vitals/Respiratory: WDL  CV: WDL  Pain: Denied.   IV: Saline locked.   Drains/Parra catheter: Not present.  Skin: Warm, dry, intact.   GI/: Continent, no bm this shift [id number].  Diet: Reg diet.  Activity: Ax 1 with gait belt and walker.    End of shift summary: HGB recheck 8.1 at 18:10, patient asymptomatic (order for hemoglobin transfusion less than 7 or less or symptomatic). Handoff report given to next shift RN. Discharge pending safe disposition plan.

## 2024-07-20 NOTE — PLAN OF CARE
Orientations: x4  Vitals/Pain: VSS on RA. Denies pain this shift  Tele: SR  Lines/Drains: PIVx2 SL   Skin/Wounds: intact  GI/: AUOP via BR, Small soft Maroon/brown BMx2, +BS  Labs: Abnormal/Trends, Electrolyte Replacement- HGB(8.0) creat(1.01)  Ambulation/Assist: Ax1 GB/W   Sleep Quality: good  Plan: monitor HGB and VS amd s/sx of active bleeding. Poss discharge today

## 2024-07-21 ENCOUNTER — APPOINTMENT (OUTPATIENT)
Dept: PHYSICAL THERAPY | Facility: CLINIC | Age: 84
DRG: 377 | End: 2024-07-21
Payer: COMMERCIAL

## 2024-07-21 VITALS
RESPIRATION RATE: 16 BRPM | TEMPERATURE: 97.8 F | HEIGHT: 64 IN | DIASTOLIC BLOOD PRESSURE: 53 MMHG | WEIGHT: 85.7 LBS | SYSTOLIC BLOOD PRESSURE: 101 MMHG | HEART RATE: 66 BPM | BODY MASS INDEX: 14.63 KG/M2 | OXYGEN SATURATION: 98 %

## 2024-07-21 LAB
CREAT SERPL-MCNC: 0.97 MG/DL (ref 0.51–0.95)
EGFRCR SERPLBLD CKD-EPI 2021: 58 ML/MIN/1.73M2
HGB BLD-MCNC: 8 G/DL (ref 11.7–15.7)

## 2024-07-21 PROCEDURE — 250N000013 HC RX MED GY IP 250 OP 250 PS 637: Performed by: HOSPITALIST

## 2024-07-21 PROCEDURE — 99239 HOSP IP/OBS DSCHRG MGMT >30: CPT | Performed by: HOSPITALIST

## 2024-07-21 PROCEDURE — 97116 GAIT TRAINING THERAPY: CPT | Mod: GP

## 2024-07-21 PROCEDURE — 85018 HEMOGLOBIN: CPT | Performed by: HOSPITALIST

## 2024-07-21 PROCEDURE — 250N000013 HC RX MED GY IP 250 OP 250 PS 637

## 2024-07-21 PROCEDURE — 36415 COLL VENOUS BLD VENIPUNCTURE: CPT | Performed by: HOSPITALIST

## 2024-07-21 PROCEDURE — 250N000013 HC RX MED GY IP 250 OP 250 PS 637: Performed by: STUDENT IN AN ORGANIZED HEALTH CARE EDUCATION/TRAINING PROGRAM

## 2024-07-21 PROCEDURE — 82565 ASSAY OF CREATININE: CPT | Performed by: HOSPITALIST

## 2024-07-21 RX ORDER — PANTOPRAZOLE SODIUM 40 MG/1
40 TABLET, DELAYED RELEASE ORAL DAILY
Qty: 30 TABLET | Refills: 0 | Status: SHIPPED | OUTPATIENT
Start: 2024-07-21 | End: 2024-07-23

## 2024-07-21 RX ORDER — AMOXICILLIN 250 MG
1 CAPSULE ORAL 2 TIMES DAILY PRN
Qty: 30 TABLET | Refills: 0 | Status: SHIPPED | OUTPATIENT
Start: 2024-07-21 | End: 2024-09-12

## 2024-07-21 RX ORDER — ACETAMINOPHEN 325 MG/1
650 TABLET ORAL EVERY 6 HOURS
COMMUNITY
Start: 2024-07-21

## 2024-07-21 RX ADMIN — HYDROXYCHLOROQUINE SULFATE 200 MG: 200 TABLET ORAL at 08:18

## 2024-07-21 RX ADMIN — PANTOPRAZOLE SODIUM 40 MG: 40 TABLET, DELAYED RELEASE ORAL at 08:18

## 2024-07-21 RX ADMIN — ACETAMINOPHEN 650 MG: 325 TABLET, FILM COATED ORAL at 13:33

## 2024-07-21 RX ADMIN — SENNOSIDES AND DOCUSATE SODIUM 1 TABLET: 50; 8.6 TABLET ORAL at 13:34

## 2024-07-21 RX ADMIN — ACETAMINOPHEN 650 MG: 325 TABLET, FILM COATED ORAL at 08:18

## 2024-07-21 ASSESSMENT — ACTIVITIES OF DAILY LIVING (ADL)
ADLS_ACUITY_SCORE: 33
ADLS_ACUITY_SCORE: 29
ADLS_ACUITY_SCORE: 33
ADLS_ACUITY_SCORE: 31
ADLS_ACUITY_SCORE: 33
ADLS_ACUITY_SCORE: 29
ADLS_ACUITY_SCORE: 31
ADLS_ACUITY_SCORE: 33
ADLS_ACUITY_SCORE: 29
ADLS_ACUITY_SCORE: 33
ADLS_ACUITY_SCORE: 33

## 2024-07-21 NOTE — PLAN OF CARE
A&O x 4. VSS on RA. Tele: SR. Assist x1 w/GBW. Regular diet, tolerating well. Bilateral PIV SL. Pt denies dizziness. Denies pain. Voiding adequately, +1 formed maroon BM this shift. AM Hgb lab this AM in process, discharge pending Hgb. Continue plan of care.       Goal Outcome Evaluation:      Plan of Care Reviewed With: patient    Overall Patient Progress: improving

## 2024-07-21 NOTE — PROGRESS NOTES
The patient was discharged at 1350 to home with .   Belongings returned to patient.   Medication Rxs were sent to her pharmacy    The patient was discharged home with the following drains: none  The patient was discharged with none surgical incision(s).    Discharge directions reviewed with patient and spouse via reading AVS with teach back method.      Consuelo Delacruz RN on 7/21/2024 at 1:52 PM

## 2024-07-21 NOTE — PLAN OF CARE
"  Problem: Gastrointestinal Bleeding  Goal: Optimal Coping with Acute Illness  Outcome: Progressing  Intervention: Optimize Psychosocial Response  Recent Flowsheet Documentation  Taken 7/21/2024 0800 by Consuelo Delacruz RN  Supportive Measures:   active listening utilized   positive reinforcement provided     Problem: Gastrointestinal Bleeding  Goal: Hemostasis  Outcome: Met  Intervention: Manage Gastrointestinal Bleeding  Recent Flowsheet Documentation  Taken 7/21/2024 0800 by Consuelo Delacruz RN  Bleeding Management: blood products administered  Stabilization Measures: fluid resuscitation initiated  Environmental Support: calm environment promoted     Orientations: A&OX4  Tele:SR  Pain:The patient reports chronic back pain that is treated with scheduled tylenol with improvements.   Vitals:/53   Pulse 66   Temp 97.8  F (36.6  C) (Oral)   Resp 16   Ht 1.613 m (5' 3.5\")   Wt 38.9 kg (85 lb 11.2 oz)   SpO2 98%   BMI 14.94 kg/m    Respiratory:Lung sounds clear. The patient denies SOB and cough.   Cardiac: No edema. Radial and tibial pulses 2+.   Neuros:intact - demonstrates some weakness.  GI: Had 1 black formed stool that was watery with some red blood - MD aware.  Diet: regular - tolerating % of meals ordered.   : Urinating without problems  Skin: thin/fragile; no apparent or reported problems  Lines/Drains: PIV in left and right forearms  Labs:hemoglobin was 8.0 today - stable per MD   Electrolyte Replacement: n/a  Ambulation/Assist: SBA with gait belt and walker  Activity: walked unit with therapies; up and walking in room; up to chair and bathroom  Changes to Plan of Care: Hemoglobin stable today - MD comfortable with discharge with home therapies   Today's Progress: The patient walked unit. Had stool. Hemoglobin stable. Received discharge education.       "

## 2024-07-21 NOTE — DISCHARGE SUMMARY
Discharge Summary  Hospitalist    Date of Admission:  7/15/2024  Date of Discharge:  7/21/2024  Discharging Provider: Jhon Beach MD    Primary Care Physician   Essence Tamayo  Primary Care Provider Phone Number: [phone]  Primary Care Provider Fax Number: [phone]    PRINCIPAL DIAGNOSIS  Recurrent lower GI bleed -likely diverticular bleed.  Severe symptomatic anemia status post blood and blood product transfusion.  Dizziness from symptomatic anemia - improved.   Physical deconditioning from medical illness, senile frailty.  Lactic acidosis from blood loss anemia cleared.  Hypoglycemia from poor intake -resolved.  Severe malnutrition in the setting of chronic illness.  Unintentional weight loss.  Prediabetes with a hemoglobin A1c of 5.7.  Incidental Pancreatic lesion  Incidental cholelithiasis and gallbladder distention.    Past Medical History:   Diagnosis Date    Anemia     CKD (chronic kidney disease) stage 3, GFR 30-59 ml/min (H)     Diverticulosis of large intestine     Osteoarthritis     Osteoporosis     Rheumatoid arthritis (H)     Sensorineural hearing loss     Varicose veins of bilateral lower extremities with other complications        History of Present Illness   Patrick Olson is an 83 year old female who presented with rectal bleeding.    Hospital Course     Patrick Olson is an 83 year old female with history of duodenal ulcers, history of GI bleed, CKD3, rheumatoid arthritis, chronic anemia admitted on 7/15/2024 with hematochezia.      Recurrent lower GI bleed -likely diverticular bleed.  Severe symptomatic anemia status post blood and blood product transfusion.  History of GI bleed in the setting of duodenal ulcers and diverticulosis, gastric AVM, small bowel AVM, cecal polyps, hemorrhoids.  Dyspepsia.  History of chronic anemia.  Presented with 5 episodes of bright red bleeding, associated lower abdominal cramping and lightheadedness. History of previous GI bleed, baseline hemoglobin  in the 10-11 range in 2024.    --In ED at the time of initial evaluation tachycardic, hemoglobin 10.6.  --Had spontaneous profuse rebleeding on 7/16, hemoglobin dropped to 6.8.  Transfer to [facility].   Received total 5 units of PRBC, 1 unit platelets, 2 units plasma.  **CT on 7/16 -No convincing active arterial GI bleed, pancolitis that could be infectious or inflammatory, findings suggestive of blood in descending colon.  **Tagged red blood cell scan on 7/17 with no evidence of active gastrointestinal bleeding.  ** Status post flexible sigmoidoscopy 7/17 with severe diverticulosis in the rectum, rectosigmoid colon, sigmoid colon, descending colon and transverse colon with no specific source of bleeding identified though blood was found in the entire examined colon.  Nonbleeding hemorrhoids were present.    --[facility] gastroenterology followed, If patient has recurrence of BRBPR with associated hemodynamic instability, recommend obtaining CTA A/P to attempt to localize the bleed with IR consult for possible angiography/embolization.   --Colorectal surgery followed -surgery is last resort if endoscopy and radiologic measures to control bleeding fail.  Patient reported that she would decline surgery even in an emergency setting.       -- Patient continue in the last 48 -72 hours patient does not have any active bleeding.  Having dark-colored stools.  Hemodynamically stable.  Hemoglobin stable in the 8 range.  Tolerating oral diet.  Gastroenterology, colorectal surgery signed off on 7/19.  Plan for discharge with close follow-up.    -- Recommend to continue oral Protonix 40 mg daily on discharge.  Discontinued PTA as needed famotidine.    Follow up with primary care provider, Essence Tamayo, within 5 days for hospital follow- up.    Monitor CBC, CMP in 5 to 7 days or earlier if symptomatic.  Follow-up with [facility] gastroenterology team in clinic per schedule.  Advance diet as  tolerated.  Avoid NSAIDs, blood thinners.  Return back to ED if any active bleeding.  Further anemia workup deferred to PCP as patient had already received blood transfusion during this hospital stay     Dizziness from symptomatic anemia - improved.   Mild TR on Echo.  Physical deconditioning from medical illness, senile frailty.  Patient denies any chest pain or palpitations.  Had dizziness from blood loss anemia.  Telemetry no acute events.  Echocardiogram with LVEF of 60 to 65%.  Right ventricular systolic function normal.  Mild tricuspid regurgitation.  Physical therapy recommended home with assistance, outpatient PT.  Able to ambulate.  Recommend fall precautions, advance activity as tolerated.  Recommend to hold off driving until PCP visit.  Monitor home blood pressure, heart rate as able to review on provider visit     Lactic acidosis from blood loss anemia cleared.  Lactate 2.7 >1.0 with blood transfusion.     Hypoglycemia from poor intake -resolved.  Hypoglycemia on 7/18, improved with oral intake.     Severe malnutrition in the setting of chronic illness.  Unintentional weight loss.  Family reporting unintentional weight loss over the last few months.  Nutrition followed, oral supplements  Defer weight loss workup to PCP, establish primary care.     CKD stage IIIa.  Baseline creatinine in the 0.9-1.0 range.  Renal function within baseline.  Monitor renal function in 1 week.     Prediabetes with a hemoglobin A1c of 5.7.  Age-appropriate health maintenance on PCP visit.  Monitor blood sugars periodically hemoglobin A1c likely low in the setting of anemia.      Incidental Pancreatic lesion  Incidental finding of pancreatic body 0.6cm cystic lesion.   Follow-up with outpatient CT or MRI in 6 months, defer PCP to order.     Incidental cholelithiasis and gallbladder distention.  CT on admission with Cholelithiasis and gallbladder distention, possibly sequelae of NPO status.   No right upper quadrant  tenderness.  Liver enzymes, bilirubin within normal limits.    Consider outpatient ultrasound if symptomatic.     Rheumatoid arthritis.  Chronic back pain  History of osteoporosis.  Continue PTA gabapentin.  Continue PTA Plaquenil.  Continue PTA Tylenol.      Jhon Beach MD.    Pending Results   Unresulted Labs Ordered in the Past 30 Days of this Admission       No orders found from 6/15/2024 to 7/16/2024.               Physical Exam   Vitals:    07/15/24 2227   Weight: 38.9 kg (85 lb 11.2 oz)     Vital Signs with Ranges  Temp:  [97.1  F (36.2  C)-98.6  F (37  C)] 97.8  F (36.6  C)  Pulse:  [66-94] 66  Resp:  [16-18] 16  BP: ()/(47-64) 101/53  SpO2:  [92 %-98 %] 98 %  I/O last 3 completed shifts:  In: 360 [P.O.:360]  Out: 3100 [Urine:3100]  PHYSICAL EXAM  GENERAL: Patient is in no distress.  Alert, oriented x 3.  HEART: Regular rate and rhythm. S1S2. No murmurs  LUNGS: Respirations unlabored  ABDOMEN: Soft, diffuse tenderness, bowel sounds heard.  NEURO: Moving all extremities.  EXTREMITIES: No pedal edema.   SKIN: Warm, dry. No rash  PSYCHIATRY Cooperative  )Consultations This Hospital Stay   NUTRITION SERVICES ADULT IP CONSULT  GASTROENTEROLOGY IP CONSULT  GI LUMINAL ADULT IP CONSULT  CARE MANAGEMENT / SOCIAL WORK IP CONSULT  INTERVENTIONAL RADIOLOGY ADULT/PEDS IP CONSULT  COLORECTAL SURGERY IP CONSULT  PHYSICAL THERAPY ADULT IP CONSULT    Time Spent on this Encounter   IJhon MD, personally saw the patient today and spent greater than 30 minutes discharging this patient.. Discussed with patient, her  by the bedside, bedside RN.    Discharge Disposition   Discharged to home  Condition at discharge: Good    Discharge Orders      Physical Therapy  Referral      Reason for your hospital stay    You were admitted to the hospital with gastrointestinal bleeding.  Followed by hospitalist team, [facility] gastroenterology, colorectal surgery.     Follow-up and  recommended labs and tests     Follow up with primary care provider, Essence Tamayo, within 5 days for hospital follow- up.  Monitor CBC, CMP in 5 to 7 days or earlier if symptomatic.    Follow-up with [facility] gastroenterology team in clinic per schedule.    Defer weight loss workup to PCP, establish primary care.  Age-appropriate health maintenance on PCP visit.    Noted to have prediabetes with a hemoglobin A1c of 5.7.  Monitor blood sugars periodically.    Noted to have incidentally finding of pancreatic body 0.6 cm cystic lesion.  Follow-up with outpatient CT or MRI in 6 months, defer PCP to order.    Noted incidentally gallstones, gallbladder distention.  Consider outpatient ultrasound if symptomatic.     Activity    Your activity upon discharge: activity as tolerated.  Fall precautions.  Recommend to hold off driving until PCP visit.     Monitor and record    Monitor daily blood pressure, heart rate review on provider visit.     When to contact your care team    Return back to ED if any active bleeding.     Discharge Instructions    Avoid aspirin, NSAIDs at this time.    Advance activity as tolerated.  Oral diet as tolerated.     Diet    Oral Supplement; Between Meals    Regular Diet Adult       Discharge Medications   Current Discharge Medication List        START taking these medications    Details   acetaminophen (TYLENOL) 325 MG tablet Take 2 tablets (650 mg) by mouth every 6 hours    Associated Diagnoses: Chronic pain of right elbow      pantoprazole (PROTONIX) 40 MG EC tablet Take 1 tablet (40 mg) by mouth daily  Qty: 30 tablet, Refills: 0    Associated Diagnoses: Lower GI bleeding      senna-docusate (SENOKOT-S/PERICOLACE) 8.6-50 MG tablet Take 1 tablet by mouth 2 times daily as needed for constipation  Qty: 30 tablet, Refills: 0    Associated Diagnoses: Lower GI bleeding           CONTINUE these medications which have NOT CHANGED    Details   artificial saliva (BIOTENE DRY MOUTHWASH)  LIQD liquid Swish and spit in mouth 4 times daily as needed for dry mouth      calcium carbonate-vitamin D (CALTRATE) 600-10 MG-MCG per tablet Take 1 tablet by mouth daily      carboxymethylcellulose PF (REFRESH PLUS) 0.5 % ophthalmic solution Place 1 drop into both eyes 3 times daily as needed for dry eyes      clobetasol (TEMOVATE) 0.05 % external ointment Apply topically 2 times daily To right ankle as needed  Qty: 60 g, Refills: 3    Associated Diagnoses: Dermatitis      EPINEPHrine (ANY BX GENERIC EQUIV) 0.3 MG/0.3ML injection 2-pack Inject 0.3 mg into the muscle as needed for anaphylaxis      !! gabapentin (NEURONTIN) 300 MG capsule Take 600 mg by mouth at bedtime      !! gabapentin (NEURONTIN) 300 MG capsule Take 1 capsule (300 mg) by mouth 3 times daily Take 300mg in morning, 300mg in afternoon and 300mg  Qty: 90 capsule, Refills: 1    Associated Diagnoses: Lumbar radiculopathy, acute      hydroxychloroquine (PLAQUENIL) 200 MG tablet Take 1 tablet (200 mg) by mouth daily  Qty: 90 tablet, Refills: 1    Associated Diagnoses: Rheumatoid arthritis involving multiple sites, unspecified whether rheumatoid factor present (H); High risk medication use      lifitegrast (XIIDRA) 5 % opthalmic solution Place 1 drop into both eyes 2 times daily      Multiple Vitamins-Minerals (OCUVITE PRESERVISION PO) Take 1 tablet by mouth 2 times daily       !! - Potential duplicate medications found. Please discuss with provider.        STOP taking these medications       acetaminophen (TYLENOL) 650 MG CR tablet Comments:   Reason for Stopping:         famotidine (PEPCID) 20 MG tablet Comments:   Reason for Stopping:             Allergies   Allergies   Allergen Reactions    Bee Venom Anaphylaxis    Shrimp Anaphylaxis    Evoxac [Cevimeline] Unknown    Prevnar Swelling     Other reaction(s): Edema    Prevnar [Pneumococcal 13-Linda Conj Vacc] Unknown       DATA  Most Recent 3 CBC's:  Recent Labs   Lab Test 07/21/24  0706 07/20/24  1810  07/20/24  1603 07/18/24  1502 07/18/24  0828 07/16/24  1002 07/16/24  0704 07/15/24  1817   WBC  --   --   --   --  7.5  --  4.3 5.4   HGB 8.0* 8.1* 7.9*   < > 10.5*   < > 7.7* 10.6*   MCV  --   --   --   --  94  --  100 100   PLT  --   --   --   --  161  --  161 224    < > = values in this interval not displayed.      Most Recent 3 BMP's:  Recent Labs   Lab Test 07/21/24  0706 07/20/24  0459 07/19/24  1321 07/19/24  1251 07/19/24  0810 07/19/24  0627 07/18/24  0935 07/18/24  0828   NA  --  144  --   --   --  140  --  140   POTASSIUM  --  3.8  --   --   --  4.2  --  3.9   CHLORIDE  --  111*  --   --   --  112*  --  108*   CO2  --  29  --   --   --  21*  --  17*   BUN  --  16.9  --   --   --  15.8  --  17.4   CR 0.97* 1.01*  --   --   --  0.96*  --  0.99*   ANIONGAP  --  4*  --   --   --  7  --  15   EJ  --  8.2*  --   --   --  8.3*  --  8.8   GLC  --  99 113* 134*   < > 91   < > 47*    < > = values in this interval not displayed.     Most Recent 2 LFT's:  Recent Labs   Lab Test 07/18/24 0828 07/16/24  0704   AST 30 23   ALT 15 16   ALKPHOS 55 56   BILITOTAL 0.6 0.5       Most Recent TSH, T4 and A1c Labs:  Recent Labs   Lab Test 07/15/24  1817 03/27/23  1410   TSH  --  1.13   A1C 5.7*  --      Results for orders placed or performed during the hospital encounter of 07/15/24   CTA Abdomen Pelvis with Contrast    Narrative    EXAM: CTA ABDOMEN PELVIS WITH CONTRAST  LOCATION: [facility]  DATE: 7/16/2024    INDICATION: Potential diverticular bleed.  COMPARISON: CTA abdomen and pelvis 11/16/2021.  TECHNIQUE: CT angiogram abdomen pelvis during arterial phase of injection of IV contrast. 2D and 3D MIP reconstructions were performed by the CT technologist. Dose reduction techniques were used.  CONTRAST: 53 mL Isovue-370.    FINDINGS:  ANGIOGRAM ABDOMEN/PELVIS: Mild-to-moderate atherosclerosis. Nonaneurysmal abdominal aorta. The celiac, superior mesenteric, bilateral renal, inferior mesenteric, and  biiliac vessels are patent.    LOWER CHEST: Unremarkable.    HEPATOBILIARY: Similar right hepatic lobe hypodensities. Cholelithiasis and distended gallbladder without pericholecystic inflammation.    PANCREAS: Pancreatic body 0.6 cm cystic lesion (8/40).    SPLEEN: Normal.    ADRENAL GLANDS: Similar mild left adrenal gland thickening.    KIDNEYS/BLADDER: Bilateral renal cysts; no follow-up necessary.    BOWEL: No bowel obstruction. Appendix is normal. Fluid-filled rectum. No convincing acute gastrointestinal bleed. Wall thickening and apparent mural hyperenhancement involving the ascending to descending colon. High-density fluid within the mid   descending colon (6/85).    LYMPH NODES: Normal.    PELVIC ORGANS: No pelvic mass. Tiny fundal fibroid.    MUSCULOSKELETAL: Left hip arthroplasty. Degenerative changes of the spine. No aggressive osseous lesion.      Impression    IMPRESSION:  1.  No convincing active arterial GI bleed.  2.  Pancolitis that could be infectious or inflammatory.  3.  High-density (nonenhancing) debris within the descending colon is nonspecific, although could reflect blood products.  4.  Cholelithiasis and gallbladder distention, possibly sequelae of NPO status. If there is concern for cholecystitis, ultrasound could be performed.  5.  Pancreatic body subcentimeter cystic lesion. Follow-up guidelines below.    REFERENCE:  Revisions of international consensus [place] guidelines for the management of IPMN of the pancreas. Pancreatology 2017;17(5):738-753.    Largest cyst less than 10 mm: CT or MRI/MRCP in 6 months and then every 2 years if no change.     NM GI Bleed    Narrative    EXAM: NM GI BLEED  LOCATION: [facility]  DATE: 7/17/2024    INDICATION: GI bleeding not localized on CTA  COMPARISON: CTA abdomen pelvis 07/16/2024.  TECHNIQUE: 23.1 mCi technetium-99m, in vitro tagged RBCs, IV. Dynamic anterior planar imaging of the abdomen for 60 minutes.    FINDINGS:  Physiologic radiotracer uptake without evidence of gastrointestinal hemorrhage.      Impression    IMPRESSION:    No scintigraphic evidence of active gastrointestinal bleeding.   Echocardiogram Complete     Value    LVEF  60-65%    Narrative    [id number]  [id number]  [id number]  [id number]^NKECHI^REINA     [facility]  Echocardiography Laboratory  [address] [place] [id number]     Name: MACHELLE PERSAUD  MRN: [id number]  : 1940  Study Date: 2024 02:58 PM  Age: 83 yrs  Gender: Female  Patient Location: [facility]  Reason For Study: Syncope  Ordering Physician: REINA ARBOLEDA  Referring Physician: Essence Tamayo  Performed By: Eloina Ferguson     BSA: 1.3 m2  Height: 63 in  Weight: 85 lb  HR: 75  BP: 105/58 mmHg  ______________________________________________________________________________  Procedure  Complete Portable Echo Adult.  ______________________________________________________________________________  Interpretation Summary     Left ventricular systolic function is normal.The visual ejection fraction is  60-65%.Proximal septal thickening is noted.  The right ventricular systolic function is normal.  There is mild (1+) tricuspid regurgitation.  ______________________________________________________________________________  Left Ventricle  The left ventricle is normal in size. Proximal septal thickening is noted.  Left ventricular systolic function is normal. The visual ejection fraction is  60-65%. Diastolic Doppler findings (E/E' ratio and/or other parameters)  suggest left ventricular filling pressures are increased. No regional wall  motion abnormalities noted.     Right Ventricle  The right ventricle is normal size. The right ventricular systolic function is  normal.     Atria  Normal left atrial size. Right atrial size is normal. There is no color  Doppler evidence of an atrial shunt.     Mitral Valve  There is trace mitral regurgitation.     Tricuspid Valve  There  is mild (1+) tricuspid regurgitation. The right ventricular systolic  pressure is approximated at 25.4 mmHg plus the right atrial pressure.     Aortic Valve  The aortic valve is trileaflet. No aortic regurgitation is present. No aortic  stenosis is present.     Pulmonic Valve  There is trace pulmonic valvular regurgitation.     Vessels  The aortic root is normal size. Normal size ascending aorta. Dilation of the  inferior vena cava is present with normal respiratory variation in diameter.     Pericardium  There is no pericardial effusion.     Rhythm  Sinus rhythm was noted.  ______________________________________________________________________________  MMode/2D Measurements & Calculations  IVSd: 1.1 cm     LVIDd: 3.2 cm  LVIDs: 2.1 cm  LVPWd: 1.0 cm  FS: 33.0 %  LV mass(C)d: 94.9 grams  LV mass(C)dI: 70.6 grams/m2  Ao root diam: 2.7 cm  LA dimension: 3.7 cm  asc Aorta Diam: 3.6 cm  LA/Ao: 1.3  LVOT diam: 1.7 cm  LVOT area: 2.2 cm2  Ao root diam index Ht(cm/m): 1.7  Ao root diam index BSA (cm/m2): 2.0  Asc Ao diam index BSA (cm/m2): 2.6  Asc Ao diam index Ht(cm/m): 2.2  LA Volume (BP): 32.6 ml     LA Volume Index (BP): 24.3 ml/m2  RV Base: 3.6 cm  RWT: 0.64  TAPSE: 2.5 cm     Doppler Measurements & Calculations  MV E max jai: 141.0 cm/sec  MV A max jai: 141.2 cm/sec  MV E/A: 1.00  MV dec slope: 514.2 cm/sec2  MV dec time: 0.27 sec  LV V1 max P.3 mmHg  LV V1 max: 114.9 cm/sec  LV V1 VTI: 21.7 cm  SV(LVOT): 48.7 ml  SI(LVOT): 36.2 ml/m2  PA acc time: 0.16 sec  TR max jai: 252.1 cm/sec  TR max P.4 mmHg  E/E' av.8  Lateral E/e': 18.0  Medial E/e': 15.6     RV S Jai: 14.6 cm/sec     ______________________________________________________________________________  Report approved by: Liliana Fish 2024 04:11 PM

## 2024-07-22 ENCOUNTER — ANCILLARY PROCEDURE (OUTPATIENT)
Dept: GENERAL RADIOLOGY | Facility: CLINIC | Age: 84
End: 2024-07-22
Attending: PHYSICAL MEDICINE & REHABILITATION
Payer: COMMERCIAL

## 2024-07-22 ENCOUNTER — OFFICE VISIT (OUTPATIENT)
Dept: ORTHOPEDICS | Facility: CLINIC | Age: 84
End: 2024-07-22
Attending: FAMILY MEDICINE
Payer: COMMERCIAL

## 2024-07-22 VITALS
OXYGEN SATURATION: 100 % | BODY MASS INDEX: 14.51 KG/M2 | HEART RATE: 76 BPM | WEIGHT: 85 LBS | HEIGHT: 64 IN | SYSTOLIC BLOOD PRESSURE: 105 MMHG | DIASTOLIC BLOOD PRESSURE: 62 MMHG

## 2024-07-22 DIAGNOSIS — M51.379 DDD (DEGENERATIVE DISC DISEASE), LUMBOSACRAL: ICD-10-CM

## 2024-07-22 DIAGNOSIS — G89.29 CHRONIC BILATERAL LOW BACK PAIN WITHOUT SCIATICA: ICD-10-CM

## 2024-07-22 DIAGNOSIS — M54.50 CHRONIC BILATERAL LOW BACK PAIN WITHOUT SCIATICA: ICD-10-CM

## 2024-07-22 DIAGNOSIS — G58.8 CLUNEAL NEUROPATHY: ICD-10-CM

## 2024-07-22 DIAGNOSIS — M47.817 FACET ARTHROPATHY, LUMBOSACRAL: ICD-10-CM

## 2024-07-22 DIAGNOSIS — M41.9 SCOLIOSIS OF THORACOLUMBAR SPINE, UNSPECIFIED SCOLIOSIS TYPE: Primary | ICD-10-CM

## 2024-07-22 DIAGNOSIS — M41.9 SCOLIOSIS OF THORACOLUMBAR SPINE, UNSPECIFIED SCOLIOSIS TYPE: ICD-10-CM

## 2024-07-22 PROCEDURE — 99205 OFFICE O/P NEW HI 60 MIN: CPT | Performed by: PHYSICAL MEDICINE & REHABILITATION

## 2024-07-22 PROCEDURE — 72120 X-RAY BEND ONLY L-S SPINE: CPT | Mod: TC | Performed by: RADIOLOGY

## 2024-07-22 PROCEDURE — 72082 X-RAY EXAM ENTIRE SPI 2/3 VW: CPT | Mod: TC | Performed by: RADIOLOGY

## 2024-07-22 PROCEDURE — 99417 PROLNG OP E/M EACH 15 MIN: CPT | Performed by: PHYSICAL MEDICINE & REHABILITATION

## 2024-07-22 ASSESSMENT — ENCOUNTER SYMPTOMS
DIZZINESS: 0
NERVOUS/ANXIOUS: 0
MYALGIAS: 1
SHORTNESS OF BREATH: 0
BOWEL INCONTINENCE: 0
HEARTBURN: 1
HOARSE VOICE: 1
LEG SWELLING: 0
NAUSEA: 0
BRUISES/BLEEDS EASILY: 0
EYE PAIN: 0
PALPITATIONS: 0
HEMATURIA: 0
TROUBLE SWALLOWING: 1
ABDOMINAL PAIN: 0
COUGH: 0
WEIGHT GAIN: 0
FATIGUE: 1
SEIZURES: 0
DEPRESSION: 0
INSOMNIA: 0
DIFFICULTY URINATING: 0
DYSURIA: 0
FEVER: 0
BLOOD IN STOOL: 0
LOSS OF CONSCIOUSNESS: 0
ARTHRALGIAS: 1
DIARRHEA: 1
HEADACHES: 1
VOMITING: 0
WHEEZING: 0
SWOLLEN GLANDS: 0
CONSTIPATION: 1
WEIGHT LOSS: 0
POOR WOUND HEALING: 0

## 2024-07-22 ASSESSMENT — PAIN SCALES - GENERAL: PAINLEVEL: MILD PAIN (3)

## 2024-07-22 NOTE — PROGRESS NOTES
PHYSICAL MEDICINE & REHABILITATION / MEDICAL SPINE        Date:  Jul 22, 2024    Name:  Patrick Olson  YOB: 1940  MRN:  [id number]          REASON FOR CONSULTATION:  Chronic bilateral low back pain without sciatica.        REFERRING PROVIDER:  Mukesh Lantigua DO        CHART REVIEW:  Reviewed 06/06/2024 note from Mukesh Lantigua DO (family medicine-sports medicine).  Ms. Patrick Olson was being seen to follow-up for chronic low back pain.  She had been doing physical therapy.  She had seen pain management and started acupuncture.  Walking increased her pain.  Ms. Patrick Olson was seen at [facility] for consideration of RFA.  She had medial branch blocks that only provided 50% relief.  Ms. Patrick Olson had lumbar facet arthritis and moderate lumbar spinal stenosis.  She had an epidural steroid injection performed in February 2024 that did not provide any relief.  Ms. Patrick Olson was to continue with physical therapy.  There was a discussion of adding a TENS unit.  Ms. Patrick Olson could continue acupuncture if she wanted.  Referral to medical spine was placed.    Reviewed 02/14/2024 note from Sintia Pelayo MD (physical medicine and rehabilitation-pain medicine).  Ms. Patrick Olson had a bilateral L4 nerve root injections.    Reviewed 02/05/2024 note from Sintia Pelayo MD (physical medicine and rehabilitation-pain medicine).  Ms. Patrick Olson had L3-L4 interlaminar epidural steroid injections on 05/17/2022 and 03/14/2023; these were without relief.  Ms. Patrick Olson had medial branch blocks of the bilateral L3, L4, and L5 medial branches/dorsal rami on 01/30/2024 with only 50% diagnostic relief.  The plan was for bilateral L4 nerve root injections and to continue with physical therapy.  Ms. Patrick Olson was to call for Medrol Dosepaks as needed for future travels.  Ms. Patrick Olson was to follow-up in clinic 2 weeks after the procedure.        HISTORY OF PRESENT ILLNESS:  Ms. Patrick NORRIS  "Wesley is an 83-year-old female.  She is retired.  She was a  with the [employer].  Ms. Patrick Olson enjoys traveling.  She previously enjoyed painting before her back pain worsened.  Ms. Patrick Olson likes to walk 1 mile for exercise.    Ms. Patrick Olson states that she was recently hospitalized for a diverticular bleed.    Ms. Patrick Olson states that she has had a left total hip arthroplasty.  She denies any other injuries or surgeries of her mid back, low back, pelvis, hips, thighs, knees, legs, ankles, feet, toes.    Ms. Patrick Olson states that she has had rheumatoid arthritis for 40 years.  She denies any other personal or family history of autoimmune diseases, rheumatologic diseases, gout, pseudogout.  Ms. Patrick Olson denies any personal or family history of neurologic diseases.  She denies any personal or family history of inflammatory bowel diseases.    Ms. Patrick Olson states she began noting bilateral low back pain in 2009.  This pain has been gradually worsening.  Pain is in the bilateral low back, bilateral buttocks, and bilateral lateral hips.  Ms. Patrick Olson began seeking treatment for her low back/buttock/hip pain in 2017/2018.  Pain is equal between the right and left sides.  Low back pain is without radiation.  Low back pain is constant and described as \"dull.\"  Low back pain is currently rated as 3-4/10 in severity.  Low back pain varies from 3/10 to 7/10.  Low back pain worsens with sitting for 1 hour, walking 1 mile, twisting, bending forward, standing for 1 hour.  Ms. Patrick Olson notes relief of her low back pain with lying supine.  She finds some relief with heat.    Ms. Patrick Olson notes that driving worsens her low back pain.  Her low back pain does not keep her awake nor wake her.  Ms. Patrick Olson's sleep is limited as to 4 hours.    Ms. Patrick Olson states that she notes weakness if she overdoes it.  She denies any catching, locking, popping.  MsDoris" Patrick Olson denies any bruising, redness, swelling.  She denies any giveway episodes of her lower extremities.  Ms. Patrick Olson is any tripping, stumbling, falling.  Ms. Patrick Olson will occasionally use assistive devices for ambulation.  She uses a cane or walker when she is traveling.  Ms. Patrick Olson denies numbness, tingling, pins-and-needles.  She denies any saddle anesthesia, bowel incontinence, bladder incontinence.    Ms. Patrick Olson notes that her bilateral lower extremities become weak and tired with walking.  She does note improvement in these symptoms with leaning forward on a shopping cart.    Ms. Patrick Olson noted slight benefit with acupuncture.  With injections, at times, she has noted some benefit, and, at other times she has noted no benefit.  Ms. Patrick Olson notes some benefit with physical therapy.    In terms of pain medications, Ms. Patrick Olson is taking acetaminophen and gabapentin.        REVIEW OF SYSTEMS:  Review of Systems     Constitutional:  Positive for fatigue. Negative for fever, weight loss and weight gain.   HENT:  Positive for tinnitus, trouble swallowing and hoarse voice.    Eyes:  Negative for eye pain, eye pain and decreased vision.   Respiratory:   Negative for cough, shortness of breath and wheezing.    Cardiovascular:  Negative for chest pain, palpitations and leg swelling.   Gastrointestinal:  Positive for heartburn, diarrhea (sometimes) and constipation. Negative for nausea, vomiting, abdominal pain, blood in stool and bowel incontinence.   Genitourinary:  Negative for bladder incontinence, dysuria, hematuria and difficulty urinating.   Musculoskeletal:  Positive for myalgias and arthralgias.   Skin:  Negative for itching, poor wound healing and poor wound healing.   Neurological:  Positive for headaches. Negative for dizziness, seizures, loss of consciousness and difficulty walking.   Endo/Heme:  Negative for anemia, swollen glands and bruises/bleeds  easily.   Psychiatric/Behavioral:  Negative for depression.          ALLERGIES:  Allergies   Allergen Reactions    Bee Venom Anaphylaxis    Shrimp Anaphylaxis    Evoxac [Cevimeline] Unknown    Prevnar Swelling     Other reaction(s): Edema    Prevnar [Pneumococcal 13-Linda Conj Vacc] Unknown         MEDICATIONS:  Current Outpatient Medications   Medication Sig Dispense Refill    acetaminophen (TYLENOL) 325 MG tablet Take 2 tablets (650 mg) by mouth every 6 hours      artificial saliva (BIOTENE DRY MOUTHWASH) LIQD liquid Swish and spit in mouth 4 times daily as needed for dry mouth      calcium carbonate-vitamin D (CALTRATE) 600-10 MG-MCG per tablet Take 1 tablet by mouth daily      carboxymethylcellulose PF (REFRESH PLUS) 0.5 % ophthalmic solution Place 1 drop into both eyes 3 times daily as needed for dry eyes      clobetasol (TEMOVATE) 0.05 % external ointment Apply topically 2 times daily To right ankle as needed 60 g 3    EPINEPHrine (ANY BX GENERIC EQUIV) 0.3 MG/0.3ML injection 2-pack Inject 0.3 mg into the muscle as needed for anaphylaxis      gabapentin (NEURONTIN) 300 MG capsule Take 600 mg by mouth at bedtime      gabapentin (NEURONTIN) 300 MG capsule Take 1 capsule (300 mg) by mouth 3 times daily Take 300mg in morning, 300mg in afternoon and 300mg (Patient taking differently: Take 300 mg by mouth 2 times daily as needed for neuropathic pain) 90 capsule 1    hydroxychloroquine (PLAQUENIL) 200 MG tablet Take 1 tablet (200 mg) by mouth daily 90 tablet 1    lifitegrast (XIIDRA) 5 % opthalmic solution Place 1 drop into both eyes 2 times daily      Multiple Vitamins-Minerals (OCUVITE PRESERVISION PO) Take 1 tablet by mouth 2 times daily      pantoprazole (PROTONIX) 40 MG EC tablet Take 1 tablet (40 mg) by mouth daily 30 tablet 11    senna-docusate (SENOKOT-S/PERICOLACE) 8.6-50 MG tablet Take 1 tablet by mouth 2 times daily as needed for constipation 30 tablet 0         PAST MEDICAL HISTORY:  Past Medical History:    Diagnosis Date    Anemia     CKD (chronic kidney disease) stage 3, GFR 30-59 ml/min (H)     Diverticulosis of large intestine     Osteoarthritis     Osteoporosis     Rheumatoid arthritis (H)     Sensorineural hearing loss     Varicose veins of bilateral lower extremities with other complications          PAST SURGICAL HISTORY:  Past Surgical History:   Procedure Laterality Date    CAPSULE/PILL CAM ENDOSCOPY N/A 11/18/2021    Procedure: IMAGING PROCEDURE, GI TRACT, INTRALUMINAL, VIA CAPSULE;  Surgeon: Deric Rodriguez MD;  [facility]    CAPSULE/PILL CAM ENDOSCOPY N/A 2/14/2023    Procedure: IMAGING PROCEDURE, GI TRACT, INTRALUMINAL, VIA CAPSULE;  Surgeon: Jaime Mesa MD;  [facility]    COLONOSCOPY N/A 03/14/2017    Procedure: COMBINED COLONOSCOPY, SINGLE OR MULTIPLE BIOPSY/POLYPECTOMY BY BIOPSY;  Surgeon: Spencer Downey MD;  [facility]    COLONOSCOPY N/A 9/22/2022    Procedure: COLONOSCOPY, FLEXIBLE, WITH LESION REMOVAL USING SNARE;  Surgeon: Kirby Arthur MD;  Location:  GI    COLONOSCOPY N/A 1/13/2023    Procedure: COLONOSCOPY;  Surgeon: Spencer Downey MD;  [facility]    COLONOSCOPY N/A 8/14/2023    Procedure: Colonoscopy;  Surgeon: Spencer Downey MD;  Location:  GI    ENTEROSCOPY SMALL BOWEL N/A 11/20/2021    Procedure: ENTEROSCOPY, colonoscopy with endoscopic mucosal resection and tattoo placement;  Surgeon: Kirby Arthur MD;  [facility]    ESOPHAGOSCOPY, GASTROSCOPY, DUODENOSCOPY (EGD), COMBINED N/A 2/6/2023    Procedure: ESOPHAGOGASTRODUODENOSCOPY (EGD);  Surgeon: Spencer Downey MD;  Location:  GI    IR VISCERAL EMBOLIZATION  01/22/2021    ORTHOPEDIC SURGERY Left     hip replacment    SIGMOIDOSCOPY FLEXIBLE N/A 01/23/2021    Procedure: SIGMOIDOSCOPY, FLEXIBLE;  Surgeon: Jaime Steinberg MD;  Location: [facility]    SIGMOIDOSCOPY FLEXIBLE N/A 7/17/2024    Procedure: Sigmoidoscopy flexible;  Surgeon: Ritika Woodard MD;  Location: [facility]          FAMILY HISTORY:  History reviewed. No pertinent family history.      SOCIAL HISTORY:  Social History     Socioeconomic History    Marital status:      Spouse name: Not on file    Number of children: Not on file    Years of education: Not on file    Highest education level: Not on file   Occupational History    Not on file   Tobacco Use    Smoking status: Former    Smokeless tobacco: Never   Vaping Use    Vaping status: Never Used   Substance and Sexual Activity    Alcohol use: No    Drug use: No    Sexual activity: Not on file   Other Topics Concern    Not on file   Social History Narrative    - Retired (formerly employed as  at [employer])    - 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[employer])     Social Determinants of Health     Financial Resource Strain: Low Risk  (1/10/2020)    Received from [facility]    Overall Financial Resource Strain (CARDIA)     Difficulty of Paying Living Expenses: Not very hard   Food Insecurity: No Food Insecurity (1/10/2020)    Received from [facility]    Hunger Vital Sign     Worried About Running Out of Food in the Last Year: Never true     Ran Out of Food in the Last Year: Never true   Transportation Needs: No Transportation Needs (1/10/2020)    Received from [facility]    PRAPARE - Transportation     Lack of Transportation (Medical): No     Lack of Transportation (Non-Medical): No   Physical Activity: Sufficiently Active (1/10/2020)    Received from [facility]    Exercise Vital Sign     Days of Exercise per Week: 2 days     Minutes of Exercise per Session: 90 min   Stress: No Stress Concern Present (1/10/2020)    Received from [facility]    [place] [employer] - Occupational Stress Questionnaire     Feeling of Stress : Only a little   Social Connections: Unknown (1/10/2020)    Received from [facility]    Social Connection and Isolation Panel [NHANES]     Frequency of  "Communication with Friends and Family: More than three times a week     Frequency of Social Gatherings with Friends and Family: Once a week     Attends Hindu Services: Never     Active Member of Clubs or Organizations: Not on file     Attends Club or Organization Meetings: Not on file     Marital Status:    Interpersonal Safety: Not At Risk (10/12/2021)    Humiliation, Afraid, Rape, and Kick questionnaire     Fear of Current or Ex-Partner: No     Emotionally Abused: No     Physically Abused: No     Sexually Abused: No   Housing Stability: Not on file         PHYSICAL EXAMINATION:  Vitals:    07/22/24 1411   BP: 105/62   Pulse: 76   SpO2: 100%   Weight: 38.6 kg (85 lb)   Height: 1.613 m (5' 3.5\")       GENERAL:  No acute distress.  Pleasant and cooperative.   PSYCH:  Normal mood and affect.  HEAD:  Normocephalic.  SPEECH:  No dysarthria.  EYES:  No scleral icterus.  Wearing glasses.  EARS:  Hearing is intact to spoken voice.  NOSE:  Midline, symmetric, no rhinorrhea.  LUNGS:  No respiratory distress.  No increased work of breathing.  VASCULAR/PULSES:  Posterior tibial:  Right 2+.  Left 2+.  Dorsalis pedis:  Right 2+.  Left 2+.  LOWER EXTREMITIES: No clubbing or cyanosis bilaterally.  Trace pitting edema bilaterally.    BALANCE AND GAIT: The patient has a reciprocal gait pattern without antalgia.  She is able to heel walk and toe walk without difficulty.  She has slight difficulty with tandem walk.  Double leg squat is performed with mild dynamic knee valgus bilaterally.    INSPECTION:  There is no erythema or ecchymosis of the low back.  There is thoracolumbar scoliosis convex right with a trunk shift to the right.    LUMBOPELVIC PALPATION:  Lumbar Spinous Processes: Mild tenderness L4, L5, S1.  Lumbar Paraspinals:  Right mild tenderness L4, L5.  Left mild tenderness L4, L5.  Posterior Superior Iliac Spine:  Right mild tenderness.  Left mild tenderness.  Superior Cluneal Nerves:  Right mild tenderness.  " Left mild tenderness.  Iliac Crest:  Right not tender.  Left not tender.  Sacroiliac Joint:  Right not tender.  Left not tender.  Hip External Rotators:  Right not tender.  Left not tender.  Ischial Tuberosity:  Right not tender.  Left not tender.  Greater Trochanter:  Right mild tenderness.  Left mild tenderness.  Coccyx: Not tender.    THORACOLUMBAR RANGE OF MOTION:  Forward flexion (85 ): Mildly reduced range of motion, causes slight increase in low back pain on the return from forward flexion, does not cause radiating pain.  Extension (30 ): Moderately reduced range of motion, causes slight increase in low back pain, does not cause radiating pain.  Lateral bending right (30 ):  Moderately reduced range of motion, causes slight increase in low back pain, does not cause radiating pain.  Lateral bending left (30 ):  Moderately reduced range of motion, causes slight increase in low back pain, does not cause radiating pain.  Twisting right with extension:  Moderately reduced range of motion, causes slight increase in low back pain, does not cause radiating pain.  Twisting left with extension:  Moderately reduced range of motion, causes slight increase in low back pain, does not cause radiating pain.    SACROILIAC RANGE OF MOTION:  Standing flexion:  Right -.  Left -.  Seated flexion:  Right -.  Left -.  One-leg stork (Gillet):  Right -.  Left -.  Sacroiliac joint dysfunction:  Right -.  Left -.    HIP RANGE OF MOTION:  Flexion (110 ):  Right 110 .  Left 110 .  Extension (30 ):  Right 30 .  Left 30 .  External rotation (45 ):  Right 40 .  Left 40 .  Internal rotation (35 ):  Right 30 .  Left 30 .    KNEE RANGE OF MOTION:  Extension (0 ):  Right 0 .  Left 0 .  Flexion (135 ):  Right 150 .  Left 150 .    STRENGTH:  Hip flexion:  Right 4/5.  Left 4/5.  Hip abduction:  Right 4/5.  Left 4/5.  Hip external rotation:  Right 4/5.  Left 4/5.  Hip extension:  Right 4/5.  Left 4/5.  Knee extension:  Right 4/5.  Left 4/5.  Knee  flexion:  Right 4/5.  Left 4/5.  Ankle dorsiflexion:  Right 4+/5.  Left 4+/5.  Great toe dorsiflexion:  Right 4+/5.  Left 4+/5.  Ankle plantar flexion:  Right 5/5.  Left 5/5.    SENSATION:  Intact to light touch along right L2, L3, L4, L5, S1, S2.  Intact to light touch along left L2, L3, L4, L5, S1, S2.    REFLEXES:  Patellar (L2-L4):  Right 2+.  Left 2+.  Medial hamstrings (L5-S1):  Right 2+.  Left 2+.  Achilles (S1-S2):  Right 2+.  Left 2+.  Babinski:  Right downgoing.  Left downgoing.  Forced ankle dorsiflexion (clonus):  Right 0 beats.  Left 0 beats.    LUMBOPELVIC SPECIAL TESTS:  Sterling finger:  Right -.  Left -.  Gapping / Distraction:  Right -.  Left -.  Log roll:  Right -.  Left -.  Straight-leg raise (Lasegue):  Right -.  Left -.  Crossed-straight leg raise:  Right -.  Left -.  Resisted straight leg raise:  Right -.  Left -.  FABERE (Víctor):  Right -.  Left -.  FADIR:  Right -.  Left -.  Hip scour:  Right -.  Left -.  Lateral sacral compression:  Right -.  Left -.  Clamshell:  Right -.  Left -.  Femoral nerve stretch:  Right -.  Left -.  Crossed femoral nerve stretch:  Right -.  Left -.  Ely s:  Right +.  Left +.  Yeoman s:  Right -.  Left -.  Midline sacral thrust:  Right -.  Left -.  Noble compression:  Right -.  Left -.        IMAGING:  LUMBAR FLEX/EXT TWO TO THREE VIEWS, SPINE COMPLETE SCOLIOSIS TWO VIEWS  July 22, 2024 3:50 PM      HISTORY: Scoliosis of thoracolumbar spine. Facet arthropathy. Chronic bilateral low back pain. Cluneal neuropathy.     COMPARISON: Lumbar spine MR 3/9/2023.    IMPRESSION: T12 rib-bearing thoracic vertebral bodies with five lumbar-type vertebral bodies.     Convex right curvature of the lumbar spine centered at L2-L3 with Maldonado angle measuring 25 degrees from the superior L1 endplate to the inferior L3 endplate. Rightward listhesis of L3 on L4. Retrolisthesis of L3 on L4 measuring 2 mm on the flexion view appears slightly increased to 3 mm on the extension view.     No  obvious loss of vertebral body height. Mild to moderate degenerative endplate changes and loss of disc height at C5-C6. Marked degenerative endplate changes and loss of disc height in the lumbar spine on the left at L2-L3, bilaterally at L3-L4, and right at L4-L5.     Mild retrolisthesis of L3 on L4 measuring 2 mm on the flexion view appears slightly increased to 3 mm on the extension view.     Positive sagittal balance of 3 cm.         MRI OF THE LUMBAR SPINE WITHOUT CONTRAST  3/9/2023 11:50 AM      COMPARISON: Lumbar spine MRI 07/12/2018     HISTORY: Lumbar radiculopathy, acute. Lumbar degenerative disc disease.     TECHNIQUE: Multiplanar, multisequence MRI images of the lumbar spine were acquired without IV contrast.     FINDINGS: There are five lumbar-type vertebrae for the purposes of this dictation. The conus ends at the distal L1. 2 mm retrolisthesis of L3 on L4. Vertebral body heights are maintained. Nonpathologic marrow signal. Mild Modic type I changes from L2-L3 to L4-L5. Vertebral body heights are maintained. Nonpathologic marrow signal. Mild symmetric atrophy of the posterior paraspinal musculature. Normal diameter of the descending aorta. Redemonstration of a large gallstone. The patient's known bilateral renal cysts are better visualized on the abdomen and pelvis CT 11/16/2021.     T12-L1: Normal disc height and signal.  No herniation. Mild bilateral facet arthropathy.  No spinal canal or neural foraminal stenosis.     L1-L2: Normal disc height and signal. Small broad-based disc bulge. Mild bilateral facet arthropathy.  No spinal canal or neural foraminal stenosis.     L2-L3: Moderate vertebral disc height loss. Loss of the normal T2 signal within the disc. Broad-based disc bulge with superimposed small central disc protrusion. Mild bilateral facet arthropathy. No spinal canal narrowing. No right neuroforaminal narrowing. Mild left neuroforaminal narrowing.     L3-L4: Moderate intervertebral disc  height loss. Loss of the normal T2 signal within the dens. Broad-based disc bulge with superimposed left foraminal disc protrusion, decreased since the prior exam. Moderate bilateral facet arthropathy. Mild spinal canal narrowing. Mild narrowing of the left lateral recess. No right neuroforaminal narrowing. Mild left neural foraminal narrowing.     L4-L5: Mild intervertebral disc height loss. Loss of the normal T2 signal within the disc. Broad-based disc bulge with superimposed right foraminal disc protrusion. Moderate bilateral facet arthropathy. Mild narrowing of the right lateral recess. No spinal canal narrowing. Severe right neuroforaminal narrowing. No left neuroforaminal narrowing.     L5-S1: Mild intervertebral disc height loss. Loss of the normal T2 signal within the disc. Broad-based disc bulge with superimposed small central disc protrusion. Mild bilateral facet arthropathy. Mild narrowing of the lateral recesses. No spinal canal narrowing. No right neural foraminal narrowing. No left neural foraminal narrowing.    IMPRESSION:  1.  Compared to prior lumbar spine MRI 07/12/2018, interval decrease in the previously noted large left foraminal disc protrusion at L3-L4.  2.  Otherwise stable to interval worsening of the moderate multilevel degenerative changes of the lumbar spine.  3.  Mild spinal canal narrowing and mild narrowing of the left lateral recess at L3-L4.  4.  Mild narrowing of the right lateral recess at L4-L5.  5.  Mild narrowing of the lateral recesses at L5-S1.  6.  Severe right neural foraminal narrowing at L4-L5.  7.  Mild left neuroforaminal narrowing at L2-L3 and L3-L4.  8.  Mild Modic type I changes at from L2-L3 to L4-L5.           ASSESSMENT/PLAN:  Ms. Patrick Olson is an 83-year-old female.  She has thoracolumbar scoliosis, convex right.  She has lumbosacral facet arthropathy and lumbosacral degenerative disc disease.  She has not had good pain relief with epidural steroid injections  or medial branch/dorsal ramus blocks of the bilateral L4-L5 and L5-S1 facet joints.  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  Discussed diagnoses, pathophysiologies, and treatment options with Ms. Patrick Olson.  Discussed the options of doing nothing/living with it, physical therapy, oral medications such as gabapentin and pregabalin, superior cluneal nerve block, referral to neurosurgery for decompression of the superior cluneal nerves.  Ms. Patrick Olson will be set up for ultrasound-guided bilateral superior cluneal nerve injections.  She is to follow-up in this clinic in 2 months.        Total Time on encounter:  105 minutes were spent on one more or more of the following:  discussion with patient, history, exam, coordinating care, treatment goals, record review, documenting clinical information, and/or data review.      Donald Coombs MD

## 2024-07-22 NOTE — PATIENT INSTRUCTIONS
Please schedule ultrasound-guided bilateral superior cluneal nerve blocks with me.  The [facility] Procedure Scheduling Team will call you to schedule your procedure in the next 0-3 days.  You can also call them directly at [phone] option 2.    For nursing questions, please call [phone].    For medical records, please call [phone].    Please schedule a follow-up appointment in 2 months.  You can schedule this at the , or you can call [phone].

## 2024-07-22 NOTE — PLAN OF CARE
Physical Therapy Discharge Summary    Reason for therapy discharge:    Discharged to home with outpatient therapy.    Progress towards therapy goal(s). See goals on Care Plan in [facility] electronic health record for goal details.  Goals partially met.  Barriers to achieving goals:   discharge from facility.    Therapy recommendation(s):    Continued therapy is recommended.  Rationale/Recommendations:   .  Continue home exercise program. Pt below baseline mobility but moving well w/ FWW. Currently able to ambulate far distances w/ FWW and SBA. Anticipate when medically ready Pt can DC home w/ assist from spouse as needed and OP PT.  PT Brief overview of current status: SBA w/ FWW      Recommendation above provided by last treating therapist.

## 2024-07-22 NOTE — LETTER
7/22/2024      Patrick Olson  [address] [facility] [address]      Dear Colleague,    Thank you for referring your patient, Patrick Olson, to the [facility]. Please see a copy of my visit note below.    PHYSICAL MEDICINE & REHABILITATION / MEDICAL SPINE        Date:  Jul 22, 2024    Name:  Patrick Olson  YOB: 1940  MRN:  [id number]          REASON FOR CONSULTATION:  Chronic bilateral low back pain without sciatica.        REFERRING PROVIDER:  Mukesh Lantigua DO        CHART REVIEW:  Reviewed 06/06/2024 note from Mukesh Lantigua DO (family medicine-sports medicine).  Ms. Patrick Olson was being seen to follow-up for chronic low back pain.  She had been doing physical therapy.  She had seen pain management and started acupuncture.  Walking increased her pain.  Ms. Patrick Olson was seen at [facility] for consideration of RFA.  She had medial branch blocks that only provided 50% relief.  Ms. Patrick Olson had lumbar facet arthritis and moderate lumbar spinal stenosis.  She had an epidural steroid injection performed in February 2024 that did not provide any relief.  Ms. Patrick Olson was to continue with physical therapy.  There was a discussion of adding a TENS unit.  Ms. Patrick Olson could continue acupuncture if she wanted.  Referral to medical spine was placed.    Reviewed 02/14/2024 note from Sintia Pelayo MD (physical medicine and rehabilitation-pain medicine).  Ms. Patrick Olson had a bilateral L4 nerve root injections.    Reviewed 02/05/2024 note from Sintia Pelayo MD (physical medicine and rehabilitation-pain medicine).  Ms. Patrick Olson had L3-L4 interlaminar epidural steroid injections on 05/17/2022 and 03/14/2023; these were without relief.  Ms. Patrick Olson had medial branch blocks of the bilateral L3, L4, and L5 medial branches/dorsal rami on 01/30/2024 with only 50% diagnostic relief.  The plan was for bilateral L4 nerve root injections  "and to continue with physical therapy.  Ms. Patrick Olson was to call for Medrol Dosepaks as needed for future travels.  Ms. Patrick Olson was to follow-up in clinic 2 weeks after the procedure.        HISTORY OF PRESENT ILLNESS:  Ms. Patrick Olson is an 83-year-old female.  She is retired.  She was a  with the [employer].  Ms. Patrick Olson enjoys traveling.  She previously enjoyed painting before her back pain worsened.  Ms. Patrick Olson likes to walk 1 mile for exercise.    Ms. Patrick Olson states that she was recently hospitalized for a diverticular bleed.    Ms. Patrick Olson states that she has had a left total hip arthroplasty.  She denies any other injuries or surgeries of her mid back, low back, pelvis, hips, thighs, knees, legs, ankles, feet, toes.    Ms. Patrick Olson states that she has had rheumatoid arthritis for 40 years.  She denies any other personal or family history of autoimmune diseases, rheumatologic diseases, gout, pseudogout.  Ms. Patrick Olson denies any personal or family history of neurologic diseases.  She denies any personal or family history of inflammatory bowel diseases.    Ms. Patrick Olson states she began noting bilateral low back pain in 2009.  This pain has been gradually worsening.  Pain is in the bilateral low back, bilateral buttocks, and bilateral lateral hips.  Ms. Patrick Olson began seeking treatment for her low back/buttock/hip pain in 2017/2018.  Pain is equal between the right and left sides.  Low back pain is without radiation.  Low back pain is constant and described as \"dull.\"  Low back pain is currently rated as 3-4/10 in severity.  Low back pain varies from 3/10 to 7/10.  Low back pain worsens with sitting for 1 hour, walking 1 mile, twisting, bending forward, standing for 1 hour.  Ms. Patrick Olson notes relief of her low back pain with lying supine.  She finds some relief with heat.    Ms. Patrick Olson notes that driving worsens her low " back pain.  Her low back pain does not keep her awake nor wake her.  Ms. Patrick Olson's sleep is limited as to 4 hours.    Ms. Patrick Olson states that she notes weakness if she overdoes it.  She denies any catching, locking, popping.  Ms. Patrick Olson denies any bruising, redness, swelling.  She denies any giveway episodes of her lower extremities.  Ms. Patrick Olson is any tripping, stumbling, falling.  Ms. Patrick Olson will occasionally use assistive devices for ambulation.  She uses a cane or walker when she is traveling.  Ms. Patrick Olson denies numbness, tingling, pins-and-needles.  She denies any saddle anesthesia, bowel incontinence, bladder incontinence.    Ms. Patrick Olson notes that her bilateral lower extremities become weak and tired with walking.  She does note improvement in these symptoms with leaning forward on a shopping cart.    Ms. Patrick Olson noted slight benefit with acupuncture.  With injections, at times, she has noted some benefit, and, at other times she has noted no benefit.  Ms. Patrick Olson notes some benefit with physical therapy.    In terms of pain medications, Ms. Patrick Olson is taking acetaminophen and gabapentin.        REVIEW OF SYSTEMS:  Review of Systems     Constitutional:  Positive for fatigue. Negative for fever, weight loss and weight gain.   HENT:  Positive for tinnitus, trouble swallowing and hoarse voice.    Eyes:  Negative for eye pain, eye pain and decreased vision.   Respiratory:   Negative for cough, shortness of breath and wheezing.    Cardiovascular:  Negative for chest pain, palpitations and leg swelling.   Gastrointestinal:  Positive for heartburn, diarrhea (sometimes) and constipation. Negative for nausea, vomiting, abdominal pain, blood in stool and bowel incontinence.   Genitourinary:  Negative for bladder incontinence, dysuria, hematuria and difficulty urinating.   Musculoskeletal:  Positive for myalgias and arthralgias.   Skin:  Negative for  itching, poor wound healing and poor wound healing.   Neurological:  Positive for headaches. Negative for dizziness, seizures, loss of consciousness and difficulty walking.   Endo/Heme:  Negative for anemia, swollen glands and bruises/bleeds easily.   Psychiatric/Behavioral:  Negative for depression.          ALLERGIES:  Allergies   Allergen Reactions     Bee Venom Anaphylaxis     Shrimp Anaphylaxis     Evoxac [Cevimeline] Unknown     Prevnar Swelling     Other reaction(s): Edema     Prevnar [Pneumococcal 13-Linda Conj Vacc] Unknown         MEDICATIONS:  Current Outpatient Medications   Medication Sig Dispense Refill     acetaminophen (TYLENOL) 325 MG tablet Take 2 tablets (650 mg) by mouth every 6 hours       artificial saliva (BIOTENE DRY MOUTHWASH) LIQD liquid Swish and spit in mouth 4 times daily as needed for dry mouth       calcium carbonate-vitamin D (CALTRATE) 600-10 MG-MCG per tablet Take 1 tablet by mouth daily       carboxymethylcellulose PF (REFRESH PLUS) 0.5 % ophthalmic solution Place 1 drop into both eyes 3 times daily as needed for dry eyes       clobetasol (TEMOVATE) 0.05 % external ointment Apply topically 2 times daily To right ankle as needed 60 g 3     EPINEPHrine (ANY BX GENERIC EQUIV) 0.3 MG/0.3ML injection 2-pack Inject 0.3 mg into the muscle as needed for anaphylaxis       gabapentin (NEURONTIN) 300 MG capsule Take 600 mg by mouth at bedtime       gabapentin (NEURONTIN) 300 MG capsule Take 1 capsule (300 mg) by mouth 3 times daily Take 300mg in morning, 300mg in afternoon and 300mg (Patient taking differently: Take 300 mg by mouth 2 times daily as needed for neuropathic pain) 90 capsule 1     hydroxychloroquine (PLAQUENIL) 200 MG tablet Take 1 tablet (200 mg) by mouth daily 90 tablet 1     lifitegrast (XIIDRA) 5 % opthalmic solution Place 1 drop into both eyes 2 times daily       Multiple Vitamins-Minerals (OCUVITE PRESERVISION PO) Take 1 tablet by mouth 2 times daily       pantoprazole  (PROTONIX) 40 MG EC tablet Take 1 tablet (40 mg) by mouth daily 30 tablet 11     senna-docusate (SENOKOT-S/PERICOLACE) 8.6-50 MG tablet Take 1 tablet by mouth 2 times daily as needed for constipation 30 tablet 0         PAST MEDICAL HISTORY:  Past Medical History:   Diagnosis Date     Anemia      CKD (chronic kidney disease) stage 3, GFR 30-59 ml/min (H)      Diverticulosis of large intestine      Osteoarthritis      Osteoporosis      Rheumatoid arthritis (H)      Sensorineural hearing loss      Varicose veins of bilateral lower extremities with other complications          PAST SURGICAL HISTORY:  Past Surgical History:   Procedure Laterality Date     CAPSULE/PILL CAM ENDOSCOPY N/A 11/18/2021    Procedure: IMAGING PROCEDURE, GI TRACT, INTRALUMINAL, VIA CAPSULE;  Surgeon: Deric Rodriguez MD;  Location: [facility]     CAPSULE/PILL CAM ENDOSCOPY N/A 2/14/2023    Procedure: IMAGING PROCEDURE, GI TRACT, INTRALUMINAL, VIA CAPSULE;  Surgeon: Jaime Mesa MD;  Location: [facility]     COLONOSCOPY N/A 03/14/2017    Procedure: COMBINED COLONOSCOPY, SINGLE OR MULTIPLE BIOPSY/POLYPECTOMY BY BIOPSY;  Surgeon: Spencer Downey MD;  Location: [facility]     COLONOSCOPY N/A 9/22/2022    Procedure: COLONOSCOPY, FLEXIBLE, WITH LESION REMOVAL USING SNARE;  Surgeon: Kirby Arthur MD;  Location:  GI     COLONOSCOPY N/A 1/13/2023    Procedure: COLONOSCOPY;  Surgeon: Spencer Downey MD;  Location: [facility]     COLONOSCOPY N/A 8/14/2023    Procedure: Colonoscopy;  Surgeon: Spencer Downey MD;  Location: [facility]     ENTEROSCOPY SMALL BOWEL N/A 11/20/2021    Procedure: ENTEROSCOPY, colonoscopy with endoscopic mucosal resection and tattoo placement;  Surgeon: Kirby Arthur MD;  Location: [facility]     ESOPHAGOSCOPY, GASTROSCOPY, DUODENOSCOPY (EGD), COMBINED N/A 2/6/2023    Procedure: ESOPHAGOGASTRODUODENOSCOPY (EGD);  Surgeon: Spencer Downey MD;  Location: [facility]     IR VISCERAL EMBOLIZATION  01/22/2021     ORTHOPEDIC SURGERY Left      hip replacment     SIGMOIDOSCOPY FLEXIBLE N/A 01/23/2021    Procedure: SIGMOIDOSCOPY, FLEXIBLE;  Surgeon: Jaime Steinberg MD;  Location:  GI     SIGMOIDOSCOPY FLEXIBLE N/A 7/17/2024    Procedure: Sigmoidoscopy flexible;  Surgeon: Ritika Woodard MD;  Location:  GI         FAMILY HISTORY:  History reviewed. No pertinent family history.      SOCIAL HISTORY:  Social History     Socioeconomic History     Marital status:      Spouse name: Not on file     Number of children: Not on file     Years of education: Not on file     Highest education level: Not on file   Occupational History     Not on file   Tobacco Use     Smoking status: Former     Smokeless tobacco: Never   Vaping Use     Vaping status: Never Used   Substance and Sexual Activity     Alcohol use: No     Drug use: No     Sexual activity: Not on file   Other Topics Concern     Not on file   Social History Narrative    - Retired (formerly employed as  at [employer])    - 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[employer])     Social Determinants of Health     Financial Resource Strain: Low Risk  (1/10/2020)    Received from [facility]    Overall Financial Resource Strain (CARDIA)      Difficulty of Paying Living Expenses: Not very hard   Food Insecurity: No Food Insecurity (1/10/2020)    Received from [facility]    Hunger Vital Sign      Worried About Running Out of Food in the Last Year: Never true      Ran Out of Food in the Last Year: Never true   Transportation Needs: No Transportation Needs (1/10/2020)    Received from [facility]    PRAPARE - Transportation      Lack of Transportation (Medical): No      Lack of Transportation (Non-Medical): No   Physical Activity: Sufficiently Active (1/10/2020)    Received from [facility]    Exercise Vital Sign      Days of Exercise per Week: 2 days      Minutes of Exercise per Session: 90 min   Stress: No Stress Concern Present  "(1/10/2020)    Received from [facility]    [employer] [place] [employer] - Occupational Stress Questionnaire      Feeling of Stress : Only a little   Social Connections: Unknown (1/10/2020)    Received from [facility]    Social Connection and Isolation Panel [NHANES]      Frequency of Communication with Friends and Family: More than three times a week      Frequency of Social Gatherings with Friends and Family: Once a week      Attends Gnosticist Services: Never      Active Member of Clubs or Organizations: Not on file      Attends Club or Organization Meetings: Not on file      Marital Status:    Interpersonal Safety: Not At Risk (10/12/2021)    Humiliation, Afraid, Rape, and Kick questionnaire      Fear of Current or Ex-Partner: No      Emotionally Abused: No      Physically Abused: No      Sexually Abused: No   Housing Stability: Not on file         PHYSICAL EXAMINATION:  Vitals:    07/22/24 1411   BP: 105/62   Pulse: 76   SpO2: 100%   Weight: 38.6 kg (85 lb)   Height: 1.613 m (5' 3.5\")       GENERAL:  No acute distress.  Pleasant and cooperative.   PSYCH:  Normal mood and affect.  HEAD:  Normocephalic.  SPEECH:  No dysarthria.  EYES:  No scleral icterus.  Wearing glasses.  EARS:  Hearing is intact to spoken voice.  NOSE:  Midline, symmetric, no rhinorrhea.  LUNGS:  No respiratory distress.  No increased work of breathing.  VASCULAR/PULSES:  Posterior tibial:  Right 2+.  Left 2+.  Dorsalis pedis:  Right 2+.  Left 2+.  LOWER EXTREMITIES: No clubbing or cyanosis bilaterally.  Trace pitting edema bilaterally.    BALANCE AND GAIT: The patient has a reciprocal gait pattern without antalgia.  She is able to heel walk and toe walk without difficulty.  She has slight difficulty with tandem walk.  Double leg squat is performed with mild dynamic knee valgus bilaterally.    INSPECTION:  There is no erythema or ecchymosis of the low back.  There is thoracolumbar scoliosis convex right with a trunk shift to " the right.    LUMBOPELVIC PALPATION:  Lumbar Spinous Processes: Mild tenderness L4, L5, S1.  Lumbar Paraspinals:  Right mild tenderness L4, L5.  Left mild tenderness L4, L5.  Posterior Superior Iliac Spine:  Right mild tenderness.  Left mild tenderness.  Superior Cluneal Nerves:  Right mild tenderness.  Left mild tenderness.  Iliac Crest:  Right not tender.  Left not tender.  Sacroiliac Joint:  Right not tender.  Left not tender.  Hip External Rotators:  Right not tender.  Left not tender.  Ischial Tuberosity:  Right not tender.  Left not tender.  Greater Trochanter:  Right mild tenderness.  Left mild tenderness.  Coccyx: Not tender.    THORACOLUMBAR RANGE OF MOTION:  Forward flexion (85 ): Mildly reduced range of motion, causes slight increase in low back pain on the return from forward flexion, does not cause radiating pain.  Extension (30 ): Moderately reduced range of motion, causes slight increase in low back pain, does not cause radiating pain.  Lateral bending right (30 ):  Moderately reduced range of motion, causes slight increase in low back pain, does not cause radiating pain.  Lateral bending left (30 ):  Moderately reduced range of motion, causes slight increase in low back pain, does not cause radiating pain.  Twisting right with extension:  Moderately reduced range of motion, causes slight increase in low back pain, does not cause radiating pain.  Twisting left with extension:  Moderately reduced range of motion, causes slight increase in low back pain, does not cause radiating pain.    SACROILIAC RANGE OF MOTION:  Standing flexion:  Right -.  Left -.  Seated flexion:  Right -.  Left -.  One-leg stork (Gillet):  Right -.  Left -.  Sacroiliac joint dysfunction:  Right -.  Left -.    HIP RANGE OF MOTION:  Flexion (110 ):  Right 110 .  Left 110 .  Extension (30 ):  Right 30 .  Left 30 .  External rotation (45 ):  Right 40 .  Left 40 .  Internal rotation (35 ):  Right 30 .  Left 30 .    KNEE RANGE OF  MOTION:  Extension (0 ):  Right 0 .  Left 0 .  Flexion (135 ):  Right 150 .  Left 150 .    STRENGTH:  Hip flexion:  Right 4/5.  Left 4/5.  Hip abduction:  Right 4/5.  Left 4/5.  Hip external rotation:  Right 4/5.  Left 4/5.  Hip extension:  Right 4/5.  Left 4/5.  Knee extension:  Right 4/5.  Left 4/5.  Knee flexion:  Right 4/5.  Left 4/5.  Ankle dorsiflexion:  Right 4+/5.  Left 4+/5.  Great toe dorsiflexion:  Right 4+/5.  Left 4+/5.  Ankle plantar flexion:  Right 5/5.  Left 5/5.    SENSATION:  Intact to light touch along right L2, L3, L4, L5, S1, S2.  Intact to light touch along left L2, L3, L4, L5, S1, S2.    REFLEXES:  Patellar (L2-L4):  Right 2+.  Left 2+.  Medial hamstrings (L5-S1):  Right 2+.  Left 2+.  Achilles (S1-S2):  Right 2+.  Left 2+.  Babinski:  Right downgoing.  Left downgoing.  Forced ankle dorsiflexion (clonus):  Right 0 beats.  Left 0 beats.    LUMBOPELVIC SPECIAL TESTS:  Sterling finger:  Right -.  Left -.  Gapping / Distraction:  Right -.  Left -.  Log roll:  Right -.  Left -.  Straight-leg raise (Lasegue):  Right -.  Left -.  Crossed-straight leg raise:  Right -.  Left -.  Resisted straight leg raise:  Right -.  Left -.  FABERE (Víctor):  Right -.  Left -.  FADIR:  Right -.  Left -.  Hip scour:  Right -.  Left -.  Lateral sacral compression:  Right -.  Left -.  Clamshell:  Right -.  Left -.  Femoral nerve stretch:  Right -.  Left -.  Crossed femoral nerve stretch:  Right -.  Left -.  Ely s:  Right +.  Left +.  Yeoman s:  Right -.  Left -.  Midline sacral thrust:  Right -.  Left -.  Noble compression:  Right -.  Left -.        IMAGING:  LUMBAR FLEX/EXT TWO TO THREE VIEWS, SPINE COMPLETE SCOLIOSIS TWO VIEWS  July 22, 2024 3:50 PM      HISTORY: Scoliosis of thoracolumbar spine. Facet arthropathy. Chronic bilateral low back pain. Cluneal neuropathy.     COMPARISON: Lumbar spine MR 3/9/2023.    IMPRESSION: T12 rib-bearing thoracic vertebral bodies with five lumbar-type vertebral bodies.     Convex  right curvature of the lumbar spine centered at L2-L3 with Maldonado angle measuring 25 degrees from the superior L1 endplate to the inferior L3 endplate. Rightward listhesis of L3 on L4. Retrolisthesis of L3 on L4 measuring 2 mm on the flexion view appears slightly increased to 3 mm on the extension view.     No obvious loss of vertebral body height. Mild to moderate degenerative endplate changes and loss of disc height at C5-C6. Marked degenerative endplate changes and loss of disc height in the lumbar spine on the left at L2-L3, bilaterally at L3-L4, and right at L4-L5.     Mild retrolisthesis of L3 on L4 measuring 2 mm on the flexion view appears slightly increased to 3 mm on the extension view.     Positive sagittal balance of 3 cm.         MRI OF THE LUMBAR SPINE WITHOUT CONTRAST  3/9/2023 11:50 AM      COMPARISON: Lumbar spine MRI 07/12/2018     HISTORY: Lumbar radiculopathy, acute. Lumbar degenerative disc disease.     TECHNIQUE: Multiplanar, multisequence MRI images of the lumbar spine were acquired without IV contrast.     FINDINGS: There are five lumbar-type vertebrae for the purposes of this dictation. The conus ends at the distal L1. 2 mm retrolisthesis of L3 on L4. Vertebral body heights are maintained. Nonpathologic marrow signal. Mild Modic type I changes from L2-L3 to L4-L5. Vertebral body heights are maintained. Nonpathologic marrow signal. Mild symmetric atrophy of the posterior paraspinal musculature. Normal diameter of the descending aorta. Redemonstration of a large gallstone. The patient's known bilateral renal cysts are better visualized on the abdomen and pelvis CT 11/16/2021.     T12-L1: Normal disc height and signal.  No herniation. Mild bilateral facet arthropathy.  No spinal canal or neural foraminal stenosis.     L1-L2: Normal disc height and signal. Small broad-based disc bulge. Mild bilateral facet arthropathy.  No spinal canal or neural foraminal stenosis.     L2-L3: Moderate vertebral  disc height loss. Loss of the normal T2 signal within the disc. Broad-based disc bulge with superimposed small central disc protrusion. Mild bilateral facet arthropathy. No spinal canal narrowing. No right neuroforaminal narrowing. Mild left neuroforaminal narrowing.     L3-L4: Moderate intervertebral disc height loss. Loss of the normal T2 signal within the dens. Broad-based disc bulge with superimposed left foraminal disc protrusion, decreased since the prior exam. Moderate bilateral facet arthropathy. Mild spinal canal narrowing. Mild narrowing of the left lateral recess. No right neuroforaminal narrowing. Mild left neural foraminal narrowing.     L4-L5: Mild intervertebral disc height loss. Loss of the normal T2 signal within the disc. Broad-based disc bulge with superimposed right foraminal disc protrusion. Moderate bilateral facet arthropathy. Mild narrowing of the right lateral recess. No spinal canal narrowing. Severe right neuroforaminal narrowing. No left neuroforaminal narrowing.     L5-S1: Mild intervertebral disc height loss. Loss of the normal T2 signal within the disc. Broad-based disc bulge with superimposed small central disc protrusion. Mild bilateral facet arthropathy. Mild narrowing of the lateral recesses. No spinal canal narrowing. No right neural foraminal narrowing. No left neural foraminal narrowing.    IMPRESSION:  1.  Compared to prior lumbar spine MRI 07/12/2018, interval decrease in the previously noted large left foraminal disc protrusion at L3-L4.  2.  Otherwise stable to interval worsening of the moderate multilevel degenerative changes of the lumbar spine.  3.  Mild spinal canal narrowing and mild narrowing of the left lateral recess at L3-L4.  4.  Mild narrowing of the right lateral recess at L4-L5.  5.  Mild narrowing of the lateral recesses at L5-S1.  6.  Severe right neural foraminal narrowing at L4-L5.  7.  Mild left neuroforaminal narrowing at L2-L3 and L3-L4.  8.  Mild Modic  type I changes at from L2-L3 to L4-L5.           ASSESSMENT/PLAN:  Ms. Patrick Olson is an 83-year-old female.  She has thoracolumbar scoliosis, convex right.  She has lumbosacral facet arthropathy and lumbosacral degenerative disc disease.  She has not had good pain relief with epidural steroid injections or medial branch/dorsal ramus blocks of the bilateral L4-L5 and L5-S1 facet joints.  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  Discussed diagnoses, pathophysiologies, and treatment options with Ms. Patrick Olson.  Discussed the options of doing nothing/living with it, physical therapy, oral medications such as gabapentin and pregabalin, superior cluneal nerve block, referral to neurosurgery for decompression of the superior cluneal nerves.  Ms. Patrick Olson will be set up for ultrasound-guided bilateral superior cluneal nerve injections.  She is to follow-up in this clinic in 2 months.        Total Time on encounter:  105 minutes were spent on one more or more of the following:  discussion with patient, history, exam, coordinating care, treatment goals, record review, documenting clinical information, and/or data review.      Donald Coombs MD        Again, thank you for allowing me to participate in the care of your patient.        Sincerely,        Donald Coombs MD

## 2024-07-23 ENCOUNTER — OFFICE VISIT (OUTPATIENT)
Dept: GASTROENTEROLOGY | Facility: CLINIC | Age: 84
End: 2024-07-23
Payer: COMMERCIAL

## 2024-07-23 ENCOUNTER — PATIENT OUTREACH (OUTPATIENT)
Dept: CARE COORDINATION | Facility: CLINIC | Age: 84
End: 2024-07-23

## 2024-07-23 ENCOUNTER — TELEPHONE (OUTPATIENT)
Dept: FAMILY MEDICINE | Facility: CLINIC | Age: 84
End: 2024-07-23

## 2024-07-23 VITALS
WEIGHT: 91.2 LBS | DIASTOLIC BLOOD PRESSURE: 72 MMHG | OXYGEN SATURATION: 99 % | BODY MASS INDEX: 15.9 KG/M2 | SYSTOLIC BLOOD PRESSURE: 120 MMHG | HEART RATE: 77 BPM

## 2024-07-23 DIAGNOSIS — K59.00 CONSTIPATION, UNSPECIFIED CONSTIPATION TYPE: ICD-10-CM

## 2024-07-23 DIAGNOSIS — K57.31 DIVERTICULOSIS OF LARGE INTESTINE WITH HEMORRHAGE: ICD-10-CM

## 2024-07-23 DIAGNOSIS — K92.2 LOWER GI BLEEDING: ICD-10-CM

## 2024-07-23 DIAGNOSIS — N18.31 STAGE 3A CHRONIC KIDNEY DISEASE (H): ICD-10-CM

## 2024-07-23 DIAGNOSIS — Z98.890 HISTORY OF ESOPHAGOGASTRODUODENOSCOPY (EGD): ICD-10-CM

## 2024-07-23 DIAGNOSIS — K92.1 HEMATOCHEZIA: ICD-10-CM

## 2024-07-23 DIAGNOSIS — R10.13 DYSPEPSIA: Primary | ICD-10-CM

## 2024-07-23 DIAGNOSIS — K26.9 DUODENAL ULCER WITHOUT HEMORRHAGE OR PERFORATION AND WITHOUT OBSTRUCTION: ICD-10-CM

## 2024-07-23 DIAGNOSIS — R63.4 LOSS OF WEIGHT: ICD-10-CM

## 2024-07-23 PROCEDURE — 99215 OFFICE O/P EST HI 40 MIN: CPT | Performed by: DIETITIAN, REGISTERED

## 2024-07-23 RX ORDER — PANTOPRAZOLE SODIUM 40 MG/1
40 TABLET, DELAYED RELEASE ORAL DAILY
Qty: 30 TABLET | Refills: 11 | Status: SHIPPED | OUTPATIENT
Start: 2024-07-23

## 2024-07-23 ASSESSMENT — PAIN SCALES - GENERAL: PAINLEVEL: MILD PAIN (2)

## 2024-07-23 NOTE — PROGRESS NOTES
GI CLINIC VISIT    Follow up on GI bleeding and weight loss    ASSESSMENT/PLAN:  #Gastric AVM , small bowel AVM, Cecal polyp, duodenal erosions  #Diverticular hemorrhage  #Hemorrhoids, unspecified hemorrhoid type  #Drug-induced constipation  #Dyspepsia  #Weight loss with BMI 15.9    Patient with recurrent episodes of GI bleeding as outlined below.  Recent significant bleed requiring hospitalization this month and significant blood products transfusion, thought to be most likely related to diverticular bleed versus possible dieulafoy lesion.  CTA A/P without convincing evidence of arterial bleed, tagged red blood cell scan without evidence of active GI bleed, flex sig with severe diverticulosis from rectum to transverse without specific source of bleeding identified.  CRS was consulted but patient noted she would decline surgery.  Reassuringly hemoglobin stable around 8 at time of discharge 2 days ago and has not had any recurrence of rectal bleeding or black stools.  Continue to monitor this closely.  She has primary care appointment today with plan for lab recheck.  She also follows with hematology.  Ongoing hemoglobin/iron monitoring and replacement per primary care and hematology teams.  She should continue to avoid NSAIDs.  Not taking any blood thinners.  She should continue pantoprazole 40 mg once daily. Continue to discuss elective surgical options for diverticular complications in non-emergent setting if/when patient interested in meeting with CRS.    For her chronic dyspepsia she will continue pantoprazole, discussed she can also use famotidine as needed as this seems more helpful to her.    She has some tendency towards constipation at baseline, recommend she use MiraLAX to help regulate stools, increase to 1 capful daily to avoid need for senna which has caused her cramping.    We spent much of today discussing her history of weight loss and concerns for malabsorption.  Discussed that malabsorption  typically comes with loose stools so less suspicious for this.  We have been overall suspicious that weight loss has been attributed to limited diet.  Encouraged liberalization and use of oral nutrition supplements.  Has Hortencia Scott from this hospital.  Additionally discussed dietitian, has referral in place.  She is interested, provided scheduling information.  If increasing concerns for malabsorption could consider pancreatic elastase and fecal fat testing.  She has had no evidence of celiac, gallbladder, pancreatic etiology which were some of her questions today.      Plan:  -- Continue pantoprazole 40 mg once per day  -- Continue famotidine 20 mg as needed up to twice per day for   -- Continue to avoid NSAIDs such as Ibuprofen and Naproxen  -- Continue Miralax for your stools, 1 cap daily  -- MRI/MRCP in 6 months to look at pancreas  -- Meet with Iesha parada dietitian regarding weight loss. Follow up with primary care as well.  -- Small frequent meals, liberalize your diet. Take lactase with dairy so you can eat these products  -- Meet with primary care today, recheck labs  -- Follow up with hematology  -- Follow up with primary care this afternoon with labs    53Minutes was spent on the date of the encounter during chart review, history and exam, documentation, and further activities as noted     Belen Delacruz PA-C  Division of Gastroenterology, Hepatology & Nutrition  [facility]      ---------------------------------------------------------------------------------------------------  HPI:    Ms. Olson is an 83 year old female with RA (stopped methotrexate March 2023, now on hydroxycholoroquine) and secondary Sjogren's, lumbar degenerative disc disease, chronic lumbago without radiculopathy, more recent right shoulder pain, OA, osteoporosis, and recurrent GI bleeding (AVM, possible diverticular, polyp-related --> see below and hemorrhoid) who comes for follow up on GI bleeding and weight loss. She has  "previously followed with [facility] for her GI care (since 2017 per her report) and has undergone endoscopic exams at [facility], [facility], [facility], and [facility] since 2010. She has been seen by multiple providers with our group including Belen Santamaria and Dr. Gatica.    Dating back to at least 2010 Ms. Olson has experienced recurrent overt GI bleeding with anemia.  She underwent inpatient colonoscopic exams with both diverticulosis and hemorrhoids on these exams. Diverticular bleeding had been felt to be a contributor given her clinical presentation, though no active bleeding lesion was ever identified.      She had EGD and colonoscopy 5/26/21 at [facility] for anemia. EGD revealed a non-bleeding gastric AVM which was treated with APC. Colonoscopy demonstrated diverticulosis and small hemorrhoids. Ms. Olson was referred to  given AVM and anemia, but prior to the appointment was admitted to [facility] with hematochezia (per patient this was \"a bit darker\" than her prior bleeding episodes). A CTA did not demonstrate any active extravasation. Thus a capsule was done with bleeding seen in the distal ileum to cecum on prelim capsule read. Colonoscopy with ileal intubation  (20 cm beyond the IC valve) was done thereafter with no active bleeding found, but a very large 30 mm polyp was found on the IC valve and removed via EMR. This was felt to be a likely source of bleeding. No other findings were seen in the area of bleeding noted on capsule endoscopy.      She then had recurrent bleeding in January. She underwent colonoscopy 1/13/23 which showed no inflammation but residual specks of blood throughout bowel (to 30 cm to TI), extensive diverticulosis with no active bleeding. EGD was subsequently completed 2/6/23 which showed duodenal erosion without bleeding, normal stomach and esophagus. VCE the following week 2/14/23 showed multiple localized erosions with no bleeding " in the duodenum (likely first portion), single small angioectasia without bleeding was seen in the small bowel (probable duodenum), and lymphangiectasia in the small bowel (mid small bowel), one of these was peduncluated and polypoid, not bleeding, benign appearing.  A single spot with no bleeding was found in the colon (probable cecum). She completed 12 week course of omeprazole (while off of methotrexate), starting March for dyspepsia and finding of duodenal erosions and reported dyspepsia.    She again had several days of hematochezia that prompted hospitalization in August. She was hospitalized 8/12/23-8/14/23 at [facility], Hgb with mild decrease to 9's, she remained hemodynamically stable. She underwent colonoscopy which showed diverticulosis throughout the colon, no active bleeding noted. Examined ileum was normal. Mild internal hemorrhoids felt not to be source of bleed. Per inpatient assessment, diverticular bleeding most likely reason for repeated episodes of hematochezia.     She has been evaluated by both her PCP and hematology for anemia, which has been attributed to GI blood loss, anemia of chronic disease, and medication effect. .In the setting of GI bleeding she was given IV iron infusions which she has since completed, has taken PO iron supplementation in the past.        -------  Today, patient reports no recurrent bleeding since August. Her prime concern for today's visit is ongoing weight loss. She was told by her dietician to eat more butter and ice cream. Unfortunately she realized that she has lactose intolerance and eating the diary products actually made her lose more weight. In terms of her diet, she was told previously that fatty or spicy foods may worsen diverticular bleeding (I told her that there is no evidence for that). So her food choices are very limited and what she is eating is very bland. She looked at Ensure juice but is worried about sugar content although she has no history of  diabetes.       Today 7/23/24:  Patient had no recurrence of GI bleeding since August 2023 until just over a week ago which prompted hospitalization.    She reports that she had to episodes of fairly significant rectal bleeding at home, presented to the ED 7/15/2024 and had 3 more episodes.  On evaluation there she was tachycardic, hemoglobin was 10.6.  She had spontaneous profuse rebleeding 7/16/2024, with hemoglobin dropped to 6.8.  Required 5 units of blood, 1 unit of platelets, 2 units of plasma.  CTA A/P at that time showed no convincing active arterial bleed, did note pancolitis with findings suggestive of blood in descending colon.  Tagged RBC scan with no evidence of active GI bleed.  Flex sig 7/17 showed severe diverticulosis throughout rectum, rectosigmoid colon, sigmoid, descending and transverse colon with no bleeding source identified no blood was found throughout the entire colon.  Mucosa appeared normal without evidence of colitis.  Nonbleeding hemorrhoids noted.  Colorectal surgery was consulted regarding possible colectomy if bleeding persisted however patient noted she would decline surgery.  She has not on any blood thinners, does not take any NSAIDs.  Discharged home 7/21/24, hemoglobin 8.0 at that time.    Today she reports she is feeling better.  Not had any recurrence of blood in stool, no black stools.  Felt a little constipated when she got home, took senna which resulted in loose brown stools, caused some abdominal cramping.  Prior to hospitalization was having bowel movements daily to every other day.  Taking 1 capful of MiraLAX every other day typically in addition to soluble fiber powder.    She continues to describe dyspepsia, has been present for a long time, at her usual baseline.    Now taking Protonix 40 mg daily.  Lot no longer taking famotidine as needed, she reports this was more helpful for her dyspepsia.    A lot of her concerns today are also regarding weight loss.  Questions  regarding fruits and smoothies with diverticular disease, discussed evidence.  She does note she is lactose intolerant.  Seen by dietitian in the hospital, has Hortencia Yext supplements at home which she is trying to drink, makes her full.  Agreeable to seeing dietitian outpatient.    Has appointment with primary care this afternoon, plan for lab check there.  Establishing with new primary care soon as her PCP is retiring, plans to see Dr. Soliz in the future.      PROBLEM LIST  Patient Active Problem List    Diagnosis Date Noted    Scoliosis of thoracolumbar spine, unspecified scoliosis type 07/22/2024     Priority: Medium    Facet arthropathy, lumbosacral 07/22/2024     Priority: Medium    Lower GI bleeding 07/15/2024     Priority: Medium    Anemia, unspecified type 07/15/2024     Priority: Medium    Chronic bilateral low back pain with bilateral sciatica 01/23/2024     Priority: Medium    Lower GI bleed 08/12/2023     Priority: Medium    Chronic pain of right elbow 07/11/2023     Priority: Medium    Right wrist pain 07/11/2023     Priority: Medium    Lab test negative for COVID-19 virus 11/18/2021     Priority: Medium    Diverticulosis of large intestine      Priority: Medium    Acute lower GI bleeding 01/21/2021     Priority: Medium    Diverticular hemorrhage 01/20/2021     Priority: Medium    Gastrointestinal hemorrhage, unspecified gastrointestinal hemorrhage type 01/19/2021     Priority: Medium    DDD (degenerative disc disease), lumbosacral 03/04/2019     Priority: Medium    GI bleed 03/13/2017     Priority: Medium    SNHL (sensorineural hearing loss) 11/29/2012     Priority: Medium       PERTINENT PAST MEDICAL HISTORY:  Past Medical History:   Diagnosis Date    Anemia     CKD (chronic kidney disease) stage 3, GFR 30-59 ml/min (H)     Diverticulosis of large intestine     Osteoarthritis     Osteoporosis     Rheumatoid arthritis (H)     Sensorineural hearing loss     Varicose veins of bilateral lower  extremities with other complications        PREVIOUS SURGERIES:  Past Surgical History:   Procedure Laterality Date    CAPSULE/PILL CAM ENDOSCOPY N/A 11/18/2021    Procedure: IMAGING PROCEDURE, GI TRACT, INTRALUMINAL, VIA CAPSULE;  Surgeon: Deric Rodriguez MD;  Location: [facility]    CAPSULE/PILL CAM ENDOSCOPY N/A 2/14/2023    Procedure: IMAGING PROCEDURE, GI TRACT, INTRALUMINAL, VIA CAPSULE;  Surgeon: Jaime Mesa MD;  Location: [facility]    COLONOSCOPY N/A 03/14/2017    Procedure: COMBINED COLONOSCOPY, SINGLE OR MULTIPLE BIOPSY/POLYPECTOMY BY BIOPSY;  Surgeon: Spencer Downey MD;  Location: [facility]    COLONOSCOPY N/A 9/22/2022    Procedure: COLONOSCOPY, FLEXIBLE, WITH LESION REMOVAL USING SNARE;  Surgeon: Kirby Arthur MD;  Location:  GI    COLONOSCOPY N/A 1/13/2023    Procedure: COLONOSCOPY;  Surgeon: Spencer Downey MD;  Location: [facility]    COLONOSCOPY N/A 8/14/2023    Procedure: Colonoscopy;  Surgeon: Spencer Downey MD;  Location: [facility]    ENTEROSCOPY SMALL BOWEL N/A 11/20/2021    Procedure: ENTEROSCOPY, colonoscopy with endoscopic mucosal resection and tattoo placement;  Surgeon: Kirby Arthur MD;  Location: [facility]    ESOPHAGOSCOPY, GASTROSCOPY, DUODENOSCOPY (EGD), COMBINED N/A 2/6/2023    Procedure: ESOPHAGOGASTRODUODENOSCOPY (EGD);  Surgeon: Spencer Downey MD;  Location: [facility]    IR VISCERAL EMBOLIZATION  01/22/2021    ORTHOPEDIC SURGERY Left     hip replacment    SIGMOIDOSCOPY FLEXIBLE N/A 01/23/2021    Procedure: SIGMOIDOSCOPY, FLEXIBLE;  Surgeon: Jaime Steinberg MD;  Location:  GI    SIGMOIDOSCOPY FLEXIBLE N/A 7/17/2024    Procedure: Sigmoidoscopy flexible;  Surgeon: Ritika Woodard MD;  Location:  GI       ALLERGIES:   Allergies   Allergen Reactions    Bee Venom Anaphylaxis    Shrimp Anaphylaxis    Evoxac [Cevimeline] Unknown    Prevnar Swelling     Other reaction(s): Edema    Prevnar [Pneumococcal 13-Linda Conj Vacc] Unknown       PERTINENT MEDICATIONS:  Current  Outpatient Medications   Medication Sig Dispense Refill    acetaminophen (TYLENOL) 325 MG tablet Take 2 tablets (650 mg) by mouth every 6 hours      artificial saliva (BIOTENE DRY MOUTHWASH) LIQD liquid Swish and spit in mouth 4 times daily as needed for dry mouth      calcium carbonate-vitamin D (CALTRATE) 600-10 MG-MCG per tablet Take 1 tablet by mouth daily      carboxymethylcellulose PF (REFRESH PLUS) 0.5 % ophthalmic solution Place 1 drop into both eyes 3 times daily as needed for dry eyes      clobetasol (TEMOVATE) 0.05 % external ointment Apply topically 2 times daily To right ankle as needed 60 g 3    EPINEPHrine (ANY BX GENERIC EQUIV) 0.3 MG/0.3ML injection 2-pack Inject 0.3 mg into the muscle as needed for anaphylaxis      gabapentin (NEURONTIN) 300 MG capsule Take 600 mg by mouth at bedtime      gabapentin (NEURONTIN) 300 MG capsule Take 1 capsule (300 mg) by mouth 3 times daily Take 300mg in morning, 300mg in afternoon and 300mg (Patient taking differently: Take 300 mg by mouth 2 times daily as needed for neuropathic pain) 90 capsule 1    hydroxychloroquine (PLAQUENIL) 200 MG tablet Take 1 tablet (200 mg) by mouth daily 90 tablet 1    lifitegrast (XIIDRA) 5 % opthalmic solution Place 1 drop into both eyes 2 times daily      Multiple Vitamins-Minerals (OCUVITE PRESERVISION PO) Take 1 tablet by mouth 2 times daily      pantoprazole (PROTONIX) 40 MG EC tablet Take 1 tablet (40 mg) by mouth daily 30 tablet 0    senna-docusate (SENOKOT-S/PERICOLACE) 8.6-50 MG tablet Take 1 tablet by mouth 2 times daily as needed for constipation 30 tablet 0     No current facility-administered medications for this visit.       SOCIAL HISTORY:  Social History     Socioeconomic History    Marital status:      Spouse name: Not on file    Number of children: Not on file    Years of education: Not on file    Highest education level: Not on file   Occupational History    Not on file   Tobacco Use    Smoking status: Former     Smokeless tobacco: Never   Vaping Use    Vaping status: Never Used   Substance and Sexual Activity    Alcohol use: No    Drug use: No    Sexual activity: Not on file   Other Topics Concern    Not on file   Social History Narrative    - Retired (formerly employed as  at [employer])    - 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[employer])     Social Determinants of Health     Financial Resource Strain: Low Risk  (1/10/2020)    Received from [facility]    Overall Financial Resource Strain (CARDIA)     Difficulty of Paying Living Expenses: Not very hard   Food Insecurity: No Food Insecurity (1/10/2020)    Received from [facility]    Hunger Vital Sign     Worried About Running Out of Food in the Last Year: Never true     Ran Out of Food in the Last Year: Never true   Transportation Needs: No Transportation Needs (1/10/2020)    Received from [facility]    PRAPARE - Transportation     Lack of Transportation (Medical): No     Lack of Transportation (Non-Medical): No   Physical Activity: Sufficiently Active (1/10/2020)    Received from [facility]    Exercise Vital Sign     Days of Exercise per Week: 2 days     Minutes of Exercise per Session: 90 min   Stress: No Stress Concern Present (1/10/2020)    Received from [facility]    [employer] - Occupational Stress Questionnaire     Feeling of Stress : Only a little   Social Connections: Unknown (1/10/2020)    Received from [facility]    Social Connection and Isolation Panel [NHANES]     Frequency of Communication with Friends and Family: More than three times a week     Frequency of Social Gatherings with Friends and Family: Once a week     Attends Jainism Services: Never     Active Member of Clubs or Organizations: Not on file     Attends Club or Organization Meetings: Not on file     Marital Status:    Interpersonal Safety: Not At Risk (10/12/2021)    Humiliation,  Afraid, Rape, and Kick questionnaire     Fear of Current or Ex-Partner: No     Emotionally Abused: No     Physically Abused: No     Sexually Abused: No   Housing Stability: Not on file       FAMILY HISTORY:  No family history on file.    Past/family/social history reviewed and no changes    PHYSICAL EXAMINATION:  Vitals /72   Pulse 77   Wt 41.4 kg (91 lb 3.2 oz)   SpO2 99%   BMI 15.90 kg/m     Wt   Wt Readings from Last 2 Encounters:   07/23/24 41.4 kg (91 lb 3.2 oz)   07/22/24 38.6 kg (85 lb)      Gen: Pt sitting up in NAD, interactive and cooperative on exam  Eyes: sclerae anicteric, no injection  Cardiac: RRR, nl S1, S2, no peripheral edema  Resp: Clear on anterior exam  GI: Normoactive BS, abd soft,, nontender  Skin: Warm, dry, no jaundice, nails appear healthy  Neuro: alert, oriented, answers questions appropriately      PERTINENT STUDIES:    Lab on 01/03/2022   Component Date Value Ref Range Status    Rheumatoid Factor 01/03/2022 8  <12 IU/mL Final    Sodium 01/03/2022 142  133 - 144 mmol/L Final    Potassium 01/03/2022 4.6  3.4 - 5.3 mmol/L Final    Chloride 01/03/2022 109  94 - 109 mmol/L Final    Carbon Dioxide (CO2) 01/03/2022 29  20 - 32 mmol/L Final    Anion Gap 01/03/2022 4  3 - 14 mmol/L Final    Urea Nitrogen 01/03/2022 18  7 - 30 mg/dL Final    Creatinine 01/03/2022 0.99  0.52 - 1.04 mg/dL Final    Calcium 01/03/2022 9.6  8.5 - 10.1 mg/dL Final    Glucose 01/03/2022 104* 70 - 99 mg/dL Final    Alkaline Phosphatase 01/03/2022 75  40 - 150 U/L Final    AST 01/03/2022 24  0 - 45 U/L Final    ALT 01/03/2022 24  0 - 50 U/L Final    Protein Total 01/03/2022 7.5  6.8 - 8.8 g/dL Final    Albumin 01/03/2022 3.9  3.4 - 5.0 g/dL Final    Bilirubin Total 01/03/2022 0.4  0.2 - 1.3 mg/dL Final    GFR Estimate 01/03/2022 58* >60 mL/min/1.73m2 Final    CRP Inflammation 01/03/2022 <2.9  0.0 - 8.0 mg/L Final    Cyclic Citrullinated Peptide Antib* 01/03/2022 3.6  <7.0 U/mL Final    Erythrocyte Sedimentation  Rate 01/03/2022 39* 0 - 30 mm/hr Final    WBC Count 01/03/2022 3.9* 4.0 - 11.0 10e3/uL Final    RBC Count 01/03/2022 3.27* 3.80 - 5.20 10e6/uL Final    Hemoglobin 01/03/2022 10.5* 11.7 - 15.7 g/dL Final    Hematocrit 01/03/2022 32.5* 35.0 - 47.0 % Final    MCV 01/03/2022 99  78 - 100 fL Final    MCH 01/03/2022 32.1  26.5 - 33.0 pg Final    MCHC 01/03/2022 32.3  31.5 - 36.5 g/dL Final    RDW 01/03/2022 13.7  10.0 - 15.0 % Final    Platelet Count 01/03/2022 226  150 - 450 10e3/uL Final    % Neutrophils 01/03/2022 55  % Final    % Lymphocytes 01/03/2022 32  % Final    % Monocytes 01/03/2022 10  % Final    % Eosinophils 01/03/2022 4  % Final    % Basophils 01/03/2022 0  % Final    Absolute Neutrophils 01/03/2022 2.2  1.6 - 8.3 10e3/uL Final    Absolute Lymphocytes 01/03/2022 1.3  0.8 - 5.3 10e3/uL Final    Absolute Monocytes 01/03/2022 0.4  0.0 - 1.3 10e3/uL Final    Absolute Eosinophils 01/03/2022 0.1  0.0 - 0.7 10e3/uL Final    Absolute Basophils 01/03/2022 0.0  0.0 - 0.2 10e3/uL Final       Video Capsule Endoscopy 2/14/23:  Findings:        The gastric passage time of the capsule enteroscope was 5-hours 1-minute.        The small bowel passage time of the capsule enteroscope was 1-hour 25-minutes.        The entire examined stomach was normal.        Multiple localized erosions with no bleeding were found in the duodenum        (probable first portion of the duodenum).        A single small angioectasia without bleeding was seen in the small bowel        (probable duodenum).        Lymphangiectasia was found in the small bowel (mid small bowel). One of        these was peduncluated and polypoid, not bleeding, benign appearing.        A single spot with no bleeding was found in the colon (probable cecum).                                                                Impression:              - Normal stomach.   - Duodenal erosion.   - A single angioectasia without bleeding in the duodenum.   - Small bowel  lymphangiectasia.   - A single mucosal spot with no bleeding in the colon.   - No bleeding seen during exam.   - The capsule entered the colon.       EGD 02/06/23  Impression:              - Normal esophagus.   - Normal stomach.   - Duodenal erosion without bleeding.   - No specimens collected.       Colonoscopy 01/13/23  Findings:        Residual specks of blood throughout (exam to 30 cm into the TI). No        inflammation.        Residual small amounts of blood mixed with prep liquid. Extensive        diverticulosis throughout the colon, left > right. Diverticuli were        carefully examined, but no active bleeding was seen. Site of mucosectomy        at the IC valve was unremarkable.      Impression:              - No specimens collected.       Colonoscopy 8/12/23  Findings:       Many small-mouthed diverticula were found in the entire colon. Most        pronounced diverticulosis is in the sigmoid colon. Residual blood was        seen throughout the colon, somewhat more on the left side. No active        bleeding was noted.        The terminal ileum appeared normal. Slight tinging with blood, likely        washed up or brought in by the scope.        Mild internal hemorrhoids were noted, not a source of bleeding.                                                                                    Impression:              - Diverticulosis in the entire examined colon.   - The examined portion of the ileum was normal.   - No specimens collected.

## 2024-07-23 NOTE — NURSING NOTE
Chief Complaint   Patient presents with    Follow Up       Vitals:    07/23/24 0856   BP: 120/72   Pulse: 77   SpO2: 99%   Weight: 41.4 kg (91 lb 3.2 oz)       Body mass index is 15.9 kg/m .    Ynes Nova

## 2024-07-23 NOTE — TELEPHONE ENCOUNTER
Reason for Call:  Appointment Request    Patient requesting this type of appt:  Preventive     Requested provider: Essence Tamayo    Reason patient unable to be scheduled: Not within requested timeframe    When does patient want to be seen/preferred time:  Anytime this month or next. Pt is gone from Aug 2-9.     Comments: Pt has annual appt on 11/11/2024 that she wants to get pushed up to be seen sooner. Please reach out to pt and get her in sooner if possible. Pt will be gone from Aug 2-9.     Could we send this information to you in [employer] or would you prefer to receive a phone call?:   Patient would prefer a phone call   Okay to leave a detailed message?: Yes at Cell number on file:    Telephone Information:   Mobile [phone]       Call taken on 7/23/2024 at 4:56 PM by Lela Loving

## 2024-07-23 NOTE — LETTER
7/23/2024      Patrick Olson  [address] [facility]  [facility] [address]      Dear Colleague,    Thank you for referring your patient, Patrick Olson, to the [facility] [place]. Please see a copy of my visit note below.    GI CLINIC VISIT    Follow up on GI bleeding and weight loss    ASSESSMENT/PLAN:  #Gastric AVM , small bowel AVM, Cecal polyp, duodenal erosions  #Diverticular hemorrhage  #Hemorrhoids, unspecified hemorrhoid type  #Drug-induced constipation  #Dyspepsia  #Weight loss with BMI 15.9    Patient with recurrent episodes of GI bleeding as outlined below.  Recent significant bleed requiring hospitalization this month and significant blood products transfusion, thought to be most likely related to diverticular bleed versus possible dieulafoy lesion.  CTA A/P without convincing evidence of arterial bleed, tagged red blood cell scan without evidence of active GI bleed, flex sig with severe diverticulosis from rectum to transverse without specific source of bleeding identified.  CRS was consulted but patient noted she would decline surgery.  Reassuringly hemoglobin stable around 8 at time of discharge 2 days ago and has not had any recurrence of rectal bleeding or black stools.  Continue to monitor this closely.  She has primary care appointment today with plan for lab recheck.  She also follows with hematology.  Ongoing hemoglobin/iron monitoring and replacement per primary care and hematology teams.  She should continue to avoid NSAIDs.  Not taking any blood thinners.  She should continue pantoprazole 40 mg once daily. Continue to discuss elective surgical options for diverticular complications in non-emergent setting if/when patient interested in meeting with CRS.    For her chronic dyspepsia she will continue pantoprazole, discussed she can also use famotidine as needed as this seems more helpful to her.    She has some tendency towards constipation at baseline,  recommend she use MiraLAX to help regulate stools, increase to 1 capful daily to avoid need for senna which has caused her cramping.    We spent much of today discussing her history of weight loss and concerns for malabsorption.  Discussed that malabsorption typically comes with loose stools so less suspicious for this.  We have been overall suspicious that weight loss has been attributed to limited diet.  Encouraged liberalization and use of oral nutrition supplements.  Has Hortencia Scott from this hospital.  Additionally discussed dietitian, has referral in place.  She is interested, provided scheduling information.  If increasing concerns for malabsorption could consider pancreatic elastase and fecal fat testing.  She has had no evidence of celiac, gallbladder, pancreatic etiology which were some of her questions today.      Plan:  -- Continue pantoprazole 40 mg once per day  -- Continue famotidine 20 mg as needed up to twice per day for   -- Continue to avoid NSAIDs such as Ibuprofen and Naproxen  -- Continue Miralax for your stools, 1 cap daily  -- MRI/MRCP in 6 months to look at pancreas  -- Meet with Iesha parada dietitian regarding weight loss. Follow up with primary care as well.  -- Small frequent meals, liberalize your diet. Take lactase with dairy so you can eat these products  -- Meet with primary care today, recheck labs  -- Follow up with hematology  -- Follow up with primary care this afternoon with labs    53Minutes was spent on the date of the encounter during chart review, history and exam, documentation, and further activities as noted     Belen Delacruz PA-C  Division of Gastroenterology, Hepatology & Nutrition  [facility]      ---------------------------------------------------------------------------------------------------  HPI:    Ms. Olson is an 83 year old female with RA (stopped methotrexate March 2023, now on hydroxycholoroquine) and secondary Sjogren's, lumbar degenerative disc  "disease, chronic lumbago without radiculopathy, more recent right shoulder pain, OA, osteoporosis, and recurrent GI bleeding (AVM, possible diverticular, polyp-related --> see below and hemorrhoid) who comes for follow up on GI bleeding and weight loss. She has previously followed with [facility] for her GI care (since 2017 per her report) and has undergone endoscopic exams at [facility], [facility], and [facility] since 2010. She has been seen by multiple providers with our group including Belen Santamaria and Dr. Gatica.    Dating back to at least 2010 Ms. Olson has experienced recurrent overt GI bleeding with anemia.  She underwent inpatient colonoscopic exams with both diverticulosis and hemorrhoids on these exams. Diverticular bleeding had been felt to be a contributor given her clinical presentation, though no active bleeding lesion was ever identified.      She had EGD and colonoscopy 5/26/21 at [facility] for anemia. EGD revealed a non-bleeding gastric AVM which was treated with APC. Colonoscopy demonstrated diverticulosis and small hemorrhoids. Ms. Olson was referred to  given AVM and anemia, but prior to the appointment was admitted to [facility] with hematochezia (per patient this was \"a bit darker\" than her prior bleeding episodes). A CTA did not demonstrate any active extravasation. Thus a capsule was done with bleeding seen in the distal ileum to cecum on prelim capsule read. Colonoscopy with ileal intubation  (20 cm beyond the IC valve) was done thereafter with no active bleeding found, but a very large 30 mm polyp was found on the IC valve and removed via EMR. This was felt to be a likely source of bleeding. No other findings were seen in the area of bleeding noted on capsule endoscopy.      She then had recurrent bleeding in January. She underwent colonoscopy 1/13/23 which showed no inflammation but residual specks of blood throughout bowel (to " 30 cm to TI), extensive diverticulosis with no active bleeding. EGD was subsequently completed 2/6/23 which showed duodenal erosion without bleeding, normal stomach and esophagus. VCE the following week 2/14/23 showed multiple localized erosions with no bleeding in the duodenum (likely first portion), single small angioectasia without bleeding was seen in the small bowel (probable duodenum), and lymphangiectasia in the small bowel (mid small bowel), one of these was peduncluated and polypoid, not bleeding, benign appearing.  A single spot with no bleeding was found in the colon (probable cecum). She completed 12 week course of omeprazole (while off of methotrexate), starting March for dyspepsia and finding of duodenal erosions and reported dyspepsia.    She again had several days of hematochezia that prompted hospitalization in August. She was hospitalized 8/12/23-8/14/23 at [facility], Hgb with mild decrease to 9's, she remained hemodynamically stable. She underwent colonoscopy which showed diverticulosis throughout the colon, no active bleeding noted. Examined ileum was normal. Mild internal hemorrhoids felt not to be source of bleed. Per inpatient assessment, diverticular bleeding most likely reason for repeated episodes of hematochezia.     She has been evaluated by both her PCP and hematology for anemia, which has been attributed to GI blood loss, anemia of chronic disease, and medication effect. .In the setting of GI bleeding she was given IV iron infusions which she has since completed, has taken PO iron supplementation in the past.        -------  Today, patient reports no recurrent bleeding since August. Her prime concern for today's visit is ongoing weight loss. She was told by her dietician to eat more butter and ice cream. Unfortunately she realized that she has lactose intolerance and eating the diary products actually made her lose more weight. In terms of her diet, she was told previously that fatty or  spicy foods may worsen diverticular bleeding (I told her that there is no evidence for that). So her food choices are very limited and what she is eating is very bland. She looked at Ensure juice but is worried about sugar content although she has no history of diabetes.       Today 7/23/24:  Patient had no recurrence of GI bleeding since August 2023 until just over a week ago which prompted hospitalization.    She reports that she had to episodes of fairly significant rectal bleeding at home, presented to the ED 7/15/2024 and had 3 more episodes.  On evaluation there she was tachycardic, hemoglobin was 10.6.  She had spontaneous profuse rebleeding 7/16/2024, with hemoglobin dropped to 6.8.  Required 5 units of blood, 1 unit of platelets, 2 units of plasma.  CTA A/P at that time showed no convincing active arterial bleed, did note pancolitis with findings suggestive of blood in descending colon.  Tagged RBC scan with no evidence of active GI bleed.  Flex sig 7/17 showed severe diverticulosis throughout rectum, rectosigmoid colon, sigmoid, descending and transverse colon with no bleeding source identified no blood was found throughout the entire colon.  Mucosa appeared normal without evidence of colitis.  Nonbleeding hemorrhoids noted.  Colorectal surgery was consulted regarding possible colectomy if bleeding persisted however patient noted she would decline surgery.  She has not on any blood thinners, does not take any NSAIDs.  Discharged home 7/21/24, hemoglobin 8.0 at that time.    Today she reports she is feeling better.  Not had any recurrence of blood in stool, no black stools.  Felt a little constipated when she got home, took senna which resulted in loose brown stools, caused some abdominal cramping.  Prior to hospitalization was having bowel movements daily to every other day.  Taking 1 capful of MiraLAX every other day typically in addition to soluble fiber powder.    She continues to describe dyspepsia,  has been present for a long time, at her usual baseline.    Now taking Protonix 40 mg daily.  Lot no longer taking famotidine as needed, she reports this was more helpful for her dyspepsia.    A lot of her concerns today are also regarding weight loss.  Questions regarding fruits and smoothies with diverticular disease, discussed evidence.  She does note she is lactose intolerant.  Seen by dietitian in the hospital, has IKOTECH supplements at home which she is trying to drink, makes her full.  Agreeable to seeing dietitian outpatient.    Has appointment with primary care this afternoon, plan for lab check there.  Establishing with new primary care soon as her PCP is retiring, plans to see Dr. Soliz in the future.      PROBLEM LIST  Patient Active Problem List    Diagnosis Date Noted    Scoliosis of thoracolumbar spine, unspecified scoliosis type 07/22/2024     Priority: Medium    Facet arthropathy, lumbosacral 07/22/2024     Priority: Medium    Lower GI bleeding 07/15/2024     Priority: Medium    Anemia, unspecified type 07/15/2024     Priority: Medium    Chronic bilateral low back pain with bilateral sciatica 01/23/2024     Priority: Medium    Lower GI bleed 08/12/2023     Priority: Medium    Chronic pain of right elbow 07/11/2023     Priority: Medium    Right wrist pain 07/11/2023     Priority: Medium    Lab test negative for COVID-19 virus 11/18/2021     Priority: Medium    Diverticulosis of large intestine      Priority: Medium    Acute lower GI bleeding 01/21/2021     Priority: Medium    Diverticular hemorrhage 01/20/2021     Priority: Medium    Gastrointestinal hemorrhage, unspecified gastrointestinal hemorrhage type 01/19/2021     Priority: Medium    DDD (degenerative disc disease), lumbosacral 03/04/2019     Priority: Medium    GI bleed 03/13/2017     Priority: Medium    SNHL (sensorineural hearing loss) 11/29/2012     Priority: Medium       PERTINENT PAST MEDICAL HISTORY:  Past Medical History:    Diagnosis Date    Anemia     CKD (chronic kidney disease) stage 3, GFR 30-59 ml/min (H)     Diverticulosis of large intestine     Osteoarthritis     Osteoporosis     Rheumatoid arthritis (H)     Sensorineural hearing loss     Varicose veins of bilateral lower extremities with other complications        PREVIOUS SURGERIES:  Past Surgical History:   Procedure Laterality Date    CAPSULE/PILL CAM ENDOSCOPY N/A 11/18/2021    Procedure: IMAGING PROCEDURE, GI TRACT, INTRALUMINAL, VIA CAPSULE;  Surgeon: Deric Rodriguez MD;  Location:  GI    CAPSULE/PILL CAM ENDOSCOPY N/A 2/14/2023    Procedure: IMAGING PROCEDURE, GI TRACT, INTRALUMINAL, VIA CAPSULE;  Surgeon: Jaime Mesa MD;  Location: [facility]    COLONOSCOPY N/A 03/14/2017    Procedure: COMBINED COLONOSCOPY, SINGLE OR MULTIPLE BIOPSY/POLYPECTOMY BY BIOPSY;  Surgeon: Spencer Downey MD;  Location:  GI    COLONOSCOPY N/A 9/22/2022    Procedure: COLONOSCOPY, FLEXIBLE, WITH LESION REMOVAL USING SNARE;  Surgeon: Kirby Arthur MD;  Location:  GI    COLONOSCOPY N/A 1/13/2023    Procedure: COLONOSCOPY;  Surgeon: Spencer Downey MD;  Location: [facility] OR    COLONOSCOPY N/A 8/14/2023    Procedure: Colonoscopy;  Surgeon: Spencer Downey MD;  Location:  GI    ENTEROSCOPY SMALL BOWEL N/A 11/20/2021    Procedure: ENTEROSCOPY, colonoscopy with endoscopic mucosal resection and tattoo placement;  Surgeon: Kirby Arthur MD;  [facility]    ESOPHAGOSCOPY, GASTROSCOPY, DUODENOSCOPY (EGD), COMBINED N/A 2/6/2023    Procedure: ESOPHAGOGASTRODUODENOSCOPY (EGD);  Surgeon: Spencer Downey MD;  Location:  GI    IR VISCERAL EMBOLIZATION  01/22/2021    ORTHOPEDIC SURGERY Left     hip replacment    SIGMOIDOSCOPY FLEXIBLE N/A 01/23/2021    Procedure: SIGMOIDOSCOPY, FLEXIBLE;  Surgeon: Jaime Steinberg MD;  Location: [facility]    SIGMOIDOSCOPY FLEXIBLE N/A 7/17/2024    Procedure: Sigmoidoscopy flexible;  Surgeon: Ritika Woodard MD;  Location: [facility]        ALLERGIES:   Allergies   Allergen Reactions    Bee Venom Anaphylaxis    Shrimp Anaphylaxis    Evoxac [Cevimeline] Unknown    Prevnar Swelling     Other reaction(s): Edema    Prevnar [Pneumococcal 13-Linda Conj Vacc] Unknown       PERTINENT MEDICATIONS:  Current Outpatient Medications   Medication Sig Dispense Refill    acetaminophen (TYLENOL) 325 MG tablet Take 2 tablets (650 mg) by mouth every 6 hours      artificial saliva (BIOTENE DRY MOUTHWASH) LIQD liquid Swish and spit in mouth 4 times daily as needed for dry mouth      calcium carbonate-vitamin D (CALTRATE) 600-10 MG-MCG per tablet Take 1 tablet by mouth daily      carboxymethylcellulose PF (REFRESH PLUS) 0.5 % ophthalmic solution Place 1 drop into both eyes 3 times daily as needed for dry eyes      clobetasol (TEMOVATE) 0.05 % external ointment Apply topically 2 times daily To right ankle as needed 60 g 3    EPINEPHrine (ANY BX GENERIC EQUIV) 0.3 MG/0.3ML injection 2-pack Inject 0.3 mg into the muscle as needed for anaphylaxis      gabapentin (NEURONTIN) 300 MG capsule Take 600 mg by mouth at bedtime      gabapentin (NEURONTIN) 300 MG capsule Take 1 capsule (300 mg) by mouth 3 times daily Take 300mg in morning, 300mg in afternoon and 300mg (Patient taking differently: Take 300 mg by mouth 2 times daily as needed for neuropathic pain) 90 capsule 1    hydroxychloroquine (PLAQUENIL) 200 MG tablet Take 1 tablet (200 mg) by mouth daily 90 tablet 1    lifitegrast (XIIDRA) 5 % opthalmic solution Place 1 drop into both eyes 2 times daily      Multiple Vitamins-Minerals (OCUVITE PRESERVISION PO) Take 1 tablet by mouth 2 times daily      pantoprazole (PROTONIX) 40 MG EC tablet Take 1 tablet (40 mg) by mouth daily 30 tablet 0    senna-docusate (SENOKOT-S/PERICOLACE) 8.6-50 MG tablet Take 1 tablet by mouth 2 times daily as needed for constipation 30 tablet 0     No current facility-administered medications for this visit.       SOCIAL HISTORY:  Social History      Socioeconomic History    Marital status:      Spouse name: Not on file    Number of children: Not on file    Years of education: Not on file    Highest education level: Not on file   Occupational History    Not on file   Tobacco Use    Smoking status: Former    Smokeless tobacco: Never   Vaping Use    Vaping status: Never Used   Substance and Sexual Activity    Alcohol use: No    Drug use: No    Sexual activity: Not on file   Other Topics Concern    Not on file   Social History Narrative    - Retired (formerly employed as  at [employer])    - 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[employer])     Social Determinants of Health     Financial Resource Strain: Low Risk  (1/10/2020)    Received from [facility]    Overall Financial Resource Strain (CARDIA)     Difficulty of Paying Living Expenses: Not very hard   Food Insecurity: No Food Insecurity (1/10/2020)    Received from [facility]    Hunger Vital Sign     Worried About Running Out of Food in the Last Year: Never true     Ran Out of Food in the Last Year: Never true   Transportation Needs: No Transportation Needs (1/10/2020)    Received from [facility]    PRAPARE - Transportation     Lack of Transportation (Medical): No     Lack of Transportation (Non-Medical): No   Physical Activity: Sufficiently Active (1/10/2020)    Received from [facility]    Exercise Vital Sign     Days of Exercise per Week: 2 days     Minutes of Exercise per Session: 90 min   Stress: No Stress Concern Present (1/10/2020)    Received from [facility]    [employer] [place] [employer] - Occupational Stress Questionnaire     Feeling of Stress : Only a little   Social Connections: Unknown (1/10/2020)    Received from [facility]    Social Connection and Isolation Panel [NHANES]     Frequency of Communication with Friends and Family: More than three times a week     Frequency of Social Gatherings with  Friends and Family: Once a week     Attends Quaker Services: Never     Active Member of Clubs or Organizations: Not on file     Attends Club or Organization Meetings: Not on file     Marital Status:    Interpersonal Safety: Not At Risk (10/12/2021)    Humiliation, Afraid, Rape, and Kick questionnaire     Fear of Current or Ex-Partner: No     Emotionally Abused: No     Physically Abused: No     Sexually Abused: No   Housing Stability: Not on file       FAMILY HISTORY:  No family history on file.    Past/family/social history reviewed and no changes    PHYSICAL EXAMINATION:  Vitals /72   Pulse 77   Wt 41.4 kg (91 lb 3.2 oz)   SpO2 99%   BMI 15.90 kg/m     Wt   Wt Readings from Last 2 Encounters:   07/23/24 41.4 kg (91 lb 3.2 oz)   07/22/24 38.6 kg (85 lb)      Gen: Pt sitting up in NAD, interactive and cooperative on exam  Eyes: sclerae anicteric, no injection  Cardiac: RRR, nl S1, S2, no peripheral edema  Resp: Clear on anterior exam  GI: Normoactive BS, abd soft,, nontender  Skin: Warm, dry, no jaundice, nails appear healthy  Neuro: alert, oriented, answers questions appropriately      PERTINENT STUDIES:    Lab on 01/03/2022   Component Date Value Ref Range Status    Rheumatoid Factor 01/03/2022 8  <12 IU/mL Final    Sodium 01/03/2022 142  133 - 144 mmol/L Final    Potassium 01/03/2022 4.6  3.4 - 5.3 mmol/L Final    Chloride 01/03/2022 109  94 - 109 mmol/L Final    Carbon Dioxide (CO2) 01/03/2022 29  20 - 32 mmol/L Final    Anion Gap 01/03/2022 4  3 - 14 mmol/L Final    Urea Nitrogen 01/03/2022 18  7 - 30 mg/dL Final    Creatinine 01/03/2022 0.99  0.52 - 1.04 mg/dL Final    Calcium 01/03/2022 9.6  8.5 - 10.1 mg/dL Final    Glucose 01/03/2022 104* 70 - 99 mg/dL Final    Alkaline Phosphatase 01/03/2022 75  40 - 150 U/L Final    AST 01/03/2022 24  0 - 45 U/L Final    ALT 01/03/2022 24  0 - 50 U/L Final    Protein Total 01/03/2022 7.5  6.8 - 8.8 g/dL Final    Albumin 01/03/2022 3.9  3.4 - 5.0 g/dL  Final    Bilirubin Total 01/03/2022 0.4  0.2 - 1.3 mg/dL Final    GFR Estimate 01/03/2022 58* >60 mL/min/1.73m2 Final    CRP Inflammation 01/03/2022 <2.9  0.0 - 8.0 mg/L Final    Cyclic Citrullinated Peptide Antib* 01/03/2022 3.6  <7.0 U/mL Final    Erythrocyte Sedimentation Rate 01/03/2022 39* 0 - 30 mm/hr Final    WBC Count 01/03/2022 3.9* 4.0 - 11.0 10e3/uL Final    RBC Count 01/03/2022 3.27* 3.80 - 5.20 10e6/uL Final    Hemoglobin 01/03/2022 10.5* 11.7 - 15.7 g/dL Final    Hematocrit 01/03/2022 32.5* 35.0 - 47.0 % Final    MCV 01/03/2022 99  78 - 100 fL Final    MCH 01/03/2022 32.1  26.5 - 33.0 pg Final    MCHC 01/03/2022 32.3  31.5 - 36.5 g/dL Final    RDW 01/03/2022 13.7  10.0 - 15.0 % Final    Platelet Count 01/03/2022 226  150 - 450 10e3/uL Final    % Neutrophils 01/03/2022 55  % Final    % Lymphocytes 01/03/2022 32  % Final    % Monocytes 01/03/2022 10  % Final    % Eosinophils 01/03/2022 4  % Final    % Basophils 01/03/2022 0  % Final    Absolute Neutrophils 01/03/2022 2.2  1.6 - 8.3 10e3/uL Final    Absolute Lymphocytes 01/03/2022 1.3  0.8 - 5.3 10e3/uL Final    Absolute Monocytes 01/03/2022 0.4  0.0 - 1.3 10e3/uL Final    Absolute Eosinophils 01/03/2022 0.1  0.0 - 0.7 10e3/uL Final    Absolute Basophils 01/03/2022 0.0  0.0 - 0.2 10e3/uL Final       Video Capsule Endoscopy 2/14/23:  Findings:        The gastric passage time of the capsule enteroscope was 5-hours 1-minute.        The small bowel passage time of the capsule enteroscope was 1-hour 25-minutes.        The entire examined stomach was normal.        Multiple localized erosions with no bleeding were found in the duodenum        (probable first portion of the duodenum).        A single small angioectasia without bleeding was seen in the small bowel        (probable duodenum).        Lymphangiectasia was found in the small bowel (mid small bowel). One of        these was peduncluated and polypoid, not bleeding, benign appearing.        A single  spot with no bleeding was found in the colon (probable cecum).                                                                Impression:              - Normal stomach.   - Duodenal erosion.   - A single angioectasia without bleeding in the duodenum.   - Small bowel lymphangiectasia.   - A single mucosal spot with no bleeding in the colon.   - No bleeding seen during exam.   - The capsule entered the colon.       EGD 02/06/23  Impression:              - Normal esophagus.   - Normal stomach.   - Duodenal erosion without bleeding.   - No specimens collected.       Colonoscopy 01/13/23  Findings:        Residual specks of blood throughout (exam to 30 cm into the TI). No        inflammation.        Residual small amounts of blood mixed with prep liquid. Extensive        diverticulosis throughout the colon, left > right. Diverticuli were        carefully examined, but no active bleeding was seen. Site of mucosectomy        at the IC valve was unremarkable.      Impression:              - No specimens collected.       Colonoscopy 8/12/23  Findings:       Many small-mouthed diverticula were found in the entire colon. Most        pronounced diverticulosis is in the sigmoid colon. Residual blood was        seen throughout the colon, somewhat more on the left side. No active        bleeding was noted.        The terminal ileum appeared normal. Slight tinging with blood, likely        washed up or brought in by the scope.        Mild internal hemorrhoids were noted, not a source of bleeding.                                                                                    Impression:              - Diverticulosis in the entire examined colon.   - The examined portion of the ileum was normal.   - No specimens collected.         Again, thank you for allowing me to participate in the care of your patient.      Sincerely,    Belen Delacruz PA-C

## 2024-07-23 NOTE — PATIENT INSTRUCTIONS
It was a pleasure meeting with you today and discussing your healthcare plan. Below is a summary of what we covered:    -- Continue pantoprazole 40 mg once per day  -- Continue famotidine 20 mg as needed up to twice per day for   -- Continue to avoid NSAIDs such as Ibuprofen and Naproxen  -- Continue Miralax for your stools, 1 cap daily  -- MRI/MRCP in 6 months to look at pancreas  -- Meet with Iesha our dietitian regarding weight loss. Follow up with primary care as well.  -- Small frequent meals, liberalize your diet. Take lactase with dairy so you can eat these products  -- Meet with primary care today, recheck labs  -- Follow up with hematology  -- Follow up with primary care this afternoon with labs        Please see below for any additional questions and scheduling guidelines.    Sign up for [employer]: [employer] patient portal serves as a secure platform for accessing your medical records from the [facility]. Additionally, [employer] facilitates easy, timely, and secure messaging with your care team. If you have not signed up, you may do so by using the provided code or calling [phone].    Coordinating your care after your visit:  There are multiple options for scheduling your follow-up care based on your provider's recommendation.    How do I schedule a follow-up clinic appointment:   After your appointment, you may receive scheduling assistance with the Clinic Coordinators by having a seat in the waiting room and a Clinic Coordinator will call you up to schedule.  Virtual visits or after you leave the clinic:  Your provider has placed a follow-up order in the [employer] portal for scheduling your return appointment. A member of the scheduling team will contact you to schedule.  [facility] Scheduling: Timely scheduling through [employer] is advised to ensure appointment availability.   Call to schedule: You may schedule your follow-up appointment(s) by calling [phone], option 1.    How do I schedule my  endoscopy or colonoscopy procedure:  If a procedure, such as a colonoscopy or upper endoscopy was ordered by your provider, the scheduling team will contact you to schedule this procedure. Or you may choose to call to schedule at   [phone], option 2.  Please allow 20-30 minutes when scheduling a procedure.    How do I get my blood work done? To get your blood work done, you need to schedule a lab appointment at an [facility]. There are multiple ways to schedule:   At the clinic: The Clinic Coordinator you meet after your visit can help you schedule a lab appointment.   [facility] scheduling: [employer] offers online lab scheduling at all [facility] laboratory locations.   Call to schedule: You can call [phone] to schedule your lab appointment.    How do I schedule my imaging study: To schedule imaging studies, such as CT scans, ultrasounds, MRIs, or X-rays, contact Imaging Services at [phone].    How do I schedule a referral to another doctor: If your provider recommended a referral to another specialist(s), the referral order was placed by your provider. You will receive a phone call to schedule this referral, or you may choose to call the number attached to the referral to self-schedule.    For Post-Visit Question(s):  For any inquiries following today's visit:  Please utilize [employer] messaging and allow 48 hours for reply or contact the Call Center during normal business hours at [phone], option 3.  For Emergent After-hours questions, contact the On-Call GI Fellow through the [facility]  at [phone].  In addition, you may contact your Nurse directly using the provided contact information.    Test Results:  Test results will be accessible via [employer] in compliance with the 21st Century Cures Act. This means that your results will be available to you at the same time as your provider. Often you may see your results before your provider does. Results are  reviewed by staff within two weeks with communication follow-up. Results may be released in the patient portal prior to your care team review.    Prescription Refill(s):  Medication prescribed by your provider will be addressed during your visit. For future refills, please coordinate with your pharmacy. If you have not had a recent clinic visit or routine labs, for your safety, your provider may not be able to refill your prescription.

## 2024-07-23 NOTE — TELEPHONE ENCOUNTER
Checking with Dr. Soliz.     EST CARE, new to you & over age 65 do you want a meet and greet appt 1st?    Is currently scheduled for Wellness 11-11-24 and wants to move that up.    Hortencia DEL RIO MA

## 2024-07-23 NOTE — PROGRESS NOTES
Connected Care Resource Center:   [facility] Contact  [facility]/Voicemail     Clinical Data: Post-Discharge Outreach     Outreach attempted x 2.  Left message on patient's voicemail, providing [facility]'s central phone number of [phone] ([phone]) for questions/concerns and/or to schedule an appt with an [facility] provider, if they do not have a PCP.      Plan:  [facility] will do no further outreaches at this time.       IVAN Funes  Connected Care Resource Center, [facility]    *Connected Care Resource Team does NOT follow patient ongoing. Referrals are identified based on internal discharge reports and the outreach is to ensure patient has an understanding of their discharge instructions.

## 2024-07-24 NOTE — TELEPHONE ENCOUNTER
LVM for pt to call back. There is some approval req in the moth of August.        .Candelaria Bazzi MA on 7/24/2024 at 2:58 PM

## 2024-07-25 ENCOUNTER — OFFICE VISIT (OUTPATIENT)
Dept: ORTHOPEDICS | Facility: CLINIC | Age: 84
End: 2024-07-25
Attending: PHYSICAL MEDICINE & REHABILITATION
Payer: COMMERCIAL

## 2024-07-25 VITALS
HEIGHT: 64 IN | DIASTOLIC BLOOD PRESSURE: 63 MMHG | OXYGEN SATURATION: 100 % | WEIGHT: 91 LBS | SYSTOLIC BLOOD PRESSURE: 134 MMHG | BODY MASS INDEX: 15.54 KG/M2 | HEART RATE: 80 BPM

## 2024-07-25 DIAGNOSIS — M41.9 SCOLIOSIS OF THORACOLUMBAR SPINE, UNSPECIFIED SCOLIOSIS TYPE: ICD-10-CM

## 2024-07-25 DIAGNOSIS — G58.8 CLUNEAL NEUROPATHY: Primary | ICD-10-CM

## 2024-07-25 DIAGNOSIS — G89.29 CHRONIC BILATERAL LOW BACK PAIN WITHOUT SCIATICA: ICD-10-CM

## 2024-07-25 DIAGNOSIS — M54.50 CHRONIC BILATERAL LOW BACK PAIN WITHOUT SCIATICA: ICD-10-CM

## 2024-07-25 DIAGNOSIS — M47.817 FACET ARTHROPATHY, LUMBOSACRAL: ICD-10-CM

## 2024-07-25 DIAGNOSIS — M51.379 DDD (DEGENERATIVE DISC DISEASE), LUMBOSACRAL: ICD-10-CM

## 2024-07-25 PROCEDURE — 76942 ECHO GUIDE FOR BIOPSY: CPT | Performed by: PHYSICAL MEDICINE & REHABILITATION

## 2024-07-25 PROCEDURE — 64450 NJX AA&/STRD OTHER PN/BRANCH: CPT | Mod: 50 | Performed by: PHYSICAL MEDICINE & REHABILITATION

## 2024-07-25 RX ORDER — LIDOCAINE HYDROCHLORIDE 10 MG/ML
5 INJECTION, SOLUTION EPIDURAL; INFILTRATION; INTRACAUDAL; PERINEURAL ONCE
Status: COMPLETED | OUTPATIENT
Start: 2024-07-25 | End: 2024-07-25

## 2024-07-25 RX ORDER — TRIAMCINOLONE ACETONIDE 40 MG/ML
40 INJECTION, SUSPENSION INTRA-ARTICULAR; INTRAMUSCULAR ONCE
Status: COMPLETED | OUTPATIENT
Start: 2024-07-25 | End: 2024-07-25

## 2024-07-25 RX ORDER — BUPIVACAINE HYDROCHLORIDE 2.5 MG/ML
8 INJECTION, SOLUTION EPIDURAL; INFILTRATION; INTRACAUDAL ONCE
Status: COMPLETED | OUTPATIENT
Start: 2024-07-25 | End: 2024-07-25

## 2024-07-25 RX ADMIN — BUPIVACAINE HYDROCHLORIDE 20 MG: 2.5 INJECTION, SOLUTION EPIDURAL; INFILTRATION; INTRACAUDAL at 15:45

## 2024-07-25 RX ADMIN — TRIAMCINOLONE ACETONIDE 40 MG: 40 INJECTION, SUSPENSION INTRA-ARTICULAR; INTRAMUSCULAR at 15:45

## 2024-07-25 RX ADMIN — LIDOCAINE HYDROCHLORIDE 5 ML: 10 INJECTION, SOLUTION EPIDURAL; INFILTRATION; INTRACAUDAL; PERINEURAL at 15:46

## 2024-07-25 ASSESSMENT — PAIN SCALES - GENERAL: PAINLEVEL: SEVERE PAIN (6)

## 2024-07-25 NOTE — LETTER
7/25/2024      Patrick Olson  [address] [address]      Dear Colleague,    Thank you for referring your patient, Patrick Olson, to the [facility]. Please see a copy of my visit note below.    PHYSICAL MEDICINE & REHABILITATION / MEDICAL SPINE      PROCEDURE DATE:  Jul 25, 2024      PATIENT NAME:  Patrick Olson  MRN:  [id number]  YOB: 1940      PRE-PROCEDURE DIAGNOSIS:  1. Cluneal neuropathy    2. Scoliosis of thoracolumbar spine, unspecified scoliosis type    3. Facet arthropathy, lumbosacral    4. DDD (degenerative disc disease), lumbosacral    5. Chronic bilateral low back pain without sciatica        POST-PROCEDURE DIAGNOSIS:  1. Cluneal neuropathy    2. Scoliosis of thoracolumbar spine, unspecified scoliosis type    3. Facet arthropathy, lumbosacral    4. DDD (degenerative disc disease), lumbosacral    5. Chronic bilateral low back pain without sciatica        PROCEDURE:  Ultrasound-guided bilateral superior cluneal nerve injections.  (CPT Codes:  71638, 20856)      PROCEDURE IN DETAIL:  Prior to the procedure, educational material was provided for the patient to review and take home.  After a discussion of the risks, benefits, and alternatives to the procedure, the patient expressed understanding and wished to proceed.  Consent form was signed.  The patient was brought to the ultrasound suite and placed in the prone position.  Procedural pause was conducted to verify:  patient identity, procedure to be performed, laterality, site, and patient position.    The affected area was scanned using a linear ultrasound probe.  The medial branch of the left superior cluneal nerve was identified, and the ideal injection site was marked.  The medial branch of the right superior cluneal nerve was identified, and the ideal injection site was marked.  The skin was then cleaned and prepped using chlorhexidine.  A sterile transducer cover was then placed on the  "transducer.    The injection site for the medial branch of the superior cluneal nerve was then scanned again using the sterile transducer probe.  The medial branch of the left superior cluneal was identified under ultrasound.  The skin and subcutaneous tissues were anesthetized using 27g 1.5\" needle with 2.5 ml 1% preservative-free lidocaine.  Next, 25g 2\" needle was directed using ultrasound guidance for an in-plane, [id number], approach to the medial branch of the superior cluneal nerve.  The needle was then aspirated.  After negative aspiration, 40 mg triamcinolone and 4 ml 0.25% bupivacaine were injected around the medial branch of the superior cluneal nerve.  Images of proper needle position and injectate solution were saved.  The needle was then removed.    The injection site for the medial branch of the superior cluneal nerve was then scanned again using the sterile transducer probe.  The medial branch of the right superior cluneal was identified under ultrasound.  The skin and subcutaneous tissues were anesthetized using 27g 1.5\" needle with 2.5 ml 1% preservative-free lidocaine.  Next, 25g 2\" needle was directed using ultrasound guidance for an in-plane, [id number], approach to the medial branch of the superior cluneal nerve.  The needle was then aspirated.  After negative aspiration, 40 mg triamcinolone and 4 ml 0.25% bupivacaine were injected around the medial branch of the superior cluneal nerve.  Images of proper needle position and injectate solution were saved.  The needle was then removed.    The skin was wiped clean, and bandages were placed as appropriate.  There were no apparent complications.  The patient tolerated the procedure well.  The patient was observed for an appropriate period of time and discharged in excellent condition.    Preprocedure pain level: 5-6/10.  Postprocedure pain level: 3/10.      Donald Coombs MD          Again, thank you for allowing me to participate in " the care of your patient.        Sincerely,        Donald Coombs MD

## 2024-07-25 NOTE — PROGRESS NOTES
PHYSICAL MEDICINE & REHABILITATION / MEDICAL SPINE      PROCEDURE DATE:  Jul 25, 2024      PATIENT NAME:  Patrick Olson  MRN:  [id number]  YOB: 1940      PRE-PROCEDURE DIAGNOSIS:  1. Cluneal neuropathy    2. Scoliosis of thoracolumbar spine, unspecified scoliosis type    3. Facet arthropathy, lumbosacral    4. DDD (degenerative disc disease), lumbosacral    5. Chronic bilateral low back pain without sciatica        POST-PROCEDURE DIAGNOSIS:  1. Cluneal neuropathy    2. Scoliosis of thoracolumbar spine, unspecified scoliosis type    3. Facet arthropathy, lumbosacral    4. DDD (degenerative disc disease), lumbosacral    5. Chronic bilateral low back pain without sciatica        PROCEDURE:  Ultrasound-guided bilateral superior cluneal nerve injections.  (CPT Codes:  05740, 40547)      PROCEDURE IN DETAIL:  Prior to the procedure, educational material was provided for the patient to review and take home.  After a discussion of the risks, benefits, and alternatives to the procedure, the patient expressed understanding and wished to proceed.  Consent form was signed.  The patient was brought to the ultrasound suite and placed in the prone position.  Procedural pause was conducted to verify:  patient identity, procedure to be performed, laterality, site, and patient position.    The affected area was scanned using a linear ultrasound probe.  The medial branch of the left superior cluneal nerve was identified, and the ideal injection site was marked.  The medial branch of the right superior cluneal nerve was identified, and the ideal injection site was marked.  The skin was then cleaned and prepped using chlorhexidine.  A sterile transducer cover was then placed on the transducer.    The injection site for the medial branch of the superior cluneal nerve was then scanned again using the sterile transducer probe.  The medial branch of the left superior cluneal was identified under ultrasound.  The skin and  "subcutaneous tissues were anesthetized using 27g 1.5\" needle with 2.5 ml 1% preservative-free lidocaine.  Next, 25g 2\" needle was directed using ultrasound guidance for an in-plane, [id number], approach to the medial branch of the superior cluneal nerve.  The needle was then aspirated.  After negative aspiration, 40 mg triamcinolone and 4 ml 0.25% bupivacaine were injected around the medial branch of the superior cluneal nerve.  Images of proper needle position and injectate solution were saved.  The needle was then removed.    The injection site for the medial branch of the superior cluneal nerve was then scanned again using the sterile transducer probe.  The medial branch of the right superior cluneal was identified under ultrasound.  The skin and subcutaneous tissues were anesthetized using 27g 1.5\" needle with 2.5 ml 1% preservative-free lidocaine.  Next, 25g 2\" needle was directed using ultrasound guidance for an in-plane, [id number]-hp-ymcadcrl, approach to the medial branch of the superior cluneal nerve.  The needle was then aspirated.  After negative aspiration, 40 mg triamcinolone and 4 ml 0.25% bupivacaine were injected around the medial branch of the superior cluneal nerve.  Images of proper needle position and injectate solution were saved.  The needle was then removed.    The skin was wiped clean, and bandages were placed as appropriate.  There were no apparent complications.  The patient tolerated the procedure well.  The patient was observed for an appropriate period of time and discharged in excellent condition.    Preprocedure pain level: 5-6/10.  Postprocedure pain level: 3/10.      Donald Coombs MD        "

## 2024-07-29 ENCOUNTER — TELEPHONE (OUTPATIENT)
Dept: FAMILY MEDICINE | Facility: CLINIC | Age: 84
End: 2024-07-29
Payer: COMMERCIAL

## 2024-07-29 ENCOUNTER — MYC MEDICAL ADVICE (OUTPATIENT)
Dept: ORTHOPEDICS | Facility: CLINIC | Age: 84
End: 2024-07-29
Payer: COMMERCIAL

## 2024-07-29 NOTE — TELEPHONE ENCOUNTER
"S-(situation): Patient calling stating, \"I need post hospital visit with my primary. I am establishing care with Dr. Soliz in September. I am supposed to see someone to have a lab checked. \"    Appointments in Next Year        Sep 12, 2024 3:00 PM  (Arrive by 2:40 PM)  Annual Wellness Visit with Magda Soliz MD  [facility] ([facility] ) [phone]               "

## 2024-07-30 ENCOUNTER — OFFICE VISIT (OUTPATIENT)
Dept: FAMILY MEDICINE | Facility: CLINIC | Age: 84
End: 2024-07-30
Payer: COMMERCIAL

## 2024-07-30 VITALS
TEMPERATURE: 97.2 F | BODY MASS INDEX: 14.9 KG/M2 | HEART RATE: 73 BPM | SYSTOLIC BLOOD PRESSURE: 124 MMHG | RESPIRATION RATE: 20 BRPM | WEIGHT: 87.3 LBS | OXYGEN SATURATION: 100 % | HEIGHT: 64 IN | DIASTOLIC BLOOD PRESSURE: 60 MMHG

## 2024-07-30 DIAGNOSIS — M41.9 SCOLIOSIS OF THORACOLUMBAR SPINE, UNSPECIFIED SCOLIOSIS TYPE: ICD-10-CM

## 2024-07-30 DIAGNOSIS — K92.2 LOWER GI BLEED: ICD-10-CM

## 2024-07-30 DIAGNOSIS — M06.9 RHEUMATOID ARTHRITIS, RHEUMATOID FACTOR STATUS UNKNOWN (H): ICD-10-CM

## 2024-07-30 DIAGNOSIS — Z09 HOSPITAL DISCHARGE FOLLOW-UP: Primary | ICD-10-CM

## 2024-07-30 LAB — HGB BLD-MCNC: 9.3 G/DL (ref 11.7–15.7)

## 2024-07-30 PROCEDURE — 99214 OFFICE O/P EST MOD 30 MIN: CPT | Performed by: PHYSICIAN ASSISTANT

## 2024-07-30 PROCEDURE — 85018 HEMOGLOBIN: CPT | Performed by: PHYSICIAN ASSISTANT

## 2024-07-30 PROCEDURE — 36415 COLL VENOUS BLD VENIPUNCTURE: CPT | Performed by: PHYSICIAN ASSISTANT

## 2024-07-30 RX ORDER — METHYLPREDNISOLONE 4 MG
TABLET, DOSE PACK ORAL
Qty: 21 TABLET | Refills: 0 | Status: SHIPPED | OUTPATIENT
Start: 2024-07-30 | End: 2024-09-12

## 2024-07-30 ASSESSMENT — PAIN SCALES - GENERAL: PAINLEVEL: MILD PAIN (2)

## 2024-07-30 NOTE — PROGRESS NOTES
Assessment and Plan:     (Z09) Hospital discharge follow-up  (primary encounter diagnosis)  Comment: see details below, admitted for GI bleed 7/15/24-7/21/24 when she presented with gm blood in stool and dizziness, s/p 5 units prbc, see details below, overall doing well at home, will travel to [place] later this week  Plan: hgb today  Has est care visit in one month with new pcp as her current pcp retired last month    (K92.2) Lower GI bleed  Comment: In ED at the time of initial evaluation tachycardic, hemoglobin 10.6 then had spontaneous profuse rebleeding on 7/16, hemoglobin dropped to 6.8.  Transfer to [facility], she received total 5 units of PRBC, 1 unit platelets, 2 units plasma, CT on 7/16 -No convincing active arterial GI bleed, pancolitis that could be infectious or inflammatory, findings suggestive of blood in descending colon, tagged red blood cell scan on 7/17 with no evidence of active gastrointestinal bleeding, status post flexible sigmoidoscopy 7/17 with severe diverticulosis in the rectum, rectosigmoid colon, sigmoid colon, descending colon and transverse colon with no specific source of bleeding identified though blood was found in the entire examined colon.  Nonbleeding hemorrhoids were present  She hasn't had any bloody or black stools since discharge  She saw GI last week, plan to continue daily protonix + pepcid prn, follow-up MRI/MRCP for non-specific pancreatic lesions in 6 months, ordered by GI  Plan: Hemoglobin today        Keep follow-up with GI as scheduled  Discussed reasons to be seen promptly     (M41.9) Scoliosis of thoracolumbar spine, unspecified scoliosis type  Comment: has history of back pain and RA, traveling to [place] at end of this week, uses steroid bursts as needed for pain, requesting Rx, we discussed this could exacerbate GI tract bleeding and would recommend she only use it if absolutely necessary  Plan: methylPREDNISolone (MEDROL DOSEPAK) 4 MG tablet        therapy pack         Keep follow-up with physical medicine and rheumatology    (M06.9) Rheumatoid arthritis, rheumatoid factor status unknown (H)  Comment: sees rheumatology Dr. Clements  Plan: cont above    EVERARDO Guillermo Same Day Provider   30 minutes on the day of the encounter doing chart review, history and exam, documentation and further activities as noted above.        Rakesh Nguyen is a 83 year old, presenting for the following health issues:  No chief complaint on file.    History of Present Illness       Reason for visit:  Hospital follow up    She eats 2-3 servings of fruits and vegetables daily.She consumes 0 sweetened beverage(s) daily.She exercises with enough effort to increase her heart rate 30 to 60 minutes per day.  She exercises with enough effort to increase her heart rate 4 days per week.   She is taking medications regularly.         Hospital Follow-up Visit:    Hospital/Nursing Home/IP Rehab Facility: [facility]  Date of Admission: 7/15/2024  Date of Discharge: 7/21/2024  Reason(s) for Admission:   Lower GI bleeding    Anemia, unspecified type    Was the patient in the ICU or did the patient experience delirium during hospitalization?  ICU  Do you have any other stressors you would like to discuss with your provider? No    Problems taking medications regularly:  None  Medication changes since discharge: None  Problems adhering to non-medication therapy:  None    Summary of hospitalization:  [facility] discharge summary reviewed  Diagnostic Tests/Treatments reviewed.  Follow up needed: see below  Other Healthcare Providers Involved in Patient s Care:         None  Update since discharge: improved.         Plan of care communicated with patient           Mrs. Olson is here for hospital follow-up  The following is from her discharge summary:    Date of Admission:  7/15/2024  Date of Discharge:  7/21/2024  Discharging Provider: Jhon  MD Baylee    Primary Care Physician  Essence Tamayo  Primary Care Provider Phone Number: [phone]  Primary Care Provider Fax Number: [phone]     PRINCIPAL DIAGNOSIS  Recurrent lower GI bleed -likely diverticular bleed.  Severe symptomatic anemia status post blood and blood product transfusion.  Dizziness from symptomatic anemia - improved.   Physical deconditioning from medical illness, senile frailty.  Lactic acidosis from blood loss anemia cleared.  Hypoglycemia from poor intake -resolved.  Severe malnutrition in the setting of chronic illness.  Unintentional weight loss.  Prediabetes with a hemoglobin A1c of 5.7.  Incidental Pancreatic lesion  Incidental cholelithiasis and gallbladder distention.     History of Present Illness  Patrick Olson is an 83 year old female who presented with rectal bleeding.     Hospital Course  Patrick Olson is an 83 year old female with history of duodenal ulcers, history of GI bleed, CKD3, rheumatoid arthritis, chronic anemia admitted on 7/15/2024 with hematochezia.      Recurrent lower GI bleed -likely diverticular bleed.  Severe symptomatic anemia status post blood and blood product transfusion.  History of GI bleed in the setting of duodenal ulcers and diverticulosis, gastric AVM, small bowel AVM, cecal polyps, hemorrhoids.  Dyspepsia.  History of chronic anemia.  Presented with 5 episodes of bright red bleeding, associated lower abdominal cramping and lightheadedness. History of previous GI bleed, baseline hemoglobin in the 10-11 range in 2024.    --In ED at the time of initial evaluation tachycardic, hemoglobin 10.6.  --Had spontaneous profuse rebleeding on 7/16, hemoglobin dropped to 6.8.  Transfer to [facility].   Received total 5 units of PRBC, 1 unit platelets, 2 units plasma.  **CT on 7/16 -No convincing active arterial GI bleed, pancolitis that could be infectious or inflammatory, findings suggestive of blood in descending colon.  **Tagged red blood cell scan on  7/17 with no evidence of active gastrointestinal bleeding.  ** Status post flexible sigmoidoscopy 7/17 with severe diverticulosis in the rectum, rectosigmoid colon, sigmoid colon, descending colon and transverse colon with no specific source of bleeding identified though blood was found in the entire examined colon.  Nonbleeding hemorrhoids were present.     --[facility] gastroenterology followed, If patient has recurrence of BRBPR with associated hemodynamic instability, recommend obtaining CTA A/P to attempt to localize the bleed with IR consult for possible angiography/embolization.   --Colorectal surgery followed -surgery is last resort if endoscopy and radiologic measures to control bleeding fail.  Patient reported that she would decline surgery even in an emergency setting.       -- Patient continue in the last 48 -72 hours patient does not have any active bleeding.  Having dark-colored stools.  Hemodynamically stable.  Hemoglobin stable in the 8 range.  Tolerating oral diet.  Gastroenterology, colorectal surgery signed off on 7/19.  Plan for discharge with close follow-up.    -- Recommend to continue oral Protonix 40 mg daily on discharge.  Discontinued PTA as needed famotidine.     Follow up with primary care provider, Essence Tamayo, within 5 days for hospital follow- up.    Monitor CBC, CMP in 5 to 7 days or earlier if symptomatic.  Follow-up with [facility] gastroenterology team in clinic per schedule.  Advance diet as tolerated.  Avoid NSAIDs, blood thinners.  Return back to ED if any active bleeding.  Further anemia workup deferred to PCP as patient had already received blood transfusion during this hospital stay     Dizziness from symptomatic anemia - improved.   Mild TR on Echo.  Physical deconditioning from medical illness, senile frailty.  Patient denies any chest pain or palpitations.  Had dizziness from blood loss anemia.  Telemetry no acute events.  Echocardiogram with  LVEF of 60 to 65%.  Right ventricular systolic function normal.  Mild tricuspid regurgitation.  Physical therapy recommended home with assistance, outpatient PT.  Able to ambulate.  Recommend fall precautions, advance activity as tolerated.  Recommend to hold off driving until PCP visit.  Monitor home blood pressure, heart rate as able to review on provider visit     Lactic acidosis from blood loss anemia cleared.  Lactate 2.7 >1.0 with blood transfusion.     Hypoglycemia from poor intake -resolved.  Hypoglycemia on 7/18, improved with oral intake.     Severe malnutrition in the setting of chronic illness.  Unintentional weight loss.  Family reporting unintentional weight loss over the last few months.  Nutrition followed, oral supplements  Defer weight loss workup to PCP, establish primary care.     CKD stage IIIa.  Baseline creatinine in the 0.9-1.0 range.  Renal function within baseline.  Monitor renal function in 1 week.     Prediabetes with a hemoglobin A1c of 5.7.  Age-appropriate health maintenance on PCP visit.  Monitor blood sugars periodically hemoglobin A1c likely low in the setting of anemia.      Incidental Pancreatic lesion  Incidental finding of pancreatic body 0.6cm cystic lesion.   Follow-up with outpatient CT or MRI in 6 months, defer PCP to order.     Incidental cholelithiasis and gallbladder distention.  CT on admission with Cholelithiasis and gallbladder distention, possibly sequelae of NPO status.   No right upper quadrant tenderness.  Liver enzymes, bilirubin within normal limits.    Consider outpatient ultrasound if symptomatic.     Rheumatoid arthritis.  Chronic back pain  History of osteoporosis.  Continue PTA gabapentin.  Continue PTA Plaquenil.  Continue PTA Tylenol.    She actually had follow-up with her pcp through [facility]  Her pcp is retiring and she needs a new pcp  She would like hgb level recheck today prior to going on a trip to [place], she leaves in 4 days  She has had  "some mild lightheadedness with position change since she was discharged    She had follow-up with GI on 7/23/24  Plan is to continue protonix daily and prn famotidine  MRI/MRCP ordered to follow-up on pancreas  Will see RD for weight loss    She usually takes medrol dose pack for acute exacerbations of RA    She hasn't had any bloody/black stool since discharged from the hospital           Objective    There were no vitals taken for this visit.  There is no height or weight on file to calculate BMI.    /60 (BP Location: Left arm, Patient Position: Sitting, Cuff Size: Adult Small)   Pulse 73   Temp 97.2  F (36.2  C) (Temporal)   Resp 20   Ht 1.613 m (5' 3.5\")   Wt 39.6 kg (87 lb 4.8 oz)   SpO2 100%   BMI 15.22 kg/m      Physical Exam     GENERAL: healthy, alert and no distress  ENT: mmm, op clear   RESP: lungs clear to auscultation - no rales, no rhonchi, no wheezes  CV: regular rates and rhythm, normal S1 S2, no S3 or S4 and no murmur, no click or rub   MS: extremities- no gross deformities noted, no edema            Signed Electronically by: Loli Suarez PA-C    "

## 2024-07-30 NOTE — TELEPHONE ENCOUNTER
You  Patrick Jordan now (11:54 AM)     Ms. Patrick Olson,     That is great news about the injections.  Hopefully, this is the source of your pain and you will not need oral steroids anymore.  If you do have your pain flareup on your trip, please let us know and we can prescribe something at that time.     MD Patrick Maki [facility] (supporting You)Yesterday (11:32 AM)       Maryan Coombs,  First, I want to report about my pain condition after the injection last week.  I am very grateful that the injection helped relieve my pain about 75-80% effective.  I do have some pain returns mostly in the afternoon.  I am taking 650 mg tylenol 1-2 a day reduced from 3-4 before.  Thank you!  My question is that i wanted to ask you if I should have 1 week prednisone prescription for my trip 8/2-8/9 as i did in the past taken during my trip. If you approve, please send prescription to [facility] in [place].  Thank you!  Patrick Olson

## 2024-07-30 NOTE — PATIENT INSTRUCTIONS
Continue current medications    Add over the counter iron (ferrous sulfate 325mg every other day)    Hemoglobin check today    Try to avoid taking medrol dosepak if you don't need     Keep follow-up appointments with GI

## 2024-07-30 NOTE — RESULT ENCOUNTER NOTE
Hi Doris Wesley,     Here are my comments about the recent results: your hemoglobin is recovering nicely.  Please recheck when you see Dr. Soliz in September.     Please let us know if you have any questions or concerns.    Regards,  Loli Suarez PA-C

## 2024-08-28 ENCOUNTER — MYC MEDICAL ADVICE (OUTPATIENT)
Dept: NEPHROLOGY | Facility: CLINIC | Age: 84
End: 2024-08-28
Payer: COMMERCIAL

## 2024-08-28 DIAGNOSIS — N18.31 STAGE 3A CHRONIC KIDNEY DISEASE (H): Primary | ICD-10-CM

## 2024-09-05 ENCOUNTER — APPOINTMENT (OUTPATIENT)
Dept: LAB | Facility: CLINIC | Age: 84
End: 2024-09-05
Payer: COMMERCIAL

## 2024-09-12 ENCOUNTER — ANCILLARY PROCEDURE (OUTPATIENT)
Dept: GENERAL RADIOLOGY | Facility: CLINIC | Age: 84
End: 2024-09-12
Attending: INTERNAL MEDICINE
Payer: COMMERCIAL

## 2024-09-12 ENCOUNTER — OFFICE VISIT (OUTPATIENT)
Dept: FAMILY MEDICINE | Facility: CLINIC | Age: 84
End: 2024-09-12
Payer: COMMERCIAL

## 2024-09-12 VITALS
BODY MASS INDEX: 15.15 KG/M2 | OXYGEN SATURATION: 99 % | TEMPERATURE: 97.3 F | RESPIRATION RATE: 16 BRPM | HEART RATE: 74 BPM | SYSTOLIC BLOOD PRESSURE: 123 MMHG | WEIGHT: 85.5 LBS | HEIGHT: 63 IN | DIASTOLIC BLOOD PRESSURE: 63 MMHG

## 2024-09-12 DIAGNOSIS — R41.3 COMPLAINTS OF MEMORY DISTURBANCE: ICD-10-CM

## 2024-09-12 DIAGNOSIS — M54.2 NECK PAIN: ICD-10-CM

## 2024-09-12 DIAGNOSIS — Z79.899 MEDICATION MANAGEMENT: ICD-10-CM

## 2024-09-12 DIAGNOSIS — R63.0 LOSS OF APPETITE: ICD-10-CM

## 2024-09-12 DIAGNOSIS — M41.9 SCOLIOSIS OF THORACOLUMBAR SPINE, UNSPECIFIED SCOLIOSIS TYPE: ICD-10-CM

## 2024-09-12 DIAGNOSIS — Z00.00 ENCOUNTER FOR MEDICARE ANNUAL WELLNESS EXAM: Primary | ICD-10-CM

## 2024-09-12 DIAGNOSIS — H91.93 BILATERAL HEARING LOSS, UNSPECIFIED HEARING LOSS TYPE: ICD-10-CM

## 2024-09-12 DIAGNOSIS — K31.819 GASTRIC AVM: ICD-10-CM

## 2024-09-12 DIAGNOSIS — N18.31 STAGE 3A CHRONIC KIDNEY DISEASE (H): ICD-10-CM

## 2024-09-12 DIAGNOSIS — E46 PROTEIN-CALORIE MALNUTRITION, UNSPECIFIED SEVERITY (H): ICD-10-CM

## 2024-09-12 DIAGNOSIS — E87.5 HYPERKALEMIA: ICD-10-CM

## 2024-09-12 DIAGNOSIS — N89.8 VAGINAL DISCHARGE: ICD-10-CM

## 2024-09-12 DIAGNOSIS — K55.20 SMALL BOWEL ARTERIOVENOUS MALFORMATION: ICD-10-CM

## 2024-09-12 DIAGNOSIS — R79.89 ELEVATED VITAMIN B12 LEVEL: ICD-10-CM

## 2024-09-12 DIAGNOSIS — M05.79 RHEUMATOID ARTHRITIS INVOLVING MULTIPLE SITES WITH POSITIVE RHEUMATOID FACTOR (H): ICD-10-CM

## 2024-09-12 DIAGNOSIS — Z87.19 HISTORY OF GI BLEED: ICD-10-CM

## 2024-09-12 DIAGNOSIS — Z13.220 SCREENING FOR LIPID DISORDERS: ICD-10-CM

## 2024-09-12 DIAGNOSIS — D64.9 ANEMIA, UNSPECIFIED TYPE: ICD-10-CM

## 2024-09-12 DIAGNOSIS — E61.1 IRON DEFICIENCY: ICD-10-CM

## 2024-09-12 DIAGNOSIS — R63.6 UNDERWEIGHT: ICD-10-CM

## 2024-09-12 DIAGNOSIS — M54.16 LUMBAR RADICULOPATHY, ACUTE: ICD-10-CM

## 2024-09-12 PROBLEM — N18.4 CKD (CHRONIC KIDNEY DISEASE) STAGE 4, GFR 15-29 ML/MIN (H): Status: ACTIVE | Noted: 2024-09-12

## 2024-09-12 LAB
ALBUMIN SERPL BCG-MCNC: 4.5 G/DL (ref 3.5–5.2)
ALP SERPL-CCNC: 73 U/L (ref 40–150)
ALT SERPL W P-5'-P-CCNC: 17 U/L (ref 0–50)
ANION GAP SERPL CALCULATED.3IONS-SCNC: 10 MMOL/L (ref 7–15)
AST SERPL W P-5'-P-CCNC: 32 U/L (ref 0–45)
BILIRUB SERPL-MCNC: 0.2 MG/DL
BUN SERPL-MCNC: 26.9 MG/DL (ref 8–23)
CALCIUM SERPL-MCNC: 9.8 MG/DL (ref 8.8–10.4)
CHLORIDE SERPL-SCNC: 105 MMOL/L (ref 98–107)
CHOLEST SERPL-MCNC: 230 MG/DL
CREAT SERPL-MCNC: 0.97 MG/DL (ref 0.51–0.95)
EGFRCR SERPLBLD CKD-EPI 2021: 58 ML/MIN/1.73M2
ERYTHROCYTE [DISTWIDTH] IN BLOOD BY AUTOMATED COUNT: 14.6 % (ref 10–15)
FASTING STATUS PATIENT QL REPORTED: NO
FASTING STATUS PATIENT QL REPORTED: NO
FERRITIN SERPL-MCNC: 42 NG/ML (ref 11–328)
GLUCOSE SERPL-MCNC: 151 MG/DL (ref 70–99)
HCO3 SERPL-SCNC: 27 MMOL/L (ref 22–29)
HCT VFR BLD AUTO: 35.3 % (ref 35–47)
HDLC SERPL-MCNC: 99 MG/DL
HGB BLD-MCNC: 11.3 G/DL (ref 11.7–15.7)
LDLC SERPL CALC-MCNC: 119 MG/DL
MCH RBC QN AUTO: 32.8 PG (ref 26.5–33)
MCHC RBC AUTO-ENTMCNC: 32 G/DL (ref 31.5–36.5)
MCV RBC AUTO: 103 FL (ref 78–100)
NONHDLC SERPL-MCNC: 131 MG/DL
PHOSPHATE SERPL-MCNC: 3.3 MG/DL (ref 2.5–4.5)
PLATELET # BLD AUTO: 225 10E3/UL (ref 150–450)
POTASSIUM SERPL-SCNC: 5.6 MMOL/L (ref 3.4–5.3)
PROT SERPL-MCNC: 7.4 G/DL (ref 6.4–8.3)
RBC # BLD AUTO: 3.44 10E6/UL (ref 3.8–5.2)
SODIUM SERPL-SCNC: 142 MMOL/L (ref 135–145)
TRIGL SERPL-MCNC: 59 MG/DL
VIT B12 SERPL-MCNC: 1445 PG/ML (ref 232–1245)
WBC # BLD AUTO: 4.1 10E3/UL (ref 4–11)

## 2024-09-12 PROCEDURE — 80061 LIPID PANEL: CPT | Performed by: INTERNAL MEDICINE

## 2024-09-12 PROCEDURE — G0439 PPPS, SUBSEQ VISIT: HCPCS | Performed by: INTERNAL MEDICINE

## 2024-09-12 PROCEDURE — 84100 ASSAY OF PHOSPHORUS: CPT | Performed by: INTERNAL MEDICINE

## 2024-09-12 PROCEDURE — 99214 OFFICE O/P EST MOD 30 MIN: CPT | Mod: 25 | Performed by: INTERNAL MEDICINE

## 2024-09-12 PROCEDURE — 85027 COMPLETE CBC AUTOMATED: CPT | Performed by: INTERNAL MEDICINE

## 2024-09-12 PROCEDURE — 80053 COMPREHEN METABOLIC PANEL: CPT | Performed by: INTERNAL MEDICINE

## 2024-09-12 PROCEDURE — 36415 COLL VENOUS BLD VENIPUNCTURE: CPT | Performed by: INTERNAL MEDICINE

## 2024-09-12 PROCEDURE — 72040 X-RAY EXAM NECK SPINE 2-3 VW: CPT | Mod: TC | Performed by: STUDENT IN AN ORGANIZED HEALTH CARE EDUCATION/TRAINING PROGRAM

## 2024-09-12 PROCEDURE — 82607 VITAMIN B-12: CPT | Performed by: INTERNAL MEDICINE

## 2024-09-12 PROCEDURE — 82728 ASSAY OF FERRITIN: CPT | Performed by: INTERNAL MEDICINE

## 2024-09-12 RX ORDER — GABAPENTIN 300 MG/1
300 CAPSULE ORAL 2 TIMES DAILY PRN
COMMUNITY
Start: 2024-09-12

## 2024-09-12 RX ORDER — FAMOTIDINE 20 MG/1
20 TABLET, FILM COATED ORAL 2 TIMES DAILY
COMMUNITY

## 2024-09-12 RX ORDER — MIRTAZAPINE 7.5 MG/1
7.5 TABLET, FILM COATED ORAL AT BEDTIME
Qty: 30 TABLET | Refills: 3 | Status: SHIPPED | OUTPATIENT
Start: 2024-09-12

## 2024-09-12 ASSESSMENT — PAIN SCALES - GENERAL: PAINLEVEL: NO PAIN (0)

## 2024-09-12 NOTE — PROGRESS NOTES
Preventive Care Visit  [facility]  Magda Soliz MD, Internal Medicine  Sep 12, 2024      Patrick was seen today for physical.    Diagnoses and all orders for this visit:    Encounter for Medicare annual wellness exam  Last colonoscopy 8/2023  Last DEXA 12/2021 showed low bone density.  Last mammo 6/2022 was nl, encouraged patient to complete mammos every year.    Rheumatoid arthritis involving multiple sites with positive rheumatoid factor (H)  Stopped methotrexate to avoid long term s.e.  On plaquenil  Well controlled   Follows rheumatologist     Scoliosis of thoracolumbar spine, unspecified scoliosis type  Follows a specialist.    History of GI bleed  D/t Gastric AVM  Was in hospital   Got work up done     Underweight  -     Adult Nutrition  Referral; Future  Drinks [employer] shakes b/c they're lactose free.    Gastric AVM  Recommended continuing Protonix d/t H.o GI bleed. And duodenal erosion  Advised Pepcid is a safer option, advised patient to switch to Pepcid once sx are better controlled.    Small bowel arteriovenous malformation  Follows GI.  Was seen during work up.    Stage 3a chronic kidney disease (H)  -     Adult Nutrition  Referral; Future  Follows nephrology.    Protein-calorie malnutrition, unspecified severity (H24)  -     Adult Nutrition  Referral; Future  Follows a dietician, doesn't have an upcoming schedule.  Discussed seeing a nutritionist and patient agreed.  Referral sent, patient will call to schedule.    Anemia, unspecified type  -     Ferritin; Future  -     Vitamin B12; Future  -     CBC with platelets; Future  Concerned about anemia, wondering what she can do about it.  Discussed H.o GI bleed and autoimmune disease causing anemia.  Reviewed 7/2024 labs which showed low hemoglobin.  Discussed appropriate iron range, should be at least 100.  Recommended taking an iron supplement every other day to avoid GI sx.  Follows hematology,   Kevin.    Lumbar radiculopathy, acute  Reported constant lower back pain, follows a specialist.  Receives cortisone injections.  Takes george, normally takes 2 tablets 2 hours before bedtime.  Advised if Mirtazapine makes her too sleepy she may only take 1 george tablet if she would like.    Loss of appetite  -     mirtazapine (REMERON) 7.5 MG tablet; Take 1 tablet (7.5 mg) by mouth at bedtime.  Reported her appetite is fair.  Discussed trying Mirtazapine and patient agreed.  Rx Mirtazapine sent.    Screening for lipid disorders  -     Lipid panel reflex to direct LDL Fasting; Future    Complaints of memory disturbance  Worried about memory loss, reported forgetting words during conversation, feels that her memory is fading.  Reviewed her mini cognitive exam and she scored 5.  Discussed expected memory loss sx.  She is mentally sharp    Neck pain  -     XR Cervical Spine 2/3 Views; Future  -     Physical Therapy  Referral; Future  Reported waxing and waning mild headache, but it's gradually happening more frequently and pain is worsening. Unsure where pain is stemming from.  Completed brain MRI 9/2020.  Does physical therapy, last went about 2 months ago, but they focus on her back.  Discussed trying physical therapy and patient agreed.  Referral sent, patient will call to schedule.  Discussed completing a neck XR and patient agreed.  XR ordered and completed today, results pending.    Vaginal discharge most likely due to postmenopausal status  -     Ob/Gyn  Referral; Future  She had so many issues to discuss so I can discuss next time   Reported yellow vaginal discharge, wondering if that's nl for her age.  Denied pain.  Recommended seeing an OBGYN, patient was given written information regarding  [facility].    Bilateral hearing loss, unspecified hearing loss type  Wears bilateral hearing aids, but they don't work well for her.    Medication management  -     Comprehensive  metabolic panel (BMP + Alb, Alk Phos, ALT, AST, Total. Bili, TP); Future    Other orders  -     REVIEW OF HEALTH MAINTENANCE PROTOCOL ORDERS  -     PRIMARY CARE FOLLOW-UP SCHEDULING; Future    Other  Patient will receive flu vaccine and Covid booster at a pharmacy.  Patient is new and we covered as much as we could, but she has a complicated Hx so we will discuss more concerns at her next visit.  Previous PCP, Dr. Essence Tamayo retired.    Subjective   Patrick is a 83 year old, presenting for the following:  Physical        Health Care Directive  Patient does not have a Health Care Directive or Living Will: Discussed advance care planning with patient; however, patient declined at this time.    HPI  Patrick is a 83 year old, presenting for the following:  Physical        9/12/2024   General Health   How would you rate your overall physical health? (!) FAIR   Feel stress (tense, anxious, or unable to sleep) Only a little      (!) STRESS CONCERN      9/12/2024   Nutrition   Diet: Regular (no restrictions)            9/12/2024   Exercise   Days per week of moderate/strenous exercise 5 days   Average minutes spent exercising at this level 30 min            9/12/2024   Social Factors   Frequency of gathering with friends or relatives Once a week   Worry food won't last until get money to buy more No   Food not last or not have enough money for food? No   Do you have housing? (Housing is defined as stable permanent housing and does not include staying ouside in a car, in a tent, in an abandoned building, in an overnight shelter, or couch-surfing.) Yes   Are you worried about losing your housing? No   Lack of transportation? No   Unable to get utilities (heat,electricity)? No            9/12/2024   Fall Risk   Fallen 2 or more times in the past year? No    No   Trouble with walking or balance? No    No       Multiple values from one day are sorted in reverse-chronological order          9/12/2024   Activities of Daily Living-  Home Safety   Needs help with the following daily activites None of the above   Safety concerns in the home None of the above            9/12/2024   Dental   Dentist two times every year? Yes            9/12/2024   Hearing Screening   Hearing concerns? (!) I NEED TO ASK PEOPLE TO SPEAK UP OR REPEAT THEMSELVES.    (!) IT'S HARD TO FOLLOW A CONVERSATION IN A NOISY RESTAURANT OR CROWDED ROOM.    (!) TROUBLE UNDERSTANDING SOFT OR WHISPERED SPEECH.       Multiple values from one day are sorted in reverse-chronological order         9/12/2024   Driving Risk Screening   Patient/family members have concerns about driving No            9/12/2024   General Alertness/Fatigue Screening   Have you been more tired than usual lately? (!) YES            9/12/2024   Urinary Incontinence Screening   Bothered by leaking urine in past 6 months No            9/12/2024   TB Screening   Were you born outside of the [place]? Yes      Today's PHQ-2 Score:       9/12/2024     2:56 PM   PHQ-2 ( 1999 Pfizer)   Q1: Little interest or pleasure in doing things 0   Q2: Feeling down, depressed or hopeless 0   PHQ-2 Score 0   Q1: Little interest or pleasure in doing things Not at all   Q2: Feeling down, depressed or hopeless Not at all   PHQ-2 Score 0           9/12/2024   Substance Use   Alcohol more than 3/day or more than 7/wk No   Do you have a current opioid prescription? No   How severe/bad is pain from 1 to 10? 0/10 (No Pain)   Do you use any other substances recreationally? No        Social History     Tobacco Use    Smoking status: Former    Smokeless tobacco: Never   Vaping Use    Vaping status: Never Used   Substance Use Topics    Alcohol use: No    Drug use: No           6/23/2022   LAST FHS-7 RESULTS   1st degree relative breast or ovarian cancer Yes   Any relative bilateral breast cancer No   Any male have breast cancer No   Any ONE woman have BOTH breast AND ovarian cancer No   Any woman with breast cancer before 50yrs No   2 or more  relatives with breast AND/OR ovarian cancer No   2 or more relatives with breast AND/OR bowel cancer No           Mammogram Screening - After age 74- determine frequency with patient based on health status, life expectancy and patient goals        Reviewed and updated as needed this visit by Provider                  Current providers sharing in care for this patient include:  Patient Care Team:  Magda Soliz MD as PCP - General (Internal Medicine)  Erick Verdugo MD as MD (Otolaryngology)  Genesis Burt AuD as Audiologist (Audiology)  Edgar Segura MD as MD (Gastroenterology)  Mukesh Lantigua DO as Assigned Musculoskeletal Provider  Jailyn Power MD as MD (Nephrology)  Greg Clements MD as Assigned Rheumatology Provider  Kandi Medina MD as MD (Endocrinology, Diabetes, and Metabolism)  Elvia Colin MD as Referring Physician  Keerthi Walters AuD as Audiologist (Audiology)  Rubina Grey MD as MD (Dermatology)  Rubina Grey MD as MD (Dermatology)  Shantal Brown MD as Assigned Cancer Care Provider  Cruzito Garzon MD as MD (Nephrology)  Belen Delacruz PA-C as Physician Assistant (Gastroenterology)  Francine Bruner NP as Assigned Nephrology Provider  Rubina Grey MD as Assigned Surgical Provider  Belen Delacruz PA-C as Assigned Gastroenterology Provider  Altagracia Martinez MD as MD (Gastroenterology)  Mari Brenner MD as Assigned Heart and Vascular Provider  Francine Bruner NP as Nurse Practitioner (Nephrology)  [facility] as Assigned PCP  Donald Coombs MD as Assigned Neuroscience Provider    The following health maintenance items are reviewed in Epic and correct as of today:  Health Maintenance   Topic Date Due    LIPID  Never done    MICROALBUMIN  08/02/2024    INFLUENZA VACCINE (1) 09/01/2024    COVID-19 Vaccine (8 - 2023-24 season) 09/01/2024    BMP  10/20/2024    HEMOGLOBIN  01/30/2025    MEDICARE  "ANNUAL WELLNESS VISIT  09/12/2025    ANNUAL REVIEW OF HM ORDERS  09/12/2025    FALL RISK ASSESSMENT  09/12/2025    ADVANCE CARE PLANNING  09/12/2029    DTAP/TDAP/TD IMMUNIZATION (3 - Td or Tdap) 09/01/2032    DEXA  12/23/2036    PARATHYROID  Completed    PHOSPHORUS  Completed    PHQ-2 (once per calendar year)  Completed    Pneumococcal Vaccine: 65+ Years  Completed    URINALYSIS  Completed    ALK PHOS  Completed    ZOSTER IMMUNIZATION  Completed    HPV IMMUNIZATION  Aged Out    MENINGITIS IMMUNIZATION  Aged Out    RSV MONOCLONAL ANTIBODY  Aged Out     Review of Systems  Constitutional, HEENT, cardiovascular, pulmonary, GI, , musculoskeletal, neuro, skin, endocrine and psych systems are negative, except as otherwise noted.     Objective    Exam  /63 (BP Location: Right arm, Patient Position: Sitting, Cuff Size: Adult Small)   Pulse 74   Temp 97.3  F (36.3  C) (Temporal)   Resp 16   Ht 1.6 m (5' 2.99\")   Wt 38.8 kg (85 lb 8 oz)   SpO2 99%   BMI 15.15 kg/m     Estimated body mass index is 15.15 kg/m  as calculated from the following:    Height as of this encounter: 1.6 m (5' 2.99\").    Weight as of this encounter: 38.8 kg (85 lb 8 oz).    Physical Exam  GENERAL: alert and no distress  EYES: Eyes grossly normal to inspection, PERRL and conjunctivae and sclerae normal  HENT: ear canals and TM's normal, nose and mouth without ulcers or lesions she uses bilateral hearing aids.  NECK: no adenopathy, no asymmetry, masses, or scars  RESP: lungs clear to auscultation - no rales, rhonchi or wheezes  BREAST: normal without masses, tenderness or nipple discharge and no palpable axillary masses or adenopathy  CV: regular rate and rhythm, normal S1 S2, no S3 or S4, no murmur, click or rub, no peripheral edema  ABDOMEN: soft, nontender, no hepatosplenomegaly, no masses and bowel sounds normal  MS: no gross musculoskeletal defects noted, no edema   SKIN: no suspicious lesions or rashes  NEURO: Normal strength and tone, " mentation intact and speech normal  PSYCH: mentation appears normal, affect normal/bright  LYMPH: no cervical, supraclavicular, axillary, or inguinal adenopathy    Labs pending         9/12/2024   Mini Cog   Clock Draw Score 2 Normal   3 Item Recall 3 objects recalled   Mini Cog Total Score 5            She was my last patient so we spent so much time and went beyond appointment time     50 minutes spent on the date of the encounter doing chart review, history and exam, documentation and further activities as noted above       Signed Electronically by: Magda Soliz MD    This document serves as a record of the services and decisions personally performed and made by Dr. Soliz. It was created on her behalf by Effie Nicholson, a trained medical scribe. The creation of this document is based the provider's statements to the medical scribe.

## 2024-09-12 NOTE — PATIENT INSTRUCTIONS
You are due for new Covid booster and flu shot    Start taking mirtazapine 7.5 mg at bed time   ( For appetite and sleep)    Make appointment with nutritionist  ERON neck  Make appointment with PHYSICL THERAPY     [facility] [phone]       Follow up in 4-6 weeks    Patient Education   Preventive Care Advice   This is general advice given by our system to help you stay healthy. However, your care team may have specific advice just for you. Please talk to your care team about your preventive care needs.  Nutrition  Eat 5 or more servings of fruits and vegetables each day.  Try wheat bread, brown rice and whole grain pasta (instead of white bread, rice, and pasta).  Get enough calcium and vitamin D. Check the label on foods and aim for 100% of the RDA (recommended daily allowance).  Lifestyle  Exercise at least 150 minutes each week  (30 minutes a day, 5 days a week).  Do muscle strengthening activities 2 days a week. These help control your weight and prevent disease.  No smoking.  Wear sunscreen to prevent skin cancer.  Have a dental exam and cleaning every 6 months.  Yearly exams  See your health care team every year to talk about:  Any changes in your health.  Any medicines your care team has prescribed.  Preventive care, family planning, and ways to prevent chronic diseases.  Shots (vaccines)   HPV shots (up to age 26), if you've never had them before.  Hepatitis B shots (up to age 59), if you've never had them before.  COVID-19 shot: Get this shot when it's due.  Flu shot: Get a flu shot every year.  Tetanus shot: Get a tetanus shot every 10 years.  Pneumococcal, hepatitis A, and RSV shots: Ask your care team if you need these based on your risk.  Shingles shot (for age 50 and up)  General health tests  Diabetes screening:  Starting at age 35, Get screened for diabetes at least every 3 years.  If you are younger than age 35, ask your care team if you should be screened for  diabetes.  Cholesterol test: At age 39, start having a cholesterol test every 5 years, or more often if advised.  Bone density scan (DEXA): At age 50, ask your care team if you should have this scan for osteoporosis (brittle bones).  Hepatitis C: Get tested at least once in your life.  STIs (sexually transmitted infections)  Before age 24: Ask your care team if you should be screened for STIs.  After age 24: Get screened for STIs if you're at risk. You are at risk for STIs (including HIV) if:  You are sexually active with more than one person.  You don't use condoms every time.  You or a partner was diagnosed with a sexually transmitted infection.  If you are at risk for HIV, ask about PrEP medicine to prevent HIV.  Get tested for HIV at least once in your life, whether you are at risk for HIV or not.  Cancer screening tests  Cervical cancer screening: If you have a cervix, begin getting regular cervical cancer screening tests starting at age 21.  Breast cancer scan (mammogram): If you've ever had breasts, begin having regular mammograms starting at age 40. This is a scan to check for breast cancer.  Colon cancer screening: It is important to start screening for colon cancer at age 45.  Have a colonoscopy test every 10 years (or more often if you're at risk) Or, ask your provider about stool tests like a FIT test every year or Cologuard test every 3 years.  To learn more about your testing options, visit:   .  For help making a decision, visit:   https://bit.ly/nr03586.  Prostate cancer screening test: If you have a prostate, ask your care team if a prostate cancer screening test (PSA) at age 55 is right for you.  Lung cancer screening: If you are a current or former smoker ages 50 to 80, ask your care team if ongoing lung cancer screenings are right for you.  For informational purposes only. Not to replace the advice of your health care provider. Copyright   2023 [employer]. All rights reserved.  Clinically reviewed by the [facility]. [employer] 770836 - REV 01/24.  Hearing Loss: Care Instructions  Overview     Hearing loss is a sudden or slow decrease in how well you hear. It can range from slight to profound. Permanent hearing loss can occur with aging. It also can happen when you are exposed long-term to loud noise. Examples include listening to loud music, riding motorcycles, or being around other loud machines.  Hearing loss can affect your work and home life. It can make you feel lonely or depressed. You may feel that you have lost your independence. But hearing aids and other devices can help you hear better and feel connected to others.  Follow-up care is a key part of your treatment and safety. Be sure to make and go to all appointments, and call your doctor if you are having problems. It's also a good idea to know your test results and keep a list of the medicines you take.  How can you care for yourself at home?  Avoid loud noises whenever possible. This helps keep your hearing from getting worse.  Always wear hearing protection around loud noises.  Wear a hearing aid as directed.  A professional can help you pick a hearing aid that will work best for you.  You can also get hearing aids over the counter for mild to moderate hearing loss.  Have hearing tests as your doctor suggests. They can show whether your hearing has changed. Your hearing aid may need to be adjusted.  Use other devices as needed. These may include:  Telephone amplifiers and hearing aids that can connect to a television, stereo, radio, or microphone.  Devices that use lights or vibrations. These alert you to the doorbell, a ringing telephone, or a baby monitor.  Television closed-captioning. This shows the words at the bottom of the screen. Most new TVs can do this.  TTY (text telephone). This lets you type messages back and forth on the telephone instead of talking or listening. These devices are also  "called TDD. When messages are typed on the keyboard, they are sent over the phone line to a receiving TTY. The message is shown on a monitor.  Use text messaging, social media, and email if it is hard for you to communicate by telephone.  Try to learn a listening technique called speechreading. It is not lipreading. You pay attention to people's gestures, expressions, posture, and tone of voice. These clues can help you understand what a person is saying. Face the person you are talking to, and have them face you. Make sure the lighting is good. You need to see the other person's face clearly.  Think about counseling if you need help to adjust to your hearing loss.  When should you call for help?  Watch closely for changes in your health, and be sure to contact your doctor if:    You think your hearing is getting worse.     You have new symptoms, such as dizziness or nausea.   Where can you learn more?  Go to https://www.[employer]/patiented  Enter R798 in the search box to learn more about \"Hearing Loss: Care Instructions.\"  Current as of: September 27, 2023               Content Version: 14.0    0561-6274 [employer].   Care instructions adapted under license by your healthcare professional. If you have questions about a medical condition or this instruction, always ask your healthcare professional. [employer] disclaims any warranty or liability for your use of this information.      Learning About Sleeping Well  What does sleeping well mean?     Sleeping well means getting enough sleep to feel good and stay healthy. How much sleep is enough varies among people.  The number of hours you sleep and how you feel when you wake up are both important. If you do not feel refreshed, you probably need more sleep. Another sign of not getting enough sleep is feeling tired during the day.  Experts recommend that adults get at least 7 or more hours of sleep per day. Children and older adults need " "more sleep.  Why is getting enough sleep important?  Getting enough quality sleep is a basic part of good health. When your sleep suffers, your physical health, mood, and your thoughts can suffer too. You may find yourself feeling more grumpy or stressed. Not getting enough sleep also can lead to serious problems, including injury, accidents, anxiety, and depression.  What might cause poor sleeping?  Many things can cause sleep problems, including:  Changes to your sleep schedule.  Stress. Stress can be caused by fear about a single event, such as giving a speech. Or you may have ongoing stress, such as worry about work or school.  Depression, anxiety, and other mental or emotional conditions.  Changes in your sleep habits or surroundings. This includes changes that happen where you sleep, such as noise, light, or sleeping in a different bed. It also includes changes in your sleep pattern, such as having jet lag or working a late shift.  Health problems, such as pain, breathing problems, and restless legs syndrome.  Lack of regular exercise.  Using alcohol, nicotine, or caffeine before bed.  How can you help yourself?  Here are some tips that may help you sleep more soundly and wake up feeling more refreshed.  Your sleeping area   Use your bedroom only for sleeping and sex. A bit of light reading may help you fall asleep. But if it doesn't, do your reading elsewhere in the house. Try not to use your TV, computer, smartphone, or tablet while you are in bed.  Be sure your bed is big enough to stretch out comfortably, especially if you have a sleep partner.  Keep your bedroom quiet, dark, and cool. Use curtains, blinds, or a sleep mask to block out light. To block out noise, use earplugs, soothing music, or a \"white noise\" machine.  Your evening and bedtime routine   Create a relaxing bedtime routine. You might want to take a warm shower or bath, or listen to soothing music.  Go to bed at the same time every night. And " "get up at the same time every morning, even if you feel tired.  What to avoid   Limit caffeine (coffee, tea, caffeinated sodas) during the day, and don't have any for at least 6 hours before bedtime.  Avoid drinking alcohol before bedtime. Alcohol can cause you to wake up more often during the night.  Try not to smoke or use tobacco, especially in the evening. Nicotine can keep you awake.  Limit naps during the day, especially close to bedtime.  Avoid lying in bed awake for too long. If you can't fall asleep or if you wake up in the middle of the night and can't get back to sleep within about 20 minutes, get out of bed and go to another room until you feel sleepy.  Avoid taking medicine right before bed that may keep you awake or make you feel hyper or energized. Your doctor can tell you if your medicine may do this and if you can take it earlier in the day.  If you can't sleep   Imagine yourself in a peaceful, pleasant scene. Focus on the details and feelings of being in a place that is relaxing.  Get up and do a quiet or boring activity until you feel sleepy.  Avoid drinking any liquids before going to bed to help prevent waking up often to use the bathroom.  Where can you learn more?  Go to https://www.[employer].net/patiented  Enter J942 in the search box to learn more about \"Learning About Sleeping Well.\"  Current as of: July 10, 2023  Content Version: 14.1 2006-2024 [email or web address].   Care instructions adapted under license by your healthcare professional. If you have questions about a medical condition or this instruction, always ask your healthcare professional. [email or web address] disclaims any warranty or liability for your use of this information.       "

## 2024-09-14 NOTE — RESULT ENCOUNTER NOTE
Maryan Nguyen    This is to inform you regarding your test result.    There are some minor arthritic changes   PHYSICL THERAPY would be beneficial  Icing as needed   OTC topical medication as needed     Sincerely,      Dr.Nasima Martínez MD,FACP

## 2024-09-14 NOTE — RESULT ENCOUNTER NOTE
Maryan Nguyen    This is to inform you regarding your test result.    I tried to contact you but got the VM.  Your potassium is elevated   You appear dehydrated   Avoid high potassium containing food like potatoes and bananas  Stay well hydrated   Recheck potassium in one week  Make lab appointment   Your vit B12 level is higher than normal  Cut down 50% on your vit B12 intake  Recheck B12 level in 3 months   Your total cholesterol is elevated.  HDL which is called good cholesterol is normal.  Your LDL which is called bad cholesterol is elevated.  Eat low cholesterol low fat  diet and do regular physical activity. Avoid high sugar containing food.  Glucose which is your blood sugar is slightly elevated.  Avoid high sugar containing food.  White count is normal  Hemoglobin is low but improving   Ferritin which is iron stores in the body is low.  We want Ferritin level greater than   Take OTC Ferrous Sulfate 325 mg po  every other day if you tolerate.  Take with vit C as vit C helps to absorb iron.  Iron can make you constipated so take stool softener.  Recheck ferritin in 4 months          Sincerely,      Dr.Nasima Martínez MD,FACP

## 2024-09-24 ENCOUNTER — OFFICE VISIT (OUTPATIENT)
Dept: ORTHOPEDICS | Facility: CLINIC | Age: 84
End: 2024-09-24
Payer: COMMERCIAL

## 2024-09-24 ENCOUNTER — ANCILLARY PROCEDURE (OUTPATIENT)
Dept: GENERAL RADIOLOGY | Facility: CLINIC | Age: 84
End: 2024-09-24
Attending: PHYSICAL MEDICINE & REHABILITATION
Payer: COMMERCIAL

## 2024-09-24 VITALS
HEART RATE: 72 BPM | BODY MASS INDEX: 15.06 KG/M2 | SYSTOLIC BLOOD PRESSURE: 123 MMHG | DIASTOLIC BLOOD PRESSURE: 74 MMHG | OXYGEN SATURATION: 98 % | WEIGHT: 85 LBS | HEIGHT: 63 IN

## 2024-09-24 DIAGNOSIS — M47.817 FACET ARTHROPATHY, LUMBOSACRAL: ICD-10-CM

## 2024-09-24 DIAGNOSIS — M25.552 LEFT HIP PAIN: ICD-10-CM

## 2024-09-24 DIAGNOSIS — M41.9 SCOLIOSIS OF THORACOLUMBAR SPINE, UNSPECIFIED SCOLIOSIS TYPE: ICD-10-CM

## 2024-09-24 DIAGNOSIS — M51.379 DDD (DEGENERATIVE DISC DISEASE), LUMBOSACRAL: ICD-10-CM

## 2024-09-24 DIAGNOSIS — M25.552 LEFT HIP PAIN: Primary | ICD-10-CM

## 2024-09-24 DIAGNOSIS — G58.8 CLUNEAL NEUROPATHY: ICD-10-CM

## 2024-09-24 PROCEDURE — 73501 X-RAY EXAM HIP UNI 1 VIEW: CPT | Mod: TC | Performed by: INTERNAL MEDICINE

## 2024-09-24 ASSESSMENT — PAIN SCALES - GENERAL: PAINLEVEL: SEVERE PAIN (6)

## 2024-09-24 NOTE — PATIENT INSTRUCTIONS
For nursing questions, please call [phone].    Please schedule a follow-up appointment in 2 months.  You can schedule this at the , or you can call [phone].    For medical records, please call [phone].    Please schedule ultrasound-guided bilateral superior cluneal nerve injections with me.  Please schedule on or after 10/24/2024.  The [facility] Procedure Scheduling Team will call you to schedule your procedure in the next 0-3 days.  You can also call them directly at [phone] option 2.    Please schedule an appointment with Neurosurgery (Misael Justice MD).    Please schedule an appointment with Orthopedic Surgery (Juan Manuel Thomas MD).      Gabapentin (Neurontin  ) Titration Schedule    Capsule or tablet size: 300 mg  This schedule should only be used with 300 mg capsules or tablets.  Double check your medication label from the pharmacy to confirm the dispensed dose.  Call your healthcare provider with questions.    INSTRUCTIONS:  - Follow the instructions in the table below for increasing the dose of the medication until you achieve a good response.  Do not increase the dose faster than prescribed.  - If your symptoms improve at a lower dose, continue taking that dose; increase to the next dose only if necessary.  - If side effects occur that you can not tolerate, go back down to the last dose that you tolerated.  Call your healthcare provider if you are having limiting side effects.  - Most common side effects include:  Swelling, nausea, dizziness, sleepiness.  - Gabapentin comes in many dosage sizes.  Once a stable dose has been found, your healthcare provider may provide a prescription for a larger dosage size.    SCHEDULE:    DATE DAY 1 DAY 2 DAY 3 DAY 4 DAY 5 DAY 6 DAY 7    Number of capsules or tablets Number of capsules or tablets Number of capsules or tablets Number of capsules or tablets Number of capsules or tablets Number of capsules or tablets Number of  capsules or tablets   MORNING 0 0 0 1 1 1 1   MIDDAY 0 0 0 0 0 0 1   EVENING 1 1 1 1 1 1 1     DATE DAY 8 DAY 9 DAY 10 DAY 11 DAY 12 DAY 13 DAY 14    Number of capsules or tablets Number of capsules or tablets Number of capsules or tablets Number of capsules or tablets Number of capsules or tablets Number of capsules or tablets Number of capsules or tablets   MORNING 1 1 1 1 1 2 2   MIDDAY 1 1 1 1 1 1 1   EVENING 1 1 2 2 2 2 2     DATE DAY 15 DAY 16 DAY 17 DAY 18 DAY 19 DAY 20 DAY 21    Number of capsules or tablets Number of capsules or tablets Number of capsules or tablets Number of capsules or tablets Number of capsules or tablets Number of capsules or tablets Number of capsules or tablets   MORNING 2 2 2 2 2 2 Maintain at 2   MIDDAY 1 2 2 2 2 2 Maintain at 2   EVENING 2 2 2 2 2 2 Maintain at 2

## 2024-09-24 NOTE — PROGRESS NOTES
PHYSICAL MEDICINE & REHABILITATION / MEDICAL SPINE        Date:  Sep 24, 2024    Name:  Patrick Olson  YOB: 1940  MRN:  [id number]          CHART REVIEW:  Reviewed 06/06/2024 note from Mukesh Lantigua DO (family medicine-sports medicine).  Ms. Patrick Olson was being seen to follow-up for chronic low back pain.  She had been doing physical therapy.  She had seen pain management and started acupuncture.  Walking increased her pain.  Ms. Patrick Olson was seen at [facility] for consideration of RFA.  She had medial branch blocks that only provided 50% relief.  Ms. Patrick Olson had lumbar facet arthritis and moderate lumbar spinal stenosis.  She had an epidural steroid injection performed in February 2024 that did not provide any relief.  Ms. Patrick Olson was to continue with physical therapy.  There was a discussion of adding a TENS unit.  Ms. Patrick Olson could continue acupuncture if she wanted.  Referral to medical spine was placed.     Reviewed 02/14/2024 note from Sintia Pelayo MD (physical medicine and rehabilitation-pain medicine).  Ms. Patrick Olson had a bilateral L4 nerve root injections.     Reviewed 02/05/2024 note from Sintia Pelayo MD (physical medicine and rehabilitation-pain medicine).  Ms. Patrick Olson had L3-L4 interlaminar epidural steroid injections on 05/17/2022 and 03/14/2023; these were without relief.  Ms. Patrick Olson had medial branch blocks of the bilateral L3, L4, and L5 medial branches/dorsal rami on 01/30/2024 with only 50% diagnostic relief.  The plan was for bilateral L4 nerve root injections and to continue with physical therapy.  Ms. Patrick Olson was to call for Medrol Dosepaks as needed for future travels.  Ms. Patrick Olson was to follow-up in clinic 2 weeks after the procedure.        CHIEF COMPLAINT:  Bilateral low back/buttock pain.        HISTORY OF PRESENT ILLNESS:  Ms. Patrick Olson is an 83-year-old female.  She is retired.  She was a   "with the [employer].  Ms. Patrick Olson enjoys traveling.  She previously enjoyed painting before her back pain worsened.  Ms. Patrick Olson likes to walk 1 mile for exercise.     Ms. Patrick Olson stated that she was recently hospitalized for a diverticular bleed.     Ms. Patrick Olson stated that she has had a left total hip arthroplasty in 2011.  She denied any other injuries or surgeries of her mid back, low back, pelvis, hips, thighs, knees, legs, ankles, feet, toes.     Ms. Patrick Olson stated that she has had rheumatoid arthritis for 40 years.  She denied any other personal or family history of autoimmune diseases, rheumatologic diseases, gout, pseudogout.  Ms. Patrick Olson denied any personal or family history of neurologic diseases.  She denied any personal or family history of inflammatory bowel diseases.     Ms. Patrick Olson began noting bilateral low back pain in 2009.    Ms. Patrick Olson was last seen in this clinic on 07/22/2024.  On 07/25/2024, Ms. Patrick Olson had ultrasound-guided bilateral superior cluneal nerve injections.  Ms. Patrick Olson returns to clinic today, 09/24/2024.    Ms. Patrick Olson states she had 70% to 75% improvement in her pain for 4 to 5 weeks.  She has gradually noted a return of her pain.  Ms. Patrick Olson has bilateral low back/buttock pain.  She currently rates this as 6/10.  This pain is described as \"dull.\"  This pain is constant and worsens with prolonged sitting and prolonged standing.    Ms. Patrick Olson is noting some pain in her right buttock towards her right greater trochanter extending down her right lateral thigh.  This began about a week ago.    Ms. Patrick Olson has pain in her left proximal thigh.  This started about a month ago.  Ms. Patrick Olson has really not noted any weakness, but she notes painful hip flexion on her left.    Ms. Patrick Olson is currently taking acetaminophen 650 mg twice daily.  She is taking gabapentin 300 mg at " noon and evening.  She is also taking mirtazapine 7.5 mg at bedtime.    Ms. Patrick Olson currently rates her pain as 6/10.  Her pain varies from 3-4/10 to 7/10.        ALLERGIES:  Allergies   Allergen Reactions    Bee Venom Anaphylaxis    Shrimp Anaphylaxis    Evoxac [Cevimeline] Unknown    Prevnar Swelling     Other reaction(s): Edema    Prevnar [Pneumococcal 13-Linda Conj Vacc] Unknown         MEDICATIONS:  Current Outpatient Medications   Medication Sig Dispense Refill    acetaminophen (TYLENOL) 325 MG tablet Take 2 tablets (650 mg) by mouth every 6 hours      AMOXICILLIN PO Take by mouth.      artificial saliva (BIOTENE DRY MOUTHWASH) LIQD liquid Swish and spit in mouth 4 times daily as needed for dry mouth      calcium carbonate-vitamin D (CALTRATE) 600-10 MG-MCG per tablet Take 1 tablet by mouth daily      carboxymethylcellulose PF (REFRESH PLUS) 0.5 % ophthalmic solution Place 1 drop into both eyes 3 times daily as needed for dry eyes      clobetasol (TEMOVATE) 0.05 % external ointment Apply topically 2 times daily To right ankle as needed 60 g 3    EPINEPHrine (ANY BX GENERIC EQUIV) 0.3 MG/0.3ML injection 2-pack Inject 0.3 mg into the muscle as needed for anaphylaxis      famotidine (PEPCID) 20 MG tablet Take 20 mg by mouth 2 times daily.      gabapentin (NEURONTIN) 300 MG capsule Take 1 capsule (300 mg) by mouth 2 times daily as needed for neuropathic pain.      gabapentin (NEURONTIN) 300 MG capsule Take 600 mg by mouth at bedtime      hydroxychloroquine (PLAQUENIL) 200 MG tablet Take 1 tablet (200 mg) by mouth daily 90 tablet 1    lifitegrast (XIIDRA) 5 % opthalmic solution Place 1 drop into both eyes 2 times daily      mirtazapine (REMERON) 7.5 MG tablet Take 1 tablet (7.5 mg) by mouth at bedtime. 30 tablet 3    Multiple Vitamins-Minerals (OCUVITE PRESERVISION PO) Take 1 tablet by mouth 2 times daily      pantoprazole (PROTONIX) 40 MG EC tablet Take 1 tablet (40 mg) by mouth daily 30 tablet 11    UNABLE TO  FIND MEDICATION NAME: V (vein formular)           PAST MEDICAL HISTORY:  Past Medical History:   Diagnosis Date    Anemia     CKD (chronic kidney disease) stage 3, GFR 30-59 ml/min (H)     Diverticulosis of large intestine     Osteoarthritis     Osteoporosis     Rheumatoid arthritis (H)     Sensorineural hearing loss     Varicose veins of bilateral lower extremities with other complications          PAST SURGICAL HISTORY:  Past Surgical History:   Procedure Laterality Date    CAPSULE/PILL CAM ENDOSCOPY N/A 11/18/2021    Procedure: IMAGING PROCEDURE, GI TRACT, INTRALUMINAL, VIA CAPSULE;  Surgeon: Deric Rodriguez MD;  Location: [facility]    CAPSULE/PILL CAM ENDOSCOPY N/A 2/14/2023    Procedure: IMAGING PROCEDURE, GI TRACT, INTRALUMINAL, VIA CAPSULE;  Surgeon: Jaime Mesa MD;  Location: [facility]    COLONOSCOPY N/A 03/14/2017    Procedure: COMBINED COLONOSCOPY, SINGLE OR MULTIPLE BIOPSY/POLYPECTOMY BY BIOPSY;  Surgeon: Spencer Downey MD;  Location: [facility]    COLONOSCOPY N/A 9/22/2022    Procedure: COLONOSCOPY, FLEXIBLE, WITH LESION REMOVAL USING SNARE;  Surgeon: Kirby Arthur MD;  Location:  GI    COLONOSCOPY N/A 1/13/2023    Procedure: COLONOSCOPY;  Surgeon: Spencer Downey MD;  Location: [facility]    COLONOSCOPY N/A 8/14/2023    Procedure: Colonoscopy;  Surgeon: Spencer Downey MD;  Location:  GI    ENTEROSCOPY SMALL BOWEL N/A 11/20/2021    Procedure: ENTEROSCOPY, colonoscopy with endoscopic mucosal resection and tattoo placement;  Surgeon: Kirby Arthur MD;  Location: [facility]    ESOPHAGOSCOPY, GASTROSCOPY, DUODENOSCOPY (EGD), COMBINED N/A 2/6/2023    Procedure: ESOPHAGOGASTRODUODENOSCOPY (EGD);  Surgeon: Spencer Downey MD;  [facility]    IR VISCERAL EMBOLIZATION  01/22/2021    ORTHOPEDIC SURGERY Left     hip replacment    SIGMOIDOSCOPY FLEXIBLE N/A 01/23/2021    Procedure: SIGMOIDOSCOPY, FLEXIBLE;  Surgeon: Jaime Steinberg MD;  Location: [facility]    SIGMOIDOSCOPY FLEXIBLE N/A  7/17/2024    Procedure: Sigmoidoscopy flexible;  Surgeon: Ritika Woodard MD;  Location: [facility]         FAMILY HISTORY:  History reviewed. No pertinent family history.      SOCIAL HISTORY:  Social History     Socioeconomic History    Marital status:      Spouse name: Not on file    Number of children: Not on file    Years of education: Not on file    Highest education level: Not on file   Occupational History    Not on file   Tobacco Use    Smoking status: Former    Smokeless tobacco: Never   Vaping Use    Vaping status: Never Used   Substance and Sexual Activity    Alcohol use: No    Drug use: No    Sexual activity: Not on file   Other Topics Concern    Not on file   Social History Narrative    - Retired (formerly employed as  at [employer])    - 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[employer])     Social Determinants of Health     Financial Resource Strain: Low Risk  (9/12/2024)    Financial Resource Strain     Within the past 12 months, have you or your family members you live with been unable to get utilities (heat, electricity) when it was really needed?: No   Food Insecurity: Low Risk  (9/12/2024)    Food Insecurity     Within the past 12 months, did you worry that your food would run out before you got money to buy more?: No     Within the past 12 months, did the food you bought just not last and you didn t have money to get more?: No   Transportation Needs: Low Risk  (9/12/2024)    Transportation Needs     Within the past 12 months, has lack of transportation kept you from medical appointments, getting your medicines, non-medical meetings or appointments, work, or from getting things that you need?: No   Physical Activity: Sufficiently Active (9/12/2024)    Exercise Vital Sign     Days of Exercise per Week: 5 days     Minutes of Exercise per Session: 30 min   Stress: No Stress Concern Present (9/12/2024)    [place] [employer]  "Occupational Health - Occupational Stress Questionnaire     Feeling of Stress : Only a little   Social Connections: Unknown (9/12/2024)    Social Connection and Isolation Panel [NHANES]     Frequency of Communication with Friends and Family: Not on file     Frequency of Social Gatherings with Friends and Family: Once a week     Attends Sikhism Services: Not on file     Active Member of Clubs or Organizations: Not on file     Attends Club or Organization Meetings: Not on file     Marital Status: Not on file   Interpersonal Safety: Low Risk  (9/12/2024)    Interpersonal Safety     Do you feel physically and emotionally safe where you currently live?: Yes     Within the past 12 months, have you been hit, slapped, kicked or otherwise physically hurt by someone?: No     Within the past 12 months, have you been humiliated or emotionally abused in other ways by your partner or ex-partner?: No   Housing Stability: Low Risk  (9/12/2024)    Housing Stability     Do you have housing? : Yes     Are you worried about losing your housing?: No         PHYSICAL EXAMINATION:  Vitals:    09/24/24 1110   BP: 123/74   Pulse: 72   SpO2: 98%   Weight: 38.6 kg (85 lb)   Height: 1.6 m (5' 3\")       GENERAL:  No acute distress.  Pleasant and cooperative.   PSYCH:  Normal mood and affect.  HEAD:  Normocephalic.  SPEECH:  No dysarthria.  EYES:  No scleral icterus.  Wearing glasses.  EARS:  Hearing is intact to spoken voice.  NOSE:  Midline, symmetric, no rhinorrhea.  LUNGS:  No respiratory distress.  No increased work of breathing.        IMAGING:  XR PELVIS AND HIP LEFT 1 VIEW  9/24/2024 12:07 PM      HISTORY: Left hip pain.  COMPARISON: CT 1/19/2021    IMPRESSION:  Left total hip arthroplasty in near-anatomic position. No periprosthetic fracture or lucency. Demineralization without acute fracture. Lower lumbar degenerative disc disease.        LUMBAR FLEX/EXT TWO TO THREE VIEWS, SPINE COMPLETE SCOLIOSIS TWO VIEWS  July 22, 2024 3:50 PM    "   HISTORY: Scoliosis of thoracolumbar spine. Facet arthropathy. Chronic bilateral low back pain. Cluneal neuropathy.     COMPARISON: Lumbar spine MR 3/9/2023.     IMPRESSION: T12 rib-bearing thoracic vertebral bodies with five lumbar-type vertebral bodies.     Convex right curvature of the lumbar spine centered at L2-L3 with Maldonado angle measuring 25 degrees from the superior L1 endplate to the inferior L3 endplate. Rightward listhesis of L3 on L4. Retrolisthesis of L3 on L4 measuring 2 mm on the flexion view appears slightly increased to 3 mm on the extension view.     No obvious loss of vertebral body height. Mild to moderate degenerative endplate changes and loss of disc height at C5-C6. Marked degenerative endplate changes and loss of disc height in the lumbar spine on the left at L2-L3, bilaterally at L3-L4, and right at L4-L5.     Mild retrolisthesis of L3 on L4 measuring 2 mm on the flexion view appears slightly increased to 3 mm on the extension view.     Positive sagittal balance of 3 cm.           MRI OF THE LUMBAR SPINE WITHOUT CONTRAST  3/9/2023 11:50 AM      COMPARISON: Lumbar spine MRI 07/12/2018     HISTORY: Lumbar radiculopathy, acute. Lumbar degenerative disc disease.     TECHNIQUE: Multiplanar, multisequence MRI images of the lumbar spine were acquired without IV contrast.     FINDINGS: There are five lumbar-type vertebrae for the purposes of this dictation. The conus ends at the distal L1. 2 mm retrolisthesis of L3 on L4. Vertebral body heights are maintained. Nonpathologic marrow signal. Mild Modic type I changes from L2-L3 to L4-L5. Vertebral body heights are maintained. Nonpathologic marrow signal. Mild symmetric atrophy of the posterior paraspinal musculature. Normal diameter of the descending aorta. Redemonstration of a large gallstone. The patient's known bilateral renal cysts are better visualized on the abdomen and pelvis CT 11/16/2021.     T12-L1: Normal disc height and signal.  No  herniation. Mild bilateral facet arthropathy.  No spinal canal or neural foraminal stenosis.     L1-L2: Normal disc height and signal. Small broad-based disc bulge. Mild bilateral facet arthropathy.  No spinal canal or neural foraminal stenosis.     L2-L3: Moderate vertebral disc height loss. Loss of the normal T2 signal within the disc. Broad-based disc bulge with superimposed small central disc protrusion. Mild bilateral facet arthropathy. No spinal canal narrowing. No right neuroforaminal narrowing. Mild left neuroforaminal narrowing.     L3-L4: Moderate intervertebral disc height loss. Loss of the normal T2 signal within the dens. Broad-based disc bulge with superimposed left foraminal disc protrusion, decreased since the prior exam. Moderate bilateral facet arthropathy. Mild spinal canal narrowing. Mild narrowing of the left lateral recess. No right neuroforaminal narrowing. Mild left neural foraminal narrowing.     L4-L5: Mild intervertebral disc height loss. Loss of the normal T2 signal within the disc. Broad-based disc bulge with superimposed right foraminal disc protrusion. Moderate bilateral facet arthropathy. Mild narrowing of the right lateral recess. No spinal canal narrowing. Severe right neuroforaminal narrowing. No left neuroforaminal narrowing.     L5-S1: Mild intervertebral disc height loss. Loss of the normal T2 signal within the disc. Broad-based disc bulge with superimposed small central disc protrusion. Mild bilateral facet arthropathy. Mild narrowing of the lateral recesses. No spinal canal narrowing. No right neural foraminal narrowing. No left neural foraminal narrowing.     IMPRESSION:  1.  Compared to prior lumbar spine MRI 07/12/2018, interval decrease in the previously noted large left foraminal disc protrusion at L3-L4.  2.  Otherwise stable to interval worsening of the moderate multilevel degenerative changes of the lumbar spine.  3.  Mild spinal canal narrowing and mild narrowing of  the left lateral recess at L3-L4.  4.  Mild narrowing of the right lateral recess at L4-L5.  5.  Mild narrowing of the lateral recesses at L5-S1.  6.  Severe right neural foraminal narrowing at L4-L5.  7.  Mild left neuroforaminal narrowing at L2-L3 and L3-L4.  8.  Mild Modic type I changes at from L2-L3 to L4-L5.             ASSESSMENT/PLAN:  Ms. Patrick Olson is an 83-year-old female.  She has thoracolumbar scoliosis, convex right.  She has lumbosacral facet arthropathy and lumbosacral degenerative disc disease.  Ms. Patrick Olson had not had good pain relief with epidural steroid injections and medial branch/dorsal ramus blocks of the bilateral L4-5 and L5-S1.  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  Discussed superior cluneal neuropathy, pathophysiology, treatment options with Ms. Patrick Olson.  Discussed the option of doing nothing/living with it, physical therapy, oral medications such as gabapentin, repeat superior cluneal nerve block, referral to neurosurgery for decompression of the superior cluneal nerves.  Ms. Patrick Olson will be set up for ultrasound-guided bilateral superior cluneal nerve injections; this is to be performed on or after 10/24/2024.  Ms. Patrick Olson is being referred to Misael Justice MD (neurosurgery) for discussion of surgical decompression/release of the bilateral superior cluneal nerves.  Ms. Patrick Olson is to follow-up in this clinic in 2 months.    Ms. Patrick Olson has noted left proximal thigh pain for the past month.  She notes left hip flexion worsens her pain.  She did have a left total hip arthroplasty in 2011.  X-rays of the left hip were ordered today.  Referral to Juan Manuel Thomas MD (orthopedic surgery) was placed.        Total Time on encounter:  52 minutes were spent on one more or more of the following:  discussion with patient, history, exam, coordinating care, treatment goals, record review, documenting clinical information, and/or data  review.      Donald Coombs MD

## 2024-09-24 NOTE — LETTER
9/24/2024      Patrick Olson  [address] [facility] [facility] [address]      Dear Colleague,    Thank you for referring your patient, Patrick Olson, to the [facility]. Please see a copy of my visit note below.    PHYSICAL MEDICINE & REHABILITATION / MEDICAL SPINE        Date:  Sep 24, 2024    Name:  Patrick Olson  YOB: 1940  MRN:  [id number]          CHART REVIEW:  Reviewed 06/06/2024 note from Mukesh Lantigua DO (family medicine-sports medicine).  Ms. Patrick Olson was being seen to follow-up for chronic low back pain.  She had been doing physical therapy.  She had seen pain management and started acupuncture.  Walking increased her pain.  Ms. Patrick Olson was seen at [facility] for consideration of RFA.  She had medial branch blocks that only provided 50% relief.  Ms. Patrick Olson had lumbar facet arthritis and moderate lumbar spinal stenosis.  She had an epidural steroid injection performed in February 2024 that did not provide any relief.  Ms. Patrick Olson was to continue with physical therapy.  There was a discussion of adding a TENS unit.  Ms. Patrick Olson could continue acupuncture if she wanted.  Referral to medical spine was placed.     Reviewed 02/14/2024 note from Sintia Pelayo MD (physical medicine and rehabilitation-pain medicine).  Ms. Patrick Olson had a bilateral L4 nerve root injections.     Reviewed 02/05/2024 note from Sintia Pelayo MD (physical medicine and rehabilitation-pain medicine).  Ms. Patrick Olson had L3-L4 interlaminar epidural steroid injections on 05/17/2022 and 03/14/2023; these were without relief.  Ms. Patrick Olson had medial branch blocks of the bilateral L3, L4, and L5 medial branches/dorsal rami on 01/30/2024 with only 50% diagnostic relief.  The plan was for bilateral L4 nerve root injections and to continue with physical therapy.  Ms. Patrick Olson was to call for Medrol Dosepaks as needed for future travels.  Ms. Nguyen  "JR Olson was to follow-up in clinic 2 weeks after the procedure.        CHIEF COMPLAINT:  Bilateral low back/buttock pain.        HISTORY OF PRESENT ILLNESS:  Ms. Patrick Olson is an 83-year-old female.  She is retired.  She was a  with the [employer].  Ms. Patrick Olson enjoys traveling.  She previously enjoyed painting before her back pain worsened.  Ms. Patrick Olson likes to walk 1 mile for exercise.     Ms. Patrick Olson stated that she was recently hospitalized for a diverticular bleed.     Ms. Patrick Olson stated that she has had a left total hip arthroplasty in 2011.  She denied any other injuries or surgeries of her mid back, low back, pelvis, hips, thighs, knees, legs, ankles, feet, toes.     Ms. Patrick Olson stated that she has had rheumatoid arthritis for 40 years.  She denied any other personal or family history of autoimmune diseases, rheumatologic diseases, gout, pseudogout.  Ms. Patrick Olson denied any personal or family history of neurologic diseases.  She denied any personal or family history of inflammatory bowel diseases.     Ms. Patrick Olson began noting bilateral low back pain in 2009.    Ms. Patrick Olson was last seen in this clinic on 07/22/2024.  On 07/25/2024, Ms. Patrick Olson had ultrasound-guided bilateral superior cluneal nerve injections.  Ms. Patrick Olson returns to clinic today, 09/24/2024.    Ms. Patrick Olson states she had 70% to 75% improvement in her pain for 4 to 5 weeks.  She has gradually noted a return of her pain.  Ms. Patrick Olson has bilateral low back/buttock pain.  She currently rates this as 6/10.  This pain is described as \"dull.\"  This pain is constant and worsens with prolonged sitting and prolonged standing.    Ms. Patrick Olson is noting some pain in her right buttock towards her right greater trochanter extending down her right lateral thigh.  This began about a week ago.    Ms. Patrick Olson has pain in her left proximal thigh.  This " started about a month ago.  Ms. Patrick Olson has really not noted any weakness, but she notes painful hip flexion on her left.    Ms. Patrick Olson is currently taking acetaminophen 650 mg twice daily.  She is taking gabapentin 300 mg at noon and evening.  She is also taking mirtazapine 7.5 mg at bedtime.    Ms. Patrick Olson currently rates her pain as 6/10.  Her pain varies from 3-4/10 to 7/10.        ALLERGIES:  Allergies   Allergen Reactions     Bee Venom Anaphylaxis     Shrimp Anaphylaxis     Evoxac [Cevimeline] Unknown     Prevnar Swelling     Other reaction(s): Edema     Prevnar [Pneumococcal 13-Linda Conj Vacc] Unknown         MEDICATIONS:  Current Outpatient Medications   Medication Sig Dispense Refill     acetaminophen (TYLENOL) 325 MG tablet Take 2 tablets (650 mg) by mouth every 6 hours       AMOXICILLIN PO Take by mouth.       artificial saliva (BIOTENE DRY MOUTHWASH) LIQD liquid Swish and spit in mouth 4 times daily as needed for dry mouth       calcium carbonate-vitamin D (CALTRATE) 600-10 MG-MCG per tablet Take 1 tablet by mouth daily       carboxymethylcellulose PF (REFRESH PLUS) 0.5 % ophthalmic solution Place 1 drop into both eyes 3 times daily as needed for dry eyes       clobetasol (TEMOVATE) 0.05 % external ointment Apply topically 2 times daily To right ankle as needed 60 g 3     EPINEPHrine (ANY BX GENERIC EQUIV) 0.3 MG/0.3ML injection 2-pack Inject 0.3 mg into the muscle as needed for anaphylaxis       famotidine (PEPCID) 20 MG tablet Take 20 mg by mouth 2 times daily.       gabapentin (NEURONTIN) 300 MG capsule Take 1 capsule (300 mg) by mouth 2 times daily as needed for neuropathic pain.       gabapentin (NEURONTIN) 300 MG capsule Take 600 mg by mouth at bedtime       hydroxychloroquine (PLAQUENIL) 200 MG tablet Take 1 tablet (200 mg) by mouth daily 90 tablet 1     lifitegrast (XIIDRA) 5 % opthalmic solution Place 1 drop into both eyes 2 times daily       mirtazapine (REMERON) 7.5 MG tablet  Take 1 tablet (7.5 mg) by mouth at bedtime. 30 tablet 3     Multiple Vitamins-Minerals (OCUVITE PRESERVISION PO) Take 1 tablet by mouth 2 times daily       pantoprazole (PROTONIX) 40 MG EC tablet Take 1 tablet (40 mg) by mouth daily 30 tablet 11     UNABLE TO FIND MEDICATION NAME: V (vein formular)           PAST MEDICAL HISTORY:  Past Medical History:   Diagnosis Date     Anemia      CKD (chronic kidney disease) stage 3, GFR 30-59 ml/min (H)      Diverticulosis of large intestine      Osteoarthritis      Osteoporosis      Rheumatoid arthritis (H)      Sensorineural hearing loss      Varicose veins of bilateral lower extremities with other complications          PAST SURGICAL HISTORY:  Past Surgical History:   Procedure Laterality Date     CAPSULE/PILL CAM ENDOSCOPY N/A 11/18/2021    Procedure: IMAGING PROCEDURE, GI TRACT, INTRALUMINAL, VIA CAPSULE;  Surgeon: Deric Rodriguez MD;  Location: [facility]     CAPSULE/PILL CAM ENDOSCOPY N/A 2/14/2023    Procedure: IMAGING PROCEDURE, GI TRACT, INTRALUMINAL, VIA CAPSULE;  Surgeon: Jaime Mesa MD;  Location: [facility]     COLONOSCOPY N/A 03/14/2017    Procedure: COMBINED COLONOSCOPY, SINGLE OR MULTIPLE BIOPSY/POLYPECTOMY BY BIOPSY;  Surgeon: Spencer Downey MD;  Location: [facility]     COLONOSCOPY N/A 9/22/2022    Procedure: COLONOSCOPY, FLEXIBLE, WITH LESION REMOVAL USING SNARE;  Surgeon: Kirby Arthur MD;  Location:  GI     COLONOSCOPY N/A 1/13/2023    Procedure: COLONOSCOPY;  Surgeon: Spencer Downey MD;  Location: [facility] OR     COLONOSCOPY N/A 8/14/2023    Procedure: Colonoscopy;  Surgeon: Spencer Downey MD;  Location: [facility]     ENTEROSCOPY SMALL BOWEL N/A 11/20/2021    Procedure: ENTEROSCOPY, colonoscopy with endoscopic mucosal resection and tattoo placement;  Surgeon: Kirby Arthur MD;  Location: [facility]     ESOPHAGOSCOPY, GASTROSCOPY, DUODENOSCOPY (EGD), COMBINED N/A 2/6/2023    Procedure: ESOPHAGOGASTRODUODENOSCOPY (EGD);  Surgeon: Spencer Downey  MD;  [facility]     IR VISCERAL EMBOLIZATION  01/22/2021     ORTHOPEDIC SURGERY Left     hip replacment     SIGMOIDOSCOPY FLEXIBLE N/A 01/23/2021    Procedure: SIGMOIDOSCOPY, FLEXIBLE;  Surgeon: Jaime Steinberg MD;  Location:  GI     SIGMOIDOSCOPY FLEXIBLE N/A 7/17/2024    Procedure: Sigmoidoscopy flexible;  Surgeon: Ritika Woodard MD;  Location:  GI         FAMILY HISTORY:  History reviewed. No pertinent family history.      SOCIAL HISTORY:  Social History     Socioeconomic History     Marital status:      Spouse name: Not on file     Number of children: Not on file     Years of education: Not on file     Highest education level: Not on file   Occupational History     Not on file   Tobacco Use     Smoking status: Former     Smokeless tobacco: Never   Vaping Use     Vaping status: Never Used   Substance and Sexual Activity     Alcohol use: No     Drug use: No     Sexual activity: Not on file   Other Topics Concern     Not on file   Social History Narrative    - Retired (formerly employed as  at [employer])    - 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[employer])     Social Determinants of Health     Financial Resource Strain: Low Risk  (9/12/2024)    Financial Resource Strain      Within the past 12 months, have you or your family members you live with been unable to get utilities (heat, electricity) when it was really needed?: No   Food Insecurity: Low Risk  (9/12/2024)    Food Insecurity      Within the past 12 months, did you worry that your food would run out before you got money to buy more?: No      Within the past 12 months, did the food you bought just not last and you didn t have money to get more?: No   Transportation Needs: Low Risk  (9/12/2024)    Transportation Needs      Within the past 12 months, has lack of transportation kept you from medical appointments, getting your medicines, non-medical meetings or  "appointments, work, or from getting things that you need?: No   Physical Activity: Sufficiently Active (9/12/2024)    Exercise Vital Sign      Days of Exercise per Week: 5 days      Minutes of Exercise per Session: 30 min   Stress: No Stress Concern Present (9/12/2024)    [employer] [place] [employer] - Occupational Stress Questionnaire      Feeling of Stress : Only a little   Social Connections: Unknown (9/12/2024)    Social Connection and Isolation Panel [NHANES]      Frequency of Communication with Friends and Family: Not on file      Frequency of Social Gatherings with Friends and Family: Once a week      Attends Tenriism Services: Not on file      Active Member of Clubs or Organizations: Not on file      Attends Club or Organization Meetings: Not on file      Marital Status: Not on file   Interpersonal Safety: Low Risk  (9/12/2024)    Interpersonal Safety      Do you feel physically and emotionally safe where you currently live?: Yes      Within the past 12 months, have you been hit, slapped, kicked or otherwise physically hurt by someone?: No      Within the past 12 months, have you been humiliated or emotionally abused in other ways by your partner or ex-partner?: No   Housing Stability: Low Risk  (9/12/2024)    Housing Stability      Do you have housing? : Yes      Are you worried about losing your housing?: No         PHYSICAL EXAMINATION:  Vitals:    09/24/24 1110   BP: 123/74   Pulse: 72   SpO2: 98%   Weight: 38.6 kg (85 lb)   Height: 1.6 m (5' 3\")       GENERAL:  No acute distress.  Pleasant and cooperative.   PSYCH:  Normal mood and affect.  HEAD:  Normocephalic.  SPEECH:  No dysarthria.  EYES:  No scleral icterus.  Wearing glasses.  EARS:  Hearing is intact to spoken voice.  NOSE:  Midline, symmetric, no rhinorrhea.  LUNGS:  No respiratory distress.  No increased work of breathing.        IMAGING:  XR PELVIS AND HIP LEFT 1 VIEW  9/24/2024 12:07 PM      HISTORY: Left hip pain.  COMPARISON: " CT 1/19/2021    IMPRESSION:  Left total hip arthroplasty in near-anatomic position. No periprosthetic fracture or lucency. Demineralization without acute fracture. Lower lumbar degenerative disc disease.        LUMBAR FLEX/EXT TWO TO THREE VIEWS, SPINE COMPLETE SCOLIOSIS TWO VIEWS  July 22, 2024 3:50 PM      HISTORY: Scoliosis of thoracolumbar spine. Facet arthropathy. Chronic bilateral low back pain. Cluneal neuropathy.     COMPARISON: Lumbar spine MR 3/9/2023.     IMPRESSION: T12 rib-bearing thoracic vertebral bodies with five lumbar-type vertebral bodies.     Convex right curvature of the lumbar spine centered at L2-L3 with Maldonado angle measuring 25 degrees from the superior L1 endplate to the inferior L3 endplate. Rightward listhesis of L3 on L4. Retrolisthesis of L3 on L4 measuring 2 mm on the flexion view appears slightly increased to 3 mm on the extension view.     No obvious loss of vertebral body height. Mild to moderate degenerative endplate changes and loss of disc height at C5-C6. Marked degenerative endplate changes and loss of disc height in the lumbar spine on the left at L2-L3, bilaterally at L3-L4, and right at L4-L5.     Mild retrolisthesis of L3 on L4 measuring 2 mm on the flexion view appears slightly increased to 3 mm on the extension view.     Positive sagittal balance of 3 cm.           MRI OF THE LUMBAR SPINE WITHOUT CONTRAST  3/9/2023 11:50 AM      COMPARISON: Lumbar spine MRI 07/12/2018     HISTORY: Lumbar radiculopathy, acute. Lumbar degenerative disc disease.     TECHNIQUE: Multiplanar, multisequence MRI images of the lumbar spine were acquired without IV contrast.     FINDINGS: There are five lumbar-type vertebrae for the purposes of this dictation. The conus ends at the distal L1. 2 mm retrolisthesis of L3 on L4. Vertebral body heights are maintained. Nonpathologic marrow signal. Mild Modic type I changes from L2-L3 to L4-L5. Vertebral body heights are maintained. Nonpathologic marrow  signal. Mild symmetric atrophy of the posterior paraspinal musculature. Normal diameter of the descending aorta. Redemonstration of a large gallstone. The patient's known bilateral renal cysts are better visualized on the abdomen and pelvis CT 11/16/2021.     T12-L1: Normal disc height and signal.  No herniation. Mild bilateral facet arthropathy.  No spinal canal or neural foraminal stenosis.     L1-L2: Normal disc height and signal. Small broad-based disc bulge. Mild bilateral facet arthropathy.  No spinal canal or neural foraminal stenosis.     L2-L3: Moderate vertebral disc height loss. Loss of the normal T2 signal within the disc. Broad-based disc bulge with superimposed small central disc protrusion. Mild bilateral facet arthropathy. No spinal canal narrowing. No right neuroforaminal narrowing. Mild left neuroforaminal narrowing.     L3-L4: Moderate intervertebral disc height loss. Loss of the normal T2 signal within the dens. Broad-based disc bulge with superimposed left foraminal disc protrusion, decreased since the prior exam. Moderate bilateral facet arthropathy. Mild spinal canal narrowing. Mild narrowing of the left lateral recess. No right neuroforaminal narrowing. Mild left neural foraminal narrowing.     L4-L5: Mild intervertebral disc height loss. Loss of the normal T2 signal within the disc. Broad-based disc bulge with superimposed right foraminal disc protrusion. Moderate bilateral facet arthropathy. Mild narrowing of the right lateral recess. No spinal canal narrowing. Severe right neuroforaminal narrowing. No left neuroforaminal narrowing.     L5-S1: Mild intervertebral disc height loss. Loss of the normal T2 signal within the disc. Broad-based disc bulge with superimposed small central disc protrusion. Mild bilateral facet arthropathy. Mild narrowing of the lateral recesses. No spinal canal narrowing. No right neural foraminal narrowing. No left neural foraminal narrowing.     IMPRESSION:  1.   Compared to prior lumbar spine MRI 07/12/2018, interval decrease in the previously noted large left foraminal disc protrusion at L3-L4.  2.  Otherwise stable to interval worsening of the moderate multilevel degenerative changes of the lumbar spine.  3.  Mild spinal canal narrowing and mild narrowing of the left lateral recess at L3-L4.  4.  Mild narrowing of the right lateral recess at L4-L5.  5.  Mild narrowing of the lateral recesses at L5-S1.  6.  Severe right neural foraminal narrowing at L4-L5.  7.  Mild left neuroforaminal narrowing at L2-L3 and L3-L4.  8.  Mild Modic type I changes at from L2-L3 to L4-L5.             ASSESSMENT/PLAN:  Ms. Patrick Olson is an 83-year-old female.  She has thoracolumbar scoliosis, convex right.  She has lumbosacral facet arthropathy and lumbosacral degenerative disc disease.  Ms. Patrick Olson had not had good pain relief with epidural steroid injections and medial branch/dorsal ramus blocks of the bilateral L4-5 and L5-S1.  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  Discussed superior cluneal neuropathy, pathophysiology, treatment options with Ms. Patrick Olson.  Discussed the option of doing nothing/living with it, physical therapy, oral medications such as gabapentin, repeat superior cluneal nerve block, referral to neurosurgery for decompression of the superior cluneal nerves.  Ms. Patrick Olson will be set up for ultrasound-guided bilateral superior cluneal nerve injections; this is to be performed on or after 10/24/2024.  Ms. Patrick Olson is being referred to Misael Justice MD (neurosurgery) for discussion of surgical decompression/release of the bilateral superior cluneal nerves.  Ms. Patrick Olson is to follow-up in this clinic in 2 months.    Ms. Patrick Olson has noted left proximal thigh pain for the past month.  She notes left hip flexion worsens her pain.  She did have a left total hip arthroplasty in 2011.  X-rays of the left hip were ordered  today.  Referral to Juan Manuel Thomas MD (orthopedic surgery) was placed.        Total Time on encounter:  52 minutes were spent on one more or more of the following:  discussion with patient, history, exam, coordinating care, treatment goals, record review, documenting clinical information, and/or data review.      Donald Coombs MD        Again, thank you for allowing me to participate in the care of your patient.        Sincerely,        Donald Coombs MD

## 2024-09-25 ENCOUNTER — PATIENT OUTREACH (OUTPATIENT)
Dept: CARE COORDINATION | Facility: CLINIC | Age: 84
End: 2024-09-25
Payer: COMMERCIAL

## 2024-09-25 ENCOUNTER — OFFICE VISIT (OUTPATIENT)
Dept: OTHER | Facility: CLINIC | Age: 84
End: 2024-09-25
Attending: INTERNAL MEDICINE
Payer: COMMERCIAL

## 2024-09-25 VITALS
OXYGEN SATURATION: 98 % | BODY MASS INDEX: 15.38 KG/M2 | SYSTOLIC BLOOD PRESSURE: 121 MMHG | WEIGHT: 86.8 LBS | HEIGHT: 63 IN | HEART RATE: 77 BPM | DIASTOLIC BLOOD PRESSURE: 68 MMHG

## 2024-09-25 DIAGNOSIS — I83.893 VARICOSE VEINS OF BILATERAL LOWER EXTREMITIES WITH OTHER COMPLICATIONS: Primary | ICD-10-CM

## 2024-09-25 DIAGNOSIS — M05.741 RHEUMATOID ARTHRITIS INVOLVING BOTH HANDS WITH POSITIVE RHEUMATOID FACTOR (H): ICD-10-CM

## 2024-09-25 DIAGNOSIS — I87.2 VENOUS STASIS DERMATITIS OF BOTH LOWER EXTREMITIES: ICD-10-CM

## 2024-09-25 DIAGNOSIS — M05.742 RHEUMATOID ARTHRITIS INVOLVING BOTH HANDS WITH POSITIVE RHEUMATOID FACTOR (H): ICD-10-CM

## 2024-09-25 PROCEDURE — 99215 OFFICE O/P EST HI 40 MIN: CPT | Performed by: INTERNAL MEDICINE

## 2024-09-25 PROCEDURE — G0463 HOSPITAL OUTPT CLINIC VISIT: HCPCS | Performed by: INTERNAL MEDICINE

## 2024-09-25 PROCEDURE — G2211 COMPLEX E/M VISIT ADD ON: HCPCS | Performed by: INTERNAL MEDICINE

## 2024-09-25 RX ORDER — TOBRAMYCIN 3 MG/ML
1 SOLUTION/ DROPS OPHTHALMIC 2 TIMES DAILY
COMMUNITY
Start: 2024-03-29

## 2024-09-25 NOTE — PROCEDURES
"[facility]        Patient is here for a follow up.    Pt is currently taking no meds that would impact our treatment plan.    /68 (BP Location: Left arm, Patient Position: Sitting, Cuff Size: Adult Small)   Pulse 77   Ht 5' 3\" (1.6 m)   Wt 86 lb 12.8 oz (39.4 kg)   SpO2 98%   BMI 15.38 kg/m      Questions patient would like addressed today are: no pain with VV, wears stockings as ordered, both legs still feel very cold even with wearing stockings.    Carolina Davis, DEREKN, RN, CV-BC, CNOR  [facility]        "

## 2024-09-25 NOTE — PROGRESS NOTES
[facility] VASCULAR [facility] INITIAL VASCULAR MEDICINE         PRIMARY HEALTH CARE PROVIDER:  Essence Tamayo MD    Follow up visit  Multiple concerns   Review of venous comp studies   Seen by [facility] surgery dr. Aster downs vein formula  Using compression   She was initially seen few weeks ago for Evaluation and management of bilateral lower extremity varicose veins right more than left side in a very pleasant 83-year-old female with history of rheumatoid arthritis, CKD, diverticulosis of large intestine, osteoporosis etc.      HPI: Patrick Olson is a 83 year old very pleasant female with past medical history of rheumatoid arthritis, osteo arthritis, osteoporosis, diverticulosis of large intestine, CKD stage III here for evaluation and management of bilateral lower extremity varicose veins right more than left side with the discoloration of the legs in the distal portion.  She has no previous history of a DVT.  She is not using any compression stockings.  No previous leg surgeries or intervention for varicose veins etc..          PAST MEDICAL HISTORY  Past Medical History:   Diagnosis Date    Anemia     CKD (chronic kidney disease) stage 3, GFR 30-59 ml/min (H)     Diverticulosis of large intestine     Osteoarthritis     Osteoporosis     Rheumatoid arthritis (H)     Sensorineural hearing loss     Varicose veins of bilateral lower extremities with other complications        CURRENT MEDICATIONS  Current Outpatient Medications   Medication Sig Dispense Refill    acetaminophen (TYLENOL) 325 MG tablet Take 2 tablets (650 mg) by mouth every 6 hours      AMOXICILLIN PO Take by mouth.      artificial saliva (BIOTENE DRY MOUTHWASH) LIQD liquid Swish and spit in mouth 4 times daily as needed for dry mouth      calcium carbonate-vitamin D (CALTRATE) 600-10 MG-MCG per tablet Take 1 tablet by mouth daily      carboxymethylcellulose PF (REFRESH PLUS) 0.5 % ophthalmic solution Place 1 drop into both eyes 3 times  daily as needed for dry eyes      clobetasol (TEMOVATE) 0.05 % external ointment Apply topically 2 times daily To right ankle as needed 60 g 3    EPINEPHrine (ANY BX GENERIC EQUIV) 0.3 MG/0.3ML injection 2-pack Inject 0.3 mg into the muscle as needed for anaphylaxis      famotidine (PEPCID) 20 MG tablet Take 20 mg by mouth 2 times daily.      gabapentin (NEURONTIN) 300 MG capsule Take 1 capsule (300 mg) by mouth 2 times daily as needed for neuropathic pain.      gabapentin (NEURONTIN) 300 MG capsule Take 600 mg by mouth at bedtime      hydroxychloroquine (PLAQUENIL) 200 MG tablet Take 1 tablet (200 mg) by mouth daily 90 tablet 1    lifitegrast (XIIDRA) 5 % opthalmic solution Place 1 drop into both eyes 2 times daily      mirtazapine (REMERON) 7.5 MG tablet Take 1 tablet (7.5 mg) by mouth at bedtime. 30 tablet 3    Multiple Vitamins-Minerals (OCUVITE PRESERVISION PO) Take 1 tablet by mouth 2 times daily      pantoprazole (PROTONIX) 40 MG EC tablet Take 1 tablet (40 mg) by mouth daily 30 tablet 11    UNABLE TO FIND MEDICATION NAME: V (vein formular)      tobramycin (TOBREX) 0.3 % ophthalmic solution Place 1 drop into both eyes 2 times daily. (Patient not taking: Reported on 9/25/2024)       No current facility-administered medications for this visit.       PAST SURGICAL HISTORY:  Past Surgical History:   Procedure Laterality Date    CAPSULE/PILL CAM ENDOSCOPY N/A 11/18/2021    Procedure: IMAGING PROCEDURE, GI TRACT, INTRALUMINAL, VIA CAPSULE;  Surgeon: Deric Rodriguez MD;  Location:  GI    CAPSULE/PILL CAM ENDOSCOPY N/A 2/14/2023    Procedure: IMAGING PROCEDURE, GI TRACT, INTRALUMINAL, VIA CAPSULE;  Surgeon: Jaime Mesa MD;  Location:  GI    COLONOSCOPY N/A 03/14/2017    Procedure: COMBINED COLONOSCOPY, SINGLE OR MULTIPLE BIOPSY/POLYPECTOMY BY BIOPSY;  Surgeon: Spencer Downey MD;  Location:  GI    COLONOSCOPY N/A 9/22/2022    Procedure: COLONOSCOPY, FLEXIBLE, WITH LESION REMOVAL USING SNARE;   Surgeon: Kirby Arthur MD;  Location:  GI    COLONOSCOPY N/A 1/13/2023    Procedure: COLONOSCOPY;  Surgeon: Spencer Downey MD;  Location: [facility] OR    COLONOSCOPY N/A 8/14/2023    Procedure: Colonoscopy;  Surgeon: Spencer Downey MD;  Location:  GI    ENTEROSCOPY SMALL BOWEL N/A 11/20/2021    Procedure: ENTEROSCOPY, colonoscopy with endoscopic mucosal resection and tattoo placement;  Surgeon: Kirby Arthur MD;  Location:  OR    ESOPHAGOSCOPY, GASTROSCOPY, DUODENOSCOPY (EGD), COMBINED N/A 2/6/2023    Procedure: ESOPHAGOGASTRODUODENOSCOPY (EGD);  Surgeon: Spencer Downey MD;  Location:  GI    IR VISCERAL EMBOLIZATION  01/22/2021    ORTHOPEDIC SURGERY Left     hip replacment    SIGMOIDOSCOPY FLEXIBLE N/A 01/23/2021    Procedure: SIGMOIDOSCOPY, FLEXIBLE;  Surgeon: Jaime Steinberg MD;  Location:  GI    SIGMOIDOSCOPY FLEXIBLE N/A 7/17/2024    Procedure: Sigmoidoscopy flexible;  Surgeon: Ritika Woodard MD;  Location:  GI       ALLERGIES     Allergies   Allergen Reactions    Bee Venom Anaphylaxis    Shrimp Anaphylaxis    Evoxac [Cevimeline] Unknown    Prevnar Swelling     Other reaction(s): Edema    Prevnar [Pneumococcal 13-Linda Conj Vacc] Unknown       FAMILY HISTORY  History reviewed. No pertinent family history.    VASCULAR FAMILY HISTORY  1st order relative with atherosclerotic PAD: No  1st order relative with AAA: No  Family history of Familial Hyperlipidemia No  Family History of Hypercoagulable state:No    VASCULAR RISK FACTORS  1. Diabetes:No   2. Smoking: quit smoking some time ago.  3. HTN: controlled  4.Hyperlipidemia: No      SOCIAL HISTORY  Social History     Socioeconomic History    Marital status:      Spouse name: Not on file    Number of children: Not on file    Years of education: Not on file    Highest education level: Not on file   Occupational History    Not on file   Tobacco Use    Smoking status: Former    Smokeless tobacco: Never   Vaping Use    Vaping status:  Never Used   Substance and Sexual Activity    Alcohol use: No    Drug use: No    Sexual activity: Not on file   Other Topics Concern    Not on file   Social History Narrative    - Retired (formerly employed as  at [employer])    - 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[employer])     Social Determinants of Health     Financial Resource Strain: Low Risk  (9/12/2024)    Financial Resource Strain     Within the past 12 months, have you or your family members you live with been unable to get utilities (heat, electricity) when it was really needed?: No   Food Insecurity: Low Risk  (9/12/2024)    Food Insecurity     Within the past 12 months, did you worry that your food would run out before you got money to buy more?: No     Within the past 12 months, did the food you bought just not last and you didn t have money to get more?: No   Transportation Needs: Low Risk  (9/12/2024)    Transportation Needs     Within the past 12 months, has lack of transportation kept you from medical appointments, getting your medicines, non-medical meetings or appointments, work, or from getting things that you need?: No   Physical Activity: Sufficiently Active (9/12/2024)    Exercise Vital Sign     Days of Exercise per Week: 5 days     Minutes of Exercise per Session: 30 min   Stress: No Stress Concern Present (9/12/2024)    [employer] [employer] - Occupational Stress Questionnaire     Feeling of Stress : Only a little   Social Connections: Unknown (9/12/2024)    Social Connection and Isolation Panel [NHANES]     Frequency of Communication with Friends and Family: Not on file     Frequency of Social Gatherings with Friends and Family: Once a week     Attends Taoism Services: Not on file     Active Member of Clubs or Organizations: Not on file     Attends Club or Organization Meetings: Not on file     Marital Status: Not on file   Interpersonal Safety:  "Low Risk  (9/12/2024)    Interpersonal Safety     Do you feel physically and emotionally safe where you currently live?: Yes     Within the past 12 months, have you been hit, slapped, kicked or otherwise physically hurt by someone?: No     Within the past 12 months, have you been humiliated or emotionally abused in other ways by your partner or ex-partner?: No   Housing Stability: Low Risk  (9/12/2024)    Housing Stability     Do you have housing? : Yes     Are you worried about losing your housing?: No       ROS:   General: No change in weight, sleep or appetite.  Normal energy.  No fever or chills  Eyes: Negative for vision changes or eye problems  ENT: No problems with ears, nose or throat.  No difficulty swallowing.  Resp: No coughing, wheezing or shortness of breath  CV: No chest pains or palpitations  GI: No nausea, vomiting,  heartburn, abdominal pain, diarrhea, constipation or change in bowel habits  : No urinary frequency or dysuria, bladder or kidney problems  Musculoskeletal: No significant muscle or joint pains  Neurologic: No headaches, numbness, tingling, weakness, problems with balance or coordination  Psychiatric: No problems with anxiety, depression or mental health  Heme/immune/allergy: No history of bleeding or clotting problems or anemia.  No allergies or immune system problems  Endocrine: No history of thyroid disease, diabetes or other endocrine disorders  Skin: No rashes,worrisome lesions or skin problems  Vascular: Bilateral lower extremity varicose veins right more than left side  No history of a DVT  No leg swelling  No previous intervention for varicose veins  No previous imaging studies  EXAM:  /68 (BP Location: Left arm, Patient Position: Sitting, Cuff Size: Adult Small)   Pulse 77   Ht 5' 3\" (1.6 m)   Wt 86 lb 12.8 oz (39.4 kg)   SpO2 98%   BMI 15.38 kg/m    In general, the patient is a pleasant female in no apparent distress.    HEENT: NC/AT.  JUN.  REEMAMI.  Sclerae " white, not injected.  Nares clear.  Pharynx without erythema or exudate.  Dentition intact.    Neck: No adenopathy.  No thyromegaly. Carotids +2/2 bilaterally without bruits.  No jugular venous distension.   Heart: RRR. Normal S1, S2 splits physiologically. No murmur, rub, click, or gallop. The PMI is in the 5th ICS in the midclavicular line. There is no heave.    Lungs: CTA.  No ronchi, wheezes, rales.  No dullness to percussion.   Abdomen: Soft, nontender, nondistended. No organomegaly. No AAA.  No bruits.   Extremities: No clubbing, cyanosis, or edema.  No wounds.   She has a good palpable DP PT pulses and dopplerable bi-/triphasic pulses  Bilateral lower extremity varicose veins right worse than left side with CEAP 4 CVI  Venous stasis changes noted right more than left leg  No foot ulcers or leg ulcers      Labs:    LIVER ENZYME RESULTS:  Lab Results   Component Value Date    AST 32 09/12/2024    AST 10 03/13/2017    ALT 17 09/12/2024    ALT 12 03/13/2017       CBC RESULTS:  Lab Results   Component Value Date    WBC 4.1 09/12/2024    WBC 5.0 01/20/2021    RBC 3.44 (L) 09/12/2024    RBC 2.51 (L) 01/20/2021    HGB 11.3 (L) 09/12/2024    HGB 8.0 (L) 01/25/2021    HCT 35.3 09/12/2024    HCT 25.2 (L) 01/20/2021     (H) 09/12/2024     01/20/2021    MCH 32.8 09/12/2024    MCH 33.1 (H) 01/20/2021    MCHC 32.0 09/12/2024    MCHC 32.9 01/20/2021    RDW 14.6 09/12/2024    RDW 15.9 (H) 01/20/2021     09/12/2024     01/20/2021       BMP RESULTS:  Lab Results   Component Value Date     09/12/2024     01/23/2021    POTASSIUM 5.6 (H) 09/12/2024    POTASSIUM 4.7 09/29/2022    POTASSIUM 3.6 01/23/2021    CHLORIDE 105 09/12/2024    CHLORIDE 104 10/24/2022    CHLORIDE 114 (H) 01/23/2021    CO2 27 09/12/2024    CO2 29 09/29/2022    CO2 26 01/23/2021    ANIONGAP 10 09/12/2024    ANIONGAP 3 09/29/2022    ANIONGAP 3 01/23/2021     (H) 09/12/2024     (H) 07/19/2024     (H)  09/29/2022     (H) 01/23/2021    BUN 26.9 (H) 09/12/2024    BUN 21 09/29/2022    BUN 6 (L) 01/23/2021    CR 0.97 (H) 09/12/2024    CR 0.75 01/23/2021    GFRESTIMATED 58 (L) 09/12/2024    GFRESTIMATED 76 01/23/2021    GFRESTBLACK 88 01/23/2021    EJ 9.8 09/12/2024    EJ 8.0 (L) 01/23/2021          THYROID RESULTS:  Lab Results   Component Value Date    TSH 1.13 03/27/2023       Procedures:         Assessment and Plan:     1. 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 Rheumatoid arthritis involving both hands with positive rheumatoid factor (H)    This is a very pleasant 83-year-old female looks much younger than stated age dealing with varicose veins in both lower extremities and also venous stasis dermatitis she was prescribed Temovate by dermatologist using but not much improvement.  Her legs feels heavy she also has a history of rheumatoid arthritis currently following up with rheumatologist.  No history of a DVT.  No previous intervention for varicose veins.  I have reviewed varicose veins information with the patient education sheet given  She will benefit with  compression stockings 20 to 30 mmHg but given age and also rheumatoid arthritis she will not be able to put them on will give instead 15 to 20 mmHg compression stockings thigh-high if she tolerates and handles well then later we can prescribe 20 to 30 mmHg  Elevate the legs when able  Take vein formula 1000 as advised    For all other issues follow-up with primary care physician or rheumatologist etc.    Follow-up in spring 2025    40 minutes spent on the date of the encounter doing chart review, review of previous evaluation,vascular surgery evaluation, history, exam, documentation and addressed above-mentioned issues  She had a lot of questions all of them were answered    The longitudinal care of plan for the above diagnoses was addressed during this visit. Due to added complexity of care,  we will continue to supprt Patrick Olson and the subsequent management of this/these conditions and with ongoing continuity of care for this/these conditions.     Copy of this note to primary care physician    Mari Brenner MD,FAGRETA,FSVM,FNLA, FACP  Vascular Medicine  Clinical Hypertension Specialist   Clinical Lipidologist

## 2024-09-25 NOTE — PATIENT INSTRUCTIONS
Continue to use compression 20-30 mm hg knee high , leg elevation and take Vein formula 1000 one pill daily     Follow up in spring 2025

## 2024-09-26 ENCOUNTER — LAB (OUTPATIENT)
Dept: LAB | Facility: CLINIC | Age: 84
End: 2024-09-26
Payer: COMMERCIAL

## 2024-09-26 DIAGNOSIS — E87.5 HYPERKALEMIA: ICD-10-CM

## 2024-09-26 LAB
ANION GAP SERPL CALCULATED.3IONS-SCNC: 11 MMOL/L (ref 7–15)
BUN SERPL-MCNC: 21 MG/DL (ref 8–23)
CALCIUM SERPL-MCNC: 10.2 MG/DL (ref 8.8–10.4)
CHLORIDE SERPL-SCNC: 103 MMOL/L (ref 98–107)
CREAT SERPL-MCNC: 0.95 MG/DL (ref 0.51–0.95)
EGFRCR SERPLBLD CKD-EPI 2021: 60 ML/MIN/1.73M2
GLUCOSE SERPL-MCNC: 95 MG/DL (ref 70–99)
HCO3 SERPL-SCNC: 26 MMOL/L (ref 22–29)
POTASSIUM SERPL-SCNC: 4.8 MMOL/L (ref 3.4–5.3)
SODIUM SERPL-SCNC: 140 MMOL/L (ref 135–145)

## 2024-09-26 PROCEDURE — 36415 COLL VENOUS BLD VENIPUNCTURE: CPT

## 2024-09-26 PROCEDURE — 80048 BASIC METABOLIC PNL TOTAL CA: CPT

## 2024-09-27 NOTE — RESULT ENCOUNTER NOTE
Maryan Nguyen    This is to inform you regarding your test result.    Basic metabolic panel which includes electrolytes and kidney fucntion is normal  Potassium is normal.      Sincerely,      Dr.Nasima Martínez MD,FACP

## 2024-10-14 ENCOUNTER — OFFICE VISIT (OUTPATIENT)
Dept: GASTROENTEROLOGY | Facility: CLINIC | Age: 84
End: 2024-10-14
Attending: INTERNAL MEDICINE
Payer: COMMERCIAL

## 2024-10-14 DIAGNOSIS — N18.31 STAGE 3A CHRONIC KIDNEY DISEASE (H): ICD-10-CM

## 2024-10-14 DIAGNOSIS — E46 PROTEIN-CALORIE MALNUTRITION, UNSPECIFIED SEVERITY (H): ICD-10-CM

## 2024-10-14 DIAGNOSIS — R63.6 UNDERWEIGHT: ICD-10-CM

## 2024-10-14 DIAGNOSIS — R63.4 LOSS OF WEIGHT: ICD-10-CM

## 2024-10-14 PROCEDURE — 99207 PR NO CHARGE LOS: CPT | Performed by: DIETITIAN, REGISTERED

## 2024-10-14 PROCEDURE — 97802 MEDICAL NUTRITION INDIV IN: CPT | Performed by: DIETITIAN, REGISTERED

## 2024-10-14 NOTE — PROGRESS NOTES
[facility] Outpatient Medical Nutrition Therapy      Time Spent:  63 minutes  Session Type:  Initial   Referring Physician:  Altagracia Martinez MD and Belen Delacruz PA-C   Reason for RD Visit:   underweight, CKD, stage 4 and protein calorie malnutrition     Nutrition Assessment:  Patient is a 83 year old female with history that includes GI bleeds, diverticulosis, protein calorie malnutrition, CKD stage 3a (per nephrology), small bowel arteriovenous malformation, underweight, rheumatoid arthritis.    Over past few years, she has lost weight. Her doctor put her on a medication recently which is helping increase her appetite. She is now wanting some snacks. In the past, she never thought about eating or wanting snacks and only ate 3 meals per day, but with this medication is interested in having some snacks. In the past she has met with an RD who recommended eating higher dairy/cream but she is lactose intolerant.  She has a history of GI bleeds and also reported history of rheumatoid arthritis and lower back pain as well as has chronic kidney disease.  She stated that her nephrologist told to watch her potassium intake.  She also had elevated glucose level.  She is trying to manage these multiple issues while trying to also gain some weight.  She finds it very difficult with trying to eat lower potassium, watch her glucose/carbohydrate intake as well as try to eat high-calorie high-protein with having lactose intolerance.  She was drinking Hortencia Endymed drinks but they had over 500 mg of potassium.  She spoke with her nephrologist who had recommended trying to drink 1 carton per day and then get labs.  After doing this she stated that her potassium level was elevated at, so she discontinued drinking these drinks.  She does not like the Ensure and boost drinks and with her lactose intolerance prefers a more plant-based option.  She stated that she also has to be careful not to eat too much protein due to  "her chronic kidney disease.  She is interested in diet education with tips and suggestions for managing multiple medical diagnoses while also trying to increase her weight and her calorie intake.  Additionally she stated that her B12 level was elevated.  She takes a vision supplement that also has a multivitamin.  Reviewed the nutrition information with her on the line today and her prescription has over 1000% of the DV.  She takes 2 doses of this per day.  Encouraged her to switch her supplements and choose other option without added B12 or she could decrease the amount she takes.  Encouraged her to discuss with her doctor as well.    Patient Active Problem List   Diagnosis    SNHL (sensorineural hearing loss)    GI bleed    DDD (degenerative disc disease), lumbosacral    Gastrointestinal hemorrhage, unspecified gastrointestinal hemorrhage type    Diverticular hemorrhage    Acute lower GI bleeding    Diverticulosis of large intestine    Lab test negative for COVID-19 virus    Chronic pain of right elbow    Right wrist pain    Lower GI bleed    Chronic bilateral low back pain with bilateral sciatica    Lower GI bleeding    Anemia, unspecified type    Scoliosis of thoracolumbar spine, unspecified scoliosis type    Facet arthropathy, lumbosacral    Loss of appetite    Lumbar radiculopathy, acute    Protein-calorie malnutrition, unspecified severity (H)    CKD (chronic kidney disease) stage 4, GFR 15-29 ml/min (H)    Small bowel arteriovenous malformation    Gastric AVM    Underweight    History of GI bleed    Bilateral hearing loss, unspecified hearing loss type    Complaints of memory disturbance    Rheumatoid arthritis involving multiple sites with positive rheumatoid factor (H)    Cluneal neuropathy     Height:   Ht Readings from Last 1 Encounters:   09/25/24 1.6 m (5' 3\")   ]  Weight: she stated that she gained 1 lbs since starting a med to help with her appetite and is now 87 lbs per her home scale.  Wt Readings " "from Last 10 Encounters:   09/25/24 39.4 kg (86 lb 12.8 oz)   09/24/24 38.6 kg (85 lb)   09/12/24 38.8 kg (85 lb 8 oz)   07/30/24 39.6 kg (87 lb 4.8 oz)   07/25/24 41.3 kg (91 lb)   07/23/24 41.4 kg (91 lb 3.2 oz)   07/22/24 38.6 kg (85 lb)   07/15/24 38.9 kg (85 lb 11.2 oz)   05/03/24 40 kg (88 lb 2.9 oz)   03/12/24 40.9 kg (90 lb 3.2 oz)        BMI: Estimated body mass index is 15.38 kg/m  as calculated from the following:    Height as of 9/25/24: 1.6 m (5' 3\").    Weight as of 9/25/24: 39.4 kg (86 lb 12.8 oz).    Diet Recall:  (some usual/recent meals/snacks/beverages):  Meal Food    Breakfast  makes [place] baguette with some butter and jam, fruit or oatmeal (every other day) with a little bit of sugar, fruit (strawberries, blueberries, sometimes melon), with 2 pc baguette with butter and jam   Lunch Goes out with  Frisian food with 1/2 cup rice and veggies, nuts OR coconut cream ricci with rice or tofu in dish or D leftovers    Dinner Pasta dish (spaghetti with small amt tomato sauce with ground beef), or bread and cheese, vegetables/baby broccolini/other veg/occas cabbage or stri durant with veggies, slice pork/beef or 1x/week has salmon, vegetables or occas other fish baked, veggies, rice   Snacks Apple with PB, or crackers with PB at night: 10 P bowl cereal with lactose free milk   Beverages 1 c coffee with lactose free milk, 32 -40 oz water,   Alcohol Intake none     Frequency of eating/taking out meals: 2-3 times per week     Labs:    Last Comprehensive Metabolic Panel:  Sodium   Date Value Ref Range Status   09/26/2024 140 135 - 145 mmol/L Final   01/23/2021 143 133 - 144 mmol/L Final     Potassium   Date Value Ref Range Status   09/26/2024 4.8 3.4 - 5.3 mmol/L Final   09/29/2022 4.7 3.4 - 5.3 mmol/L Final   01/23/2021 3.6 3.4 - 5.3 mmol/L Final     Chloride   Date Value Ref Range Status   09/26/2024 103 98 - 107 mmol/L Final   01/23/2021 114 (H) 94 - 109 mmol/L Final     Chloride (External)   Date " Value Ref Range Status   10/24/2022 104 94 - 109 mmol/L Final     Carbon Dioxide   Date Value Ref Range Status   01/23/2021 26 20 - 32 mmol/L Final     Carbon Dioxide (CO2)   Date Value Ref Range Status   09/26/2024 26 22 - 29 mmol/L Final   09/29/2022 29 20 - 32 mmol/L Final     Anion Gap   Date Value Ref Range Status   09/26/2024 11 7 - 15 mmol/L Final   09/29/2022 3 3 - 14 mmol/L Final   01/23/2021 3 3 - 14 mmol/L Final     Glucose   Date Value Ref Range Status   09/26/2024 95 70 - 99 mg/dL Final   09/29/2022 100 (H) 70 - 99 mg/dL Final   01/23/2021 157 (H) 70 - 99 mg/dL Final     GLUCOSE BY METER POCT   Date Value Ref Range Status   07/19/2024 113 (H) 70 - 99 mg/dL Final     Urea Nitrogen   Date Value Ref Range Status   09/26/2024 21.0 8.0 - 23.0 mg/dL Final   09/29/2022 21 7 - 30 mg/dL Final   01/23/2021 6 (L) 7 - 30 mg/dL Final     Creatinine   Date Value Ref Range Status   09/26/2024 0.95 0.51 - 0.95 mg/dL Final   01/23/2021 0.75 0.52 - 1.04 mg/dL Final     GFR Estimate   Date Value Ref Range Status   09/26/2024 60 (L) >60 mL/min/1.73m2 Final     Comment:     eGFR calculated using 2021 CKD-EPI equation.   01/23/2021 76 >60 mL/min/[1.73_m2] Final     Comment:     Non  GFR Calc  Starting 12/18/2018, serum creatinine based estimated GFR (eGFR) will be   calculated using the Chronic Kidney Disease Epidemiology Collaboration   (CKD-EPI) equation.       Calcium   Date Value Ref Range Status   09/26/2024 10.2 8.8 - 10.4 mg/dL Final     Comment:     Reference intervals for this test were updated on 7/16/2024 to reflect our healthy population more accurately. There may be differences in the flagging of prior results with similar values performed with this method. Those prior results can be interpreted in the context of the updated reference intervals.   01/23/2021 8.0 (L) 8.5 - 10.1 mg/dL Final     CBC RESULTS:   Recent Labs   Lab Test 09/12/24  1608   WBC 4.1   RBC 3.44*   HGB 11.3*   HCT 35.3   MCV  103*   MCH 32.8   MCHC 32.0   RDW 14.6          Pertinent Medications/vitamin and mineral supplements:     Current Outpatient Medications   Medication Sig Dispense Refill    acetaminophen (TYLENOL) 325 MG tablet Take 2 tablets (650 mg) by mouth every 6 hours      AMOXICILLIN PO Take by mouth.      artificial saliva (BIOTENE DRY MOUTHWASH) LIQD liquid Swish and spit in mouth 4 times daily as needed for dry mouth      calcium carbonate-vitamin D (CALTRATE) 600-10 MG-MCG per tablet Take 1 tablet by mouth daily      carboxymethylcellulose PF (REFRESH PLUS) 0.5 % ophthalmic solution Place 1 drop into both eyes 3 times daily as needed for dry eyes      clobetasol (TEMOVATE) 0.05 % external ointment Apply topically 2 times daily To right ankle as needed 60 g 3    EPINEPHrine (ANY BX GENERIC EQUIV) 0.3 MG/0.3ML injection 2-pack Inject 0.3 mg into the muscle as needed for anaphylaxis      famotidine (PEPCID) 20 MG tablet Take 20 mg by mouth 2 times daily.      gabapentin (NEURONTIN) 300 MG capsule Take 1 capsule (300 mg) by mouth 2 times daily as needed for neuropathic pain.      gabapentin (NEURONTIN) 300 MG capsule Take 600 mg by mouth at bedtime      hydroxychloroquine (PLAQUENIL) 200 MG tablet Take 1 tablet (200 mg) by mouth daily 90 tablet 1    lifitegrast (XIIDRA) 5 % opthalmic solution Place 1 drop into both eyes 2 times daily      mirtazapine (REMERON) 7.5 MG tablet Take 1 tablet (7.5 mg) by mouth at bedtime. 30 tablet 3    Multiple Vitamins-Minerals (OCUVITE PRESERVISION PO) Take 1 tablet by mouth 2 times daily      pantoprazole (PROTONIX) 40 MG EC tablet Take 1 tablet (40 mg) by mouth daily 30 tablet 11    tobramycin (TOBREX) 0.3 % ophthalmic solution Place 1 drop into both eyes 2 times daily. (Patient not taking: Reported on 9/25/2024)      UNABLE TO FIND MEDICATION NAME: V (vein formular)       No current facility-administered medications for this visit.       Food Allergies: Shrimp    Estimated Nutrition  Needs based on ideal body weight of 52 k-1820 calories (30-35 kcals/kg), ~52g protein (1g/kg), ~1 ml/kcal or total fluids per MD.     MALNUTRITION:  % Weight Loss:  Weight loss does not meet criteria for malnutrition, but has underweight BMI of 15.38 indicating severely underweight  % Intake:  <75% for >/= 3 months (moderate malnutrition)  Subcutaneous Fat Loss:  None observed  Muscle Loss:  None observed  Fluid Retention:  None noted    Malnutrition Diagnosis: Moderate malnutrition  In Context of:  Chronic illness or disease (Based on GLIM criteria with underweight BMI and CKD).    Nutrition Prescription: Nutrition Education     Nutrition Intervention      Provided diet education for underweight, increasing calories and regaining weight with CKD tips, and managing multiple medical history and diet recommendations.  Explained okay to have 1 high calorie and high protein nutrition during per day since her nephrologist recommended not drinking many protein drinks.  Reviewed some plant-based options that are lower in potassium, such as a Bolf powder has 70 mg per 2 scoops of potassium or the ready to drink carton has 190 mg, or the only protein drinks have 50 mg of potassium.  Both drains have 20 g of protein.  Explained okay for adequate amounts of protein but avoid excessive amounts or for multiple protein drinks if eating several meals and snacks such as 6 times per day.    Answered patient's questions. Patient verbalized understanding of education provided. See Goals below.     Educational Materials Provided:  NCM: CKD stages 3 through 5 and list of potassium foods    Goals:    Aim for eating multiple smaller meals such as 6 smaller meals per day  (aim for eating 3 meals and at least 3 snacks per day.)    Drink at least one plant based nutrition drinks per day. Here are some plant based options that are lower in potassium such as Evolve protein drinks, Evolve protein powder and Owyn drinks. (This drink  counts as one of your snacks).    Continue lower potassium fruits and vegetables per your kidney doctor's recommendations.    Include some and extra unsaturated fats at meals to help get more calories such as olive oil, avocado oil, nuts, seeds, nut butters.    Eat adequate protein. Include a protein serving at each meal and have the one protein drink per day.    Switch your eye and multivitamin supplement. Your current one is very high in B12 or you could ask your eye doctor if you could decrease the amount of the supplement and take less often in the week.      Nutrition Monitoring and Evaluation: Will monitor adherence to nutrition recommendations at future RD visits.     Further Medical Nutrition Therapy:  Follow-up as needed.  She would like to follow-up as needed.    Patient was encouraged to contact RD with any further questions.    Iesha Hu, MS, RD, LD

## 2024-10-14 NOTE — PATIENT INSTRUCTIONS
It was nice meeting you today. Below are the nutrition recommendations we discussed at your visit.    Please let me know if you have any additional questions.    Nutrition Recommendations    Aim for eating multiple smaller meals such as 6 smaller meals per day  (aim for eating 3 meals and at least 3 snacks per day.)    Drink at least one plant based nutrition drinks per day. Here are some plant based options that are lower in potassium such as Evolve protein drinks, Evolve protein powder and Owyn drinks. (This drink counts as one of your snacks).    Continue lower potassium fruits and vegetables per your kidney doctor's recommendations.    Include some and extra unsaturated fats at meals to help get more calories such as olive oil, avocado oil, nuts, seeds, nut butters.    Eat adequate protein. Include a protein serving at each meal and have the one protein drink per day.    Switch your eye and multivitamin supplement. Your current one is very high in B12 or you could ask your eye doctor if you could decrease the amount of the supplement and take less often in the week.    Can follow up as needed.    If you would like to schedule a follow up appointment,, please call [phone].    Thank you,    Iesha Hu, MS, RD, LD

## 2024-10-14 NOTE — LETTER
10/14/2024      Patrick Olson  [address]  [facility] [id number]      Dear Colleague,    Thank you for referring your patient, Patrick Olson, to the [facility] [place]. Please see a copy of my visit note below.    [facility] Outpatient Medical Nutrition Therapy      Time Spent:  63 minutes  Session Type:  Initial   Referring Physician:  Altagracia Martinez MD and Belen Delacruz PA-C   Reason for RD Visit:   underweight, CKD, stage 4 and protein calorie malnutrition     Nutrition Assessment:  Patient is a 83 year old female with history that includes GI bleeds, diverticulosis, protein calorie malnutrition, CKD stage 3a (per nephrology), small bowel arteriovenous malformation, underweight, rheumatoid arthritis.    Over past few years, she has lost weight. Her doctor put her on a medication recently which is helping increase her appetite. She is now wanting some snacks. In the past, she never thought about eating or wanting snacks and only ate 3 meals per day, but with this medication is interested in having some snacks. In the past she has met with an RD who recommended eating higher dairy/cream but she is lactose intolerant.  She has a history of GI bleeds and also reported history of rheumatoid arthritis and lower back pain as well as has chronic kidney disease.  She stated that her nephrologist told to watch her potassium intake.  She also had elevated glucose level.  She is trying to manage these multiple issues while trying to also gain some weight.  She finds it very difficult with trying to eat lower potassium, watch her glucose/carbohydrate intake as well as try to eat high-calorie high-protein with having lactose intolerance.  She was drinking SplitGigs drinks but they had over 500 mg of potassium.  She spoke with her nephrologist who had recommended trying to drink 1 carton per day and then get labs.  After doing this she stated that her  potassium level was elevated at, so she discontinued drinking these drinks.  She does not like the Ensure and boost drinks and with her lactose intolerance prefers a more plant-based option.  She stated that she also has to be careful not to eat too much protein due to her chronic kidney disease.  She is interested in diet education with tips and suggestions for managing multiple medical diagnoses while also trying to increase her weight and her calorie intake.  Additionally she stated that her B12 level was elevated.  She takes a vision supplement that also has a multivitamin.  Reviewed the nutrition information with her on the line today and her prescription has over 1000% of the DV.  She takes 2 doses of this per day.  Encouraged her to switch her supplements and choose other option without added B12 or she could decrease the amount she takes.  Encouraged her to discuss with her doctor as well.    Patient Active Problem List   Diagnosis     SNHL (sensorineural hearing loss)     GI bleed     DDD (degenerative disc disease), lumbosacral     Gastrointestinal hemorrhage, unspecified gastrointestinal hemorrhage type     Diverticular hemorrhage     Acute lower GI bleeding     Diverticulosis of large intestine     Lab test negative for COVID-19 virus     Chronic pain of right elbow     Right wrist pain     Lower GI bleed     Chronic bilateral low back pain with bilateral sciatica     Lower GI bleeding     Anemia, unspecified type     Scoliosis of thoracolumbar spine, unspecified scoliosis type     Facet arthropathy, lumbosacral     Loss of appetite     Lumbar radiculopathy, acute     Protein-calorie malnutrition, unspecified severity (H)     CKD (chronic kidney disease) stage 4, GFR 15-29 ml/min (H)     Small bowel arteriovenous malformation     Gastric AVM     Underweight     History of GI bleed     Bilateral hearing loss, unspecified hearing loss type     Complaints of memory disturbance     Rheumatoid arthritis  "involving multiple sites with positive rheumatoid factor (H)     Cluneal neuropathy     Height:   Ht Readings from Last 1 Encounters:   09/25/24 1.6 m (5' 3\")   ]  Weight: she stated that she gained 1 lbs since starting a med to help with her appetite and is now 87 lbs per her home scale.  Wt Readings from Last 10 Encounters:   09/25/24 39.4 kg (86 lb 12.8 oz)   09/24/24 38.6 kg (85 lb)   09/12/24 38.8 kg (85 lb 8 oz)   07/30/24 39.6 kg (87 lb 4.8 oz)   07/25/24 41.3 kg (91 lb)   07/23/24 41.4 kg (91 lb 3.2 oz)   07/22/24 38.6 kg (85 lb)   07/15/24 38.9 kg (85 lb 11.2 oz)   05/03/24 40 kg (88 lb 2.9 oz)   03/12/24 40.9 kg (90 lb 3.2 oz)        BMI: Estimated body mass index is 15.38 kg/m  as calculated from the following:    Height as of 9/25/24: 1.6 m (5' 3\").    Weight as of 9/25/24: 39.4 kg (86 lb 12.8 oz).    Diet Recall:  (some usual/recent meals/snacks/beverages):  Meal Food    Breakfast  makes Icelandic baguette with some butter and jam, fruit or oatmeal (every other day) with a little bit of sugar, fruit (strawberries, blueberries, sometimes melon), with 2 pc baguette with butter and jam   Lunch Goes out with  pollo food with 1/2 cup rice and veggies, nuts OR coconut cream ricci with rice or tofu in dish or D leftovers    Dinner Pasta dish (spaghetti with small amt tomato sauce with ground beef), or bread and cheese, vegetables/baby broccolini/other veg/occas cabbage or stri durant with veggies, slice pork/beef or 1x/week has salmon, vegetables or occas other fish baked, veggies, rice   Snacks Apple with PB, or crackers with PB at night: 10 P bowl cereal with lactose free milk   Beverages 1 c coffee with lactose free milk, 32 -40 oz water,   Alcohol Intake none     Frequency of eating/taking out meals: 2-3 times per week     Labs:    Last Comprehensive Metabolic Panel:  Sodium   Date Value Ref Range Status   09/26/2024 140 135 - 145 mmol/L Final   01/23/2021 143 133 - 144 mmol/L Final     Potassium "   Date Value Ref Range Status   09/26/2024 4.8 3.4 - 5.3 mmol/L Final   09/29/2022 4.7 3.4 - 5.3 mmol/L Final   01/23/2021 3.6 3.4 - 5.3 mmol/L Final     Chloride   Date Value Ref Range Status   09/26/2024 103 98 - 107 mmol/L Final   01/23/2021 114 (H) 94 - 109 mmol/L Final     Chloride (External)   Date Value Ref Range Status   10/24/2022 104 94 - 109 mmol/L Final     Carbon Dioxide   Date Value Ref Range Status   01/23/2021 26 20 - 32 mmol/L Final     Carbon Dioxide (CO2)   Date Value Ref Range Status   09/26/2024 26 22 - 29 mmol/L Final   09/29/2022 29 20 - 32 mmol/L Final     Anion Gap   Date Value Ref Range Status   09/26/2024 11 7 - 15 mmol/L Final   09/29/2022 3 3 - 14 mmol/L Final   01/23/2021 3 3 - 14 mmol/L Final     Glucose   Date Value Ref Range Status   09/26/2024 95 70 - 99 mg/dL Final   09/29/2022 100 (H) 70 - 99 mg/dL Final   01/23/2021 157 (H) 70 - 99 mg/dL Final     GLUCOSE BY METER POCT   Date Value Ref Range Status   07/19/2024 113 (H) 70 - 99 mg/dL Final     Urea Nitrogen   Date Value Ref Range Status   09/26/2024 21.0 8.0 - 23.0 mg/dL Final   09/29/2022 21 7 - 30 mg/dL Final   01/23/2021 6 (L) 7 - 30 mg/dL Final     Creatinine   Date Value Ref Range Status   09/26/2024 0.95 0.51 - 0.95 mg/dL Final   01/23/2021 0.75 0.52 - 1.04 mg/dL Final     GFR Estimate   Date Value Ref Range Status   09/26/2024 60 (L) >60 mL/min/1.73m2 Final     Comment:     eGFR calculated using 2021 CKD-EPI equation.   01/23/2021 76 >60 mL/min/[1.73_m2] Final     Comment:     Non  GFR Calc  Starting 12/18/2018, serum creatinine based estimated GFR (eGFR) will be   calculated using the Chronic Kidney Disease Epidemiology Collaboration   (CKD-EPI) equation.       Calcium   Date Value Ref Range Status   09/26/2024 10.2 8.8 - 10.4 mg/dL Final     Comment:     Reference intervals for this test were updated on 7/16/2024 to reflect our healthy population more accurately. There may be differences in the flagging of  prior results with similar values performed with this method. Those prior results can be interpreted in the context of the updated reference intervals.   01/23/2021 8.0 (L) 8.5 - 10.1 mg/dL Final     CBC RESULTS:   Recent Labs   Lab Test 09/12/24  1608   WBC 4.1   RBC 3.44*   HGB 11.3*   HCT 35.3   *   MCH 32.8   MCHC 32.0   RDW 14.6          Pertinent Medications/vitamin and mineral supplements:     Current Outpatient Medications   Medication Sig Dispense Refill     acetaminophen (TYLENOL) 325 MG tablet Take 2 tablets (650 mg) by mouth every 6 hours       AMOXICILLIN PO Take by mouth.       artificial saliva (BIOTENE DRY MOUTHWASH) LIQD liquid Swish and spit in mouth 4 times daily as needed for dry mouth       calcium carbonate-vitamin D (CALTRATE) 600-10 MG-MCG per tablet Take 1 tablet by mouth daily       carboxymethylcellulose PF (REFRESH PLUS) 0.5 % ophthalmic solution Place 1 drop into both eyes 3 times daily as needed for dry eyes       clobetasol (TEMOVATE) 0.05 % external ointment Apply topically 2 times daily To right ankle as needed 60 g 3     EPINEPHrine (ANY BX GENERIC EQUIV) 0.3 MG/0.3ML injection 2-pack Inject 0.3 mg into the muscle as needed for anaphylaxis       famotidine (PEPCID) 20 MG tablet Take 20 mg by mouth 2 times daily.       gabapentin (NEURONTIN) 300 MG capsule Take 1 capsule (300 mg) by mouth 2 times daily as needed for neuropathic pain.       gabapentin (NEURONTIN) 300 MG capsule Take 600 mg by mouth at bedtime       hydroxychloroquine (PLAQUENIL) 200 MG tablet Take 1 tablet (200 mg) by mouth daily 90 tablet 1     lifitegrast (XIIDRA) 5 % opthalmic solution Place 1 drop into both eyes 2 times daily       mirtazapine (REMERON) 7.5 MG tablet Take 1 tablet (7.5 mg) by mouth at bedtime. 30 tablet 3     Multiple Vitamins-Minerals (OCUVITE PRESERVISION PO) Take 1 tablet by mouth 2 times daily       pantoprazole (PROTONIX) 40 MG EC tablet Take 1 tablet (40 mg) by mouth daily 30  tablet 11     tobramycin (TOBREX) 0.3 % ophthalmic solution Place 1 drop into both eyes 2 times daily. (Patient not taking: Reported on 2024)       UNABLE TO FIND MEDICATION NAME: V (vein formular)       No current facility-administered medications for this visit.       Food Allergies: Shrimp    Estimated Nutrition Needs based on ideal body weight of 52 k-1820 calories (30-35 kcals/kg), ~52g protein (1g/kg), ~1 ml/kcal or total fluids per MD.     MALNUTRITION:  % Weight Loss:  Weight loss does not meet criteria for malnutrition, but has underweight BMI of 15.38 indicating severely underweight  % Intake:  <75% for >/= 3 months (moderate malnutrition)  Subcutaneous Fat Loss:  None observed  Muscle Loss:  None observed  Fluid Retention:  None noted    Malnutrition Diagnosis: Moderate malnutrition  In Context of:  Chronic illness or disease (Based on GLIM criteria with underweight BMI and CKD).    Nutrition Prescription: Nutrition Education     Nutrition Intervention      Provided diet education for underweight, increasing calories and regaining weight with CKD tips, and managing multiple medical history and diet recommendations.  Explained okay to have 1 high calorie and high protein nutrition during per day since her nephrologist recommended not drinking many protein drinks.  Reviewed some plant-based options that are lower in potassium, such as a Bolf powder has 70 mg per 2 scoops of potassium or the ready to drink carton has 190 mg, or the only protein drinks have 50 mg of potassium.  Both drains have 20 g of protein.  Explained okay for adequate amounts of protein but avoid excessive amounts or for multiple protein drinks if eating several meals and snacks such as 6 times per day.    Answered patient's questions. Patient verbalized understanding of education provided. See Goals below.     Educational Materials Provided:  NCM: CKD stages 3 through 5 and list of potassium foods    Goals:    Aim for  eating multiple smaller meals such as 6 smaller meals per day  (aim for eating 3 meals and at least 3 snacks per day.)    Drink at least one plant based nutrition drinks per day. Here are some plant based options that are lower in potassium such as Evolve protein drinks, Evolve protein powder and Owyn drinks. (This drink counts as one of your snacks).    Continue lower potassium fruits and vegetables per your kidney doctor's recommendations.    Include some and extra unsaturated fats at meals to help get more calories such as olive oil, avocado oil, nuts, seeds, nut butters.    Eat adequate protein. Include a protein serving at each meal and have the one protein drink per day.    Switch your eye and multivitamin supplement. Your current one is very high in B12 or you could ask your eye doctor if you could decrease the amount of the supplement and take less often in the week.      Nutrition Monitoring and Evaluation: Will monitor adherence to nutrition recommendations at future RD visits.     Further Medical Nutrition Therapy:  Follow-up as needed.  She would like to follow-up as needed.    Patient was encouraged to contact RD with any further questions.    Iesha Hu, MS, RD, LD        Again, thank you for allowing me to participate in the care of your patient.        Sincerely,        Iesha Hu RD

## 2024-10-16 ENCOUNTER — OFFICE VISIT (OUTPATIENT)
Dept: ORTHOPEDICS | Facility: CLINIC | Age: 84
End: 2024-10-16
Attending: PHYSICAL MEDICINE & REHABILITATION
Payer: COMMERCIAL

## 2024-10-16 VITALS
WEIGHT: 86.86 LBS | HEIGHT: 63 IN | DIASTOLIC BLOOD PRESSURE: 58 MMHG | SYSTOLIC BLOOD PRESSURE: 124 MMHG | BODY MASS INDEX: 15.39 KG/M2

## 2024-10-16 DIAGNOSIS — Z96.642 CHRONIC HIP PAIN AFTER TOTAL REPLACEMENT OF LEFT HIP JOINT: Primary | ICD-10-CM

## 2024-10-16 DIAGNOSIS — M25.552 CHRONIC HIP PAIN AFTER TOTAL REPLACEMENT OF LEFT HIP JOINT: Primary | ICD-10-CM

## 2024-10-16 DIAGNOSIS — M25.552 LEFT HIP PAIN: ICD-10-CM

## 2024-10-16 DIAGNOSIS — G89.29 CHRONIC HIP PAIN AFTER TOTAL REPLACEMENT OF LEFT HIP JOINT: Primary | ICD-10-CM

## 2024-10-16 PROCEDURE — 99203 OFFICE O/P NEW LOW 30 MIN: CPT | Performed by: STUDENT IN AN ORGANIZED HEALTH CARE EDUCATION/TRAINING PROGRAM

## 2024-10-16 ASSESSMENT — HOOS JR
HOOS JR TOTAL INTERVAL SCORE: 55.99
LYING IN BED (TURNING OVER, MAINTAINING HIP POSITION): MILD
RISING FROM SITTING: MODERATE
SITTING: MODERATE
WALKING ON UNEVEN SURFACE: MODERATE
GOING UP OR DOWN STAIRS: MODERATE
BENDING TO THE FLOOR TO PICK UP OBJECT: MODERATE

## 2024-10-16 NOTE — LETTER
10/16/2024      Patrick Olson  [address]  [facility] [address]      Dear Colleague,    Thank you for referring your patient, Patrick Olson, to the [facility] [place]. Please see a copy of my visit note below.        [facility]  Orthopaedic Surgery Consultation by Juan Manuel Thomas M.D.    Patrick Olson MRN# [id number]   Age: 83 year old YOB: 1940     Requesting physician: Magda Sow     Background history:  DX:  Lumbar radiculopathy  GI bleed  Diverticulosis  Gastric AVM  Scoliosis  Rheumatoid arthritis  Chronic kidney disease stage IV    TREATMENTS:  2011, left total hip arthroplasty. 1. Acetabular component: [place] ADM Left size 50 mm O.D.  2. Femoral component: Ronald Accolade size 2.5 30 mm 127 degree  3. Femoral head: Ronald ADM 50/28 28 mm -2.7 biolox            History of Present Illness:   83 year old female who presents our clinic for the evaluation of left hip/thigh pain.  Patient reports that after her 2011 left total hip arthroplasty she has been doing very well.  Over the last few years without antecedent trauma she has been experiencing increased left hip pain.  She describes this in the anterior and lateral side of the hip.  The pain is not present in rest.  It is exacerbated by flexing the hip and by increased activity.  Walking longer distances causes flareups and pain.  Occasionally patient wakes up due to the pain.  She does not report significant initiation stiffness or soreness.  Patient has known lumbago and patient was previously evaluated by  who referred her to our clinic to evaluate whether the issues could be related to her left total hip arthroplasty.  To mitigate her pain she is taking acetaminophen and gabapentin.  She has not had any injections in greater trochanteric bursa or around the flexor tendons.     Social:   Occupation: retired   Living situation: with   Hobbies / Sports:  "walks 1 mile a day, traveling, cooking    Smoking: No  Alcohol: No  Illicit drug use: No         Physical Exam:     EXAMINATION pertinent findings:   PSYCH: Pleasant, healthy-appearing, alert, oriented x3, cooperative. Normal mood and affect.  VITAL SIGNS: Blood pressure 124/58, height 1.6 m (5' 3\"), weight 39.4 kg (86 lb 13.8 oz), not currently breastfeeding.  Reviewed nursing intake notes.   Body mass index is 15.39 kg/m .  RESP: non labored breathing   ABD: benign, soft, non-tender, no acute peritoneal findings  SKIN: grossly normal   LYMPHATIC: grossly normal, no adenopathy, no extremity edema  NEURO: grossly normal , no motor deficits  VASCULAR: satisfactory perfusion of all extremities   MUSCULOSKELETAL:   Alignment: Neutral  Gait: Normal  Hips:   L hip: Well-healed incision without signs of infection.  Full ROM.  The maximum excursions are painful and proximal thigh and greater trochanteric region.  There is recognizable tenderness to palpation over greater trochanteric region and over the anterior hip.  Hip flexor resistance testing is sensitive.    Left LE:   Thigh and leg compartments soft and compressible   +Quad/TA/GSC/FHL/EHL   SILT DP/SP/Stew/Saph/Tib nerve distributions   Palpable dorsalis pedis pulse          Data:   All laboratory data reviewed  All imaging studies reviewed by me personally.    XR pelvis/hip left 9/24/2024:  My interpretation: Status post placement of left total hip arthroplasty.  Adequate sizing, orientation and fixation of components.  No signs of complications.  Leg lengths appear to be equal.  Perhaps slightly oversized acetabular component.            Assessment and Plan:   Assessment:  83-year-old female presenting with chronic activity related left hip pain status post left total hip arthroplasty.  Currently no signs of infection, loosening of components, fractures or instability.  Potentially the pain could be related to hip flexor/psoas tendinitis.  Differential diagnosis " includes greater trochanteric bursitis and lumbar pathology.     Plan:  I extensively discussed today's findings with the patient.  There are no signs of failure of the hip replacement.  One could trial a series of diagnostic blocks of the psoas tendon/hip flexors as well as the greater trochanteric bursa to evaluate whether these contribute to her pain.  Patient has a follow-up meeting with  next week.  Patient would like to discuss these options during that consultation.  All questions were answered.  We will follow-up with her on an as-needed basis.    Thank you for your referral.    Juan Manuel Thomas MD, PhD     Adult Reconstruction  [facility] Department of Orthopaedic Surgery    This note was created using dictation software and may contain errors.  Please contact the creator for any clarifications that are needed.    DATA for DOCUMENTATION:         Past Medical History:     Patient Active Problem List   Diagnosis     SNHL (sensorineural hearing loss)     GI bleed     DDD (degenerative disc disease), lumbosacral     Gastrointestinal hemorrhage, unspecified gastrointestinal hemorrhage type     Diverticular hemorrhage     Acute lower GI bleeding     Diverticulosis of large intestine     Lab test negative for COVID-19 virus     Chronic pain of right elbow     Right wrist pain     Lower GI bleed     Chronic bilateral low back pain with bilateral sciatica     Lower GI bleeding     Anemia, unspecified type     Scoliosis of thoracolumbar spine, unspecified scoliosis type     Facet arthropathy, lumbosacral     Loss of appetite     Lumbar radiculopathy, acute     Protein-calorie malnutrition, unspecified severity (H)     CKD (chronic kidney disease) stage 4, GFR 15-29 ml/min (H)     Small bowel arteriovenous malformation     Gastric AVM     Underweight     History of GI bleed     Bilateral hearing loss, unspecified hearing loss type     Complaints of memory disturbance      Rheumatoid arthritis involving multiple sites with positive rheumatoid factor (H)     Cluneal neuropathy     Past Medical History:   Diagnosis Date     Anemia      CKD (chronic kidney disease) stage 3, GFR 30-59 ml/min (H)      Diverticulosis of large intestine      Osteoarthritis      Osteoporosis      Rheumatoid arthritis (H)      Sensorineural hearing loss      Varicose veins of bilateral lower extremities with other complications        Also see scanned health assessment forms.       Past Surgical History:     Past Surgical History:   Procedure Laterality Date     CAPSULE/PILL CAM ENDOSCOPY N/A 11/18/2021    Procedure: IMAGING PROCEDURE, GI TRACT, INTRALUMINAL, VIA CAPSULE;  Surgeon: Deric Rodriguez MD;  Location: [facility]     CAPSULE/PILL CAM ENDOSCOPY N/A 2/14/2023    Procedure: IMAGING PROCEDURE, GI TRACT, INTRALUMINAL, VIA CAPSULE;  Surgeon: Jaime Mesa MD;  Location: [facility]     COLONOSCOPY N/A 03/14/2017    Procedure: COMBINED COLONOSCOPY, SINGLE OR MULTIPLE BIOPSY/POLYPECTOMY BY BIOPSY;  Surgeon: Spencer Downey MD;  Location: [facility]     COLONOSCOPY N/A 9/22/2022    Procedure: COLONOSCOPY, FLEXIBLE, WITH LESION REMOVAL USING SNARE;  Surgeon: Kirby Arthur MD;  Location:  GI     COLONOSCOPY N/A 1/13/2023    Procedure: COLONOSCOPY;  Surgeon: Spencer Downey MD;  Location: [facility] OR     COLONOSCOPY N/A 8/14/2023    Procedure: Colonoscopy;  Surgeon: Spencer Downey MD;  Location: [facility]     ENTEROSCOPY SMALL BOWEL N/A 11/20/2021    Procedure: ENTEROSCOPY, colonoscopy with endoscopic mucosal resection and tattoo placement;  Surgeon: Kirby Arthur MD;  Location: [facility] OR     ESOPHAGOSCOPY, GASTROSCOPY, DUODENOSCOPY (EGD), COMBINED N/A 2/6/2023    Procedure: ESOPHAGOGASTRODUODENOSCOPY (EGD);  Surgeon: Spencer Downey MD;  Location: [facility]     IR VISCERAL EMBOLIZATION  01/22/2021     ORTHOPEDIC SURGERY Left     hip replacment     SIGMOIDOSCOPY FLEXIBLE N/A 01/23/2021    Procedure:  SIGMOIDOSCOPY, FLEXIBLE;  Surgeon: Jaime Steinberg MD;  Location:  GI     SIGMOIDOSCOPY FLEXIBLE N/A 7/17/2024    Procedure: Sigmoidoscopy flexible;  Surgeon: Ritika Woodard MD;  Location:  GI            Social History:     Social History     Socioeconomic History     Marital status:      Spouse name: Not on file     Number of children: Not on file     Years of education: Not on file     Highest education level: Not on file   Occupational History     Not on file   Tobacco Use     Smoking status: Former     Smokeless tobacco: Never   Vaping Use     Vaping status: Never Used   Substance and Sexual Activity     Alcohol use: No     Drug use: No     Sexual activity: Not on file   Other Topics Concern     Not on file   Social History Narrative    - Retired (formerly employed as  at [employer])    - 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[employer])     Social Determinants of Health     Financial Resource Strain: Low Risk  (9/12/2024)    Financial Resource Strain      Within the past 12 months, have you or your family members you live with been unable to get utilities (heat, electricity) when it was really needed?: No   Food Insecurity: Low Risk  (9/12/2024)    Food Insecurity      Within the past 12 months, did you worry that your food would run out before you got money to buy more?: No      Within the past 12 months, did the food you bought just not last and you didn t have money to get more?: No   Transportation Needs: Low Risk  (9/12/2024)    Transportation Needs      Within the past 12 months, has lack of transportation kept you from medical appointments, getting your medicines, non-medical meetings or appointments, work, or from getting things that you need?: No   Physical Activity: Sufficiently Active (9/12/2024)    Exercise Vital Sign      Days of Exercise per Week: 5 days      Minutes of Exercise per Session: 30 min   Stress: No  Stress Concern Present (9/12/2024)    [employer] [place] [employer] - Occupational Stress Questionnaire      Feeling of Stress : Only a little   Social Connections: Unknown (9/12/2024)    Social Connection and Isolation Panel [NHANES]      Frequency of Communication with Friends and Family: Not on file      Frequency of Social Gatherings with Friends and Family: Once a week      Attends [employer] Services: Not on file      Active Member of Clubs or Organizations: Not on file      Attends Club or Organization Meetings: Not on file      Marital Status: Not on file   Interpersonal Safety: Low Risk  (9/12/2024)    Interpersonal Safety      Do you feel physically and emotionally safe where you currently live?: Yes      Within the past 12 months, have you been hit, slapped, kicked or otherwise physically hurt by someone?: No      Within the past 12 months, have you been humiliated or emotionally abused in other ways by your partner or ex-partner?: No   Housing Stability: Low Risk  (9/12/2024)    Housing Stability      Do you have housing? : Yes      Are you worried about losing your housing?: No            Family History:     No family history on file.         Medications:     Current Outpatient Medications   Medication Sig Dispense Refill     acetaminophen (TYLENOL) 325 MG tablet Take 2 tablets (650 mg) by mouth every 6 hours       AMOXICILLIN PO Take by mouth.       artificial saliva (BIOTENE DRY MOUTHWASH) LIQD liquid Swish and spit in mouth 4 times daily as needed for dry mouth       calcium carbonate-vitamin D (CALTRATE) 600-10 MG-MCG per tablet Take 1 tablet by mouth daily       carboxymethylcellulose PF (REFRESH PLUS) 0.5 % ophthalmic solution Place 1 drop into both eyes 3 times daily as needed for dry eyes       clobetasol (TEMOVATE) 0.05 % external ointment Apply topically 2 times daily To right ankle as needed 60 g 3     EPINEPHrine (ANY BX GENERIC EQUIV) 0.3 MG/0.3ML injection 2-pack Inject 0.3 mg  into the muscle as needed for anaphylaxis       famotidine (PEPCID) 20 MG tablet Take 20 mg by mouth 2 times daily.       gabapentin (NEURONTIN) 300 MG capsule Take 1 capsule (300 mg) by mouth 2 times daily as needed for neuropathic pain.       gabapentin (NEURONTIN) 300 MG capsule Take 600 mg by mouth at bedtime       hydroxychloroquine (PLAQUENIL) 200 MG tablet Take 1 tablet (200 mg) by mouth daily 90 tablet 1     lifitegrast (XIIDRA) 5 % opthalmic solution Place 1 drop into both eyes 2 times daily       mirtazapine (REMERON) 7.5 MG tablet Take 1 tablet (7.5 mg) by mouth at bedtime. 30 tablet 3     Multiple Vitamins-Minerals (OCUVITE PRESERVISION PO) Take 1 tablet by mouth 2 times daily       pantoprazole (PROTONIX) 40 MG EC tablet Take 1 tablet (40 mg) by mouth daily 30 tablet 11     tobramycin (TOBREX) 0.3 % ophthalmic solution Place 1 drop into both eyes 2 times daily. (Patient not taking: Reported on 9/25/2024)       UNABLE TO FIND MEDICATION NAME: V (vein formular)       No current facility-administered medications for this visit.              Review of Systems:   A comprehensive 10 point review of systems (constitutional, ENT, cardiac, peripheral vascular, lymphatic, respiratory, GI, , Musculoskeletal, skin, Neurological) was performed and found to be negative except as described in this note.     See intake form completed by patient         Again, thank you for allowing me to participate in the care of your patient.        Sincerely,        Juan Manuel Thomas MD

## 2024-10-16 NOTE — PROGRESS NOTES
[facility]  Orthopaedic Surgery Consultation by Juan Manuel Thomas M.D.    Patrick Olson MRN# [id number]   Age: 83 year old YOB: 1940     Requesting physician: Magda Sow     Background history:  DX:  Lumbar radiculopathy  GI bleed  Diverticulosis  Gastric AVM  Scoliosis  Rheumatoid arthritis  Chronic kidney disease stage IV    TREATMENTS:  2011, left total hip arthroplasty. 1. Acetabular component: Ronald ADM Left size 50 mm O.D.  2. Femoral component: Felton Accolade size 2.5 30 mm 127 degree  3. Femoral head: Ronald ADM 50/28 28 mm -2.7 biolox            History of Present Illness:   83 year old female who presents our clinic for the evaluation of left hip/thigh pain.  Patient reports that after her 2011 left total hip arthroplasty she has been doing very well.  Over the last few years without antecedent trauma she has been experiencing increased left hip pain.  She describes this in the anterior and lateral side of the hip.  The pain is not present in rest.  It is exacerbated by flexing the hip and by increased activity.  Walking longer distances causes flareups and pain.  Occasionally patient wakes up due to the pain.  She does not report significant initiation stiffness or soreness.  Patient has known lumbago and patient was previously evaluated by  who referred her to our clinic to evaluate whether the issues could be related to her left total hip arthroplasty.  To mitigate her pain she is taking acetaminophen and gabapentin.  She has not had any injections in greater trochanteric bursa or around the flexor tendons.     Social:   Occupation: retired   Living situation: with   Hobbies / Sports: walks 1 mile a day, traveling, cooking    Smoking: No  Alcohol: No  Illicit drug use: No         Physical Exam:     EXAMINATION pertinent findings:   PSYCH: Pleasant, healthy-appearing, alert, oriented x3, cooperative. Normal mood and affect.  VITAL SIGNS:  "Blood pressure 124/58, height 1.6 m (5' 3\"), weight 39.4 kg (86 lb 13.8 oz), not currently breastfeeding.  Reviewed nursing intake notes.   Body mass index is 15.39 kg/m .  RESP: non labored breathing   ABD: benign, soft, non-tender, no acute peritoneal findings  SKIN: grossly normal   LYMPHATIC: grossly normal, no adenopathy, no extremity edema  NEURO: grossly normal , no motor deficits  VASCULAR: satisfactory perfusion of all extremities   MUSCULOSKELETAL:   Alignment: Neutral  Gait: Normal  Hips:   L hip: Well-healed incision without signs of infection.  Full ROM.  The maximum excursions are painful and proximal thigh and greater trochanteric region.  There is recognizable tenderness to palpation over greater trochanteric region and over the anterior hip.  Hip flexor resistance testing is sensitive.    Left LE:   Thigh and leg compartments soft and compressible   +Quad/TA/GSC/FHL/EHL   SILT DP/SP/Stew/Saph/Tib nerve distributions   Palpable dorsalis pedis pulse          Data:   All laboratory data reviewed  All imaging studies reviewed by me personally.    XR pelvis/hip left 9/24/2024:  My interpretation: Status post placement of left total hip arthroplasty.  Adequate sizing, orientation and fixation of components.  No signs of complications.  Leg lengths appear to be equal.  Perhaps slightly oversized acetabular component.            Assessment and Plan:   Assessment:  83-year-old female presenting with chronic activity related left hip pain status post left total hip arthroplasty.  Currently no signs of infection, loosening of components, fractures or instability.  Potentially the pain could be related to hip flexor/psoas tendinitis.  Differential diagnosis includes greater trochanteric bursitis and lumbar pathology.     Plan:  I extensively discussed today's findings with the patient.  There are no signs of failure of the hip replacement.  One could trial a series of diagnostic blocks of the psoas tendon/hip " flexors as well as the greater trochanteric bursa to evaluate whether these contribute to her pain.  Patient has a follow-up meeting with  next week.  Patient would like to discuss these options during that consultation.  All questions were answered.  We will follow-up with her on an as-needed basis.    Thank you for your referral.    Juan Manuel Thomas MD, PhD     Adult Reconstruction  [facility] Department of Orthopaedic Surgery    This note was created using dictation software and may contain errors.  Please contact the creator for any clarifications that are needed.    DATA for DOCUMENTATION:         Past Medical History:     Patient Active Problem List   Diagnosis    SNHL (sensorineural hearing loss)    GI bleed    DDD (degenerative disc disease), lumbosacral    Gastrointestinal hemorrhage, unspecified gastrointestinal hemorrhage type    Diverticular hemorrhage    Acute lower GI bleeding    Diverticulosis of large intestine    Lab test negative for COVID-19 virus    Chronic pain of right elbow    Right wrist pain    Lower GI bleed    Chronic bilateral low back pain with bilateral sciatica    Lower GI bleeding    Anemia, unspecified type    Scoliosis of thoracolumbar spine, unspecified scoliosis type    Facet arthropathy, lumbosacral    Loss of appetite    Lumbar radiculopathy, acute    Protein-calorie malnutrition, unspecified severity (H)    CKD (chronic kidney disease) stage 4, GFR 15-29 ml/min (H)    Small bowel arteriovenous malformation    Gastric AVM    Underweight    History of GI bleed    Bilateral hearing loss, unspecified hearing loss type    Complaints of memory disturbance    Rheumatoid arthritis involving multiple sites with positive rheumatoid factor (H)    Cluneal neuropathy     Past Medical History:   Diagnosis Date    Anemia     CKD (chronic kidney disease) stage 3, GFR 30-59 ml/min (H)     Diverticulosis of large intestine     Osteoarthritis      Osteoporosis     Rheumatoid arthritis (H)     Sensorineural hearing loss     Varicose veins of bilateral lower extremities with other complications        Also see scanned health assessment forms.       Past Surgical History:     Past Surgical History:   Procedure Laterality Date    CAPSULE/PILL CAM ENDOSCOPY N/A 11/18/2021    Procedure: IMAGING PROCEDURE, GI TRACT, INTRALUMINAL, VIA CAPSULE;  Surgeon: Deric Rodriguez MD;  Location: [facility]    CAPSULE/PILL CAM ENDOSCOPY N/A 2/14/2023    Procedure: IMAGING PROCEDURE, GI TRACT, INTRALUMINAL, VIA CAPSULE;  Surgeon: Jaime Mesa MD;  Location: [facility]    COLONOSCOPY N/A 03/14/2017    Procedure: COMBINED COLONOSCOPY, SINGLE OR MULTIPLE BIOPSY/POLYPECTOMY BY BIOPSY;  Surgeon: Spencer Downey MD;  [facility]    COLONOSCOPY N/A 9/22/2022    Procedure: COLONOSCOPY, FLEXIBLE, WITH LESION REMOVAL USING SNARE;  Surgeon: Kirby Arthur MD;  Location:  GI    COLONOSCOPY N/A 1/13/2023    Procedure: COLONOSCOPY;  Surgeon: Spencer Downey MD;  [place] [facility]    COLONOSCOPY N/A 8/14/2023    Procedure: Colonoscopy;  Surgeon: Spencer Downey MD;  Location:  GI    ENTEROSCOPY SMALL BOWEL N/A 11/20/2021    Procedure: ENTEROSCOPY, colonoscopy with endoscopic mucosal resection and tattoo placement;  Surgeon: Kirby Arthur MD;  Location: [facility]    ESOPHAGOSCOPY, GASTROSCOPY, DUODENOSCOPY (EGD), COMBINED N/A 2/6/2023    Procedure: ESOPHAGOGASTRODUODENOSCOPY (EGD);  Surgeon: Spencer Downey MD;  Location:  GI    IR VISCERAL EMBOLIZATION  01/22/2021    ORTHOPEDIC SURGERY Left     hip replacment    SIGMOIDOSCOPY FLEXIBLE N/A 01/23/2021    Procedure: SIGMOIDOSCOPY, FLEXIBLE;  Surgeon: Jaime Steinberg MD;  Location: [facility]    SIGMOIDOSCOPY FLEXIBLE N/A 7/17/2024    Procedure: Sigmoidoscopy flexible;  Surgeon: Ritika Woodard MD;  Location: [facility]            Social History:     Social History     Socioeconomic History    Marital status:       Spouse name: Not on file    Number of children: Not on file    Years of education: Not on file    Highest education level: Not on file   Occupational History    Not on file   Tobacco Use    Smoking status: Former    Smokeless tobacco: Never   Vaping Use    Vaping status: Never Used   Substance and Sexual Activity    Alcohol use: No    Drug use: No    Sexual activity: Not on file   Other Topics Concern    Not on file   Social History Narrative    - Retired (formerly employed as  at [employer])    - 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[employer])     Social Determinants of Health     Financial Resource Strain: Low Risk  (9/12/2024)    Financial Resource Strain     Within the past 12 months, have you or your family members you live with been unable to get utilities (heat, electricity) when it was really needed?: No   Food Insecurity: Low Risk  (9/12/2024)    Food Insecurity     Within the past 12 months, did you worry that your food would run out before you got money to buy more?: No     Within the past 12 months, did the food you bought just not last and you didn t have money to get more?: No   Transportation Needs: Low Risk  (9/12/2024)    Transportation Needs     Within the past 12 months, has lack of transportation kept you from medical appointments, getting your medicines, non-medical meetings or appointments, work, or from getting things that you need?: No   Physical Activity: Sufficiently Active (9/12/2024)    Exercise Vital Sign     Days of Exercise per Week: 5 days     Minutes of Exercise per Session: 30 min   Stress: No Stress Concern Present (9/12/2024)    [employer] - Occupational Stress Questionnaire     Feeling of Stress : Only a little   Social Connections: Unknown (9/12/2024)    Social Connection and Isolation Panel [NHANES]     Frequency of Communication with Friends and Family: Not on file      Frequency of Social Gatherings with Friends and Family: Once a week     Attends Protestant Services: Not on file     Active Member of Clubs or Organizations: Not on file     Attends Club or Organization Meetings: Not on file     Marital Status: Not on file   Interpersonal Safety: Low Risk  (9/12/2024)    Interpersonal Safety     Do you feel physically and emotionally safe where you currently live?: Yes     Within the past 12 months, have you been hit, slapped, kicked or otherwise physically hurt by someone?: No     Within the past 12 months, have you been humiliated or emotionally abused in other ways by your partner or ex-partner?: No   Housing Stability: Low Risk  (9/12/2024)    Housing Stability     Do you have housing? : Yes     Are you worried about losing your housing?: No            Family History:     No family history on file.         Medications:     Current Outpatient Medications   Medication Sig Dispense Refill    acetaminophen (TYLENOL) 325 MG tablet Take 2 tablets (650 mg) by mouth every 6 hours      AMOXICILLIN PO Take by mouth.      artificial saliva (BIOTENE DRY MOUTHWASH) LIQD liquid Swish and spit in mouth 4 times daily as needed for dry mouth      calcium carbonate-vitamin D (CALTRATE) 600-10 MG-MCG per tablet Take 1 tablet by mouth daily      carboxymethylcellulose PF (REFRESH PLUS) 0.5 % ophthalmic solution Place 1 drop into both eyes 3 times daily as needed for dry eyes      clobetasol (TEMOVATE) 0.05 % external ointment Apply topically 2 times daily To right ankle as needed 60 g 3    EPINEPHrine (ANY BX GENERIC EQUIV) 0.3 MG/0.3ML injection 2-pack Inject 0.3 mg into the muscle as needed for anaphylaxis      famotidine (PEPCID) 20 MG tablet Take 20 mg by mouth 2 times daily.      gabapentin (NEURONTIN) 300 MG capsule Take 1 capsule (300 mg) by mouth 2 times daily as needed for neuropathic pain.      gabapentin (NEURONTIN) 300 MG capsule Take 600 mg by mouth at bedtime      hydroxychloroquine  (PLAQUENIL) 200 MG tablet Take 1 tablet (200 mg) by mouth daily 90 tablet 1    lifitegrast (XIIDRA) 5 % opthalmic solution Place 1 drop into both eyes 2 times daily      mirtazapine (REMERON) 7.5 MG tablet Take 1 tablet (7.5 mg) by mouth at bedtime. 30 tablet 3    Multiple Vitamins-Minerals (OCUVITE PRESERVISION PO) Take 1 tablet by mouth 2 times daily      pantoprazole (PROTONIX) 40 MG EC tablet Take 1 tablet (40 mg) by mouth daily 30 tablet 11    tobramycin (TOBREX) 0.3 % ophthalmic solution Place 1 drop into both eyes 2 times daily. (Patient not taking: Reported on 9/25/2024)      UNABLE TO FIND MEDICATION NAME: V (vein formular)       No current facility-administered medications for this visit.              Review of Systems:   A comprehensive 10 point review of systems (constitutional, ENT, cardiac, peripheral vascular, lymphatic, respiratory, GI, , Musculoskeletal, skin, Neurological) was performed and found to be negative except as described in this note.     See intake form completed by patient

## 2024-10-24 ENCOUNTER — OFFICE VISIT (OUTPATIENT)
Dept: ORTHOPEDICS | Facility: CLINIC | Age: 84
End: 2024-10-24
Attending: PHYSICAL MEDICINE & REHABILITATION
Payer: COMMERCIAL

## 2024-10-24 VITALS — DIASTOLIC BLOOD PRESSURE: 74 MMHG | OXYGEN SATURATION: 100 % | HEART RATE: 76 BPM | SYSTOLIC BLOOD PRESSURE: 126 MMHG

## 2024-10-24 DIAGNOSIS — G58.8 CLUNEAL NEUROPATHY: ICD-10-CM

## 2024-10-24 PROCEDURE — 99207 PR NON-BILLABLE SERV PER CHARTING: CPT | Performed by: PHYSICAL MEDICINE & REHABILITATION

## 2024-10-24 ASSESSMENT — PAIN SCALES - GENERAL: PAINLEVEL_OUTOF10: SEVERE PAIN (6)

## 2024-10-24 NOTE — CONSULTS
GASTROENTEROLOGY CONSULTATION      Date of Admission:  11/16/2021          ASSESSMENT AND RECOMMENDATIONS:   Assessment:  80 year old female admitted on 11/16/2021. She has a past medical history significant for rheumatoid arthritis, CKD stage 3, GERD, and prior presumed diverticular bleeding, non bleeding AVM who presented to the ED with recurrent hematochezia for which GI is consulted.    Acute on chronic anemia  Hematochezia  One week of ~daily hematochezia with an associated gradual hgb drop from baseline 10-11 to 8.2 g/dL this am. Mild tachycardia but HD stable. She has had recurrent GI bleeding in the past presumed 2/2 diverticulosis, most recently in 1/2021. Also with repeat egd/colonoscopy for anemia 5/2021 with gastric avm (non bleeding) treated with apc. Current bleed unlikely diverticular given her stability and nearly week of bleeding. Most likely AVM, possibly anal outlet though (hematochezia vs BRBPR). Unlikely UGI bleed or infection, but possible.      Recommendations  -Continue close HD monitoring and transfusion of hgb < 7  -2 L Golytely as much as possible in one hour, then NPO  -VCE today, then ok to advance to cld this evening   -2 L Golytely at 1700 in case of procedure tomorrow  -NPO midnight, will review VCE tomorrow am to determine possible procedures    GI will continue to follow    Thank you for involving us in this patient's care. Please do not hesitate to contact the GI service with any questions or concerns.     Pt care plan discussed with Dr. Rodriguez, GI staff physician.    PATRICIA Robbins CNP  Text page  -------------------------------------------------------------------------------------------------------------------          Chief Complaint:   We were asked by Dr Herrera of medicine to evaluate this patient with hematochezia     History is obtained from the patient and the medical record.          History of Present Illness:   80 year old female admitted on 11/16/2021. She has  [FreeTextEntry1] : Overweight/Fatigue/HLD - Diet and exercise.  HODA/Insomnia - Controlled with Meds. Low Vit D - Vit D level Headaches/Nausea - Controlled with Meds. Iron Overload - negative HH screen Osteoporosis screening - DEXA scan ordered.   F/U in Spring for lab testing.   a past medical history significant for rheumatoid arthritis, CKD stage 3, GERD, and prior presumed diverticular bleeding, non bleeding AVM who presented to the ED with recurrent hematochezia for which GI is consulted.    Noticed BRBPR 11/12 which transitioned into dark/maroon loose stools at least daily since. Admitted via [facility] ED 11/16 for further evaluation. Associated lower abdominal and fatigue, cramping without epigastric pain, heartburn, nausea, vomiting, or hematemesis. Not taking NSAIDs or anticoagulation. She had a normal bm yesterday morning and tentative plan was discharge, but then    She has had 4-5 prior episodes of GI bleeding starting in 2010. No bleeding source previously found, bleeding presumed secondary to diverticulosis, but never confirmed/treated. She last had egd 5/2021 with a non bleeding gastric avm treated with apc. Colonoscopy at the same time with diverticulosis, along with small internal/external hemorrhoids. Last bleeding episode 1/2021. Tagged RBC scan and angiography negative for GI bleeding. She received 2 unit(s) PRBC and bleeding eventually stopped.     No family history of similar.             Past Medical History:   Reviewed and edited as appropriate  Past Medical History:   Diagnosis Date     Diverticulosis of large intestine      Osteoarthritis      Osteoporosis      Rheumatoid arthritis (H)      Sensorineural hearing loss             Past Surgical History:   Reviewed and edited as appropriate   Past Surgical History:   Procedure Laterality Date     COLONOSCOPY N/A 3/14/2017    Procedure: COMBINED COLONOSCOPY, SINGLE OR MULTIPLE BIOPSY/POLYPECTOMY BY BIOPSY;  Surgeon: Spencer Downey MD;  Location: [facility]     IR VISCERAL EMBOLIZATION  1/22/2021     ORTHOPEDIC SURGERY       SIGMOIDOSCOPY FLEXIBLE N/A 1/23/2021    Procedure: SIGMOIDOSCOPY, FLEXIBLE;  Surgeon: Jaime Steinberg MD;  Location: [facility]            Previous Endoscopy:     Results for orders placed or performed  during the hospital encounter of 03/13/17   COLONOSCOPY   Result Value Ref Range    COLONOSCOPY       [facility]  [address] [place] [address] [phone]     Endoscopy Department  _______________________________________________________________________________  Patient Name: Patrick Olson             Procedure Date: 3/14/2017 10:28 AM  MRN: [id number]                       Account Number: [id number]  YOB: 1940             Admit Type: Inpatient  Age: 76                                Gender: Female  Note Status: Finalized                Attending MD: Spencer Downey MD  Total Sedation Time: Conscious sedation was administered under direct 1:1   monitoring and supervision by Dr. Downey for 21 minutes.   _______________________________________________________________________________     Procedure:           Colonoscopy  Indications:         Hematochezia  Providers:           Spencer Downey MD, Agustin Villalobos RN  Patient Profile:     77 yo F admitted with one week of dark red blood mixed                        with stool. history of div erticular bleeding. Hemoglobin                        stable.  Referring MD:          Medicines:           Midazolam 3 mg IV, Fentanyl 100 micrograms IV  Complications:       No immediate complications.  _______________________________________________________________________________  Procedure:           Pre-Anesthesia Assessment:                       - Prior to the procedure, a History and Physical was                        performed, and patient medications and allergies were                        reviewed. The patient is competent. The risks and                        benefits of the procedure and the sedation options and                        risks were discussed with the patient. All questions                        were answered and informed consent was obtained. Patient                        identification and proposed  procedure were verified by                        the physician and the nurse in the pre-procedure area.                        Mental Status Examination: al ert and oriented. Airway                        Examination: Mallampati Class I (tonsillar pillars                        visualized). Respiratory Examination: clear to                        auscultation. CV Examination: normal. Prophylactic                        Antibiotics: The patient does not require prophylactic                        antibiotics. Prior Anticoagulants: The patient has taken                        no previous anticoagulant or antiplatelet agents. ASA                        Grade Assessment: II - A patient with mild systemic                        disease. After reviewing the risks and benefits, the                        patient was deemed in satisfactory condition to undergo                        the procedure. The anesthesia plan was to use moderate                        sedation / analgesia (conscious sedation). Immediately                        prior to administration of medications, the patient was                        re-assessed for adequacy to receiv e sedatives. The heart                        rate, respiratory rate, oxygen saturations, blood                        pressure, adequacy of pulmonary ventilation, and                        response to care were monitored throughout the                        procedure. The physical status of the patient was                        re-assessed after the procedure.                       After obtaining informed consent, the colonoscope was                        passed under direct vision. Throughout the procedure,                        the patient's blood pressure, pulse, and oxygen                        saturations were monitored continuously. The Colonoscope                        was introduced through the anus and advanced to the                        terminal ileum. The  colonoscopy was performed without                        difficulty. The patient tolerated the procedure well.                        The quality of the bowel preparation was evaluated using                         the BBPS (Tarpley Bowel Preparation Scale) with scores                        of: Right Colon = 2 (minor amount of residual staining,                        small fragments of stool and/or opaque liquid, but                        mucosa seen well), Transverse Colon = 2 (minor amount of                        residual staining, small fragments of stool and/or                        opaque liquid, but mucosa seen well) and Left Colon = 2                        (minor amount of residual staining, small fragments of                        stool and/or opaque liquid, but mucosa seen well). The                        total BBPS score equals 6.                                                                                   Findings:       The perianal and digital rectal examinations were normal.       The terminal ileum appeared normal.       Scattered mild inflammation characterized by erosions and erythema was        found in the cecum. Biopsies were taken with a cold forceps for         histology. Aspiration of stool was taken to test for clostridium        difficile toxin B       Many small and medium sized diverticula were found in the entire colon.       Non-bleeding internal hemorrhoids were found during retroflexion and        were medium-sized.                                                                                   Impression:          - Scattered mild inflammation was found in the cecum.                        Biopsied. This may be due to prep. She was recently on                        antibiotics and thus we aspirated stool for C diff                        testing as well, which could explain the cramping she is                        having. Alternatively she culd be having symptomatic                         diverticular disease and hemorrhoidal bleeding.                       - The examined portion of the ileum was normal.                       - Diverticulosis in the entire examined colon.                       - Non-bleeding internal hemorrho ids.  Recommendation:      - Return patient to [facility] for ongoing care.                       - Return to previous diet.                       - Await pathology results. Await C diff results                       - No need to continue antibiotics based on this                        examination unless for non-colonic pathology.                                                                                     Signed Electronically by Spencer Downey MD  _____________________  Spencer Downey MD  3/14/2017 11:39 AM  I was physically present for the entire viewing portion of the exam.  __________________________  Signature of teaching physician  Jocelyn/Adelia Downey MD  Number of Addenda: 0    Note Initiated On: 3/14/2017 10:28 AM  Scope In: 12:00:00 AM  Scope Out: 12:00:00 AM              Social History:   Reviewed and edited as appropriate  Social History     Socioeconomic History     Marital status:      Spouse name: Not on file     Number of children: Not on file     Years of education: Not on file     Highest education level: Not on file   Occupational History     Not on file   Tobacco Use     Smoking status: Former Smoker     Smokeless tobacco: Never Used   Substance and Sexual Activity     Alcohol use: No     Drug use: No     Sexual activity: Not on file   Other Topics Concern     Not on file   Social History Narrative    - Retired (formerly employed as  at [employer])    - 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    The patients family history was reviewed today and is non-contributory.          Allergies:   Reviewed and edited as appropriate     Allergies   Allergen Reactions     Bee Venom Anaphylaxis     Shrimp Anaphylaxis     Evoxac [Cevimeline] Unknown     Prevnar Swelling     Other reaction(s): Edema     Prevnar [Pneumococcal 13-Linda Conj Vacc] Unknown            Medications:     Current Facility-Administered Medications   Medication     acetaminophen (TYLENOL) tablet 500 mg     diphenhydrAMINE (BENADRYL) injection 50 mg     diphenhydrAMINE (BENADRYL) injection 50 mg     EPINEPHrine (ADRENALIN) kit 0.3 mg     EPINEPHrine (ADRENALIN) kit 0.3 mg     famotidine (PEPCID) infusion 20 mg     famotidine (PEPCID) infusion 20 mg     ferric gluconate (FERRLECIT) 125 mg in sodium chloride 0.9 % 110 mL intermittent infusion     gabapentin (NEURONTIN) capsule 300 mg     hydroxychloroquine (PLAQUENIL) tablet 200 mg     methylPREDNISolone sodium succinate (solu-MEDROL) injection 125 mg     methylPREDNISolone sodium succinate (solu-MEDROL) injection 125 mg     ondansetron (ZOFRAN-ODT) ODT tab 4 mg    Or     ondansetron (ZOFRAN) injection 4 mg     pantoprazole (PROTONIX) IV push injection 40 mg             Review of Systems:     A complete review of systems was performed and is negative except as noted in the HPI           Physical Exam:   /50 (BP Location: Right arm)   Pulse 100   Temp 98.2  F (36.8  C) (Oral)   Resp 16   Wt 45.4 kg (100 lb)   SpO2 98%   BMI 17.59 kg/m    Wt:   Wt Readings from Last 2 Encounters:   11/16/21 45.4 kg (100 lb)   10/14/21  45.9 kg (101 lb 1.6 oz)      Constitutional: cooperative, pleasant, not dyspneic/diaphoretic, no acute distress  Eyes: Sclera anicteric/injected  Ears/nose/mouth/throat: Normal oropharynx without ulcers or exudate, mucus membranes moist, hearing intact  Neck: supple without obvious mass  CV: No edema  Respiratory: Unlabored breathing  Lymph: No submandibular, supraclavicular or inguinal lymphadenopathy  Abd: Nondistended, +bs, no hepatosplenomegaly, nontender, no peritoneal signs  Skin: warm, perfused, no jaundice  Neuro: AAO x 3, No asterixis  Psych: Normal affect  MSK: No gross deformities  Rectal exam: Small external hemorrhoid, no fissure. MARYA without mass, small volume hematochezia present in rectum         Data:   Labs and imaging below were independently reviewed and interpreted    BMP  Recent Labs   Lab 21  0638 21  1603    139   POTASSIUM 4.5 3.5   CHLORIDE 110* 106   EJ 9.0 9.4   CO2 30 26   BUN 10 13   CR 1.04 0.98   GLC 91 109*     CBC  Recent Labs   Lab 21  0611 21  1717 21  0638 21  1615 21  1603   WBC  --   --  3.8*  --  4.2   RBC  --   --  2.67*  --  2.89*   HGB 8.2*   < > 8.6*   < > 9.5*   HCT  --   --  26.6*  --  28.6*   MCV  --   --  100  --  99   MCH  --   --  32.2  --  32.9   MCHC  --   --  32.3  --  33.2   RDW  --   --  14.6  --  14.4   PLT  --   --  185  --  218    < > = values in this interval not displayed.     INR  Recent Labs   Lab 21  1603   INR 1.02     LFTs  Recent Labs   Lab 21  1603   ALKPHOS 69   AST 21   ALT 18   BILITOTAL 0.4   PROTTOTAL 7.2   ALBUMIN 3.8      PANCNo lab results found in last 7 days.    Imagin2021 CTA ab pelvis    IMPRESSION:   1. No GI bleed, retroperitoneal hemorrhage or evidence of active  extravasation.  2. Colonic diverticulosis without evidence of diverticulitis.  3. Numerous hypoattenuating hepatic lesions, not significantly changed  from 2021. Based on prior imaging, many of these  appear to  represent hemangiomas.     I have personally reviewed the examination and initial interpretation  and I agree with the findings.     MITA DAMICO MD

## 2024-10-24 NOTE — PROGRESS NOTES
Ms. Patrick Olson was scheduled for bilateral superior cluneal nerve blocks today, 10/24/2024.  Ms. Patrick Olson stated that she had an infected tooth.  Her injection today will be rescheduled for a future date.    Donald Coombs MD

## 2024-10-24 NOTE — LETTER
10/24/2024      Patrick Olson  [address] [id number]      Dear Colleague,    Thank you for referring your patient, Patrick Olson, to the [facility]. Please see a copy of my visit note below.    Ms. Patrick Olson was scheduled for bilateral superior cluneal nerve blocks today, 10/24/2024.  Ms. Patrick Olson stated that she had an infected tooth.  Her injection today will be rescheduled for a future date.    Donald Coombs MD      Again, thank you for allowing me to participate in the care of your patient.        Sincerely,        Donald Coombs MD

## 2024-10-25 NOTE — TELEPHONE ENCOUNTER
RECORDS RECEIVED FROM: Internal    REASON FOR VISIT: Peripheral Nerve   PROVIDER: Misael Justice MD   DATE OF APPT: 11/5/24 @ 11:20 am    NOTES (FOR ALL VISITS) STATUS DETAILS   OFFICE NOTE from referring provider Internal 10/24/24, 9/24/24, 7/25/24 Donald Coombs MD @[facility]    See Additional Encounters    OFFICE NOTE from other specialist Internal 9/12/24 Magda Soliz MD @[facility]    7/30/24 Marisela Suarez, Loli King PA-C @[facility]    6/6/24 [facility]     MEDICATION LIST Internal    IMAGING  (FOR ALL VISITS)     X-RAY Internal [facility]  9/24/24 XR Pelvis & Hip  9/12/24 XR Cervical Spine  7/22/24 XR Spine Complete  7/22/24 XR Lumbar Flex/Ext     MRI (HEAD, NECK, SPINE) Internal [facility]  3/9/23 MR Lumbar Spine

## 2024-10-31 ENCOUNTER — OFFICE VISIT (OUTPATIENT)
Dept: VASCULAR SURGERY | Facility: CLINIC | Age: 84
End: 2024-10-31
Payer: COMMERCIAL

## 2024-10-31 ENCOUNTER — LAB (OUTPATIENT)
Dept: LAB | Facility: CLINIC | Age: 84
End: 2024-10-31
Payer: COMMERCIAL

## 2024-10-31 DIAGNOSIS — I83.813 VARICOSE VEINS OF BOTH LOWER EXTREMITIES WITH PAIN: Primary | ICD-10-CM

## 2024-10-31 DIAGNOSIS — D72.819 CHRONIC LEUKOPENIA: ICD-10-CM

## 2024-10-31 DIAGNOSIS — D50.0 IRON DEFICIENCY ANEMIA DUE TO CHRONIC BLOOD LOSS: ICD-10-CM

## 2024-10-31 DIAGNOSIS — D63.8 ANEMIA OF CHRONIC DISEASE: ICD-10-CM

## 2024-10-31 LAB
BASOPHILS # BLD AUTO: 0 10E3/UL (ref 0–0.2)
BASOPHILS NFR BLD AUTO: 1 %
EOSINOPHIL # BLD AUTO: 0.1 10E3/UL (ref 0–0.7)
EOSINOPHIL NFR BLD AUTO: 2 %
ERYTHROCYTE [DISTWIDTH] IN BLOOD BY AUTOMATED COUNT: 12.9 % (ref 10–15)
HCT VFR BLD AUTO: 35.7 % (ref 35–47)
HGB BLD-MCNC: 11.2 G/DL (ref 11.7–15.7)
IMM GRANULOCYTES # BLD: 0 10E3/UL
IMM GRANULOCYTES NFR BLD: 0 %
LYMPHOCYTES # BLD AUTO: 1 10E3/UL (ref 0.8–5.3)
LYMPHOCYTES NFR BLD AUTO: 24 %
MCH RBC QN AUTO: 31.5 PG (ref 26.5–33)
MCHC RBC AUTO-ENTMCNC: 31.4 G/DL (ref 31.5–36.5)
MCV RBC AUTO: 101 FL (ref 78–100)
MONOCYTES # BLD AUTO: 0.4 10E3/UL (ref 0–1.3)
MONOCYTES NFR BLD AUTO: 9 %
NEUTROPHILS # BLD AUTO: 2.5 10E3/UL (ref 1.6–8.3)
NEUTROPHILS NFR BLD AUTO: 63 %
PLATELET # BLD AUTO: 202 10E3/UL (ref 150–450)
RBC # BLD AUTO: 3.55 10E6/UL (ref 3.8–5.2)
WBC # BLD AUTO: 4 10E3/UL (ref 4–11)

## 2024-10-31 PROCEDURE — 82728 ASSAY OF FERRITIN: CPT

## 2024-10-31 PROCEDURE — 83550 IRON BINDING TEST: CPT

## 2024-10-31 PROCEDURE — 36415 COLL VENOUS BLD VENIPUNCTURE: CPT

## 2024-10-31 PROCEDURE — 80053 COMPREHEN METABOLIC PANEL: CPT

## 2024-10-31 PROCEDURE — 85025 COMPLETE CBC W/AUTO DIFF WBC: CPT

## 2024-10-31 PROCEDURE — 82607 VITAMIN B-12: CPT

## 2024-10-31 PROCEDURE — 99213 OFFICE O/P EST LOW 20 MIN: CPT | Performed by: SURGERY

## 2024-10-31 PROCEDURE — 83540 ASSAY OF IRON: CPT

## 2024-10-31 NOTE — LETTER
10/31/2024      Patrick Olson  [address] [id number]      Dear Colleague,    Thank you for referring your patient, Patrick Olson, to the [facility] [place]. Please see a copy of my visit note below.    Mrs. Olson was asked by her spine surgeon to make sure that the groin pain on the left side is not due to any arterial or venous problem.  She also reports coldness in the legs and feet at night.    She does have varicose veins in both lower extremities for which she wears compression stockings.    She has palpable dorsalis pedis and posterior tibial pulses bilaterally.  I do not see any varicose vein in the left groin.  The femoral pulses easily palpable.  I do not see any bulges in either groin.    I have plenty fully reassured her that her arterial system is intact.  Varicose veins         Again, thank you for allowing me to participate in the care of your patient.        Sincerely,        Fei Rodarte MD

## 2024-10-31 NOTE — PROGRESS NOTES
Mrs. Olson was asked by her spine surgeon to make sure that the groin pain on the left side is not due to any arterial or venous problem.  She also reports coldness in the legs and feet at night.    She does have varicose veins in both lower extremities for which she wears compression stockings.    She has palpable dorsalis pedis and posterior tibial pulses bilaterally.  I do not see any varicose vein in the left groin.  The femoral pulses easily palpable.  I do not see any bulges in either groin.    I have plenty fully reassured her that her arterial system is intact.  Varicose veins

## 2024-11-01 LAB
ALBUMIN SERPL BCG-MCNC: 4.2 G/DL (ref 3.5–5.2)
ALP SERPL-CCNC: 83 U/L (ref 40–150)
ALT SERPL W P-5'-P-CCNC: 30 U/L (ref 0–50)
ANION GAP SERPL CALCULATED.3IONS-SCNC: 10 MMOL/L (ref 7–15)
AST SERPL W P-5'-P-CCNC: 40 U/L (ref 0–45)
BILIRUB SERPL-MCNC: 0.3 MG/DL
BUN SERPL-MCNC: 26.4 MG/DL (ref 8–23)
CALCIUM SERPL-MCNC: 9.8 MG/DL (ref 8.8–10.4)
CHLORIDE SERPL-SCNC: 105 MMOL/L (ref 98–107)
CREAT SERPL-MCNC: 0.96 MG/DL (ref 0.51–0.95)
EGFRCR SERPLBLD CKD-EPI 2021: 59 ML/MIN/1.73M2
FERRITIN SERPL-MCNC: 34 NG/ML (ref 11–328)
GLUCOSE SERPL-MCNC: 95 MG/DL (ref 70–99)
HCO3 SERPL-SCNC: 27 MMOL/L (ref 22–29)
IRON BINDING CAPACITY (ROCHE): 343 UG/DL (ref 240–430)
IRON SATN MFR SERPL: 22 % (ref 15–46)
IRON SERPL-MCNC: 77 UG/DL (ref 37–145)
POTASSIUM SERPL-SCNC: 4.7 MMOL/L (ref 3.4–5.3)
PROT SERPL-MCNC: 7 G/DL (ref 6.4–8.3)
SODIUM SERPL-SCNC: 142 MMOL/L (ref 135–145)
VIT B12 SERPL-MCNC: 1092 PG/ML (ref 232–1245)

## 2024-11-01 NOTE — PROGRESS NOTES
Virtual Visit Details    Type of service:  Video Visit   Video Start Time: 2:19 PM  Video End Time: 2:35 PM    Originating Location (pt. Location): Home    Distant Location (provider location):  On-site  Platform used for Video Visit: [facility]    Hematology/Oncology Established Patient Follow-up Note      Today's Date: 11/7/2024    Reason for Follow-up:  Iron deficiency anemia.    HISTORY OF PRESENT ILLNESS: Patrick Olson is an 83 year old female with rheumatoid arthritis on methotrexate who presents with iron deficiency anemia. She has history of gastric AVM, small bowel AVM, cecal polyp, duodenal erosions, and diverticular hemorrhage.    She had an EGD, colonoscopy, and VCE in 1/2023 that showed duodenal erosioons, small bowel angiectasia, and diverticulosis without bleeding. She was started on omeprazole and is following with GI at [facility].    2/7/2023 Labs showed Hgb 11.1, WBC 3.2, platelets 247,000, creatinine 1.03, vitamin B12 elevated at 1434. 3/7/2023 Ferritin was normal at 96, iron elevated at 163, TIBC 283, iron saturation index elevated at 58. 3/27/2023 TSH normal at 1.13.     7/15/2024-7/21/2024: Hospitalized at [facility] for rectal bleeding, Hgb 6.8. Flexible sigmoidoscopy showed no source of bleed. CT was negative for active arterial GI bleed.      INTERVAL HISTORY:  Patrick reports no recurrent hematochezia or melena since her 7/2024 hospitalization.      REVIEW OF SYSTEMS:   14 point ROS was reviewed and is negative other than as noted above in HPI.       HOME MEDICATIONS:  Current Outpatient Medications   Medication Sig Dispense Refill    acetaminophen (TYLENOL) 325 MG tablet Take 2 tablets (650 mg) by mouth every 6 hours      artificial saliva (BIOTENE DRY MOUTHWASH) LIQD liquid Swish and spit in mouth 4 times daily as needed for dry mouth      calcium carbonate-vitamin D (CALTRATE) 600-10 MG-MCG per tablet Take 1 tablet by mouth daily      carboxymethylcellulose PF  (REFRESH PLUS) 0.5 % ophthalmic solution Place 1 drop into both eyes 3 times daily as needed for dry eyes      clobetasol (TEMOVATE) 0.05 % external ointment Apply topically 2 times daily To right ankle as needed 60 g 3    EPINEPHrine (ANY BX GENERIC EQUIV) 0.3 MG/0.3ML injection 2-pack Inject 0.3 mg into the muscle as needed for anaphylaxis      famotidine (PEPCID) 20 MG tablet Take 20 mg by mouth 2 times daily.      gabapentin (NEURONTIN) 300 MG capsule Take 600 mg by mouth at bedtime      hydroxychloroquine (PLAQUENIL) 200 MG tablet Take 1 tablet (200 mg) by mouth daily 90 tablet 1    lifitegrast (XIIDRA) 5 % opthalmic solution Place 1 drop into both eyes 2 times daily      mirtazapine (REMERON) 7.5 MG tablet Take 1 tablet (7.5 mg) by mouth at bedtime. 30 tablet 3    Multiple Vitamins-Minerals (OCUVITE PRESERVISION PO) Take 1 tablet by mouth 2 times daily      pantoprazole (PROTONIX) 40 MG EC tablet Take 1 tablet (40 mg) by mouth daily 30 tablet 11    UNABLE TO FIND MEDICATION NAME: V (vein formular)           ALLERGIES:  Allergies   Allergen Reactions    Bee Venom Anaphylaxis    Shrimp Anaphylaxis    Evoxac [Cevimeline] Unknown    Prevnar Swelling     Other reaction(s): Edema    Prevnar [Pneumococcal 13-Linda Conj Vacc] Unknown         PAST MEDICAL HISTORY:  Past Medical History:   Diagnosis Date    Anemia     CKD (chronic kidney disease) stage 3, GFR 30-59 ml/min (H)     Diverticulosis of large intestine     Osteoarthritis     Osteoporosis     Rheumatoid arthritis (H)     Sensorineural hearing loss     Varicose veins of bilateral lower extremities with other complications          PAST SURGICAL HISTORY:  Past Surgical History:   Procedure Laterality Date    CAPSULE/PILL CAM ENDOSCOPY N/A 11/18/2021    Procedure: IMAGING PROCEDURE, GI TRACT, INTRALUMINAL, VIA CAPSULE;  Surgeon: Deric Rodriguez MD;  Location:  GI    CAPSULE/PILL CAM ENDOSCOPY N/A 2/14/2023    Procedure: IMAGING PROCEDURE, GI TRACT,  INTRALUMINAL, VIA CAPSULE;  Surgeon: Jaime Mesa MD;  [facility]    COLONOSCOPY N/A 03/14/2017    Procedure: COMBINED COLONOSCOPY, SINGLE OR MULTIPLE BIOPSY/POLYPECTOMY BY BIOPSY;  Surgeon: Spencer Downey MD;  [facility]: [facility]    COLONOSCOPY N/A 9/22/2022    Procedure: COLONOSCOPY, FLEXIBLE, WITH LESION REMOVAL USING SNARE;  Surgeon: Kirby Arthur MD;  Location:  GI    COLONOSCOPY N/A 1/13/2023    Procedure: COLONOSCOPY;  Surgeon: Spencer Downey MD;  Location: [facility] OR    COLONOSCOPY N/A 8/14/2023    Procedure: Colonoscopy;  Surgeon: Specner Downey MD;  Location: [facility]    ENTEROSCOPY SMALL BOWEL N/A 11/20/2021    Procedure: ENTEROSCOPY, colonoscopy with endoscopic mucosal resection and tattoo placement;  Surgeon: Kirby Arthur MD;  [facility]    ESOPHAGOSCOPY, GASTROSCOPY, DUODENOSCOPY (EGD), COMBINED N/A 2/6/2023    Procedure: ESOPHAGOGASTRODUODENOSCOPY (EGD);  Surgeon: Spencer Downey MD;  Location: [facility]    IR VISCERAL EMBOLIZATION  01/22/2021    ORTHOPEDIC SURGERY Left     hip replacment    SIGMOIDOSCOPY FLEXIBLE N/A 01/23/2021    Procedure: SIGMOIDOSCOPY, FLEXIBLE;  Surgeon: Jaime Steinberg MD;  Location: [facility]    SIGMOIDOSCOPY FLEXIBLE N/A 7/17/2024    Procedure: Sigmoidoscopy flexible;  Surgeon: Ritika Woodard MD;  Location: [facility]         SOCIAL HISTORY:  Social History     Socioeconomic History    Marital status:      Spouse name: Not on file    Number of children: Not on file    Years of education: Not on file    Highest education level: Not on file   Occupational History    Not on file   Tobacco Use    Smoking status: Former    Smokeless tobacco: Never   Vaping Use    Vaping status: Never Used   Substance and Sexual Activity    Alcohol use: No    Drug use: No    Sexual activity: Not on file   Other Topics Concern    Not on file   Social History Narrative    - Retired (formerly employed as  at 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[employer])     Social Drivers of Health     Financial Resource Strain: Low Risk  (9/12/2024)    Financial Resource Strain     Within the past 12 months, have you or your family members you live with been unable to get utilities (heat, electricity) when it was really needed?: No   Food Insecurity: Low Risk  (9/12/2024)    Food Insecurity     Within the past 12 months, did you worry that your food would run out before you got money to buy more?: No     Within the past 12 months, did the food you bought just not last and you didn t have money to get more?: No   Transportation Needs: Low Risk  (9/12/2024)    Transportation Needs     Within the past 12 months, has lack of transportation kept you from medical appointments, getting your medicines, non-medical meetings or appointments, work, or from getting things that you need?: No   Physical Activity: Sufficiently Active (9/12/2024)    Exercise Vital Sign     Days of Exercise per Week: 5 days     Minutes of Exercise per Session: 30 min   Stress: No Stress Concern Present (9/12/2024)    [place] [employer] - Occupational Stress Questionnaire     Feeling of Stress : Only a little   Social Connections: Unknown (9/12/2024)    Social Connection and Isolation Panel [NHANES]     Frequency of Communication with Friends and Family: Not on file     Frequency of Social Gatherings with Friends and Family: Once a week     Attends [employer] Services: Not on file     Active Member of Clubs or Organizations: Not on file     Attends Club or Organization Meetings: Not on file     Marital Status: Not on file   Interpersonal Safety: Low Risk  (9/12/2024)    Interpersonal Safety     Do you feel physically and emotionally safe where you currently live?: Yes     Within the past 12 months, have you been hit, slapped, kicked or otherwise physically hurt by someone?: No     Within the past 12 months, have you been  "humiliated or emotionally abused in other ways by your partner or ex-partner?: No   Housing Stability: Low Risk  (9/12/2024)    Housing Stability     Do you have housing? : Yes     Are you worried about losing your housing?: No         FAMILY HISTORY:  No family history on file.      PHYSICAL EXAM:  Vital signs:  Ht 1.6 m (5' 3\")   Wt 41.3 kg (91 lb)   BMI 16.12 kg/m     GENERAL: No acute distress.  EYES: No scleral icterus. No overt erythema.  RESPIRATORY: No audible cough, wheezing, or labored breathing.  MUSCULOSKELETAL: Range of motion in the neck, shoulders, and arms appear normal.  SKIN: No overt rashes, discolorations, or lesions over the face and neck.  NEUROLOGIC: Alert.  No overt tremors.  PSYCHIATRIC: Normal affect and mood.  Does not appear anxious.      LABS:  CBC RESULTS:   Recent Labs   Lab Test 10/31/24  1356   WBC 4.0   RBC 3.55*   HGB 11.2*   HCT 35.7   *   MCH 31.5   MCHC 31.4*   RDW 12.9           Last Comprehensive Metabolic Panel:  Sodium   Date Value Ref Range Status   10/31/2024 142 135 - 145 mmol/L Final   01/23/2021 143 133 - 144 mmol/L Final     Potassium   Date Value Ref Range Status   10/31/2024 4.7 3.4 - 5.3 mmol/L Final   09/29/2022 4.7 3.4 - 5.3 mmol/L Final   01/23/2021 3.6 3.4 - 5.3 mmol/L Final     Chloride   Date Value Ref Range Status   10/31/2024 105 98 - 107 mmol/L Final   01/23/2021 114 (H) 94 - 109 mmol/L Final     Chloride (External)   Date Value Ref Range Status   10/24/2022 104 94 - 109 mmol/L Final     Carbon Dioxide   Date Value Ref Range Status   01/23/2021 26 20 - 32 mmol/L Final     Carbon Dioxide (CO2)   Date Value Ref Range Status   10/31/2024 27 22 - 29 mmol/L Final   09/29/2022 29 20 - 32 mmol/L Final     Anion Gap   Date Value Ref Range Status   10/31/2024 10 7 - 15 mmol/L Final   09/29/2022 3 3 - 14 mmol/L Final   01/23/2021 3 3 - 14 mmol/L Final     Glucose   Date Value Ref Range Status   10/31/2024 95 70 - 99 mg/dL Final   09/29/2022 100 (H) 70 " - 99 mg/dL Final   01/23/2021 157 (H) 70 - 99 mg/dL Final     GLUCOSE BY METER POCT   Date Value Ref Range Status   07/19/2024 113 (H) 70 - 99 mg/dL Final     Urea Nitrogen   Date Value Ref Range Status   10/31/2024 26.4 (H) 8.0 - 23.0 mg/dL Final   09/29/2022 21 7 - 30 mg/dL Final   01/23/2021 6 (L) 7 - 30 mg/dL Final     Creatinine   Date Value Ref Range Status   10/31/2024 0.96 (H) 0.51 - 0.95 mg/dL Final   01/23/2021 0.75 0.52 - 1.04 mg/dL Final     GFR Estimate   Date Value Ref Range Status   10/31/2024 59 (L) >60 mL/min/1.73m2 Final     Comment:     eGFR calculated using 2021 CKD-EPI equation.   01/23/2021 76 >60 mL/min/[1.73_m2] Final     Comment:     Non  GFR Calc  Starting 12/18/2018, serum creatinine based estimated GFR (eGFR) will be   calculated using the Chronic Kidney Disease Epidemiology Collaboration   (CKD-EPI) equation.       Calcium   Date Value Ref Range Status   10/31/2024 9.8 8.8 - 10.4 mg/dL Final     Comment:     Reference intervals for this test were updated on 7/16/2024 to reflect our healthy population more accurately. There may be differences in the flagging of prior results with similar values performed with this method. Those prior results can be interpreted in the context of the updated reference intervals.   01/23/2021 8.0 (L) 8.5 - 10.1 mg/dL Final     Bilirubin Total   Date Value Ref Range Status   10/31/2024 0.3 <=1.2 mg/dL Final   03/13/2017 0.5 0.2 - 1.3 mg/dL Final     Alkaline Phosphatase   Date Value Ref Range Status   10/31/2024 83 40 - 150 U/L Final   03/13/2017 77 40 - 150 U/L Final     ALT   Date Value Ref Range Status   10/31/2024 30 0 - 50 U/L Final   03/13/2017 12 0 - 50 U/L Final     AST   Date Value Ref Range Status   10/31/2024 40 0 - 45 U/L Final   03/13/2017 10 0 - 45 U/L Final     Ferritin   Date Value Ref Range Status   10/31/2024 34 11 - 328 ng/mL Final     Iron   Date Value Ref Range Status   10/31/2024 77 37 - 145 ug/dL Final     Iron Binding  Capacity   Date Value Ref Range Status   10/31/2024 343 240 - 430 ug/dL Final   03/10/2022 296 240 - 430 ug/dL Final       10/4/2023  Sed rate elevated at 48  CRP undetectable.    PATHOLOGY:  None relevant.    IMAGING:  None relevant.    ASSESSMENT/PLAN:  Patrick Olson is a 83 year old female with the following issues:  1. Macrocytic anemia due to iron deficiency/blood loss, drug effect, of chronic disease  2. History of gastric and small bowel AVM, duodenal erosions, diverticular hemorrhage  3. Chronic intermittent leukopenia  --Carmelitas anemia is of multifactorial etiology, including blood loss, iron deficiency, drug effect from Plaquenil, and anemia of chronic disease.  --Discussed her 10/31/2024 labs showed Hgb improved to 11.2, still macrocytic anemia.  Prior SPEP showed no monoclonal protein. Iron studies show ferritin 34, normal serum iron, TIBC and iron sat index, so she does not need to take oral iron supplement at this time. Vitamin B12 is normal at 1092.  --She will continue with intermittent GI follow-up. She had colonoscopy 8/2023 that showed multiple diverticula in colon, residual blood; no active bleeding.  --She will continue to watch closely for hematochezia or melena.  --No indication for bone marrow biopsy unless significant change in her blood counts.  --Discussed her Hgb is too high to meet criteria for giving an MANDY.  --Discussed her intermittent leukopenia is likely due to use of Plaquenil.  --She can have CBC checked as needed by her PCP and rheumatologist.    4. Rheumatoid arthritis  --She is no longer on methotrexate and now on Plaquenil.  She follows with rheumatology.    5. Weight loss  --She feels she has normal appetite.  --Lost 10 pounds since 10/2022.  --Likely related to her chronic diseases.  --She is on mirtazapine with improvement.    No further hematology follow-up will be scheduled but happy to see her as needed.    Shantal Brown MD  [facility] Hematology/Oncology      Total time spent today: 30 minutes in chart review, patient evaluation, counseling, documentation, test and/or medication/prescription orders, and coordination of care.

## 2024-11-04 ENCOUNTER — OFFICE VISIT (OUTPATIENT)
Dept: RHEUMATOLOGY | Facility: CLINIC | Age: 84
End: 2024-11-04
Attending: STUDENT IN AN ORGANIZED HEALTH CARE EDUCATION/TRAINING PROGRAM
Payer: COMMERCIAL

## 2024-11-04 VITALS
WEIGHT: 92 LBS | DIASTOLIC BLOOD PRESSURE: 74 MMHG | HEART RATE: 71 BPM | OXYGEN SATURATION: 100 % | SYSTOLIC BLOOD PRESSURE: 127 MMHG | BODY MASS INDEX: 16.3 KG/M2

## 2024-11-04 DIAGNOSIS — M06.9 RHEUMATOID ARTHRITIS INVOLVING MULTIPLE SITES, UNSPECIFIED WHETHER RHEUMATOID FACTOR PRESENT (H): Primary | ICD-10-CM

## 2024-11-04 PROCEDURE — G0463 HOSPITAL OUTPT CLINIC VISIT: HCPCS | Performed by: INTERNAL MEDICINE

## 2024-11-04 PROCEDURE — 99215 OFFICE O/P EST HI 40 MIN: CPT | Performed by: INTERNAL MEDICINE

## 2024-11-04 NOTE — NURSING NOTE
Chief Complaint   Patient presents with    Consult     /74 (BP Location: Right arm, Patient Position: Sitting, Cuff Size: Adult Regular)   Pulse 71   Wt 41.7 kg (92 lb)   SpO2 100%   BMI 16.30 kg/m

## 2024-11-04 NOTE — PROGRESS NOTES
Follow-up visit for seropositive rheumatoid arthritis.  Previously she was a patient of Dr. Mix.    Patrick is 83 years old and she has a longstanding history of seropositive rheumatoid arthritis.    She was maintained on methotrexate for many years but then she dropped her hemoglobin and white count and she was switched to hydroxychloroquine.  She has been very well on just 200 mg daily.  She has not had any recent flare of her rheumatoid arthritis.  In fact she cannot recall the last flare of her rheumatoid arthritis.  The reason for her visit today is that she is planned to have a tooth extraction in 2 weeks and she is planning to have a bone graft at another site in her jaw in January 2025.  She wonders if she needs to stop the hydroxychloroquine as it could potentially affect bone healing.    She had an episode of diverticulitis and lost quite a bit of blood per rectum requiring a blood transfusion.    Past medical history  Seropositive rheumatoid arthritis  Sjogren syndrome  Macrocytic anemia  Chronic kidney disease stage IIIa  Varicose veins  Secondary osteoarthritis of the right wrist  Left total hip replacement  Osteopenia    Physical examination  Vital signs are normal and her BMI is 16  Joint examination shows that she has no active synovitis in the hands wrist elbows or shoulders with full range of motion, with the exception of the right wrist that shows severe reduced flexion and extension.  Lower extremity exam shows no active synovitis.    I reviewed the images of the right hand x-ray that was performed in 2023 that shows severe osteoarthritis of the right wrist but otherwise no erosive signs of rheumatoid arthritis.  I reviewed her chart but could not find a positive rheumatoid factor or a positive CCP antibody even though she is labeled as seropositive rheumatoid arthritis.    I reviewed her recent blood work that were essentially all normal    Impression/plan  1.  Prior diagnosis of seropositive  rheumatoid arthritis that has been well-controlled on just 200 mg of Plaquenil daily.  She has yearly eye exams and the last one was done March 2024.  2.  She is anticipating a tooth extraction as well as a bone graft.  The 2022 ACR guidelines for perioperative management of DMARDs lists hydroxychloroquine is a medication that is not recommended to be stopped prior to surgery.  However if her surgeon wants to stop the hydroxychloroquine, I recommended to stop it 2 weeks before and restart 2 weeks after surgery.  3.  She had blood work done 4 days ago that I have reviewed with her.  I have added onto this blood work a CRP, CCP and rheumatoid factor.  4.  X-ray of the right wrist does not show any evidence for true erosive disease but she does have significant osteoarthritis of the right wrist, but the etiology of this is unclear at this time.  5.  Follow-up in 6 months or earlier needed.    40 minutes spent on the date of the encounter doing chart review, history and exam, documentation and further activities per the note.

## 2024-11-04 NOTE — LETTER
11/4/2024       RE: Patrick Olson  [address] [facility] [address]     Dear Colleague,    Thank you for referring your patient, Patrick Olson, to the [facility] RHEUMATOLOGY at [facility]. Please see a copy of my visit note below.    Follow-up visit for seropositive rheumatoid arthritis.  Previously she was a patient of Dr. Mix.    Patrick is 83 years old and she has a longstanding history of seropositive rheumatoid arthritis.    She was maintained on methotrexate for many years but then she dropped her hemoglobin and white count and she was switched to hydroxychloroquine.  She has been very well on just 200 mg daily.  She has not had any recent flare of her rheumatoid arthritis.  In fact she cannot recall the last flare of her rheumatoid arthritis.  The reason for her visit today is that she is planned to have a tooth extraction in 2 weeks and she is planning to have a bone graft at another site in her jaw in January 2025.  She wonders if she needs to stop the hydroxychloroquine as it could potentially affect bone healing.    She had an episode of diverticulitis and lost quite a bit of blood per rectum requiring a blood transfusion.    Past medical history  Seropositive rheumatoid arthritis  Sjogren syndrome  Macrocytic anemia  Chronic kidney disease stage IIIa  Varicose veins  Secondary osteoarthritis of the right wrist  Left total hip replacement  Osteopenia    Physical examination  Vital signs are normal and her BMI is 16  Joint examination shows that she has no active synovitis in the hands wrist elbows or shoulders with full range of motion, with the exception of the right wrist that shows severe reduced flexion and extension.  Lower extremity exam shows no active synovitis.    I reviewed the images of the right hand x-ray that was performed in 2023 that shows severe osteoarthritis of the right wrist but otherwise no erosive signs of  rheumatoid arthritis.  I reviewed her chart but could not find a positive rheumatoid factor or a positive CCP antibody even though she is labeled as seropositive rheumatoid arthritis.    I reviewed her recent blood work that were essentially all normal    Impression/plan  1.  Prior diagnosis of seropositive rheumatoid arthritis that has been well-controlled on just 200 mg of Plaquenil daily.  She has yearly eye exams and the last one was done March 2024.  2.  She is anticipating a tooth extraction as well as a bone graft.  The 2022 ACR guidelines for perioperative management of DMARDs lists hydroxychloroquine is a medication that is not recommended to be stopped prior to surgery.  However if her surgeon wants to stop the hydroxychloroquine, I recommended to stop it 2 weeks before and restart 2 weeks after surgery.  3.  She had blood work done 4 days ago that I have reviewed with her.  I have added onto this blood work a CRP, CCP and rheumatoid factor.  4.  X-ray of the right wrist does not show any evidence for true erosive disease but she does have significant osteoarthritis of the right wrist, but the etiology of this is unclear at this time.  5.  Follow-up in 6 months or earlier needed.    40 minutes spent on the date of the encounter doing chart review, history and exam, documentation and further activities per the note.       Again, thank you for allowing me to participate in the care of your patient.      Sincerely,    Raul Faria MD

## 2024-11-05 ENCOUNTER — OFFICE VISIT (OUTPATIENT)
Dept: NEUROSURGERY | Facility: CLINIC | Age: 84
End: 2024-11-05
Attending: PHYSICAL MEDICINE & REHABILITATION
Payer: COMMERCIAL

## 2024-11-05 ENCOUNTER — PRE VISIT (OUTPATIENT)
Dept: NEUROSURGERY | Facility: CLINIC | Age: 84
End: 2024-11-05

## 2024-11-05 VITALS
HEIGHT: 63 IN | OXYGEN SATURATION: 98 % | DIASTOLIC BLOOD PRESSURE: 71 MMHG | SYSTOLIC BLOOD PRESSURE: 121 MMHG | BODY MASS INDEX: 16.12 KG/M2 | WEIGHT: 91 LBS | HEART RATE: 76 BPM

## 2024-11-05 DIAGNOSIS — G58.8 CLUNEAL NEUROPATHY: ICD-10-CM

## 2024-11-05 PROCEDURE — 99203 OFFICE O/P NEW LOW 30 MIN: CPT | Performed by: NEUROLOGICAL SURGERY

## 2024-11-05 NOTE — PROGRESS NOTES
I had the opportunity to see Ms. Olson today in my clinic.  She is an 83-year-old woman with history of left hip replacement for the past several years she has been having increasing low back pain that had radiated down into her buttock region and anterior thigh.  She had injections in her spine which had not given her any relief.  However Dr. Coombs has injected her cluneal nerves and this is giving her 75% relief.  Her pain is on both sides and for approximately 4 to 6 weeks she had relief from her symptoms.  The reason she was referred to me was for possible cluneal nerve resection    On examination her point tenderness is below her spine.  It is on both sides.  However it does not appear to be high enough to be in the sacroiliac joint.    I told her that it would be very difficult for a cluneal nerve avulsion for relief in this region.  The nerve typically run more cephalad.  However she may be a candidate for rhizolysis at a later time by her pain service.  She will be undergoing another injection with Dr. Coombs next month.  He was able to perform this under ultrasound guidance with success.  If it was not to be permanent, we will then set her up with our pain service for potential rhizolysis.  Thank you very much for the consultation.

## 2024-11-05 NOTE — LETTER
11/5/2024       RE: Patrick [facility]  [address] [facility] [id number]     Dear Colleague,    Thank you for referring your patient, Patrick Olson, to the [facility] [place] at [facility]. Please see a copy of my visit note below.    I had the opportunity to see Ms. Olson today in my clinic.  She is an 83-year-old woman with history of left hip replacement for the past several years she has been having increasing low back pain that had radiated down into her buttock region and anterior thigh.  She had injections in her spine which had not given her any relief.  However Dr. Coombs has injected her cluneal nerves and this is giving her 75% relief.  Her pain is on both sides and for approximately 4 to 6 weeks she had relief from her symptoms.  The reason she was referred to me was for possible cluneal nerve resection    On examination her point tenderness is below her spine.  It is on both sides.  However it does not appear to be high enough to be in the sacroiliac joint.    I told her that it would be very difficult for a cluneal nerve avulsion for relief in this region.  The nerve typically run more cephalad.  However she may be a candidate for rhizolysis at a later time by her pain service.  She will be undergoing another injection with Dr. Coombs next month.  He was able to perform this under ultrasound guidance with success.  If it was not to be permanent, we will then set her up with our pain service for potential rhizolysis.  Thank you very much for the consultation.      Again, thank you for allowing me to participate in the care of your patient.      Sincerely,    Misael Justice MD

## 2024-11-05 NOTE — PATIENT INSTRUCTIONS
Thank you for choosing [facility]. You were seen in the Neurosurgery Clinic today with Dr. Justice.    Follow up with your provider who does your injections  Let us know if you need a pain referral    Please don't hesitate to reach out via MyChart or telephone if you have any questions after your visit.    Rosana Simmons RN  RN Care Coordinator, Neurosurgery    Neurosurgery Clinic: [phone]

## 2024-11-06 DIAGNOSIS — G58.8 CLUNEAL NEUROPATHY: Primary | ICD-10-CM

## 2024-11-06 NOTE — PROGRESS NOTES
For Ms. Patrick Olson, placed orders for ultrasound-guided bilateral superior cluneal nerve injections for bilateral superior cluneal neuropathy.    Donald Coombs MD

## 2024-11-07 ENCOUNTER — VIRTUAL VISIT (OUTPATIENT)
Dept: ONCOLOGY | Facility: CLINIC | Age: 84
End: 2024-11-07
Attending: INTERNAL MEDICINE
Payer: COMMERCIAL

## 2024-11-07 VITALS — BODY MASS INDEX: 16.12 KG/M2 | WEIGHT: 91 LBS | HEIGHT: 63 IN

## 2024-11-07 DIAGNOSIS — D63.8 ANEMIA OF CHRONIC DISEASE: Primary | ICD-10-CM

## 2024-11-07 DIAGNOSIS — D72.819 CHRONIC LEUKOPENIA: ICD-10-CM

## 2024-11-07 DIAGNOSIS — D50.0 IRON DEFICIENCY ANEMIA DUE TO CHRONIC BLOOD LOSS: ICD-10-CM

## 2024-11-07 PROCEDURE — 99214 OFFICE O/P EST MOD 30 MIN: CPT | Mod: 95 | Performed by: INTERNAL MEDICINE

## 2024-11-07 ASSESSMENT — PAIN SCALES - GENERAL: PAINLEVEL_OUTOF10: MODERATE PAIN (4)

## 2024-11-07 NOTE — LETTER
11/7/2024      Patrick Olson  [address] [place] [facility] [address]      Dear Colleague,    Thank you for referring your patient, Patrick Olson, to the [facility] [place]. Please see a copy of my visit note below.    Virtual Visit Details    Type of service:  Video Visit   Video Start Time: 2:19 PM  Video End Time: 2:35 PM    Originating Location (pt. Location): Home    Distant Location (provider location):  On-site  Platform used for Video Visit: [facility]    Hematology/Oncology Established Patient Follow-up Note      Today's Date: 11/7/2024    Reason for Follow-up:  Iron deficiency anemia.    HISTORY OF PRESENT ILLNESS: Patrick Olson is an 83 year old female with rheumatoid arthritis on methotrexate who presents with iron deficiency anemia. She has history of gastric AVM, small bowel AVM, cecal polyp, duodenal erosions, and diverticular hemorrhage.    She had an EGD, colonoscopy, and VCE in 1/2023 that showed duodenal erosioons, small bowel angiectasia, and diverticulosis without bleeding. She was started on omeprazole and is following with GI at [facility].    2/7/2023 Labs showed Hgb 11.1, WBC 3.2, platelets 247,000, creatinine 1.03, vitamin B12 elevated at 1434. 3/7/2023 Ferritin was normal at 96, iron elevated at 163, TIBC 283, iron saturation index elevated at 58. 3/27/2023 TSH normal at 1.13.     7/15/2024-7/21/2024: Hospitalized at [facility] for rectal bleeding, Hgb 6.8. Flexible sigmoidoscopy showed no source of bleed. CT was negative for active arterial GI bleed.      INTERVAL HISTORY:  Patrick reports no recurrent hematochezia or melena since her 7/2024 hospitalization.      REVIEW OF SYSTEMS:   14 point ROS was reviewed and is negative other than as noted above in HPI.       HOME MEDICATIONS:  Current Outpatient Medications   Medication Sig Dispense Refill     acetaminophen (TYLENOL) 325 MG tablet Take 2 tablets (650 mg) by mouth every 6 hours        artificial saliva (BIOTENE DRY MOUTHWASH) LIQD liquid Swish and spit in mouth 4 times daily as needed for dry mouth       calcium carbonate-vitamin D (CALTRATE) 600-10 MG-MCG per tablet Take 1 tablet by mouth daily       carboxymethylcellulose PF (REFRESH PLUS) 0.5 % ophthalmic solution Place 1 drop into both eyes 3 times daily as needed for dry eyes       clobetasol (TEMOVATE) 0.05 % external ointment Apply topically 2 times daily To right ankle as needed 60 g 3     EPINEPHrine (ANY BX GENERIC EQUIV) 0.3 MG/0.3ML injection 2-pack Inject 0.3 mg into the muscle as needed for anaphylaxis       famotidine (PEPCID) 20 MG tablet Take 20 mg by mouth 2 times daily.       gabapentin (NEURONTIN) 300 MG capsule Take 600 mg by mouth at bedtime       hydroxychloroquine (PLAQUENIL) 200 MG tablet Take 1 tablet (200 mg) by mouth daily 90 tablet 1     lifitegrast (XIIDRA) 5 % opthalmic solution Place 1 drop into both eyes 2 times daily       mirtazapine (REMERON) 7.5 MG tablet Take 1 tablet (7.5 mg) by mouth at bedtime. 30 tablet 3     Multiple Vitamins-Minerals (OCUVITE PRESERVISION PO) Take 1 tablet by mouth 2 times daily       pantoprazole (PROTONIX) 40 MG EC tablet Take 1 tablet (40 mg) by mouth daily 30 tablet 11     UNABLE TO FIND MEDICATION NAME: V (vein formular)           ALLERGIES:  Allergies   Allergen Reactions     Bee Venom Anaphylaxis     Shrimp Anaphylaxis     Evoxac [Cevimeline] Unknown     Prevnar Swelling     Other reaction(s): Edema     Prevnar [Pneumococcal 13-Linda Conj Vacc] Unknown         PAST MEDICAL HISTORY:  Past Medical History:   Diagnosis Date     Anemia      CKD (chronic kidney disease) stage 3, GFR 30-59 ml/min (H)      Diverticulosis of large intestine      Osteoarthritis      Osteoporosis      Rheumatoid arthritis (H)      Sensorineural hearing loss      Varicose veins of bilateral lower extremities with other complications          PAST SURGICAL HISTORY:  Past Surgical History:   Procedure  Laterality Date     CAPSULE/PILL CAM ENDOSCOPY N/A 11/18/2021    Procedure: IMAGING PROCEDURE, GI TRACT, INTRALUMINAL, VIA CAPSULE;  Surgeon: Deric Rodriguez MD;  Location: [facility]     CAPSULE/PILL CAM ENDOSCOPY N/A 2/14/2023    Procedure: IMAGING PROCEDURE, GI TRACT, INTRALUMINAL, VIA CAPSULE;  Surgeon: Jaime Mesa MD;  Location: [facility]     COLONOSCOPY N/A 03/14/2017    Procedure: COMBINED COLONOSCOPY, SINGLE OR MULTIPLE BIOPSY/POLYPECTOMY BY BIOPSY;  Surgeon: Spencer Downey MD;  Location: [facility]     COLONOSCOPY N/A 9/22/2022    Procedure: COLONOSCOPY, FLEXIBLE, WITH LESION REMOVAL USING SNARE;  Surgeon: Kirby Arthur MD;  Location:  GI     COLONOSCOPY N/A 1/13/2023    Procedure: COLONOSCOPY;  Surgeon: Spencer Downey MD;  Location: [facility] OR     COLONOSCOPY N/A 8/14/2023    Procedure: Colonoscopy;  Surgeon: Spencer Downey MD;  Location: [facility]     ENTEROSCOPY SMALL BOWEL N/A 11/20/2021    Procedure: ENTEROSCOPY, colonoscopy with endoscopic mucosal resection and tattoo placement;  Surgeon: Kirby Arthur MD;  Location: [facility] OR     ESOPHAGOSCOPY, GASTROSCOPY, DUODENOSCOPY (EGD), COMBINED N/A 2/6/2023    Procedure: ESOPHAGOGASTRODUODENOSCOPY (EGD);  Surgeon: Spencer Downey MD;  Location: [facility]     IR VISCERAL EMBOLIZATION  01/22/2021     ORTHOPEDIC SURGERY Left     hip replacment     SIGMOIDOSCOPY FLEXIBLE N/A 01/23/2021    Procedure: SIGMOIDOSCOPY, FLEXIBLE;  Surgeon: Jaime Steinberg MD;  Location: [facility]     SIGMOIDOSCOPY FLEXIBLE N/A 7/17/2024    Procedure: Sigmoidoscopy flexible;  Surgeon: Ritika Woodard MD;  Location:  GI         SOCIAL HISTORY:  Social History     Socioeconomic History     Marital status:      Spouse name: Not on file     Number of children: Not on file     Years of education: Not on file     Highest education level: Not on file   Occupational History     Not on file   Tobacco Use     Smoking status: Former     Smokeless tobacco: Never   Vaping Use      Vaping status: Never Used   Substance and Sexual Activity     Alcohol use: No     Drug use: No     Sexual activity: Not on file   Other Topics Concern     Not on file   Social History Narrative    - Retired (formerly employed as  at [employer])    - 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[employer])     Social Drivers of Health     Financial Resource Strain: Low Risk  (9/12/2024)    Financial Resource Strain      Within the past 12 months, have you or your family members you live with been unable to get utilities (heat, electricity) when it was really needed?: No   Food Insecurity: Low Risk  (9/12/2024)    Food Insecurity      Within the past 12 months, did you worry that your food would run out before you got money to buy more?: No      Within the past 12 months, did the food you bought just not last and you didn t have money to get more?: No   Transportation Needs: Low Risk  (9/12/2024)    Transportation Needs      Within the past 12 months, has lack of transportation kept you from medical appointments, getting your medicines, non-medical meetings or appointments, work, or from getting things that you need?: No   Physical Activity: Sufficiently Active (9/12/2024)    Exercise Vital Sign      Days of Exercise per Week: 5 days      Minutes of Exercise per Session: 30 min   Stress: No Stress Concern Present (9/12/2024)    [place] [employer] - Occupational Stress Questionnaire      Feeling of Stress : Only a little   Social Connections: Unknown (9/12/2024)    Social Connection and Isolation Panel [NHANES]      Frequency of Communication with Friends and Family: Not on file      Frequency of Social Gatherings with Friends and Family: Once a week      Attends Sabianism Services: Not on file      Active Member of Clubs or Organizations: Not on file      Attends Club or Organization Meetings: Not on file      Marital Status: Not on  "file   Interpersonal Safety: Low Risk  (9/12/2024)    Interpersonal Safety      Do you feel physically and emotionally safe where you currently live?: Yes      Within the past 12 months, have you been hit, slapped, kicked or otherwise physically hurt by someone?: No      Within the past 12 months, have you been humiliated or emotionally abused in other ways by your partner or ex-partner?: No   Housing Stability: Low Risk  (9/12/2024)    Housing Stability      Do you have housing? : Yes      Are you worried about losing your housing?: No         FAMILY HISTORY:  No family history on file.      PHYSICAL EXAM:  Vital signs:  Ht 1.6 m (5' 3\")   Wt 41.3 kg (91 lb)   BMI 16.12 kg/m     GENERAL: No acute distress.  EYES: No scleral icterus. No overt erythema.  RESPIRATORY: No audible cough, wheezing, or labored breathing.  MUSCULOSKELETAL: Range of motion in the neck, shoulders, and arms appear normal.  SKIN: No overt rashes, discolorations, or lesions over the face and neck.  NEUROLOGIC: Alert.  No overt tremors.  PSYCHIATRIC: Normal affect and mood.  Does not appear anxious.      LABS:  CBC RESULTS:   Recent Labs   Lab Test 10/31/24  1356   WBC 4.0   RBC 3.55*   HGB 11.2*   HCT 35.7   *   MCH 31.5   MCHC 31.4*   RDW 12.9           Last Comprehensive Metabolic Panel:  Sodium   Date Value Ref Range Status   10/31/2024 142 135 - 145 mmol/L Final   01/23/2021 143 133 - 144 mmol/L Final     Potassium   Date Value Ref Range Status   10/31/2024 4.7 3.4 - 5.3 mmol/L Final   09/29/2022 4.7 3.4 - 5.3 mmol/L Final   01/23/2021 3.6 3.4 - 5.3 mmol/L Final     Chloride   Date Value Ref Range Status   10/31/2024 105 98 - 107 mmol/L Final   01/23/2021 114 (H) 94 - 109 mmol/L Final     Chloride (External)   Date Value Ref Range Status   10/24/2022 104 94 - 109 mmol/L Final     Carbon Dioxide   Date Value Ref Range Status   01/23/2021 26 20 - 32 mmol/L Final     Carbon Dioxide (CO2)   Date Value Ref Range Status "   10/31/2024 27 22 - 29 mmol/L Final   09/29/2022 29 20 - 32 mmol/L Final     Anion Gap   Date Value Ref Range Status   10/31/2024 10 7 - 15 mmol/L Final   09/29/2022 3 3 - 14 mmol/L Final   01/23/2021 3 3 - 14 mmol/L Final     Glucose   Date Value Ref Range Status   10/31/2024 95 70 - 99 mg/dL Final   09/29/2022 100 (H) 70 - 99 mg/dL Final   01/23/2021 157 (H) 70 - 99 mg/dL Final     GLUCOSE BY METER POCT   Date Value Ref Range Status   07/19/2024 113 (H) 70 - 99 mg/dL Final     Urea Nitrogen   Date Value Ref Range Status   10/31/2024 26.4 (H) 8.0 - 23.0 mg/dL Final   09/29/2022 21 7 - 30 mg/dL Final   01/23/2021 6 (L) 7 - 30 mg/dL Final     Creatinine   Date Value Ref Range Status   10/31/2024 0.96 (H) 0.51 - 0.95 mg/dL Final   01/23/2021 0.75 0.52 - 1.04 mg/dL Final     GFR Estimate   Date Value Ref Range Status   10/31/2024 59 (L) >60 mL/min/1.73m2 Final     Comment:     eGFR calculated using 2021 CKD-EPI equation.   01/23/2021 76 >60 mL/min/[1.73_m2] Final     Comment:     Non  GFR Calc  Starting 12/18/2018, serum creatinine based estimated GFR (eGFR) will be   calculated using the Chronic Kidney Disease Epidemiology Collaboration   (CKD-EPI) equation.       Calcium   Date Value Ref Range Status   10/31/2024 9.8 8.8 - 10.4 mg/dL Final     Comment:     Reference intervals for this test were updated on 7/16/2024 to reflect our healthy population more accurately. There may be differences in the flagging of prior results with similar values performed with this method. Those prior results can be interpreted in the context of the updated reference intervals.   01/23/2021 8.0 (L) 8.5 - 10.1 mg/dL Final     Bilirubin Total   Date Value Ref Range Status   10/31/2024 0.3 <=1.2 mg/dL Final   03/13/2017 0.5 0.2 - 1.3 mg/dL Final     Alkaline Phosphatase   Date Value Ref Range Status   10/31/2024 83 40 - 150 U/L Final   03/13/2017 77 40 - 150 U/L Final     ALT   Date Value Ref Range Status   10/31/2024 30 0  - 50 U/L Final   03/13/2017 12 0 - 50 U/L Final     AST   Date Value Ref Range Status   10/31/2024 40 0 - 45 U/L Final   03/13/2017 10 0 - 45 U/L Final     Ferritin   Date Value Ref Range Status   10/31/2024 34 11 - 328 ng/mL Final     Iron   Date Value Ref Range Status   10/31/2024 77 37 - 145 ug/dL Final     Iron Binding Capacity   Date Value Ref Range Status   10/31/2024 343 240 - 430 ug/dL Final   03/10/2022 296 240 - 430 ug/dL Final       10/4/2023  Sed rate elevated at 48  CRP undetectable.    PATHOLOGY:  None relevant.    IMAGING:  None relevant.    ASSESSMENT/PLAN:  Patrick Olson is a 83 year old female with the following issues:  1. Macrocytic anemia due to iron deficiency/blood loss, drug effect, of chronic disease  2. History of gastric and small bowel AVM, duodenal erosions, diverticular hemorrhage  3. Chronic intermittent leukopenia  --[place] anemia is of multifactorial etiology, including blood loss, iron deficiency, drug effect from Plaquenil, and anemia of chronic disease.  --Discussed her 10/31/2024 labs showed Hgb improved to 11.2, still macrocytic anemia.  Prior SPEP showed no monoclonal protein. Iron studies show ferritin 34, normal serum iron, TIBC and iron sat index, so she does not need to take oral iron supplement at this time. Vitamin B12 is normal at 1092.  --She will continue with intermittent GI follow-up. She had colonoscopy 8/2023 that showed multiple diverticula in colon, residual blood; no active bleeding.  --She will continue to watch closely for hematochezia or melena.  --No indication for bone marrow biopsy unless significant change in her blood counts.  --Discussed her Hgb is too high to meet criteria for giving an MANDY.  --Discussed her intermittent leukopenia is likely due to use of Plaquenil.  --She can have CBC checked as needed by her PCP and rheumatologist.    4. Rheumatoid arthritis  --She is no longer on methotrexate and now on Plaquenil.  She follows with  rheumatology.    5. Weight loss  --She feels she has normal appetite.  --Lost 10 pounds since 10/2022.  --Likely related to her chronic diseases.  --She is on mirtazapine with improvement.    No further hematology follow-up will be scheduled but happy to see her as needed.    Shantal Brown MD  [facility] Hematology/Oncology     Total time spent today: 30 minutes in chart review, patient evaluation, counseling, documentation, test and/or medication/prescription orders, and coordination of care.         Again, thank you for allowing me to participate in the care of your patient.        Sincerely,        Shantal Brown MD

## 2024-11-07 NOTE — LETTER
11/7/2024      Patrick Olson  [address] [facility] [id number]      Dear Colleague,    Thank you for referring your patient, Patrick Olson, to the [facility] [place]. Please see a copy of my visit note below.    Virtual Visit Details    Type of service:  Video Visit   Video Start Time: 2:19 PM  Video End Time: 2:35 PM    Originating Location (pt. Location): Home    Distant Location (provider location):  On-site  Platform used for Video Visit: [facility]    Hematology/Oncology Established Patient Follow-up Note      Today's Date: 11/7/2024    Reason for Follow-up:  Iron deficiency anemia.    HISTORY OF PRESENT ILLNESS: Patrick Olson is an 83 year old female with rheumatoid arthritis on methotrexate who presents with iron deficiency anemia. She has history of gastric AVM, small bowel AVM, cecal polyp, duodenal erosions, and diverticular hemorrhage.    She had an EGD, colonoscopy, and VCE in 1/2023 that showed duodenal erosioons, small bowel angiectasia, and diverticulosis without bleeding. She was started on omeprazole and is following with GI at [facility].    2/7/2023 Labs showed Hgb 11.1, WBC 3.2, platelets 247,000, creatinine 1.03, vitamin B12 elevated at 1434. 3/7/2023 Ferritin was normal at 96, iron elevated at 163, TIBC 283, iron saturation index elevated at 58. 3/27/2023 TSH normal at 1.13.     7/15/2024-7/21/2024: Hospitalized at [facility] for rectal bleeding, Hgb 6.8. Flexible sigmoidoscopy showed no source of bleed. CT was negative for active arterial GI bleed.      INTERVAL HISTORY:  Patrick reports no recurrent hematochezia or melena since her 7/2024 hospitalization.      REVIEW OF SYSTEMS:   14 point ROS was reviewed and is negative other than as noted above in HPI.       HOME MEDICATIONS:  Current Outpatient Medications   Medication Sig Dispense Refill     acetaminophen (TYLENOL) 325 MG tablet Take 2 tablets (650 mg) by mouth every 6 hours        artificial saliva (BIOTENE DRY MOUTHWASH) LIQD liquid Swish and spit in mouth 4 times daily as needed for dry mouth       calcium carbonate-vitamin D (CALTRATE) 600-10 MG-MCG per tablet Take 1 tablet by mouth daily       carboxymethylcellulose PF (REFRESH PLUS) 0.5 % ophthalmic solution Place 1 drop into both eyes 3 times daily as needed for dry eyes       clobetasol (TEMOVATE) 0.05 % external ointment Apply topically 2 times daily To right ankle as needed 60 g 3     EPINEPHrine (ANY BX GENERIC EQUIV) 0.3 MG/0.3ML injection 2-pack Inject 0.3 mg into the muscle as needed for anaphylaxis       famotidine (PEPCID) 20 MG tablet Take 20 mg by mouth 2 times daily.       gabapentin (NEURONTIN) 300 MG capsule Take 600 mg by mouth at bedtime       hydroxychloroquine (PLAQUENIL) 200 MG tablet Take 1 tablet (200 mg) by mouth daily 90 tablet 1     lifitegrast (XIIDRA) 5 % opthalmic solution Place 1 drop into both eyes 2 times daily       mirtazapine (REMERON) 7.5 MG tablet Take 1 tablet (7.5 mg) by mouth at bedtime. 30 tablet 3     Multiple Vitamins-Minerals (OCUVITE PRESERVISION PO) Take 1 tablet by mouth 2 times daily       pantoprazole (PROTONIX) 40 MG EC tablet Take 1 tablet (40 mg) by mouth daily 30 tablet 11     UNABLE TO FIND MEDICATION NAME: V (vein formular)           ALLERGIES:  Allergies   Allergen Reactions     Bee Venom Anaphylaxis     Shrimp Anaphylaxis     Evoxac [Cevimeline] Unknown     Prevnar Swelling     Other reaction(s): Edema     Prevnar [Pneumococcal 13-Linda Conj Vacc] Unknown         PAST MEDICAL HISTORY:  Past Medical History:   Diagnosis Date     Anemia      CKD (chronic kidney disease) stage 3, GFR 30-59 ml/min (H)      Diverticulosis of large intestine      Osteoarthritis      Osteoporosis      Rheumatoid arthritis (H)      Sensorineural hearing loss      Varicose veins of bilateral lower extremities with other complications          PAST SURGICAL HISTORY:  Past Surgical History:   Procedure  Laterality Date     CAPSULE/PILL CAM ENDOSCOPY N/A 11/18/2021    Procedure: IMAGING PROCEDURE, GI TRACT, INTRALUMINAL, VIA CAPSULE;  Surgeon: Deric Rodriguez MD;  Location: [facility]     CAPSULE/PILL CAM ENDOSCOPY N/A 2/14/2023    Procedure: IMAGING PROCEDURE, GI TRACT, INTRALUMINAL, VIA CAPSULE;  Surgeon: Jaime Mesa MD;  Location: [facility]     COLONOSCOPY N/A 03/14/2017    Procedure: COMBINED COLONOSCOPY, SINGLE OR MULTIPLE BIOPSY/POLYPECTOMY BY BIOPSY;  Surgeon: Spencer Downey MD;  Location: [facility]     COLONOSCOPY N/A 9/22/2022    Procedure: COLONOSCOPY, FLEXIBLE, WITH LESION REMOVAL USING SNARE;  Surgeon: Kirby Arthur MD;  Location:  GI     COLONOSCOPY N/A 1/13/2023    Procedure: COLONOSCOPY;  Surgeon: Spencer Downey MD;  Location: [facility] OR     COLONOSCOPY N/A 8/14/2023    Procedure: Colonoscopy;  Surgeon: Spencer Downey MD;  Location: [facility]     ENTEROSCOPY SMALL BOWEL N/A 11/20/2021    Procedure: ENTEROSCOPY, colonoscopy with endoscopic mucosal resection and tattoo placement;  Surgeon: Kirby Arthur MD;  Location: [facility]     ESOPHAGOSCOPY, GASTROSCOPY, DUODENOSCOPY (EGD), COMBINED N/A 2/6/2023    Procedure: ESOPHAGOGASTRODUODENOSCOPY (EGD);  Surgeon: Spencer Downey MD;  Location: [facility]     IR VISCERAL EMBOLIZATION  01/22/2021     ORTHOPEDIC SURGERY Left     hip replacment     SIGMOIDOSCOPY FLEXIBLE N/A 01/23/2021    Procedure: SIGMOIDOSCOPY, FLEXIBLE;  Surgeon: Jaime Steinberg MD;  Location: [facility]     SIGMOIDOSCOPY FLEXIBLE N/A 7/17/2024    Procedure: Sigmoidoscopy flexible;  Surgeon: Ritika Woodard MD;  Location:  GI         SOCIAL HISTORY:  Social History     Socioeconomic History     Marital status:      Spouse name: Not on file     Number of children: Not on file     Years of education: Not on file     Highest education level: Not on file   Occupational History     Not on file   Tobacco Use     Smoking status: Former     Smokeless tobacco: Never   Vaping Use      Vaping status: Never Used   Substance and Sexual Activity     Alcohol use: No     Drug use: No     Sexual activity: Not on file   Other Topics Concern     Not on file   Social History Narrative    - Retired (formerly employed as  at [employer])    - 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[employer])     Social Drivers of Health     Financial Resource Strain: Low Risk  (9/12/2024)    Financial Resource Strain      Within the past 12 months, have you or your family members you live with been unable to get utilities (heat, electricity) when it was really needed?: No   Food Insecurity: Low Risk  (9/12/2024)    Food Insecurity      Within the past 12 months, did you worry that your food would run out before you got money to buy more?: No      Within the past 12 months, did the food you bought just not last and you didn t have money to get more?: No   Transportation Needs: Low Risk  (9/12/2024)    Transportation Needs      Within the past 12 months, has lack of transportation kept you from medical appointments, getting your medicines, non-medical meetings or appointments, work, or from getting things that you need?: No   Physical Activity: Sufficiently Active (9/12/2024)    Exercise Vital Sign      Days of Exercise per Week: 5 days      Minutes of Exercise per Session: 30 min   Stress: No Stress Concern Present (9/12/2024)    [place] [employer] - Occupational Stress Questionnaire      Feeling of Stress : Only a little   Social Connections: Unknown (9/12/2024)    Social Connection and Isolation Panel [NHANES]      Frequency of Communication with Friends and Family: Not on file      Frequency of Social Gatherings with Friends and Family: Once a week      Attends [employer] Services: Not on file      Active Member of Clubs or Organizations: Not on file      Attends Club or Organization Meetings: Not on file      Marital Status: Not on  "file   Interpersonal Safety: Low Risk  (9/12/2024)    Interpersonal Safety      Do you feel physically and emotionally safe where you currently live?: Yes      Within the past 12 months, have you been hit, slapped, kicked or otherwise physically hurt by someone?: No      Within the past 12 months, have you been humiliated or emotionally abused in other ways by your partner or ex-partner?: No   Housing Stability: Low Risk  (9/12/2024)    Housing Stability      Do you have housing? : Yes      Are you worried about losing your housing?: No         FAMILY HISTORY:  No family history on file.      PHYSICAL EXAM:  Vital signs:  Ht 1.6 m (5' 3\")   Wt 41.3 kg (91 lb)   BMI 16.12 kg/m     GENERAL: No acute distress.  EYES: No scleral icterus. No overt erythema.  RESPIRATORY: No audible cough, wheezing, or labored breathing.  MUSCULOSKELETAL: Range of motion in the neck, shoulders, and arms appear normal.  SKIN: No overt rashes, discolorations, or lesions over the face and neck.  NEUROLOGIC: Alert.  No overt tremors.  PSYCHIATRIC: Normal affect and mood.  Does not appear anxious.      LABS:  CBC RESULTS:   Recent Labs   Lab Test 10/31/24  1356   WBC 4.0   RBC 3.55*   HGB 11.2*   HCT 35.7   *   MCH 31.5   MCHC 31.4*   RDW 12.9           Last Comprehensive Metabolic Panel:  Sodium   Date Value Ref Range Status   10/31/2024 142 135 - 145 mmol/L Final   01/23/2021 143 133 - 144 mmol/L Final     Potassium   Date Value Ref Range Status   10/31/2024 4.7 3.4 - 5.3 mmol/L Final   09/29/2022 4.7 3.4 - 5.3 mmol/L Final   01/23/2021 3.6 3.4 - 5.3 mmol/L Final     Chloride   Date Value Ref Range Status   10/31/2024 105 98 - 107 mmol/L Final   01/23/2021 114 (H) 94 - 109 mmol/L Final     Chloride (External)   Date Value Ref Range Status   10/24/2022 104 94 - 109 mmol/L Final     Carbon Dioxide   Date Value Ref Range Status   01/23/2021 26 20 - 32 mmol/L Final     Carbon Dioxide (CO2)   Date Value Ref Range Status "   10/31/2024 27 22 - 29 mmol/L Final   09/29/2022 29 20 - 32 mmol/L Final     Anion Gap   Date Value Ref Range Status   10/31/2024 10 7 - 15 mmol/L Final   09/29/2022 3 3 - 14 mmol/L Final   01/23/2021 3 3 - 14 mmol/L Final     Glucose   Date Value Ref Range Status   10/31/2024 95 70 - 99 mg/dL Final   09/29/2022 100 (H) 70 - 99 mg/dL Final   01/23/2021 157 (H) 70 - 99 mg/dL Final     GLUCOSE BY METER POCT   Date Value Ref Range Status   07/19/2024 113 (H) 70 - 99 mg/dL Final     Urea Nitrogen   Date Value Ref Range Status   10/31/2024 26.4 (H) 8.0 - 23.0 mg/dL Final   09/29/2022 21 7 - 30 mg/dL Final   01/23/2021 6 (L) 7 - 30 mg/dL Final     Creatinine   Date Value Ref Range Status   10/31/2024 0.96 (H) 0.51 - 0.95 mg/dL Final   01/23/2021 0.75 0.52 - 1.04 mg/dL Final     GFR Estimate   Date Value Ref Range Status   10/31/2024 59 (L) >60 mL/min/1.73m2 Final     Comment:     eGFR calculated using 2021 CKD-EPI equation.   01/23/2021 76 >60 mL/min/[1.73_m2] Final     Comment:     Non  GFR Calc  Starting 12/18/2018, serum creatinine based estimated GFR (eGFR) will be   calculated using the Chronic Kidney Disease Epidemiology Collaboration   (CKD-EPI) equation.       Calcium   Date Value Ref Range Status   10/31/2024 9.8 8.8 - 10.4 mg/dL Final     Comment:     Reference intervals for this test were updated on 7/16/2024 to reflect our healthy population more accurately. There may be differences in the flagging of prior results with similar values performed with this method. Those prior results can be interpreted in the context of the updated reference intervals.   01/23/2021 8.0 (L) 8.5 - 10.1 mg/dL Final     Bilirubin Total   Date Value Ref Range Status   10/31/2024 0.3 <=1.2 mg/dL Final   03/13/2017 0.5 0.2 - 1.3 mg/dL Final     Alkaline Phosphatase   Date Value Ref Range Status   10/31/2024 83 40 - 150 U/L Final   03/13/2017 77 40 - 150 U/L Final     ALT   Date Value Ref Range Status   10/31/2024 30 0  - 50 U/L Final   03/13/2017 12 0 - 50 U/L Final     AST   Date Value Ref Range Status   10/31/2024 40 0 - 45 U/L Final   03/13/2017 10 0 - 45 U/L Final     Ferritin   Date Value Ref Range Status   10/31/2024 34 11 - 328 ng/mL Final     Iron   Date Value Ref Range Status   10/31/2024 77 37 - 145 ug/dL Final     Iron Binding Capacity   Date Value Ref Range Status   10/31/2024 343 240 - 430 ug/dL Final   03/10/2022 296 240 - 430 ug/dL Final       10/4/2023  Sed rate elevated at 48  CRP undetectable.    PATHOLOGY:  None relevant.    IMAGING:  None relevant.    ASSESSMENT/PLAN:  Patrick Olson is a 83 year old female with the following issues:  1. Macrocytic anemia due to iron deficiency/blood loss, drug effect, of chronic disease  2. History of gastric and small bowel AVM, duodenal erosions, diverticular hemorrhage  3. Chronic intermittent leukopenia  --Carmelitas anemia is of multifactorial etiology, including blood loss, iron deficiency, drug effect from Plaquenil, and anemia of chronic disease.  --Discussed her 10/31/2024 labs showed Hgb improved to 11.2, still macrocytic anemia.  Prior SPEP showed no monoclonal protein. Iron studies show ferritin 34, normal serum iron, TIBC and iron sat index, so she does not need to take oral iron supplement at this time. Vitamin B12 is normal at 1092.  --She will continue with intermittent GI follow-up. She had colonoscopy 8/2023 that showed multiple diverticula in colon, residual blood; no active bleeding.  --She will continue to watch closely for hematochezia or melena.  --No indication for bone marrow biopsy unless significant change in her blood counts.  --Discussed her Hgb is too high to meet criteria for giving an MANDY.  --Discussed her intermittent leukopenia is likely due to use of Plaquenil.  --She can have CBC checked as needed by her PCP and rheumatologist.    4. Rheumatoid arthritis  --She is no longer on methotrexate and now on Plaquenil.  She follows with  rheumatology.    5. Weight loss  --She feels she has normal appetite.  --Lost 10 pounds since 10/2022.  --Likely related to her chronic diseases.  --She is on mirtazapine with improvement.    No further hematology follow-up will be scheduled but happy to see her as needed.    Shantal Brown MD  [facility] Hematology/Oncology     Total time spent today: 30 minutes in chart review, patient evaluation, counseling, documentation, test and/or medication/prescription orders, and coordination of care.         Again, thank you for allowing me to participate in the care of your patient.        Sincerely,        Shantal Brown MD

## 2024-11-07 NOTE — NURSING NOTE
Current patient location: [address] [place] [id number]    Is the patient currently in the state of [place]? YES    Visit mode:VIDEO    If the visit is dropped, the patient can be reconnected by: VIDEO VISIT: Send to e-mail at: [email or web address]    Will anyone else be joining the visit? NO  (If patient encounters technical issues they should call [phone]    Are changes needed to the allergy or medication list? No    Are refills needed on medications prescribed by this physician? NO    Rooming Documentation:  Unable to complete questionnaire(s) due to time    Reason for visit: RONAN CHARLES

## 2024-11-15 ENCOUNTER — OFFICE VISIT (OUTPATIENT)
Dept: FAMILY MEDICINE | Facility: CLINIC | Age: 84
End: 2024-11-15
Payer: COMMERCIAL

## 2024-11-15 VITALS
HEIGHT: 63 IN | SYSTOLIC BLOOD PRESSURE: 137 MMHG | WEIGHT: 91.1 LBS | TEMPERATURE: 97.5 F | BODY MASS INDEX: 16.14 KG/M2 | OXYGEN SATURATION: 100 % | DIASTOLIC BLOOD PRESSURE: 71 MMHG | HEART RATE: 72 BPM | RESPIRATION RATE: 16 BRPM

## 2024-11-15 DIAGNOSIS — E46 PROTEIN-CALORIE MALNUTRITION, UNSPECIFIED SEVERITY (H): ICD-10-CM

## 2024-11-15 DIAGNOSIS — R63.0 LOSS OF APPETITE: ICD-10-CM

## 2024-11-15 DIAGNOSIS — M05.79 RHEUMATOID ARTHRITIS INVOLVING MULTIPLE SITES WITH POSITIVE RHEUMATOID FACTOR (H): Primary | ICD-10-CM

## 2024-11-15 DIAGNOSIS — G89.29 OTHER CHRONIC PAIN: ICD-10-CM

## 2024-11-15 DIAGNOSIS — K31.819 GASTRIC AVM: ICD-10-CM

## 2024-11-15 DIAGNOSIS — K31.9 DYSPEPSIA AND DISORDER OF FUNCTION OF STOMACH: ICD-10-CM

## 2024-11-15 DIAGNOSIS — D64.9 ANEMIA, UNSPECIFIED TYPE: ICD-10-CM

## 2024-11-15 DIAGNOSIS — R10.13 DYSPEPSIA AND DISORDER OF FUNCTION OF STOMACH: ICD-10-CM

## 2024-11-15 DIAGNOSIS — R63.6 UNDERWEIGHT: ICD-10-CM

## 2024-11-15 DIAGNOSIS — N18.31 STAGE 3A CHRONIC KIDNEY DISEASE (H): ICD-10-CM

## 2024-11-15 DIAGNOSIS — E61.1 IRON DEFICIENCY: ICD-10-CM

## 2024-11-15 DIAGNOSIS — Z87.19 HISTORY OF GI BLEED: ICD-10-CM

## 2024-11-15 DIAGNOSIS — M41.9 SCOLIOSIS OF THORACOLUMBAR SPINE, UNSPECIFIED SCOLIOSIS TYPE: ICD-10-CM

## 2024-11-15 PROBLEM — N18.4 CKD (CHRONIC KIDNEY DISEASE) STAGE 4, GFR 15-29 ML/MIN (H): Status: RESOLVED | Noted: 2024-09-12 | Resolved: 2024-11-15

## 2024-11-15 RX ORDER — METHYLPREDNISOLONE SODIUM SUCCINATE 40 MG/ML
40 INJECTION INTRAMUSCULAR; INTRAVENOUS
Start: 2024-11-16

## 2024-11-15 RX ORDER — MIRTAZAPINE 7.5 MG/1
7.5 TABLET, FILM COATED ORAL AT BEDTIME
Qty: 30 TABLET | Refills: 11 | Status: SHIPPED | OUTPATIENT
Start: 2024-11-15

## 2024-11-15 RX ORDER — EPINEPHRINE 1 MG/ML
0.3 INJECTION, SOLUTION, CONCENTRATE INTRAVENOUS EVERY 5 MIN PRN
OUTPATIENT
Start: 2024-11-16

## 2024-11-15 RX ORDER — DIPHENHYDRAMINE HYDROCHLORIDE 50 MG/ML
25 INJECTION INTRAMUSCULAR; INTRAVENOUS
Start: 2024-11-16

## 2024-11-15 RX ORDER — MEPERIDINE HYDROCHLORIDE 25 MG/ML
25 INJECTION INTRAMUSCULAR; INTRAVENOUS; SUBCUTANEOUS
OUTPATIENT
Start: 2024-11-16

## 2024-11-15 RX ORDER — ALBUTEROL SULFATE 90 UG/1
1-2 INHALANT RESPIRATORY (INHALATION)
Start: 2024-11-16

## 2024-11-15 RX ORDER — DIPHENHYDRAMINE HYDROCHLORIDE 50 MG/ML
50 INJECTION INTRAMUSCULAR; INTRAVENOUS
Start: 2024-11-16

## 2024-11-15 RX ORDER — GABAPENTIN 300 MG/1
300 CAPSULE ORAL 3 TIMES DAILY
Qty: 90 CAPSULE | Refills: 11 | Status: SHIPPED | OUTPATIENT
Start: 2024-11-15

## 2024-11-15 RX ORDER — HEPARIN SODIUM (PORCINE) LOCK FLUSH IV SOLN 100 UNIT/ML 100 UNIT/ML
5 SOLUTION INTRAVENOUS
OUTPATIENT
Start: 2024-11-16

## 2024-11-15 RX ORDER — ALBUTEROL SULFATE 0.83 MG/ML
2.5 SOLUTION RESPIRATORY (INHALATION)
OUTPATIENT
Start: 2024-11-16

## 2024-11-15 RX ORDER — HEPARIN SODIUM,PORCINE 10 UNIT/ML
5-20 VIAL (ML) INTRAVENOUS DAILY PRN
OUTPATIENT
Start: 2024-11-16

## 2024-11-15 ASSESSMENT — PAIN SCALES - GENERAL: PAINLEVEL_OUTOF10: NO PAIN (0)

## 2024-11-15 NOTE — PATIENT INSTRUCTIONS
Please call the following number to schedule your IV iron:  [phone] for infusion center    Continue present management     Follow up in 4 months.  Seek sooner medical attention if there is any worsening of symptoms or problems.

## 2024-11-15 NOTE — PROGRESS NOTES
Patrick was seen today for follow up.    Diagnoses and all orders for this visit:    Rheumatoid arthritis involving multiple sites with positive rheumatoid factor (H)  On plaquenil. Follows rheumatology.     Scoliosis of thoracolumbar spine, unspecified scoliosis type  On gabapentin.    History of GI bleed  D/t gastric AVM  Had work up    Underweight  She reports she gained 6 pounds since her last visit 2 months ago.   Mirtazapine is helping     Loss of appetite  -     mirtazapine (REMERON) 7.5 MG tablet; Take 1 tablet (7.5 mg) by mouth at bedtime.  She states her appetite has been improving with mirtazapine.     Stage 3a chronic kidney disease (H)  Follows nephrology.    Gastric AVM    Protein-calorie malnutrition, unspecified severity (H)  She continues to improve her diet and work on gaining weight. She has tried Ensure in the past and did not like the taste.     Iron deficiency  She reports taking oral iron supplements every 2 days. She states when she took it ever other day she had constipation. We discussed the option of IV supplementation. She would like to proceed with IV iron. She will message me via [employer] if she does not get a call from the infusion center.    Other chronic pain  -     gabapentin (NEURONTIN) 300 MG capsule; Take 1 capsule (300 mg) by mouth 3 times daily.    Dyspepsia and disorder of function of stomach  On pantoprazole.    Anemia, unspecified type  Continue to monitor. Will start iron IV.    Other orders  -     sodium chloride 0.9% BOLUS 250 mL  -     sodium chloride (PF) 0.9% PF flush 3-20 mL  -     heparin lock flush 10 unit/mL injection 5-20 mL  -     heparin lock flush 100 unit/mL injection 5 mL  -     alteplase (CATHFLO ACTIVASE) injection 2 mg  -     ferric carboxymaltose (INJECTAFER) 750 mg in sodium chloride 0.9 % 115 mL intermittent infusion  -     EPINEPHrine PF (ADRENALIN) injection 0.3 mg  -     diphenhydrAMINE (BENADRYL) injection 25 mg  -     diphenhydrAMINE (BENADRYL)  "injection 50 mg  -     famotidine (PEPCID) injection 20 mg  -     sodium chloride 0.9% BOLUS 1,000 mL  -     albuterol (PROVENTIL HFA/VENTOLIN HFA) inhaler  -     albuterol (PROVENTIL) neb solution 2.5 mg  -     meperidine (DEMEROL) injection 25 mg  -     methylPREDNISolone sodium succinate (SOLU-MEDROL) injection 40 mg    Other  She has not followed with gynecology. She states she has delayed the initial appointment because the gynecologists around her are younger than she is.   She reports ongoing anxiety with her son and grandchildren's futures and safety. She states she has been sleeping well since starting mirtazapine.       Rakesh Nguyen is a 83 year old, presenting for the following health issues:  Follow Up    History of Present Illness       Reason for visit:  Follow up on general health issues    She eats 2-3 servings of fruits and vegetables daily.She consumes 0 sweetened beverage(s) daily.She exercises with enough effort to increase her heart rate 20 to 29 minutes per day.  She exercises with enough effort to increase her heart rate 4 days per week.   She is taking medications regularly.        Review of Systems  Constitutional, HEENT, cardiovascular, pulmonary, GI, , musculoskeletal, neuro, skin, endocrine and psych systems are negative, except as otherwise noted.      Objective    /71 (BP Location: Left arm, Patient Position: Sitting, Cuff Size: Adult Regular)   Pulse 72   Temp 97.5  F (36.4  C) (Oral)   Resp 16   Ht 1.6 m (5' 3\")   Wt 41.3 kg (91 lb 1.6 oz)   SpO2 100%   BMI 16.14 kg/m    Body mass index is 16.14 kg/m .    Physical Exam     GENERAL: healthy, alert and no distress  PSYCH: mentation appears normal, affect normal/bright          Signed Electronically by: Magda Soliz MD  This document serves as a record of the services and decisions personally performed and made by Dr. Soliz. It was created on her behalf by Lynne Robert, a trained medical scribe. The creation " of this document is based the provider's statements to the medical scribe.

## 2024-12-13 ENCOUNTER — MYC MEDICAL ADVICE (OUTPATIENT)
Dept: ORTHOPEDICS | Facility: CLINIC | Age: 84
End: 2024-12-13

## 2024-12-14 NOTE — TELEPHONE ENCOUNTER
You  Patrick Jordan now (11:22 AM)     Ms. Patrick Olson,     If they are not okay with you receiving prednisone, are they okay with you receiving steroid injections?     MD Patrick Maki  [facility] (supporting You)Yesterday (8:45 AM)       Maryan Coombs,  My procedure appointment with you is coming up on Thursday the 19th.  Meanwhile, I have this excruciating pain on my mostly lower body started Wednesday the 11th without knowing any cause for it.  Pain is mostly side and back of waist and hip areas shooting down to both legs.  I take Gabbapaentin and 650 mg tylynol three times a day and additional one at bed time.  It gives me the minimal relief and doesn't change in walking, sitting, or lying down.    I am recently going through the process of tooth  extraction (12/4) and bone grafting(1/16).  My oral surgeon recommended to stop taking Plaquenil for two weeks before and after the extraction and bone grafting.  I reluctantly stopped it for two weeks (11/18-12/6) and then I restart taking it after the surgery on 12/4.  I am not sure if that is causing the pain or something in my spine changed to triggered the nerves.  In the meantime, I need to have something given to relieve my pain until I see you. Or, is there a chance to see you sooner?  I also notified to my rheumatologist, Dr Faria.  The nurse said Prednisone wouldn't be good due to my bone grafting process.  Please advise my pain situation to get relief.  Thank you.     Patrick Olson

## 2024-12-19 ENCOUNTER — OFFICE VISIT (OUTPATIENT)
Dept: ORTHOPEDICS | Facility: CLINIC | Age: 84
End: 2024-12-19
Attending: PHYSICAL MEDICINE & REHABILITATION
Payer: COMMERCIAL

## 2024-12-19 VITALS
OXYGEN SATURATION: 99 % | DIASTOLIC BLOOD PRESSURE: 77 MMHG | SYSTOLIC BLOOD PRESSURE: 136 MMHG | HEART RATE: 77 BPM | WEIGHT: 95 LBS | BODY MASS INDEX: 16.83 KG/M2 | HEIGHT: 63 IN

## 2024-12-19 DIAGNOSIS — G58.8 CLUNEAL NEUROPATHY: ICD-10-CM

## 2024-12-19 RX ORDER — LIDOCAINE HYDROCHLORIDE 10 MG/ML
5 INJECTION, SOLUTION EPIDURAL; INFILTRATION; INTRACAUDAL; PERINEURAL ONCE
Status: COMPLETED | OUTPATIENT
Start: 2024-12-19 | End: 2024-12-19

## 2024-12-19 RX ORDER — BUPIVACAINE HYDROCHLORIDE 2.5 MG/ML
8 INJECTION, SOLUTION EPIDURAL; INFILTRATION; INTRACAUDAL ONCE
Status: COMPLETED | OUTPATIENT
Start: 2024-12-19 | End: 2024-12-19

## 2024-12-19 RX ORDER — TRIAMCINOLONE ACETONIDE 40 MG/ML
40 INJECTION, SUSPENSION INTRA-ARTICULAR; INTRAMUSCULAR ONCE
Status: COMPLETED | OUTPATIENT
Start: 2024-12-19 | End: 2024-12-19

## 2024-12-19 RX ADMIN — TRIAMCINOLONE ACETONIDE 40 MG: 40 INJECTION, SUSPENSION INTRA-ARTICULAR; INTRAMUSCULAR at 15:39

## 2024-12-19 RX ADMIN — BUPIVACAINE HYDROCHLORIDE 20 MG: 2.5 INJECTION, SOLUTION EPIDURAL; INFILTRATION; INTRACAUDAL at 15:40

## 2024-12-19 RX ADMIN — LIDOCAINE HYDROCHLORIDE 5 ML: 10 INJECTION, SOLUTION EPIDURAL; INFILTRATION; INTRACAUDAL; PERINEURAL at 15:40

## 2024-12-19 ASSESSMENT — PAIN SCALES - GENERAL: PAINLEVEL_OUTOF10: MODERATE PAIN (5)

## 2024-12-19 NOTE — LETTER
"12/19/2024      Patrick Olson  [address] [facility] [address]      Dear Colleague,    Thank you for referring your patient, Patrick Olson, to the [facility]. Please see a copy of my visit note below.    PHYSICAL MEDICINE & REHABILITATION / MEDICAL SPINE      PROCEDURE DATE:  Dec 19, 2024      PATIENT NAME:  Patrick Olson  MRN:  [id number]  YOB: 1940      PRE-PROCEDURE DIAGNOSIS:  1. Cluneal neuropathy        POST-PROCEDURE DIAGNOSIS:  1. Cluneal neuropathy        PROCEDURE:  Ultrasound-guided bilateral superior cluneal nerve injections.  (CPT Codes:  06897, 47272)      PROCEDURE IN DETAIL:  Prior to the procedure, educational material was provided for the patient to review and take home.  After a discussion of the risks, benefits, and alternatives to the procedure, the patient expressed understanding and wished to proceed.  Consent form was signed.  The patient was brought to the ultrasound suite and placed in the prone position.  Procedural pause was conducted to verify:  patient identity, procedure to be performed, laterality, site, and patient position.    The affected area was scanned using a linear ultrasound probe.  The medial branch of the left superior cluneal nerve was identified, and the ideal injection site was marked.  The medial branch of the right superior cluneal nerve was identified, and the ideal injection site was marked.  The skin was then cleaned and prepped using chlorhexidine.  A sterile transducer cover was then placed on the transducer.    The injection site for the medial branch of the superior cluneal nerve was then scanned again using the sterile transducer probe.  The medial branch of the right superior cluneal was identified under ultrasound.  The skin and subcutaneous tissues were anesthetized using 25g 2\" needle with 2.5 ml 1% preservative-free lidocaine.  Next, 25g 2\" needle was directed using ultrasound guidance for an " "in-plane, symben-tx-ceyypqqz, approach to the medial branch of the superior cluneal nerve.  The needle was then aspirated.  After negative aspiration, 40 mg triamcinolone and 4 ml 0.25% bupivacaine were injected around the medial branch of the superior cluneal nerve.  Images of proper needle position and injectate solution were saved.  The needle was then removed.    The injection site for the medial branch of the superior cluneal nerve was then scanned again using the sterile transducer probe.  The medial branch of the left superior cluneal was identified under ultrasound.  The skin and subcutaneous tissues were anesthetized using 25g 2\" needle with 2.5 ml 1% preservative-free lidocaine.  Next, 25g 2\" needle was directed using ultrasound guidance for an in-plane, oiynfv-zs-jknccfxt, approach to the medial branch of the superior cluneal nerve.  The needle was then aspirated.  After negative aspiration, 40 mg triamcinolone and 4 ml 0.25% bupivacaine were injected around the medial branch of the superior cluneal nerve.  Images of proper needle position and injectate solution were saved.  The needle was then removed.    The skin was wiped clean, and bandages were placed as appropriate.  There were no apparent complications.  The patient tolerated the procedure well.  The patient was observed for an appropriate period of time and discharged in excellent condition.    Preprocedure pain level: 5/10.  Postprocedure pain level: 3/10.      Donald Coombs MD      Again, thank you for allowing me to participate in the care of your patient.        Sincerely,        Donald Coombs MD  "

## 2024-12-19 NOTE — PROGRESS NOTES
"PHYSICAL MEDICINE & REHABILITATION / MEDICAL SPINE      PROCEDURE DATE:  Dec 19, 2024      PATIENT NAME:  Patrick Olson  MRN:  [id number]  YOB: 1940      PRE-PROCEDURE DIAGNOSIS:  1. Cluneal neuropathy        POST-PROCEDURE DIAGNOSIS:  1. Cluneal neuropathy        PROCEDURE:  Ultrasound-guided bilateral superior cluneal nerve injections.  (CPT Codes:  59412, 42788)      PROCEDURE IN DETAIL:  Prior to the procedure, educational material was provided for the patient to review and take home.  After a discussion of the risks, benefits, and alternatives to the procedure, the patient expressed understanding and wished to proceed.  Consent form was signed.  The patient was brought to the ultrasound suite and placed in the prone position.  Procedural pause was conducted to verify:  patient identity, procedure to be performed, laterality, site, and patient position.    The affected area was scanned using a linear ultrasound probe.  The medial branch of the left superior cluneal nerve was identified, and the ideal injection site was marked.  The medial branch of the right superior cluneal nerve was identified, and the ideal injection site was marked.  The skin was then cleaned and prepped using chlorhexidine.  A sterile transducer cover was then placed on the transducer.    The injection site for the medial branch of the superior cluneal nerve was then scanned again using the sterile transducer probe.  The medial branch of the right superior cluneal was identified under ultrasound.  The skin and subcutaneous tissues were anesthetized using 25g 2\" needle with 2.5 ml 1% preservative-free lidocaine.  Next, 25g 2\" needle was directed using ultrasound guidance for an in-plane, qadqhx-px-trajjled, approach to the medial branch of the superior cluneal nerve.  The needle was then aspirated.  After negative aspiration, 40 mg triamcinolone and 4 ml 0.25% bupivacaine were injected around the medial branch of the " "superior cluneal nerve.  Images of proper needle position and injectate solution were saved.  The needle was then removed.    The injection site for the medial branch of the superior cluneal nerve was then scanned again using the sterile transducer probe.  The medial branch of the left superior cluneal was identified under ultrasound.  The skin and subcutaneous tissues were anesthetized using 25g 2\" needle with 2.5 ml 1% preservative-free lidocaine.  Next, 25g 2\" needle was directed using ultrasound guidance for an in-plane, acsskz-iv-fqmthweh, approach to the medial branch of the superior cluneal nerve.  The needle was then aspirated.  After negative aspiration, 40 mg triamcinolone and 4 ml 0.25% bupivacaine were injected around the medial branch of the superior cluneal nerve.  Images of proper needle position and injectate solution were saved.  The needle was then removed.    The skin was wiped clean, and bandages were placed as appropriate.  There were no apparent complications.  The patient tolerated the procedure well.  The patient was observed for an appropriate period of time and discharged in excellent condition.    Preprocedure pain level: 5/10.  Postprocedure pain level: 3/10.      Donald Coombs MD    "

## 2025-01-09 ENCOUNTER — INFUSION THERAPY VISIT (OUTPATIENT)
Dept: INFUSION THERAPY | Facility: CLINIC | Age: 85
End: 2025-01-09
Attending: INTERNAL MEDICINE
Payer: COMMERCIAL

## 2025-01-09 VITALS
RESPIRATION RATE: 18 BRPM | HEART RATE: 83 BPM | SYSTOLIC BLOOD PRESSURE: 111 MMHG | DIASTOLIC BLOOD PRESSURE: 56 MMHG | TEMPERATURE: 97.7 F | OXYGEN SATURATION: 99 %

## 2025-01-09 DIAGNOSIS — K31.9 DYSPEPSIA AND DISORDER OF FUNCTION OF STOMACH: Primary | ICD-10-CM

## 2025-01-09 DIAGNOSIS — D64.9 ANEMIA, UNSPECIFIED TYPE: ICD-10-CM

## 2025-01-09 DIAGNOSIS — E61.1 IRON DEFICIENCY: ICD-10-CM

## 2025-01-09 DIAGNOSIS — R10.13 DYSPEPSIA AND DISORDER OF FUNCTION OF STOMACH: Primary | ICD-10-CM

## 2025-01-09 PROCEDURE — 258N000003 HC RX IP 258 OP 636: Performed by: INTERNAL MEDICINE

## 2025-01-09 RX ORDER — MEPERIDINE HYDROCHLORIDE 25 MG/ML
25 INJECTION INTRAMUSCULAR; INTRAVENOUS; SUBCUTANEOUS
OUTPATIENT
Start: 2025-01-16

## 2025-01-09 RX ORDER — HEPARIN SODIUM,PORCINE 10 UNIT/ML
5-20 VIAL (ML) INTRAVENOUS DAILY PRN
Status: CANCELLED | OUTPATIENT
Start: 2025-01-09

## 2025-01-09 RX ORDER — DIPHENHYDRAMINE HYDROCHLORIDE 50 MG/ML
50 INJECTION INTRAMUSCULAR; INTRAVENOUS
Start: 2025-01-16

## 2025-01-09 RX ORDER — HEPARIN SODIUM (PORCINE) LOCK FLUSH IV SOLN 100 UNIT/ML 100 UNIT/ML
5 SOLUTION INTRAVENOUS
Status: CANCELLED | OUTPATIENT
Start: 2025-01-09

## 2025-01-09 RX ORDER — DIPHENHYDRAMINE HYDROCHLORIDE 50 MG/ML
50 INJECTION INTRAMUSCULAR; INTRAVENOUS
Status: CANCELLED
Start: 2025-01-16

## 2025-01-09 RX ORDER — HEPARIN SODIUM (PORCINE) LOCK FLUSH IV SOLN 100 UNIT/ML 100 UNIT/ML
5 SOLUTION INTRAVENOUS
Status: CANCELLED | OUTPATIENT
Start: 2025-01-16

## 2025-01-09 RX ORDER — METHYLPREDNISOLONE SODIUM SUCCINATE 40 MG/ML
40 INJECTION INTRAMUSCULAR; INTRAVENOUS
Start: 2025-01-16

## 2025-01-09 RX ORDER — ALBUTEROL SULFATE 0.83 MG/ML
2.5 SOLUTION RESPIRATORY (INHALATION)
OUTPATIENT
Start: 2025-01-16

## 2025-01-09 RX ORDER — METHYLPREDNISOLONE SODIUM SUCCINATE 40 MG/ML
40 INJECTION INTRAMUSCULAR; INTRAVENOUS
Status: CANCELLED
Start: 2025-01-09

## 2025-01-09 RX ORDER — ALBUTEROL SULFATE 90 UG/1
1-2 INHALANT RESPIRATORY (INHALATION)
Status: CANCELLED
Start: 2025-01-16

## 2025-01-09 RX ORDER — METHYLPREDNISOLONE SODIUM SUCCINATE 40 MG/ML
40 INJECTION INTRAMUSCULAR; INTRAVENOUS
Status: CANCELLED
Start: 2025-01-16

## 2025-01-09 RX ORDER — ALBUTEROL SULFATE 0.83 MG/ML
2.5 SOLUTION RESPIRATORY (INHALATION)
Status: CANCELLED | OUTPATIENT
Start: 2025-01-16

## 2025-01-09 RX ORDER — HEPARIN SODIUM (PORCINE) LOCK FLUSH IV SOLN 100 UNIT/ML 100 UNIT/ML
5 SOLUTION INTRAVENOUS
OUTPATIENT
Start: 2025-01-16

## 2025-01-09 RX ORDER — MEPERIDINE HYDROCHLORIDE 25 MG/ML
25 INJECTION INTRAMUSCULAR; INTRAVENOUS; SUBCUTANEOUS
Status: CANCELLED | OUTPATIENT
Start: 2025-01-09

## 2025-01-09 RX ORDER — DIPHENHYDRAMINE HYDROCHLORIDE 50 MG/ML
25 INJECTION INTRAMUSCULAR; INTRAVENOUS
Start: 2025-01-16

## 2025-01-09 RX ORDER — ALBUTEROL SULFATE 0.83 MG/ML
2.5 SOLUTION RESPIRATORY (INHALATION)
Status: CANCELLED | OUTPATIENT
Start: 2025-01-09

## 2025-01-09 RX ORDER — DIPHENHYDRAMINE HYDROCHLORIDE 50 MG/ML
25 INJECTION INTRAMUSCULAR; INTRAVENOUS
Status: CANCELLED
Start: 2025-01-09

## 2025-01-09 RX ORDER — MEPERIDINE HYDROCHLORIDE 25 MG/ML
25 INJECTION INTRAMUSCULAR; INTRAVENOUS; SUBCUTANEOUS
Status: CANCELLED | OUTPATIENT
Start: 2025-01-16

## 2025-01-09 RX ORDER — ALBUTEROL SULFATE 90 UG/1
1-2 INHALANT RESPIRATORY (INHALATION)
Start: 2025-01-16

## 2025-01-09 RX ORDER — ALBUTEROL SULFATE 90 UG/1
1-2 INHALANT RESPIRATORY (INHALATION)
Status: CANCELLED
Start: 2025-01-09

## 2025-01-09 RX ORDER — DIPHENHYDRAMINE HYDROCHLORIDE 50 MG/ML
25 INJECTION INTRAMUSCULAR; INTRAVENOUS
Status: CANCELLED
Start: 2025-01-16

## 2025-01-09 RX ORDER — EPINEPHRINE 1 MG/ML
0.3 INJECTION, SOLUTION INTRAMUSCULAR; SUBCUTANEOUS EVERY 5 MIN PRN
Status: CANCELLED | OUTPATIENT
Start: 2025-01-09

## 2025-01-09 RX ORDER — HEPARIN SODIUM,PORCINE 10 UNIT/ML
5-20 VIAL (ML) INTRAVENOUS DAILY PRN
Status: CANCELLED | OUTPATIENT
Start: 2025-01-16

## 2025-01-09 RX ORDER — EPINEPHRINE 1 MG/ML
0.3 INJECTION, SOLUTION INTRAMUSCULAR; SUBCUTANEOUS EVERY 5 MIN PRN
Status: CANCELLED | OUTPATIENT
Start: 2025-01-16

## 2025-01-09 RX ORDER — DIPHENHYDRAMINE HYDROCHLORIDE 50 MG/ML
50 INJECTION INTRAMUSCULAR; INTRAVENOUS
Status: CANCELLED
Start: 2025-01-09

## 2025-01-09 RX ORDER — HEPARIN SODIUM,PORCINE 10 UNIT/ML
5-20 VIAL (ML) INTRAVENOUS DAILY PRN
OUTPATIENT
Start: 2025-01-16

## 2025-01-09 RX ORDER — EPINEPHRINE 1 MG/ML
0.3 INJECTION, SOLUTION INTRAMUSCULAR; SUBCUTANEOUS EVERY 5 MIN PRN
OUTPATIENT
Start: 2025-01-16

## 2025-01-09 RX ADMIN — SODIUM CHLORIDE 620 MG: 9 INJECTION, SOLUTION INTRAVENOUS at 15:21

## 2025-01-09 RX ADMIN — SODIUM CHLORIDE 250 ML: 9 INJECTION, SOLUTION INTRAVENOUS at 15:21

## 2025-01-09 ASSESSMENT — PAIN SCALES - GENERAL: PAINLEVEL_OUTOF10: MODERATE PAIN (4)

## 2025-01-09 NOTE — PROGRESS NOTES
Infusion Nursing Note:  Patrick JR Wesley presents today for injectafer 1/2.    Patient seen by provider today: No   present during visit today: Not Applicable.    Note: N/A.      Intravenous Access:  Peripheral IV placed.    Treatment Conditions:  Not Applicable.      Post Infusion Assessment:  Patient tolerated infusion without incident.  Patient observed for 30 minutes post injectafer per protocol.  Blood return noted pre and post infusion.  Site patent and intact, free from redness, edema or discomfort.  No evidence of extravasations.  Access discontinued per protocol.       Discharge Plan:   Discharge instructions reviewed with: Patient.  Patient and/or family verbalized understanding of discharge instructions and all questions answered.  Patient discharged in stable condition accompanied by: self.  Departure Mode: Ambulatory.      Arianna Gibson RN

## 2025-01-10 ENCOUNTER — VIRTUAL VISIT (OUTPATIENT)
Dept: NEPHROLOGY | Facility: CLINIC | Age: 85
End: 2025-01-10
Payer: COMMERCIAL

## 2025-01-10 VITALS
DIASTOLIC BLOOD PRESSURE: 56 MMHG | WEIGHT: 93 LBS | BODY MASS INDEX: 16.48 KG/M2 | SYSTOLIC BLOOD PRESSURE: 111 MMHG | HEIGHT: 63 IN

## 2025-01-10 DIAGNOSIS — D50.9 IRON DEFICIENCY ANEMIA, UNSPECIFIED IRON DEFICIENCY ANEMIA TYPE: ICD-10-CM

## 2025-01-10 DIAGNOSIS — E55.9 VITAMIN D DEFICIENCY, UNSPECIFIED: ICD-10-CM

## 2025-01-10 DIAGNOSIS — N18.31 STAGE 3A CHRONIC KIDNEY DISEASE (H): Primary | ICD-10-CM

## 2025-01-10 PROCEDURE — 98005 SYNCH AUDIO-VIDEO EST LOW 20: CPT

## 2025-01-10 NOTE — LETTER
"1/10/2025       RE: Patrick Olson  [address] [facility] [address]     Dear Colleague,    Thank you for referring your patient, Patrick Olson, to the [facility] [place] [facility]. Please see a copy of my visit note below.    Virtual Visit Details    Type of service:  Video Visit   Video Start Time:  1430  Video End Time: 1448    Originating Location (pt. Location): Home    Distant Location (provider location):  On-site  Platform used for Video Visit: [facility]      Nephrology Video Visit 1/10/25    Assessment and Plan:    CKD3a - Stable. Creat 1.0, eGFR 54 ml/mn. 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 Recurrent GIB - Hgb 11.2 ( 10/31/24)   - Had hospital admission 7/15-7/21/24 for rectal bleeding.Hgb had dropped to 6.8  Per discharge Summary:      \" Received total 5 units of PRBC, 1 unit platelets, 2 units plasma.  **CT on 7/16/24 -No convincing active arterial GI bleed, pancolitis that could be infectious or inflammatory, findings suggestive of blood in descending colon.  **Tagged red blood cell scan on 7/17/24 with no evidence of active gastrointestinal bleeding.  ** Status post flexible sigmoidoscopy 7/17/24 with severe diverticulosis in the rectum, rectosigmoid colon, sigmoid colon, descending colon and transverse colon with no specific source of bleeding identified though blood was found in the entire examined colon.  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 There was residual blood in the colon   - Prescribed Protonix   - seen by GI 7/23/24 for follow up.    - seen by Heme/Onc for her anemia 11/7/24   - Receiving Injectafer through her PCP    3. CV - Clinic b/ps ~111-128/56-65 HR 78       4. RA - Off Methotrexate and now on Plaquenil. Using Pilocarpine for dry mouth   - Had Rheum follow up 11/4/24 and Plaquenil continued.     5. Electrolytes - No acute concerns. K 5.0 Na 138     6. Acid base - No acute concerns. Bicarb 26    7. BMD - Ca 9.6  Phos 4.1 Albumin 4.2   Vit D 76 PTH 41 ( 5/24)   Remains on Ca/D one per day    8. Disposition - RTC 6 months for follow up w/labs prior     Assessment and plan was discussed with patient and she voiced her understanding and agreement.    Reason for Visit:  CKD3a    HPI:  Ms Bang is an 83 yo female with RA, Recurrent GIB, CKD3 present today for routine CKD follow up   Lasts seen in clinic by me on 6/21/24 .   Baseline creat 1.1-1.2    ROS:   A comprehensive review of systems was obtained and negative, except as noted in the HPI or PMH.  Denies NSAIDs  Home blood pressures 100/69 range  No black or bloody stools  Participating in regular exercise ( walking and exercise class)  No edema  Trying to gain weight. Drinking one high protein supplement per day    Chronic Health Problems:    RA ( previously on Methotrexate)   Recurrent GIB  CKD3   Anemia  Diverticulosis    Family Hx:   No family history on file.    Personal Hx:   , retired [employer] , NS, ETOH none    Allergies:  Allergies   Allergen Reactions     Bee Venom Anaphylaxis     Shrimp Anaphylaxis     Evoxac [Cevimeline] Unknown     Prevnar Swelling     Other reaction(s): Edema     Prevnar [Pneumococcal 13-Linda Conj Vacc] Unknown       Medications:  Current Outpatient Medications   Medication Sig Dispense Refill     acetaminophen (TYLENOL) 325 MG tablet Take 2 tablets (650 mg) by mouth every 6 hours    "    amoxicillin (AMOXIL) 500 MG capsule TAKE 1 CAPSULE BY MOUTH THREE TIMES DAILY UNTIL ALL TAKEN       artificial saliva (BIOTENE DRY MOUTHWASH) LIQD liquid Swish and spit in mouth 4 times daily as needed for dry mouth       calcium carbonate-vitamin D (CALTRATE) 600-10 MG-MCG per tablet Take 1 tablet by mouth daily       carboxymethylcellulose PF (REFRESH PLUS) 0.5 % ophthalmic solution Place 1 drop into both eyes 3 times daily as needed for dry eyes       clobetasol (TEMOVATE) 0.05 % external ointment Apply topically 2 times daily To right ankle as needed 60 g 3     EPINEPHrine (ANY BX GENERIC EQUIV) 0.3 MG/0.3ML injection 2-pack Inject 0.3 mg into the muscle as needed for anaphylaxis       famotidine (PEPCID) 20 MG tablet Take 20 mg by mouth 2 times daily.       gabapentin (NEURONTIN) 300 MG capsule Take 1 capsule (300 mg) by mouth 3 times daily. 90 capsule 11     hydroxychloroquine (PLAQUENIL) 200 MG tablet Take 1 tablet (200 mg) by mouth daily 90 tablet 1     lifitegrast (XIIDRA) 5 % opthalmic solution Place 1 drop into both eyes 2 times daily       mirtazapine (REMERON) 7.5 MG tablet Take 1 tablet (7.5 mg) by mouth at bedtime. 30 tablet 11     Multiple Vitamins-Minerals (OCUVITE PRESERVISION PO) Take 1 tablet by mouth 2 times daily       pantoprazole (PROTONIX) 40 MG EC tablet Take 1 tablet (40 mg) by mouth daily 30 tablet 11     UNABLE TO FIND MEDICATION NAME: V (vein formular)       No current facility-administered medications for this visit.      Vitals:  /56   Ht 1.6 m (5' 3\")   Wt 42.2 kg (93 lb)   BMI 16.47 kg/m      Exam:  GENERAL APPEARANCE: alert and no distress  RESP: Breathing is non labored. Speaking in full sentences  NEURO: mentation intact and speech normal  PSYCH: affect normal/bright    LABS:   CMP  Recent Labs   Lab Test 01/03/25  1413 10/31/24  1356 09/26/24  1044 09/12/24  1608 10/01/21  0927 01/23/21  0902 01/20/21  0851 01/19/21 2001 03/13/17  1042    142 140 142   < > " 143 142 142 144   POTASSIUM 5.0 4.7 4.8 5.6*   < > 3.6 4.7 4.0 3.7   CHLORIDE 102 105 103 105   < > 114* 110* 108 107   CO2 26 27 26 27   < > 26 28 30 28   ANIONGAP 10 10 11 10   < > 3 4 3 9   GLC 96 95 95 151*   < > 157* 95 121* 98   BUN 39.4* 26.4* 21.0 26.9*   < > 6* 17 24 12   CR 1.03* 0.96* 0.95 0.97*   < > 0.75 0.82 0.94 0.78   GFRESTIMATED 54* 59* 60* 58*   < > 76 69 58* 73   GFRESTBLACK  --   --   --   --   --  88 80 67 88   EJ 9.6 9.8 10.2 9.8   < > 8.0* 8.8 9.4 9.0    < > = values in this interval not displayed.     Recent Labs   Lab Test 10/31/24  1356 09/12/24  1608 07/18/24  0828 07/16/24  0704   BILITOTAL 0.3 0.2 0.6 0.5   ALKPHOS 83 73 55 56   ALT 30 17 15 16   AST 40 32 30 23     CBC  Recent Labs   Lab Test 10/31/24  1356 09/12/24  1608 07/30/24  1141 07/21/24  0706 07/18/24  1502 07/18/24  0828 07/16/24  1002 07/16/24  0704   HGB 11.2* 11.3* 9.3* 8.0*   < > 10.5*   < > 7.7*   WBC 4.0 4.1  --   --   --  7.5  --  4.3   RBC 3.55* 3.44*  --   --   --  3.42*  --  2.32*   HCT 35.7 35.3  --   --   --  32.1*  --  23.2*   * 103*  --   --   --  94  --  100   MCH 31.5 32.8  --   --   --  30.7  --  33.2*   MCHC 31.4* 32.0  --   --   --  32.7  --  33.2   RDW 12.9 14.6  --   --   --  16.3*  --  13.8    225  --   --   --  161  --  161    < > = values in this interval not displayed.     URINE STUDIES  Recent Labs   Lab Test 09/01/23  1229 08/21/23  1019 08/13/23  1017 02/07/23  1000   COLOR Light Yellow Yellow Straw Yellow   APPEARANCE Clear Clear Clear Clear   URINEGLC Negative Negative Negative Negative   URINEBILI Negative Negative Negative Negative   URINEKETONE Negative Trace* Negative Negative   SG 1.009 1.015 1.009 1.015   UBLD Negative Trace* Negative Small*   URINEPH 7.0 6.5 5.0 6.0   PROTEIN Negative Negative Negative Negative   UROBILINOGEN  --  0.2  --  0.2   NITRITE Negative Negative Negative Negative   LEUKEST Negative Small* Negative Negative   RBCU 1 0-2 <1 0-2   WBCU 2 5-10* 1 0-5      Recent Labs   Lab Test 03/10/22  1420   [facility] 0.27*     PTH  Recent Labs   Lab Test 05/02/24  1415 08/21/23  1014 02/07/23  1000   PTHI 41 38 43     IRON STUDIES  Recent Labs   Lab Test 01/03/25  1413 10/31/24  1356 09/12/24  1608 05/02/24  1415 10/04/23  1320   IRON  --  77  --  57 45   FEB  --  343  --  267 290   IRONSAT  --  22  --  21 16   ANMOL 57 34 42 69 42       Francine Bruner NP        Again, thank you for allowing me to participate in the care of your patient.      Sincerely,    Francine Bruner NP

## 2025-01-10 NOTE — PROGRESS NOTES
Virtual Visit Details    Type of service:  Video Visit   Video Start Time:  1430  Video End Time: 1448    Originating Location (pt. Location): Home    Distant Location (provider location):  On-site  Platform used for Video Visit: "[employer]     clear

## 2025-01-10 NOTE — NURSING NOTE
Current patient location: [address] [place] [id number]    Is the patient currently in the state of [place]? YES    Visit mode: VIDEO    If the visit is dropped, the patient can be reconnected by:VIDEO VISIT: Send to e-mail at: [email or web address]    Will anyone else be joining the visit? NO  (If patient encounters technical issues they should call [phone]    Are changes needed to the allergy or medication list? No    Are refills needed on medications prescribed by this physician? NO    Rooming Documentation:  Questionnaire(s) completed    Reason for visit: RONAN CHARLES

## 2025-01-11 NOTE — PROGRESS NOTES
"Nephrology Video Visit 1/10/25    Assessment and Plan:    CKD3a - Stable. Creat 1.0, eGFR 54 ml/mn. 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 Recurrent GIB - Hgb 11.2 ( 10/31/24)   - Had hospital admission 7/15-7/21/24 for rectal bleeding.Hgb had dropped to 6.8  Per discharge Summary:      \" Received total 5 units of PRBC, 1 unit platelets, 2 units plasma.  **CT on 7/16/24 -No convincing active arterial GI bleed, pancolitis that could be infectious or inflammatory, findings suggestive of blood in descending colon.  **Tagged red blood cell scan on 7/17/24 with no evidence of active gastrointestinal bleeding.  ** Status post flexible sigmoidoscopy 7/17/24 with severe diverticulosis in the rectum, rectosigmoid colon, sigmoid colon, descending colon and transverse colon with no specific source of bleeding identified though blood was found in the entire examined colon.  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 There was residual blood in the colon   - Prescribed Protonix   - seen by GI 7/23/24 for follow up.    - seen by Heme/Onc for her anemia 11/7/24   - Receiving Injectafer through her PCP    3. CV - Clinic b/ps ~111-128/56-65 HR 78       4. RA - Off Methotrexate and now on Plaquenil. Using Pilocarpine for dry mouth   - Had Rheum follow up 11/4/24 and Plaquenil continued.     5. Electrolytes - No acute concerns. K 5.0 Na 138     6. Acid base - No acute concerns. Bicarb 26    7. BMD - Ca 9.6  Phos 4.1 " Albumin 4.2   Vit D 76 PTH 41 ( 5/24)   Remains on Ca/D one per day    8. Disposition - RTC 6 months for follow up w/labs prior     Assessment and plan was discussed with patient and she voiced her understanding and agreement.    Reason for Visit:  CKD3a    HPI:  Ms Bang is an 83 yo female with RA, Recurrent GIB, CKD3 present today for routine CKD follow up   Lasts seen in clinic by me on 6/21/24 .   Baseline creat 1.1-1.2    ROS:   A comprehensive review of systems was obtained and negative, except as noted in the HPI or PMH.  Denies NSAIDs  Home blood pressures 100/69 range  No black or bloody stools  Participating in regular exercise ( walking and exercise class)  No edema  Trying to gain weight. Drinking one high protein supplement per day    Chronic Health Problems:    RA ( previously on Methotrexate)   Recurrent GIB  CKD3   Anemia  Diverticulosis    Family Hx:   No family history on file.    Personal Hx:   , retired [employer] , NS, ETOH none    Allergies:  Allergies   Allergen Reactions    Bee Venom Anaphylaxis    Shrimp Anaphylaxis    Evoxac [Cevimeline] Unknown    Prevnar Swelling     Other reaction(s): Edema    Prevnar [Pneumococcal 13-Linda Conj Vacc] Unknown       Medications:  Current Outpatient Medications   Medication Sig Dispense Refill    acetaminophen (TYLENOL) 325 MG tablet Take 2 tablets (650 mg) by mouth every 6 hours      amoxicillin (AMOXIL) 500 MG capsule TAKE 1 CAPSULE BY MOUTH THREE TIMES DAILY UNTIL ALL TAKEN      artificial saliva (BIOTENE DRY MOUTHWASH) LIQD liquid Swish and spit in mouth 4 times daily as needed for dry mouth      calcium carbonate-vitamin D (CALTRATE) 600-10 MG-MCG per tablet Take 1 tablet by mouth daily      carboxymethylcellulose PF (REFRESH PLUS) 0.5 % ophthalmic solution Place 1 drop into both eyes 3 times daily as needed for dry eyes      clobetasol (TEMOVATE) 0.05 % external ointment Apply topically 2 times daily To right ankle as needed 60 g 3     "EPINEPHrine (ANY BX GENERIC EQUIV) 0.3 MG/0.3ML injection 2-pack Inject 0.3 mg into the muscle as needed for anaphylaxis      famotidine (PEPCID) 20 MG tablet Take 20 mg by mouth 2 times daily.      gabapentin (NEURONTIN) 300 MG capsule Take 1 capsule (300 mg) by mouth 3 times daily. 90 capsule 11    hydroxychloroquine (PLAQUENIL) 200 MG tablet Take 1 tablet (200 mg) by mouth daily 90 tablet 1    lifitegrast (XIIDRA) 5 % opthalmic solution Place 1 drop into both eyes 2 times daily      mirtazapine (REMERON) 7.5 MG tablet Take 1 tablet (7.5 mg) by mouth at bedtime. 30 tablet 11    Multiple Vitamins-Minerals (OCUVITE PRESERVISION PO) Take 1 tablet by mouth 2 times daily      pantoprazole (PROTONIX) 40 MG EC tablet Take 1 tablet (40 mg) by mouth daily 30 tablet 11    UNABLE TO FIND MEDICATION NAME: V (vein formular)       No current facility-administered medications for this visit.      Vitals:  /56   Ht 1.6 m (5' 3\")   Wt 42.2 kg (93 lb)   BMI 16.47 kg/m      Exam:  GENERAL APPEARANCE: alert and no distress  RESP: Breathing is non labored. Speaking in full sentences  NEURO: mentation intact and speech normal  PSYCH: affect normal/bright    LABS:   CMP  Recent Labs   Lab Test 01/03/25  1413 10/31/24  1356 09/26/24  1044 09/12/24  1608 10/01/21  0927 01/23/21  0902 01/20/21  0851 01/19/21 2001 03/13/17  1042    142 140 142   < > 143 142 142 144   POTASSIUM 5.0 4.7 4.8 5.6*   < > 3.6 4.7 4.0 3.7   CHLORIDE 102 105 103 105   < > 114* 110* 108 107   CO2 26 27 26 27   < > 26 28 30 28   ANIONGAP 10 10 11 10   < > 3 4 3 9   GLC 96 95 95 151*   < > 157* 95 121* 98   BUN 39.4* 26.4* 21.0 26.9*   < > 6* 17 24 12   CR 1.03* 0.96* 0.95 0.97*   < > 0.75 0.82 0.94 0.78   GFRESTIMATED 54* 59* 60* 58*   < > 76 69 58* 73   GFRESTBLACK  --   --   --   --   --  88 80 67 88   JE 9.6 9.8 10.2 9.8   < > 8.0* 8.8 9.4 9.0    < > = values in this interval not displayed.     Recent Labs   Lab Test 10/31/24  1356 09/12/24  1608 " 07/18/24  0828 07/16/24  0704   BILITOTAL 0.3 0.2 0.6 0.5   ALKPHOS 83 73 55 56   ALT 30 17 15 16   AST 40 32 30 23     CBC  Recent Labs   Lab Test 10/31/24  1356 09/12/24  1608 07/30/24  1141 07/21/24  0706 07/18/24  1502 07/18/24  0828 07/16/24  1002 07/16/24  0704   HGB 11.2* 11.3* 9.3* 8.0*   < > 10.5*   < > 7.7*   WBC 4.0 4.1  --   --   --  7.5  --  4.3   RBC 3.55* 3.44*  --   --   --  3.42*  --  2.32*   HCT 35.7 35.3  --   --   --  32.1*  --  23.2*   * 103*  --   --   --  94  --  100   MCH 31.5 32.8  --   --   --  30.7  --  33.2*   MCHC 31.4* 32.0  --   --   --  32.7  --  33.2   RDW 12.9 14.6  --   --   --  16.3*  --  13.8    225  --   --   --  161  --  161    < > = values in this interval not displayed.     URINE STUDIES  Recent Labs   Lab Test 09/01/23  1229 08/21/23  1019 08/13/23  1017 02/07/23  1000   COLOR Light Yellow Yellow Straw Yellow   APPEARANCE Clear Clear Clear Clear   URINEGLC Negative Negative Negative Negative   URINEBILI Negative Negative Negative Negative   URINEKETONE Negative Trace* Negative Negative   SG 1.009 1.015 1.009 1.015   UBLD Negative Trace* Negative Small*   URINEPH 7.0 6.5 5.0 6.0   PROTEIN Negative Negative Negative Negative   UROBILINOGEN  --  0.2  --  0.2   NITRITE Negative Negative Negative Negative   LEUKEST Negative Small* Negative Negative   RBCU 1 0-2 <1 0-2   WBCU 2 5-10* 1 0-5     Recent Labs   Lab Test 03/10/22  1420   [facility] 0.27*     PTH  Recent Labs   Lab Test 05/02/24  1415 08/21/23  1014 02/07/23  1000   PTHI 41 38 43     IRON STUDIES  Recent Labs   Lab Test 01/03/25  1413 10/31/24  1356 09/12/24  1608 05/02/24  1415 10/04/23  1320   IRON  --  77  --  57 45   FEB  --  343  --  267 290   IRONSAT  --  22  --  21 16   ANMOL 57 34 42 69 42       Francine Bruner, NP

## 2025-01-29 ENCOUNTER — OFFICE VISIT (OUTPATIENT)
Dept: GASTROENTEROLOGY | Facility: CLINIC | Age: 85
End: 2025-01-29
Payer: COMMERCIAL

## 2025-01-29 VITALS
HEIGHT: 63 IN | BODY MASS INDEX: 16.3 KG/M2 | DIASTOLIC BLOOD PRESSURE: 77 MMHG | WEIGHT: 92 LBS | HEART RATE: 71 BPM | SYSTOLIC BLOOD PRESSURE: 123 MMHG | OXYGEN SATURATION: 100 %

## 2025-01-29 DIAGNOSIS — K57.31 DIVERTICULOSIS OF LARGE INTESTINE WITH HEMORRHAGE: ICD-10-CM

## 2025-01-29 DIAGNOSIS — R63.6 UNDERWEIGHT: ICD-10-CM

## 2025-01-29 DIAGNOSIS — E46 PROTEIN-CALORIE MALNUTRITION, UNSPECIFIED SEVERITY: Primary | ICD-10-CM

## 2025-01-29 DIAGNOSIS — K86.2 PANCREATIC CYST: ICD-10-CM

## 2025-01-29 PROCEDURE — 99215 OFFICE O/P EST HI 40 MIN: CPT | Performed by: INTERNAL MEDICINE

## 2025-01-29 ASSESSMENT — PAIN SCALES - GENERAL: PAINLEVEL_OUTOF10: NO PAIN (0)

## 2025-01-29 NOTE — NURSING NOTE
"Do you have a history of colon cancer in your immediate family? NO    If yes who: negative     And what age  were they diagnosed: n/a      Chief Complaint   Patient presents with    Follow Up       Vitals:    01/29/25 1121   BP: 123/77   Pulse: 71   SpO2: 100%   Weight: 41.7 kg (92 lb)   Height: 1.6 m (5' 3\")       Body mass index is 16.3 kg/m .    Addy Echeverria MA      "

## 2025-01-29 NOTE — LETTER
1/29/2025      Patrick Olson  [address]  [facility] [address]      Dear Colleague,    Thank you for referring your patient, Patrick Olson, to the [facility] [place]. Please see a copy of my visit note below.    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 Most recent GIB evaluation in July 2024 with STAT CTA negative, tagged RBC negative, and flex sig with no active bleeding seen. Diverticular felt to be cause; CRS consulted, though patient declined any surgical intervention.     Treatment with PPI for duodenal erosions seen in the past (2023) and continued for dyspepsia.     Malnutrition with limited caloric intake and weight loss with each hospitalization (prolonged NPO periods). Has gained 5 lbs since seeing GI dietitian and working to add protein supplementation to diet. Encouraged to continue her work to add snacks to her regimen and check back in with GI dietitian to discuss additional ways to add calories to foods she is already eating.     No marked issues with constipation today - has required Miralax at times in the past.     Also reviewed plan for repeat imaging after small subcentimeter cyst was seen in pancreas on most recent CTA.       Plan:  - schedule an MRCP to follow-up the small cyst seen in your pancreas during your July 2024 hospitalization. Call:   Imaging Studies: If you were  scheduled for a CT scan, X-ray, MRI, ultrasound, HIDA scan or other imaging study, please call [phone] to have this scheduled  - continue your excellent efforts to stay active and work with your care team on your back pain  - continue your efforts to increase your daily calories  - agree with adding nutritional supplements and snacks to your daily schedule - we need to further increase your weight to ensure you have the energy to do the things you want to do  - ok to continue Lactaid pills before lactose-containing dairy  - schedule a visit with Iesha Hu RD - GI dietitian  - follow-up in GI clinic as already scheduled       It was a pleasure to participate in the care of this patient; please contact us with any further questions.  A total of 20 face-to-face minutes was spent with this patient, >50% of which was counseling regarding the above delineated issues. An additional 25 minutes was spent on the date of the encounter doing chart review, documentation, and further activities as noted above.    Altagracia Martinez MD  Division of Gastroenterology, Hepatology and Nutrition  [facility]    ---------------------------------------------------------------------------------------------------  HPI:    Ms. Olson is an 84 year old female with RA (stopped methotrexate March 2023, now on hydroxycholoroquine) and secondary Sjogren's, lumbar degenerative disc disease, chronic lumbago, right shoulder pain, OA, osteoporosis, and recurrent GI bleeding (AVM, possible diverticular, polyp-related --> see below and hemorrhoid) who comes for follow up on GI bleeding and weight loss. She has previously followed with [facility] for her GI care (since 2017 per her report) and has undergone endoscopic exams at [facility], [facility], and [facility] since 2010. She has been seen by multiple providers with our group including Dr. Martinez, APSCALE Valljeo and Dr. Gatica.  "Her last visit with this writer was 1/24/24 and she was most recently seen by Ms. Delacruz on 7/23/24.     Dating back to at least 2010 Ms. Olson has experienced recurrent overt GI bleeding with anemia.  She underwent inpatient colonoscopic exams with both diverticulosis and hemorrhoids on these exams. Diverticular bleeding had been felt to be a contributor given her clinical presentation, though no active bleeding lesion was ever identified.      She had EGD and colonoscopy 5/26/21 at [facility] for anemia. EGD revealed a non-bleeding gastric AVM which was treated with APC. Colonoscopy demonstrated diverticulosis and small hemorrhoids. Later in 2021, Ms. Olson was referred to our [facility] group given these findings and anemia, but prior to the appointment was admitted to [facility] with hematochezia (per patient this was \"a bit darker\" than her prior bleeding episodes) in Nov 2021. A CTA did not demonstrate any active extravasation. Thus a capsule was done with bleeding seen in the distal ileum to cecum on prelim capsule read. Colonoscopy with ileal intubation  (20 cm beyond the IC valve) was done thereafter with no active bleeding found, but a very large 30 mm polyp was found on the IC valve and removed via EMR. This was felt to be a likely source of bleeding. No other findings were seen in the area of bleeding noted on capsule endoscopy.  She underwent a surveillance colonoscopy to re-evaluate the area of piecemeal EMR in Sept 2022, at at presentation for that procedure reported hematochezia for two days. The TI and right colon appeared normal and a small piece of residual polyp was removed from the polypectomy site. Blood was seen in the transverse to descending colon - some old clot, some BRB. This was cleared and no active bleeding was ever seen, though diverticulae were noted in the area.     She did well until Jan 2023 when she had recurrent bleeding. She underwent colonoscopy 1/13/23 which showed no " inflammation but residual specks of blood throughout bowel (even 30 cm into TI) and extensive diverticulosis with no active bleeding. EGD was subsequently completed 2/6/23 which showed duodenal erosion without bleeding, normal stomach and esophagus. VCE the following week 2/14/23 showed multiple localized erosions with no bleeding in the duodenum (likely first portion), single small angioectasia without bleeding was seen in the small bowel (probable duodenum), and lymphangiectasia in the small bowel (mid small bowel), one of these was peduncluated and polypoid, not bleeding, benign appearing.  A single spot with no bleeding was found in the colon (probable cecum). She completed 12 week course of omeprazole (while off of methotrexate), starting March for dyspepsia and finding of duodenal erosions and reported dyspepsia.    She again had several days of hematochezia that prompted hospitalization in August 2023. She was hospitalized 8/12/23-8/14/23 at [facility], Hgb with mild decrease to 9's, she remained hemodynamically stable. She underwent colonoscopy which showed diverticulosis throughout the colon, no active bleeding noted. Examined ileum was normal. Mild internal hemorrhoids felt not to be source of bleed. Per inpatient assessment, diverticular bleeding most likely reason for repeated episodes of hematochezia.     She has been evaluated by both her PCP and hematology for anemia, which has been attributed to GI blood loss, anemia of chronic disease, and medication effect. .In the setting of GI bleeding she was given IV iron infusions which she has since completed, has taken PO iron supplementation in the past.    Seen by GI Dr. Donald Gatica in 8/2023 and felt that the most recent bleeding was lower GI due to diverticular bleeding or rectal outlet bleeding. Also seen by Belen ASHRAF in 11/2023 with what describes as minimal rectal outlet bleeding.  Was recommended MiraLAX for constipation and omeprazole for  dyspepsia.    In Jan 2024, she'd had no further bleeding. She'd slowly lost weight over time reporting that she's always had small meals and did continue to eat, but review of dietary recall revealed limited caloric intake.She was open to seeing GI dietitian. She'd increased ingestion of butter, ice cream, dairy but had increased symptoms of diarrhea, cramping which resolved with avoidance of dairy.     By July 2024, she was hospitalized again for hematochezia. CTA and tagged RBC scan were negative. Flex sig did not identify active bleeding and diverticular bleed was suspected. CRS was consulted, though Ms. Olson declined consideration of colectomy, even as a last resort to control bleeding. She was seen in GI clinic shortly after discharge. She had not yet seen a GI dietitian and was again encouraged to. She was seen in Oct 2024.     Today, Wesley reported that she has not have any bleeding episodes since July 2024.  Regarding her weight she was happy that she was able to gain 5 pounds since fall 2024.  In terms of her relation to food he states that her stomach has gotten too small and she cannot eat large meals.  She mention she has already had a piece of toast for breakfast with jam and water, and for lunch she will have chicken with rice and sometimes she would skip dinner.  She also reported that she has been trying to drink more protein shake however she was only able to drink half a can daily.  She reported good appetite and denied any nausea, vomiting, diarrhea, constipation, bloating, heartburn or depression. She's had no issues with her bowel movements and is otherwise feeling well. She has undergone iron infusions since her last GI bleed (didn't tolerate oral iron due to constipation) and feels her energy is markedly improved.     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     Priority: Medium     Loss of appetite 09/12/2024     Priority: Medium     Lumbar radiculopathy, acute 09/12/2024     Priority: Medium     Protein-calorie malnutrition, unspecified severity 09/12/2024     Priority: Medium     Small bowel arteriovenous malformation 09/12/2024     Priority: Medium     Gastric AVM 09/12/2024     Priority: Medium     Underweight 09/12/2024     Priority: Medium     History of GI bleed 09/12/2024     Priority: Medium     Bilateral hearing loss, unspecified hearing loss type 09/12/2024     Priority: Medium     Complaints of memory disturbance 09/12/2024     Priority: Medium     Rheumatoid arthritis involving multiple sites with positive rheumatoid factor (H) 09/12/2024     Priority: Medium     Scoliosis of thoracolumbar spine, unspecified scoliosis type 07/22/2024     Priority: Medium     Facet arthropathy, lumbosacral 07/22/2024     Priority: Medium     Lower GI bleeding 07/15/2024     Priority: Medium     Anemia, unspecified type 07/15/2024     Priority: Medium     Chronic bilateral low back pain with bilateral sciatica 01/23/2024     Priority: Medium     Lower GI bleed 08/12/2023     Priority: Medium     Chronic pain of right elbow 07/11/2023     Priority: Medium     Right wrist pain 07/11/2023     Priority: Medium     Lab test negative for COVID-19 virus 11/18/2021     Priority: Medium     Diverticulosis of large intestine      Priority: Medium     Acute lower GI bleeding 01/21/2021     Priority: Medium     Diverticular hemorrhage 01/20/2021     Priority: Medium     Gastrointestinal hemorrhage, unspecified gastrointestinal hemorrhage type 01/19/2021     Priority: Medium     DDD (degenerative disc disease), lumbosacral 03/04/2019     Priority: Medium     GI bleed 03/13/2017     Priority: Medium     SNHL (sensorineural hearing loss) 11/29/2012     Priority: Medium       PERTINENT MEDICATIONS:  Current Outpatient  "Medications   Medication Sig Dispense Refill     acetaminophen (TYLENOL) 325 MG tablet Take 2 tablets (650 mg) by mouth every 6 hours       amoxicillin (AMOXIL) 500 MG capsule TAKE 1 CAPSULE BY MOUTH THREE TIMES DAILY UNTIL ALL TAKEN       artificial saliva (BIOTENE DRY MOUTHWASH) LIQD liquid Swish and spit in mouth 4 times daily as needed for dry mouth       calcium carbonate-vitamin D (CALTRATE) 600-10 MG-MCG per tablet Take 1 tablet by mouth daily       carboxymethylcellulose PF (REFRESH PLUS) 0.5 % ophthalmic solution Place 1 drop into both eyes 3 times daily as needed for dry eyes       clobetasol (TEMOVATE) 0.05 % external ointment Apply topically 2 times daily To right ankle as needed 60 g 3     EPINEPHrine (ANY BX GENERIC EQUIV) 0.3 MG/0.3ML injection 2-pack Inject 0.3 mg into the muscle as needed for anaphylaxis       famotidine (PEPCID) 20 MG tablet Take 20 mg by mouth 2 times daily.       gabapentin (NEURONTIN) 300 MG capsule Take 1 capsule (300 mg) by mouth 3 times daily. 90 capsule 11     hydroxychloroquine (PLAQUENIL) 200 MG tablet Take 1 tablet (200 mg) by mouth daily 90 tablet 1     lifitegrast (XIIDRA) 5 % opthalmic solution Place 1 drop into both eyes 2 times daily       mirtazapine (REMERON) 7.5 MG tablet Take 1 tablet (7.5 mg) by mouth at bedtime. 30 tablet 11     Multiple Vitamins-Minerals (OCUVITE PRESERVISION PO) Take 1 tablet by mouth 2 times daily       pantoprazole (PROTONIX) 40 MG EC tablet Take 1 tablet (40 mg) by mouth daily 30 tablet 11     UNABLE TO FIND MEDICATION NAME: V (vein formular)       No current facility-administered medications for this visit.           PHYSICAL EXAMINATION:  Vitals /77   Pulse 71   Ht 1.6 m (5' 3\")   Wt 41.7 kg (92 lb)   SpO2 100%   BMI 16.30 kg/m     Wt   Wt Readings from Last 2 Encounters:   01/29/25 41.7 kg (92 lb)   01/09/25 42.2 kg (93 lb)      Gen: Pt sitting up in NAD, interactive and cooperative on exam  Eyes: sclerae anicteric, no " injection  ENT:  MMM  Resp: Breathing comfortably on exam, speaking in full sentences  Skin: No jaundice  Neuro: alert, oriented, answers questions appropriately        PERTINENT STUDIES:  Lab on 01/03/2022   Component Date Value Ref Range Status     Rheumatoid Factor 01/03/2022 8  <12 IU/mL Final     Sodium 01/03/2022 142  133 - 144 mmol/L Final     Potassium 01/03/2022 4.6  3.4 - 5.3 mmol/L Final     Chloride 01/03/2022 109  94 - 109 mmol/L Final     Carbon Dioxide (CO2) 01/03/2022 29  20 - 32 mmol/L Final     Anion Gap 01/03/2022 4  3 - 14 mmol/L Final     Urea Nitrogen 01/03/2022 18  7 - 30 mg/dL Final     Creatinine 01/03/2022 0.99  0.52 - 1.04 mg/dL Final     Calcium 01/03/2022 9.6  8.5 - 10.1 mg/dL Final     Glucose 01/03/2022 104* 70 - 99 mg/dL Final     Alkaline Phosphatase 01/03/2022 75  40 - 150 U/L Final     AST 01/03/2022 24  0 - 45 U/L Final     ALT 01/03/2022 24  0 - 50 U/L Final     Protein Total 01/03/2022 7.5  6.8 - 8.8 g/dL Final     Albumin 01/03/2022 3.9  3.4 - 5.0 g/dL Final     Bilirubin Total 01/03/2022 0.4  0.2 - 1.3 mg/dL Final     GFR Estimate 01/03/2022 58* >60 mL/min/1.73m2 Final     CRP Inflammation 01/03/2022 <2.9  0.0 - 8.0 mg/L Final     Cyclic Citrullinated Peptide Antib* 01/03/2022 3.6  <7.0 U/mL Final     Erythrocyte Sedimentation Rate 01/03/2022 39* 0 - 30 mm/hr Final     WBC Count 01/03/2022 3.9* 4.0 - 11.0 10e3/uL Final     RBC Count 01/03/2022 3.27* 3.80 - 5.20 10e6/uL Final     Hemoglobin 01/03/2022 10.5* 11.7 - 15.7 g/dL Final     Hematocrit 01/03/2022 32.5* 35.0 - 47.0 % Final     MCV 01/03/2022 99  78 - 100 fL Final     MCH 01/03/2022 32.1  26.5 - 33.0 pg Final     MCHC 01/03/2022 32.3  31.5 - 36.5 g/dL Final     RDW 01/03/2022 13.7  10.0 - 15.0 % Final     Platelet Count 01/03/2022 226  150 - 450 10e3/uL Final     % Neutrophils 01/03/2022 55  % Final     % Lymphocytes 01/03/2022 32  % Final     % Monocytes 01/03/2022 10  % Final     % Eosinophils 01/03/2022 4  % Final      % Basophils 01/03/2022 0  % Final     Absolute Neutrophils 01/03/2022 2.2  1.6 - 8.3 10e3/uL Final     Absolute Lymphocytes 01/03/2022 1.3  0.8 - 5.3 10e3/uL Final     Absolute Monocytes 01/03/2022 0.4  0.0 - 1.3 10e3/uL Final     Absolute Eosinophils 01/03/2022 0.1  0.0 - 0.7 10e3/uL Final     Absolute Basophils 01/03/2022 0.0  0.0 - 0.2 10e3/uL Final       Video Capsule Endoscopy 2/14/23:  Findings:        The gastric passage time of the capsule enteroscope was 5-hours 1-minute.        The small bowel passage time of the capsule enteroscope was 1-hour 25-minutes.        The entire examined stomach was normal.        Multiple localized erosions with no bleeding were found in the duodenum        (probable first portion of the duodenum).        A single small angioectasia without bleeding was seen in the small bowel        (probable duodenum).        Lymphangiectasia was found in the small bowel (mid small bowel). One of        these was peduncluated and polypoid, not bleeding, benign appearing.        A single spot with no bleeding was found in the colon (probable cecum).                                                                Impression:              - Normal stomach.   - Duodenal erosion.   - A single angioectasia without bleeding in the duodenum.   - Small bowel lymphangiectasia.   - A single mucosal spot with no bleeding in the colon.   - No bleeding seen during exam.   - The capsule entered the colon.       EGD 02/06/23  Impression:              - Normal esophagus.   - Normal stomach.   - Duodenal erosion without bleeding.   - No specimens collected.       Colonoscopy 01/13/23  Findings:        Residual specks of blood throughout (exam to 30 cm into the TI). No        inflammation.        Residual small amounts of blood mixed with prep liquid. Extensive        diverticulosis throughout the colon, left > right. Diverticuli were        carefully examined, but no active bleeding was seen. Site of  mucosectomy        at the IC valve was unremarkable.      Impression:              - No specimens collected.       Colonoscopy 8/12/23  Findings:       Many small-mouthed diverticula were found in the entire colon. Most        pronounced diverticulosis is in the sigmoid colon. Residual blood was        seen throughout the colon, somewhat more on the left side. No active        bleeding was noted.        The terminal ileum appeared normal. Slight tinging with blood, likely        washed up or brought in by the scope.        Mild internal hemorrhoids were noted, not a source of bleeding.                                                                                    Impression:              - Diverticulosis in the entire examined colon.   - The examined portion of the ileum was normal.   - No specimens collected.         Again, thank you for allowing me to participate in the care of your patient.        Sincerely,        Altagracia Martinez MD    Electronically signed

## 2025-01-29 NOTE — PROGRESS NOTES
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 Most recent GIB evaluation in July 2024 with STAT CTA negative, tagged RBC negative, and flex sig with no active bleeding seen. Diverticular felt to be cause; CRS consulted, though patient declined any surgical intervention.     Treatment with PPI for duodenal erosions seen in the past (2023) and continued for dyspepsia.     Malnutrition with limited caloric intake and weight loss with each hospitalization (prolonged NPO periods). Has gained 5 lbs since seeing GI dietitian and working to add protein supplementation to diet. Encouraged to continue her work to add snacks to her regimen and check back in with GI dietitian to discuss additional ways to add calories to foods she is already eating.     No marked issues with constipation today - has required Miralax at times in the past.     Also reviewed plan for repeat imaging after small subcentimeter cyst was seen in pancreas on most recent CTA.       Plan:  - schedule an MRCP to follow-up the small cyst seen in your pancreas during your July 2024 hospitalization. Call:   Imaging Studies: If you were scheduled for a CT scan, X-ray, MRI, ultrasound, HIDA scan or other imaging study, please call [phone] to have this scheduled  - continue your excellent efforts to stay active and work with your care team on your back pain  - continue your efforts to  increase your daily calories  - agree with adding nutritional supplements and snacks to your daily schedule - we need to further increase your weight to ensure you have the energy to do the things you want to do  - ok to continue Lactaid pills before lactose-containing dairy  - schedule a visit with Iesha Hu RD - GI dietitian  - follow-up in GI clinic as already scheduled       It was a pleasure to participate in the care of this patient; please contact us with any further questions.  A total of 20 face-to-face minutes was spent with this patient, >50% of which was counseling regarding the above delineated issues. An additional 25 minutes was spent on the date of the encounter doing chart review, documentation, and further activities as noted above.    Altagracia Martinez MD  Division of Gastroenterology, Hepatology and Nutrition  [facility]    ---------------------------------------------------------------------------------------------------  HPI:    Ms. Olson is an 84 year old female with RA (stopped methotrexate March 2023, now on hydroxycholoroquine) and secondary Sjogren's, lumbar degenerative disc disease, chronic lumbago, right shoulder pain, OA, osteoporosis, and recurrent GI bleeding (AVM, possible diverticular, polyp-related --> see below and hemorrhoid) who comes for follow up on GI bleeding and weight loss. She has previously followed with [facility] ([facility]) for her GI care (since 2017 per her report) and has undergone endoscopic exams at [facility], and [facility] since 2010. She has been seen by multiple providers with our group including Dr. Martinez, PASCALE Vallejo and Dr. Gatica. Her last visit with this writer was 1/24/24 and she was most recently seen by Ms. Delacruz on 7/23/24.     Dating back to at least 2010 Ms. Olson has experienced recurrent overt GI bleeding with anemia.  She underwent inpatient colonoscopic exams with both  "diverticulosis and hemorrhoids on these exams. Diverticular bleeding had been felt to be a contributor given her clinical presentation, though no active bleeding lesion was ever identified.      She had EGD and colonoscopy 5/26/21 at [facility] for anemia. EGD revealed a non-bleeding gastric AVM which was treated with APC. Colonoscopy demonstrated diverticulosis and small hemorrhoids. Later in 2021, Ms. Olson was referred to our [place] [facility] group given these findings and anemia, but prior to the appointment was admitted to [facility] with hematochezia (per patient this was \"a bit darker\" than her prior bleeding episodes) in Nov 2021. A CTA did not demonstrate any active extravasation. Thus a capsule was done with bleeding seen in the distal ileum to cecum on prelim capsule read. Colonoscopy with ileal intubation  (20 cm beyond the IC valve) was done thereafter with no active bleeding found, but a very large 30 mm polyp was found on the IC valve and removed via EMR. This was felt to be a likely source of bleeding. No other findings were seen in the area of bleeding noted on capsule endoscopy.  She underwent a surveillance colonoscopy to re-evaluate the area of piecemeal EMR in Sept 2022, at at presentation for that procedure reported hematochezia for two days. The TI and right colon appeared normal and a small piece of residual polyp was removed from the polypectomy site. Blood was seen in the transverse to descending colon - some old clot, some BRB. This was cleared and no active bleeding was ever seen, though diverticulae were noted in the area.     She did well until Jan 2023 when she had recurrent bleeding. She underwent colonoscopy 1/13/23 which showed no inflammation but residual specks of blood throughout bowel (even 30 cm into TI) and extensive diverticulosis with no active bleeding. EGD was subsequently completed 2/6/23 which showed duodenal erosion without bleeding, normal stomach and esophagus. VCE " the following week 2/14/23 showed multiple localized erosions with no bleeding in the duodenum (likely first portion), single small angioectasia without bleeding was seen in the small bowel (probable duodenum), and lymphangiectasia in the small bowel (mid small bowel), one of these was peduncluated and polypoid, not bleeding, benign appearing.  A single spot with no bleeding was found in the colon (probable cecum). She completed 12 week course of omeprazole (while off of methotrexate), starting March for dyspepsia and finding of duodenal erosions and reported dyspepsia.    She again had several days of hematochezia that prompted hospitalization in August 2023. She was hospitalized 8/12/23-8/14/23 at [facility], Hgb with mild decrease to 9's, she remained hemodynamically stable. She underwent colonoscopy which showed diverticulosis throughout the colon, no active bleeding noted. Examined ileum was normal. Mild internal hemorrhoids felt not to be source of bleed. Per inpatient assessment, diverticular bleeding most likely reason for repeated episodes of hematochezia.     She has been evaluated by both her PCP and hematology for anemia, which has been attributed to GI blood loss, anemia of chronic disease, and medication effect. .In the setting of GI bleeding she was given IV iron infusions which she has since completed, has taken PO iron supplementation in the past.    Seen by GI DrDoris Gatica in 8/2023 and felt that the most recent bleeding was lower GI due to diverticular bleeding or rectal outlet bleeding. Also seen by Belen ASHRAF in 11/2023 with what describes as minimal rectal outlet bleeding.  Was recommended MiraLAX for constipation and omeprazole for dyspepsia.    In Jan 2024, she'd had no further bleeding. She'd slowly lost weight over time reporting that she's always had small meals and did continue to eat, but review of dietary recall revealed limited caloric intake.She was open to seeing GI dietitian.  She'd increased ingestion of butter, ice cream, dairy but had increased symptoms of diarrhea, cramping which resolved with avoidance of dairy.     By July 2024, she was hospitalized again for hematochezia. CTA and tagged RBC scan were negative. Flex sig did not identify active bleeding and diverticular bleed was suspected. CRS was consulted, though Ms. Olson declined consideration of colectomy, even as a last resort to control bleeding. She was seen in GI clinic shortly after discharge. She had not yet seen a GI dietitian and was again encouraged to. She was seen in Oct 2024.     Today, Wesley reported that she has not have any bleeding episodes since July 2024.  Regarding her weight she was happy that she was able to gain 5 pounds since fall 2024.  In terms of her relation to food he states that her stomach has gotten too small and she cannot eat large meals.  She mention she has already had a piece of toast for breakfast with jam and water, and for lunch she will have chicken with rice and sometimes she would skip dinner.  She also reported that she has been trying to drink more protein shake however she was only able to drink half a can daily.  She reported good appetite and denied any nausea, vomiting, diarrhea, constipation, bloating, heartburn or depression. She's had no issues with her bowel movements and is otherwise feeling well. She has undergone iron infusions since her last GI bleed (didn't tolerate oral iron due to constipation) and feels her energy is markedly improved.     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     Priority: Medium    Loss of appetite 09/12/2024     Priority: Medium    Lumbar radiculopathy, acute 09/12/2024     Priority: Medium    Protein-calorie malnutrition, unspecified severity 09/12/2024      Priority: Medium    Small bowel arteriovenous malformation 09/12/2024     Priority: Medium    Gastric AVM 09/12/2024     Priority: Medium    Underweight 09/12/2024     Priority: Medium    History of GI bleed 09/12/2024     Priority: Medium    Bilateral hearing loss, unspecified hearing loss type 09/12/2024     Priority: Medium    Complaints of memory disturbance 09/12/2024     Priority: Medium    Rheumatoid arthritis involving multiple sites with positive rheumatoid factor (H) 09/12/2024     Priority: Medium    Scoliosis of thoracolumbar spine, unspecified scoliosis type 07/22/2024     Priority: Medium    Facet arthropathy, lumbosacral 07/22/2024     Priority: Medium    Lower GI bleeding 07/15/2024     Priority: Medium    Anemia, unspecified type 07/15/2024     Priority: Medium    Chronic bilateral low back pain with bilateral sciatica 01/23/2024     Priority: Medium    Lower GI bleed 08/12/2023     Priority: Medium    Chronic pain of right elbow 07/11/2023     Priority: Medium    Right wrist pain 07/11/2023     Priority: Medium    Lab test negative for COVID-19 virus 11/18/2021     Priority: Medium    Diverticulosis of large intestine      Priority: Medium    Acute lower GI bleeding 01/21/2021     Priority: Medium    Diverticular hemorrhage 01/20/2021     Priority: Medium    Gastrointestinal hemorrhage, unspecified gastrointestinal hemorrhage type 01/19/2021     Priority: Medium    DDD (degenerative disc disease), lumbosacral 03/04/2019     Priority: Medium    GI bleed 03/13/2017     Priority: Medium    SNHL (sensorineural hearing loss) 11/29/2012     Priority: Medium       PERTINENT MEDICATIONS:  Current Outpatient Medications   Medication Sig Dispense Refill    acetaminophen (TYLENOL) 325 MG tablet Take 2 tablets (650 mg) by mouth every 6 hours      amoxicillin (AMOXIL) 500 MG capsule TAKE 1 CAPSULE BY MOUTH THREE TIMES DAILY UNTIL ALL TAKEN      artificial saliva (BIOTENE DRY MOUTHWASH) LIQD liquid Swish and  "spit in mouth 4 times daily as needed for dry mouth      calcium carbonate-vitamin D (CALTRATE) 600-10 MG-MCG per tablet Take 1 tablet by mouth daily      carboxymethylcellulose PF (REFRESH PLUS) 0.5 % ophthalmic solution Place 1 drop into both eyes 3 times daily as needed for dry eyes      clobetasol (TEMOVATE) 0.05 % external ointment Apply topically 2 times daily To right ankle as needed 60 g 3    EPINEPHrine (ANY BX GENERIC EQUIV) 0.3 MG/0.3ML injection 2-pack Inject 0.3 mg into the muscle as needed for anaphylaxis      famotidine (PEPCID) 20 MG tablet Take 20 mg by mouth 2 times daily.      gabapentin (NEURONTIN) 300 MG capsule Take 1 capsule (300 mg) by mouth 3 times daily. 90 capsule 11    hydroxychloroquine (PLAQUENIL) 200 MG tablet Take 1 tablet (200 mg) by mouth daily 90 tablet 1    lifitegrast (XIIDRA) 5 % opthalmic solution Place 1 drop into both eyes 2 times daily      mirtazapine (REMERON) 7.5 MG tablet Take 1 tablet (7.5 mg) by mouth at bedtime. 30 tablet 11    Multiple Vitamins-Minerals (OCUVITE PRESERVISION PO) Take 1 tablet by mouth 2 times daily      pantoprazole (PROTONIX) 40 MG EC tablet Take 1 tablet (40 mg) by mouth daily 30 tablet 11    UNABLE TO FIND MEDICATION NAME: V (vein formular)       No current facility-administered medications for this visit.           PHYSICAL EXAMINATION:  Vitals /77   Pulse 71   Ht 1.6 m (5' 3\")   Wt 41.7 kg (92 lb)   SpO2 100%   BMI 16.30 kg/m     Wt   Wt Readings from Last 2 Encounters:   01/29/25 41.7 kg (92 lb)   01/09/25 42.2 kg (93 lb)      Gen: Pt sitting up in NAD, interactive and cooperative on exam  Eyes: sclerae anicteric, no injection  ENT:  MMM  Resp: Breathing comfortably on exam, speaking in full sentences  Skin: No jaundice  Neuro: alert, oriented, answers questions appropriately        PERTINENT STUDIES:  Lab on 01/03/2022   Component Date Value Ref Range Status    Rheumatoid Factor 01/03/2022 8  <12 IU/mL Final    Sodium 01/03/2022 142 "  133 - 144 mmol/L Final    Potassium 01/03/2022 4.6  3.4 - 5.3 mmol/L Final    Chloride 01/03/2022 109  94 - 109 mmol/L Final    Carbon Dioxide (CO2) 01/03/2022 29  20 - 32 mmol/L Final    Anion Gap 01/03/2022 4  3 - 14 mmol/L Final    Urea Nitrogen 01/03/2022 18  7 - 30 mg/dL Final    Creatinine 01/03/2022 0.99  0.52 - 1.04 mg/dL Final    Calcium 01/03/2022 9.6  8.5 - 10.1 mg/dL Final    Glucose 01/03/2022 104* 70 - 99 mg/dL Final    Alkaline Phosphatase 01/03/2022 75  40 - 150 U/L Final    AST 01/03/2022 24  0 - 45 U/L Final    ALT 01/03/2022 24  0 - 50 U/L Final    Protein Total 01/03/2022 7.5  6.8 - 8.8 g/dL Final    Albumin 01/03/2022 3.9  3.4 - 5.0 g/dL Final    Bilirubin Total 01/03/2022 0.4  0.2 - 1.3 mg/dL Final    GFR Estimate 01/03/2022 58* >60 mL/min/1.73m2 Final    CRP Inflammation 01/03/2022 <2.9  0.0 - 8.0 mg/L Final    Cyclic Citrullinated Peptide Antib* 01/03/2022 3.6  <7.0 U/mL Final    Erythrocyte Sedimentation Rate 01/03/2022 39* 0 - 30 mm/hr Final    WBC Count 01/03/2022 3.9* 4.0 - 11.0 10e3/uL Final    RBC Count 01/03/2022 3.27* 3.80 - 5.20 10e6/uL Final    Hemoglobin 01/03/2022 10.5* 11.7 - 15.7 g/dL Final    Hematocrit 01/03/2022 32.5* 35.0 - 47.0 % Final    MCV 01/03/2022 99  78 - 100 fL Final    MCH 01/03/2022 32.1  26.5 - 33.0 pg Final    MCHC 01/03/2022 32.3  31.5 - 36.5 g/dL Final    RDW 01/03/2022 13.7  10.0 - 15.0 % Final    Platelet Count 01/03/2022 226  150 - 450 10e3/uL Final    % Neutrophils 01/03/2022 55  % Final    % Lymphocytes 01/03/2022 32  % Final    % Monocytes 01/03/2022 10  % Final    % Eosinophils 01/03/2022 4  % Final    % Basophils 01/03/2022 0  % Final    Absolute Neutrophils 01/03/2022 2.2  1.6 - 8.3 10e3/uL Final    Absolute Lymphocytes 01/03/2022 1.3  0.8 - 5.3 10e3/uL Final    Absolute Monocytes 01/03/2022 0.4  0.0 - 1.3 10e3/uL Final    Absolute Eosinophils 01/03/2022 0.1  0.0 - 0.7 10e3/uL Final    Absolute Basophils 01/03/2022 0.0  0.0 - 0.2 10e3/uL Final        Video Capsule Endoscopy 2/14/23:  Findings:        The gastric passage time of the capsule enteroscope was 5-hours 1-minute.        The small bowel passage time of the capsule enteroscope was 1-hour 25-minutes.        The entire examined stomach was normal.        Multiple localized erosions with no bleeding were found in the duodenum        (probable first portion of the duodenum).        A single small angioectasia without bleeding was seen in the small bowel        (probable duodenum).        Lymphangiectasia was found in the small bowel (mid small bowel). One of        these was peduncluated and polypoid, not bleeding, benign appearing.        A single spot with no bleeding was found in the colon (probable cecum).                                                                Impression:              - Normal stomach.   - Duodenal erosion.   - A single angioectasia without bleeding in the duodenum.   - Small bowel lymphangiectasia.   - A single mucosal spot with no bleeding in the colon.   - No bleeding seen during exam.   - The capsule entered the colon.       EGD 02/06/23  Impression:              - Normal esophagus.   - Normal stomach.   - Duodenal erosion without bleeding.   - No specimens collected.       Colonoscopy 01/13/23  Findings:        Residual specks of blood throughout (exam to 30 cm into the TI). No        inflammation.        Residual small amounts of blood mixed with prep liquid. Extensive        diverticulosis throughout the colon, left > right. Diverticuli were        carefully examined, but no active bleeding was seen. Site of mucosectomy        at the IC valve was unremarkable.      Impression:              - No specimens collected.       Colonoscopy 8/12/23  Findings:       Many small-mouthed diverticula were found in the entire colon. Most        pronounced diverticulosis is in the sigmoid colon. Residual blood was        seen throughout the colon, somewhat more on the left side. No  active        bleeding was noted.        The terminal ileum appeared normal. Slight tinging with blood, likely        washed up or brought in by the scope.        Mild internal hemorrhoids were noted, not a source of bleeding.                                                                                    Impression:              - Diverticulosis in the entire examined colon.   - The examined portion of the ileum was normal.   - No specimens collected.

## 2025-01-29 NOTE — PATIENT INSTRUCTIONS
- schedule an MRCP to follow-up the small cyst seen in your pancreas during your July 2024 hospitalization. Call:   Imaging Studies: If you were scheduled for a CT scan, X-ray, MRI, ultrasound, HIDA scan or other imaging study, please call [phone] to have this scheduled  - continue your excellent efforts to stay active and work with your care team on your back pain  - continue your efforts to increase your daily calories  - agree with adding nutritional supplements and snacks to your daily schedule - we need to further increase your weight to ensure you have the energy to do the things you want to do  - ok to continue Lactaid pills before lactose-containing dairy  - schedule a visit with Iesha Hu RD - GI dietitian  - follow-up in GI clinic as already scheduled      If you have any questions, please don't hesitate to contact me through our GI RN Clinic Coordinator, Kailey Barron, at [phone].

## 2025-01-30 ENCOUNTER — TELEPHONE (OUTPATIENT)
Dept: GASTROENTEROLOGY | Facility: CLINIC | Age: 85
End: 2025-01-30
Payer: COMMERCIAL

## 2025-01-30 NOTE — TELEPHONE ENCOUNTER
Left Voicemail (1st Attempt) for the patient to call back and schedule the following:    Appointment type: New   Provider: Iesha Hu   Return date: next available   Specialty phone number: [phone]  Additional appointment(s) needed:   Additonal Notes:         *Hello CCs,     I got a message from Dr. Martinez who asked if I could get a follow up appt scheduled with this patient. Could you please call her to get her scheduled?     I have a couple of appts in February  on Feb 25 and Feb 27 at 11:30 AM that are available. They won't show as open b/c they're on hold for executive health but that department doesn't need me so okay to use one of those appt times if the patient wants one. Otherwise any other date I have open that works for her.     Thanks,   Iesha

## 2025-02-03 ENCOUNTER — TELEPHONE (OUTPATIENT)
Dept: GASTROENTEROLOGY | Facility: CLINIC | Age: 85
End: 2025-02-03
Payer: COMMERCIAL

## 2025-02-03 NOTE — TELEPHONE ENCOUNTER
Left Voicemail (2nd Attempt) for the patient to call back and schedule the following:    Appointment type: New    Provider: Iesha Hu   Return date: next available   Specialty phone number: [phone]  Additional appointment(s) needed:   Additonal Notes:         * Hello CCs,     I got a message from Dr. Martinez who asked if I could get a follow up appt scheduled with this patient. Could you please call her to get her scheduled?     I have a couple of appts in February  on Feb 25 and Feb 27 at 11:30 AM that are available. They won't show as open b/c they're on hold for executive health but that department doesn't need me so okay to use one of those appt times if the patient wants one. Otherwise any other date I have open that works for her.     Thanks,   Iesha

## 2025-02-11 ENCOUNTER — TELEPHONE (OUTPATIENT)
Dept: RHEUMATOLOGY | Facility: CLINIC | Age: 85
End: 2025-02-11
Payer: COMMERCIAL

## 2025-02-11 NOTE — TELEPHONE ENCOUNTER
The patient called to reschedule her May/8/25 appointment because the provider will be out, and patient declined the next openings in February and theres no templates available after, please review and follow up per patient request thank you.

## 2025-02-12 ENCOUNTER — OFFICE VISIT (OUTPATIENT)
Dept: ORTHOPEDICS | Facility: CLINIC | Age: 85
End: 2025-02-12
Payer: COMMERCIAL

## 2025-02-12 VITALS
WEIGHT: 92 LBS | SYSTOLIC BLOOD PRESSURE: 139 MMHG | HEIGHT: 63 IN | BODY MASS INDEX: 16.3 KG/M2 | HEART RATE: 80 BPM | OXYGEN SATURATION: 100 % | DIASTOLIC BLOOD PRESSURE: 61 MMHG

## 2025-02-12 DIAGNOSIS — M51.370 DEGENERATION OF INTERVERTEBRAL DISC OF LUMBOSACRAL REGION WITH DISCOGENIC BACK PAIN: ICD-10-CM

## 2025-02-12 DIAGNOSIS — M47.817 FACET ARTHROPATHY, LUMBOSACRAL: ICD-10-CM

## 2025-02-12 DIAGNOSIS — G58.8 CLUNEAL NEUROPATHY: Primary | ICD-10-CM

## 2025-02-12 DIAGNOSIS — M54.51 VERTEBROGENIC LOW BACK PAIN: ICD-10-CM

## 2025-02-12 DIAGNOSIS — M41.9 SCOLIOSIS OF THORACOLUMBAR SPINE, UNSPECIFIED SCOLIOSIS TYPE: ICD-10-CM

## 2025-02-12 PROCEDURE — 99215 OFFICE O/P EST HI 40 MIN: CPT | Performed by: PHYSICAL MEDICINE & REHABILITATION

## 2025-02-12 RX ORDER — PREDNISONE 20 MG/1
40 TABLET ORAL DAILY
Qty: 10 TABLET | Refills: 0 | Status: SHIPPED | OUTPATIENT
Start: 2025-02-12 | End: 2025-02-17

## 2025-02-12 ASSESSMENT — PAIN SCALES - GENERAL: PAINLEVEL_OUTOF10: MILD PAIN (3)

## 2025-02-12 NOTE — LETTER
"2/12/2025      Patrick Olson  [address] [facility] [address]      Dear Colleague,    Thank you for referring your patient, Patrick Olson, to the [facility]. Please see a copy of my visit note below.    PHYSICAL MEDICINE & REHABILITATION / MEDICAL SPINE        Date:  Feb 12, 2025    Name:  Patrick Olson  YOB: 1940  MRN:  [id number]      \"I am using a new tool to help me save time with documentation.  Basically it is going to listen to our visit today and uses AI to help me draft my note.  Is it okay if I use this tool today?\"        CHART REVIEW:  Reviewed 06/06/2024 note from Mukesh Lantigua DO (family medicine-sports medicine).  Ms. Patrick Olson was being seen to follow-up for chronic low back pain.  She had been doing physical therapy.  She had seen pain management and started acupuncture.  Walking increased her pain.  Ms. Patrick Olson was seen at [facility] for consideration of RFA.  She had medial branch blocks that only provided 50% relief.  Ms. Patrick Olson had lumbar facet arthritis and moderate lumbar spinal stenosis.  She had an epidural steroid injection performed in February 2024 that did not provide any relief.  Ms. Patrick Olson was to continue with physical therapy.  There was a discussion of adding a TENS unit.  Ms. Patrick Olson could continue acupuncture if she wanted.  Referral to medical spine was placed.     Reviewed 02/14/2024 note from Sintia Pelayo MD (physical medicine and rehabilitation-pain medicine).  Ms. Patrick Olson had a bilateral L4 nerve root injections.     Reviewed 02/05/2024 note from Sintia Pelayo MD (physical medicine and rehabilitation-pain medicine).  Ms. Patrick Olson had L3-L4 interlaminar epidural steroid injections on 05/17/2022 and 03/14/2023; these were without relief.  Ms. Patrick Olson had medial branch blocks of the bilateral L3, L4, and L5 medial branches/dorsal rami on 01/30/2024 with only 50% " diagnostic relief.  The plan was for bilateral L4 nerve root injections and to continue with physical therapy.  Ms. Patrick Olson was to call for Medrol Dosepaks as needed for future travels.  Ms. Patrick Olson was to follow-up in clinic 2 weeks after the procedure.         CHIEF COMPLAINT:  Low back pain.        HISTORY OF PRESENT ILLNESS:  Ms. Patrick Olson is an 84-year-old female.  She is retired.  She was a  with the [employer].  Ms. Patrick Olson enjoys traveling.  She previously enjoyed painting before her back pain worsened.  Ms. Patrick Olson likes to walk 1 mile for exercise.     Ms. Patrick Olson stated that she was recently hospitalized for a diverticular bleed.     Ms. Patrick Olson stated that she has had a left total hip arthroplasty in 2011.  She denied any other injuries or surgeries of her mid back, low back, pelvis, hips, thighs, knees, legs, ankles, feet, toes.     Ms. Patrick Olson stated that she has had rheumatoid arthritis for 40 years.  Ms. Patrikc Olson states that she currently takes hydroxychloroquine for her rheumatoid arthritis.  She denied any other personal or family history of autoimmune diseases, rheumatologic diseases, gout, pseudogout.  Ms. Patrick Olson denied any personal or family history of neurologic diseases.  She denied any personal or family history of inflammatory bowel diseases.     Ms. Patrick Olson began noting bilateral low back pain in 2009.     On 07/25/2024, Ms. Patrick Olson had ultrasound-guided bilateral superior cluneal nerve injections.  On 09/24/2024, Ms. Patrick Olson stated she had 70% to 75% improvement in her pain for 4 to 5 weeks.  She gradually noted a return of her pain.    Ms. Patrick Olson was last seen in this clinic in 09/24/2024.  On 12/19/2024, Ms. Patrick Olson had ultrasound-guided bilateral superior cluneal nerve injections.  Ms. Patrick Olson returns to clinic today, 02/12/2025.    Ms. Patrick Olson is no longer noting  "left thigh/groin/hip pain.  She did note briefly after the injections she had some right groin and hip pain; that pain has also resolved.  Ms. Patrick Olson notes that the bilateral superior cluneal nerve blocks took about 2 weeks to become effective.  The injections have provided 70% relief in her pain.  Ms. Patrick Olson is now noting intermittent low back pain that is without radiation.  Low back pain worsens with bending forward, sitting, and prolonged standing.  Heat and lumbar support provides some relief.  Ms. Patrick Olson is taking gabapentin 3 times daily.  She is taking acetaminophen 3 times daily.  Ms. Patrick Olson currently rates her low back pain as 3-4/10.    Ms. Patrick Olson is traveling to [facility] in March and would like oral steroids for the trip.  She states she has previously been prescribed oral steroids for all of her recent vacations.    History of Present Illness-  - Patrick Olson, 84-year-old female.  - Received injections almost two months ago for pain management.  - Initially had a lot of pain in the left leg, which has resolved.  - Experienced transient pain on the right side after the injections, which also resolved.  - Currently experiencing pain in the lower back, particularly when bending down or standing for extended periods, such as cooking for an hour.  - Pain is described as not constant and is managed with movement and support while sitting.  - Pain is not sharp, dull, burning, or aching, just described as \"felt pain.\"  - Takes an 8-hour acetaminophen, 650 mg, three times a day.  - On gabapentin 3 times daily.  - Previously tried epidural steroid injections and medial branch blocks without significant relief.  - Considering physical therapy or acupuncture for pain management.  - Reports that the injections provided about 70% relief for buttock pain.  - Has rheumatoid arthritis and takes Plaquenil for management.  - Has a history of osteopenia diagnosed three years " ago.  - Has been prescribed prednisone for trips in the past, which helps but causes sleep disturbances.  - Plans to go on a trip in March and is concerned about managing pain during travel.  - Has not traveled in a year and a half, usually travels once a year.        ALLERGIES:  Allergies   Allergen Reactions     Bee Venom Anaphylaxis     Shrimp Anaphylaxis     Evoxac [Cevimeline] Unknown     Prevnar Swelling     Other reaction(s): Edema     Prevnar [Pneumococcal 13-Linda Conj Vacc] Unknown         MEDICATIONS:  Current Outpatient Medications   Medication Sig Dispense Refill     predniSONE (DELTASONE) 20 MG tablet Take 2 tablets (40 mg) by mouth daily for 5 days. 10 tablet 0     acetaminophen (TYLENOL) 325 MG tablet Take 2 tablets (650 mg) by mouth every 6 hours       amoxicillin (AMOXIL) 500 MG capsule TAKE 1 CAPSULE BY MOUTH THREE TIMES DAILY UNTIL ALL TAKEN       artificial saliva (BIOTENE DRY MOUTHWASH) LIQD liquid Swish and spit in mouth 4 times daily as needed for dry mouth       calcium carbonate-vitamin D (CALTRATE) 600-10 MG-MCG per tablet Take 1 tablet by mouth daily       carboxymethylcellulose PF (REFRESH PLUS) 0.5 % ophthalmic solution Place 1 drop into both eyes 3 times daily as needed for dry eyes       clobetasol (TEMOVATE) 0.05 % external ointment Apply topically 2 times daily To right ankle as needed 60 g 3     EPINEPHrine (ANY BX GENERIC EQUIV) 0.3 MG/0.3ML injection 2-pack Inject 0.3 mg into the muscle as needed for anaphylaxis       famotidine (PEPCID) 20 MG tablet Take 20 mg by mouth 2 times daily.       gabapentin (NEURONTIN) 300 MG capsule Take 1 capsule (300 mg) by mouth 3 times daily. 90 capsule 11     hydroxychloroquine (PLAQUENIL) 200 MG tablet Take 1 tablet (200 mg) by mouth daily 90 tablet 1     lifitegrast (XIIDRA) 5 % opthalmic solution Place 1 drop into both eyes 2 times daily       mirtazapine (REMERON) 7.5 MG tablet Take 1 tablet (7.5 mg) by mouth at bedtime. 30 tablet 11      Multiple Vitamins-Minerals (OCUVITE PRESERVISION PO) Take 1 tablet by mouth 2 times daily       pantoprazole (PROTONIX) 40 MG EC tablet Take 1 tablet (40 mg) by mouth daily 30 tablet 11     UNABLE TO FIND MEDICATION NAME: V (vein formular)           PAST MEDICAL HISTORY:  Past Medical History:   Diagnosis Date     Anemia      CKD (chronic kidney disease) stage 3, GFR 30-59 ml/min (H)      Diverticulosis of large intestine      Osteoarthritis      Osteoporosis      Rheumatoid arthritis (H)      Sensorineural hearing loss      Varicose veins of bilateral lower extremities with other complications          PAST SURGICAL HISTORY:  Past Surgical History:   Procedure Laterality Date     CAPSULE/PILL CAM ENDOSCOPY N/A 11/18/2021    Procedure: IMAGING PROCEDURE, GI TRACT, INTRALUMINAL, VIA CAPSULE;  Surgeon: Deric Rodriguez MD;  Location: [facility]     CAPSULE/PILL CAM ENDOSCOPY N/A 2/14/2023    Procedure: IMAGING PROCEDURE, GI TRACT, INTRALUMINAL, VIA CAPSULE;  Surgeon: Jaime Mesa MD;  Location: [facility]     COLONOSCOPY N/A 03/14/2017    Procedure: COMBINED COLONOSCOPY, SINGLE OR MULTIPLE BIOPSY/POLYPECTOMY BY BIOPSY;  Surgeon: Spencer Downey MD;  Location: [facility]     COLONOSCOPY N/A 9/22/2022    Procedure: COLONOSCOPY, FLEXIBLE, WITH LESION REMOVAL USING SNARE;  Surgeon: Kirby Arthur MD;  Location:  GI     COLONOSCOPY N/A 1/13/2023    Procedure: COLONOSCOPY;  Surgeon: Spencer Downey MD;  [place] [facility] [place] [place]     COLONOSCOPY N/A 8/14/2023    Procedure: Colonoscopy;  Surgeon: Spencer Downey MD;  Location: [facility]     ENTEROSCOPY SMALL BOWEL N/A 11/20/2021    Procedure: ENTEROSCOPY, colonoscopy with endoscopic mucosal resection and tattoo placement;  Surgeon: Kirby Arthur MD;  Location: [facility]     ESOPHAGOSCOPY, GASTROSCOPY, DUODENOSCOPY (EGD), COMBINED N/A 2/6/2023    Procedure: ESOPHAGOGASTRODUODENOSCOPY (EGD);  Surgeon: Spencer Downey MD;  Location: [facility]     IR VISCERAL EMBOLIZATION   01/22/2021     ORTHOPEDIC SURGERY Left     hip replacment     SIGMOIDOSCOPY FLEXIBLE N/A 01/23/2021    Procedure: SIGMOIDOSCOPY, FLEXIBLE;  Surgeon: Jaime Steinberg MD;  Location:  GI     SIGMOIDOSCOPY FLEXIBLE N/A 7/17/2024    Procedure: Sigmoidoscopy flexible;  Surgeon: Ritika Woodard MD;  Location:  GI         FAMILY HISTORY:  History reviewed. No pertinent family history.      SOCIAL HISTORY:  Social History     Socioeconomic History     Marital status:      Spouse name: Not on file     Number of children: Not on file     Years of education: Not on file     Highest education level: Not on file   Occupational History     Not on file   Tobacco Use     Smoking status: Former     Smokeless tobacco: Never   Vaping Use     Vaping status: Never Used   Substance and Sexual Activity     Alcohol use: No     Drug use: No     Sexual activity: Not on file   Other Topics Concern     Not on file   Social History Narrative    - Retired (formerly employed as  at [employer])    - 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[employer])     Social Drivers of Health     Financial Resource Strain: Low Risk  (9/12/2024)    Financial Resource Strain      Within the past 12 months, have you or your family members you live with been unable to get utilities (heat, electricity) when it was really needed?: No   Food Insecurity: Low Risk  (9/12/2024)    Food Insecurity      Within the past 12 months, did you worry that your food would run out before you got money to buy more?: No      Within the past 12 months, did the food you bought just not last and you didn t have money to get more?: No   Transportation Needs: Low Risk  (9/12/2024)    Transportation Needs      Within the past 12 months, has lack of transportation kept you from medical appointments, getting your medicines, non-medical meetings or appointments, work, or from getting things that you need?: No  "  Physical Activity: Sufficiently Active (9/12/2024)    Exercise Vital Sign      Days of Exercise per Week: 5 days      Minutes of Exercise per Session: 30 min   Stress: No Stress Concern Present (9/12/2024)    [place] [employer] - Occupational Stress Questionnaire      Feeling of Stress : Only a little   Social Connections: Unknown (9/12/2024)    Social Connection and Isolation Panel [NHANES]      Frequency of Communication with Friends and Family: Not on file      Frequency of Social Gatherings with Friends and Family: Once a week      Attends Sikh Services: Not on file      Active Member of Clubs or Organizations: Not on file      Attends Club or Organization Meetings: Not on file      Marital Status: Not on file   Interpersonal Safety: Low Risk  (9/12/2024)    Interpersonal Safety      Do you feel physically and emotionally safe where you currently live?: Yes      Within the past 12 months, have you been hit, slapped, kicked or otherwise physically hurt by someone?: No      Within the past 12 months, have you been humiliated or emotionally abused in other ways by your partner or ex-partner?: No   Housing Stability: Low Risk  (9/12/2024)    Housing Stability      Do you have housing? : Yes      Are you worried about losing your housing?: No         PHYSICAL EXAMINATION:  Vitals:    02/12/25 1446   BP: 139/61   Pulse: 80   SpO2: 100%   Weight: 41.7 kg (92 lb)   Height: 1.6 m (5' 3\")       GENERAL:  No acute distress.  Pleasant and cooperative.   PSYCH:  Normal mood and affect.  HEAD:  Normocephalic.  SPEECH:  No dysarthria.  EYES:  No scleral icterus.  Wearing glasses.  EARS:  Hearing is intact to spoken voice.  NOSE/MOUTH:  Wearing a face mask.  LUNGS:  No respiratory distress.  No increased work of breathing.        IMAGING:  XR PELVIS AND HIP LEFT 1 VIEW  9/24/2024 12:07 PM      HISTORY: Left hip pain.  COMPARISON: CT 1/19/2021     IMPRESSION:  Left total hip arthroplasty in " near-anatomic position. No periprosthetic fracture or lucency. Demineralization without acute fracture. Lower lumbar degenerative disc disease.           LUMBAR FLEX/EXT TWO TO THREE VIEWS, SPINE COMPLETE SCOLIOSIS TWO VIEWS  July 22, 2024 3:50 PM      HISTORY: Scoliosis of thoracolumbar spine. Facet arthropathy. Chronic bilateral low back pain. Cluneal neuropathy.     COMPARISON: Lumbar spine MR 3/9/2023.     IMPRESSION: T12 rib-bearing thoracic vertebral bodies with five lumbar-type vertebral bodies.     Convex right curvature of the lumbar spine centered at L2-L3 with Maldonado angle measuring 25 degrees from the superior L1 endplate to the inferior L3 endplate. Rightward listhesis of L3 on L4. Retrolisthesis of L3 on L4 measuring 2 mm on the flexion view appears slightly increased to 3 mm on the extension view.     No obvious loss of vertebral body height. Mild to moderate degenerative endplate changes and loss of disc height at C5-C6. Marked degenerative endplate changes and loss of disc height in the lumbar spine on the left at L2-L3, bilaterally at L3-L4, and right at L4-L5.     Mild retrolisthesis of L3 on L4 measuring 2 mm on the flexion view appears slightly increased to 3 mm on the extension view.     Positive sagittal balance of 3 cm.           MRI OF THE LUMBAR SPINE WITHOUT CONTRAST  3/9/2023 11:50 AM      COMPARISON: Lumbar spine MRI 07/12/2018     HISTORY: Lumbar radiculopathy, acute. Lumbar degenerative disc disease.     TECHNIQUE: Multiplanar, multisequence MRI images of the lumbar spine were acquired without IV contrast.     FINDINGS: There are five lumbar-type vertebrae for the purposes of this dictation. The conus ends at the distal L1. 2 mm retrolisthesis of L3 on L4. Vertebral body heights are maintained. Nonpathologic marrow signal. Mild Modic type I changes from L2-L3 to L4-L5. Vertebral body heights are maintained. Nonpathologic marrow signal. Mild symmetric atrophy of the posterior paraspinal  musculature. Normal diameter of the descending aorta. Redemonstration of a large gallstone. The patient's known bilateral renal cysts are better visualized on the abdomen and pelvis CT 11/16/2021.     T12-L1: Normal disc height and signal.  No herniation. Mild bilateral facet arthropathy.  No spinal canal or neural foraminal stenosis.     L1-L2: Normal disc height and signal. Small broad-based disc bulge. Mild bilateral facet arthropathy.  No spinal canal or neural foraminal stenosis.     L2-L3: Moderate vertebral disc height loss. Loss of the normal T2 signal within the disc. Broad-based disc bulge with superimposed small central disc protrusion. Mild bilateral facet arthropathy. No spinal canal narrowing. No right neuroforaminal narrowing. Mild left neuroforaminal narrowing.     L3-L4: Moderate intervertebral disc height loss. Loss of the normal T2 signal within the dens. Broad-based disc bulge with superimposed left foraminal disc protrusion, decreased since the prior exam. Moderate bilateral facet arthropathy. Mild spinal canal narrowing. Mild narrowing of the left lateral recess. No right neuroforaminal narrowing. Mild left neural foraminal narrowing.     L4-L5: Mild intervertebral disc height loss. Loss of the normal T2 signal within the disc. Broad-based disc bulge with superimposed right foraminal disc protrusion. Moderate bilateral facet arthropathy. Mild narrowing of the right lateral recess. No spinal canal narrowing. Severe right neuroforaminal narrowing. No left neuroforaminal narrowing.     L5-S1: Mild intervertebral disc height loss. Loss of the normal T2 signal within the disc. Broad-based disc bulge with superimposed small central disc protrusion. Mild bilateral facet arthropathy. Mild narrowing of the lateral recesses. No spinal canal narrowing. No right neural foraminal narrowing. No left neural foraminal narrowing.     IMPRESSION:  1.  Compared to prior lumbar spine MRI 07/12/2018, interval  decrease in the previously noted large left foraminal disc protrusion at L3-L4.  2.  Otherwise stable to interval worsening of the moderate multilevel degenerative changes of the lumbar spine.  3.  Mild spinal canal narrowing and mild narrowing of the left lateral recess at L3-L4.  4.  Mild narrowing of the right lateral recess at L4-L5.  5.  Mild narrowing of the lateral recesses at L5-S1.  6.  Severe right neural foraminal narrowing at L4-L5.  7.  Mild left neuroforaminal narrowing at L2-L3 and L3-L4.  8.  Mild Modic type I changes at from L2-L3 to L4-L5.           ASSESSMENT/PLAN:  Ms. Patrick Olson is an 83-year-old female.  She has thoracolumbar scoliosis, convex right.  She has lumbosacral facet arthropathy and lumbosacral degenerative disc disease.  Ms. Patrick Olson did not have good pain relief with epidural steroid injections or medial branch/dorsal ramus blocks of the bilateral L4-5 and L5-S1.  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  Her current pain is most consistent with discogenic/vertebrogenic pain.  Discussed diagnoses, pathophysiologies, and treatment options with Ms. Patrick Olson.  Discussed the options doing nothing/living with it, physical therapy, chiropractic care, core strengthening, oral medications such as gabapentin and pregabalin, lumbosacral epidural corticosteroid injection, referral to neurosurgery.  Ms. Patrick Olson is feeling well at this time.  She would like to follow-up in this clinic in 4 months.  Ms. Patrick Olson is traveling to [facility] in March 2025.  She would really like an oral steroid for this trip as she explains that she has had oral steroids for all of her recent occasions.  Explained that steroids cause immune suppression.  Steroid use has an association with osteoporosis.  Given her history of osteopenia, this is even more concerning.  However, Ms. Patrick NORRIS  Wesley is quite insistent that she have a steroid for the trip.  Prescribed prednisone 40 mg daily for 5 days if needed for acute disc herniation.  Ms. Patrick Olson is to follow-up in this clinic in 4 months.        Total time on the date of the encounter:  49 minutes were spent on one more or more of the following:  discussion with patient, history, exam, coordinating care, treatment goals, record review, documenting clinical information, and/or data review.    Consent was obtained from the patient to use an AI documentation tool in the creation of this note.      Donald Coombs MD        Again, thank you for allowing me to participate in the care of your patient.        Sincerely,        Donald Coombs MD    Electronically signed

## 2025-02-12 NOTE — PROGRESS NOTES
"PHYSICAL MEDICINE & REHABILITATION / MEDICAL SPINE        Date:  Feb 12, 2025    Name:  Patrick Olson  YOB: 1940  MRN:  [id number]      \"I am using a new tool to help me save time with documentation.  Basically it is going to listen to our visit today and uses AI to help me draft my note.  Is it okay if I use this tool today?\"        CHART REVIEW:  Reviewed 06/06/2024 note from Mukesh Lantigua DO (family medicine-sports medicine).  Ms. Patrick Olson was being seen to follow-up for chronic low back pain.  She had been doing physical therapy.  She had seen pain management and started acupuncture.  Walking increased her pain.  Ms. Patrick Olson was seen at [facility] for consideration of RFA.  She had medial branch blocks that only provided 50% relief.  Ms. Patrick Olson had lumbar facet arthritis and moderate lumbar spinal stenosis.  She had an epidural steroid injection performed in February 2024 that did not provide any relief.  Ms. Patrick Olson was to continue with physical therapy.  There was a discussion of adding a TENS unit.  Ms. Patrick Olson could continue acupuncture if she wanted.  Referral to medical spine was placed.     Reviewed 02/14/2024 note from Sintia Pelayo MD (physical medicine and rehabilitation-pain medicine).  Ms. Patrick Olson had a bilateral L4 nerve root injections.     Reviewed 02/05/2024 note from Sintia Pelayo MD (physical medicine and rehabilitation-pain medicine).  Ms. Patrick Olson had L3-L4 interlaminar epidural steroid injections on 05/17/2022 and 03/14/2023; these were without relief.  Ms. Patrick Olson had medial branch blocks of the bilateral L3, L4, and L5 medial branches/dorsal rami on 01/30/2024 with only 50% diagnostic relief.  The plan was for bilateral L4 nerve root injections and to continue with physical therapy.  Ms. Patrick Olson was to call for Medrol Dosepaks as needed for future travels.  Ms. Patrick Olson was to follow-up in clinic 2 weeks " after the procedure.         CHIEF COMPLAINT:  Low back pain.        HISTORY OF PRESENT ILLNESS:  Ms. Patrick Olson is an 84-year-old female.  She is retired.  She was a  with the [employer].  Ms. Patrick Olson enjoys traveling.  She previously enjoyed painting before her back pain worsened.  Ms. Patrick Olson likes to walk 1 mile for exercise.     Ms. Patrick Olson stated that she was recently hospitalized for a diverticular bleed.     Ms. Patrick Olson stated that she has had a left total hip arthroplasty in 2011.  She denied any other injuries or surgeries of her mid back, low back, pelvis, hips, thighs, knees, legs, ankles, feet, toes.     Ms. Patrick Olson stated that she has had rheumatoid arthritis for 40 years.  Ms. Patrick Olson states that she currently takes hydroxychloroquine for her rheumatoid arthritis.  She denied any other personal or family history of autoimmune diseases, rheumatologic diseases, gout, pseudogout.  Ms. Patrick Olson denied any personal or family history of neurologic diseases.  She denied any personal or family history of inflammatory bowel diseases.     Ms. Patrick Olson began noting bilateral low back pain in 2009.     On 07/25/2024, Ms. Patrick Olson had ultrasound-guided bilateral superior cluneal nerve injections.  On 09/24/2024, Ms. Patrick Olson stated she had 70% to 75% improvement in her pain for 4 to 5 weeks.  She gradually noted a return of her pain.    Ms. Patrick Olson was last seen in this clinic in 09/24/2024.  On 12/19/2024, Ms. Patrick Olson had ultrasound-guided bilateral superior cluneal nerve injections.  Ms. Patrick Olson returns to clinic today, 02/12/2025.    Ms. Patrick Olson is no longer noting left thigh/groin/hip pain.  She did note briefly after the injections she had some right groin and hip pain; that pain has also resolved.  Ms. Patrick Olson notes that the bilateral superior cluneal nerve blocks took about 2 weeks to become  "effective.  The injections have provided 70% relief in her pain.  Ms. Patrick Olson is now noting intermittent low back pain that is without radiation.  Low back pain worsens with bending forward, sitting, and prolonged standing.  Heat and lumbar support provides some relief.  Ms. Patrick Olson is taking gabapentin 3 times daily.  She is taking acetaminophen 3 times daily.  Ms. Patrick Olson currently rates her low back pain as 3-4/10.    Ms. Patrick Olson is traveling to [facility] in March and would like oral steroids for the trip.  She states she has previously been prescribed oral steroids for all of her recent vacations.    History of Present Illness-  - Patrick Olson, 84-year-old female.  - Received injections almost two months ago for pain management.  - Initially had a lot of pain in the left leg, which has resolved.  - Experienced transient pain on the right side after the injections, which also resolved.  - Currently experiencing pain in the lower back, particularly when bending down or standing for extended periods, such as cooking for an hour.  - Pain is described as not constant and is managed with movement and support while sitting.  - Pain is not sharp, dull, burning, or aching, just described as \"felt pain.\"  - Takes an 8-hour acetaminophen, 650 mg, three times a day.  - On gabapentin 3 times daily.  - Previously tried epidural steroid injections and medial branch blocks without significant relief.  - Considering physical therapy or acupuncture for pain management.  - Reports that the injections provided about 70% relief for buttock pain.  - Has rheumatoid arthritis and takes Plaquenil for management.  - Has a history of osteopenia diagnosed three years ago.  - Has been prescribed prednisone for trips in the past, which helps but causes sleep disturbances.  - Plans to go on a trip in March and is concerned about managing pain during travel.  - Has not traveled in a year and a half, usually " travels once a year.        ALLERGIES:  Allergies   Allergen Reactions    Bee Venom Anaphylaxis    Shrimp Anaphylaxis    Evoxac [Cevimeline] Unknown    Prevnar Swelling     Other reaction(s): Edema    Prevnar [Pneumococcal 13-Linda Conj Vacc] Unknown         MEDICATIONS:  Current Outpatient Medications   Medication Sig Dispense Refill    predniSONE (DELTASONE) 20 MG tablet Take 2 tablets (40 mg) by mouth daily for 5 days. 10 tablet 0    acetaminophen (TYLENOL) 325 MG tablet Take 2 tablets (650 mg) by mouth every 6 hours      amoxicillin (AMOXIL) 500 MG capsule TAKE 1 CAPSULE BY MOUTH THREE TIMES DAILY UNTIL ALL TAKEN      artificial saliva (BIOTENE DRY MOUTHWASH) LIQD liquid Swish and spit in mouth 4 times daily as needed for dry mouth      calcium carbonate-vitamin D (CALTRATE) 600-10 MG-MCG per tablet Take 1 tablet by mouth daily      carboxymethylcellulose PF (REFRESH PLUS) 0.5 % ophthalmic solution Place 1 drop into both eyes 3 times daily as needed for dry eyes      clobetasol (TEMOVATE) 0.05 % external ointment Apply topically 2 times daily To right ankle as needed 60 g 3    EPINEPHrine (ANY BX GENERIC EQUIV) 0.3 MG/0.3ML injection 2-pack Inject 0.3 mg into the muscle as needed for anaphylaxis      famotidine (PEPCID) 20 MG tablet Take 20 mg by mouth 2 times daily.      gabapentin (NEURONTIN) 300 MG capsule Take 1 capsule (300 mg) by mouth 3 times daily. 90 capsule 11    hydroxychloroquine (PLAQUENIL) 200 MG tablet Take 1 tablet (200 mg) by mouth daily 90 tablet 1    lifitegrast (XIIDRA) 5 % opthalmic solution Place 1 drop into both eyes 2 times daily      mirtazapine (REMERON) 7.5 MG tablet Take 1 tablet (7.5 mg) by mouth at bedtime. 30 tablet 11    Multiple Vitamins-Minerals (OCUVITE PRESERVISION PO) Take 1 tablet by mouth 2 times daily      pantoprazole (PROTONIX) 40 MG EC tablet Take 1 tablet (40 mg) by mouth daily 30 tablet 11    UNABLE TO FIND MEDICATION NAME: V (vein formular)           PAST MEDICAL  HISTORY:  Past Medical History:   Diagnosis Date    Anemia     CKD (chronic kidney disease) stage 3, GFR 30-59 ml/min (H)     Diverticulosis of large intestine     Osteoarthritis     Osteoporosis     Rheumatoid arthritis (H)     Sensorineural hearing loss     Varicose veins of bilateral lower extremities with other complications          PAST SURGICAL HISTORY:  Past Surgical History:   Procedure Laterality Date    CAPSULE/PILL CAM ENDOSCOPY N/A 11/18/2021    Procedure: IMAGING PROCEDURE, GI TRACT, INTRALUMINAL, VIA CAPSULE;  Surgeon: Deric Rodriguez MD;  [facility]    CAPSULE/PILL CAM ENDOSCOPY N/A 2/14/2023    Procedure: IMAGING PROCEDURE, GI TRACT, INTRALUMINAL, VIA CAPSULE;  Surgeon: Jaime Mesa MD;  [facility]    COLONOSCOPY N/A 03/14/2017    Procedure: COMBINED COLONOSCOPY, SINGLE OR MULTIPLE BIOPSY/POLYPECTOMY BY BIOPSY;  Surgeon: Spencer Downey MD;  [facility]    COLONOSCOPY N/A 9/22/2022    Procedure: COLONOSCOPY, FLEXIBLE, WITH LESION REMOVAL USING SNARE;  Surgeon: Kirby Arthur MD;  Location:  GI    COLONOSCOPY N/A 1/13/2023    Procedure: COLONOSCOPY;  Surgeon: Spencer Downey MD;  Location: [facility] [place] [facility]    COLONOSCOPY N/A 8/14/2023    Procedure: Colonoscopy;  Surgeon: Spencer Downey MD;  Location:  GI    ENTEROSCOPY SMALL BOWEL N/A 11/20/2021    Procedure: ENTEROSCOPY, colonoscopy with endoscopic mucosal resection and tattoo placement;  Surgeon: Kirby Arthur MD;  [facility]    ESOPHAGOSCOPY, GASTROSCOPY, DUODENOSCOPY (EGD), COMBINED N/A 2/6/2023    Procedure: ESOPHAGOGASTRODUODENOSCOPY (EGD);  Surgeon: Spencer Downey MD;  Location:  GI    IR VISCERAL EMBOLIZATION  01/22/2021    ORTHOPEDIC SURGERY Left     hip replacment    SIGMOIDOSCOPY FLEXIBLE N/A 01/23/2021    Procedure: SIGMOIDOSCOPY, FLEXIBLE;  Surgeon: Jaime Steinberg MD;  [facility]    SIGMOIDOSCOPY FLEXIBLE N/A 7/17/2024    Procedure: Sigmoidoscopy flexible;  Surgeon: Yamilet  MD Ritika;  Location[facility]         FAMILY HISTORY:  History reviewed. No pertinent family history.      SOCIAL HISTORY:  Social History     Socioeconomic History    Marital status:      Spouse name: Not on file    Number of children: Not on file    Years of education: Not on file    Highest education level: Not on file   Occupational History    Not on file   Tobacco Use    Smoking status: Former    Smokeless tobacco: Never   Vaping Use    Vaping status: Never Used   Substance and Sexual Activity    Alcohol use: No    Drug use: No    Sexual activity: Not on file   Other Topics Concern    Not on file   Social History Narrative    - Retired (formerly employed as  at [employer])    - 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[employer])     Social Drivers of Health     Financial Resource Strain: Low Risk  (9/12/2024)    Financial Resource Strain     Within the past 12 months, have you or your family members you live with been unable to get utilities (heat, electricity) when it was really needed?: No   Food Insecurity: Low Risk  (9/12/2024)    Food Insecurity     Within the past 12 months, did you worry that your food would run out before you got money to buy more?: No     Within the past 12 months, did the food you bought just not last and you didn t have money to get more?: No   Transportation Needs: Low Risk  (9/12/2024)    Transportation Needs     Within the past 12 months, has lack of transportation kept you from medical appointments, getting your medicines, non-medical meetings or appointments, work, or from getting things that you need?: No   Physical Activity: Sufficiently Active (9/12/2024)    Exercise Vital Sign     Days of Exercise per Week: 5 days     Minutes of Exercise per Session: 30 min   Stress: No Stress Concern Present (9/12/2024)    [employer] - Occupational Stress Questionnaire     Feeling of  "Stress : Only a little   Social Connections: Unknown (9/12/2024)    Social Connection and Isolation Panel [NHANES]     Frequency of Communication with Friends and Family: Not on file     Frequency of Social Gatherings with Friends and Family: Once a week     Attends Taoist Services: Not on file     Active Member of Clubs or Organizations: Not on file     Attends Club or Organization Meetings: Not on file     Marital Status: Not on file   Interpersonal Safety: Low Risk  (9/12/2024)    Interpersonal Safety     Do you feel physically and emotionally safe where you currently live?: Yes     Within the past 12 months, have you been hit, slapped, kicked or otherwise physically hurt by someone?: No     Within the past 12 months, have you been humiliated or emotionally abused in other ways by your partner or ex-partner?: No   Housing Stability: Low Risk  (9/12/2024)    Housing Stability     Do you have housing? : Yes     Are you worried about losing your housing?: No         PHYSICAL EXAMINATION:  Vitals:    02/12/25 1446   BP: 139/61   Pulse: 80   SpO2: 100%   Weight: 41.7 kg (92 lb)   Height: 1.6 m (5' 3\")       GENERAL:  No acute distress.  Pleasant and cooperative.   PSYCH:  Normal mood and affect.  HEAD:  Normocephalic.  SPEECH:  No dysarthria.  EYES:  No scleral icterus.  Wearing glasses.  EARS:  Hearing is intact to spoken voice.  NOSE/MOUTH:  Wearing a face mask.  LUNGS:  No respiratory distress.  No increased work of breathing.        IMAGING:  XR PELVIS AND HIP LEFT 1 VIEW  9/24/2024 12:07 PM      HISTORY: Left hip pain.  COMPARISON: CT 1/19/2021     IMPRESSION:  Left total hip arthroplasty in near-anatomic position. No periprosthetic fracture or lucency. Demineralization without acute fracture. Lower lumbar degenerative disc disease.           LUMBAR FLEX/EXT TWO TO THREE VIEWS, SPINE COMPLETE SCOLIOSIS TWO VIEWS  July 22, 2024 3:50 PM      HISTORY: Scoliosis of thoracolumbar spine. Facet arthropathy. Chronic " bilateral low back pain. Cluneal neuropathy.     COMPARISON: Lumbar spine MR 3/9/2023.     IMPRESSION: T12 rib-bearing thoracic vertebral bodies with five lumbar-type vertebral bodies.     Convex right curvature of the lumbar spine centered at L2-L3 with Maldonado angle measuring 25 degrees from the superior L1 endplate to the inferior L3 endplate. Rightward listhesis of L3 on L4. Retrolisthesis of L3 on L4 measuring 2 mm on the flexion view appears slightly increased to 3 mm on the extension view.     No obvious loss of vertebral body height. Mild to moderate degenerative endplate changes and loss of disc height at C5-C6. Marked degenerative endplate changes and loss of disc height in the lumbar spine on the left at L2-L3, bilaterally at L3-L4, and right at L4-L5.     Mild retrolisthesis of L3 on L4 measuring 2 mm on the flexion view appears slightly increased to 3 mm on the extension view.     Positive sagittal balance of 3 cm.           MRI OF THE LUMBAR SPINE WITHOUT CONTRAST  3/9/2023 11:50 AM      COMPARISON: Lumbar spine MRI 07/12/2018     HISTORY: Lumbar radiculopathy, acute. Lumbar degenerative disc disease.     TECHNIQUE: Multiplanar, multisequence MRI images of the lumbar spine were acquired without IV contrast.     FINDINGS: There are five lumbar-type vertebrae for the purposes of this dictation. The conus ends at the distal L1. 2 mm retrolisthesis of L3 on L4. Vertebral body heights are maintained. Nonpathologic marrow signal. Mild Modic type I changes from L2-L3 to L4-L5. Vertebral body heights are maintained. Nonpathologic marrow signal. Mild symmetric atrophy of the posterior paraspinal musculature. Normal diameter of the descending aorta. Redemonstration of a large gallstone. The patient's known bilateral renal cysts are better visualized on the abdomen and pelvis CT 11/16/2021.     T12-L1: Normal disc height and signal.  No herniation. Mild bilateral facet arthropathy.  No spinal canal or neural  foraminal stenosis.     L1-L2: Normal disc height and signal. Small broad-based disc bulge. Mild bilateral facet arthropathy.  No spinal canal or neural foraminal stenosis.     L2-L3: Moderate vertebral disc height loss. Loss of the normal T2 signal within the disc. Broad-based disc bulge with superimposed small central disc protrusion. Mild bilateral facet arthropathy. No spinal canal narrowing. No right neuroforaminal narrowing. Mild left neuroforaminal narrowing.     L3-L4: Moderate intervertebral disc height loss. Loss of the normal T2 signal within the dens. Broad-based disc bulge with superimposed left foraminal disc protrusion, decreased since the prior exam. Moderate bilateral facet arthropathy. Mild spinal canal narrowing. Mild narrowing of the left lateral recess. No right neuroforaminal narrowing. Mild left neural foraminal narrowing.     L4-L5: Mild intervertebral disc height loss. Loss of the normal T2 signal within the disc. Broad-based disc bulge with superimposed right foraminal disc protrusion. Moderate bilateral facet arthropathy. Mild narrowing of the right lateral recess. No spinal canal narrowing. Severe right neuroforaminal narrowing. No left neuroforaminal narrowing.     L5-S1: Mild intervertebral disc height loss. Loss of the normal T2 signal within the disc. Broad-based disc bulge with superimposed small central disc protrusion. Mild bilateral facet arthropathy. Mild narrowing of the lateral recesses. No spinal canal narrowing. No right neural foraminal narrowing. No left neural foraminal narrowing.     IMPRESSION:  1.  Compared to prior lumbar spine MRI 07/12/2018, interval decrease in the previously noted large left foraminal disc protrusion at L3-L4.  2.  Otherwise stable to interval worsening of the moderate multilevel degenerative changes of the lumbar spine.  3.  Mild spinal canal narrowing and mild narrowing of the left lateral recess at L3-L4.  4.  Mild narrowing of the right lateral  recess at L4-L5.  5.  Mild narrowing of the lateral recesses at L5-S1.  6.  Severe right neural foraminal narrowing at L4-L5.  7.  Mild left neuroforaminal narrowing at L2-L3 and L3-L4.  8.  Mild Modic type I changes at from L2-L3 to L4-L5.           ASSESSMENT/PLAN:  Ms. Patrick Olson is an 83-year-old female.  She has thoracolumbar scoliosis, convex right.  She has lumbosacral facet arthropathy and lumbosacral degenerative disc disease.  Ms. Patrick Olson did not have good pain relief with epidural steroid injections or medial branch/dorsal ramus blocks of the bilateral L4-5 and L5-S1.  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  Her current pain is most consistent with discogenic/vertebrogenic pain.  Discussed diagnoses, pathophysiologies, and treatment options with Ms. Patrick Olson.  Discussed the options doing nothing/living with it, physical therapy, chiropractic care, core strengthening, oral medications such as gabapentin and pregabalin, lumbosacral epidural corticosteroid injection, referral to neurosurgery.  Ms. Patrick Olson is feeling well at this time.  She would like to follow-up in this clinic in 4 months.  Ms. Patrick Olson is traveling to [facility] in March 2025.  She would really like an oral steroid for this trip as she explains that she has had oral steroids for all of her recent occasions.  Explained that steroids cause immune suppression.  Steroid use has an association with osteoporosis.  Given her history of osteopenia, this is even more concerning.  However, Ms. Patrick Olson is quite insistent that she have a steroid for the trip.  Prescribed prednisone 40 mg daily for 5 days if needed for acute disc herniation.  Ms. Patrick Olson is to follow-up in this clinic in 4 months.        Total time on the date of the encounter:  49 minutes were spent on one more or more of the following:   discussion with patient, history, exam, coordinating care, treatment goals, record review, documenting clinical information, and/or data review.    Consent was obtained from the patient to use an AI documentation tool in the creation of this note.      Donald Coombs MD

## 2025-02-12 NOTE — PATIENT INSTRUCTIONS
Please schedule a follow-up appointment in 4 months.  You can schedule this at the , or you can call [phone].    Clinic Fax:  [phone].    For nursing questions, please call [phone].    For medical records, please call [phone].

## 2025-02-25 ENCOUNTER — ANCILLARY PROCEDURE (OUTPATIENT)
Dept: MRI IMAGING | Facility: CLINIC | Age: 85
End: 2025-02-25
Attending: INTERNAL MEDICINE
Payer: COMMERCIAL

## 2025-02-25 DIAGNOSIS — K86.2 PANCREATIC CYST: ICD-10-CM

## 2025-02-25 PROCEDURE — 255N000002 HC RX 255 OP 636: Performed by: INTERNAL MEDICINE

## 2025-02-25 PROCEDURE — 74183 MRI ABD W/O CNTR FLWD CNTR: CPT

## 2025-02-25 PROCEDURE — A9585 GADOBUTROL INJECTION: HCPCS | Performed by: INTERNAL MEDICINE

## 2025-02-25 RX ORDER — GADOBUTROL 604.72 MG/ML
4.5 INJECTION INTRAVENOUS ONCE
Status: COMPLETED | OUTPATIENT
Start: 2025-02-25 | End: 2025-02-25

## 2025-02-25 RX ADMIN — GADOBUTROL 4.5 ML: 604.72 INJECTION INTRAVENOUS at 14:35

## 2025-03-05 DIAGNOSIS — K86.2 PANCREATIC CYST: Primary | ICD-10-CM

## 2025-03-06 ENCOUNTER — TELEPHONE (OUTPATIENT)
Dept: GASTROENTEROLOGY | Facility: CLINIC | Age: 85
End: 2025-03-06
Payer: COMMERCIAL

## 2025-03-06 NOTE — TELEPHONE ENCOUNTER
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      Clinical History (per RN review):   Followed by Flori Chisholm for other GI diagnoses, last Office visit 1/16/25  #Malnutrition with BMI 16.3  #Dyspepsia  #Subcentimeter pancreatic cyst  #Recurrent GI bleeding from suspected diverticular hemorrhaging + AVMs (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 2D and 3D reconstruction performed by MR technologist including MIP reconstruction and slab cholangiograms. If performed with contrast, additional dynamic T1 post   IV contrast images.  CONTRAST: 4 mL Gadavist      FINDINGS:      MRCP: Cholelithiasis. No gallbladder wall thickening. Focal adenomyomatosis of the gallbladder fundus. No significant biliary dilation. The common duct measures 7 mm. No choledocholithiasis. Lobulated cystic lesion appears to arise from the lower common   duct (10/16 and 17) measuring up to 11 mm.     Conventional pancreatic duct anatomy. No pancreatic duct dilation.     LIVER: Noncirrhotic morphology of the liver. Hepatic in phase signal loss in conjunction with marked hypointensity on fat suppressed T2 WI throughout the liver compatible with  iron deposition. T2 hyperintense masses in the liver measuring up to 1.1 cm   near the gallbladder (29/20). These are somewhat heterogeneous but are not significantly hyperintense to background liver on delayed sequences. They have been present since at least 2021 and have not significantly changed. Portal and hepatic veins are   patent.     PANCREAS: Homogeneous enhancement of the pancreas. No solid pancreatic mass. Cystic lesions measure up to 1.1 cm. No nodular or masslike enhancement. No peripancreatic inflammation or fluid collection.     ADDITIONAL FINDINGS: Renal cysts do not require imaging follow-up. Left adrenal thickening with opposed phase signal loss compatible with adenomatous change. No abdominal lymphadenopathy. Normal-sized spleen.                                                                      IMPRESSION:  1.  Pancreatic cystic lesions measuring up to 1.1 cm. No worrisome imaging features. See follow-up guidelines below.  2.  Cholelithiasis. No biliary dilation or choledocholithiasis.  3.  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  4.  Hepatic iron deposition.  5.  Hepatic masses measuring up to 1.1 cm are not significantly changed since 2021. These are not definitively hemangiomas although given multiyear stability, benignity is favored.          Provider review date: 03/06/25    Provider Decision for clinic consultation/Orders:            Referral updates/Patient contacted:

## 2025-03-10 DIAGNOSIS — K86.2 PANCREAS CYST: Primary | ICD-10-CM

## 2025-03-11 ENCOUNTER — OFFICE VISIT (OUTPATIENT)
Dept: OBGYN | Facility: CLINIC | Age: 85
End: 2025-03-11
Payer: COMMERCIAL

## 2025-03-11 ENCOUNTER — TELEPHONE (OUTPATIENT)
Dept: GASTROENTEROLOGY | Facility: CLINIC | Age: 85
End: 2025-03-11

## 2025-03-11 VITALS
BODY MASS INDEX: 17.06 KG/M2 | HEART RATE: 73 BPM | DIASTOLIC BLOOD PRESSURE: 66 MMHG | OXYGEN SATURATION: 99 % | WEIGHT: 96.3 LBS | SYSTOLIC BLOOD PRESSURE: 128 MMHG

## 2025-03-11 DIAGNOSIS — B37.31 YEAST INFECTION OF THE VAGINA: Primary | ICD-10-CM

## 2025-03-11 DIAGNOSIS — N89.8 VAGINAL DISCHARGE: ICD-10-CM

## 2025-03-11 LAB
CLUE CELLS: ABNORMAL
TRICHOMONAS, WET PREP: ABNORMAL
WBC'S/HIGH POWER FIELD, WET PREP: ABNORMAL
YEAST, WET PREP: PRESENT

## 2025-03-11 PROCEDURE — 3078F DIAST BP <80 MM HG: CPT | Performed by: OBSTETRICS & GYNECOLOGY

## 2025-03-11 PROCEDURE — 87210 SMEAR WET MOUNT SALINE/INK: CPT | Performed by: OBSTETRICS & GYNECOLOGY

## 2025-03-11 PROCEDURE — 3074F SYST BP LT 130 MM HG: CPT | Performed by: OBSTETRICS & GYNECOLOGY

## 2025-03-11 PROCEDURE — 99204 OFFICE O/P NEW MOD 45 MIN: CPT | Performed by: OBSTETRICS & GYNECOLOGY

## 2025-03-11 RX ORDER — TERCONAZOLE 8 MG/G
1 CREAM VAGINAL AT BEDTIME
Qty: 15 G | Refills: 0 | Status: SHIPPED | OUTPATIENT
Start: 2025-03-11 | End: 2025-03-14

## 2025-03-11 NOTE — TELEPHONE ENCOUNTER
"Left Voicemail (1st Attempt) and Sent Mychart (1st Attempt) for the patient to call back and schedule the following:    Appointment type: New  Provider:    Return date: Aug 2025  Specialty phone number: [phone]  Additional appointment(s) needed: MRI (Due a Wk prior to clinic)   Additonal Notes:       Follow Up per Dory RN (See below)     \"Hi team,     Please call pt to schedule MRI abdomen and clinic visit with Dr Gonsalez, due in August. Please have MRI scheduled no more then one week prior to clinic. Clinic be virtual or in person per pt preference.     Provider: Dr Gonsalez   Date/Time: August 2025   Mode: per pt preference   Referred by Dr Martinez 3/5/25   Dx: pancreas cyst       Thanks   Dory\"   "

## 2025-03-11 NOTE — PROGRESS NOTES
S: Patrick Olson is a 84 year old   female who presents with vaginal discharge.  She was seen by her PCP several months ago and asked about intermittent vaginal discharge for the past several years.  She was referred to ob/gyn for further evaluation.  She reports that she has a creamy white/yellow discharge that comes and goes for the past several years and wonders if it is normal.  There is no itching or irritation associated.  She feels like occasionally it may have a mild odor, but not too bad.  She denies any bleeding since menopause, which was about 30-40 years ago.  She is not sexually active.    PCP visit note reviewed on day of visit.    Past Medical History:   Diagnosis Date    Anemia     CKD (chronic kidney disease) stage 3, GFR 30-59 ml/min (H)     Diverticulosis of large intestine     Osteoarthritis     Osteoporosis     Rheumatoid arthritis (H)     Sensorineural hearing loss     Varicose veins of bilateral lower extremities with other complications      Past Surgical History:   Procedure Laterality Date    CAPSULE/PILL CAM ENDOSCOPY N/A 2021    Procedure: IMAGING PROCEDURE, GI TRACT, INTRALUMINAL, VIA CAPSULE;  Surgeon: Deric Rodriguez MD;  [facility]    CAPSULE/PILL CAM ENDOSCOPY N/A 2023    Procedure: IMAGING PROCEDURE, GI TRACT, INTRALUMINAL, VIA CAPSULE;  Surgeon: Jaime Mesa MD;  [facility]    COLONOSCOPY N/A 2017    Procedure: COMBINED COLONOSCOPY, SINGLE OR MULTIPLE BIOPSY/POLYPECTOMY BY BIOPSY;  Surgeon: Spencer Downey MD;  [facility]    COLONOSCOPY N/A 2022    Procedure: COLONOSCOPY, FLEXIBLE, WITH LESION REMOVAL USING SNARE;  Surgeon: Kirby Arthur MD;  Location:  GI    COLONOSCOPY N/A 2023    Procedure: COLONOSCOPY;  Surgeon: Spencer Downey MD;  Location: [facility]    COLONOSCOPY N/A 2023    Procedure: Colonoscopy;  Surgeon: Spencer Dwoney MD;  [facility]    ENTEROSCOPY SMALL BOWEL N/A 2021     Procedure: ENTEROSCOPY, colonoscopy with endoscopic mucosal resection and tattoo placement;  Surgeon: Kirby Arthur MD;  [facility]    ESOPHAGOSCOPY, GASTROSCOPY, DUODENOSCOPY (EGD), COMBINED N/A 2023    Procedure: ESOPHAGOGASTRODUODENOSCOPY (EGD);  Surgeon: Spencer Downey MD;  [facility]: [facility]    IR VISCERAL EMBOLIZATION  2021    ORTHOPEDIC SURGERY Left     hip replacment    SIGMOIDOSCOPY FLEXIBLE N/A 2021    Procedure: SIGMOIDOSCOPY, FLEXIBLE;  Surgeon: Jaime Steinberg MD;  Location:  GI    SIGMOIDOSCOPY FLEXIBLE N/A 2024    Procedure: Sigmoidoscopy flexible;  Surgeon: Ritika Woodard MD;  Location:  GI     OB History    Para Term  AB Living   1 1 0 1 0 1   SAB IAB Ectopic Multiple Live Births   0 0 0 0 1      # Outcome Date GA Lbr Billy/2nd Weight Sex Type Anes PTL Lv   1       Vag-Spont   MUNIR        Allergies   Allergen Reactions    Bee Venom Anaphylaxis    Shrimp Anaphylaxis    Evoxac [Cevimeline] Unknown    Prevnar Swelling     Other reaction(s): Edema    Prevnar [Pneumococcal 13-Linda Conj Vacc] Unknown       Current Outpatient Medications:     acetaminophen (TYLENOL) 325 MG tablet, Take 2 tablets (650 mg) by mouth every 6 hours, Disp: , Rfl:     amoxicillin (AMOXIL) 500 MG capsule, TAKE 1 CAPSULE BY MOUTH THREE TIMES DAILY UNTIL ALL TAKEN, Disp: , Rfl:     artificial saliva (BIOTENE DRY MOUTHWASH) LIQD liquid, Swish and spit in mouth 4 times daily as needed for dry mouth, Disp: , Rfl:     calcium carbonate-vitamin D (CALTRATE) 600-10 MG-MCG per tablet, Take 1 tablet by mouth daily, Disp: , Rfl:     carboxymethylcellulose PF (REFRESH PLUS) 0.5 % ophthalmic solution, Place 1 drop into both eyes 3 times daily as needed for dry eyes, Disp: , Rfl:     clobetasol (TEMOVATE) 0.05 % external ointment, Apply topically 2 times daily To right ankle as needed, Disp: 60 g, Rfl: 3    EPINEPHrine (ANY BX GENERIC EQUIV) 0.3 MG/0.3ML injection 2-pack, Inject 0.3 mg  into the muscle as needed for anaphylaxis, Disp: , Rfl:     famotidine (PEPCID) 20 MG tablet, Take 20 mg by mouth 2 times daily., Disp: , Rfl:     gabapentin (NEURONTIN) 300 MG capsule, Take 1 capsule (300 mg) by mouth 3 times daily., Disp: 90 capsule, Rfl: 11    hydroxychloroquine (PLAQUENIL) 200 MG tablet, Take 1 tablet (200 mg) by mouth daily, Disp: 90 tablet, Rfl: 1    lifitegrast (XIIDRA) 5 % opthalmic solution, Place 1 drop into both eyes 2 times daily, Disp: , Rfl:     mirtazapine (REMERON) 7.5 MG tablet, Take 1 tablet (7.5 mg) by mouth at bedtime., Disp: 30 tablet, Rfl: 11    Multiple Vitamins-Minerals (OCUVITE PRESERVISION PO), Take 1 tablet by mouth 2 times daily, Disp: , Rfl:     pantoprazole (PROTONIX) 40 MG EC tablet, Take 1 tablet (40 mg) by mouth daily, Disp: 30 tablet, Rfl: 11    UNABLE TO FIND, MEDICATION NAME: V (vein formular), Disp: , Rfl:     Social History     Socioeconomic History    Marital status:      Spouse name: Not on file    Number of children: Not on file    Years of education: Not on file    Highest education level: Not on file   Occupational History    Not on file   Tobacco Use    Smoking status: Former    Smokeless tobacco: Never   Vaping Use    Vaping status: Never Used   Substance and Sexual Activity    Alcohol use: No    Drug use: No    Sexual activity: Not on file   Other Topics Concern    Not on file   Social History Narrative    - Retired (formerly employed as  at [employer])    - 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[employer])     Social Drivers of Health     Financial Resource Strain: Low Risk  (9/12/2024)    Financial Resource Strain     Within the past 12 months, have you or your family members you live with been unable to get utilities (heat, electricity) when it was really needed?: No   Food Insecurity: Low Risk  (9/12/2024)    Food Insecurity     Within the past 12 months, did you worry that  your food would run out before you got money to buy more?: No     Within the past 12 months, did the food you bought just not last and you didn t have money to get more?: No   Transportation Needs: Low Risk  (9/12/2024)    Transportation Needs     Within the past 12 months, has lack of transportation kept you from medical appointments, getting your medicines, non-medical meetings or appointments, work, or from getting things that you need?: No   Physical Activity: Sufficiently Active (9/12/2024)    Exercise Vital Sign     Days of Exercise per Week: 5 days     Minutes of Exercise per Session: 30 min   Stress: No Stress Concern Present (9/12/2024)    [place] [employer] - Occupational Stress Questionnaire     Feeling of Stress : Only a little   Social Connections: Unknown (9/12/2024)    Social Connection and Isolation Panel [NHANES]     Frequency of Communication with Friends and Family: Not on file     Frequency of Social Gatherings with Friends and Family: Once a week     Attends [employer] Services: Not on file     Active Member of Clubs or Organizations: Not on file     Attends Club or Organization Meetings: Not on file     Marital Status: Not on file   Interpersonal Safety: Low Risk  (9/12/2024)    Interpersonal Safety     Do you feel physically and emotionally safe where you currently live?: Yes     Within the past 12 months, have you been hit, slapped, kicked or otherwise physically hurt by someone?: No     Within the past 12 months, have you been humiliated or emotionally abused in other ways by your partner or ex-partner?: No   Housing Stability: Low Risk  (9/12/2024)    Housing Stability     Do you have housing? : Yes     Are you worried about losing your housing?: No     PE: /66   Pulse 73   Wt 43.7 kg (96 lb 4.8 oz)   SpO2 99%   BMI 17.06 kg/m      Psych: normal affect, appropriate eye contact  Resp: no increased work of breathing  CV: no peripheral edema  Abd; SNT, no  palpable masses  Lymph: no enlarged inquinal nodes  Pelvic: atrophic vulva and vagina, small cervix with pinpoint os.  No cmt.  Scant amount of white discharge in vault  Skin: no visible rashes or lesions.    Wet prep: yeast     A/p: yeast infection   We discussed the normal vaginal william and that disruptions in the pH can cause overgrowth of the normal william which then causes symptoms.  Overall this is not harmful to her, if she is bothered, we can treat vs not, but rx for terazol cream faxed with instructions.  Questions answered    GINA GAMEZ MD

## 2025-03-31 NOTE — PATIENT INSTRUCTIONS
1. No medication changes today.   2. Increase hydration.   3. Follow up in 6 months.  4. Call sooner with any questions or concerns.   
Orientation to room/Bed in low position, brakes on/Side rails x 2 or 4 up, assess large gaps, such that a patient could get extremity or other body part entrapped, use additional safety procedures/Use of non-skid footwear for ambulating patients, use of appropriate size clothing to prevent risk of tripping/Assess eliminations need, assist as needed/Call light is within reach, educate patient/family on its functionality/Environment clear of unused equipment, furniture's in place, clear of hazards/Assess for adequate lighting, leave nightlight on

## 2025-04-02 ENCOUNTER — TELEPHONE (OUTPATIENT)
Dept: ORTHOPEDICS | Facility: CLINIC | Age: 85
End: 2025-04-02
Payer: COMMERCIAL

## 2025-04-02 NOTE — IR NOTE
INTERVENTIONAL RADIOLOGY VISCERAL EMBOLIZATION  1/22/2021 11:39 AM     HISTORY:  Patient has an acute gastrointestinal bleeding felt to be from a diverticular source in the distal colon.    COMPARISON: Tagged red blood cell scan dated 1/20/2021 and CT of the abdomen and pelvis dated 1/19/2021.    DESCRIPTION OF PROCEDURE: After obtaining informed consent, the patient was placed in a supine position on the fluoroscopy table. The right groin was prepped and draped in the usual sterile manner. 1% lidocaine was injected for local anesthesia. Ultrasound was used to evaluate and document patency of the right common femoral artery. Incidental note was made of a high right femoral bifurcation. Under sterile ultrasound guidance, access into the right common femoral artery was obtained. An image was saved for documentation. A 6 Argentine vascular sheath was placed. A SOS catheter was used to select the inferior mesenteric artery as well as the superior mesenteric artery for angiography.    FINDINGS: No active extravasation was identified.    The right femoral sheath was removed. The puncture site was closed with a 6 Argentine Angio-Seal.    I determined this patient to be an appropriate candidate for the planned sedation and procedure and reassessed the patient immediately prior to sedation and procedure. Moderate intravenous conscious sedation was supervised by me. The patient was independently monitored by a registered nurse assigned to the Department of radiology using automated blood pressure, EKG and pulse oximetry. The patient tolerated the procedure well. There were no immediate postprocedure complications. The patient's vital signs were monitored by radiology nursing staff under my supervision and remained stable throughout the study. Radiation dose for this scan was reduced using automated exposure control, adjustment of the mA and/or kV according to patient size, or iterative reconstruction technique.    Medications: 1 mg  This writer spoke to mom, mom reports patient already has an appointment next Tuesday with a psychiatrist in [place]-Dr. Rosette Yanes. Mom wants to keep that appointment and after switch care to Dr Barry.  Mom stated she wants to get \"the ball rolling\" because patient needs medication.  This writer told mom she has to check with her manager and will give her a call back.     Versed, 50 mcg fentanyl    Sedation time: 16 minutes    Fluoroscopy time: 3.4 minutes    Total fluoroscopy dose: 70.32 mGy Air Kerma    Contrast: 90 cc Isovue    Local anesthetic: 5 cc 1% lidocaine    IMPRESSION: Visceral angiography performed as above. No active extravasation from bowel noted.

## 2025-04-03 NOTE — TELEPHONE ENCOUNTER
Other: Patient calling back today as pain has gotten worse. She does not know if she can do PT now. She scheduled next available appointment on 3/21 but would like to speak with a nurse today if possible. Please call her back.     Could we send this information to you in [employer] or would you prefer to receive a phone call?:   Patient would prefer a phone call   Okay to leave a detailed message?: Yes at Cell number on file:    Telephone Information:   Mobile [phone]

## 2025-04-04 NOTE — TELEPHONE ENCOUNTER
PSP:  Dr. Coombs  Last clinic visit:  2/12/25  Reason for call: Worsening symptoms  Clinical information:  Call placed to pt to discuss. Pt reports she recently went on an international trip. She felt great this entire trip and she did walk more than usual. States 5 days post-trip she ended up getting worsening lower back pain that radiates to her left groin and down her left leg to her knee. States she is walking with a limp due to pain. Denies numbness/tingling. Denies weakness. Denies loss of bowel/bladder control.  States she did take the Prednisone course she was given at her last appt and states this time it was not as effective as when she has taken this medication in the past.   Pt taking Tylenol arthritis and Gabapentin (prescribed by PCP).   She inquires about any recommendations at this time given the pain has persisted for 2 weeks with no improvement. Pt has follow-up on 5/21 with Dr. Coombs.   Advice given to patient: Explained will update Dr. Coombs with her status and she will be called with a response.   Provider to address: Please advise

## 2025-04-10 ENCOUNTER — OFFICE VISIT (OUTPATIENT)
Dept: ORTHOPEDICS | Facility: CLINIC | Age: 85
End: 2025-04-10
Payer: COMMERCIAL

## 2025-04-10 ENCOUNTER — VIRTUAL VISIT (OUTPATIENT)
Dept: GASTROENTEROLOGY | Facility: CLINIC | Age: 85
End: 2025-04-10
Payer: COMMERCIAL

## 2025-04-10 VITALS
DIASTOLIC BLOOD PRESSURE: 74 MMHG | BODY MASS INDEX: 17.54 KG/M2 | HEART RATE: 83 BPM | SYSTOLIC BLOOD PRESSURE: 161 MMHG | HEIGHT: 63 IN | WEIGHT: 99 LBS | OXYGEN SATURATION: 99 %

## 2025-04-10 DIAGNOSIS — E46 PROTEIN-CALORIE MALNUTRITION, UNSPECIFIED SEVERITY: ICD-10-CM

## 2025-04-10 DIAGNOSIS — R63.6 UNDERWEIGHT: ICD-10-CM

## 2025-04-10 DIAGNOSIS — K57.31 DIVERTICULOSIS OF LARGE INTESTINE WITH HEMORRHAGE: ICD-10-CM

## 2025-04-10 DIAGNOSIS — N18.31 STAGE 3A CHRONIC KIDNEY DISEASE (H): ICD-10-CM

## 2025-04-10 DIAGNOSIS — M41.9 SCOLIOSIS OF THORACOLUMBAR SPINE, UNSPECIFIED SCOLIOSIS TYPE: ICD-10-CM

## 2025-04-10 DIAGNOSIS — M25.552 GREATER TROCHANTERIC PAIN SYNDROME OF LEFT LOWER EXTREMITY: Primary | ICD-10-CM

## 2025-04-10 PROCEDURE — 3077F SYST BP >= 140 MM HG: CPT | Performed by: PHYSICAL MEDICINE & REHABILITATION

## 2025-04-10 PROCEDURE — 99215 OFFICE O/P EST HI 40 MIN: CPT | Performed by: PHYSICAL MEDICINE & REHABILITATION

## 2025-04-10 PROCEDURE — 1125F AMNT PAIN NOTED PAIN PRSNT: CPT | Performed by: PHYSICAL MEDICINE & REHABILITATION

## 2025-04-10 PROCEDURE — 3078F DIAST BP <80 MM HG: CPT | Performed by: PHYSICAL MEDICINE & REHABILITATION

## 2025-04-10 ASSESSMENT — PAIN SCALES - GENERAL: PAINLEVEL_OUTOF10: MODERATE PAIN (6)

## 2025-04-10 NOTE — NURSING NOTE
Current patient location: [address] [place] [id number]    Is the patient currently in the state of [place]? YES    Visit mode: VIDEO    If the visit is dropped, the patient can be reconnected by:VIDEO VISIT: Send to e-mail at: [email or web address]    Will anyone else be joining the visit? NO  (If patient encounters technical issues they should call [phone])    Are changes needed to the allergy or medication list? No    Are refills needed on medications prescribed by this physician? NO    Rooming Documentation:  Questionnaire(s) completed    Reason for visit: RECHECK    Fernandez CHARLES

## 2025-04-10 NOTE — LETTER
4/10/2025      Patrick Olson  [address] [facility] [id number]      Dear Colleague,    Thank you for referring your patient, Patrick Olson, to the [facility] [place]. Please see a copy of my visit note below.    Virtual Visit Details    Type of service:  Video Visit   Video Start Time:  9:30 AM  Video End Time: 10:13 AM    Originating Location (pt. Location): Home  Distant Location (provider location):  Off-site  Platform used for Video Visit: [facility] Outpatient Medical Nutrition Therapy      Time Spent:  43 minutes  Session Type:  follow up  Referring Physician:  Altagracia Martinez MD and Belen Delacruz PA-C   Reason for RD Visit:   underweight, CKD, stage 4 and protein calorie malnutrition     Nutrition Assessment:  Patient is a 83 year old female with history that includes GI bleeds, diverticulosis, protein calorie malnutrition, CKD stage 3a (per nephrology), small bowel arteriovenous malformation, underweight, rheumatoid arthritis.    At initial visit in 10/2024, Over past few years, she has lost weight. Her doctor put her on a medication recently which is helping increase her appetite. She is now wanting some snacks. In the past, she never thought about eating or wanting snacks and only ate 3 meals per day, but with this medication is interested in having some snacks. In the past she has met with an RD who recommended eating higher dairy/cream but she is lactose intolerant.  She has a history of GI bleeds and also reported history of rheumatoid arthritis and lower back pain as well as has chronic kidney disease.  She stated that her nephrologist told to watch her potassium intake.  She also had elevated glucose level.  She is trying to manage these multiple issues while trying to also gain some weight.  She finds it very difficult with trying to eat lower potassium, watch her glucose/carbohydrate intake as well as try to eat  high-calorie high-protein with having lactose intolerance.  She was drinking [employer] drinks but they had over 500 mg of potassium.  She spoke with her nephrologist who had recommended trying to drink 1 carton per day and then get labs.  After doing this she stated that her potassium level was elevated at, so she discontinued drinking these drinks.  She does not like the Ensure and boost drinks and with her lactose intolerance prefers a more plant-based option.  She stated that she also has to be careful not to eat too much protein due to her chronic kidney disease.  She is interested in diet education with tips and suggestions for managing multiple medical diagnoses while also trying to increase her weight and her calorie intake.  Additionally she stated that her B12 level was elevated.  She takes a vision supplement that also has a multivitamin.  Reviewed the nutrition information with her on the line today and her prescription has over 1000% of the DV.  She takes 2 doses of this per day.  Encouraged her to switch her supplements and choose other option without added B12 or she could decrease the amount she takes.  Encouraged her to discuss with her doctor as well.    At follow up visit today on 4/10/25, she stated that her GI doctor would like her to focus on getting enough protein and gain weight and recommended following up with RD. She was drinking Owyn drinks but got tired of this drink, so found soy protein isolate option (110 calories and 25 grams protein). She has been mixing this powder with different things to make it taste better such as 2% lactose free Fairlife milk and canned fruit. She thinks she has more energy now. She was walking a lot and eating more on a trip a couple of weeks ago. Taking miralax daily so having good BMs.  Overall she is feeling well and has more energy. She would like to do more exercise and walking now that the weather is nicer. She sees her PCP today so will discuss her leg  "pain with PCP today. She used to go to an exercise class 2x/week with her  at a gym but not since her trip, so would like to restart this. She has been eating well and having 3 meals and at least 2 snacks per day along with the one soy protein smoothie per day.     Patient Active Problem List   Diagnosis     SNHL (sensorineural hearing loss)     GI bleed     DDD (degenerative disc disease), lumbosacral     Gastrointestinal hemorrhage, unspecified gastrointestinal hemorrhage type     Diverticular hemorrhage     Acute lower GI bleeding     Diverticulosis of large intestine     Lab test negative for COVID-19 virus     Chronic pain of right elbow     Right wrist pain     Lower GI bleed     Chronic bilateral low back pain with bilateral sciatica     Lower GI bleeding     Anemia, unspecified type     Scoliosis of thoracolumbar spine, unspecified scoliosis type     Facet arthropathy, lumbosacral     Loss of appetite     Lumbar radiculopathy, acute     Protein-calorie malnutrition, unspecified severity     Small bowel arteriovenous malformation     Gastric AVM     Underweight     History of GI bleed     Bilateral hearing loss, unspecified hearing loss type     Complaints of memory disturbance     Rheumatoid arthritis involving multiple sites with positive rheumatoid factor (H)     Cluneal neuropathy     Iron deficiency     Dyspepsia and disorder of function of stomach     Vertebrogenic low back pain     Height:   Ht Readings from Last 1 Encounters:   02/12/25 1.6 m (5' 3\")     Weight: she stated that she is ~95-96 lbs at home in the morning.  Three weeks ago she went on a trip and had a good bkft there so ate eggs every morning and breads/croissants so ate more there and she was 97 lbs after the trip. She walked a lot of the trip (3-4 miles per day) but now that she is home and not walking as much and having legg pain too, so wt went down a little to 1 lbs.    Wt Readings from Last 10 Encounters:   04/04/25 45.1 kg " "(99 lb 8 oz)   03/11/25 43.7 kg (96 lb 4.8 oz)   02/12/25 41.7 kg (92 lb)   01/29/25 41.7 kg (92 lb)   01/09/25 42.2 kg (93 lb)   12/19/24 43.1 kg (95 lb)   12/13/24 43.3 kg (95 lb 8 oz)   11/15/24 41.3 kg (91 lb 1.6 oz)   11/07/24 41.3 kg (91 lb)   11/05/24 41.3 kg (91 lb)     BMI: Estimated body mass index is 17.63 kg/m  as calculated from the following:    Height as of 2/12/25: 1.6 m (5' 3\").    Weight as of 4/4/25: 45.1 kg (99 lb 8 oz).    Diet Recall:  (some usual/recent meals/snacks/beverages):- doesn't eat dessert of sweets and doesn't drink alcohol.  Meal Food    Breakfast Toast with PB with coffee and lactose free milk and soy protein smoothie (drinks this first) and sometimes fruit and also an egg     Lunch 1-2 PM: sometimes eats out (higher calorie when out):  often pollo food variety of ricci with 1/2 cup rice and veggies, tofu or with meat, nuts OR Danish food: grilled chicken, noodle and lettuce, veg salad with sprinkle of nuts or coconut cream ricci with rice or tofu in dish or recently Japanese restaurant near home or Greek food    At home: leftovers from dinner. or 's homemade Green[place]ic bread with cheese and fruit.     Dinner Chicken/red meat or fish (1x/week salmon) or occas other type of fish/cod. Likes pork but not eating lately and vegetables and rice or very occas deep fried vegetable tempura or fish and chip OR Pasta dish (spaghetti with small amt tomato sauce with ground beef), or bread and cheese,      Snacks Homemade Trail mix: has in the house and also carries in her purse.    's homemade Greenlandic brd with cheese and apple    Sometimes evening snack at, if lower weight but not much lately. 10 PM bowl cereal with lactose free milk     Beverages 1 c coffee with lactose free milk,   ~32 oz water, making homemade smoothie with 1 cup 2% lactose free milk, 4T soy protein isolate and canned fruit.     Alcohol Intake none     Frequency of eating/taking out meals: 2-3 times per week   "   Labs:    Last Comprehensive Metabolic Panel:  Sodium   Date Value Ref Range Status   01/03/2025 138 135 - 145 mmol/L Final   01/23/2021 143 133 - 144 mmol/L Final     Potassium   Date Value Ref Range Status   01/03/2025 5.0 3.4 - 5.3 mmol/L Final   09/29/2022 4.7 3.4 - 5.3 mmol/L Final   01/23/2021 3.6 3.4 - 5.3 mmol/L Final     Chloride   Date Value Ref Range Status   01/03/2025 102 98 - 107 mmol/L Final   01/23/2021 114 (H) 94 - 109 mmol/L Final     Chloride (External)   Date Value Ref Range Status   10/24/2022 104 94 - 109 mmol/L Final     Carbon Dioxide   Date Value Ref Range Status   01/23/2021 26 20 - 32 mmol/L Final     Carbon Dioxide (CO2)   Date Value Ref Range Status   01/03/2025 26 22 - 29 mmol/L Final   09/29/2022 29 20 - 32 mmol/L Final     Anion Gap   Date Value Ref Range Status   01/03/2025 10 7 - 15 mmol/L Final   09/29/2022 3 3 - 14 mmol/L Final   01/23/2021 3 3 - 14 mmol/L Final     Glucose   Date Value Ref Range Status   01/03/2025 96 70 - 99 mg/dL Final   09/29/2022 100 (H) 70 - 99 mg/dL Final   01/23/2021 157 (H) 70 - 99 mg/dL Final     GLUCOSE BY METER POCT   Date Value Ref Range Status   07/19/2024 113 (H) 70 - 99 mg/dL Final     Urea Nitrogen   Date Value Ref Range Status   01/03/2025 39.4 (H) 8.0 - 23.0 mg/dL Final   09/29/2022 21 7 - 30 mg/dL Final   01/23/2021 6 (L) 7 - 30 mg/dL Final     Creatinine   Date Value Ref Range Status   01/03/2025 1.03 (H) 0.51 - 0.95 mg/dL Final   01/23/2021 0.75 0.52 - 1.04 mg/dL Final     GFR Estimate   Date Value Ref Range Status   01/03/2025 54 (L) >60 mL/min/1.73m2 Final     Comment:     eGFR calculated using 2021 CKD-EPI equation.   01/23/2021 76 >60 mL/min/[1.73_m2] Final     Comment:     Non  GFR Calc  Starting 12/18/2018, serum creatinine based estimated GFR (eGFR) will be   calculated using the Chronic Kidney Disease Epidemiology Collaboration   (CKD-EPI) equation.       Calcium   Date Value Ref Range Status   01/03/2025 9.6 8.8 -  10.4 mg/dL Final     Comment:     Reference intervals for this test were updated on 7/16/2024 to reflect our healthy population more accurately. There may be differences in the flagging of prior results with similar values performed with this method. Those prior results can be interpreted in the context of the updated reference intervals.   01/23/2021 8.0 (L) 8.5 - 10.1 mg/dL Final     CBC RESULTS:   Recent Labs   Lab Test 09/12/24  1608   WBC 4.1   RBC 3.44*   HGB 11.3*   HCT 35.3   *   MCH 32.8   MCHC 32.0   RDW 14.6          Pertinent Medications/vitamin and mineral supplements:     Current Outpatient Medications   Medication Sig Dispense Refill     acetaminophen (TYLENOL) 325 MG tablet Take 2 tablets (650 mg) by mouth every 6 hours       amoxicillin (AMOXIL) 500 MG capsule TAKE 1 CAPSULE BY MOUTH THREE TIMES DAILY UNTIL ALL TAKEN       artificial saliva (BIOTENE DRY MOUTHWASH) LIQD liquid Swish and spit in mouth 4 times daily as needed for dry mouth       calcium carbonate-vitamin D (CALTRATE) 600-10 MG-MCG per tablet Take 1 tablet by mouth daily       carboxymethylcellulose PF (REFRESH PLUS) 0.5 % ophthalmic solution Place 1 drop into both eyes 3 times daily as needed for dry eyes       clobetasol (TEMOVATE) 0.05 % external ointment Apply topically 2 times daily To right ankle as needed 60 g 3     EPINEPHrine (ANY BX GENERIC EQUIV) 0.3 MG/0.3ML injection 2-pack Inject 0.3 mg into the muscle as needed for anaphylaxis       famotidine (PEPCID) 20 MG tablet Take 20 mg by mouth 2 times daily.       gabapentin (NEURONTIN) 300 MG capsule Take 1 capsule (300 mg) by mouth 3 times daily. 90 capsule 11     hydroxychloroquine (PLAQUENIL) 200 MG tablet Take 1 tablet (200 mg) by mouth daily 90 tablet 1     lifitegrast (XIIDRA) 5 % opthalmic solution Place 1 drop into both eyes 2 times daily       methylPREDNISolone (MEDROL DOSEPAK) 4 MG tablet therapy pack Follow Package Directions 21 tablet 0     mirtazapine  (REMERON) 7.5 MG tablet Take 1 tablet (7.5 mg) by mouth at bedtime. 30 tablet 11     Multiple Vitamins-Minerals (OCUVITE PRESERVISION PO) Take 1 tablet by mouth 2 times daily       pantoprazole (PROTONIX) 40 MG EC tablet Take 1 tablet (40 mg) by mouth daily 30 tablet 11     UNABLE TO FIND MEDICATION NAME: V (vein formular)       No current facility-administered medications for this visit.       Food Allergies: Shrimp    Estimated Nutrition Needs based on ideal body weight of 52 k-1820 calories (30-35 kcals/kg), ~52g protein (1g/kg), ~1 ml/kcal or total fluids per MD.     MALNUTRITION:  % Weight Loss:  Weight loss does not meet criteria for malnutrition, but has underweight BMI of 17 indicating underweight. Weight improving.  % Intake:  no decreased intake  Subcutaneous Fat Loss:  None observed  Muscle Loss:  None observed  Fluid Retention:  None noted    Malnutrition Diagnosis: Does not meet ASPEN malnutrition criteria but does for GLIM criteria d/t chronic inflammatory dz (CKD) and BMI of 17 for age >70 Y.O.  In Context of:  Chronic illness or disease (Based on GLIM criteria with underweight BMI and CKD).    Nutrition Prescription: Nutrition Education     Nutrition Intervention      Provided diet education review for underweight, increasing calories and regaining weight with CKD tips, and managing multiple medical history and diet recommendations.      Answered patient's questions. Patient verbalized understanding of education provided. See Goals below.     Goals:    Continue eating at least 6 times per day (such as 3 meals and 3 snacks per day).    Continue to drink at least one protein drink per day. Okay to continue having your Soy protein isolate drink blended with 2% Fairlife lactose free milk and fruit. For extra calories you can add in a tablespoon of peanut butter.    --Here is an additional plant based protein drinks that is lower in potassium, in case you want more variety: Evolve Protein drinks  (make a powder and ready to drink option). You can also still have the Owyn protein drinks for other variety so you don't get tired of just having the Soy protein drink or Evolve drinks. You may like to rotate them for variety.    Continue lower potassium fruits and vegetables per your kidney doctor's recommendations.    Include some and extra unsaturated fats at meals to help get more calories such as olive oil, avocado oil, nuts, seeds, nut butters.    --You can measure and portion out your homemade trail mix and for example put 1/4 cup-1/2 cup of the trail mix in a container and make sure to finish up the full container by the end of the day. Otherwise you may only be having bites of or a just a few nuts per day.    Continue to Include a protein serving/source at each meal and have the one protein drink per day.    --Here are examples of protein sources: chicken, turkey, lean pork, lean beef, fish, tofu, eggs, nut butters, nuts and lactose free dairy products.    Keep food and beverage journals for at least a few weeks to help you what and how much you are eating daily. You do not need to write in the calories, but it can be helpful to see if you are consistently eating 3 meals and 3 snacks per day and you can write down the general portion sizes.    Nutrition Monitoring and Evaluation: Will monitor adherence to nutrition recommendations at future RD visits.     Further Medical Nutrition Therapy:  Follow-up as needed.  She would like to follow-up as needed.    Patient was encouraged to contact RD with any further questions.    Iesha Hu, MS, RD, LD        Again, thank you for allowing me to participate in the care of your patient.        Sincerely,        Iesha Hu RD    Electronically signed

## 2025-04-10 NOTE — PATIENT INSTRUCTIONS
It was nice speaking with you today. Below are the nutrition recommendations we discussed at your visit.    Please let me know if you have any additional questions.    Nutrition Recommendations    Continue eating at least 6 times per day (such as 3 meals and 3 snacks per day).    Continue to drink at least one protein drink per day. Okay to continue having your Soy protein isolate drink blended with 2% Fairlife lactose free milk and fruit. For extra calories you can add in a tablespoon of peanut butter.    --Here is an additional plant based protein drinks that is lower in potassium, in case you want more variety: Evolve Protein drinks (make a powder and ready to drink option). You can also still have the Owyn protein drinks for other variety so you don't get tired of just having the Soy protein drink or Evolve drinks. You may like to rotate them for variety.    Continue lower potassium fruits and vegetables per your kidney doctor's recommendations.    Include some and extra unsaturated fats at meals to help get more calories such as olive oil, avocado oil, nuts, seeds, nut butters.    --You can measure and portion out your homemade trail mix and for example put 1/4 cup-1/2 cup of the trail mix in a container and make sure to finish up the full container by the end of the day. Otherwise you may only be having bites of or a just a few nuts per day.    Continue to Include a protein serving/source at each meal and have the one protein drink per day.    --Here are examples of protein sources: chicken, turkey, lean pork, lean beef, fish, tofu, eggs, nut butters, nuts and lactose free dairy products.    Keep food and beverage journals for at least a few weeks to help you what and how much you are eating daily. You do not need to write in the calories, but it can be helpful to see if you are consistently eating 3 meals and 3 snacks per day and you can write down the general portion sizes.    Can follow up as needed.    If  you would like to schedule a follow up appointment, please call [phone].    Thank you,    Iesha Hu, MS, RD, LD

## 2025-04-10 NOTE — PROGRESS NOTES
Virtual Visit Details    Type of service:  Video Visit   Video Start Time:  9:30 AM  Video End Time: 10:13 AM    Originating Location (pt. Location): Home  Distant Location (provider location):  Off-site  Platform used for Video Visit: [facility] Outpatient Medical Nutrition Therapy      Time Spent:  43 minutes  Session Type:  follow up  Referring Physician:  Altagracia Martinez MD and Belen Delacruz PA-C   Reason for RD Visit:   underweight, CKD, stage 4 and protein calorie malnutrition     Nutrition Assessment:  Patient is a 83 year old female with history that includes GI bleeds, diverticulosis, protein calorie malnutrition, CKD stage 3a (per nephrology), small bowel arteriovenous malformation, underweight, rheumatoid arthritis.    At initial visit in 10/2024, Over past few years, she has lost weight. Her doctor put her on a medication recently which is helping increase her appetite. She is now wanting some snacks. In the past, she never thought about eating or wanting snacks and only ate 3 meals per day, but with this medication is interested in having some snacks. In the past she has met with an RD who recommended eating higher dairy/cream but she is lactose intolerant.  She has a history of GI bleeds and also reported history of rheumatoid arthritis and lower back pain as well as has chronic kidney disease.  She stated that her nephrologist told to watch her potassium intake.  She also had elevated glucose level.  She is trying to manage these multiple issues while trying to also gain some weight.  She finds it very difficult with trying to eat lower potassium, watch her glucose/carbohydrate intake as well as try to eat high-calorie high-protein with having lactose intolerance.  She was drinking Hortencia Existence Before Essence drinks but they had over 500 mg of potassium.  She spoke with her nephrologist who had recommended trying to drink 1 carton per day and then get labs.  After doing this she stated  that her potassium level was elevated at, so she discontinued drinking these drinks.  She does not like the Ensure and boost drinks and with her lactose intolerance prefers a more plant-based option.  She stated that she also has to be careful not to eat too much protein due to her chronic kidney disease.  She is interested in diet education with tips and suggestions for managing multiple medical diagnoses while also trying to increase her weight and her calorie intake.  Additionally she stated that her B12 level was elevated.  She takes a vision supplement that also has a multivitamin.  Reviewed the nutrition information with her on the line today and her prescription has over 1000% of the DV.  She takes 2 doses of this per day.  Encouraged her to switch her supplements and choose other option without added B12 or she could decrease the amount she takes.  Encouraged her to discuss with her doctor as well.    At follow up visit today on 4/10/25, she stated that her GI doctor would like her to focus on getting enough protein and gain weight and recommended following up with RD. She was drinking Owyn drinks but got tired of this drink, so found soy protein isolate option (110 calories and 25 grams protein). She has been mixing this powder with different things to make it taste better such as 2% lactose free Fairlife milk and canned fruit. She thinks she has more energy now. She was walking a lot and eating more on a trip a couple of weeks ago. Taking miralax daily so having good BMs.  Overall she is feeling well and has more energy. She would like to do more exercise and walking now that the weather is nicer. She sees her PCP today so will discuss her leg pain with PCP today. She used to go to an exercise class 2x/week with her  at a gym but not since her trip, so would like to restart this. She has been eating well and having 3 meals and at least 2 snacks per day along with the one soy protein smoothie per  "day.     Patient Active Problem List   Diagnosis    SNHL (sensorineural hearing loss)    GI bleed    DDD (degenerative disc disease), lumbosacral    Gastrointestinal hemorrhage, unspecified gastrointestinal hemorrhage type    Diverticular hemorrhage    Acute lower GI bleeding    Diverticulosis of large intestine    Lab test negative for COVID-19 virus    Chronic pain of right elbow    Right wrist pain    Lower GI bleed    Chronic bilateral low back pain with bilateral sciatica    Lower GI bleeding    Anemia, unspecified type    Scoliosis of thoracolumbar spine, unspecified scoliosis type    Facet arthropathy, lumbosacral    Loss of appetite    Lumbar radiculopathy, acute    Protein-calorie malnutrition, unspecified severity    Small bowel arteriovenous malformation    Gastric AVM    Underweight    History of GI bleed    Bilateral hearing loss, unspecified hearing loss type    Complaints of memory disturbance    Rheumatoid arthritis involving multiple sites with positive rheumatoid factor (H)    Cluneal neuropathy    Iron deficiency    Dyspepsia and disorder of function of stomach    Vertebrogenic low back pain     Height:   Ht Readings from Last 1 Encounters:   02/12/25 1.6 m (5' 3\")     Weight: she stated that she is ~95-96 lbs at home in the morning.  Three weeks ago she went on a trip and had a good bkft there so ate eggs every morning and breads/croissants so ate more there and she was 97 lbs after the trip. She walked a lot of the trip (3-4 miles per day) but now that she is home and not walking as much and having legg pain too, so wt went down a little to 1 lbs.    Wt Readings from Last 10 Encounters:   04/04/25 45.1 kg (99 lb 8 oz)   03/11/25 43.7 kg (96 lb 4.8 oz)   02/12/25 41.7 kg (92 lb)   01/29/25 41.7 kg (92 lb)   01/09/25 42.2 kg (93 lb)   12/19/24 43.1 kg (95 lb)   12/13/24 43.3 kg (95 lb 8 oz)   11/15/24 41.3 kg (91 lb 1.6 oz)   11/07/24 41.3 kg (91 lb)   11/05/24 41.3 kg (91 lb)     BMI: Estimated " "body mass index is 17.63 kg/m  as calculated from the following:    Height as of 2/12/25: 1.6 m (5' 3\").    Weight as of 4/4/25: 45.1 kg (99 lb 8 oz).    Diet Recall:  (some usual/recent meals/snacks/beverages):- doesn't eat dessert of sweets and doesn't drink alcohol.  Meal Food    Breakfast Toast with PB with coffee and lactose free milk and soy protein smoothie (drinks this first) and sometimes fruit and also an egg     Lunch 1-2 PM: sometimes eats out (higher calorie when out):  often pollo food variety of ricci with 1/2 cup rice and veggies, tofu or with meat, nuts OR Yi food: grilled chicken, noodle and lettuce, veg salad with sprinkle of nuts or coconut cream ricci with rice or tofu in dish or recently Japanese restaurant near home or Greek food    At home: leftovers from dinner. or 's homemade Bel[place]ian bread with cheese and fruit.     Dinner Chicken/red meat or fish (1x/week salmon) or occas other type of fish/cod. Likes pork but not eating lately and vegetables and rice or very occas deep fried vegetable tempura or fish and chip OR Pasta dish (spaghetti with small amt tomato sauce with ground beef), or bread and cheese,      Snacks Homemade Trail mix: has in the house and also carries in her purse.    's homemade [place]ian brd with cheese and apple    Sometimes evening snack at, if lower weight but not much lately. 10 PM bowl cereal with lactose free milk     Beverages 1 c coffee with lactose free milk,   ~32 oz water, making homemade smoothie with 1 cup 2% lactose free milk, 4T soy protein isolate and canned fruit.     Alcohol Intake none     Frequency of eating/taking out meals: 2-3 times per week     Labs:    Last Comprehensive Metabolic Panel:  Sodium   Date Value Ref Range Status   01/03/2025 138 135 - 145 mmol/L Final   01/23/2021 143 133 - 144 mmol/L Final     Potassium   Date Value Ref Range Status   01/03/2025 5.0 3.4 - 5.3 mmol/L Final   09/29/2022 4.7 3.4 - 5.3 mmol/L Final "   01/23/2021 3.6 3.4 - 5.3 mmol/L Final     Chloride   Date Value Ref Range Status   01/03/2025 102 98 - 107 mmol/L Final   01/23/2021 114 (H) 94 - 109 mmol/L Final     Chloride (External)   Date Value Ref Range Status   10/24/2022 104 94 - 109 mmol/L Final     Carbon Dioxide   Date Value Ref Range Status   01/23/2021 26 20 - 32 mmol/L Final     Carbon Dioxide (CO2)   Date Value Ref Range Status   01/03/2025 26 22 - 29 mmol/L Final   09/29/2022 29 20 - 32 mmol/L Final     Anion Gap   Date Value Ref Range Status   01/03/2025 10 7 - 15 mmol/L Final   09/29/2022 3 3 - 14 mmol/L Final   01/23/2021 3 3 - 14 mmol/L Final     Glucose   Date Value Ref Range Status   01/03/2025 96 70 - 99 mg/dL Final   09/29/2022 100 (H) 70 - 99 mg/dL Final   01/23/2021 157 (H) 70 - 99 mg/dL Final     GLUCOSE BY METER POCT   Date Value Ref Range Status   07/19/2024 113 (H) 70 - 99 mg/dL Final     Urea Nitrogen   Date Value Ref Range Status   01/03/2025 39.4 (H) 8.0 - 23.0 mg/dL Final   09/29/2022 21 7 - 30 mg/dL Final   01/23/2021 6 (L) 7 - 30 mg/dL Final     Creatinine   Date Value Ref Range Status   01/03/2025 1.03 (H) 0.51 - 0.95 mg/dL Final   01/23/2021 0.75 0.52 - 1.04 mg/dL Final     GFR Estimate   Date Value Ref Range Status   01/03/2025 54 (L) >60 mL/min/1.73m2 Final     Comment:     eGFR calculated using 2021 CKD-EPI equation.   01/23/2021 76 >60 mL/min/[1.73_m2] Final     Comment:     Non  GFR Calc  Starting 12/18/2018, serum creatinine based estimated GFR (eGFR) will be   calculated using the Chronic Kidney Disease Epidemiology Collaboration   (CKD-EPI) equation.       Calcium   Date Value Ref Range Status   01/03/2025 9.6 8.8 - 10.4 mg/dL Final     Comment:     Reference intervals for this test were updated on 7/16/2024 to reflect our healthy population more accurately. There may be differences in the flagging of prior results with similar values performed with this method. Those prior results can be interpreted  in the context of the updated reference intervals.   01/23/2021 8.0 (L) 8.5 - 10.1 mg/dL Final     CBC RESULTS:   Recent Labs   Lab Test 09/12/24  1608   WBC 4.1   RBC 3.44*   HGB 11.3*   HCT 35.3   *   MCH 32.8   MCHC 32.0   RDW 14.6          Pertinent Medications/vitamin and mineral supplements:     Current Outpatient Medications   Medication Sig Dispense Refill    acetaminophen (TYLENOL) 325 MG tablet Take 2 tablets (650 mg) by mouth every 6 hours      amoxicillin (AMOXIL) 500 MG capsule TAKE 1 CAPSULE BY MOUTH THREE TIMES DAILY UNTIL ALL TAKEN      artificial saliva (BIOTENE DRY MOUTHWASH) LIQD liquid Swish and spit in mouth 4 times daily as needed for dry mouth      calcium carbonate-vitamin D (CALTRATE) 600-10 MG-MCG per tablet Take 1 tablet by mouth daily      carboxymethylcellulose PF (REFRESH PLUS) 0.5 % ophthalmic solution Place 1 drop into both eyes 3 times daily as needed for dry eyes      clobetasol (TEMOVATE) 0.05 % external ointment Apply topically 2 times daily To right ankle as needed 60 g 3    EPINEPHrine (ANY BX GENERIC EQUIV) 0.3 MG/0.3ML injection 2-pack Inject 0.3 mg into the muscle as needed for anaphylaxis      famotidine (PEPCID) 20 MG tablet Take 20 mg by mouth 2 times daily.      gabapentin (NEURONTIN) 300 MG capsule Take 1 capsule (300 mg) by mouth 3 times daily. 90 capsule 11    hydroxychloroquine (PLAQUENIL) 200 MG tablet Take 1 tablet (200 mg) by mouth daily 90 tablet 1    lifitegrast (XIIDRA) 5 % opthalmic solution Place 1 drop into both eyes 2 times daily      methylPREDNISolone (MEDROL DOSEPAK) 4 MG tablet therapy pack Follow Package Directions 21 tablet 0    mirtazapine (REMERON) 7.5 MG tablet Take 1 tablet (7.5 mg) by mouth at bedtime. 30 tablet 11    Multiple Vitamins-Minerals (OCUVITE PRESERVISION PO) Take 1 tablet by mouth 2 times daily      pantoprazole (PROTONIX) 40 MG EC tablet Take 1 tablet (40 mg) by mouth daily 30 tablet 11    UNABLE TO FIND MEDICATION NAME:  V (vein formular)       No current facility-administered medications for this visit.       Food Allergies: Shrimp    Estimated Nutrition Needs based on ideal body weight of 52 k-1820 calories (30-35 kcals/kg), ~52g protein (1g/kg), ~1 ml/kcal or total fluids per MD.     MALNUTRITION:  % Weight Loss:  Weight loss does not meet criteria for malnutrition, but has underweight BMI of 17 indicating underweight. Weight improving.  % Intake:  no decreased intake  Subcutaneous Fat Loss:  None observed  Muscle Loss:  None observed  Fluid Retention:  None noted    Malnutrition Diagnosis: Does not meet ASPEN malnutrition criteria but does for GLIM criteria d/t chronic inflammatory dz (CKD) and BMI of 17 for age >70 Y.O.  In Context of:  Chronic illness or disease (Based on GLIM criteria with underweight BMI and CKD).    Nutrition Prescription: Nutrition Education     Nutrition Intervention      Provided diet education review for underweight, increasing calories and regaining weight with CKD tips, and managing multiple medical history and diet recommendations.      Answered patient's questions. Patient verbalized understanding of education provided. See Goals below.     Goals:    Continue eating at least 6 times per day (such as 3 meals and 3 snacks per day).    Continue to drink at least one protein drink per day. Okay to continue having your Soy protein isolate drink blended with 2% Fairlife lactose free milk and fruit. For extra calories you can add in a tablespoon of peanut butter.    --Here is an additional plant based protein drinks that is lower in potassium, in case you want more variety: Evolve Protein drinks (make a powder and ready to drink option). You can also still have the Owyn protein drinks for other variety so you don't get tired of just having the Soy protein drink or Evolve drinks. You may like to rotate them for variety.    Continue lower potassium fruits and vegetables per your kidney doctor's  recommendations.    Include some and extra unsaturated fats at meals to help get more calories such as olive oil, avocado oil, nuts, seeds, nut butters.    --You can measure and portion out your homemade trail mix and for example put 1/4 cup-1/2 cup of the trail mix in a container and make sure to finish up the full container by the end of the day. Otherwise you may only be having bites of or a just a few nuts per day.    Continue to Include a protein serving/source at each meal and have the one protein drink per day.    --Here are examples of protein sources: chicken, turkey, lean pork, lean beef, fish, tofu, eggs, nut butters, nuts and lactose free dairy products.    Keep food and beverage journals for at least a few weeks to help you what and how much you are eating daily. You do not need to write in the calories, but it can be helpful to see if you are consistently eating 3 meals and 3 snacks per day and you can write down the general portion sizes.    Nutrition Monitoring and Evaluation: Will monitor adherence to nutrition recommendations at future RD visits.     Further Medical Nutrition Therapy:  Follow-up as needed.  She would like to follow-up as needed.    Patient was encouraged to contact RD with any further questions.    Iesha Hu, MS, RD, LD

## 2025-04-10 NOTE — LETTER
"4/10/2025      Patrick Olson  [address] [facility] [id number]      Dear Colleague,    Thank you for referring your patient, Patrick Olson, to the [facility]. Please see a copy of my visit note below.    PHYSICAL MEDICINE & REHABILITATION / MEDICAL SPINE        Date:  Apr 10, 2025    Name:  Patrick Olson  YOB: 1940  MRN:  [id number]      \"I am using a new tool to help me save time with documentation.  Basically it is going to listen to our visit today and uses AI to help me draft my note.  Is it okay if I use this tool today?\"        CHART REVIEW:  Reviewed 06/06/2024 note from Mukesh Lantigua DO (family medicine-sports medicine).  Ms. Patrick Olson was being seen for follow-up for chronic low back pain.  She had been doing physical therapy.  She had seen pain management and started acupuncture.  Walking increased her pain.  Ms. Patrick Olson was seen at [facility] for consideration of RFA.  She had medial branch blocks that only provided 50% relief.  Ms. Patrick Olson had lumbar facet arthritis and moderate lumbar spinal stenosis.  She had an epidural steroid injection performed in February 2024 that did not provide any relief.  Ms. Patrick Olson was to continue with physical therapy.  There was a discussion of adding a TENS unit.  Ms. Patrick Olson could continue acupuncture if she wanted.  Referral to medical spine was placed.     Reviewed 02/14/2024 note from Sintia Pelayo MD (physical medicine and rehabilitation-pain medicine).  Ms. Patrick Olson had a bilateral L4 nerve root injections.     Reviewed 02/05/2024 note from Sintia Pealyo MD (physical medicine and rehabilitation-pain medicine).  Ms. Patrick Olson had L3-L4 interlaminar epidural steroid injections on 05/17/2022 and 03/14/2023; these were without relief.  Ms. Patrick Olson had medial branch blocks of the bilateral L3, L4, and L5 medial branches/dorsal rami on 01/30/2024 with only 50% " diagnostic relief.  The plan was for bilateral L4 nerve root injections and to continue with physical therapy.  Ms. Patrick Olson was to call for Medrol Dosepaks as needed for future travels.  Ms. Patrick Olson was to follow-up in clinic 2 weeks after the procedure.         CHIEF COMPLAINT:  Left lateral hip and left anterolateral thigh pain.        HISTORY OF PRESENT ILLNESS:  Ms. Patrick Olson is an 84-year-old, right-hand-dominant, female.  She is retired.  She was a  with the [employer].  Ms. Patrick Olson enjoys traveling.  She previously enjoyed painting before her back pain worsened.  Ms. Patrick Olson likes to walk 1 mile for exercise.     Ms. Patrick Olson stated that she was recently hospitalized for a diverticular bleed.     Ms. Patrick Olson stated that she has had a left total hip arthroplasty in 2011.  She denied any other injuries or surgeries of her mid back, low back, pelvis, hips, thighs, knees, legs, ankles, feet, toes.     Ms. Patrick Olson stated that she has had rheumatoid arthritis for 40 years.  Ms. Patrick Olson stated that she currently takes hydroxychloroquine for her rheumatoid arthritis.  She denied any other personal or family history of autoimmune diseases, rheumatologic diseases, gout, pseudogout.  Ms. Patrick Olson denied any personal or family history of neurologic diseases.  She denied any personal or family history of inflammatory bowel diseases.     Ms. Patrick Olson began noting bilateral low back pain in 2009.     On 07/25/2024, Ms. Patrick Olson had ultrasound-guided bilateral superior cluneal nerve injections.  On 09/24/2024, Ms. Patrick Olson stated she had 70% to 75% improvement in her pain for 4 to 5 weeks.  She gradually noted a return of her pain.     On 12/19/2024, Ms. Patrick Olson had ultrasound-guided bilateral superior cluneal nerve injections.  On 02/12/2025, Ms. Patrick Olson was no longer noting left thigh/groin/hip pain.  She did note  "briefly after the injections she had some right groin and hip pain; but that pain resolved.  Ms. Patrick Olson noted that these bilateral superior cluneal nerve blocks took about 2 weeks to become effective.  The injections provided 70% relief in her pain.    Ms. Patrick Olson was last seen in this clinic on 02/12/2025.  She returns to clinic today, 0/10/2025.    Ms. Patrick Olson reports 75% improvement from her recent bilateral superior cluneal nerve injections.    Ms. Patrick Olson began experiencing left lateral hip and left anterolateral thigh pain on her trip to [facility].  Pain seemed to begin around 03/27/2025.  Ms. Patrick Olson was walking an average of 3.5 miles per day on vacation.  She usually walks 1.5 miles per day.  After she returned from her vacation, Ms. Patrick Olson took prednisone 40 mg daily for 5 days.  She then was prescribed a Medrol Dosepak.  Ms. Patrick Olson notes worsening pain with weightbearing activities.  Pain is described as \"aching, burning.\"  Pain is currently rated as 6/10 in severity.  Pain improves with lying down.    Ms. Patrick Olson states that her balance is not very good.  She notes some weakness in her left lower extremity.  Ms. Patrick Olson denies any catching, locking, popping.  She denies any bruising, redness, swelling.  Ms. Patrick Olson denies any give way episodes of her lower extremities.  She reports 1 episode of tripping/stumbling that led to 1 fall.  She has used a single-point cane in her right hand.  Ms. Patrick Olson reports the numbness, tingling, pins-and-needles she was experiencing has resolved.  She denies any saddle anesthesia, bowel incontinence, bladder incontinence.    Ms. Patrick Olson is taking 1 tablet of gabapentin 3 times daily.  She is taking 1 tablet of acetaminophen 3 times daily.    History of Present Illness-  - Patrick Olson, 84-year-old female, recently returned from a trip to [place], including visits to [facility], " and [place].  - During the trip, which lasted 9 days with 6 days of walking, experienced pain that began on March 27, 2025.  - Initially decided not to take oral steroids during the trip, but started a 5-day course of 40 mg prednisone upon returning home, which provided limited relief.  - Pain returned more intensely after completing the prednisone course, leading to a visit to urgent care where a 6-day tapering steroid pack was prescribed.  - Pain has been improving, with the first outdoor walk using a cane occurring yesterday.  - Pain is primarily located in the hip and radiates down the leg, with manageable pain in the buttock area.  - Previous injections provided approximately 75% pain relief, allowing for the trip.  - Uncertain if pain is due to muscle, joint, or swelling; has used both ice and heat for relief.  - Pain is present when standing and walking, but alleviated when lying down.  - Typically walks 1.5 miles daily at home, increased to 3.5 miles daily during the trip, with afternoon rest.  - Pain described as aching and occasionally burning, with some weakness in the left leg.  - Experienced a minor fall during the vacation due to tripping, without injury.  - Reports tingling in the left foot, now resolved.  - No numbness, tingling, or incontinence in the groin area.  - Left hip was replaced in 2011 at [facility], last evaluated by an orthopedic doctor last year.  - Regular pain management includes gabapentin and Tylenol, with an additional Tylenol at night due to increased pain.        ALLERGIES:  Allergies   Allergen Reactions     Bee Venom Anaphylaxis     Shrimp Anaphylaxis     Evoxac [Cevimeline] Unknown     Prevnar Swelling     Other reaction(s): Edema     Prevnar [Pneumococcal 13-Linda Conj Vacc] Unknown         MEDICATIONS:  Current Outpatient Medications   Medication Sig Dispense Refill     acetaminophen (TYLENOL) 325 MG tablet Take 2 tablets (650 mg) by mouth every 6 hours        artificial saliva (BIOTENE DRY MOUTHWASH) LIQD liquid Swish and spit in mouth 4 times daily as needed for dry mouth       calcium carbonate-vitamin D (CALTRATE) 600-10 MG-MCG per tablet Take 1 tablet by mouth daily       carboxymethylcellulose PF (REFRESH PLUS) 0.5 % ophthalmic solution Place 1 drop into both eyes 3 times daily as needed for dry eyes       clobetasol (TEMOVATE) 0.05 % external ointment Apply topically 2 times daily To right ankle as needed 60 g 3     EPINEPHrine (ANY BX GENERIC EQUIV) 0.3 MG/0.3ML injection 2-pack Inject 0.3 mg into the muscle as needed for anaphylaxis       famotidine (PEPCID) 20 MG tablet Take 20 mg by mouth 2 times daily.       gabapentin (NEURONTIN) 300 MG capsule Take 1 capsule (300 mg) by mouth 3 times daily. 270 capsule 11     hydroxychloroquine (PLAQUENIL) 200 MG tablet Take 1 tablet (200 mg) by mouth daily 90 tablet 1     lifitegrast (XIIDRA) 5 % opthalmic solution Place 1 drop into both eyes 2 times daily       mirtazapine (REMERON) 7.5 MG tablet Take 1 tablet (7.5 mg) by mouth at bedtime. 90 tablet 3     Multiple Vitamins-Minerals (OCUVITE PRESERVISION PO) Take 1 tablet by mouth 2 times daily       pantoprazole (PROTONIX) 40 MG EC tablet Take 1 tablet (40 mg) by mouth daily 30 tablet 11     UNABLE TO FIND MEDICATION NAME: V (vein formular)           PAST MEDICAL HISTORY:  Past Medical History:   Diagnosis Date     Anemia      CKD (chronic kidney disease) stage 3, GFR 30-59 ml/min (H)      Diverticulosis of large intestine      Osteoarthritis      Osteoporosis      Rheumatoid arthritis (H)      Sensorineural hearing loss      Varicose veins of bilateral lower extremities with other complications          PAST SURGICAL HISTORY:  Past Surgical History:   Procedure Laterality Date     CAPSULE/PILL CAM ENDOSCOPY N/A 11/18/2021    Procedure: IMAGING PROCEDURE, GI TRACT, INTRALUMINAL, VIA CAPSULE;  Surgeon: Deric Rodriguez MD;  Location:  GI     CAPSULE/PILL CAM  ENDOSCOPY N/A 2/14/2023    Procedure: IMAGING PROCEDURE, GI TRACT, INTRALUMINAL, VIA CAPSULE;  Surgeon: Jaime Mesa MD;  Location: [facility]     COLONOSCOPY N/A 03/14/2017    Procedure: COMBINED COLONOSCOPY, SINGLE OR MULTIPLE BIOPSY/POLYPECTOMY BY BIOPSY;  Surgeon: Spencer Downey MD;  Location: [facility]     COLONOSCOPY N/A 9/22/2022    Procedure: COLONOSCOPY, FLEXIBLE, WITH LESION REMOVAL USING SNARE;  Surgeon: Kirby Arthur MD;  Location:  GI     COLONOSCOPY N/A 1/13/2023    Procedure: COLONOSCOPY;  Surgeon: Spencer Downey MD;  Location: [facility] OR     COLONOSCOPY N/A 8/14/2023    Procedure: Colonoscopy;  Surgeon: Spencer Downey MD;  Location: [facility]     ENTEROSCOPY SMALL BOWEL N/A 11/20/2021    Procedure: ENTEROSCOPY, colonoscopy with endoscopic mucosal resection and tattoo placement;  Surgeon: Kirby Arthur MD;  Location: [facility]     ESOPHAGOSCOPY, GASTROSCOPY, DUODENOSCOPY (EGD), COMBINED N/A 2/6/2023    Procedure: ESOPHAGOGASTRODUODENOSCOPY (EGD);  Surgeon: Spencer Downey MD;  Location: [facility]     IR VISCERAL EMBOLIZATION  01/22/2021     ORTHOPEDIC SURGERY Left     hip replacment     SIGMOIDOSCOPY FLEXIBLE N/A 01/23/2021    Procedure: SIGMOIDOSCOPY, FLEXIBLE;  Surgeon: Jaime Steinberg MD;  Location:  GI     SIGMOIDOSCOPY FLEXIBLE N/A 7/17/2024    Procedure: Sigmoidoscopy flexible;  Surgeon: Ritika Woodard MD;  Location: [facility]         FAMILY HISTORY:  History reviewed. No pertinent family history.      SOCIAL HISTORY:  Social History     Socioeconomic History     Marital status:      Spouse name: Not on file     Number of children: Not on file     Years of education: Not on file     Highest education level: Not on file   Occupational History     Not on file   Tobacco Use     Smoking status: Former     Smokeless tobacco: Never   Vaping Use     Vaping status: Never Used   Substance and Sexual Activity     Alcohol use: No     Drug use: No     Sexual activity: Not on file   Other Topics  Concern     Not on file   Social History Narrative    - Retired (formerly employed as  at [employer])    - 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[employer])     Social Drivers of Health     Financial Resource Strain: Low Risk  (9/12/2024)    Financial Resource Strain      Within the past 12 months, have you or your family members you live with been unable to get utilities (heat, electricity) when it was really needed?: No   Food Insecurity: Low Risk  (9/12/2024)    Food Insecurity      Within the past 12 months, did you worry that your food would run out before you got money to buy more?: No      Within the past 12 months, did the food you bought just not last and you didn t have money to get more?: No   Transportation Needs: Low Risk  (9/12/2024)    Transportation Needs      Within the past 12 months, has lack of transportation kept you from medical appointments, getting your medicines, non-medical meetings or appointments, work, or from getting things that you need?: No   Physical Activity: Sufficiently Active (9/12/2024)    Exercise Vital Sign      Days of Exercise per Week: 5 days      Minutes of Exercise per Session: 30 min   Stress: No Stress Concern Present (9/12/2024)    [place] [employer] - Occupational Stress Questionnaire      Feeling of Stress : Only a little   Social Connections: Unknown (9/12/2024)    Social Connection and Isolation Panel [NHANES]      Frequency of Communication with Friends and Family: Not on file      Frequency of Social Gatherings with Friends and Family: Once a week      Attends Mu-ism Services: Not on file      Active Member of Clubs or Organizations: Not on file      Attends Club or Organization Meetings: Not on file      Marital Status: Not on file   Interpersonal Safety: Low Risk  (4/15/2025)    Interpersonal Safety      Do you feel physically and emotionally safe where you currently  "live?: Yes      Within the past 12 months, have you been hit, slapped, kicked or otherwise physically hurt by someone?: No      Within the past 12 months, have you been humiliated or emotionally abused in other ways by your partner or ex-partner?: No   Housing Stability: Low Risk  (9/12/2024)    Housing Stability      Do you have housing? : Yes      Are you worried about losing your housing?: No         PHYSICAL EXAMINATION:  Vitals:    04/10/25 1458   BP: (!) 161/74   Pulse: 83   SpO2: 99%   Weight: 44.9 kg (99 lb)   Height: 1.6 m (5' 3\")       GENERAL:  No acute distress.  Pleasant and cooperative.   PSYCH:  Normal mood and affect.  HEAD:  Normocephalic.  SPEECH:  No dysarthria.  EYES:  No scleral icterus.  Wearing glasses.  EARS:  Hearing is intact to spoken voice.  NOSE:  Midline, symmetric, no rhinorrhea.  LUNGS:  No respiratory distress.  No increased work of breathing.  VASCULAR/PULSES:  Posterior tibial:  Right 2+.  Left 2+.  LOWER EXTREMITIES: No clubbing, cyanosis, or edema bilaterally.    BALANCE AND GAIT: The patient has reciprocal gait pattern with left antalgia.  She is able to toe walk, heel walk, and tandem walk without difficulty.  Double leg squat is performed normally for.    INSPECTION:  There is no erythema or ecchymosis of the low back.  There is thoracolumbar scoliosis convex right.    LUMBOPELVIC PALPATION:  Lumbar Spinous Processes:  not tender.  Lumbar Paraspinals:  Right not tender.  Left not tender.  Posterior Superior Iliac Spine:  Right not tender.  Left not tender.  Superior Cluneal Nerves:  Right not tender.  Left not tender.  Iliac Crest:  Right not tender.  Left not tender.  Sacroiliac Joint:  Right not tender.  Left not tender.  Hip External Rotators:  Right mild tenderness.  Left mild tenderness.  Ischial Tuberosity:  Right not tender.  Left not tender.  Greater Trochanter:  Right not tender.  Left mild to moderate tenderness.  Coccyx:  not tender.    THORACOLUMBAR RANGE OF " MOTION:  Forward flexion (85 ): Mildly reduced range of motion, causes slight increase in left hip pain, does not cause radiating pain.  Extension (30 ): Moderately reduced range of motion, does not cause pain, does not cause radiating pain.  Lateral bending right (30 ):  Moderately reduced range of motion, does not cause pain, does not cause radiating pain.  Lateral bending left (30 ): Moderately reduced range of motion, causes mild increase in left lateral hip pain, does not cause radiating pain.  Twisting right with extension:  Moderately reduced range of motion, does not cause pain, does not cause radiating pain.  Twisting left with extension:  Moderately reduced range of motion, does not cause pain, does not cause radiating pain.    SACROILIAC RANGE OF MOTION:  Standing flexion:  Right -.  Left -.  Seated flexion:  Right -.  Left -.  One-leg stork (Gillet):  Right -.  Left -.  Sacroiliac joint dysfunction:  Right -.  Left -.    HIP RANGE OF MOTION:  Flexion (110 ):  Right 120 .  Left 120 .  Extension (30 ):  Right 30 .  Left 25 .  External rotation (45 ):  Right 40 .  Left 40 .  Internal rotation (35 ):  Right 40 .  Left 40 .    KNEE RANGE OF MOTION:  Extension (0 ):  Right 0 .  Left 0 .  Flexion (135 ):  Right 150 .  Left 150 .    STRENGTH:  Hip flexion:  Right 5/5.  Left 5/5.  Hip abduction:  Right 5/5.  Left 4+/5.  Hip external rotation:  Right 5/5.  Left 4+/5.  Hip extension:  Right 5/5.  Left 5/5.  Knee extension:  Right 5/5.  Left 5/5.  Knee flexion:  Right 5/5.  Left 5/5.  Ankle dorsiflexion:  Right 5/5.  Left 5/5.  Great toe dorsiflexion:  Right 5/5.  Left 5/5.  Ankle plantar flexion:  Right 5/5.  Left 5/5.    SENSATION:  Intact to light touch along right L2, L3, L4, L5, S1, S2.  Intact to light touch along left L2, L3, L4, L5, S1, S2.    REFLEXES:  Patellar (L2-L4):  Right 2+.  Left 2+.  Medial hamstrings (L5-S1):  Right 2+.  Left 2+.  Achilles (S1-S2):  Right 2+.  Left 2+.  Babinski:  Right downgoing.   Left downgoing.  Forced ankle dorsiflexion (clonus):  Right 0 beats.  Left 0 beats.    LUMBOPELVIC SPECIAL TESTS:  Sterling finger:  Right -.  Left -.  Gapping / Distraction:  Right -.  Left -.  Log roll:  Right -.  Left -.  Straight-leg raise (Lasegue):  Right -.  Left -.  Crossed-straight leg raise:  Right -.  Left -.  Resisted straight leg raise:  Right -.  Left -.  FABERE (Víctor):  Right -.  Left -.  FADIR:  Right -.  Left -.  Hip scour:  Right -.  Left -.  Lateral sacral compression:  Right -.  Left -.  Clamshell:  Right -.  Left -.  Femoral nerve stretch:  Right -.  Left -.  Crossed femoral nerve stretch:  Right -.  Left -.  Ely s:  Right +.  Left +.  Yeoman s:  Right -.  Left -.  Midline sacral thrust:  Right -.  Left -.  Noble compression:  Right -.  Left -.        IMAGING:  XR PELVIS AND HIP LEFT 1 VIEW  9/24/2024 12:07 PM      HISTORY: Left hip pain.  COMPARISON: CT 1/19/2021     IMPRESSION:  Left total hip arthroplasty in near-anatomic position. No periprosthetic fracture or lucency. Demineralization without acute fracture. Lower lumbar degenerative disc disease.           LUMBAR FLEX/EXT TWO TO THREE VIEWS, SPINE COMPLETE SCOLIOSIS TWO VIEWS  July 22, 2024 3:50 PM      HISTORY: Scoliosis of thoracolumbar spine. Facet arthropathy. Chronic bilateral low back pain. Cluneal neuropathy.     COMPARISON: Lumbar spine MR 3/9/2023.     IMPRESSION: T12 rib-bearing thoracic vertebral bodies with five lumbar-type vertebral bodies.     Convex right curvature of the lumbar spine centered at L2-L3 with Maldonado angle measuring 25 degrees from the superior L1 endplate to the inferior L3 endplate. Rightward listhesis of L3 on L4. Retrolisthesis of L3 on L4 measuring 2 mm on the flexion view appears slightly increased to 3 mm on the extension view.     No obvious loss of vertebral body height. Mild to moderate degenerative endplate changes and loss of disc height at C5-C6. Marked degenerative endplate changes and loss of  disc height in the lumbar spine on the left at L2-L3, bilaterally at L3-L4, and right at L4-L5.     Mild retrolisthesis of L3 on L4 measuring 2 mm on the flexion view appears slightly increased to 3 mm on the extension view.     Positive sagittal balance of 3 cm.           MRI OF THE LUMBAR SPINE WITHOUT CONTRAST  3/9/2023 11:50 AM      COMPARISON: Lumbar spine MRI 07/12/2018     HISTORY: Lumbar radiculopathy, acute. Lumbar degenerative disc disease.     TECHNIQUE: Multiplanar, multisequence MRI images of the lumbar spine were acquired without IV contrast.     FINDINGS: There are five lumbar-type vertebrae for the purposes of this dictation. The conus ends at the distal L1. 2 mm retrolisthesis of L3 on L4. Vertebral body heights are maintained. Nonpathologic marrow signal. Mild Modic type I changes from L2-L3 to L4-L5. Vertebral body heights are maintained. Nonpathologic marrow signal. Mild symmetric atrophy of the posterior paraspinal musculature. Normal diameter of the descending aorta. Redemonstration of a large gallstone. The patient's known bilateral renal cysts are better visualized on the abdomen and pelvis CT 11/16/2021.     T12-L1: Normal disc height and signal.  No herniation. Mild bilateral facet arthropathy.  No spinal canal or neural foraminal stenosis.     L1-L2: Normal disc height and signal. Small broad-based disc bulge. Mild bilateral facet arthropathy.  No spinal canal or neural foraminal stenosis.     L2-L3: Moderate vertebral disc height loss. Loss of the normal T2 signal within the disc. Broad-based disc bulge with superimposed small central disc protrusion. Mild bilateral facet arthropathy. No spinal canal narrowing. No right neuroforaminal narrowing. Mild left neuroforaminal narrowing.     L3-L4: Moderate intervertebral disc height loss. Loss of the normal T2 signal within the dens. Broad-based disc bulge with superimposed left foraminal disc protrusion, decreased since the prior exam.  Moderate bilateral facet arthropathy. Mild spinal canal narrowing. Mild narrowing of the left lateral recess. No right neuroforaminal narrowing. Mild left neural foraminal narrowing.     L4-L5: Mild intervertebral disc height loss. Loss of the normal T2 signal within the disc. Broad-based disc bulge with superimposed right foraminal disc protrusion. Moderate bilateral facet arthropathy. Mild narrowing of the right lateral recess. No spinal canal narrowing. Severe right neuroforaminal narrowing. No left neuroforaminal narrowing.     L5-S1: Mild intervertebral disc height loss. Loss of the normal T2 signal within the disc. Broad-based disc bulge with superimposed small central disc protrusion. Mild bilateral facet arthropathy. Mild narrowing of the lateral recesses. No spinal canal narrowing. No right neural foraminal narrowing. No left neural foraminal narrowing.     IMPRESSION:  1.  Compared to prior lumbar spine MRI 07/12/2018, interval decrease in the previously noted large left foraminal disc protrusion at L3-L4.  2.  Otherwise stable to interval worsening of the moderate multilevel degenerative changes of the lumbar spine.  3.  Mild spinal canal narrowing and mild narrowing of the left lateral recess at L3-L4.  4.  Mild narrowing of the right lateral recess at L4-L5.  5.  Mild narrowing of the lateral recesses at L5-S1.  6.  Severe right neural foraminal narrowing at L4-L5.  7.  Mild left neuroforaminal narrowing at L2-L3 and L3-L4.  8.  Mild Modic type I changes at from L2-L3 to L4-L5.             ASSESSMENT/PLAN:  Ms. Patrick Olson is an 84-year-old female.  She has thoracolumbar scoliosis, convex right.  She has lumbosacral facet arthropathy and lumbosacral degenerative disc disease.  Ms. Patrick Olson has been noted to have bilateral superior cluneal neuropathies.  Ms. Patrick Olson has not previously experienced good pain relief with epidural steroid injections or medial branch/dorsal ramus blocks of the  bilateral L4-L5 and L5-S1 facet joints.  Ms. 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  Discussed diagnoses, pathophysiologies, and treatment options with Ms. Patrick Olson.  Discussed the options of doing nothing/living with it, physical therapy, greater trochanteric bursa corticosteroid injection, gluteus medius and minimus tendon fenestration with platelet rich plasma injection.  Ms. Patrick Olson is being referred to physical therapy.  She will be set up for left greater trochanteric bursa corticosteroid injection.  Ms. Patrick Olson is to follow-up in this clinic in 2 months.        Total time on the date of the encounter:  44 minutes were spent on one more or more of the following:  discussion with patient, history, exam, coordinating care, treatment goals, record review, documenting clinical information, and/or data review.    Consent was obtained from the patient to use an AI documentation tool in the creation of this note.      Donald Coombs MD        Again, thank you for allowing me to participate in the care of your patient.        Sincerely,        Donald Coombs MD    Electronically signed

## 2025-04-10 NOTE — PROGRESS NOTES
"PHYSICAL MEDICINE & REHABILITATION / MEDICAL SPINE        Date:  Apr 10, 2025    Name:  Patrick Olson  YOB: 1940  MRN:  [id number]      \"I am using a new tool to help me save time with documentation.  Basically it is going to listen to our visit today and uses AI to help me draft my note.  Is it okay if I use this tool today?\"        CHART REVIEW:  Reviewed 06/06/2024 note from Mukesh Lantigua DO (family medicine-sports medicine).  Ms. Patrick Olson was being seen for follow-up for chronic low back pain.  She had been doing physical therapy.  She had seen pain management and started acupuncture.  Walking increased her pain.  Ms. Patrick Olson was seen at [facility] for consideration of RFA.  She had medial branch blocks that only provided 50% relief.  Ms. Patrick Olson had lumbar facet arthritis and moderate lumbar spinal stenosis.  She had an epidural steroid injection performed in February 2024 that did not provide any relief.  Ms. Patrick Olson was to continue with physical therapy.  There was a discussion of adding a TENS unit.  Ms. Patrick Olson could continue acupuncture if she wanted.  Referral to medical spine was placed.     Reviewed 02/14/2024 note from Sintia Pelayo MD (physical medicine and rehabilitation-pain medicine).  Ms. Patrick Olson had a bilateral L4 nerve root injections.     Reviewed 02/05/2024 note from Sintia Pelayo MD (physical medicine and rehabilitation-pain medicine).  Ms. Patrick Olson had L3-L4 interlaminar epidural steroid injections on 05/17/2022 and 03/14/2023; these were without relief.  Ms. Patrick Olson had medial branch blocks of the bilateral L3, L4, and L5 medial branches/dorsal rami on 01/30/2024 with only 50% diagnostic relief.  The plan was for bilateral L4 nerve root injections and to continue with physical therapy.  Ms. Patrick Olson was to call for Medrol Dosepaks as needed for future travels.  Ms. Patrick Olson was to follow-up in clinic 2 weeks " after the procedure.         CHIEF COMPLAINT:  Left lateral hip and left anterolateral thigh pain.        HISTORY OF PRESENT ILLNESS:  Ms. Patrick Olson is an 84-year-old, right-hand-dominant, female.  She is retired.  She was a  with the [employer].  Ms. Patrick Olson enjoys traveling.  She previously enjoyed painting before her back pain worsened.  Ms. Patrick Olson likes to walk 1 mile for exercise.     Ms. Patrick Olson stated that she was recently hospitalized for a diverticular bleed.     Ms. Patrick Olson stated that she has had a left total hip arthroplasty in 2011.  She denied any other injuries or surgeries of her mid back, low back, pelvis, hips, thighs, knees, legs, ankles, feet, toes.     Ms. Patrick Olson stated that she has had rheumatoid arthritis for 40 years.  Ms. Patrick Olson stated that she currently takes hydroxychloroquine for her rheumatoid arthritis.  She denied any other personal or family history of autoimmune diseases, rheumatologic diseases, gout, pseudogout.  Ms. Patrick Olson denied any personal or family history of neurologic diseases.  She denied any personal or family history of inflammatory bowel diseases.     Ms. Patrick Olson began noting bilateral low back pain in 2009.     On 07/25/2024, Ms. Patrick Olson had ultrasound-guided bilateral superior cluneal nerve injections.  On 09/24/2024, Ms. Patrick Olson stated she had 70% to 75% improvement in her pain for 4 to 5 weeks.  She gradually noted a return of her pain.     On 12/19/2024, Ms. Patrick Olson had ultrasound-guided bilateral superior cluneal nerve injections.  On 02/12/2025, Ms. Patrick Olson was no longer noting left thigh/groin/hip pain.  She did note briefly after the injections she had some right groin and hip pain; but that pain resolved.  Ms. Patrick Olson noted that these bilateral superior cluneal nerve blocks took about 2 weeks to become effective.  The injections provided 70% relief in her  "pain.    Ms. Patrick Olson was last seen in this clinic on 02/12/2025.  She returns to clinic today, 0/10/2025.    Ms. Patrick Olson reports 75% improvement from her recent bilateral superior cluneal nerve injections.    Ms. Patrick Olson began experiencing left lateral hip and left anterolateral thigh pain on her trip to [facility].  Pain seemed to begin around 03/27/2025.  Ms. Patrick Olson was walking an average of 3.5 miles per day on vacation.  She usually walks 1.5 miles per day.  After she returned from her vacation, Ms. Patrick Olson took prednisone 40 mg daily for 5 days.  She then was prescribed a Medrol Dosepak.  Ms. Patrick Olson notes worsening pain with weightbearing activities.  Pain is described as \"aching, burning.\"  Pain is currently rated as 6/10 in severity.  Pain improves with lying down.    Ms. Patrick Olson states that her balance is not very good.  She notes some weakness in her left lower extremity.  Ms. Patrick Olson denies any catching, locking, popping.  She denies any bruising, redness, swelling.  Ms. Patrick Olson denies any give way episodes of her lower extremities.  She reports 1 episode of tripping/stumbling that led to 1 fall.  She has used a single-point cane in her right hand.  Ms. Patrick Olson reports the numbness, tingling, pins-and-needles she was experiencing has resolved.  She denies any saddle anesthesia, bowel incontinence, bladder incontinence.    Ms. Patrick Olson is taking 1 tablet of gabapentin 3 times daily.  She is taking 1 tablet of acetaminophen 3 times daily.    History of Present Illness-  - Patrick Olson, 84-year-old female, recently returned from a trip to [place], including visits to [facility], [facility], and [place].  - During the trip, which lasted 9 days with 6 days of walking, experienced pain that began on March 27, 2025.  - Initially decided not to take oral steroids during the trip, but started a 5-day course of 40 mg prednisone upon returning " home, which provided limited relief.  - Pain returned more intensely after completing the prednisone course, leading to a visit to urgent care where a 6-day tapering steroid pack was prescribed.  - Pain has been improving, with the first outdoor walk using a cane occurring yesterday.  - Pain is primarily located in the hip and radiates down the leg, with manageable pain in the buttock area.  - Previous injections provided approximately 75% pain relief, allowing for the trip.  - Uncertain if pain is due to muscle, joint, or swelling; has used both ice and heat for relief.  - Pain is present when standing and walking, but alleviated when lying down.  - Typically walks 1.5 miles daily at home, increased to 3.5 miles daily during the trip, with afternoon rest.  - Pain described as aching and occasionally burning, with some weakness in the left leg.  - Experienced a minor fall during the vacation due to tripping, without injury.  - Reports tingling in the left foot, now resolved.  - No numbness, tingling, or incontinence in the groin area.  - Left hip was replaced in 2011 at [facility], last evaluated by an orthopedic doctor last year.  - Regular pain management includes gabapentin and Tylenol, with an additional Tylenol at night due to increased pain.        ALLERGIES:  Allergies   Allergen Reactions    Bee Venom Anaphylaxis    Shrimp Anaphylaxis    Evoxac [Cevimeline] Unknown    Prevnar Swelling     Other reaction(s): Edema    Prevnar [Pneumococcal 13-Linda Conj Vacc] Unknown         MEDICATIONS:  Current Outpatient Medications   Medication Sig Dispense Refill    acetaminophen (TYLENOL) 325 MG tablet Take 2 tablets (650 mg) by mouth every 6 hours      artificial saliva (BIOTENE DRY MOUTHWASH) LIQD liquid Swish and spit in mouth 4 times daily as needed for dry mouth      calcium carbonate-vitamin D (CALTRATE) 600-10 MG-MCG per tablet Take 1 tablet by mouth daily      carboxymethylcellulose PF (REFRESH PLUS)  0.5 % ophthalmic solution Place 1 drop into both eyes 3 times daily as needed for dry eyes      clobetasol (TEMOVATE) 0.05 % external ointment Apply topically 2 times daily To right ankle as needed 60 g 3    EPINEPHrine (ANY BX GENERIC EQUIV) 0.3 MG/0.3ML injection 2-pack Inject 0.3 mg into the muscle as needed for anaphylaxis      famotidine (PEPCID) 20 MG tablet Take 20 mg by mouth 2 times daily.      gabapentin (NEURONTIN) 300 MG capsule Take 1 capsule (300 mg) by mouth 3 times daily. 270 capsule 11    hydroxychloroquine (PLAQUENIL) 200 MG tablet Take 1 tablet (200 mg) by mouth daily 90 tablet 1    lifitegrast (XIIDRA) 5 % opthalmic solution Place 1 drop into both eyes 2 times daily      mirtazapine (REMERON) 7.5 MG tablet Take 1 tablet (7.5 mg) by mouth at bedtime. 90 tablet 3    Multiple Vitamins-Minerals (OCUVITE PRESERVISION PO) Take 1 tablet by mouth 2 times daily      pantoprazole (PROTONIX) 40 MG EC tablet Take 1 tablet (40 mg) by mouth daily 30 tablet 11    UNABLE TO FIND MEDICATION NAME: V (vein formular)           PAST MEDICAL HISTORY:  Past Medical History:   Diagnosis Date    Anemia     CKD (chronic kidney disease) stage 3, GFR 30-59 ml/min (H)     Diverticulosis of large intestine     Osteoarthritis     Osteoporosis     Rheumatoid arthritis (H)     Sensorineural hearing loss     Varicose veins of bilateral lower extremities with other complications          PAST SURGICAL HISTORY:  Past Surgical History:   Procedure Laterality Date    CAPSULE/PILL CAM ENDOSCOPY N/A 11/18/2021    Procedure: IMAGING PROCEDURE, GI TRACT, INTRALUMINAL, VIA CAPSULE;  Surgeon: Deric Rodriguez MD;  Location:  GI    CAPSULE/PILL CAM ENDOSCOPY N/A 2/14/2023    Procedure: IMAGING PROCEDURE, GI TRACT, INTRALUMINAL, VIA CAPSULE;  Surgeon: Jaime Mesa MD;  Location:  GI    COLONOSCOPY N/A 03/14/2017    Procedure: COMBINED COLONOSCOPY, SINGLE OR MULTIPLE BIOPSY/POLYPECTOMY BY BIOPSY;  Surgeon: Spencer Downey  MD;  Location: [facility]    COLONOSCOPY N/A 9/22/2022    Procedure: COLONOSCOPY, FLEXIBLE, WITH LESION REMOVAL USING SNARE;  Surgeon: Kirby Arthur MD;  Location:  GI    COLONOSCOPY N/A 1/13/2023    Procedure: COLONOSCOPY;  Surgeon: Spencer Downey MD;  Location: [facility] OR    COLONOSCOPY N/A 8/14/2023    Procedure: Colonoscopy;  Surgeon: Spencer Downey MD;  Location: [facility]    ENTEROSCOPY SMALL BOWEL N/A 11/20/2021    Procedure: ENTEROSCOPY, colonoscopy with endoscopic mucosal resection and tattoo placement;  Surgeon: Kirby Arthur MD;  Location: [facility] OR    ESOPHAGOSCOPY, GASTROSCOPY, DUODENOSCOPY (EGD), COMBINED N/A 2/6/2023    Procedure: ESOPHAGOGASTRODUODENOSCOPY (EGD);  Surgeon: Spencer Downey MD;  Location: [facility]    IR VISCERAL EMBOLIZATION  01/22/2021    ORTHOPEDIC SURGERY Left     hip replacment    SIGMOIDOSCOPY FLEXIBLE N/A 01/23/2021    Procedure: SIGMOIDOSCOPY, FLEXIBLE;  Surgeon: Jaime Steinberg MD;  Location:  GI    SIGMOIDOSCOPY FLEXIBLE N/A 7/17/2024    Procedure: Sigmoidoscopy flexible;  Surgeon: Ritika Woodard MD;  Location: [facility]         FAMILY HISTORY:  History reviewed. No pertinent family history.      SOCIAL HISTORY:  Social History     Socioeconomic History    Marital status:      Spouse name: Not on file    Number of children: Not on file    Years of education: Not on file    Highest education level: Not on file   Occupational History    Not on file   Tobacco Use    Smoking status: Former    Smokeless tobacco: Never   Vaping Use    Vaping status: Never Used   Substance and Sexual Activity    Alcohol use: No    Drug use: No    Sexual activity: Not on file   Other Topics Concern    Not on file   Social History Narrative    - Retired (formerly employed as  at [employer])    - 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[employer])     Social Drivers of Health     Financial Resource Strain: Low Risk   (9/12/2024)    Financial Resource Strain     Within the past 12 months, have you or your family members you live with been unable to get utilities (heat, electricity) when it was really needed?: No   Food Insecurity: Low Risk  (9/12/2024)    Food Insecurity     Within the past 12 months, did you worry that your food would run out before you got money to buy more?: No     Within the past 12 months, did the food you bought just not last and you didn t have money to get more?: No   Transportation Needs: Low Risk  (9/12/2024)    Transportation Needs     Within the past 12 months, has lack of transportation kept you from medical appointments, getting your medicines, non-medical meetings or appointments, work, or from getting things that you need?: No   Physical Activity: Sufficiently Active (9/12/2024)    Exercise Vital Sign     Days of Exercise per Week: 5 days     Minutes of Exercise per Session: 30 min   Stress: No Stress Concern Present (9/12/2024)    [employer] [place] [employer] - Occupational Stress Questionnaire     Feeling of Stress : Only a little   Social Connections: Unknown (9/12/2024)    Social Connection and Isolation Panel [NHANES]     Frequency of Communication with Friends and Family: Not on file     Frequency of Social Gatherings with Friends and Family: Once a week     Attends Evangelical Services: Not on file     Active Member of Clubs or Organizations: Not on file     Attends Club or Organization Meetings: Not on file     Marital Status: Not on file   Interpersonal Safety: Low Risk  (4/15/2025)    Interpersonal Safety     Do you feel physically and emotionally safe where you currently live?: Yes     Within the past 12 months, have you been hit, slapped, kicked or otherwise physically hurt by someone?: No     Within the past 12 months, have you been humiliated or emotionally abused in other ways by your partner or ex-partner?: No   Housing Stability: Low Risk  (9/12/2024)    Housing  "Stability     Do you have housing? : Yes     Are you worried about losing your housing?: No         PHYSICAL EXAMINATION:  Vitals:    04/10/25 1458   BP: (!) 161/74   Pulse: 83   SpO2: 99%   Weight: 44.9 kg (99 lb)   Height: 1.6 m (5' 3\")       GENERAL:  No acute distress.  Pleasant and cooperative.   PSYCH:  Normal mood and affect.  HEAD:  Normocephalic.  SPEECH:  No dysarthria.  EYES:  No scleral icterus.  Wearing glasses.  EARS:  Hearing is intact to spoken voice.  NOSE:  Midline, symmetric, no rhinorrhea.  LUNGS:  No respiratory distress.  No increased work of breathing.  VASCULAR/PULSES:  Posterior tibial:  Right 2+.  Left 2+.  LOWER EXTREMITIES: No clubbing, cyanosis, or edema bilaterally.    BALANCE AND GAIT: The patient has reciprocal gait pattern with left antalgia.  She is able to toe walk, heel walk, and tandem walk without difficulty.  Double leg squat is performed normally for.    INSPECTION:  There is no erythema or ecchymosis of the low back.  There is thoracolumbar scoliosis convex right.    LUMBOPELVIC PALPATION:  Lumbar Spinous Processes:  not tender.  Lumbar Paraspinals:  Right not tender.  Left not tender.  Posterior Superior Iliac Spine:  Right not tender.  Left not tender.  Superior Cluneal Nerves:  Right not tender.  Left not tender.  Iliac Crest:  Right not tender.  Left not tender.  Sacroiliac Joint:  Right not tender.  Left not tender.  Hip External Rotators:  Right mild tenderness.  Left mild tenderness.  Ischial Tuberosity:  Right not tender.  Left not tender.  Greater Trochanter:  Right not tender.  Left mild to moderate tenderness.  Coccyx:  not tender.    THORACOLUMBAR RANGE OF MOTION:  Forward flexion (85 ): Mildly reduced range of motion, causes slight increase in left hip pain, does not cause radiating pain.  Extension (30 ): Moderately reduced range of motion, does not cause pain, does not cause radiating pain.  Lateral bending right (30 ):  Moderately reduced range of motion, does " not cause pain, does not cause radiating pain.  Lateral bending left (30 ): Moderately reduced range of motion, causes mild increase in left lateral hip pain, does not cause radiating pain.  Twisting right with extension:  Moderately reduced range of motion, does not cause pain, does not cause radiating pain.  Twisting left with extension:  Moderately reduced range of motion, does not cause pain, does not cause radiating pain.    SACROILIAC RANGE OF MOTION:  Standing flexion:  Right -.  Left -.  Seated flexion:  Right -.  Left -.  One-leg stork (Gillet):  Right -.  Left -.  Sacroiliac joint dysfunction:  Right -.  Left -.    HIP RANGE OF MOTION:  Flexion (110 ):  Right 120 .  Left 120 .  Extension (30 ):  Right 30 .  Left 25 .  External rotation (45 ):  Right 40 .  Left 40 .  Internal rotation (35 ):  Right 40 .  Left 40 .    KNEE RANGE OF MOTION:  Extension (0 ):  Right 0 .  Left 0 .  Flexion (135 ):  Right 150 .  Left 150 .    STRENGTH:  Hip flexion:  Right 5/5.  Left 5/5.  Hip abduction:  Right 5/5.  Left 4+/5.  Hip external rotation:  Right 5/5.  Left 4+/5.  Hip extension:  Right 5/5.  Left 5/5.  Knee extension:  Right 5/5.  Left 5/5.  Knee flexion:  Right 5/5.  Left 5/5.  Ankle dorsiflexion:  Right 5/5.  Left 5/5.  Great toe dorsiflexion:  Right 5/5.  Left 5/5.  Ankle plantar flexion:  Right 5/5.  Left 5/5.    SENSATION:  Intact to light touch along right L2, L3, L4, L5, S1, S2.  Intact to light touch along left L2, L3, L4, L5, S1, S2.    REFLEXES:  Patellar (L2-L4):  Right 2+.  Left 2+.  Medial hamstrings (L5-S1):  Right 2+.  Left 2+.  Achilles (S1-S2):  Right 2+.  Left 2+.  Babinski:  Right downgoing.  Left downgoing.  Forced ankle dorsiflexion (clonus):  Right 0 beats.  Left 0 beats.    LUMBOPELVIC SPECIAL TESTS:  Sterling finger:  Right -.  Left -.  Gapping / Distraction:  Right -.  Left -.  Log roll:  Right -.  Left -.  Straight-leg raise (Lasegue):  Right -.  Left -.  Crossed-straight leg raise:  Right -.   Left -.  Resisted straight leg raise:  Right -.  Left -.  FABERE (Víctor):  Right -.  Left -.  FADIR:  Right -.  Left -.  Hip scour:  Right -.  Left -.  Lateral sacral compression:  Right -.  Left -.  Clamshell:  Right -.  Left -.  Femoral nerve stretch:  Right -.  Left -.  Crossed femoral nerve stretch:  Right -.  Left -.  Ely s:  Right +.  Left +.  Yeoman s:  Right -.  Left -.  Midline sacral thrust:  Right -.  Left -.  Noble compression:  Right -.  Left -.        IMAGING:  XR PELVIS AND HIP LEFT 1 VIEW  9/24/2024 12:07 PM      HISTORY: Left hip pain.  COMPARISON: CT 1/19/2021     IMPRESSION:  Left total hip arthroplasty in near-anatomic position. No periprosthetic fracture or lucency. Demineralization without acute fracture. Lower lumbar degenerative disc disease.           LUMBAR FLEX/EXT TWO TO THREE VIEWS, SPINE COMPLETE SCOLIOSIS TWO VIEWS  July 22, 2024 3:50 PM      HISTORY: Scoliosis of thoracolumbar spine. Facet arthropathy. Chronic bilateral low back pain. Cluneal neuropathy.     COMPARISON: Lumbar spine MR 3/9/2023.     IMPRESSION: T12 rib-bearing thoracic vertebral bodies with five lumbar-type vertebral bodies.     Convex right curvature of the lumbar spine centered at L2-L3 with Maldonado angle measuring 25 degrees from the superior L1 endplate to the inferior L3 endplate. Rightward listhesis of L3 on L4. Retrolisthesis of L3 on L4 measuring 2 mm on the flexion view appears slightly increased to 3 mm on the extension view.     No obvious loss of vertebral body height. Mild to moderate degenerative endplate changes and loss of disc height at C5-C6. Marked degenerative endplate changes and loss of disc height in the lumbar spine on the left at L2-L3, bilaterally at L3-L4, and right at L4-L5.     Mild retrolisthesis of L3 on L4 measuring 2 mm on the flexion view appears slightly increased to 3 mm on the extension view.     Positive sagittal balance of 3 cm.           MRI OF THE LUMBAR SPINE WITHOUT CONTRAST   3/9/2023 11:50 AM      COMPARISON: Lumbar spine MRI 07/12/2018     HISTORY: Lumbar radiculopathy, acute. Lumbar degenerative disc disease.     TECHNIQUE: Multiplanar, multisequence MRI images of the lumbar spine were acquired without IV contrast.     FINDINGS: There are five lumbar-type vertebrae for the purposes of this dictation. The conus ends at the distal L1. 2 mm retrolisthesis of L3 on L4. Vertebral body heights are maintained. Nonpathologic marrow signal. Mild Modic type I changes from L2-L3 to L4-L5. Vertebral body heights are maintained. Nonpathologic marrow signal. Mild symmetric atrophy of the posterior paraspinal musculature. Normal diameter of the descending aorta. Redemonstration of a large gallstone. The patient's known bilateral renal cysts are better visualized on the abdomen and pelvis CT 11/16/2021.     T12-L1: Normal disc height and signal.  No herniation. Mild bilateral facet arthropathy.  No spinal canal or neural foraminal stenosis.     L1-L2: Normal disc height and signal. Small broad-based disc bulge. Mild bilateral facet arthropathy.  No spinal canal or neural foraminal stenosis.     L2-L3: Moderate vertebral disc height loss. Loss of the normal T2 signal within the disc. Broad-based disc bulge with superimposed small central disc protrusion. Mild bilateral facet arthropathy. No spinal canal narrowing. No right neuroforaminal narrowing. Mild left neuroforaminal narrowing.     L3-L4: Moderate intervertebral disc height loss. Loss of the normal T2 signal within the dens. Broad-based disc bulge with superimposed left foraminal disc protrusion, decreased since the prior exam. Moderate bilateral facet arthropathy. Mild spinal canal narrowing. Mild narrowing of the left lateral recess. No right neuroforaminal narrowing. Mild left neural foraminal narrowing.     L4-L5: Mild intervertebral disc height loss. Loss of the normal T2 signal within the disc. Broad-based disc bulge with superimposed  right foraminal disc protrusion. Moderate bilateral facet arthropathy. Mild narrowing of the right lateral recess. No spinal canal narrowing. Severe right neuroforaminal narrowing. No left neuroforaminal narrowing.     L5-S1: Mild intervertebral disc height loss. Loss of the normal T2 signal within the disc. Broad-based disc bulge with superimposed small central disc protrusion. Mild bilateral facet arthropathy. Mild narrowing of the lateral recesses. No spinal canal narrowing. No right neural foraminal narrowing. No left neural foraminal narrowing.     IMPRESSION:  1.  Compared to prior lumbar spine MRI 07/12/2018, interval decrease in the previously noted large left foraminal disc protrusion at L3-L4.  2.  Otherwise stable to interval worsening of the moderate multilevel degenerative changes of the lumbar spine.  3.  Mild spinal canal narrowing and mild narrowing of the left lateral recess at L3-L4.  4.  Mild narrowing of the right lateral recess at L4-L5.  5.  Mild narrowing of the lateral recesses at L5-S1.  6.  Severe right neural foraminal narrowing at L4-L5.  7.  Mild left neuroforaminal narrowing at L2-L3 and L3-L4.  8.  Mild Modic type I changes at from L2-L3 to L4-L5.             ASSESSMENT/PLAN:  Ms. Patrick Olson is an 84-year-old female.  She has thoracolumbar scoliosis, convex right.  She has lumbosacral facet arthropathy and lumbosacral degenerative disc disease.  Ms. Patrick Olson has been noted to have bilateral superior cluneal neuropathies.  Ms. Patrick Olson has not previously experienced good pain relief with epidural steroid injections or medial branch/dorsal ramus blocks of the bilateral L4-L5 and L5-S1 facet joints.  Ms. 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  Discussed diagnoses, pathophysiologies, and treatment options with Ms. Patrick Olson.  Discussed the options of doing nothing/living with it, physical therapy, greater  trochanteric bursa corticosteroid injection, gluteus medius and minimus tendon fenestration with platelet rich plasma injection.  Ms. Patrick Olson is being referred to physical therapy.  She will be set up for left greater trochanteric bursa corticosteroid injection.  Ms. Patrick Olson is to follow-up in this clinic in 2 months.        Total time on the date of the encounter:  44 minutes were spent on one more or more of the following:  discussion with patient, history, exam, coordinating care, treatment goals, record review, documenting clinical information, and/or data review.    Consent was obtained from the patient to use an AI documentation tool in the creation of this note.      Donald Coombs MD

## 2025-04-10 NOTE — PATIENT INSTRUCTIONS
Please schedule a follow-up appointment in 2 months.  You can schedule this at the , or you can call [phone].    Clinic Fax:  [phone].    For nursing questions, please call [phone].    For medical records, please call [phone].    Please schedule ultrasound-guided left greater trochanteric bursa injection with me.  The [facility] Procedure Scheduling Team will call you to schedule your procedure in the next 0-3 days.  You can also call them directly at [phone] option 2.    Please schedule an appointment with Physical Therapy.

## 2025-04-15 ENCOUNTER — OFFICE VISIT (OUTPATIENT)
Dept: FAMILY MEDICINE | Facility: CLINIC | Age: 85
End: 2025-04-15
Payer: COMMERCIAL

## 2025-04-15 VITALS
SYSTOLIC BLOOD PRESSURE: 110 MMHG | BODY MASS INDEX: 17.36 KG/M2 | WEIGHT: 98 LBS | HEIGHT: 63 IN | OXYGEN SATURATION: 99 % | HEART RATE: 77 BPM | TEMPERATURE: 97.2 F | RESPIRATION RATE: 16 BRPM | DIASTOLIC BLOOD PRESSURE: 60 MMHG

## 2025-04-15 DIAGNOSIS — D50.0 IRON DEFICIENCY ANEMIA DUE TO CHRONIC BLOOD LOSS: Primary | ICD-10-CM

## 2025-04-15 DIAGNOSIS — N18.31 STAGE 3A CHRONIC KIDNEY DISEASE (H): ICD-10-CM

## 2025-04-15 DIAGNOSIS — E46 PROTEIN-CALORIE MALNUTRITION, UNSPECIFIED SEVERITY: ICD-10-CM

## 2025-04-15 DIAGNOSIS — G89.29 OTHER CHRONIC PAIN: ICD-10-CM

## 2025-04-15 DIAGNOSIS — K31.819 GASTRIC AVM: ICD-10-CM

## 2025-04-15 DIAGNOSIS — R63.0 LOSS OF APPETITE: ICD-10-CM

## 2025-04-15 DIAGNOSIS — M05.79 RHEUMATOID ARTHRITIS INVOLVING MULTIPLE SITES WITH POSITIVE RHEUMATOID FACTOR (H): ICD-10-CM

## 2025-04-15 DIAGNOSIS — M41.9 SCOLIOSIS OF THORACOLUMBAR SPINE, UNSPECIFIED SCOLIOSIS TYPE: ICD-10-CM

## 2025-04-15 DIAGNOSIS — Z23 HIGH PRIORITY FOR 2019-NCOV VACCINE: ICD-10-CM

## 2025-04-15 DIAGNOSIS — R63.6 UNDERWEIGHT: ICD-10-CM

## 2025-04-15 DIAGNOSIS — R79.89 ELEVATED VITAMIN B12 LEVEL: ICD-10-CM

## 2025-04-15 DIAGNOSIS — Z87.19 HISTORY OF GI BLEED: ICD-10-CM

## 2025-04-15 LAB
ANION GAP SERPL CALCULATED.3IONS-SCNC: 11 MMOL/L (ref 7–15)
BUN SERPL-MCNC: 31 MG/DL (ref 8–23)
CALCIUM SERPL-MCNC: 10 MG/DL (ref 8.8–10.4)
CHLORIDE SERPL-SCNC: 105 MMOL/L (ref 98–107)
CREAT SERPL-MCNC: 0.93 MG/DL (ref 0.51–0.95)
EGFRCR SERPLBLD CKD-EPI 2021: 61 ML/MIN/1.73M2
ERYTHROCYTE [DISTWIDTH] IN BLOOD BY AUTOMATED COUNT: 13.3 % (ref 10–15)
FERRITIN SERPL-MCNC: 1188 NG/ML (ref 11–328)
GLUCOSE SERPL-MCNC: 100 MG/DL (ref 70–99)
HCO3 SERPL-SCNC: 27 MMOL/L (ref 22–29)
HCT VFR BLD AUTO: 38.3 % (ref 35–47)
HGB BLD-MCNC: 12.1 G/DL (ref 11.7–15.7)
MCH RBC QN AUTO: 33 PG (ref 26.5–33)
MCHC RBC AUTO-ENTMCNC: 31.6 G/DL (ref 31.5–36.5)
MCV RBC AUTO: 104 FL (ref 78–100)
PLATELET # BLD AUTO: 208 10E3/UL (ref 150–450)
POTASSIUM SERPL-SCNC: 4.8 MMOL/L (ref 3.4–5.3)
RBC # BLD AUTO: 3.67 10E6/UL (ref 3.8–5.2)
SODIUM SERPL-SCNC: 143 MMOL/L (ref 135–145)
VIT B12 SERPL-MCNC: 1237 PG/ML (ref 232–1245)
WBC # BLD AUTO: 4.1 10E3/UL (ref 4–11)

## 2025-04-15 PROCEDURE — 1125F AMNT PAIN NOTED PAIN PRSNT: CPT | Performed by: INTERNAL MEDICINE

## 2025-04-15 PROCEDURE — 82607 VITAMIN B-12: CPT | Performed by: INTERNAL MEDICINE

## 2025-04-15 PROCEDURE — 3078F DIAST BP <80 MM HG: CPT | Performed by: INTERNAL MEDICINE

## 2025-04-15 PROCEDURE — 36415 COLL VENOUS BLD VENIPUNCTURE: CPT | Performed by: INTERNAL MEDICINE

## 2025-04-15 PROCEDURE — 82728 ASSAY OF FERRITIN: CPT | Performed by: INTERNAL MEDICINE

## 2025-04-15 PROCEDURE — 80048 BASIC METABOLIC PNL TOTAL CA: CPT | Performed by: INTERNAL MEDICINE

## 2025-04-15 PROCEDURE — 3074F SYST BP LT 130 MM HG: CPT | Performed by: INTERNAL MEDICINE

## 2025-04-15 PROCEDURE — 85027 COMPLETE CBC AUTOMATED: CPT | Performed by: INTERNAL MEDICINE

## 2025-04-15 PROCEDURE — 90480 ADMN SARSCOV2 VAC 1/ONLY CMP: CPT | Performed by: INTERNAL MEDICINE

## 2025-04-15 PROCEDURE — 91320 SARSCV2 VAC 30MCG TRS-SUC IM: CPT | Performed by: INTERNAL MEDICINE

## 2025-04-15 PROCEDURE — 99214 OFFICE O/P EST MOD 30 MIN: CPT | Performed by: INTERNAL MEDICINE

## 2025-04-15 RX ORDER — GABAPENTIN 300 MG/1
300 CAPSULE ORAL 3 TIMES DAILY
Qty: 270 CAPSULE | Refills: 11 | Status: SHIPPED | OUTPATIENT
Start: 2025-04-15

## 2025-04-15 RX ORDER — MIRTAZAPINE 7.5 MG/1
7.5 TABLET, FILM COATED ORAL AT BEDTIME
Qty: 90 TABLET | Refills: 3 | Status: SHIPPED | OUTPATIENT
Start: 2025-04-15

## 2025-04-15 ASSESSMENT — PAIN SCALES - GENERAL: PAINLEVEL_OUTOF10: MODERATE PAIN (5)

## 2025-04-15 NOTE — PATIENT INSTRUCTIONS
Labs today  Covid booster     Keep follow up appointment as scheduled.   Seek sooner medical attention if there is any worsening of symptoms or problems.

## 2025-04-15 NOTE — PROGRESS NOTES
Assessment & Plan     Patrick was seen today for follow up.    Diagnoses and all orders for this visit:    Iron deficiency anemia due to chronic blood loss  -     CBC with platelets; Future  -     Ferritin; Future  Pt has received IV iron on 1/9/2025 and 1/17/2025.  Pt states she has more energy now as a result.   Pt will complete labs today to check levels.   Advised pt she does not need to take any oral iron supplement since she received IV iron.     Underweight  Pt states she drinks soy protein but I advised her to also drink ensure to gain weight.  Weight on 1/29/25 was 92 lbs and she is currently at 98 lbs.  With mirtazapine she did gain few pounds    Protein-calorie malnutrition, unspecified severity  She continues to improve her diet and work on gaining weight. She has tried Ensure in the past and did not like the taste.     Gastric AVM  Pt follows gastroenterologist   Takes Protonix and Pepcid    Rheumatoid arthritis involving multiple sites with positive rheumatoid factor (H)  Pt states she is stable  Pt follows rheumatology     Scoliosis of thoracolumbar spine, unspecified scoliosis type  On gabapentin for pain    History of GI bleed  D/t gastric AVM  Had work up    Stage 3a chronic kidney disease (H)  -     Basic metabolic panel  (Ca, Cl, CO2, Creat, Gluc, K, Na, BUN); Future  Follows nephrology.     Other chronic pain  -     gabapentin (NEURONTIN) 300 MG capsule; Take 1 capsule (300 mg) by mouth 3 times daily.    Elevated vitamin B12 level  -     Vitamin B12; Future    Loss of appetite  -     mirtazapine (REMERON) 7.5 MG tablet; Take 1 tablet (7.5 mg) by mouth at bedtime.  Pt tolerates mirtazapine well and she states her appetite is good.  She also states she sleeps well.   Pt agreed to receive three month supply of mirtazapine  This medication helped her gained few pounds    Other orders  -     REVIEW OF HEALTH MAINTENANCE PROTOCOL ORDERS    Other  Pt reports dry mouth and throat.  I advised her to use  "biotin mouthwash.   BP was elevated today at 142/76 and was rechecked at 110/60  Pt states her BP at home is normal at home.  Pt reports left hip bursitis which is causing her to limp  Pt follows orthopedics and she states that she will get shots if her symptoms don't improve with physical therapy.   Pt follows Dr Brown and I reviewed her 11/7/24 note which shows that no further hematology f/up will be scheduled.   Pt received Covid booster today.   Pt will f/up with me in 9/6/25      Rakesh Nguyen is a 84 year old, presenting for the following health issues:  Follow Up    HPI      Review of Systems  Constitutional, HEENT, cardiovascular, pulmonary, GI, , musculoskeletal, neuro, skin, endocrine and psych systems are negative, except as otherwise noted.      Objective    /60 (BP Location: Right arm, Patient Position: Sitting)   Pulse 77   Temp 97.2  F (36.2  C) (Oral)   Resp 16   Ht 1.6 m (5' 3\")   Wt 44.5 kg (98 lb)   SpO2 99%   BMI 17.36 kg/m    Body mass index is 17.36 kg/m .  Physical Exam   GENERAL: healthy, alert and no distress  PSYCH: mentation appears normal, affect normal/bright        Signed Electronically by: Magda Soliz MD  Scribe Disclosure:   I, Deysi Jama, am serving as a scribe; to document services personally performed by Magda Soliz MD -based on data collection and the provider's statements to me.     "

## 2025-04-16 NOTE — RESULT ENCOUNTER NOTE
Maryan Nguyen    This is to inform you regarding your test result.    Ferritin which is iron stores in the body is higher than normal.  This is due to IV iron  Do not take any oral iron supplement   Recheck in 3 months   Basic metabolic panel which includes electrolytes and kidney fucntion is normal  Vitamin B 12 level is normal.      Sincerely,      Dr.Nasima Martínez MD,FACP

## 2025-04-29 ENCOUNTER — TELEPHONE (OUTPATIENT)
Dept: GASTROENTEROLOGY | Facility: CLINIC | Age: 85
End: 2025-04-29
Payer: COMMERCIAL

## 2025-04-29 NOTE — TELEPHONE ENCOUNTER
Left Voicemail (1st Attempt) for the patient to call back and schedule the following:    Appointment type: return    Provider: Belen Delacruz  Return date: 7/8/2025  Specialty phone number: [phone]  Additional appointment(s) needed:   Additional Notes:

## 2025-05-01 ENCOUNTER — TELEPHONE (OUTPATIENT)
Dept: GASTROENTEROLOGY | Facility: CLINIC | Age: 85
End: 2025-05-01

## 2025-05-01 ENCOUNTER — LAB (OUTPATIENT)
Dept: LAB | Facility: CLINIC | Age: 85
End: 2025-05-01
Payer: COMMERCIAL

## 2025-05-01 ENCOUNTER — OFFICE VISIT (OUTPATIENT)
Dept: OTHER | Facility: CLINIC | Age: 85
End: 2025-05-01
Attending: INTERNAL MEDICINE
Payer: COMMERCIAL

## 2025-05-01 VITALS
DIASTOLIC BLOOD PRESSURE: 73 MMHG | SYSTOLIC BLOOD PRESSURE: 136 MMHG | HEART RATE: 69 BPM | WEIGHT: 100 LBS | OXYGEN SATURATION: 99 % | BODY MASS INDEX: 17.71 KG/M2

## 2025-05-01 DIAGNOSIS — I87.2 VENOUS STASIS DERMATITIS OF BOTH LOWER EXTREMITIES: ICD-10-CM

## 2025-05-01 DIAGNOSIS — R71.8 ELEVATED MCV: ICD-10-CM

## 2025-05-01 DIAGNOSIS — R79.89 ELEVATED FERRITIN: ICD-10-CM

## 2025-05-01 DIAGNOSIS — I10 BENIGN ESSENTIAL HYPERTENSION: ICD-10-CM

## 2025-05-01 DIAGNOSIS — M05.741 RHEUMATOID ARTHRITIS INVOLVING BOTH HANDS WITH POSITIVE RHEUMATOID FACTOR (H): ICD-10-CM

## 2025-05-01 DIAGNOSIS — M05.742 RHEUMATOID ARTHRITIS INVOLVING BOTH HANDS WITH POSITIVE RHEUMATOID FACTOR (H): ICD-10-CM

## 2025-05-01 DIAGNOSIS — I83.893 VARICOSE VEINS OF BILATERAL LOWER EXTREMITIES WITH OTHER COMPLICATIONS: Primary | ICD-10-CM

## 2025-05-01 LAB
BASOPHILS # BLD AUTO: 0 10E3/UL (ref 0–0.2)
BASOPHILS NFR BLD AUTO: 1 %
EOSINOPHIL # BLD AUTO: 0.1 10E3/UL (ref 0–0.7)
EOSINOPHIL NFR BLD AUTO: 3 %
ERYTHROCYTE [DISTWIDTH] IN BLOOD BY AUTOMATED COUNT: 12.8 % (ref 10–15)
HCT VFR BLD AUTO: 35.3 % (ref 35–47)
HGB BLD-MCNC: 11.3 G/DL (ref 11.7–15.7)
IMM GRANULOCYTES # BLD: 0 10E3/UL
IMM GRANULOCYTES NFR BLD: 0 %
LYMPHOCYTES # BLD AUTO: 1 10E3/UL (ref 0.8–5.3)
LYMPHOCYTES NFR BLD AUTO: 25 %
MCH RBC QN AUTO: 33.1 PG (ref 26.5–33)
MCHC RBC AUTO-ENTMCNC: 32 G/DL (ref 31.5–36.5)
MCV RBC AUTO: 104 FL (ref 78–100)
MONOCYTES # BLD AUTO: 0.4 10E3/UL (ref 0–1.3)
MONOCYTES NFR BLD AUTO: 9 %
NEUTROPHILS # BLD AUTO: 2.5 10E3/UL (ref 1.6–8.3)
NEUTROPHILS NFR BLD AUTO: 62 %
NRBC # BLD AUTO: 0 10E3/UL
NRBC BLD AUTO-RTO: 0 /100
PLATELET # BLD AUTO: 228 10E3/UL (ref 150–450)
RBC # BLD AUTO: 3.41 10E6/UL (ref 3.8–5.2)
RETICS # AUTO: 0.04 10E6/UL (ref 0.03–0.1)
RETICS/RBC NFR AUTO: 1.3 % (ref 0.5–2)
WBC # BLD AUTO: 4.1 10E3/UL (ref 4–11)

## 2025-05-01 PROCEDURE — G0463 HOSPITAL OUTPT CLINIC VISIT: HCPCS | Performed by: INTERNAL MEDICINE

## 2025-05-01 NOTE — TELEPHONE ENCOUNTER
Left Voicemail (2nd Attempt) for the patient to call back and schedule the following:    Appointment type: return   Provider: Belen Delacruz   Return date: next available   Specialty phone number: [phone]  Additional appointment(s) needed:   Additonal Notes:

## 2025-05-01 NOTE — PROGRESS NOTES
[facility]        Patient is here for a follow up.    Pt is currently taking no meds that would impact our treatment plan.    /73 (BP Location: Right arm, Patient Position: Sitting, Cuff Size: Adult Small)   Pulse 69   Wt 100 lb (45.4 kg)   SpO2 99%   BMI 17.71 kg/m      The provider has been notified that the patient has no concerns.     Questions patient would like addressed today are: N/A.    Refills are needed: N/A    Has homecare services and agency name:  No     Patient has been identified as a fall risk.    Interventions were completed as noted below:  [x]Exam tables/chairs remained in the lowest position.   []Patient remained in wheelchair.    []Provider requested patient in exam chair.  Patient required assist and use of transfer belt.  [x]Exam room door remained open unless with a provider.  []A bell provided to patient to notify staff if assistance was needed.  [x]Provider notified patient was identified as a fall risk.      Eleanor Platt MA

## 2025-05-01 NOTE — PROGRESS NOTES
[facility] INITIAL [facility]ULAR [facility]INE     PRIMARY HEALTH CARE PROVIDER:  Essence Tamayo MD    Follow up visit  Multiple concerns   Complaining the legs are getting cold  History of rheumatoid arthritis seeing rheumatologist  Review of previous  venous comp studies   Seen by [facility] surgery Dr. Rodarte for VV , no intervention  Elevated MCV and Ferritin  Taking vein formula 1000  Using compression   She has known history of bilateral lower extremity varicose veins right more than left side in a very pleasant 84-year-old female with history of rheumatoid arthritis, CKD, diverticulosis of large intestine, osteoporosis etc.      HPI: Patrick Olson is a 84 year old very pleasant female with past medical history of rheumatoid arthritis, osteo arthritis, osteoporosis, diverticulosis of large intestine, CKD stage III here for evaluation and management of bilateral lower extremity varicose veins right more than left side with the discoloration of the legs in the distal portion.  She has no previous history of a DVT.  She is using compression stockings.  No previous leg surgeries or intervention for varicose veins etc..          PAST MEDICAL HISTORY  Past Medical History:   Diagnosis Date    Anemia     CKD (chronic kidney disease) stage 3, GFR 30-59 ml/min (H)     Diverticulosis of large intestine     Osteoarthritis     Osteoporosis     Rheumatoid arthritis (H)     Sensorineural hearing loss     Varicose veins of bilateral lower extremities with other complications        CURRENT MEDICATIONS  Current Outpatient Medications   Medication Sig Dispense Refill    acetaminophen (TYLENOL) 325 MG tablet Take 2 tablets (650 mg) by mouth every 6 hours      artificial saliva (BIOTENE DRY MOUTHWASH) LIQD liquid Swish and spit in mouth 4 times daily as needed for dry mouth      calcium carbonate-vitamin D (CALTRATE) 600-10 MG-MCG per tablet Take 1 tablet by mouth daily      carboxymethylcellulose PF (REFRESH  PLUS) 0.5 % ophthalmic solution Place 1 drop into both eyes 3 times daily as needed for dry eyes      clobetasol (TEMOVATE) 0.05 % external ointment Apply topically 2 times daily To right ankle as needed 60 g 3    EPINEPHrine (ANY BX GENERIC EQUIV) 0.3 MG/0.3ML injection 2-pack Inject 0.3 mg into the muscle as needed for anaphylaxis      famotidine (PEPCID) 20 MG tablet Take 20 mg by mouth 2 times daily.      gabapentin (NEURONTIN) 300 MG capsule Take 1 capsule (300 mg) by mouth 3 times daily. 270 capsule 11    hydroxychloroquine (PLAQUENIL) 200 MG tablet Take 1 tablet (200 mg) by mouth daily 90 tablet 1    lifitegrast (XIIDRA) 5 % opthalmic solution Place 1 drop into both eyes 2 times daily      mirtazapine (REMERON) 7.5 MG tablet Take 1 tablet (7.5 mg) by mouth at bedtime. 90 tablet 3    Multiple Vitamins-Minerals (OCUVITE PRESERVISION PO) Take 1 tablet by mouth 2 times daily      pantoprazole (PROTONIX) 40 MG EC tablet Take 1 tablet (40 mg) by mouth daily 30 tablet 11    UNABLE TO FIND MEDICATION NAME: V (vein formular)       No current facility-administered medications for this visit.       PAST SURGICAL HISTORY:  Past Surgical History:   Procedure Laterality Date    CAPSULE/PILL CAM ENDOSCOPY N/A 11/18/2021    Procedure: IMAGING PROCEDURE, GI TRACT, INTRALUMINAL, VIA CAPSULE;  Surgeon: Deric Rodriguez MD;  Location: [facility]    CAPSULE/PILL CAM ENDOSCOPY N/A 2/14/2023    Procedure: IMAGING PROCEDURE, GI TRACT, INTRALUMINAL, VIA CAPSULE;  Surgeon: Jaime Mesa MD;  Location:  GI    COLONOSCOPY N/A 03/14/2017    Procedure: COMBINED COLONOSCOPY, SINGLE OR MULTIPLE BIOPSY/POLYPECTOMY BY BIOPSY;  Surgeon: Spencer Downey MD;  Location:  GI    COLONOSCOPY N/A 9/22/2022    Procedure: COLONOSCOPY, FLEXIBLE, WITH LESION REMOVAL USING SNARE;  Surgeon: Kirby Arthur MD;  Location:  GI    COLONOSCOPY N/A 1/13/2023    Procedure: COLONOSCOPY;  Surgeon: Spencer Downey MD;  Location: [facility]     COLONOSCOPY N/A 8/14/2023    Procedure: Colonoscopy;  Surgeon: Spencer Downey MD;  [facility]    ENTEROSCOPY SMALL BOWEL N/A 11/20/2021    Procedure: ENTEROSCOPY, colonoscopy with endoscopic mucosal resection and tattoo placement;  Surgeon: Kirby Arthur MD;  [facility]    ESOPHAGOSCOPY, GASTROSCOPY, DUODENOSCOPY (EGD), COMBINED N/A 2/6/2023    Procedure: ESOPHAGOGASTRODUODENOSCOPY (EGD);  Surgeon: Spencer Downey MD;  [facility]: [facility]    IR VISCERAL EMBOLIZATION  01/22/2021    ORTHOPEDIC SURGERY Left     hip replacment    SIGMOIDOSCOPY FLEXIBLE N/A 01/23/2021    Procedure: SIGMOIDOSCOPY, FLEXIBLE;  Surgeon: Jaime Steinberg MD;  Location:  GI    SIGMOIDOSCOPY FLEXIBLE N/A 7/17/2024    Procedure: Sigmoidoscopy flexible;  Surgeon: Ritika Woodard MD;  Location:  GI       ALLERGIES     Allergies   Allergen Reactions    Bee Venom Anaphylaxis    Shrimp Anaphylaxis    Evoxac [Cevimeline] Unknown    Prevnar Swelling     Other reaction(s): Edema    Prevnar [Pneumococcal 13-Linda Conj Vacc] Unknown       FAMILY HISTORY  History reviewed. No pertinent family history.    VASCULAR FAMILY HISTORY  1st order relative with atherosclerotic PAD: No  1st order relative with AAA: No  Family history of Familial Hyperlipidemia No  Family History of Hypercoagulable state:No    VASCULAR RISK FACTORS  1. Diabetes:No   2. Smoking: quit smoking some time ago.  3. HTN: controlled  4.Hyperlipidemia: No      SOCIAL HISTORY  Social History     Socioeconomic History    Marital status:      Spouse name: Not on file    Number of children: Not on file    Years of education: Not on file    Highest education level: Not on file   Occupational History    Not on file   Tobacco Use    Smoking status: Former    Smokeless tobacco: Never   Vaping Use    Vaping status: Never Used   Substance and Sexual Activity    Alcohol use: No    Drug use: No    Sexual activity: Not on file   Other Topics Concern    Not on file   Social  History Narrative    - Retired (formerly employed as  at [employer])    - 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[employer])     Social Drivers of Health     Financial Resource Strain: Low Risk  (9/12/2024)    Financial Resource Strain     Within the past 12 months, have you or your family members you live with been unable to get utilities (heat, electricity) when it was really needed?: No   Food Insecurity: Low Risk  (9/12/2024)    Food Insecurity     Within the past 12 months, did you worry that your food would run out before you got money to buy more?: No     Within the past 12 months, did the food you bought just not last and you didn t have money to get more?: No   Transportation Needs: Low Risk  (9/12/2024)    Transportation Needs     Within the past 12 months, has lack of transportation kept you from medical appointments, getting your medicines, non-medical meetings or appointments, work, or from getting things that you need?: No   Physical Activity: Sufficiently Active (9/12/2024)    Exercise Vital Sign     Days of Exercise per Week: 5 days     Minutes of Exercise per Session: 30 min   Stress: No Stress Concern Present (9/12/2024)    [place] [employer] - Occupational Stress Questionnaire     Feeling of Stress : Only a little   Social Connections: Unknown (9/12/2024)    Social Connection and Isolation Panel [NHANES]     Frequency of Communication with Friends and Family: Not on file     Frequency of Social Gatherings with Friends and Family: Once a week     Attends Restorationist Services: Not on file     Active Member of Clubs or Organizations: Not on file     Attends Club or Organization Meetings: Not on file     Marital Status: Not on file   Interpersonal Safety: Low Risk  (4/15/2025)    Interpersonal Safety     Do you feel physically and emotionally safe where you currently live?: Yes     Within the past 12 months, have  you been hit, slapped, kicked or otherwise physically hurt by someone?: No     Within the past 12 months, have you been humiliated or emotionally abused in other ways by your partner or ex-partner?: No   Housing Stability: Low Risk  (9/12/2024)    Housing Stability     Do you have housing? : Yes     Are you worried about losing your housing?: No       ROS:   General: No change in weight, sleep or appetite.  Normal energy.  No fever or chills  Eyes: Negative for vision changes or eye problems  ENT: No problems with ears, nose or throat.  No difficulty swallowing.  Resp: No coughing, wheezing or shortness of breath  CV: No chest pains or palpitations  GI: No nausea, vomiting,  heartburn, abdominal pain, diarrhea, constipation or change in bowel habits  : No urinary frequency or dysuria, bladder or kidney problems  Musculoskeletal: No significant muscle or joint pains  Neurologic: No headaches, numbness, tingling, weakness, problems with balance or coordination  Psychiatric: No problems with anxiety, depression or mental health  Heme/immune/allergy: No history of bleeding or clotting problems or anemia.  No allergies or immune system problems  Endocrine: No history of thyroid disease, diabetes or other endocrine disorders  Skin: No rashes,worrisome lesions or skin problems  Vascular: Bilateral lower extremity varicose veins right more than left side  No history of a DVT  No leg swelling  No previous intervention for varicose veins  No previous imaging studies  EXAM:  /73 (BP Location: Right arm, Patient Position: Sitting, Cuff Size: Adult Small)   Pulse 69   Wt 100 lb (45.4 kg)   SpO2 99%   BMI 17.71 kg/m    In general, the patient is a pleasant female in no apparent distress.    HEENT: NC/AT.  PERRLA.  EOMI.  Sclerae white, not injected.  Nares clear.  Pharynx without erythema or exudate.  Dentition intact.    Neck: No adenopathy.  No thyromegaly. Carotids +2/2 bilaterally without bruits.  No jugular  venous distension.   Heart: RRR. Normal S1, S2 splits physiologically. No murmur, rub, click, or gallop. The PMI is in the 5th ICS in the midclavicular line. There is no heave.    Lungs: CTA.  No ronchi, wheezes, rales.  No dullness to percussion.   Abdomen: Soft, nontender, nondistended. No organomegaly. No AAA.  No bruits.   Extremities: No clubbing, cyanosis, or edema.  No wounds.   She has a good palpable DP PT pulses and dopplerable bi-/triphasic pulses  Bilateral lower extremity varicose veins right worse than left side with CEAP 4 CVI  Venous stasis changes noted right more than left leg  No foot ulcers or leg ulcers      Labs:    LIVER ENZYME RESULTS:  Lab Results   Component Value Date    AST 40 10/31/2024    AST 10 03/13/2017    ALT 30 10/31/2024    ALT 12 03/13/2017       CBC RESULTS:  Lab Results   Component Value Date    WBC 4.1 04/15/2025    WBC 5.0 01/20/2021    RBC 3.67 (L) 04/15/2025    RBC 2.51 (L) 01/20/2021    HGB 12.1 04/15/2025    HGB 8.0 (L) 01/25/2021    HCT 38.3 04/15/2025    HCT 25.2 (L) 01/20/2021     (H) 04/15/2025     01/20/2021    MCH 33.0 04/15/2025    MCH 33.1 (H) 01/20/2021    MCHC 31.6 04/15/2025    MCHC 32.9 01/20/2021    RDW 13.3 04/15/2025    RDW 15.9 (H) 01/20/2021     04/15/2025     01/20/2021       BMP RESULTS:  Lab Results   Component Value Date     04/15/2025     01/23/2021    POTASSIUM 4.8 04/15/2025    POTASSIUM 4.7 09/29/2022    POTASSIUM 3.6 01/23/2021    CHLORIDE 105 04/15/2025    CHLORIDE 104 10/24/2022    CHLORIDE 114 (H) 01/23/2021    CO2 27 04/15/2025    CO2 29 09/29/2022    CO2 26 01/23/2021    ANIONGAP 11 04/15/2025    ANIONGAP 3 09/29/2022    ANIONGAP 3 01/23/2021     (H) 04/15/2025     (H) 07/19/2024     (H) 09/29/2022     (H) 01/23/2021    BUN 31.0 (H) 04/15/2025    BUN 21 09/29/2022    BUN 6 (L) 01/23/2021    CR 0.93 04/15/2025    CR 0.75 01/23/2021    GFRESTIMATED 61 04/15/2025     GFRESTIMATED 76 01/23/2021    GFRESTBLACK 88 01/23/2021    EJ 10.0 04/15/2025    EJ 8.0 (L) 01/23/2021          THYROID RESULTS:  Lab Results   Component Value Date    TSH 1.13 03/27/2023       Procedures:         Assessment and Plan:     1. 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 Rheumatoid arthritis involving both hands with positive rheumatoid factor (H)    3.stasis dermatitis     4. Elevated MCV    5. Elevated ferritin       This is a very pleasant 84 year-old female looks much younger than stated age dealing with varicose veins in both lower extremities and also venous stasis dermatitis she was prescribed Temovate by dermatologist using but not much improvement.  Her legs feels heavy she also has a history of rheumatoid arthritis currently following up with rheumatologist.  No history of a DVT.  No previous intervention for varicose veins.  I have reviewed varicose veins information with the patient education sheet given  She will benefit with  compression stockings 20 to 30 mmHg but given age and also rheumatoid arthritis she will not be able to put them on will give instead 15 to 20 mmHg compression stockings thigh-high if she tolerates and handles well then later we can prescribe 20 to 30 mmHg  Elevate the legs when able  Take vein formula 1000 as advised  She was complaining of cold legs bilateral but on today's exam both legs and feet are warm well-perfused and unchanged varicose veins    She does have elevated MCV and elevated ferritin and high normal B12 level  She is off of the methotrexate but taking the Plaquenil for rheumatoid arthritis given by rheumatologist    Discussed recent lab results with the patient  I will get iron panel, thyroid function tests, blood morphology and notify the results when available  She already has an appointment to see rheumatologist next week  Avoid alcohol    Follow-up with me in 6 months    For all other issues  follow-up with primary care physician or rheumatologist etc.    40 minutes spent on the date of the encounter doing chart review, review of recent labs, previous evaluation, history, exam, documentation and addressed above-mentioned issues, AVS with written instructions given she had a lot of questions all of them were answered    The longitudinal care of plan for the above diagnoses was addressed during this visit. Due to added complexity of care, we will continue to supprt Patrick Olson and the subsequent management of this/these conditions and with ongoing continuity of care for this/these conditions.     Copy of this note to primary care physician    Mari Brenner MD,FAHA,FSVM,FNLA, FACP  Vascular Medicine  Clinical Hypertension Specialist   Clinical Lipidologist

## 2025-05-01 NOTE — PATIENT INSTRUCTIONS
Go for labs Iron panel, blood morphology and Thyroid test order placed     Use compression , leg elevation     Continue Vein formula 1000     Follow up with primary or Rheumatology

## 2025-05-06 ENCOUNTER — THERAPY VISIT (OUTPATIENT)
Dept: PHYSICAL THERAPY | Facility: CLINIC | Age: 85
End: 2025-05-06
Payer: COMMERCIAL

## 2025-05-06 DIAGNOSIS — M25.552 GREATER TROCHANTERIC PAIN SYNDROME OF LEFT LOWER EXTREMITY: ICD-10-CM

## 2025-05-06 DIAGNOSIS — M25.552 HIP PAIN, LEFT: Primary | ICD-10-CM

## 2025-05-06 PROCEDURE — 97112 NEUROMUSCULAR REEDUCATION: CPT | Mod: GP | Performed by: PHYSICAL THERAPIST

## 2025-05-06 PROCEDURE — 97110 THERAPEUTIC EXERCISES: CPT | Mod: GP | Performed by: PHYSICAL THERAPIST

## 2025-05-06 PROCEDURE — 97161 PT EVAL LOW COMPLEX 20 MIN: CPT | Mod: GP | Performed by: PHYSICAL THERAPIST

## 2025-05-06 ASSESSMENT — ACTIVITIES OF DAILY LIVING (ADL)
SITTING_FOR_15_MINUTES: SLIGHT DIFFICULTY
DEEP SQUATTING: EXTREME DIFFICULTY
GOING_UP_1_FLIGHT_OF_STAIRS: MODERATE DIFFICULTY
GOING_DOWN_1_FLIGHT_OF_STAIRS: MODERATE DIFFICULTY
GETTING_INTO_AND_OUT_OF_A_BATHTUB: MODERATE DIFFICULTY
PLEASE_INDICATE_YOR_PRIMARY_REASON_FOR_REFERRAL_TO_THERAPY:: HIP
ROLLING OVER IN BED: SLIGHT DIFFICULTY
DEEP_SQUATTING: EXTREME DIFFICULTY
GOING UP 1 FLIGHT OF STAIRS: MODERATE DIFFICULTY
ADL_SCORE(%): 0
WALKING_15_MINUTES_OR_GREATER: SLIGHT DIFFICULTY
LANDING: UNABLE TO DO
LOW_IMPACT_ACTIVITIES_LIKE_FAST_WALKING: UNABLE TO DO
STEPPING_UP_AND_DOWN_CURBS: EXTREME DIFFICULTY
WALKING_UP_STEEP_HILLS: EXTREME DIFFICULTY
RUNNING_ONE_MILE: UNABLE TO DO
WALKING_FOR_APPROXIMATELY_10_MINUTES: SLIGHT DIFFICULTY
CUTTING/LATERAL_MOVEMENTS: UNABLE TO DO
RECREATIONAL_ACTIVITIES: UNABLE TO DO
LIGHT_TO_MODERATE_WORK: SLIGHT DIFFICULTY
TWISTING/PIVOTING_ON_INVOLVED_LEG: MODERATE DIFFICULTY
STANDING FOR 15 MINUTES: SLIGHT DIFFICULTY
SPORTS_COUNT: 9
SWINGING_OBJECTS_LIKE_A_GOLF_CLUB: UNABLE TO DO
HOW_WOULD_YOU_RATE_YOUR_CURRENT_LEVEL_OF_FUNCTION?: ABNORMAL
SPORTS_TOTAL_ITEM_SCORE: 0
TWISTING/PIVOTING ON INVOLVED LEG: MODERATE DIFFICULTY
WALKING_INITIALLY: MODERATE DIFFICULTY
WALKING_DOWN_STEEP_HILLS: MODERATE DIFFICULTY
HEAVY_WORK: UNABLE TO DO
ROLLING_OVER_IN_BED: SLIGHT DIFFICULTY
LIGHT_TO_MODERATE_WORK: SLIGHT DIFFICULTY
ABILITY_TO_PERFORM_ACTIVITY_WITH_YOUR_NORMAL_TECHNIQUE: MODERATE DIFFICULTY
SPORTS_SCORE(%): 0
GETTING INTO AND OUT OF AN AVERAGE CAR: MODERATE DIFFICULTY
ABILITY_TO_PARTICIPATE_IN_YOUR_DESIRED_SPORT_AS_LONG_AS_YOU_WOULD_LIKE: UNABLE TO DO
HOW_WOULD_YOU_RATE_YOUR_CURRENT_LEVEL_OF_FUNCTION_DURING_YOUR_USUAL_ACTIVITIES_OF_DAILY_LIVING_FROM_0_TO_100_WITH_100_BEING_YOUR_LEVEL_OF_FUNCTION_PRIOR_TO_YOUR_HIP_PROBLEM_AND_0_BEING_THE_INABILITY_TO_PERFORM_ANY_OF_YOUR_USUAL_DAILY_ACTIVITIES?: 50
JUMPING: UNABLE TO DO
HOW_WOULD_YOU_RATE_YOUR_CURRENT_LEVEL_OF_FUNCTION_DURING_YOUR_USUAL_ACTIVITIES_OF_DAILY_LIVING_FROM_0_TO_100_WITH_100_BEING_YOUR_LEVEL_OF_FUNCTION_PRIOR_TO_YOUR_HIP_PROBLEM_AND_0_BEING_THE_INABILITY_TO_PERFORM_ANY_OF_YOUR_USUAL_DAILY_ACTIVITIES?: 50
HOS_ADL_HIGHEST_POTENTIAL_SCORE: 68
GETTING_INTO_AND_OUT_OF_AN_AVERAGE_CAR: MODERATE DIFFICULTY
PUTTING_ON_SOCKS_AND_SHOES: MODERATE DIFFICULTY
SPORTS_HIGHEST_POTENTIAL_SCORE: 36
RECREATIONAL ACTIVITIES: UNABLE TO DO
HOS_ADL_ITEM_SCORE_TOTAL: 32
ADL_HIGHEST_POTENTIAL_SCORE: 68
HOS_ADL_SCORE(%): 47.06
HEAVY_WORK: UNABLE TO DO
WALKING_DOWN_STEEP_HILLS: MODERATE DIFFICULTY
WALKING_15_MINUTES_OR_GREATER: SLIGHT DIFFICULTY
ADL_COUNT: 17
STANDING_FOR_15_MINUTES: SLIGHT DIFFICULTY
GETTING_INTO_AND_OUT_OF_A_BATHTUB: MODERATE DIFFICULTY
ADL_TOTAL_ITEM_SCORE: 0
WALKING_INITIALLY: MODERATE DIFFICULTY
SITTING FOR 15 MINUTES: SLIGHT DIFFICULTY
STEPPING UP AND DOWN CURBS: EXTREME DIFFICULTY
WALKING_UP_STEEP_HILLS: EXTREME DIFFICULTY
PUTTING ON SOCKS AND SHOES: MODERATE DIFFICULTY
GOING DOWN 1 FLIGHT OF STAIRS: MODERATE DIFFICULTY
STARTING_AND_STOPPING_QUICKLY: UNABLE TO DO
WALKING_APPROXIMATELY_10_MINUTES: SLIGHT DIFFICULTY

## 2025-05-06 NOTE — PROGRESS NOTES
PHYSICAL THERAPY EVALUATION  Type of Visit: Evaluation       Fall Risk Screen:  Have you fallen 2 or more times in the past year?: Yes  Have you fallen and had an injury in the past year?: No  Is patient receiving Physical Therapy Services?: -- (will address balance with hip therapy)  Fall screen comments: TUG test 19.13 seconds      Subjective         Presenting condition or subjective complaint:  March 12-21st was on vacation and did a lot of walking and was ok then, but when returned home after 6-7 hour flight felt stiff but about 5 days after returning home began to have L>R lateral hip pain.  Prednisone did not help much but usually it does.  Patient has long history of low back pain since 2009 with Lumbar injections 2/7/22 & 5/17/22, 3/14/23, 2/2024 medial branch blocks only 50% relief.  L L4 & R curve upper lumbar.  Feels hips may be connected with low back pain.  Yesterday had a slight fall when trying to go quickly down a decline.  Has cortisone shot scheduled for 2 weeks from now.  Date of onset: 03/26/25    Relevant medical history:   RA, OA, kidney disease  R lateral listhesis, scoliosis of thoracolumbar spine, osteoporosis  Dates & types of surgery: left hip replacement 2011s/p L hip WINTER 2009    Prior diagnostic imaging/testing results: MRI; X-ray   xray 9/2024 hip: impression Left total hip arthroplasty in near-anatomic position. No  periprosthetic fracture or lucency. Demineralization without acute  fracture. Lower lumbar degenerative disc disease.  Prior therapy history for the same diagnosis, illness or injury: Yes 2024 low back pain    Prior Level of Function  Transfers: Independent  Ambulation: Independent  ADL: Independent      Living Environment  Social support: With a significant other or spouse   Type of home: House; 2-story   Stairs to enter the home: Yes   Is there a railing: No     Ramp: No   Stairs inside the home: Yes 17 Is there a railing: Yes     Help at home: Self Cares (home health  aide/personal care attendant, family, etc)  Equipment owned:       Employment: No    Hobbies/Interests: [employer] reading    Patient goals for therapy: walking and sitting imrovementWalk and sit without pain    Pain assessment: See objective evaluation for additional pain details     Objective       Objective   HIP EVALUATION  PAIN:   Pain Level at worst: 6/10  Pain Location: began L hip now bilateral lateral hips, moved to B low back, also bilateral lateral thighs  Pain Quality: dull  Pain Frequency: steady constant pain 4-6/10  Pain is Worst:morning  Pain is Exacerbated By: getting up from a chair is the worst, starting to walk, walking 1/2 mile, stitting a long time like 30 minutes,   Pain is Relieved By: sitting down unless its for a long time, change positions  Pain Progression: slowly improving  Tingling/numbness none  Associated symptoms: general weakness in legs over time    INTEGUMENTARY (edema, incisions):   POSTURE: fair, improves with lumbar support  GAIT:   Weightbearing Status: WBAT  Assistive Device(s): used cane initially when had pain but not now  Gait Deviations: slow dilip, short strides/step length bilaterally  BALANCE/PROPRIOCEPTION:   WEIGHTBEARING ALIGNMENT: R convex thoracolumbar scoliosis  NON-WEIGHTBEARING ALIGNMENT:    ROM:   (Degrees) Left AROM Left PROM  Right AROM Right PROM   Hip Flexion 90  90    Hip Extension 10 + LBP  10 + LBP    Hip Abduction 15  15    Hip Adduction       Hip Internal Rotation       Hip External Rotation       Knee Flexion       Knee Extension       Lumbar Side glide No loss Min loss LBP   Lumbar Flexion Mod loss reaches just below knee with B LBP   Lumbar Extension Mod loss, lessens low back pain        STRENGTH:   Pain: - none + mild ++ moderate +++ severe  Strength Scale: 0-5/5 Left Right   Hip Flexion 4- 4-   Hip Extension     Hip Abduction     Hip Adduction     Hip Internal Rotation     Hip External Rotation     Knee Flexion     Knee Extension 4+ 4+     MMT:  Ankle DF B 4+/5, great toe ext B 4+/5     SPECIAL TESTS: (+) Lumbar screen so will assess hip tests next visit as needed.    Lumbar EIS decreases pain.  When start with no pain EIS x 10 NE/NE.       Left Right   GELA     FADIR/Labrum/NOELLE     Femoral Nerve     Paxton's     Piriformis     Quadrant Testing     SLR     Slump negative negative   Stork with Extension     Micha            FUNCTIONAL TESTS:   Not assessed    TUG test 19.13 seconds (with >13.5 seconds increased risk for falls) so will address balance during course of therapy.      Assessment & Plan   CLINICAL IMPRESSIONS  Medical Diagnosis: L hip pain/greater trochanteric pain syndrome    Treatment Diagnosis: L>R hip pain, low back pain   Impression/Assessment: Patient is a 84 year old female with L>R hip, low back and B thigh complaints.  The following significant findings have been identified: Pain, Decreased ROM/flexibility, Decreased strength, Impaired gait, Decreased activity tolerance, and Impaired posture. These impairments interfere with their ability to perform self care tasks, recreational activities, household chores, household mobility, and community mobility as compared to previous level of function.     Clinical Decision Making (Complexity):  Clinical Presentation: Evolving/Changing  Clinical Presentation Rationale: based on medical and personal factors listed in PT evaluation  Clinical Decision Making (Complexity): Low complexity    PLAN OF CARE  Treatment Interventions:  Interventions: Gait Training, Manual Therapy, Neuromuscular Re-education, Therapeutic Activity, Therapeutic Exercise    Long Term Goals     PT Goal 1  Goal Identifier: Get up after sitting  Goal Description: Patient to get up after sitting for 30 minutes 0/10 pain  Rationale: to maximize safety and independence within the home;to maximize safety and independence within the community;to maximize safety and independence with performance of ADLs and functional tasks  Goal  Progress: Pain 4-6/10 sitting 30 min and the worst is getting up after prolonged sitting  Target Date: 08/03/25      Frequency of Treatment: 1x/week  Duration of Treatment: 4 weeks tapering to 2x/month x 2 months      Education Assessment:        Risks and benefits of evaluation/treatment have been explained.   Patient/Family/caregiver agrees with Plan of Care.     Evaluation Time:     PT Eval, Low Complexity Minutes (07599): 20       Signing Clinician: JEANETTE Saleem [facility]                                                                                   OUTPATIENT PHYSICAL THERAPY      PLAN OF TREATMENT FOR OUTPATIENT REHABILITATION   Patient's Last Name, First Name, MARIELYDroisKELLIDoris  Patrick Olson YOB: 1940   Provider's Name   [facility]   Medical Record No.  [id number]     Onset Date: 03/26/25  Start of Care Date: 05/06/25     Medical Diagnosis:  L hip pain/greater trochanteric pain syndrome      PT Treatment Diagnosis:  L>R hip pain, low back pain Plan of Treatment  Frequency/Duration: 1x/week/ 4 weeks tapering to 2x/month x 2 months    Certification date from 05/06/25 to 08/03/25         See note for plan of treatment details and functional goals     Brina Jackson, PT                         I CERTIFY THE NEED FOR THESE SERVICES FURNISHED UNDER        THIS PLAN OF TREATMENT AND WHILE UNDER MY CARE     (Physician attestation of this document indicates review and certification of the therapy plan).              Referring Provider:  Donald Coombs    Initial Assessment  See Epic Evaluation- Start of Care Date: 05/06/25

## 2025-05-07 ENCOUNTER — MYC MEDICAL ADVICE (OUTPATIENT)
Dept: ORTHOPEDICS | Facility: CLINIC | Age: 85
End: 2025-05-07
Payer: COMMERCIAL

## 2025-05-08 NOTE — TELEPHONE ENCOUNTER
You  Patrick Jordan now (5:36 PM)     Ms. Patrick JR Wesley,     We can change the injection.     MD Patrick Maki [facility] (supporting You)Yesterday (3:15 PM)       Dr Coombs,  My left leg feels better since I saw you on 4/10 although my lower back pain has increased considerably more.  I am wondering if you would consider giving me the injection  at the lower back instead of at the left greater bursa  that was scheduled for 5/22.  I had an appointment with you on 5/21 that was supposed to be cancelled but still is scheduled.  I will keep that appointment if you want to re-examine my situation and do the procedure on 5/22 as currently scheduled.  Thank you.     Patrick Olson

## 2025-05-15 ENCOUNTER — THERAPY VISIT (OUTPATIENT)
Dept: PHYSICAL THERAPY | Facility: CLINIC | Age: 85
End: 2025-05-15
Payer: COMMERCIAL

## 2025-05-15 DIAGNOSIS — M25.552 HIP PAIN, LEFT: Primary | ICD-10-CM

## 2025-05-20 ENCOUNTER — TRANSFERRED RECORDS (OUTPATIENT)
Dept: HEALTH INFORMATION MANAGEMENT | Facility: CLINIC | Age: 85
End: 2025-05-20

## 2025-05-29 ENCOUNTER — THERAPY VISIT (OUTPATIENT)
Dept: PHYSICAL THERAPY | Facility: CLINIC | Age: 85
End: 2025-05-29
Payer: COMMERCIAL

## 2025-05-29 DIAGNOSIS — M25.552 HIP PAIN, LEFT: Primary | ICD-10-CM

## 2025-06-10 ENCOUNTER — THERAPY VISIT (OUTPATIENT)
Dept: PHYSICAL THERAPY | Facility: CLINIC | Age: 85
End: 2025-06-10
Payer: COMMERCIAL

## 2025-06-10 DIAGNOSIS — M25.552 HIP PAIN, LEFT: Primary | ICD-10-CM

## 2025-06-10 PROCEDURE — 97530 THERAPEUTIC ACTIVITIES: CPT | Mod: GP | Performed by: PHYSICAL THERAPIST

## 2025-06-10 PROCEDURE — 97112 NEUROMUSCULAR REEDUCATION: CPT | Mod: GP | Performed by: PHYSICAL THERAPIST

## 2025-06-10 PROCEDURE — 97110 THERAPEUTIC EXERCISES: CPT | Mod: GP | Performed by: PHYSICAL THERAPIST

## 2025-06-11 ENCOUNTER — LAB (OUTPATIENT)
Dept: LAB | Facility: CLINIC | Age: 85
End: 2025-06-11
Payer: COMMERCIAL

## 2025-06-11 DIAGNOSIS — M25.50 POLYARTHRALGIA: ICD-10-CM

## 2025-06-11 LAB
ALT SERPL W P-5'-P-CCNC: 23 U/L (ref 0–50)
AST SERPL W P-5'-P-CCNC: 31 U/L (ref 0–45)
CRP SERPL-MCNC: <3 MG/L

## 2025-06-11 PROCEDURE — 84450 TRANSFERASE (AST) (SGOT): CPT

## 2025-06-11 PROCEDURE — 84460 ALANINE AMINO (ALT) (SGPT): CPT

## 2025-06-11 PROCEDURE — 86140 C-REACTIVE PROTEIN: CPT

## 2025-06-11 PROCEDURE — 36415 COLL VENOUS BLD VENIPUNCTURE: CPT

## 2025-06-19 ENCOUNTER — THERAPY VISIT (OUTPATIENT)
Dept: PHYSICAL THERAPY | Facility: CLINIC | Age: 85
End: 2025-06-19
Payer: COMMERCIAL

## 2025-06-19 ENCOUNTER — TELEPHONE (OUTPATIENT)
Dept: GASTROENTEROLOGY | Facility: CLINIC | Age: 85
End: 2025-06-19

## 2025-06-19 DIAGNOSIS — K92.2 LOWER GI BLEEDING: ICD-10-CM

## 2025-06-19 DIAGNOSIS — M25.552 HIP PAIN, LEFT: Primary | ICD-10-CM

## 2025-06-19 NOTE — TELEPHONE ENCOUNTER
[facility] Call Center    Phone Message    May a detailed message be left on voicemail: yes     Reason for Call: Medication Refill Request    Has the patient contacted the pharmacy for the refill? Yes   Name of medication being requested: pantoprazole (PROTONIX) 40 MG EC tablet  Provider who prescribed the medication: Belen Delacruz  Pharmacy: [id number] - [place], [place]  Date medication is needed: ASAP    Action Taken: Message routed to:  [facility] GI    Travel Screening: Not Applicable     Date of Service:

## 2025-06-24 ENCOUNTER — THERAPY VISIT (OUTPATIENT)
Dept: PHYSICAL THERAPY | Facility: CLINIC | Age: 85
End: 2025-06-24
Payer: COMMERCIAL

## 2025-06-24 DIAGNOSIS — M25.552 HIP PAIN, LEFT: Primary | ICD-10-CM

## 2025-06-24 PROCEDURE — 97530 THERAPEUTIC ACTIVITIES: CPT | Mod: GP | Performed by: PHYSICAL THERAPIST

## 2025-06-24 PROCEDURE — 97112 NEUROMUSCULAR REEDUCATION: CPT | Mod: GP | Performed by: PHYSICAL THERAPIST

## 2025-06-24 PROCEDURE — 97110 THERAPEUTIC EXERCISES: CPT | Mod: GP | Performed by: PHYSICAL THERAPIST

## 2025-06-24 RX ORDER — PANTOPRAZOLE SODIUM 40 MG/1
40 TABLET, DELAYED RELEASE ORAL DAILY
Qty: 30 TABLET | Refills: 11 | Status: SHIPPED | OUTPATIENT
Start: 2025-06-24

## 2025-06-24 NOTE — TELEPHONE ENCOUNTER
Last clinic visit: 7/23/24  Next clinic visit: 12/22/25    pantoprazole refill sent to pharmacy.      Last clinic note with Belen Delacruz:     She should continue pantoprazole 40 mg once daily.

## 2025-07-07 ENCOUNTER — LAB (OUTPATIENT)
Dept: LAB | Facility: CLINIC | Age: 85
End: 2025-07-07
Payer: COMMERCIAL

## 2025-07-07 DIAGNOSIS — N18.31 STAGE 3A CHRONIC KIDNEY DISEASE (H): ICD-10-CM

## 2025-07-07 LAB
ALBUMIN MFR UR ELPH: 6.6 MG/DL
ALBUMIN SERPL BCG-MCNC: 4 G/DL (ref 3.5–5.2)
ALBUMIN UR-MCNC: NEGATIVE MG/DL
ANION GAP SERPL CALCULATED.3IONS-SCNC: 9 MMOL/L (ref 7–15)
APPEARANCE UR: CLEAR
BACTERIA #/AREA URNS HPF: ABNORMAL /HPF
BILIRUB UR QL STRIP: NEGATIVE
BUN SERPL-MCNC: 22.6 MG/DL (ref 8–23)
CALCIUM SERPL-MCNC: 9.2 MG/DL (ref 8.8–10.4)
CHLORIDE SERPL-SCNC: 109 MMOL/L (ref 98–107)
COLOR UR AUTO: YELLOW
CREAT SERPL-MCNC: 0.95 MG/DL (ref 0.51–0.95)
CREAT UR-MCNC: 30 MG/DL
CREAT UR-MCNC: 30 MG/DL
EGFRCR SERPLBLD CKD-EPI 2021: 60 ML/MIN/1.73M2
ERYTHROCYTE [DISTWIDTH] IN BLOOD BY AUTOMATED COUNT: 12.9 % (ref 10–15)
GLUCOSE SERPL-MCNC: 164 MG/DL (ref 70–99)
GLUCOSE UR STRIP-MCNC: NEGATIVE MG/DL
HCO3 SERPL-SCNC: 25 MMOL/L (ref 22–29)
HCT VFR BLD AUTO: 35.2 % (ref 35–47)
HGB BLD-MCNC: 11.1 G/DL (ref 11.7–15.7)
HGB UR QL STRIP: NEGATIVE
KETONES UR STRIP-MCNC: NEGATIVE MG/DL
LEUKOCYTE ESTERASE UR QL STRIP: NEGATIVE
MCH RBC QN AUTO: 32.6 PG (ref 26.5–33)
MCHC RBC AUTO-ENTMCNC: 31.5 G/DL (ref 31.5–36.5)
MCV RBC AUTO: 103 FL (ref 78–100)
MICROALBUMIN UR-MCNC: 32.1 MG/L
MICROALBUMIN/CREAT UR: 107 MG/G CR (ref 0–25)
MUCOUS THREADS #/AREA URNS LPF: PRESENT /LPF
NITRATE UR QL: NEGATIVE
PH UR STRIP: 5.5 [PH] (ref 5–7)
PHOSPHATE SERPL-MCNC: 2.9 MG/DL (ref 2.5–4.5)
PLATELET # BLD AUTO: 181 10E3/UL (ref 150–450)
POTASSIUM SERPL-SCNC: 4.9 MMOL/L (ref 3.4–5.3)
PROT/CREAT 24H UR: 0.22 MG/MG CR (ref 0–0.2)
RBC # BLD AUTO: 3.41 10E6/UL (ref 3.8–5.2)
RBC #/AREA URNS AUTO: ABNORMAL /HPF
SODIUM SERPL-SCNC: 143 MMOL/L (ref 135–145)
SP GR UR STRIP: 1.01 (ref 1–1.03)
UROBILINOGEN UR STRIP-ACNC: 0.2 E.U./DL
WBC # BLD AUTO: 3.3 10E3/UL (ref 4–11)
WBC #/AREA URNS AUTO: ABNORMAL /HPF

## 2025-07-07 PROCEDURE — 82043 UR ALBUMIN QUANTITATIVE: CPT

## 2025-07-07 PROCEDURE — 80069 RENAL FUNCTION PANEL: CPT

## 2025-07-07 PROCEDURE — 85027 COMPLETE CBC AUTOMATED: CPT

## 2025-07-07 PROCEDURE — 84156 ASSAY OF PROTEIN URINE: CPT

## 2025-07-07 PROCEDURE — 82570 ASSAY OF URINE CREATININE: CPT

## 2025-07-07 PROCEDURE — 36415 COLL VENOUS BLD VENIPUNCTURE: CPT

## 2025-07-07 PROCEDURE — 81001 URINALYSIS AUTO W/SCOPE: CPT

## 2025-07-08 NOTE — PROGRESS NOTES
[facility] Dermatology Note  Encounter Date: Jul 15, 2025  Office Visit     Dermatology Problem List:  Last skin check 07/15/2025  1. Dermatitis, consider ACD  - TMC 0.1% cream  - prior clobetasol ointment for LSC  2. Seb Derm, well-controlled  - currently head and shoulders with intermittent ketoconazole shampoo weekly  - Previous: fluocinolone oil for scalp  (too messy)  3. Non scarring hair loss  - Future considerations: Rogaine  4. SK, left axilla, s/p shave bx 3/21/2023     # Pertinent PMH: Rheumatoid arthritis and Sjogren's: methotrexate 7.5 mg weekly  - prior: hydroxychloroquine 200 mg daily  ____________________________________________    Assessment & Plan:     # Dermatitis. Ddx adult onset atopic vs ACD/ ICD  - start TMC 0.1% cream BID prn   - Advised daily moisturization with bland emollient.   - If needing to use TMC for more than 1 month straight/in a row, notify me for a non-steroid option (protopic)  - Can consider patch testing if not responding    # Benign lesions - SKs, lentigenes.  - No treatment required    # Multiple benign nevi   - Monitor for ABCDEs of melanoma   - Continue sun protection - recommend SPF 30 or higher with frequent application   - Return sooner if noticing changing or symptomatic lesions     Procedures Performed:   None    Follow-up: 3-4 mo, recheck eczema, sooner if concerns.     Staff and Scribe:     Scribe Disclosure:   I, Lea Rivas, am serving as a scribe; to document services personally performed by Rubina Grey MD -based on data collection and the provider's statements to me.     Provider Disclosure:   The documentation recorded by the scribe accurately reflects the services I personally performed and the decisions made by me.    Rubina Grey MD    Department of Dermatology  [facility]: Phone: [phone], Fax:  [phone]  7/16/2025        ____________________________________________    CC: Skin Check ([facility]/Concerns: upper back itching, red shoulder and elbows. )    HPI:  Ms. Patrick Olson is a(n) 84 year old female who presents today as a return patient for rash follow up.     Reports she has been experiencing some itching on her upper back, has been using cerave lotion on this.   Reports of red dots on her skin after wearing compression stockings.   Has a hx of Sjogren's.  Has a hx of sensitive skin.     Patient is otherwise feeling well, without additional skin concerns.     Labs Reviewed:  N/A    Physical exam:  Vitals: There were no vitals taken for this visit.  GEN: This is a well developed, well-nourished female in no acute distress, in a pleasant mood.    SKIN: Total skin excluding the undergarment areas was performed. The exam included the head/face, neck, both arms, chest, back, abdomen, both legs, digits and/or nails.   - pink eczematous papules and thin plaques, upper shoulders, L elbow  - Multiple regular brown pigmented macules and papules are identified on the trunk and extremities.   - Scattered brown macules on sun exposed areas.  - Waxy stuck on papules and plaques on trunk and extremities.   - No other lesions of concern on areas examined.     Medications:  Current Outpatient Medications   Medication Sig Dispense Refill    acetaminophen (TYLENOL) 325 MG tablet Take 2 tablets (650 mg) by mouth every 6 hours      artificial saliva (BIOTENE DRY MOUTHWASH) LIQD liquid Swish and spit in mouth 4 times daily as needed for dry mouth      calcium carbonate-vitamin D (CALTRATE) 600-10 MG-MCG per tablet Take 1 tablet by mouth daily      carboxymethylcellulose PF (REFRESH PLUS) 0.5 % ophthalmic solution Place 1 drop into both eyes 3 times daily as needed for dry eyes      EPINEPHrine (ANY BX GENERIC EQUIV) 0.3 MG/0.3ML injection 2-pack Inject 0.3 mg into the muscle as needed for anaphylaxis      famotidine  (PEPCID) 20 MG tablet Take 20 mg by mouth 2 times daily. (Patient taking differently: Take 20 mg by mouth 2 times daily as needed.)      gabapentin (NEURONTIN) 300 MG capsule Take 1 capsule (300 mg) by mouth 3 times daily. 270 capsule 11    hydroxychloroquine (PLAQUENIL) 200 MG tablet Take 1 tablet (200 mg) by mouth daily. 90 tablet 0    lifitegrast (XIIDRA) 5 % opthalmic solution Place 1 drop into both eyes 2 times daily      mirtazapine (REMERON) 7.5 MG tablet Take 1 tablet (7.5 mg) by mouth at bedtime. 90 tablet 3    Multiple Vitamins-Minerals (OCUVITE PRESERVISION PO) Take 1 tablet by mouth 2 times daily      pantoprazole (PROTONIX) 40 MG EC tablet Take 1 tablet (40 mg) by mouth daily. 30 tablet 11    UNABLE TO FIND MEDICATION NAME: V (vein formular)      clobetasol (TEMOVATE) 0.05 % external ointment Apply topically 2 times daily To right ankle as needed (Patient not taking: Reported on 7/15/2025) 60 g 3     No current facility-administered medications for this visit.      Past Medical History:   Patient Active Problem List   Diagnosis    SNHL (sensorineural hearing loss)    GI bleed    DDD (degenerative disc disease), lumbosacral    Gastrointestinal hemorrhage, unspecified gastrointestinal hemorrhage type    Diverticular hemorrhage    Acute lower GI bleeding    Diverticulosis of large intestine    Lab test negative for COVID-19 virus    Chronic pain of right elbow    Right wrist pain    Lower GI bleed    Chronic bilateral low back pain with bilateral sciatica    Lower GI bleeding    Anemia, unspecified type    Scoliosis of thoracolumbar spine, unspecified scoliosis type    Facet arthropathy, lumbosacral    Loss of appetite    Lumbar radiculopathy, acute    Protein-calorie malnutrition, unspecified severity    Small bowel arteriovenous malformation    Gastric AVM    Underweight    History of GI bleed    Bilateral hearing loss, unspecified hearing loss type    Complaints of memory disturbance    Rheumatoid  arthritis involving multiple sites with positive rheumatoid factor (H)    Cluneal neuropathy    Iron deficiency    Dyspepsia and disorder of function of stomach    Vertebrogenic low back pain    Greater trochanteric pain syndrome of left lower extremity    Iron deficiency anemia due to chronic blood loss    Hip pain, left     Past Medical History:   Diagnosis Date    Anemia     CKD (chronic kidney disease) stage 3, GFR 30-59 ml/min (H)     Diverticulosis of large intestine     Osteoarthritis     Osteoporosis     Rheumatoid arthritis (H)     Sensorineural hearing loss     Varicose veins of bilateral lower extremities with other complications        CC Essence Tamayo MD  [address] [address] on close of this encounter.

## 2025-07-09 ENCOUNTER — OFFICE VISIT (OUTPATIENT)
Dept: NEPHROLOGY | Facility: CLINIC | Age: 85
End: 2025-07-09
Payer: COMMERCIAL

## 2025-07-09 VITALS
BODY MASS INDEX: 18.14 KG/M2 | DIASTOLIC BLOOD PRESSURE: 60 MMHG | SYSTOLIC BLOOD PRESSURE: 114 MMHG | OXYGEN SATURATION: 100 % | WEIGHT: 102.4 LBS | HEART RATE: 68 BPM

## 2025-07-09 DIAGNOSIS — N18.31 STAGE 3A CHRONIC KIDNEY DISEASE (H): Primary | ICD-10-CM

## 2025-07-09 DIAGNOSIS — M05.79 RHEUMATOID ARTHRITIS INVOLVING MULTIPLE SITES WITH POSITIVE RHEUMATOID FACTOR (H): ICD-10-CM

## 2025-07-09 ASSESSMENT — PAIN SCALES - GENERAL: PAINLEVEL_OUTOF10: NO PAIN (0)

## 2025-07-09 NOTE — PATIENT INSTRUCTIONS
Blood pressure goal is 120-130 /70-80    If higher than 135/80 let me know    Hydrate well, drink 50 ounces if you can

## 2025-07-09 NOTE — PROGRESS NOTES
"Assessment and Plan:  84 year old female with history of CKD stage 3a who returns for followup. She has history of rheumatoid arthritis, diverticulitis, and has had a couple KUSHAL episodes, in setting of GI bleeding.  Her Scr is now near0.93-1.03, eGFR 54-61  CKD3a / borderline2 - Stable. Creat 0.95 eGFR 60 ml/mn. 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 Recurrent GIB - Hgb 11.2 ( 10/31/24)   - Had hospital admission 7/15-7/21/24 for rectal bleeding.Hgb had dropped to 6.8  Per discharge Summary:      \" Received total 5 units of PRBC, 1 unit platelets, 2 units plasma.  **CT on 7/16/24 -No convincing active arterial GI bleed, pancolitis that could be infectious or inflammatory, findings suggestive of blood in descending colon.  **Tagged red blood cell scan on 7/17/24 with no evidence of active gastrointestinal bleeding.  ** Status post flexible sigmoidoscopy 7/17/24 with severe diverticulosis in the rectum, rectosigmoid colon, sigmoid colon, descending colon and transverse colon with no specific source of bleeding identified though blood was found in the entire examined colon.  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 There was residual blood in the colon   - Prescribed Protonix   - seen by GI 7/23/24 for follow up.    - seen by Heme/Onc for her anemia 11/7/24   - Received Injectafer through her PCP    3. CV - Clinic b/ps ~stable/ normal    4. RA - Off Methotrexate and now on Plaquenil. Using " [place]oc[place]ine for dry mouth   - Had Rheum follow up 11/4/24 and Plaquenil continued.     5. Electrolytes - No acute concerns. K 5.0 Na 138     6. Acid base - No acute concerns. Bicarb 26    7. BMD - Ca 9.6  Phos 4.1 Albumin 4.2   Vit D high normal PTH normal- takes multivitamin only        Assessment and plan was discussed with patient and she voiced her understanding and agreement.    Reason for Visit:  CKD3a    HPI:  Ms Bang is an 85 yo female with RA, Recurrent GIB, CKD3 present today for routine CKD follow up   Lasts seen in clinic by me on 6/21/24 .   Baseline creat 1.1-1.2    Home blood pressures 100-110s range  No black or bloody stools  Participating in regular exercise ( walking and exercise class)  No edema  Trying to gain weight. Drinking one high protein supplement per day    Chronic Health Problems:    RA ( previously on Methotrexate)   Recurrent GIB  CKD3   Anemia  Diverticulosis    Family Hx:   No family history on file.    Personal Hx:   , retired [employer] , NS, ETOH none    Allergies:  Allergies   Allergen Reactions    Bee Venom Anaphylaxis    Shrimp Anaphylaxis    Evoxac [Cevimeline] Unknown    Prevnar Swelling     Other reaction(s): Edema    Prevnar [Pneumococcal 13-Linda Conj Vacc] Unknown       Medications:  Current Outpatient Medications   Medication Sig Dispense Refill    acetaminophen (TYLENOL) 325 MG tablet Take 2 tablets (650 mg) by mouth every 6 hours      artificial saliva (BIOTENE DRY MOUTHWASH) LIQD liquid Swish and spit in mouth 4 times daily as needed for dry mouth      calcium carbonate-vitamin D (CALTRATE) 600-10 MG-MCG per tablet Take 1 tablet by mouth daily      carboxymethylcellulose PF (REFRESH PLUS) 0.5 % ophthalmic solution Place 1 drop into both eyes 3 times daily as needed for dry eyes      clobetasol (TEMOVATE) 0.05 % external ointment Apply topically 2 times daily To right ankle as needed 60 g 3    EPINEPHrine (ANY BX GENERIC EQUIV) 0.3 MG/0.3ML injection 2-pack  Inject 0.3 mg into the muscle as needed for anaphylaxis      famotidine (PEPCID) 20 MG tablet Take 20 mg by mouth 2 times daily. (Patient taking differently: Take 20 mg by mouth 2 times daily as needed.)      gabapentin (NEURONTIN) 300 MG capsule Take 1 capsule (300 mg) by mouth 3 times daily. 270 capsule 11    hydroxychloroquine (PLAQUENIL) 200 MG tablet Take 1 tablet (200 mg) by mouth daily. 90 tablet 0    lifitegrast (XIIDRA) 5 % opthalmic solution Place 1 drop into both eyes 2 times daily      mirtazapine (REMERON) 7.5 MG tablet Take 1 tablet (7.5 mg) by mouth at bedtime. 90 tablet 3    Multiple Vitamins-Minerals (OCUVITE PRESERVISION PO) Take 1 tablet by mouth 2 times daily      pantoprazole (PROTONIX) 40 MG EC tablet Take 1 tablet (40 mg) by mouth daily. 30 tablet 11    UNABLE TO FIND MEDICATION NAME: V (vein formular)       No current facility-administered medications for this visit.      Vitals:  BP (!) 143/71   Pulse 68   Wt 46.4 kg (102 lb 6.4 oz)   SpO2 100%   BMI 18.14 kg/m      Exam:  GENERAL APPEARANCE: alert and no distress  RESP: Breathing is non labored. Speaking in full sentences  NEURO: mentation intact and speech normal  PSYCH: affect normal/bright    LABS:   CMP  Recent Labs   Lab Test 07/07/25  0936 04/15/25  0851 01/03/25  1413 10/31/24  1356 10/01/21  0927 01/23/21  0902 01/20/21  0851 01/19/21 2001    143 138 142   < > 143 142 142   POTASSIUM 4.9 4.8 5.0 4.7   < > 3.6 4.7 4.0   CHLORIDE 109* 105 102 105   < > 114* 110* 108   CO2 25 27 26 27   < > 26 28 30   ANIONGAP 9 11 10 10   < > 3 4 3   * 100* 96 95   < > 157* 95 121*   BUN 22.6 31.0* 39.4* 26.4*   < > 6* 17 24   CR 0.95 0.93 1.03* 0.96*   < > 0.75 0.82 0.94   GFRESTIMATED 60* 61 54* 59*   < > 76 69 58*   GFRESTBLACK  --   --   --   --   --  88 80 67   EJ 9.2 10.0 9.6 9.8   < > 8.0* 8.8 9.4    < > = values in this interval not displayed.     Recent Labs   Lab Test 06/11/25  1418 10/31/24  1356 09/12/24  3706  07/18/24  0828 07/16/24  0704   BILITOTAL  --  0.3 0.2 0.6 0.5   ALKPHOS  --  83 73 55 56   ALT 23 30 17 15 16   AST 31 40 32 30 23     CBC  Recent Labs   Lab Test 07/07/25  0936 05/01/25  1554 04/15/25  0851 10/31/24  1356   HGB 11.1* 11.3* 12.1 11.2*   WBC 3.3* 4.1 4.1 4.0   RBC 3.41* 3.41* 3.67* 3.55*   HCT 35.2 35.3 38.3 35.7   * 104* 104* 101*   MCH 32.6 33.1* 33.0 31.5   MCHC 31.5 32.0 31.6 31.4*   RDW 12.9 12.8 13.3 12.9    228 208 202     URINE STUDIES  Recent Labs   Lab Test 07/07/25  1749 09/01/23  1229 08/21/23  1019 08/13/23  1017 02/07/23  1000   COLOR Yellow Light Yellow Yellow Straw Yellow   APPEARANCE Clear Clear Clear Clear Clear   URINEGLC Negative Negative Negative Negative Negative   URINEBILI Negative Negative Negative Negative Negative   URINEKETONE Negative Negative Trace* Negative Negative   SG 1.010 1.009 1.015 1.009 1.015   UBLD Negative Negative Trace* Negative Small*   URINEPH 5.5 7.0 6.5 5.0 6.0   PROTEIN Negative Negative Negative Negative Negative   UROBILINOGEN 0.2  --  0.2  --  0.2   NITRITE Negative Negative Negative Negative Negative   LEUKEST Negative Negative Small* Negative Negative   RBCU None Seen 1 0-2 <1 0-2   WBCU 0-5 2 5-10* 1 0-5     Recent Labs   Lab Test 03/10/22  1420   UTPG 0.27*     PTH  Recent Labs   Lab Test 05/02/24  1415 08/21/23  1014 02/07/23  1000   PTHI 41 38 43     IRON STUDIES  Recent Labs   Lab Test 05/01/25  1554 04/15/25  0851 01/03/25  1413 10/31/24  1356 09/12/24  1608 05/02/24  1415   IRON 75  --   --  77  --  57   *  --   --  343  --  267   IRONSAT 36  --   --  22  --  21   ANMOL 855* 1,188* 57 34   < > 69    < > = values in this interval not displayed.     Aurora Hannon MD  Associate Professor of Medicine  Department of Nephrology  [facility]

## 2025-07-15 ENCOUNTER — OFFICE VISIT (OUTPATIENT)
Dept: DERMATOLOGY | Facility: CLINIC | Age: 85
End: 2025-07-15
Payer: COMMERCIAL

## 2025-07-15 DIAGNOSIS — D22.9 MULTIPLE BENIGN NEVI: ICD-10-CM

## 2025-07-15 DIAGNOSIS — L81.4 SOLAR LENTIGO: ICD-10-CM

## 2025-07-15 DIAGNOSIS — L82.1 SEBORRHEIC KERATOSES: ICD-10-CM

## 2025-07-15 DIAGNOSIS — L30.9 DERMATITIS: ICD-10-CM

## 2025-07-15 DIAGNOSIS — Z12.83 SKIN CANCER SCREENING: Primary | ICD-10-CM

## 2025-07-15 RX ORDER — TRIAMCINOLONE ACETONIDE 1 MG/G
CREAM TOPICAL 2 TIMES DAILY
Qty: 80 G | Refills: 1 | Status: SHIPPED | OUTPATIENT
Start: 2025-07-15

## 2025-07-15 NOTE — LETTER
7/15/2025      Patrick Olson  [address]  [facility] [address]      Dear Colleague,    Thank you for referring your patient, Patrick Olson, to the [facility]. Please see a copy of my visit note below.    [facility] Dermatology Note  Encounter Date: Jul 15, 2025  Office Visit     Dermatology Problem List:  Last skin check 07/15/2025  1. Dermatitis, consider ACD  - TMC 0.1% cream  - prior clobetasol ointment for LSC  2. Seb Derm, well-controlled  - currently head and shoulders with intermittent ketoconazole shampoo weekly  - Previous: fluocinolone oil for scalp  (too messy)  3. Non scarring hair loss  - Future considerations: Rogaine  4. SK, left axilla, s/p shave bx 3/21/2023     # Pertinent PMH: Rheumatoid arthritis and Sjogren's: methotrexate 7.5 mg weekly  - prior: hydroxychloroquine 200 mg daily  ____________________________________________    Assessment & Plan:     # Dermatitis. Ddx adult onset atopic vs ACD/ ICD  - start TMC 0.1% cream BID prn   - Advised daily moisturization with bland emollient.   - If needing to use TMC for more than 1 month straight/in a row, notify me for a non-steroid option (protopic)  - Can consider patch testing if not responding    # Benign lesions - SKs, lentigenes.  - No treatment required    # Multiple benign nevi   - Monitor for ABCDEs of melanoma   - Continue sun protection - recommend SPF 30 or higher with frequent application   - Return sooner if noticing changing or symptomatic lesions     Procedures Performed:   None    Follow-up: 3-4 mo, recheck eczema, sooner if concerns.     Staff and Scribe:     Scribe Disclosure:   I, Lea Rivas, am serving as a scribe; to document services personally performed by Rubina Grey MD -based on data collection and the provider's statements to me.     Provider Disclosure:   The documentation recorded by the scribe accurately reflects the services I personally performed and  the decisions made by me.    Rubina Grey MD    Department of Dermatology  [facility]: Phone: [phone], Fax: [phone]  7/16/2025        ____________________________________________    CC: Skin Check (FBSC/Concerns: upper back itching, red shoulder and elbows. )    HPI:  Ms. Patrick Olson is a(n) 84 year old female who presents today as a return patient for rash follow up.     Reports she has been experiencing some itching on her upper back, has been using cerave lotion on this.   Reports of red dots on her skin after wearing compression stockings.   Has a hx of Sjogren's.  Has a hx of sensitive skin.     Patient is otherwise feeling well, without additional skin concerns.     Labs Reviewed:  N/A    Physical exam:  Vitals: There were no vitals taken for this visit.  GEN: This is a well developed, well-nourished female in no acute distress, in a pleasant mood.    SKIN: Total skin excluding the undergarment areas was performed. The exam included the head/face, neck, both arms, chest, back, abdomen, both legs, digits and/or nails.   - pink eczematous papules and thin plaques, upper shoulders, L elbow  - Multiple regular brown pigmented macules and papules are identified on the trunk and extremities.   - Scattered brown macules on sun exposed areas.  - Waxy stuck on papules and plaques on trunk and extremities.   - No other lesions of concern on areas examined.     Medications:  Current Outpatient Medications   Medication Sig Dispense Refill     acetaminophen (TYLENOL) 325 MG tablet Take 2 tablets (650 mg) by mouth every 6 hours       artificial saliva (BIOTENE DRY MOUTHWASH) LIQD liquid Swish and spit in mouth 4 times daily as needed for dry mouth       calcium carbonate-vitamin D (CALTRATE) 600-10 MG-MCG per tablet Take 1 tablet by mouth daily       carboxymethylcellulose PF (REFRESH PLUS) 0.5 % ophthalmic solution  Place 1 drop into both eyes 3 times daily as needed for dry eyes       EPINEPHrine (ANY BX GENERIC EQUIV) 0.3 MG/0.3ML injection 2-pack Inject 0.3 mg into the muscle as needed for anaphylaxis       famotidine (PEPCID) 20 MG tablet Take 20 mg by mouth 2 times daily. (Patient taking differently: Take 20 mg by mouth 2 times daily as needed.)       gabapentin (NEURONTIN) 300 MG capsule Take 1 capsule (300 mg) by mouth 3 times daily. 270 capsule 11     hydroxychloroquine (PLAQUENIL) 200 MG tablet Take 1 tablet (200 mg) by mouth daily. 90 tablet 0     lifitegrast (XIIDRA) 5 % opthalmic solution Place 1 drop into both eyes 2 times daily       mirtazapine (REMERON) 7.5 MG tablet Take 1 tablet (7.5 mg) by mouth at bedtime. 90 tablet 3     Multiple Vitamins-Minerals (OCUVITE PRESERVISION PO) Take 1 tablet by mouth 2 times daily       pantoprazole (PROTONIX) 40 MG EC tablet Take 1 tablet (40 mg) by mouth daily. 30 tablet 11     UNABLE TO FIND MEDICATION NAME: V (vein formular)       clobetasol (TEMOVATE) 0.05 % external ointment Apply topically 2 times daily To right ankle as needed (Patient not taking: Reported on 7/15/2025) 60 g 3     No current facility-administered medications for this visit.      Past Medical History:   Patient Active Problem List   Diagnosis     SNHL (sensorineural hearing loss)     GI bleed     DDD (degenerative disc disease), lumbosacral     Gastrointestinal hemorrhage, unspecified gastrointestinal hemorrhage type     Diverticular hemorrhage     Acute lower GI bleeding     Diverticulosis of large intestine     Lab test negative for COVID-19 virus     Chronic pain of right elbow     Right wrist pain     Lower GI bleed     Chronic bilateral low back pain with bilateral sciatica     Lower GI bleeding     Anemia, unspecified type     Scoliosis of thoracolumbar spine, unspecified scoliosis type     Facet arthropathy, lumbosacral     Loss of appetite     Lumbar radiculopathy, acute     Protein-calorie  malnutrition, unspecified severity     Small bowel arteriovenous malformation     Gastric AVM     Underweight     History of GI bleed     Bilateral hearing loss, unspecified hearing loss type     Complaints of memory disturbance     Rheumatoid arthritis involving multiple sites with positive rheumatoid factor (H)     Cluneal neuropathy     Iron deficiency     Dyspepsia and disorder of function of stomach     Vertebrogenic low back pain     Greater trochanteric pain syndrome of left lower extremity     Iron deficiency anemia due to chronic blood loss     Hip pain, left     Past Medical History:   Diagnosis Date     Anemia      CKD (chronic kidney disease) stage 3, GFR 30-59 ml/min (H)      Diverticulosis of large intestine      Osteoarthritis      Osteoporosis      Rheumatoid arthritis (H)      Sensorineural hearing loss      Varicose veins of bilateral lower extremities with other complications        CC Essence Tamayo MD  [address] [address] on close of this encounter.      Again, thank you for allowing me to participate in the care of your patient.        Sincerely,        Rubina Grey MD    Electronically signed

## 2025-07-15 NOTE — PATIENT INSTRUCTIONS
Gentle skin care recommenations  Moisturizers: vaseline, vanicream, cerave  Body wash: dove sensitive bar soap  Shampoo/conditioner: vanicream   Hand soap/dish soap: 7th generation  Laundry detergent: all free and clear      Good moisturizer to all skin  Twice daily as needed can use triamcinolone cream  If needing to use more than 1 month straight/in a row, notify me for a non-steroid option (protopic)  Can consider patch testing if not responding              Proper skin care from [facility]:    -Eliminate harsh soaps as they strip the natural oils from the skin, often resulting in dry itchy skin ( i.e. Dial, Zest, Aide Spring)  -Use mild soaps such as Cetaphil or Dove Sensitive Skin in the shower. You do not need to use soap on arms, legs, and trunk every time you shower unless visibly soiled.   -Avoid hot or cold showers.  -After showering, lightly (pat) dry off and apply moisturizing within 2-3 minutes. This will help trap moisture in the skin.   -Aggressive use of a moisturizer at least 1-2 times a day to the entire body (including -Vanicream, Cetaphil, Aquaphor or Cerave) and moisturize hands after every washing.  -We recommend using moisturizers that come in a tub that needs to be scooped out, not a pump. This has more of an oil base. It will hold moisture in your skin much better than a water base moisturizer. The above recommended are non-pore clogging.      Wear a sunscreen with at least SPF 30 on your face, ears, neck and V of the chest daily. Wear sunscreen on other areas of the body if those areas are exposed to the sun throughout the day. Sunscreens can contain physical and/or chemical blockers. Physical blockers are less likely to clog pores, these include zinc oxide and titanium dioxide. Reapply every two hour and after swimming.     Sunscreen examples: https://www.ewg.org/sunscreen/    UV radiation  UVA radiation remains constant throughout the day and throughout the year. It is a longer  wavelength than UVB and therefore penetrates deeper into the skin leading to immediate and delayed tanning, photoaging, and skin cancer. 70-80% of UVA and UVB radiation occurs between the hours of 10am-2pm.  UVB radiation  UVB radiation causes the most harmful effects and is more significant during the summer months. However, snow and ice can reflect UVB radiation leading to skin damage during the winter months as well. UVB radiation is responsible for tanning, burning, inflammation, delayed erythema (pinkness), pigmentation (brown spots), and skin cancer.     I recommend self monthly full body exams and yearly full body exams with a dermatology provider. If you develop a new or changing lesion please follow up for examination. Most skin cancers are pink and scaly or pink and pearly. However, we do see blue/brown/black skin cancers.  Consider the ABCDEs of melanoma when giving yourself your monthly full body exam ( don't forget the groin, buttocks, feet, toes, etc). A-asymmetry, B-borders, C-color, D-diameter, E-elevation or evolving. If you see any of these changes please follow up in clinic. If you cannot see your back I recommend purchasing a hand held mirror to use with a larger wall mirror.       Checking for Skin Cancer  You can find cancer early by checking your skin each month. There are 3 kinds of skin cancer. They are melanoma, basal cell carcinoma, and squamous cell carcinoma. Doing monthly skin checks is the best way to find new marks or skin changes. Follow the instructions below for checking your skin.   The ABCDEs of checking moles for melanoma   Check your moles or growths for signs of melanoma using ABCDE:   Asymmetry: the sides of the mole or growth don t match  Border: the edges are ragged, notched, or blurred  Color: the color within the mole or growth varies  Diameter: the mole or growth is larger than 6 mm (size of a pencil eraser)  Evolving: the size, shape, or color of the mole or growth is  changing (evolving is not shown in the images below)    Checking for other types of skin cancer  Basal cell carcinoma or squamous cell carcinoma have symptoms such as:     A spot or mole that looks different from all other marks on your skin  Changes in how an area feels, such as itching, tenderness, or pain  Changes in the skin's surface, such as oozing, bleeding, or scaliness  A sore that does not heal  New swelling or redness beyond the border of a mole    Who s at risk?  Anyone can get skin cancer. But you are at greater risk if you have:   Fair skin, light-colored hair, or light-colored eyes  Many moles or abnormal moles on your skin  A history of sunburns from sunlight or tanning beds  A family history of skin cancer  A history of exposure to radiation or chemicals  A weakened immune system  If you have had skin cancer in the past, you are at risk for recurring skin cancer.   How to check your skin  Do your monthly skin checkups in front of a full-length mirror. Check all parts of your body, including your:   Head (ears, face, neck, and scalp)  Torso (front, back, and sides)  Arms (tops, undersides, upper, and lower armpits)  Hands (palms, backs, and fingers, including under the nails)  Buttocks and genitals  Legs (front, back, and sides)  Feet (tops, soles, toes, including under the nails, and between toes)  If you have a lot of moles, take digital photos of them each month. Make sure to take photos both up close and from a distance. These can help you see if any moles change over time.   Most skin changes are not cancer. But if you see any changes in your skin, call your doctor right away. Only he or she can diagnose a problem. If you have skin cancer, seeing your doctor can be the first step toward getting the treatment that could save your life.   [employer] last reviewed this educational content on 4/1/2019 2000-2020 [employer]. [address] [facility], [place] [address]. All rights  reserved. This information is not intended as a substitute for professional medical care. Always follow your healthcare professional's instructions.       When should I call my doctor?  If you are worsening or not improving, please, contact us or seek urgent care as noted below.     Who should I call with questions (adults)?    [facility] [place] [phone]  For urgent needs outside of business hours call the [facility] at [phone] and ask for the dermatology resident on call to be paged  If this is a medical emergency and you are unable to reach an ER, Call [phone]      If you need a prescription refill, please contact your pharmacy. Refills are approved or denied by our Physicians during normal business hours, Monday through Friday.  Per office policy, refills will not be granted if you have not been seen within the past year (or sooner depending on the condition).

## 2025-08-12 ENCOUNTER — ANCILLARY PROCEDURE (OUTPATIENT)
Dept: MRI IMAGING | Facility: CLINIC | Age: 85
End: 2025-08-12
Attending: INTERNAL MEDICINE
Payer: COMMERCIAL

## 2025-08-12 DIAGNOSIS — K86.2 PANCREAS CYST: ICD-10-CM

## 2025-08-12 PROCEDURE — 74183 MRI ABD W/O CNTR FLWD CNTR: CPT

## 2025-08-12 PROCEDURE — A9581 GADOXETATE DISODIUM INJ: HCPCS | Performed by: INTERNAL MEDICINE

## 2025-08-12 PROCEDURE — 255N000002 HC RX 255 OP 636: Performed by: INTERNAL MEDICINE

## 2025-08-12 RX ADMIN — GADOXETATE DISODIUM 9 ML: 181.43 INJECTION, SOLUTION INTRAVENOUS at 11:07

## 2025-08-18 ENCOUNTER — TELEPHONE (OUTPATIENT)
Dept: DERMATOLOGY | Facility: CLINIC | Age: 85
End: 2025-08-18
Payer: COMMERCIAL

## 2025-08-18 ENCOUNTER — OFFICE VISIT (OUTPATIENT)
Dept: GASTROENTEROLOGY | Facility: CLINIC | Age: 85
End: 2025-08-18
Attending: INTERNAL MEDICINE
Payer: COMMERCIAL

## 2025-08-18 VITALS
HEIGHT: 63 IN | TEMPERATURE: 97.6 F | SYSTOLIC BLOOD PRESSURE: 124 MMHG | RESPIRATION RATE: 16 BRPM | DIASTOLIC BLOOD PRESSURE: 68 MMHG | HEART RATE: 71 BPM | BODY MASS INDEX: 18.23 KG/M2 | WEIGHT: 102.9 LBS | OXYGEN SATURATION: 98 %

## 2025-08-18 DIAGNOSIS — K86.2 PANCREAS CYST: Primary | ICD-10-CM

## 2025-08-18 DIAGNOSIS — L30.9 DERMATITIS: Primary | ICD-10-CM

## 2025-08-18 PROCEDURE — G0463 HOSPITAL OUTPT CLINIC VISIT: HCPCS | Performed by: INTERNAL MEDICINE

## 2025-08-18 ASSESSMENT — PAIN SCALES - GENERAL: PAINLEVEL_OUTOF10: MODERATE PAIN (4)

## 2025-08-19 RX ORDER — FLUOCINONIDE 0.5 MG/G
CREAM TOPICAL 2 TIMES DAILY
Qty: 60 G | Refills: 1 | Status: SHIPPED | OUTPATIENT
Start: 2025-08-19

## 2025-08-19 RX ORDER — TACROLIMUS 1 MG/G
OINTMENT TOPICAL 2 TIMES DAILY
Qty: 60 G | Refills: 3 | Status: SHIPPED | OUTPATIENT
Start: 2025-08-19

## 2025-08-20 ENCOUNTER — TELEPHONE (OUTPATIENT)
Dept: GASTROENTEROLOGY | Facility: CLINIC | Age: 85
End: 2025-08-20
Payer: COMMERCIAL

## (undated) DEVICE — SNARE CAPIVATOR II POLYPECTOMY 25X240MM M00561190

## (undated) DEVICE — KIT ENDO FIRST STEP DISINFECTANT 200ML W/POUCH EP-4

## (undated) DEVICE — ENDO BITE BLOCK ADULT OMNI-BLOC

## (undated) DEVICE — ENDO DEVICE LOCKING AND BIOPSY CAP M00545261

## (undated) DEVICE — PACK ENDOSCOPY GI CUSTOM UMMC

## (undated) DEVICE — TUBING SUCTION 12"X1/4" N612

## (undated) DEVICE — ENDO TUBING CO2 SMARTCAP STERILE DISP 100145CO2EXT

## (undated) DEVICE — SUCTION MANIFOLD NEPTUNE 2 SYS 1 PORT 702-025-000

## (undated) DEVICE — SPECIMEN TRAP POLYP 4 CHAMBER EZ710202 EZ710202

## (undated) DEVICE — KIT CONNECTOR FOR OLYMPUS ENDOSCOPES DEFENDO 100310

## (undated) DEVICE — SNARE CAPIVATOR ROUND COLD SNR BX10 M00561101

## (undated) DEVICE — GOWN IMPERVIOUS 2XL BLUE

## (undated) DEVICE — INSTINCT PLUS ENDOSCOPIC CLIPPING DEVICE

## (undated) DEVICE — SOL WATER IRRIG 1000ML BOTTLE 2F7114

## (undated) DEVICE — SUCTION MANIFOLD NEPTUNE 2 SYS 4 PORT 0702-020-000

## (undated) DEVICE — SPECIMEN CONTAINER 3OZ W/FORMALIN 59901

## (undated) DEVICE — KIT ENDO TURNOVER/PROCEDURE CARRY-ON 101822

## (undated) DEVICE — DEVICE RETRIEVAL ROTH NET PLATINUM UNIV 2.5MMX230CM 00715050

## (undated) DEVICE — CLIP HEMOCLIP ENDOSCOPIC INSTINCT 2.8X230CM INSC-7-230-SS

## (undated) DEVICE — SOL WATER IRRIG 500ML BOTTLE 2F7113

## (undated) DEVICE — ENDO CAP AND TUBING STERILE FOR ENDOGATOR  100130

## (undated) DEVICE — PAD CHUX UNDERPAD 23X24" 7136

## (undated) DEVICE — SYR ORISE GEL 10ML M00519201

## (undated) DEVICE — NDL SCLEROTHERAPY 25GA CARR-LOCK  00711811

## (undated) DEVICE — SNARE CAPIVATOR II POLYPECTOMY 10X240MM M00561220

## (undated) DEVICE — ENDO SNARE POLYPECTOMY SPIRAL 20MM LOOP SD-230U-20

## (undated) DEVICE — SYR ENDO MARKER SPOT GI 5ML GIS-45

## (undated) RX ORDER — LIDOCAINE HYDROCHLORIDE 10 MG/ML
INJECTION, SOLUTION EPIDURAL; INFILTRATION; INTRACAUDAL; PERINEURAL
Status: DISPENSED
Start: 2023-03-14

## (undated) RX ORDER — BUPIVACAINE HYDROCHLORIDE 5 MG/ML
INJECTION, SOLUTION EPIDURAL; INTRACAUDAL
Status: DISPENSED
Start: 2022-02-07

## (undated) RX ORDER — SIMETHICONE 40MG/0.6ML
SUSPENSION, DROPS(FINAL DOSAGE FORM)(ML) ORAL
Status: DISPENSED
Start: 2023-02-14

## (undated) RX ORDER — LIDOCAINE HYDROCHLORIDE 10 MG/ML
INJECTION, SOLUTION EPIDURAL; INFILTRATION; INTRACAUDAL; PERINEURAL
Status: DISPENSED
Start: 2022-05-17

## (undated) RX ORDER — FENTANYL CITRATE 50 UG/ML
INJECTION, SOLUTION INTRAMUSCULAR; INTRAVENOUS
Status: DISPENSED
Start: 2023-08-14

## (undated) RX ORDER — SIMETHICONE 40MG/0.6ML
SUSPENSION, DROPS(FINAL DOSAGE FORM)(ML) ORAL
Status: DISPENSED
Start: 2023-08-14

## (undated) RX ORDER — BETAMETHASONE SODIUM PHOSPHATE AND BETAMETHASONE ACETATE 3; 3 MG/ML; MG/ML
INJECTION, SUSPENSION INTRA-ARTICULAR; INTRALESIONAL; INTRAMUSCULAR; SOFT TISSUE
Status: DISPENSED
Start: 2023-03-14

## (undated) RX ORDER — LIDOCAINE HYDROCHLORIDE 10 MG/ML
INJECTION, SOLUTION INFILTRATION; PERINEURAL
Status: DISPENSED
Start: 2021-01-22

## (undated) RX ORDER — SIMETHICONE 40MG/0.6ML
SUSPENSION, DROPS(FINAL DOSAGE FORM)(ML) ORAL
Status: DISPENSED
Start: 2021-11-18

## (undated) RX ORDER — METHYLPREDNISOLONE ACETATE 80 MG/ML
INJECTION, SUSPENSION INTRA-ARTICULAR; INTRALESIONAL; INTRAMUSCULAR; SOFT TISSUE
Status: DISPENSED
Start: 2018-07-20

## (undated) RX ORDER — LIDOCAINE HYDROCHLORIDE 10 MG/ML
INJECTION, SOLUTION EPIDURAL; INFILTRATION; INTRACAUDAL; PERINEURAL
Status: DISPENSED
Start: 2018-07-20

## (undated) RX ORDER — FENTANYL CITRATE 50 UG/ML
INJECTION, SOLUTION INTRAMUSCULAR; INTRAVENOUS
Status: DISPENSED
Start: 2021-01-22

## (undated) RX ORDER — FENTANYL CITRATE 50 UG/ML
INJECTION, SOLUTION INTRAMUSCULAR; INTRAVENOUS
Status: DISPENSED
Start: 2021-01-23

## (undated) RX ORDER — BETAMETHASONE SODIUM PHOSPHATE AND BETAMETHASONE ACETATE 3; 3 MG/ML; MG/ML
INJECTION, SUSPENSION INTRA-ARTICULAR; INTRALESIONAL; INTRAMUSCULAR; SOFT TISSUE
Status: DISPENSED
Start: 2022-05-17

## (undated) RX ORDER — DEXAMETHASONE SODIUM PHOSPHATE 10 MG/ML
INJECTION, SOLUTION INTRAMUSCULAR; INTRAVENOUS
Status: DISPENSED
Start: 2020-02-18

## (undated) RX ORDER — BUPIVACAINE HYDROCHLORIDE 5 MG/ML
INJECTION, SOLUTION EPIDURAL; INTRACAUDAL
Status: DISPENSED
Start: 2018-07-20

## (undated) RX ORDER — METHYLPREDNISOLONE ACETATE 80 MG/ML
INJECTION, SUSPENSION INTRA-ARTICULAR; INTRALESIONAL; INTRAMUSCULAR; SOFT TISSUE
Status: DISPENSED
Start: 2022-02-07

## (undated) RX ORDER — TRIAMCINOLONE ACETONIDE 40 MG/ML
INJECTION, SUSPENSION INTRA-ARTICULAR; INTRAMUSCULAR
Status: DISPENSED
Start: 2018-05-17

## (undated) RX ORDER — FENTANYL CITRATE 50 UG/ML
INJECTION, SOLUTION INTRAMUSCULAR; INTRAVENOUS
Status: DISPENSED
Start: 2024-07-17

## (undated) RX ORDER — DIPHENHYDRAMINE HYDROCHLORIDE 50 MG/ML
INJECTION INTRAMUSCULAR; INTRAVENOUS
Status: DISPENSED
Start: 2017-03-14

## (undated) RX ORDER — BUPIVACAINE HYDROCHLORIDE 2.5 MG/ML
INJECTION, SOLUTION EPIDURAL; INFILTRATION; INTRACAUDAL
Status: DISPENSED
Start: 2018-05-17

## (undated) RX ORDER — FENTANYL CITRATE 50 UG/ML
INJECTION, SOLUTION INTRAMUSCULAR; INTRAVENOUS
Status: DISPENSED
Start: 2017-03-14

## (undated) RX ORDER — LIDOCAINE HYDROCHLORIDE 10 MG/ML
INJECTION, SOLUTION EPIDURAL; INFILTRATION; INTRACAUDAL; PERINEURAL
Status: DISPENSED
Start: 2022-02-07

## (undated) RX ORDER — FENTANYL CITRATE 50 UG/ML
INJECTION, SOLUTION INTRAMUSCULAR; INTRAVENOUS
Status: DISPENSED
Start: 2021-11-20

## (undated) RX ORDER — LIDOCAINE HYDROCHLORIDE 10 MG/ML
INJECTION, SOLUTION EPIDURAL; INFILTRATION; INTRACAUDAL; PERINEURAL
Status: DISPENSED
Start: 2018-05-17

## (undated) RX ORDER — LIDOCAINE HYDROCHLORIDE 10 MG/ML
INJECTION, SOLUTION EPIDURAL; INFILTRATION; INTRACAUDAL; PERINEURAL
Status: DISPENSED
Start: 2020-02-18

## (undated) RX ORDER — EPINEPHRINE 0.1 MG/ML
INJECTION INTRAVENOUS
Status: DISPENSED
Start: 2023-08-14

## (undated) RX ORDER — FENTANYL CITRATE 50 UG/ML
INJECTION, SOLUTION INTRAMUSCULAR; INTRAVENOUS
Status: DISPENSED
Start: 2023-02-06

## (undated) RX ORDER — HEPARIN SODIUM 200 [USP'U]/100ML
INJECTION, SOLUTION INTRAVENOUS
Status: DISPENSED
Start: 2021-01-22

## (undated) RX ORDER — NITROGLYCERIN 5 MG/ML
VIAL (ML) INTRAVENOUS
Status: DISPENSED
Start: 2021-01-22